# Patient Record
Sex: FEMALE | Race: WHITE | NOT HISPANIC OR LATINO | Employment: UNEMPLOYED | URBAN - METROPOLITAN AREA
[De-identification: names, ages, dates, MRNs, and addresses within clinical notes are randomized per-mention and may not be internally consistent; named-entity substitution may affect disease eponyms.]

---

## 2017-02-14 ENCOUNTER — PATIENT OUTREACH (OUTPATIENT)
Dept: ADMINISTRATIVE | Facility: HOSPITAL | Age: 42
End: 2017-02-14
Payer: COMMERCIAL

## 2017-02-14 DIAGNOSIS — G43.019 INTRACTABLE MIGRAINE WITHOUT AURA AND WITHOUT STATUS MIGRAINOSUS: Primary | ICD-10-CM

## 2017-02-14 RX ORDER — OXYCODONE AND ACETAMINOPHEN 10; 325 MG/1; MG/1
1 TABLET ORAL EVERY 6 HOURS PRN
Qty: 15 TABLET | Refills: 0 | Status: SHIPPED | OUTPATIENT
Start: 2017-02-14 | End: 2017-11-27 | Stop reason: SDUPTHER

## 2017-02-14 NOTE — PROGRESS NOTES
called indicating the patient had intractable headache pain for several days.  Percocet was requested and prescription was sent to the pharmacy.

## 2017-02-28 ENCOUNTER — DOCUMENTATION ONLY (OUTPATIENT)
Dept: INTERNAL MEDICINE | Facility: CLINIC | Age: 42
End: 2017-02-28

## 2017-03-03 ENCOUNTER — LAB VISIT (OUTPATIENT)
Dept: LAB | Facility: HOSPITAL | Age: 42
End: 2017-03-03
Attending: INTERNAL MEDICINE
Payer: COMMERCIAL

## 2017-03-03 DIAGNOSIS — R53.83 FATIGUE, UNSPECIFIED TYPE: ICD-10-CM

## 2017-03-03 DIAGNOSIS — E55.9 VITAMIN D DEFICIENCY: ICD-10-CM

## 2017-03-03 DIAGNOSIS — R42 DIZZINESS: ICD-10-CM

## 2017-03-03 DIAGNOSIS — R42 DIZZINESS: Primary | ICD-10-CM

## 2017-03-03 LAB
25(OH)D3+25(OH)D2 SERPL-MCNC: 26 NG/ML
ALBUMIN SERPL BCP-MCNC: 4 G/DL
ALP SERPL-CCNC: 43 U/L
ALT SERPL W/O P-5'-P-CCNC: 16 U/L
ANION GAP SERPL CALC-SCNC: 8 MMOL/L
AST SERPL-CCNC: 18 U/L
BASOPHILS # BLD AUTO: 0.02 K/UL
BASOPHILS NFR BLD: 0.4 %
BILIRUB SERPL-MCNC: 0.6 MG/DL
BUN SERPL-MCNC: 17 MG/DL
CALCIUM SERPL-MCNC: 9.2 MG/DL
CHLORIDE SERPL-SCNC: 104 MMOL/L
CO2 SERPL-SCNC: 26 MMOL/L
CREAT SERPL-MCNC: 0.7 MG/DL
DIFFERENTIAL METHOD: ABNORMAL
EOSINOPHIL # BLD AUTO: 0.1 K/UL
EOSINOPHIL NFR BLD: 1.3 %
ERYTHROCYTE [DISTWIDTH] IN BLOOD BY AUTOMATED COUNT: 13.9 %
EST. GFR  (AFRICAN AMERICAN): >60 ML/MIN/1.73 M^2
EST. GFR  (NON AFRICAN AMERICAN): >60 ML/MIN/1.73 M^2
GLUCOSE SERPL-MCNC: 78 MG/DL
HCT VFR BLD AUTO: 37.2 %
HGB BLD-MCNC: 11.8 G/DL
LYMPHOCYTES # BLD AUTO: 2 K/UL
LYMPHOCYTES NFR BLD: 37.5 %
MCH RBC QN AUTO: 24.6 PG
MCHC RBC AUTO-ENTMCNC: 31.7 %
MCV RBC AUTO: 78 FL
MONOCYTES # BLD AUTO: 0.3 K/UL
MONOCYTES NFR BLD: 5.1 %
NEUTROPHILS # BLD AUTO: 3 K/UL
NEUTROPHILS NFR BLD: 55.5 %
PLATELET # BLD AUTO: 291 K/UL
PMV BLD AUTO: 9.7 FL
POTASSIUM SERPL-SCNC: 4.2 MMOL/L
PROT SERPL-MCNC: 7.2 G/DL
RBC # BLD AUTO: 4.8 M/UL
SODIUM SERPL-SCNC: 138 MMOL/L
TSH SERPL DL<=0.005 MIU/L-ACNC: 1.41 UIU/ML
WBC # BLD AUTO: 5.31 K/UL

## 2017-03-03 PROCEDURE — 84443 ASSAY THYROID STIM HORMONE: CPT

## 2017-03-03 PROCEDURE — 36415 COLL VENOUS BLD VENIPUNCTURE: CPT | Mod: PO

## 2017-03-03 PROCEDURE — 80053 COMPREHEN METABOLIC PANEL: CPT

## 2017-03-03 PROCEDURE — 82306 VITAMIN D 25 HYDROXY: CPT

## 2017-03-03 PROCEDURE — 85025 COMPLETE CBC W/AUTO DIFF WBC: CPT

## 2017-04-18 ENCOUNTER — TELEPHONE (OUTPATIENT)
Dept: CARDIOLOGY | Facility: CLINIC | Age: 42
End: 2017-04-18

## 2017-04-18 DIAGNOSIS — R10.13 ABDOMINAL PAIN, EPIGASTRIC: Primary | ICD-10-CM

## 2017-04-18 DIAGNOSIS — R10.9 ABDOMINAL PAIN, UNSPECIFIED LOCATION: Primary | ICD-10-CM

## 2017-04-20 ENCOUNTER — TELEPHONE (OUTPATIENT)
Dept: RADIOLOGY | Facility: HOSPITAL | Age: 42
End: 2017-04-20

## 2017-04-28 ENCOUNTER — HOSPITAL ENCOUNTER (OUTPATIENT)
Dept: RADIOLOGY | Facility: HOSPITAL | Age: 42
Discharge: HOME | End: 2017-04-28
Attending: INTERNAL MEDICINE
Payer: COMMERCIAL

## 2017-04-28 DIAGNOSIS — R10.13 ABDOMINAL PAIN, EPIGASTRIC: ICD-10-CM

## 2017-04-28 PROCEDURE — 76700 US EXAM ABDOM COMPLETE: CPT | Mod: 26,,, | Performed by: RADIOLOGY

## 2017-04-28 PROCEDURE — 76700 US EXAM ABDOM COMPLETE: CPT | Mod: TC,PO

## 2017-05-01 ENCOUNTER — TELEPHONE (OUTPATIENT)
Dept: GASTROENTEROLOGY | Facility: CLINIC | Age: 42
End: 2017-05-01

## 2017-05-03 RX ORDER — QUETIAPINE FUMARATE 25 MG/1
25 TABLET, FILM COATED ORAL DAILY
Qty: 30 TABLET | Refills: 11 | Status: SHIPPED | OUTPATIENT
Start: 2017-05-03 | End: 2017-11-24

## 2017-05-25 ENCOUNTER — TELEPHONE (OUTPATIENT)
Dept: INTERNAL MEDICINE | Facility: CLINIC | Age: 42
End: 2017-05-25

## 2017-05-25 DIAGNOSIS — G47.33 OSA (OBSTRUCTIVE SLEEP APNEA): Primary | ICD-10-CM

## 2017-05-25 NOTE — TELEPHONE ENCOUNTER
With her c/o fatigue and daytime sleepiness, her  has observed snoring and sleeping difficulties. With get home sleep study and pulmonary consult. My staff to call pulmonary to see how to order home sleep study.

## 2017-05-26 ENCOUNTER — TELEPHONE (OUTPATIENT)
Dept: PULMONOLOGY | Facility: HOSPITAL | Age: 42
End: 2017-05-26

## 2017-06-19 RX ORDER — DIAZEPAM 5 MG/1
5 TABLET ORAL EVERY 12 HOURS PRN
Qty: 10 TABLET | Refills: 0 | Status: SHIPPED | OUTPATIENT
Start: 2017-06-19 | End: 2018-05-01

## 2017-07-27 ENCOUNTER — OFFICE VISIT (OUTPATIENT)
Dept: SLEEP MEDICINE | Facility: CLINIC | Age: 42
End: 2017-07-27
Payer: COMMERCIAL

## 2017-07-27 DIAGNOSIS — R06.83 PRIMARY SNORING: ICD-10-CM

## 2017-07-27 PROCEDURE — 99499 UNLISTED E&M SERVICE: CPT | Mod: S$GLB,,, | Performed by: INTERNAL MEDICINE

## 2017-07-27 PROCEDURE — 95806 SLEEP STUDY UNATT&RESP EFFT: CPT | Mod: S$GLB,,, | Performed by: INTERNAL MEDICINE

## 2017-07-27 PROCEDURE — 99999 PR PBB SHADOW E&M-EST. PATIENT-LVL I: CPT | Mod: PBBFAC,,,

## 2017-07-27 NOTE — PROGRESS NOTES
Assessment and Recommendations  Data collection Was 7 hours 27 minutes.  Excluded respiratory signal was high: 31 minutes.  Lowest oxygen saturation was 96%.  Average heart rate was 71 bpm with a maximal heart rate of 89 bpm.  Snoring was recorded above 50 dB 47% of the time.  Apnea-hypopnea index/respiratory event index: 1.9 events per hour total events 14  Normal AHI. Primary snoring.  If pretest suspicion for sleep disorder breathing remains significantly high test needs to be  repeated.  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  Comprehensive evaluation in sleep disorder clinic by sleep provider is suggested.

## 2017-09-22 DIAGNOSIS — R00.2 PALPITATIONS: Primary | ICD-10-CM

## 2017-10-06 ENCOUNTER — CLINICAL SUPPORT (OUTPATIENT)
Dept: CARDIOLOGY | Facility: CLINIC | Age: 42
End: 2017-10-06
Payer: COMMERCIAL

## 2017-10-06 DIAGNOSIS — R00.2 PALPITATIONS: ICD-10-CM

## 2017-11-15 ENCOUNTER — OFFICE VISIT (OUTPATIENT)
Dept: OPHTHALMOLOGY | Facility: CLINIC | Age: 42
End: 2017-11-15
Payer: COMMERCIAL

## 2017-11-15 DIAGNOSIS — H52.13 MYOPIA WITH ASTIGMATISM AND PRESBYOPIA, BILATERAL: Primary | ICD-10-CM

## 2017-11-15 DIAGNOSIS — H52.203 MYOPIA WITH ASTIGMATISM AND PRESBYOPIA, BILATERAL: Primary | ICD-10-CM

## 2017-11-15 DIAGNOSIS — H52.4 MYOPIA WITH ASTIGMATISM AND PRESBYOPIA, BILATERAL: Primary | ICD-10-CM

## 2017-11-15 PROCEDURE — 99999 PR PBB SHADOW E&M-EST. PATIENT-LVL I: CPT | Mod: PBBFAC,,, | Performed by: OPTOMETRIST

## 2017-11-15 PROCEDURE — 92310 CONTACT LENS FITTING OU: CPT | Mod: ,,, | Performed by: OPTOMETRIST

## 2017-11-15 PROCEDURE — 92004 COMPRE OPH EXAM NEW PT 1/>: CPT | Mod: S$GLB,,, | Performed by: OPTOMETRIST

## 2017-11-15 PROCEDURE — 92015 DETERMINE REFRACTIVE STATE: CPT | Mod: S$GLB,,, | Performed by: OPTOMETRIST

## 2017-11-15 RX ORDER — FLUOXETINE HYDROCHLORIDE 20 MG/1
20 CAPSULE ORAL DAILY
Refills: 6 | COMMUNITY
Start: 2017-10-24 | End: 2018-02-07 | Stop reason: SDUPTHER

## 2017-11-16 NOTE — PROGRESS NOTES
HPI     Pt's last eye appt with [facility] was with [facility] in 2013. Pt is new to [facility].   Pt states that she is here for a new CLS Rx. Pt does not remember how old   her current CLS Rx is.       Any vision changes since last exam: None  Eye pain: None  Other ocular symptoms: None    Do you wear currently wear glasses or contacts? Contacts    Interested in contacts today? Yes    Do you plan on getting new glasses today? No    Last edited by Ruddy Duckworth, Patient Care Assistant on 11/15/2017  9:23   AM. (History)            Assessment /Plan     For exam results, see Encounter Report.    Myopia with astigmatism and presbyopia, bilateral      Eyeglass Final Rx     Eyeglass Final Rx       Sphere Cylinder Axis Dist VA Add    Right -4.50 +0.25 075 20/20 +1.25    Left -6.00 +0.75 100 20/20 +1.25    Expiration Date:  11/16/2018              Contact Lens Prescription (11/15/2017)        Brand Base Curve Diameter Sphere    Right Acuvue 1 Day Moist 8.50 14.2 -3.50    Left Acuvue 1 Day Moist 8.50 14.2 -4.50    Expiration Date:  11/16/2018    Replacement:  Daily    Wearing Schedule:  Daily wear        Kept cls rx same as before, pt asymptomatic at distance with good va   If increased minus, may decrease near va  Ok +1.00 OTC PRN for near    RTC 1 yr for undilated eye exam or PRN if any problems.   Discussed above and answered questions.

## 2017-11-24 DIAGNOSIS — G43.909 MIGRAINE WITHOUT STATUS MIGRAINOSUS, NOT INTRACTABLE, UNSPECIFIED MIGRAINE TYPE: Primary | ICD-10-CM

## 2017-11-24 RX ORDER — ATENOLOL 25 MG/1
12.5 TABLET ORAL DAILY
Qty: 15 TABLET | Refills: 11 | Status: SHIPPED | OUTPATIENT
Start: 2017-11-24 | End: 2018-07-04

## 2017-11-24 RX ORDER — PROMETHAZINE HYDROCHLORIDE 25 MG/1
25 TABLET ORAL EVERY 4 HOURS
Qty: 30 TABLET | Refills: 2 | Status: SHIPPED | OUTPATIENT
Start: 2017-11-24 | End: 2018-05-01

## 2017-11-27 ENCOUNTER — DOCUMENTATION ONLY (OUTPATIENT)
Dept: NEUROLOGY | Facility: CLINIC | Age: 42
End: 2017-11-27

## 2017-11-27 DIAGNOSIS — G43.019 INTRACTABLE MIGRAINE WITHOUT AURA AND WITHOUT STATUS MIGRAINOSUS: ICD-10-CM

## 2017-11-27 RX ORDER — OXYCODONE AND ACETAMINOPHEN 10; 325 MG/1; MG/1
1 TABLET ORAL EVERY 6 HOURS PRN
Qty: 15 TABLET | Refills: 0 | Status: SHIPPED | OUTPATIENT
Start: 2017-11-27 | End: 2018-05-01

## 2017-11-27 RX ORDER — TRAMADOL HYDROCHLORIDE 100 MG/1
100 TABLET, EXTENDED RELEASE ORAL DAILY
Qty: 10 TABLET | Refills: 0 | Status: SHIPPED | OUTPATIENT
Start: 2017-11-27 | End: 2017-12-07

## 2017-11-28 NOTE — PROGRESS NOTES
Dr. Dimas called that she is in severe migraine. She is a patient of mine for long time . Sometimes she needs , strong pain medications for acute headache control.  Today  Percocet  ,   Prescribed  #15   And Tramadol 100 mg , 10 of them.

## 2017-11-30 ENCOUNTER — OFFICE VISIT (OUTPATIENT)
Dept: NEUROLOGY | Facility: CLINIC | Age: 42
End: 2017-11-30
Payer: COMMERCIAL

## 2017-11-30 DIAGNOSIS — G44.011 INTRACTABLE EPISODIC CLUSTER HEADACHE: Primary | ICD-10-CM

## 2017-11-30 PROCEDURE — 99215 OFFICE O/P EST HI 40 MIN: CPT | Mod: S$GLB,,, | Performed by: PSYCHIATRY & NEUROLOGY

## 2017-11-30 PROCEDURE — 96372 THER/PROPH/DIAG INJ SC/IM: CPT | Mod: S$GLB,,, | Performed by: PSYCHIATRY & NEUROLOGY

## 2017-11-30 RX ORDER — DIVALPROEX SODIUM 500 MG/1
500 TABLET, DELAYED RELEASE ORAL DAILY
Qty: 30 TABLET | Refills: 11 | Status: SHIPPED | OUTPATIENT
Start: 2017-11-30 | End: 2017-12-26

## 2017-11-30 RX ORDER — RIZATRIPTAN BENZOATE 10 MG/1
10 TABLET ORAL
Qty: 9 TABLET | Refills: 6 | Status: SHIPPED | OUTPATIENT
Start: 2017-11-30 | End: 2018-05-01

## 2017-11-30 RX ORDER — KETOROLAC TROMETHAMINE 30 MG/ML
60 INJECTION, SOLUTION INTRAMUSCULAR; INTRAVENOUS
Status: COMPLETED | OUTPATIENT
Start: 2017-11-30 | End: 2017-11-30

## 2017-11-30 RX ORDER — KETOROLAC TROMETHAMINE 30 MG/ML
60 INJECTION, SOLUTION INTRAMUSCULAR; INTRAVENOUS
Qty: 10 ML | Refills: 3 | Status: SHIPPED | OUTPATIENT
Start: 2017-11-30 | End: 2018-05-01

## 2017-11-30 RX ORDER — MELOXICAM 15 MG/1
15 TABLET ORAL DAILY PRN
Qty: 30 TABLET | Refills: 3 | Status: SHIPPED | OUTPATIENT
Start: 2017-11-30 | End: 2017-12-26

## 2017-11-30 RX ADMIN — KETOROLAC TROMETHAMINE 60 MG: 30 INJECTION, SOLUTION INTRAMUSCULAR; INTRAVENOUS at 01:11

## 2017-11-30 NOTE — PROGRESS NOTES
Progress note  Neurology      Neurology follow up:  For: Headache     SUBJECTIVE:      HPI:     HISTORY OF PRESENT ILLNESS:  This is a 42-year-old pleasant lady who is a   patient of mine in the past for headache.  She has headache, very severe.  She   said that when she was pregnant, she did not have headache, but after pregnancy,   she gets headache.  She said that some week she he has headache whole week,   then after that headache breaks down the next week or week 2, she does not have   any, then it starts again.  It is so intense affecting the right side of the   head and temple.  She cannot function.  She becomes totally disabled with this   severe headache.  She cannot tolerate light and noise.  She throws up.  She said   that sometimes her right side of the shoulder get tightened up stiff.  She has   noticed that within the week of headache duration, her headaches last for a   couple of hours and then come back again and it is maintaining the cyclical   pattern for a long time.  She has been tried with many medications with   migraine, but she did not persistently taking any medication, no preventives.    Doctor put her on Prozac, which did help and also she is on atenolol for some   palpitations.  Otherwise, all medications are p.r.n. during the headache time   only.  Dr. Dimas mentioned that one time she had headache, but oxygen did help.    Last time, Depakote was given, but she did not start.    She came to the clinic today with mild headache, but in the clinic, she started   having severe headache.  She was very restless, doing up and down with headache,   so we had to give her oxygen 5-7 liters per minute for 5 minutes, then she also   got 60 mg Toradol injections slowly it improved.  She start talking too and   give the history.  No family history of cluster headache and she said that   sometimes she has tears of the eye, her right side get first.      Past Medical History:   Diagnosis Date     Endometriosis     Migraines      Past Surgical History:   Procedure Laterality Date    APPENDECTOMY      diagnostic laprascopy      robotic assisted laparoscopy with excision of ovarian endometrioma and chromotubation       Family History   Problem Relation Age of Onset    Strabismus Neg Hx     Retinal detachment Neg Hx     Macular degeneration Neg Hx     Glaucoma Neg Hx     Blindness Neg Hx     Amblyopia Neg Hx     Breast cancer Neg Hx     Colon cancer Neg Hx     Ovarian cancer Neg Hx      Social History   Substance Use Topics    Smoking status: Never Smoker    Smokeless tobacco: Never Used    Alcohol use Yes      Comment: rarely       Review of patient's allergies indicates:   Allergen Reactions    Hydrocodone Other (See Comments)     Chest pains      Patient Active Problem List   Diagnosis    Cyst of left ovary    Primary snoring       Review of Systems   Constitutional: Negative for fever and weight loss.   HENT: Negative for hearing loss.    Eyes: Negative for blurred vision, double vision, photophobia and pain.   Respiratory: Negative for cough.    Cardiovascular: Negative for chest pain.   Gastrointestinal: Negative for abdominal pain, nausea and vomiting.   Genitourinary: Negative for dysuria, frequency and urgency.   Musculoskeletal: Negative.  Negative for back pain, falls, joint pain, myalgias and neck pain.   Skin: Negative for itching and rash.   Neurological: Positive for headaches. Negative for tingling.   Psychiatric/Behavioral: Positive for memory loss. Negative for depression. The patient is nervous/anxious and has insomnia.            OBJECTIVE:     Physical Exam   Constitutional: She is oriented to person, place, and time. She appears well-developed and well-nourished.   HENT:   Head: Normocephalic and atraumatic.   Eyes: Conjunctivae and EOM are normal. Pupils are equal, round, and reactive to light.   Neck: Normal range of motion. Neck supple. No JVD present. No tracheal  deviation present. No thyromegaly present.   Cardiovascular: Normal rate, regular rhythm and normal heart sounds.    Pulmonary/Chest: Effort normal and breath sounds normal.   Abdominal: She exhibits no distension. There is no tenderness.   Musculoskeletal: Normal range of motion. She exhibits no edema or tenderness.   Neurological: She is alert and oriented to person, place, and time. She has normal strength and normal reflexes. She displays normal reflexes. No cranial nerve deficit or sensory deficit. She exhibits normal muscle tone. She displays a negative Romberg sign. Coordination and gait normal.   Reflex Scores:       Tricep reflexes are 2+ on the right side and 2+ on the left side.       Bicep reflexes are 2+ on the right side and 2+ on the left side.       Brachioradialis reflexes are 2+ on the right side and 2+ on the left side.       Patellar reflexes are 2+ on the right side and 2+ on the left side.       Achilles reflexes are 2+ on the right side and 2+ on the left side.  Skin: Skin is warm and dry. No rash noted.   Psychiatric: Her speech is normal and behavior is normal. Judgment and thought content normal. Her mood appears anxious. She exhibits abnormal recent memory. She is attentive.         Strength  Deltoids Triceps Biceps Wrist Extension Wrist Flexion Hand    Upper: R 5/5 5/5 5/5 5/5 5/5 5/5    L 5/5 5/5 5/5 5/5 5/5 5/5     Iliopsoas Quadriceps Knee  Flexion Tibialis  anterior Gastro- cnemius EHL   Lower: R 5/5 5/5 5/5 5/5 5/5 5/5    L 5/5 5/5 5/5 5/5 5/5 5/5         Laboratory:  Lab Results   Component Value Date    WBC 5.31 03/03/2017    HGB 11.8 (L) 03/03/2017    HCT 37.2 03/03/2017     03/03/2017    CHOL 151 05/26/2016    TRIG 64 05/26/2016    HDL 48 05/26/2016    ALT 16 04/28/2017    AST 19 04/28/2017     04/28/2017    K 4.2 04/28/2017     04/28/2017    CREATININE 0.8 04/28/2017    BUN 16 04/28/2017    CO2 26 04/28/2017    TSH 1.410 03/03/2017                  ASSESSMENT/PLAN:     Assessment:     Encounter Diagnosis   Name Primary?    Intractable episodic cluster headache Yes       Patient Active Problem List   Diagnosis    Cyst of left ovary    Primary snoring         Plan:Intractable episodic cluster headache  -     OXYGEN FOR HOME USE    Other orders  -     divalproex (DEPAKOTE) 500 MG TbEC; Take 1 tablet (500 mg total) by mouth once daily.  Dispense: 30 tablet; Refill: 11  -     meloxicam (MOBIC) 15 MG tablet; Take 1 tablet (15 mg total) by mouth daily as needed for Pain.  Dispense: 30 tablet; Refill: 3  -     rizatriptan (MAXALT) 10 MG tablet; Take 1 tablet (10 mg total) by mouth as needed for Migraine.  Dispense: 9 tablet; Refill: 6  -     ketorolac injection 60 mg; Inject 2 mLs (60 mg total) into the muscle one time.  -     ketorolac 60 mg/2 mL Crtg; Inject 60 mg into the muscle every 72 hours as needed.  Dispense: 10 mL; Refill: 3

## 2017-12-05 ENCOUNTER — TELEPHONE (OUTPATIENT)
Dept: NEUROLOGY | Facility: CLINIC | Age: 42
End: 2017-12-05

## 2017-12-05 ENCOUNTER — OFFICE VISIT (OUTPATIENT)
Dept: PAIN MEDICINE | Facility: CLINIC | Age: 42
End: 2017-12-05
Payer: COMMERCIAL

## 2017-12-05 VITALS
DIASTOLIC BLOOD PRESSURE: 74 MMHG | WEIGHT: 145 LBS | BODY MASS INDEX: 21.98 KG/M2 | SYSTOLIC BLOOD PRESSURE: 115 MMHG | HEART RATE: 77 BPM | HEIGHT: 68 IN

## 2017-12-05 DIAGNOSIS — M79.18 MYOFASCIAL MUSCLE PAIN: Primary | ICD-10-CM

## 2017-12-05 PROCEDURE — 99204 OFFICE O/P NEW MOD 45 MIN: CPT | Mod: S$GLB,,, | Performed by: ANESTHESIOLOGY

## 2017-12-05 PROCEDURE — 99999 PR PBB SHADOW E&M-EST. PATIENT-LVL III: CPT | Mod: PBBFAC,,, | Performed by: ANESTHESIOLOGY

## 2017-12-05 NOTE — LETTER
December 5, 2017      JEREMY Gamez Jr., MD  [address] [place] [address]           [facility]  [address] [place] [address]  Phone: [phone]  Fax: [phone]          Patient: Sandra Dimas   MR Number: [id number]   YOB: 1975   Date of Visit: 12/5/2017       Dear Dr. JEREMY Gamez Jr.:    Thank you for referring Sandra Dimas to me for evaluation. Attached you will find relevant portions of my assessment and plan of care.    If you have questions, please do not hesitate to call me. I look forward to following Sandra Dimas along with you.    Sincerely,    Joe Thomas MD    Enclosure  CC:  No Recipients    If you would like to receive this communication electronically, please contact [email or web address] or [phone] to request more information on [employer] Link access.    For providers and/or their staff who would like to refer a patient to [facility], please contact us through our one-stop-shop provider referral line, [facility], at [phone].    If you feel you have received this communication in error or would no longer like to receive these types of communications, please e-mail [email or web address]

## 2017-12-05 NOTE — PROGRESS NOTES
Chief Pain Complaint:  Neck Pain (right)        History of Present Illness:   Sandra Dimas is a 42 y.o. female  who is presenting with a chief complaint of Neck Pain (right)  . The patient began experiencing this problem insidiously, and the pain has been gradually worsening over the past 1 year(s). The pain is described as throbbing, shooting, aching and heavy and is located in the right cervival spine. Pain is intermittent and lasts hours. The pain radiates to rigth temporal and behing the rigth eye. The patient rates her pain a 10 out of ten and interferes with activities of daily living a 9 out of ten. Pain may trigger migraine headache, and is improved by Percocet. Patient reports no prior trauma, no prior spinal surgery     - pertinent negatives: No fever, No chills, No weight loss, No bladder dysfunction, No bowel dysfunction, No extremity weakness, No saddle anesthesia  - pertinent positives: none    - medications, other therapies tried (physical therapy, injections):     >> NSAIDs, Tylenol, Percocet and zanaflex    >> Has previously undergone Physical Therapy    >> Has NOT previously undergone spinal injection/s      Imaging / Labs / Studies (reviewed on 12/5/2017):    Results for orders placed in visit on 09/07/10   X-Ray Cervical Spine Complete 5 view    Narrative DATE OF EXAM: Sep 14 2010      [facility]   0134  -  C-SPINE 4VWS MIN AP LAT BOTH OBL:     [id number]     CLINICAL HISTORY:   723.1 0 CERVICALGIA     PROCEDURE COMMENT:        ICD 9 CODE(S):   ()     CPT 4 CODE(S)/MODIFIER(S):   ()     RESULTS: STUDY SHOWS THAT THE DISC SPACES ARE WELL MAINTAINED.  CERVICAL   VERTEBRAL BODY HEIGHTS ARE NORMAL.  THERE ARE NO FRACTURES OR   INTRAOSSEOUS LESIONS.       IMPRESSION: NORMAL CERVICAL SPINE SERIES.          :    Transcribe Date/Time: Sep 15 2010  6:52A  Dictated by : BEBE MAYORGA DR  Read On: Sep 14 2010  4:26P  BEBE MAYORGA M.D. [id number]   Images were reviewed, findings were verified  "and document was   electronically  SIGNED BY: BEBE MAYORGA DR On: Sep 15 2010  9:31A          Review of Systems:  CONSTITUTIONAL: patient denies any fever, chills, or weight loss  SKIN: patient denies any rash or itching  RESPIRATORY: patient denies having any shortness of breath  GASTROINTESTINAL: patient denies having any diarrhea, constipation, or bowel incontinence  GENITOURINARY: patient denies having any abnormal bladder function    MUSCULOSKELETAL:  - patient complains of the above noted pain/s (see chief pain complaint)    NEUROLOGICAL:   - pain as above  - strength in Upper extremities is intact, BILATERALLY  - sensation in Upper extremities is intact, BILATERALLY  - patient denies any loss of bowel or bladder control      PSYCHIATRIC: patient denies any change in mood    Other:  All other systems reviewed and are negative      Physical Exam:  /74 (BP Location: Right arm, Patient Position: Sitting)   Pulse 77   Ht 5' 8" (1.727 m)   Wt 65.8 kg (145 lb)   BMI 22.05 kg/m²  (reviewed on 12/5/2017)  General: Alert and oriented, in no apparent distress.  Gait: normal gait.  Skin: No rashes, No discoloration, No obvious lesions  HEENT: Normocephalic, atraumatic. Pupils equal and round.  Cardiovascular: Regular rate and rhythm , no significant peripheral edema present  Respiratory: Without audible wheezing, without use of accessory muscles of respiration.    Musculoskeletal:    Cervical Spine    - Pain on flexion of cervical spine Absent  - Spurling's Test:  Absent    - Pain on extension of cervical spine Absent  - TTP over the cervical facet joints Absent  - Cervical facet loading Absent    -TTP over the right and left para cervical and trapezius muscles.      Neuro:    Strength:  UE R/L: D: 5/5; B: 5/5; T: 5/5; WF: 5/5; WE: 5/5; IO: 5/5;    Extremity Reflexes: Brisk and symmetric throughout.      Extremity Sensory: Sensation to pinprick and temperature symmetric. Proprioception intact.      Psych:  " Mood and affect is appropriate      Assessment:    Sandra Dimas is a 42 y.o. year old female who is presenting with     Encounter Diagnosis   Name Primary?    Myofascial muscle pain Yes       Plan:    1. Interventional: Patient had Semispinalis Capitis and Trapezius Muscle TPI done in clinic    2. Pharmacologic: Continue Meloxicam and Percocet PRN as prescribed by primary.     3. Rehabilitative: Internal Referral to PT    4. Diagnostic: None for now.     5. Follow up: Return in about 4 weeks (around 1/2/2018).      30 minutes were spent in this encounter with more than 50% of the time used for counseling and review of the plan.  Imaging / studies reviewed, detailed above.  I discussed in detail the risks, benefits, and alternatives to any and all potential treatment options.  All questions and concerns were fully addressed today in clinic. Medical decision making moderate.    Thank you for the opportunity to assist in the care of this patient.    Best wishes,    Signed:    Joe Thomas MD    PROCEDURE NOTE    Diagnosis: Myofascial pain  Procedure: trigger point injections to Semispinalis Capitis and Trapezius Muscle     Risks and benefits of procedure explained to patient including risks of infection, bleeding, pain, or damage to surrounding tissues. All questions answered. Informed consent obtained prior to proceeding. Areas marked and prepped in sterile fashion. Using a 27g 1.25inch needle, a 4 cc mixture of NS and 4 cc of 1% lidocaine was injected evenly into the above mentioned muscles. None to minimal bleeding noted. ER and post injection instructions given.    Joe Thomas M.D.        Disclaimer:  This note may have been prepared using voice recognition software, it may have not been extensively proofed, as such there could be errors within the text such as sound alike errors.

## 2017-12-05 NOTE — PATIENT INSTRUCTIONS
Myofascial Pain Syndrome  Your pain is caused by a state of chronic muscle tension. This condition is called by various names: myofascial pain, fibrositis, and trigger point pain. This can also be due to mechanical stress, such as working at a computer terminal for long periods or work that requires repetitive motions of the arms or hands. It can also be caused by emotional stress, such as problems on the job or in your personal life. Sometimes there is no obvious cause. The pain can happen in the area of the muscle spasm or at a site distant to it. For example, spasm of a neck muscle can cause headache. Spasm of the muscle near the shoulder blade can cause pain shooting down the arm.  Home care  · Try to identify the factors that may be causing your problem and change them:  ¨ If you feel that emotional stress is a cause of your pain, learn methods to better deal with the stress in your life. These may include regular exercise, muscle relaxation techniques, meditation, or simply taking time out for yourself. Talk with your healthcare provider or go to a local bookstore and review the many books and tapes about reducing stress.  ¨ If you feel that physical stress is a cause for your pain, try to change any poor work habits.  · You may use over-the-counter pain medicine to control pain, unless another medicine was prescribed. If you have chronic liver or kidney disease or ever had a stomach ulcer or GI bleeding, talk with your healthcare provider before using these medicines.  · Apply an ice pack over the injured area for 15 to 20 minutes every 3 to 6 hours. You should do this for the first 24 to 48 hours. You can make an ice pack by filling a plastic bag that seals at the top with ice cubes and then wrapping it with a thin towel. Be careful not to injure your skin with the ice treatments. Ice should never be applied directly to skin. Continue the use of ice packs for relief of pain and swelling as needed. After 48  hours, apply heat (warm shower or warm bath) for 15 to 20 minutes several times a day, or alternate ice and heat.  · Massage the trigger point and stretch out the muscle. Trigger point massage can be done by applying heat to the area to warm and prepare the muscle. Then have someone apply steady thumb pressure directly on the knot in the muscle for 30 seconds. Release the pressure, then massage the surrounding muscle. Repeat the process, applying more pressure to the trigger point each time. Do this up to the limit of pain. With each treatment, the trigger point should become less tender and the pain should decrease. You can apply local pressure to trigger points in the back by lying on the floor with a tennis ball under the trigger point.  Follow-up care  Follow up with your healthcare provider, or as advised. It may be necessary for you to receive physical therapy if you dont respond to home treatment alone.  Call 911  Call 911 if you have:  · A trigger point in the chest muscles, pain that becomes more severe, lasts longer, or spreads into your shoulder, arm, or jaw  · Chest pain or discomfort  · Trouble breathing with or without chest discomfort   · Sweating, lightheadedness, nausea, or vomiting along with chest discomfort  · Sudden weakness in the arm, leg, or face, especially if this happens on one side of the body  When to seek medical advice  Call your healthcare provider right away if any of these occur:  · A week passes and you have not improved  · If you develop weakness or numbness in an extremity  · If your pain worsens, regardless of its location  Date Last Reviewed: 11/23/2015  © 0698-4422 [employer]. [address] [place] [address], [place] [address]. All rights reserved. This information is not intended as a substitute for professional medical care. Always follow your healthcare professional's instructions.

## 2017-12-05 NOTE — TELEPHONE ENCOUNTER
Called to check the status of patient's home Oxygen. The authorization is still being worked on to be approved. She will call with updated status.

## 2017-12-07 ENCOUNTER — OFFICE VISIT (OUTPATIENT)
Dept: INTERNAL MEDICINE | Facility: CLINIC | Age: 42
End: 2017-12-07
Payer: COMMERCIAL

## 2017-12-07 VITALS
DIASTOLIC BLOOD PRESSURE: 70 MMHG | SYSTOLIC BLOOD PRESSURE: 90 MMHG | RESPIRATION RATE: 16 BRPM | BODY MASS INDEX: 21.87 KG/M2 | TEMPERATURE: 98 F | HEART RATE: 90 BPM | WEIGHT: 143.88 LBS

## 2017-12-07 DIAGNOSIS — J02.9 PHARYNGITIS, UNSPECIFIED ETIOLOGY: Primary | ICD-10-CM

## 2017-12-07 LAB
DEPRECATED S PYO AG THROAT QL EIA: NEGATIVE
FLUAV AG SPEC QL IA: NEGATIVE
FLUBV AG SPEC QL IA: NEGATIVE
SPECIMEN SOURCE: NORMAL

## 2017-12-07 PROCEDURE — 99999 PR PBB SHADOW E&M-EST. PATIENT-LVL II: CPT | Mod: PBBFAC,,, | Performed by: PEDIATRICS

## 2017-12-07 PROCEDURE — 99213 OFFICE O/P EST LOW 20 MIN: CPT | Mod: S$GLB,,, | Performed by: PEDIATRICS

## 2017-12-07 PROCEDURE — 87880 STREP A ASSAY W/OPTIC: CPT | Mod: PO

## 2017-12-07 PROCEDURE — 87400 INFLUENZA A/B EACH AG IA: CPT | Mod: PO

## 2017-12-07 PROCEDURE — 87081 CULTURE SCREEN ONLY: CPT

## 2017-12-07 NOTE — PROGRESS NOTES
Subjective:       Patient ID: Sandra Dimas is a 42 y.o. female.    Chief Complaint: No chief complaint on file.    Today with irritated throat and feeling of hand over throat. Clearing throat, no SOB, mild cough. Had injection 2 days ago. No fever, no uri. No other meds or new foods.      Review of Systems   Constitutional: Negative for fever and unexpected weight change.   HENT: Positive for sore throat. Negative for congestion and rhinorrhea.    Eyes: Negative for discharge and redness.   Respiratory: Positive for choking. Negative for cough, shortness of breath and wheezing.    Cardiovascular: Negative for chest pain.   Gastrointestinal: Negative for constipation, diarrhea and vomiting.   Genitourinary: Negative for decreased urine volume, difficulty urinating and menstrual problem.   Musculoskeletal: Negative for arthralgias and joint swelling.   Skin: Negative for rash and wound.   Neurological: Negative for syncope and headaches.   Psychiatric/Behavioral: Negative for behavioral problems and sleep disturbance.       Objective:      Physical Exam   Constitutional: She is oriented to person, place, and time. She appears well-developed and well-nourished. She appears distressed (constantkly clearing throat, mildly anxious. speaks uninterupted voice, not hoarse).   HENT:   Head: Normocephalic and atraumatic.   Right Ear: Tympanic membrane and external ear normal.   Left Ear: Tympanic membrane and external ear normal.   Nose: Nose normal.   Mouth/Throat: Uvula is midline, oropharynx is clear and moist and mucous membranes are normal. Normal dentition. No oropharyngeal exudate (mildly irritated .).   Eyes: Conjunctivae, EOM and lids are normal. Pupils are equal, round, and reactive to light.   Neck: Trachea normal and normal range of motion. Neck supple. No JVD present. No thyromegaly present.   All muscle loose, not in spasm.   Cardiovascular: Normal rate, regular rhythm, S1 normal, S2 normal, normal heart  sounds and normal pulses.  Exam reveals no gallop and no friction rub.    No murmur heard.  Pulmonary/Chest: Effort normal and breath sounds normal. She has no wheezes. She has no rales.   Abdominal: Soft. Normal appearance and bowel sounds are normal. She exhibits no mass. There is no hepatosplenomegaly. There is no tenderness. There is no rebound and no guarding.   Lymphadenopathy:     She has no cervical adenopathy.   Neurological: She is alert and oriented to person, place, and time. She has normal strength. No cranial nerve deficit. Coordination and gait normal.   Skin: Skin is warm and intact. No rash noted.   Psychiatric: She has a normal mood and affect. Her speech is normal and behavior is normal. Judgment and thought content normal.       Assessment:       1. Pharyngitis, unspecified etiology        Plan:       Pharyngitis, unspecified etiology  -     Throat Screen, Rapid; Future  -     Influenza antigen Nasopharyngeal Swab    I think she is about to come down with viral illness. Cool mist, warm salt water gargles, warm compresses to neck. If worsen to ER. D/W patient and .

## 2017-12-11 LAB — BACTERIA THROAT CULT: NORMAL

## 2017-12-13 ENCOUNTER — OFFICE VISIT (OUTPATIENT)
Dept: OTOLARYNGOLOGY | Facility: CLINIC | Age: 42
End: 2017-12-13
Payer: COMMERCIAL

## 2017-12-13 VITALS
HEART RATE: 68 BPM | DIASTOLIC BLOOD PRESSURE: 69 MMHG | SYSTOLIC BLOOD PRESSURE: 104 MMHG | WEIGHT: 141.13 LBS | TEMPERATURE: 98 F | BODY MASS INDEX: 21.39 KG/M2 | HEIGHT: 68 IN

## 2017-12-13 DIAGNOSIS — K21.9 LPRD (LARYNGOPHARYNGEAL REFLUX DISEASE): Primary | ICD-10-CM

## 2017-12-13 DIAGNOSIS — R09.A2 GLOBUS SENSATION: ICD-10-CM

## 2017-12-13 PROCEDURE — 99999 PR PBB SHADOW E&M-EST. PATIENT-LVL III: CPT | Mod: PBBFAC,,, | Performed by: ORTHOPAEDIC SURGERY

## 2017-12-13 PROCEDURE — 31575 DIAGNOSTIC LARYNGOSCOPY: CPT | Mod: S$GLB,,, | Performed by: ORTHOPAEDIC SURGERY

## 2017-12-13 PROCEDURE — 99204 OFFICE O/P NEW MOD 45 MIN: CPT | Mod: 25,S$GLB,, | Performed by: ORTHOPAEDIC SURGERY

## 2017-12-13 NOTE — LETTER
December 14, 2017      Luz Dimas MD  [address] [place] [address]           [facility]  [address] [place] [address]  Phone: [phone]  Fax: [phone]          Patient: Sandra Dimas   MR Number: [id number]   YOB: 1975   Date of Visit: 12/13/2017       Dear Dr. Luz Dimas:    Thank you for referring Sandra Dimas to me for evaluation. Attached you will find relevant portions of my assessment and plan of care.    If you have questions, please do not hesitate to call me. I look forward to following Sandra Dimas along with you.    Sincerely,    Tory Cooney MD    Enclosure  CC:  No Recipients    If you would like to receive this communication electronically, please contact [email or web address] or [phone] to request more information on [employer] access.    For providers and/or their staff who would like to refer a patient to [facility], please contact us through our one-stop-shop provider referral line, [facility], at [phone].    If you feel you have received this communication in error or would no longer like to receive these types of communications, please e-mail [email or web address]

## 2017-12-14 NOTE — PROGRESS NOTES
Subjective:       Patient ID: Sandra Dimas is a 42 y.o. female.    Chief Complaint: Other (throat mass/feels as if someone is choking her/going on for 1 wk)    Patient is a very pleasant 42 year old female here to see me today for the first time for evaluation of a sensation of tightness in her throat.  She says that she had several lidocaine injection in her posterior occiput several weeks ago for migraines, and since then has been having a feeling of squeezing in her throat.  She is able to swallow both solids and liquids without difficulty or pain.  She denies any reflux symptoms.  She is a non-smoker.  She does note that when she lays down this sensation makes her feel as if the is having difficulty breathing and also causes her to have anxiety.      Review of Systems   Constitutional: Negative for chills, fatigue, fever and unexpected weight change.   HENT: Positive for voice change. Negative for congestion, dental problem, ear discharge, ear pain, facial swelling, hearing loss, nosebleeds, postnasal drip, rhinorrhea, sinus pressure, sneezing, sore throat, tinnitus and trouble swallowing (globus as above).    Eyes: Negative for redness, itching and visual disturbance.   Respiratory: Positive for cough. Negative for choking, shortness of breath and wheezing.    Cardiovascular: Negative for chest pain and palpitations.   Gastrointestinal: Negative for abdominal pain.        No reflux.   Musculoskeletal: Negative for gait problem.   Skin: Negative for rash.   Neurological: Positive for headaches. Negative for dizziness and light-headedness.       Objective:      Physical Exam   Constitutional: She is oriented to person, place, and time. She appears well-developed and well-nourished. No distress.   HENT:   Head: Normocephalic and atraumatic.   Right Ear: Tympanic membrane, external ear and ear canal normal.   Left Ear: Tympanic membrane, external ear and ear canal normal.   Nose: Nose normal. No mucosal edema,  rhinorrhea, nasal deformity or septal deviation. No epistaxis. Right sinus exhibits no maxillary sinus tenderness and no frontal sinus tenderness. Left sinus exhibits no maxillary sinus tenderness and no frontal sinus tenderness.   Mouth/Throat: Uvula is midline, oropharynx is clear and moist and mucous membranes are normal. Mucous membranes are not pale and not dry. No dental caries. No oropharyngeal exudate or posterior oropharyngeal erythema.   Eyes: Conjunctivae, EOM and lids are normal. Pupils are equal, round, and reactive to light. Right eye exhibits no chemosis. Left eye exhibits no chemosis. Right conjunctiva is not injected. Left conjunctiva is not injected. No scleral icterus. Right eye exhibits normal extraocular motion and no nystagmus. Left eye exhibits normal extraocular motion and no nystagmus.   Neck: Trachea normal and phonation normal. No tracheal tenderness present. No tracheal deviation present. No thyroid mass and no thyromegaly present.   Cardiovascular: Intact distal pulses.    Pulmonary/Chest: Effort normal. No stridor. No respiratory distress.   Abdominal: She exhibits no distension.   Lymphadenopathy:        Head (right side): No submental, no submandibular, no preauricular, no posterior auricular and no occipital adenopathy present.        Head (left side): No submental, no submandibular, no preauricular, no posterior auricular and no occipital adenopathy present.     She has no cervical adenopathy.   Neurological: She is alert and oriented to person, place, and time. No cranial nerve deficit.   Skin: Skin is warm and dry. No rash noted. No erythema.   Psychiatric: She has a normal mood and affect. Her behavior is normal.       Due to indication in patient's history, presentation or risk factors,  a fiber optic exam was performed.    SEPARATE PROCEDURE NOTE:    ANESTHESIA:  Topical xylocaine with ney-synephrine  FINDINGS:  Moderate to severe inaterarytenoid erythema and  edema    PROCEDURE:  After verbal consent was obtained, the flexible scope was passed through the patient's nasal cavity without difficulty.  The nasopharynx (adenoid pad) and eustachian tube orifices were first visualized and were found to be normal, without masses or irregularity.  The posterior pharyngeal wall and base of tongue were then examined and no mass or irregular tissue was seen.  The scope was then advanced to the larynx, and the epiglottis, valleculae, and piriform sinuses were normal, without masses or mucosal irregularity.  The false vocal folds and true vocal folds were then examined and were found to have normal mobility (full abduction and adduction) and no masses or mucosal irregularity was seen.  The interartyenoid area had moderate to severe edema and erythema consistent with refulx.          Assessment:       1. LPRD (laryngopharyngeal reflux disease)    2. Globus sensation        Plan:       1.  LPRD:  She does have signs of reflux on her posterior glottis today.  I would recommend a trial of an antacid for 4-6 weeks, prescription for Prilosec 40 mg sent to her pharmacy.  If her symptoms return when stopping the antacid, then she will discuss with her PCP or GI long term risks and benefits of oral antacid use.  I also gave her a handout detailing different lifestyle and dietary changes to help with reflux as well.  2.  Globus sensation: If not improved with above, would then order MBSS +/- GI consult.  She will call in 2-3 weeks with her progress.

## 2017-12-26 ENCOUNTER — PATIENT MESSAGE (OUTPATIENT)
Dept: NEPHROLOGY | Facility: CLINIC | Age: 42
End: 2017-12-26

## 2017-12-26 RX ORDER — PANTOPRAZOLE SODIUM 40 MG/1
40 TABLET, DELAYED RELEASE ORAL DAILY
Qty: 30 TABLET | Refills: 11 | Status: SHIPPED | OUTPATIENT
Start: 2017-12-26 | End: 2018-05-22 | Stop reason: SDUPTHER

## 2018-02-07 ENCOUNTER — HOSPITAL ENCOUNTER (OUTPATIENT)
Dept: RADIOLOGY | Facility: HOSPITAL | Age: 43
Discharge: HOME | End: 2018-02-07
Attending: PEDIATRICS
Payer: COMMERCIAL

## 2018-02-07 DIAGNOSIS — Z12.31 ENCOUNTER FOR SCREENING MAMMOGRAM FOR MALIGNANT NEOPLASM OF BREAST: ICD-10-CM

## 2018-02-07 PROCEDURE — 77063 BREAST TOMOSYNTHESIS BI: CPT | Mod: 26,,, | Performed by: RADIOLOGY

## 2018-02-07 PROCEDURE — 77067 SCR MAMMO BI INCL CAD: CPT | Mod: TC,PO

## 2018-02-07 PROCEDURE — 77067 SCR MAMMO BI INCL CAD: CPT | Mod: 26,,, | Performed by: RADIOLOGY

## 2018-02-07 RX ORDER — FLUOXETINE HYDROCHLORIDE 40 MG/1
40 CAPSULE ORAL DAILY
Qty: 30 CAPSULE | Refills: 6 | Status: SHIPPED | OUTPATIENT
Start: 2018-02-07 | End: 2018-05-01

## 2018-02-27 ENCOUNTER — HOSPITAL ENCOUNTER (EMERGENCY)
Facility: HOSPITAL | Age: 43
Discharge: HOME | End: 2018-02-27
Attending: EMERGENCY MEDICINE
Payer: COMMERCIAL

## 2018-02-27 VITALS
HEIGHT: 68 IN | TEMPERATURE: 97 F | RESPIRATION RATE: 20 BRPM | DIASTOLIC BLOOD PRESSURE: 84 MMHG | WEIGHT: 144 LBS | OXYGEN SATURATION: 99 % | SYSTOLIC BLOOD PRESSURE: 123 MMHG | HEART RATE: 73 BPM | BODY MASS INDEX: 21.82 KG/M2

## 2018-02-27 DIAGNOSIS — M54.10 RADICULOPATHY AFFECTING UPPER EXTREMITY: Primary | ICD-10-CM

## 2018-02-27 PROCEDURE — 99283 EMERGENCY DEPT VISIT LOW MDM: CPT | Mod: 25

## 2018-02-27 PROCEDURE — 96372 THER/PROPH/DIAG INJ SC/IM: CPT

## 2018-02-27 PROCEDURE — 63600175 PHARM REV CODE 636 W HCPCS: Performed by: EMERGENCY MEDICINE

## 2018-02-27 RX ORDER — KETOROLAC TROMETHAMINE 30 MG/ML
60 INJECTION, SOLUTION INTRAMUSCULAR; INTRAVENOUS
Status: COMPLETED | OUTPATIENT
Start: 2018-02-27 | End: 2018-02-27

## 2018-02-27 RX ADMIN — KETOROLAC TROMETHAMINE 60 MG: 30 INJECTION, SOLUTION INTRAMUSCULAR; INTRAVENOUS at 06:02

## 2018-02-28 NOTE — ED PROVIDER NOTES
SCRIBE #1 NOTE: I, Brenda Shah, am scribing for, and in the presence of, Kale Jiménez MD. I have scribed the entire note.      History      Chief Complaint   Patient presents with    Shoulder Pain     pt c/o pain and tingling to Lt shoulder after bending down to pick something up this evening       Review of patient's allergies indicates:   Allergen Reactions    Hydrocodone Other (See Comments)     Chest pains        HPI   HPI    2/27/2018, 6:28 PM   History obtained from the patient and spouse      History of Present Illness: Sandra Dimas is a 42 y.o. female patient who presents to the Emergency Department for L shoulder pain which onset suddenly PTA while pt's spouse was helping pt stand up from a kneeling position. Symptoms are constant and moderate in severity. Movement exacerbates sxs, no mitigating factors reported.  Associated sxs include LUE numbness. Patient denies any fever, chills, falls, trauma, extremity weakness, CP, palpitations, SOB, joint swelling, neck pain, erythema, ecchymosis, and all other sxs at this time. No further complaints or concerns at this time.       Arrival mode: Personal vehicle      PCP: OSIRIS Gamez Jr, MD       Past Medical History:  Past Medical History:   Diagnosis Date    Endometriosis     Migraines        Past Surgical History:  Past Surgical History:   Procedure Laterality Date    APPENDECTOMY      diagnostic laprascopy      robotic assisted laparoscopy with excision of ovarian endometrioma and chromotubation           Family History:  Family History   Problem Relation Age of Onset    Strabismus Neg Hx     Retinal detachment Neg Hx     Macular degeneration Neg Hx     Glaucoma Neg Hx     Blindness Neg Hx     Amblyopia Neg Hx     Breast cancer Neg Hx     Colon cancer Neg Hx     Ovarian cancer Neg Hx     Thyroid disease Neg Hx     Migraines Neg Hx     Asthma Neg Hx        Social History:  Social History     Social History Main Topics     Smoking status: Never Smoker    Smokeless tobacco: Never Used    Alcohol use Yes      Comment: rarely    Drug use: No    Sexual activity: Yes     Partners: Male       ROS   Review of Systems   Constitutional: Negative for chills and fever.   HENT: Negative for sore throat.    Respiratory: Negative for shortness of breath.    Cardiovascular: Negative for chest pain and palpitations.   Gastrointestinal: Negative for nausea and vomiting.   Genitourinary: Negative for dysuria.   Musculoskeletal: Positive for arthralgias (L shoulder). Negative for joint swelling, neck pain and neck stiffness.   Skin: Negative for color change and rash.   Neurological: Positive for numbness (LUE). Negative for weakness.   Hematological: Does not bruise/bleed easily.   All other systems reviewed and are negative.    Physical Exam      Initial Vitals [02/27/18 1825]   BP Pulse Resp Temp SpO2   123/84 73 20 97.4 °F (36.3 °C) 99 %      MAP       97          Physical Exam  Nursing Notes and Vital Signs Reviewed.  Constitutional: Patient is in no acute distress. Well-developed and well-nourished.  Head: Normocephalic.  Eyes: No scleral icterus.  Neck: Supple. Full ROM. No lymphadenopathy. No trapezius tenderness.   Musculoskeletal: No clavicular tenderness. Moves all extremities.   LUE/shoulder: Tenderness to deltoid muscles. Palpation of shoulder caused radiating numbness/tingling down LUE. Elbow intact. Wrist intact. has no evident deformity. Negative for swelling. ROM is decreased secondary to pain. Cap refill distally is <2 seconds. Radial pulses are equal and 2+ bilaterally. No motor deficit. Distal sensation intact but mildly decreased per pt..  Skin: Warm and dry.  Neurological:  Alert, awake, and appropriate.  Normal speech.  No acute focal neurological deficits are appreciated.  Psychiatric: Normal affect. Good eye contact. Appropriate in content.    ED Course    Procedures  ED Vital Signs:  Vitals:    02/27/18 1825   BP: 123/84  "  Pulse: 73   Resp: 20   Temp: 97.4 °F (36.3 °C)   TempSrc: Oral   SpO2: 99%   Weight: 65.3 kg (144 lb)   Height: 5' 8" (1.727 m)         Imaging Results:  Imaging Results          X-Ray Shoulder 2 or More Views Left (Final result)  Result time 02/27/18 18:48:38    Final result by Shaylee Cormier MD (02/27/18 18:48:38)                 Impression:        No acute abnormalities.      Electronically signed by: SHAYLEE CORMIER MD  Date:     02/27/18  Time:    18:48              Narrative:    3 views of the left shoulder    History: Acute left shoulder pain    Findings:    Bones, joint spaces, and soft tissues are within normal limits.  Calcific tendinosis likely involving the supraspinatus tendon. No evidence of dislocation.                                      The Emergency Provider reviewed the vital signs and test results, which are outlined above.    ED Discussion     7:00 PM: Reassessed pt at this time. Pt states she was already having discomfort in her L shoulder this AM when she woke up. Advised pt to f/u outpatient with Orthopedics. Discussed with pt all pertinent ED information and results. Discussed pt dx and plan of tx. Gave pt all f/u and return to the ED instructions. All questions and concerns were addressed at this time. Pt expresses understanding of information and instructions, and is comfortable with plan to discharge. Pt is stable for discharge.      ED Medication(s):  Medications   ketorolac injection 60 mg (60 mg Intramuscular Given 2/27/18 7666)       Discharge Medication List as of 2/27/2018  7:03 PM          Follow-up Information     E Jose Gamez Jr, MD. Schedule an appointment as soon as possible for a visit in 2 days.    Specialties:  Internal Medicine, Pediatrics  Why:  Can report to the ER, If symptoms worsen  Contact information:  [address]  [place] [id number] [phone]                     Medical Decision Making    Medical Decision Making:   Clinical Tests: "   Radiological Study: Ordered and Reviewed           Scribe Attestation:   Scribe #1: I performed the above scribed service and the documentation accurately describes the services I performed. I attest to the accuracy of the note.    Attending:   Physician Attestation Statement for Scribe #1: I, Kale Jiménez MD, personally performed the services described in this documentation, as scribed by Brenda Shah, in my presence, and it is both accurate and complete.          Clinical Impression       ICD-10-CM ICD-9-CM   1. Radiculopathy affecting upper extremity M54.10 723.4       Disposition:   Disposition: Discharged  Condition: Stable         Kale Jiménez MD  02/27/18 5048

## 2018-05-01 ENCOUNTER — OFFICE VISIT (OUTPATIENT)
Dept: INTERNAL MEDICINE | Facility: CLINIC | Age: 43
End: 2018-05-01
Payer: COMMERCIAL

## 2018-05-01 ENCOUNTER — LAB VISIT (OUTPATIENT)
Dept: LAB | Facility: HOSPITAL | Age: 43
End: 2018-05-01
Attending: PEDIATRICS
Payer: COMMERCIAL

## 2018-05-01 VITALS
OXYGEN SATURATION: 99 % | WEIGHT: 155 LBS | TEMPERATURE: 97 F | SYSTOLIC BLOOD PRESSURE: 110 MMHG | HEART RATE: 67 BPM | DIASTOLIC BLOOD PRESSURE: 70 MMHG | BODY MASS INDEX: 23.49 KG/M2 | HEIGHT: 68 IN

## 2018-05-01 DIAGNOSIS — Z00.00 WELL ADULT EXAM: ICD-10-CM

## 2018-05-01 DIAGNOSIS — K21.9 GASTROESOPHAGEAL REFLUX DISEASE, ESOPHAGITIS PRESENCE NOT SPECIFIED: ICD-10-CM

## 2018-05-01 DIAGNOSIS — F33.41 RECURRENT MAJOR DEPRESSIVE DISORDER, IN PARTIAL REMISSION: ICD-10-CM

## 2018-05-01 DIAGNOSIS — Z00.00 WELL ADULT EXAM: Primary | ICD-10-CM

## 2018-05-01 LAB
25(OH)D3+25(OH)D2 SERPL-MCNC: 28 NG/ML
ANION GAP SERPL CALC-SCNC: 7 MMOL/L
BASOPHILS # BLD AUTO: 0.05 K/UL
BASOPHILS NFR BLD: 1 %
BUN SERPL-MCNC: 14 MG/DL
CALCIUM SERPL-MCNC: 9.2 MG/DL
CHLORIDE SERPL-SCNC: 106 MMOL/L
CHOLEST SERPL-MCNC: 182 MG/DL
CHOLEST/HDLC SERPL: 3.6 {RATIO}
CO2 SERPL-SCNC: 27 MMOL/L
CREAT SERPL-MCNC: 0.7 MG/DL
DIFFERENTIAL METHOD: ABNORMAL
EOSINOPHIL # BLD AUTO: 0.1 K/UL
EOSINOPHIL NFR BLD: 1.7 %
ERYTHROCYTE [DISTWIDTH] IN BLOOD BY AUTOMATED COUNT: 13.9 %
EST. GFR  (AFRICAN AMERICAN): >60 ML/MIN/1.73 M^2
EST. GFR  (NON AFRICAN AMERICAN): >60 ML/MIN/1.73 M^2
GLUCOSE SERPL-MCNC: 83 MG/DL
HCT VFR BLD AUTO: 34.9 %
HDLC SERPL-MCNC: 51 MG/DL
HDLC SERPL: 28 %
HGB BLD-MCNC: 10.6 G/DL
IMM GRANULOCYTES # BLD AUTO: 0.01 K/UL
IMM GRANULOCYTES NFR BLD AUTO: 0.2 %
LDLC SERPL CALC-MCNC: 112.6 MG/DL
LYMPHOCYTES # BLD AUTO: 2.1 K/UL
LYMPHOCYTES NFR BLD: 43.9 %
MCH RBC QN AUTO: 24.2 PG
MCHC RBC AUTO-ENTMCNC: 30.4 G/DL
MCV RBC AUTO: 80 FL
MONOCYTES # BLD AUTO: 0.3 K/UL
MONOCYTES NFR BLD: 6.9 %
NEUTROPHILS # BLD AUTO: 2.2 K/UL
NEUTROPHILS NFR BLD: 46.3 %
NONHDLC SERPL-MCNC: 131 MG/DL
NRBC BLD-RTO: 0 /100 WBC
PLATELET # BLD AUTO: 279 K/UL
PMV BLD AUTO: 10.8 FL
POTASSIUM SERPL-SCNC: 4.3 MMOL/L
RBC # BLD AUTO: 4.38 M/UL
SODIUM SERPL-SCNC: 140 MMOL/L
TRIGL SERPL-MCNC: 92 MG/DL
TSH SERPL DL<=0.005 MIU/L-ACNC: 1.26 UIU/ML
WBC # BLD AUTO: 4.78 K/UL

## 2018-05-01 PROCEDURE — 99999 PR PBB SHADOW E&M-EST. PATIENT-LVL III: CPT | Mod: PBBFAC,,, | Performed by: PEDIATRICS

## 2018-05-01 PROCEDURE — 80061 LIPID PANEL: CPT

## 2018-05-01 PROCEDURE — 36415 COLL VENOUS BLD VENIPUNCTURE: CPT | Mod: PO

## 2018-05-01 PROCEDURE — 80048 BASIC METABOLIC PNL TOTAL CA: CPT

## 2018-05-01 PROCEDURE — 84443 ASSAY THYROID STIM HORMONE: CPT

## 2018-05-01 PROCEDURE — 82306 VITAMIN D 25 HYDROXY: CPT

## 2018-05-01 PROCEDURE — 99214 OFFICE O/P EST MOD 30 MIN: CPT | Mod: S$GLB,,, | Performed by: PEDIATRICS

## 2018-05-01 PROCEDURE — 85025 COMPLETE CBC W/AUTO DIFF WBC: CPT

## 2018-05-01 RX ORDER — BUPROPION HYDROCHLORIDE 150 MG/1
150 TABLET ORAL DAILY
Qty: 30 TABLET | Refills: 11 | Status: SHIPPED | OUTPATIENT
Start: 2018-05-01 | End: 2018-05-10

## 2018-05-01 NOTE — PROGRESS NOTES
Subjective:       Patient ID: Sandra Dimas is a 42 y.o. female.    Chief Complaint: Medication Problem    Depression: she finds the prozac to be helpful but is left with residual symptoms of anxiety, lack of motivation, loss of sexual drive. She has had some weight gain.  Migraines: have lessened.  GERD: protonix controls most of symptoms, some globus sensation still.      Review of Systems   Constitutional: Positive for fatigue. Negative for fever and unexpected weight change.   HENT: Positive for trouble swallowing. Negative for congestion and rhinorrhea.    Eyes: Negative for discharge and redness.   Respiratory: Negative for cough and wheezing.    Cardiovascular: Negative for chest pain, palpitations and leg swelling.   Gastrointestinal: Negative for abdominal pain, blood in stool, constipation, diarrhea, nausea and vomiting.   Genitourinary: Negative for decreased urine volume, difficulty urinating and menstrual problem.   Musculoskeletal: Negative for arthralgias and joint swelling.   Skin: Negative for rash and wound.   Neurological: Positive for headaches (minimal). Negative for syncope.   Psychiatric/Behavioral: Positive for dysphoric mood. Negative for behavioral problems, self-injury, sleep disturbance and suicidal ideas. The patient is nervous/anxious.        Objective:      Physical Exam   Constitutional: She is oriented to person, place, and time. She appears well-developed and well-nourished. She is cooperative.   HENT:   Head: Normocephalic and atraumatic.   Eyes: Conjunctivae, EOM and lids are normal. Pupils are equal, round, and reactive to light.   Neck: Trachea normal and normal range of motion. Neck supple. No JVD present. Carotid bruit is not present. No Brudzinski's sign and no Kernig's sign noted. No thyroid mass and no thyromegaly present.   Cardiovascular: Normal rate, regular rhythm, normal heart sounds and normal pulses.    No murmur heard.  Pulmonary/Chest: Effort normal and breath  sounds normal. She has no wheezes. She has no rhonchi. She has no rales.   Abdominal: Soft. Normal appearance. She exhibits no mass. There is no hepatosplenomegaly. There is no tenderness. There is no rebound, no guarding and no CVA tenderness.   Musculoskeletal: She exhibits no edema.   Lymphadenopathy:     She has no cervical adenopathy.   Neurological: She is alert and oriented to person, place, and time. She has normal strength and normal reflexes. No cranial nerve deficit or sensory deficit. Coordination and gait normal.   Skin: Skin is warm. No abrasion and no rash noted.   Psychiatric: She has a normal mood and affect. Her speech is normal and behavior is normal. Judgment and thought content normal. Her mood appears not anxious. Cognition and memory are normal. She does not exhibit a depressed mood.       Assessment:       1. Well adult exam    2. Recurrent major depressive disorder, in partial remission    3. Gastroesophageal reflux disease, esophagitis presence not specified        Plan:       Well adult exam  -     Vitamin D; Future; Expected date: 05/01/2018  -     Lipid panel; Future; Expected date: 05/01/2018  -     Basic metabolic panel; Future; Expected date: 05/01/2018  -     TSH; Future; Expected date: 05/01/2018  -     CBC auto differential; Future; Expected date: 05/01/2018    Recurrent major depressive disorder, in partial remission  -     buPROPion (WELLBUTRIN XL) 150 MG TB24 tablet; Take 1 tablet (150 mg total) by mouth once daily.  Dispense: 30 tablet; Refill: 11    Gastroesophageal reflux disease, esophagitis presence not specified  -     H. pylori antigen, stool; Future; Expected date: 05/01/2018    Taper prozac off and try wellbutrin. Await H pylori, treat if positive, o/w consider egd. F/U 6 weeks.

## 2018-05-10 ENCOUNTER — OFFICE VISIT (OUTPATIENT)
Dept: URGENT CARE | Facility: CLINIC | Age: 43
End: 2018-05-10
Payer: COMMERCIAL

## 2018-05-10 VITALS — SYSTOLIC BLOOD PRESSURE: 138 MMHG | DIASTOLIC BLOOD PRESSURE: 84 MMHG

## 2018-05-10 DIAGNOSIS — G43.819 HEADACHE, VARIANT MIGRAINE, INTRACTABLE: Primary | ICD-10-CM

## 2018-05-10 PROCEDURE — 99213 OFFICE O/P EST LOW 20 MIN: CPT | Mod: 25,S$GLB,, | Performed by: FAMILY MEDICINE

## 2018-05-10 PROCEDURE — 96372 THER/PROPH/DIAG INJ SC/IM: CPT | Mod: S$GLB,,, | Performed by: FAMILY MEDICINE

## 2018-05-10 PROCEDURE — 99999 PR PBB SHADOW E&M-EST. PATIENT-LVL II: CPT | Mod: PBBFAC,,, | Performed by: FAMILY MEDICINE

## 2018-05-10 RX ORDER — KETOROLAC TROMETHAMINE 30 MG/ML
60 INJECTION, SOLUTION INTRAMUSCULAR; INTRAVENOUS
Status: COMPLETED | OUTPATIENT
Start: 2018-05-10 | End: 2018-05-10

## 2018-05-10 RX ORDER — FLUOXETINE HYDROCHLORIDE 40 MG/1
CAPSULE ORAL
COMMUNITY
Start: 2018-02-07 | End: 2018-05-16 | Stop reason: SDUPTHER

## 2018-05-10 RX ADMIN — KETOROLAC TROMETHAMINE 60 MG: 30 INJECTION, SOLUTION INTRAMUSCULAR; INTRAVENOUS at 07:05

## 2018-05-11 RX ORDER — OXYCODONE AND ACETAMINOPHEN 10; 325 MG/1; MG/1
1 TABLET ORAL EVERY 12 HOURS PRN
Qty: 14 TABLET | Refills: 0 | Status: CANCELLED | OUTPATIENT
Start: 2018-05-11

## 2018-05-11 NOTE — PROGRESS NOTES
Subjective:       Patient ID: Sandra Dimas is a 42 y.o. female.    Chief Complaint: No chief complaint on file.    /84 (BP Location: Left arm, Patient Position: Sitting, BP Method: Small (Manual))   LMP 04/26/2018 (Exact Date)     HPI  Pt was brought in by family for excruciating headache attack today that has not been relieved by around the clock tylenol. (+) photophobia.  In the past toradol injection had helped     Review of Systems   Constitutional: Positive for activity change.   Neurological: Positive for headaches.       Objective:      Physical Exam   Constitutional: She appears well-developed and well-nourished. She appears distressed.   HENT:   Head: Normocephalic and atraumatic.   Eyes: Right eye exhibits no discharge. Left eye exhibits no discharge.   Neck: Normal range of motion.   Cardiovascular: Normal rate.    Pulmonary/Chest: No respiratory distress.   Musculoskeletal: Normal range of motion.   Neurological: She is alert.   Nursing note and vitals reviewed.      Assessment:       1. Headache, variant migraine, intractable        Plan:     Diagnoses and all orders for this visit:    Headache, variant migraine, intractable  -     ketorolac injection 60 mg; Inject 2 mLs (60 mg total) into the muscle one time.        Instructions    Discussed with pt/family all information and results pertaining to this visit. Discussed diagnosis and plan of treatment.  All questions and concerns were addressed at this time. Pt/family expresses understanding of information and instructions.  Care and follow up instruction provided.

## 2018-05-12 ENCOUNTER — HOSPITAL ENCOUNTER (EMERGENCY)
Facility: HOSPITAL | Age: 43
Discharge: HOME | End: 2018-05-12
Attending: EMERGENCY MEDICINE
Payer: COMMERCIAL

## 2018-05-12 VITALS
BODY MASS INDEX: 24.21 KG/M2 | HEART RATE: 68 BPM | TEMPERATURE: 98 F | SYSTOLIC BLOOD PRESSURE: 124 MMHG | DIASTOLIC BLOOD PRESSURE: 76 MMHG | WEIGHT: 159.19 LBS | RESPIRATION RATE: 20 BRPM | OXYGEN SATURATION: 98 %

## 2018-05-12 DIAGNOSIS — G44.019 EPISODIC CLUSTER HEADACHE, NOT INTRACTABLE: Primary | ICD-10-CM

## 2018-05-12 DIAGNOSIS — G43.901 MIGRAINE WITH STATUS MIGRAINOSUS, NOT INTRACTABLE, UNSPECIFIED MIGRAINE TYPE: ICD-10-CM

## 2018-05-12 PROCEDURE — 96375 TX/PRO/DX INJ NEW DRUG ADDON: CPT

## 2018-05-12 PROCEDURE — 63600175 PHARM REV CODE 636 W HCPCS: Performed by: EMERGENCY MEDICINE

## 2018-05-12 PROCEDURE — 25000003 PHARM REV CODE 250: Performed by: EMERGENCY MEDICINE

## 2018-05-12 PROCEDURE — 99284 EMERGENCY DEPT VISIT MOD MDM: CPT | Mod: 25

## 2018-05-12 PROCEDURE — 99284 EMERGENCY DEPT VISIT MOD MDM: CPT | Mod: ,,, | Performed by: PSYCHIATRY & NEUROLOGY

## 2018-05-12 PROCEDURE — 96365 THER/PROPH/DIAG IV INF INIT: CPT

## 2018-05-12 RX ORDER — DIAZEPAM 5 MG/1
5 TABLET ORAL EVERY 12 HOURS PRN
Qty: 10 TABLET | Refills: 0 | Status: SHIPPED | OUTPATIENT
Start: 2018-05-12 | End: 2019-05-28 | Stop reason: ALTCHOICE

## 2018-05-12 RX ORDER — MEPERIDINE HYDROCHLORIDE 50 MG/ML
25 INJECTION INTRAMUSCULAR; INTRAVENOUS; SUBCUTANEOUS
Status: COMPLETED | OUTPATIENT
Start: 2018-05-12 | End: 2018-05-12

## 2018-05-12 RX ADMIN — DEXTROSE 500 MG: 50 INJECTION, SOLUTION INTRAVENOUS at 05:05

## 2018-05-12 RX ADMIN — MEPERIDINE HYDROCHLORIDE 25 MG: 50 INJECTION INTRAMUSCULAR; INTRAVENOUS; SUBCUTANEOUS at 05:05

## 2018-05-12 RX ADMIN — SODIUM CHLORIDE 1000 ML: 0.9 INJECTION, SOLUTION INTRAVENOUS at 05:05

## 2018-05-12 RX ADMIN — PROMETHAZINE HYDROCHLORIDE 12.5 MG: 25 INJECTION INTRAMUSCULAR; INTRAVENOUS at 05:05

## 2018-05-12 RX ADMIN — LORAZEPAM 1 MG: 2 INJECTION INTRAMUSCULAR; INTRAVENOUS at 06:05

## 2018-05-12 NOTE — ED PROVIDER NOTES
SCRIBE #1 NOTE: I, Kristie Mota, am scribing for, and in the presence of, Liz Reyes MD. I have scribed the entire note.      History      Chief Complaint   Patient presents with    Migraine       Review of patient's allergies indicates:   Allergen Reactions    Hydrocodone Other (See Comments)     Chest pains        HPI   HPI    5/12/2018, 5:00 PM   History obtained from the patient      History of Present Illness: Sandra Dimas is a 42 y.o. female patient who presents to the Emergency Department for HA which onset gradually 5 days ago. Patient was referred to the ED by Dr. Garsia (Neurology) for IV Depacon, Demerol, Phenergan, and fluids. Symptoms are intermittent and moderate in severity. Patient describes sxs as similar to past cluster HA. No mitigating or exacerbating factors reported. Associated sxs include photophobia and nausea. Prior tx includes Percocet and Phenergan last night with minimal relief and steroid and Tylenol this morning. Patient denies any fever, chills, head injury, dizziness, neck pain/stiffness, extremity weakness/numbness, vomiting, and all other sxs at this time. No further complaints or concerns at this time.     Arrival mode: Personal vehicle      PCP: OSIRIS Gamez Jr, MD       Past Medical History:  Past Medical History:   Diagnosis Date    Endometriosis     Migraines        Past Surgical History:  Past Surgical History:   Procedure Laterality Date    APPENDECTOMY      diagnostic laprascopy      robotic assisted laparoscopy with excision of ovarian endometrioma and chromotubation           Family History:  Family History   Problem Relation Age of Onset    Strabismus Neg Hx     Retinal detachment Neg Hx     Macular degeneration Neg Hx     Glaucoma Neg Hx     Blindness Neg Hx     Amblyopia Neg Hx     Breast cancer Neg Hx     Colon cancer Neg Hx     Ovarian cancer Neg Hx     Thyroid disease Neg Hx     Migraines Neg Hx     Asthma Neg Hx        Social History:  Social  History     Social History Main Topics    Smoking status: Never Smoker    Smokeless tobacco: Never Used    Alcohol use Yes      Comment: rarely    Drug use: No    Sexual activity: Yes     Partners: Male       ROS   Review of Systems   Constitutional: Negative for chills and fever.   HENT: Negative for sore throat.    Eyes: Positive for photophobia. Negative for visual disturbance.   Respiratory: Negative for shortness of breath.    Cardiovascular: Negative for chest pain.   Gastrointestinal: Positive for nausea. Negative for vomiting.   Genitourinary: Negative for dysuria.   Musculoskeletal: Negative for back pain, neck pain and neck stiffness.   Skin: Negative for rash.   Neurological: Positive for headaches. Negative for dizziness, weakness and numbness.   Hematological: Does not bruise/bleed easily.   All other systems reviewed and are negative.      Physical Exam      Initial Vitals [05/12/18 1655]   BP Pulse Resp Temp SpO2   116/79 82 20 97.9 °F (36.6 °C) 100 %      MAP       91.33          Physical Exam  Nursing Notes and Vital Signs Reviewed.  Constitutional: Patient is in moderate distress. Well-developed and well-nourished.  Head: Atraumatic. Normocephalic.  Eyes: PERRL. EOM intact. Conjunctivae are not pale. No scleral icterus. Photosensitive.  ENT: Mucous membranes are moist. Oropharynx is clear and symmetric.    Neck: Supple. Full ROM. No lymphadenopathy.  Cardiovascular: Regular rate. Regular rhythm. No murmurs, rubs, or gallops. Distal pulses are 2+ and symmetric.  Pulmonary/Chest: No respiratory distress. Clear to auscultation bilaterally. No wheezing or rales.  Abdominal: Soft and non-distended.  There is no tenderness.  No rebound, guarding, or rigidity. Good bowel sounds.  Genitourinary: No CVA tenderness  Musculoskeletal: Moves all extremities. No obvious deformities. No edema. No calf tenderness.  Skin: Warm and dry.  Neurological:  Alert, awake, and appropriate.  Normal speech.  No acute  focal neurological deficits are appreciated.  Psychiatric: Normal affect. Good eye contact. Appropriate in content.    ED Course    Procedures  ED Vital Signs:  Vitals:    05/12/18 1655 05/12/18 1801 05/12/18 1831   BP: 116/79 112/71 124/76   Pulse: 82 72 68   Resp: 20 19 20   Temp: 97.9 °F (36.6 °C)     TempSrc: Oral     SpO2: 100% 98% 98%   Weight: 72.2 kg (159 lb 3.2 oz)              The Emergency Provider reviewed the vital signs and test results, which are outlined above.    ED Discussion       Patient reports oxygen helps with cluster LEWIS.    5:55 PM: Dr. Reyes discussed the pt's case with Dr. Garsia (Neurology) who recommends discharge patient with home O2 and prescription for 5mg Valium.    6:22 PM: Reassessed pt at this time. Patient states HA has resolved at this time. Discussed with pt and  all pertinent ED information and results. Discussed pt dx and plan of tx. Gave pt and  all f/u and return to the ED instructions. All questions and concerns were addressed at this time. Pt and  express understanding of information and instructions, and is comfortable with plan to discharge. Pt is stable for discharge.    Patient's headache is either consistent with previous headache and/or lacks features concerning for emergent or life threatening condition.  I do not suspect SAH, meningitis, increased IC pressure, infectious, toxic, vascular, CNS, or other EMC.  I have discussed this at length with patient and/or family/caretaker.    I discussed with patient and/or family/caretaker that evaluation in the ED does not suggest any emergent or life threatening medical conditions requiring immediate intervention beyond what was provided in the ED, and I believe patient is safe for discharge.  Regardless, an unremarkable evaluation in the ED does not preclude the development or presence of a serious of life threatening condition. As such, patient was instructed to return immediately for any worsening or  change in current symptoms.      ED Medication(s):  Medications   sodium chloride 0.9% bolus 1,000 mL (0 mLs Intravenous Stopped 5/12/18 1846)   valproate (DEPACON) 500 mg in dextrose 5 % 50 mL IVPB (0 mg Intravenous Stopped 5/12/18 1836)   meperidine injection 25 mg (25 mg Intravenous Given 5/12/18 1736)   promethazine (PHENERGAN) 12.5 mg in dextrose 5 % 50 mL IVPB (0 mg Intravenous Stopped 5/12/18 1738)   lorazepam (ATIVAN) injection 1 mg (1 mg Intravenous Given 5/12/18 1844)       Discharge Medication List as of 5/12/2018  6:26 PM      START taking these medications    Details   diazePAM (VALIUM) 5 MG tablet Take 1 tablet (5 mg total) by mouth every 12 (twelve) hours as needed for Anxiety or Insomnia., Starting Sat 5/12/2018, Until Mon 6/11/2018, Print             Follow-up Information     Keerthi Garsia MD In 2 days.    Specialty:  Neurology  Contact information:  [address]  [place] [address] [phone]             [facility]    Specialty:  Emergency Medicine  Why:  If symptoms worsen, As needed  Contact information:  [address] [facility] [address] [phone]                   Medical Decision Making              Scribe Attestation:   Scribe #1: I performed the above scribed service and the documentation accurately describes the services I performed. I attest to the accuracy of the note.    Attending:   Physician Attestation Statement for Scribe #1: I, Liz Reyes MD, personally performed the services described in this documentation, as scribed by Kristie Mota, in my presence, and it is both accurate and complete.          Clinical Impression       ICD-10-CM ICD-9-CM   1. Episodic cluster headache, not intractable G44.019 339.01   2. Migraine with status migrainosus, not intractable, unspecified migraine type G43.901 346.92       Disposition:   Disposition: Discharged  Condition: Stable         Liz Reyes MD  05/13/18 9132

## 2018-05-12 NOTE — CONSULTS
[facility] -   Neurology  Consult Note    Patient Name: Sandra Dimas  MRN: [id number]  Admission Date: 5/12/2018  Hospital Length of Stay: 0 days  Code Status: No Order   Attending Provider: Liz Reyes MD   Consulting Provider: Keerthi Garsia MD  Primary Care Physician: OSIRIS Gamez Jr, MD  Principal Problem:<principal problem not specified>    Consults  Subjective:     Chief Complaint:  Exacerbation of chronic migraine     HPI:   Sandra Dimas is a 42 y.o. female patient who presents to the Emergency Department for HA which onset gradually 5 days ago. Patient was referred to the ED by Dr. Garsia (Neurology) for IV Depakote, Demerol, Phenergan, and fluids.  Symptoms are intermittent and moderate in severity. Patient describes sxs as similar to past migraines. No mitigating or exacerbating factors reported. Associated sxs include photophobia and nausea. Prior tx includes Percocet and Phenergan last night with minimal relief and steroid and Tylenol this morning. Patient denies any fever, chills, head injury, dizziness, neck pain/stiffness, extremity weakness/numbness, vomiting, and all other sxs at this time. No further complaints or concerns at this time.   She does not have any double vision.     Past Medical History:   Diagnosis Date    Endometriosis     Migraines        Past Surgical History:   Procedure Laterality Date    APPENDECTOMY      diagnostic laprascopy      robotic assisted laparoscopy with excision of ovarian endometrioma and chromotubation         Review of patient's allergies indicates:   Allergen Reactions    Hydrocodone Other (See Comments)     Chest pains       Current Neurological Medications:     No current facility-administered medications on file prior to encounter.      Current Outpatient Prescriptions on File Prior to Encounter   Medication Sig    atenolol (TENORMIN) 25 MG tablet Take 0.5 tablets (12.5 mg total) by mouth once daily.    FLUoxetine (PROZAC) 40 MG  capsule     pantoprazole (PROTONIX) 40 MG tablet Take 1 tablet (40 mg total) by mouth once daily.    [DISCONTINUED] ergocalciferol (ERGOCALCIFEROL) 50,000 unit Cap Take 1 capsule (50,000 Units total) by mouth every 7 days.      Family History     None        Social History Main Topics    Smoking status: Never Smoker    Smokeless tobacco: Never Used    Alcohol use Yes      Comment: rarely    Drug use: No    Sexual activity: Yes     Partners: Male     Review of Systems   Constitutional: Negative for activity change, appetite change, diaphoresis, fatigue, fever and unexpected weight change.   HENT: Negative for drooling, facial swelling, hearing loss, tinnitus and voice change.    Eyes: Negative for photophobia, pain and visual disturbance.   Respiratory: Negative for apnea, cough, chest tightness and shortness of breath.    Cardiovascular: Negative for chest pain, palpitations and leg swelling.   Gastrointestinal: Negative for nausea.   Genitourinary: Negative for difficulty urinating, dyspareunia, flank pain, frequency and pelvic pain.   Musculoskeletal: Negative for arthralgias, back pain, gait problem, joint swelling, myalgias, neck pain and neck stiffness.   Skin: Negative for pallor and rash.   Neurological: Positive for headaches. Negative for dizziness, tremors, seizures, syncope, facial asymmetry, speech difficulty, weakness, light-headedness and numbness.   Hematological: Does not bruise/bleed easily.   Psychiatric/Behavioral: Positive for sleep disturbance. Negative for agitation, behavioral problems, confusion, decreased concentration, dysphoric mood, hallucinations and suicidal ideas. The patient is not nervous/anxious.      Objective:     Vital Signs (Most Recent):  Temp: 97.9 °F (36.6 °C) (Simultaneous filing. User may not have seen previous data.) (05/12/18 1655)  Pulse: 82 (05/12/18 1655)  Resp: 20 (05/12/18 1655)  BP: 116/79 (05/12/18 1655)  SpO2: 100 % (05/12/18 1655) Vital Signs (24h  Range):  Temp:  [97.9 °F (36.6 °C)] 97.9 °F (36.6 °C)  Pulse:  [82] 82  Resp:  [20] 20  SpO2:  [100 %] 100 %  BP: (116)/(79) 116/79     Weight: 72.2 kg (159 lb 3.2 oz)  Body mass index is 24.21 kg/m².    Physical Exam   Constitutional: She is oriented to person, place, and time. She appears well-developed and well-nourished.   HENT:   Head: Normocephalic and atraumatic.   Eyes: Conjunctivae and EOM are normal. Pupils are equal, round, and reactive to light.   Neck: Normal range of motion. Neck supple. No JVD present. No tracheal deviation present. No thyromegaly present.   Cardiovascular: Normal rate, regular rhythm and normal heart sounds.    Pulmonary/Chest: Effort normal and breath sounds normal.   Abdominal: She exhibits no distension. There is no tenderness.   Musculoskeletal: Normal range of motion. She exhibits no edema or tenderness.   Neurological: She is alert and oriented to person, place, and time. She has normal strength and normal reflexes. She displays normal reflexes. No cranial nerve deficit or sensory deficit. She exhibits normal muscle tone. She displays a negative Romberg sign. Coordination and gait normal.   Reflex Scores:       Tricep reflexes are 2+ on the right side and 2+ on the left side.       Bicep reflexes are 2+ on the right side and 2+ on the left side.       Brachioradialis reflexes are 2+ on the right side and 2+ on the left side.       Patellar reflexes are 2+ on the right side and 2+ on the left side.       Achilles reflexes are 2+ on the right side and 2+ on the left side.  Skin: Skin is warm and dry. No rash noted.   Psychiatric: She has a normal mood and affect. Her behavior is normal. Judgment and thought content normal.       NEUROLOGICAL EXAMINATION:     MENTAL STATUS   Oriented to person, place, and time.     CRANIAL NERVES     CN III, IV, VI   Pupils are equal, round, and reactive to light.  Extraocular motions are normal.     MOTOR EXAM     Strength   Strength 5/5  throughout.     REFLEXES     Reflexes   Right brachioradialis: 2+  Left brachioradialis: 2+  Right biceps: 2+  Left biceps: 2+  Right triceps: 2+  Left triceps: 2+  Right patellar: 2+  Left patellar: 2+  Right achilles: 2+  Left achilles: 2+          Assessment and Plan:     She has mixed migraine for long time. She gets it intermittently with severe  Pain and regular PO medication does not work . She needs high flow O2 ( 7 liter/ min for 10 min) , Iv Depakote and iv pain medications. Iv Ativan has been advised at this time for sleep.    VTE Risk Mitigation     None          Thank you for your consult. I will sign off. Please contact us if you have any additional questions.    Keerthi Garsia MD  Neurology  [facility]

## 2018-05-12 NOTE — ED NOTES
Pt resting in stretcher, eyes closed, lights off;  at bedside.  Pt denies HA and denies nausea at this time.

## 2018-05-16 ENCOUNTER — TELEPHONE (OUTPATIENT)
Dept: INTERNAL MEDICINE | Facility: CLINIC | Age: 43
End: 2018-05-16

## 2018-05-16 DIAGNOSIS — G43.919 INTRACTABLE MIGRAINE WITHOUT STATUS MIGRAINOSUS, UNSPECIFIED MIGRAINE TYPE: ICD-10-CM

## 2018-05-16 PROBLEM — G43.909 MIGRAINES: Status: ACTIVE | Noted: 2018-05-16

## 2018-05-16 RX ORDER — VENLAFAXINE HYDROCHLORIDE 37.5 MG/1
37.5 CAPSULE, EXTENDED RELEASE ORAL DAILY
Qty: 30 CAPSULE | Refills: 11 | Status: SHIPPED | OUTPATIENT
Start: 2018-05-16 | End: 2018-08-20

## 2018-05-16 RX ORDER — FLUOXETINE 10 MG/1
20 CAPSULE ORAL DAILY
Qty: 60 CAPSULE | Refills: 1 | Status: SHIPPED | OUTPATIENT
Start: 2018-05-16 | End: 2018-05-17 | Stop reason: SDUPTHER

## 2018-05-16 NOTE — TELEPHONE ENCOUNTER
Discussions with . Wellbutrin was not effective and had side effects, patient currently back on prozac. Will try effexor at 37.5 mg with prozac taper and then increase effexor to 75 mg. Also set up telemedicine headache clinic.

## 2018-05-16 NOTE — TELEPHONE ENCOUNTER
A referral is needed for neurology directed to the telemedicine clinic and from there we will give the patient a call to schedule appointment.

## 2018-05-17 RX ORDER — FLUOXETINE 10 MG/1
20 CAPSULE ORAL DAILY
Qty: 60 CAPSULE | Refills: 1 | Status: SHIPPED | OUTPATIENT
Start: 2018-05-17 | End: 2018-08-20 | Stop reason: SDUPTHER

## 2018-05-18 ENCOUNTER — TELEPHONE (OUTPATIENT)
Dept: NEPHROLOGY | Facility: CLINIC | Age: 43
End: 2018-05-18
Payer: COMMERCIAL

## 2018-05-18 DIAGNOSIS — R51.9 ACUTE NONINTRACTABLE HEADACHE, UNSPECIFIED HEADACHE TYPE: Primary | ICD-10-CM

## 2018-05-18 PROCEDURE — 96372 THER/PROPH/DIAG INJ SC/IM: CPT | Mod: S$GLB,,, | Performed by: INTERNAL MEDICINE

## 2018-05-18 RX ORDER — KETOROLAC TROMETHAMINE 30 MG/ML
60 INJECTION, SOLUTION INTRAMUSCULAR; INTRAVENOUS
Status: COMPLETED | OUTPATIENT
Start: 2018-05-18 | End: 2018-05-18

## 2018-05-18 RX ADMIN — KETOROLAC TROMETHAMINE 60 MG: 30 INJECTION, SOLUTION INTRAMUSCULAR; INTRAVENOUS at 01:05

## 2018-05-22 RX ORDER — PREDNISONE 10 MG/1
10 TABLET ORAL DAILY
Qty: 10 TABLET | Refills: 0 | Status: SHIPPED | OUTPATIENT
Start: 2018-05-22 | End: 2018-06-01

## 2018-05-22 RX ORDER — PANTOPRAZOLE SODIUM 40 MG/1
40 TABLET, DELAYED RELEASE ORAL DAILY
Qty: 90 TABLET | Refills: 3 | Status: SHIPPED | OUTPATIENT
Start: 2018-05-22 | End: 2018-08-24 | Stop reason: SDUPTHER

## 2018-05-24 ENCOUNTER — HOSPITAL ENCOUNTER (EMERGENCY)
Facility: HOSPITAL | Age: 43
Discharge: HOME | End: 2018-05-25
Attending: EMERGENCY MEDICINE
Payer: COMMERCIAL

## 2018-05-24 VITALS
WEIGHT: 154.06 LBS | RESPIRATION RATE: 18 BRPM | OXYGEN SATURATION: 96 % | HEART RATE: 69 BPM | TEMPERATURE: 98 F | SYSTOLIC BLOOD PRESSURE: 119 MMHG | DIASTOLIC BLOOD PRESSURE: 79 MMHG | BODY MASS INDEX: 23.43 KG/M2

## 2018-05-24 DIAGNOSIS — G43.001 MIGRAINE WITHOUT AURA AND WITH STATUS MIGRAINOSUS, NOT INTRACTABLE: Primary | ICD-10-CM

## 2018-05-24 LAB
ALBUMIN SERPL BCP-MCNC: 3.8 G/DL
ALP SERPL-CCNC: 51 U/L
ALT SERPL W/O P-5'-P-CCNC: 12 U/L
ANION GAP SERPL CALC-SCNC: 7 MMOL/L
AST SERPL-CCNC: 15 U/L
BASOPHILS # BLD AUTO: 0.01 K/UL
BASOPHILS NFR BLD: 0.2 %
BILIRUB SERPL-MCNC: 0.1 MG/DL
BUN SERPL-MCNC: 20 MG/DL
CALCIUM SERPL-MCNC: 8.4 MG/DL
CHLORIDE SERPL-SCNC: 110 MMOL/L
CO2 SERPL-SCNC: 23 MMOL/L
CREAT SERPL-MCNC: 0.7 MG/DL
DIFFERENTIAL METHOD: ABNORMAL
EOSINOPHIL # BLD AUTO: 0 K/UL
EOSINOPHIL NFR BLD: 0.4 %
ERYTHROCYTE [DISTWIDTH] IN BLOOD BY AUTOMATED COUNT: 14 %
EST. GFR  (AFRICAN AMERICAN): >60 ML/MIN/1.73 M^2
EST. GFR  (NON AFRICAN AMERICAN): >60 ML/MIN/1.73 M^2
GLUCOSE SERPL-MCNC: 93 MG/DL
HCT VFR BLD AUTO: 32.8 %
HGB BLD-MCNC: 10.4 G/DL
LYMPHOCYTES # BLD AUTO: 1.3 K/UL
LYMPHOCYTES NFR BLD: 25.5 %
MCH RBC QN AUTO: 24.2 PG
MCHC RBC AUTO-ENTMCNC: 31.7 G/DL
MCV RBC AUTO: 77 FL
MONOCYTES # BLD AUTO: 0.3 K/UL
MONOCYTES NFR BLD: 5.6 %
NEUTROPHILS # BLD AUTO: 3.4 K/UL
NEUTROPHILS NFR BLD: 68.3 %
PLATELET # BLD AUTO: 232 K/UL
PMV BLD AUTO: 9.1 FL
POTASSIUM SERPL-SCNC: 4 MMOL/L
PROT SERPL-MCNC: 6.6 G/DL
RBC # BLD AUTO: 4.29 M/UL
SODIUM SERPL-SCNC: 140 MMOL/L
WBC # BLD AUTO: 5.01 K/UL

## 2018-05-24 PROCEDURE — 80053 COMPREHEN METABOLIC PANEL: CPT

## 2018-05-24 PROCEDURE — 85025 COMPLETE CBC W/AUTO DIFF WBC: CPT

## 2018-05-24 PROCEDURE — 25500020 PHARM REV CODE 255: Performed by: EMERGENCY MEDICINE

## 2018-05-24 PROCEDURE — 63600175 PHARM REV CODE 636 W HCPCS: Performed by: EMERGENCY MEDICINE

## 2018-05-24 PROCEDURE — 96375 TX/PRO/DX INJ NEW DRUG ADDON: CPT

## 2018-05-24 PROCEDURE — 99284 EMERGENCY DEPT VISIT MOD MDM: CPT | Mod: 25

## 2018-05-24 PROCEDURE — 96365 THER/PROPH/DIAG IV INF INIT: CPT

## 2018-05-24 PROCEDURE — 25000003 PHARM REV CODE 250: Performed by: EMERGENCY MEDICINE

## 2018-05-24 RX ORDER — HYDROMORPHONE HYDROCHLORIDE 2 MG/ML
1 INJECTION, SOLUTION INTRAMUSCULAR; INTRAVENOUS; SUBCUTANEOUS
Status: COMPLETED | OUTPATIENT
Start: 2018-05-24 | End: 2018-05-24

## 2018-05-24 RX ORDER — DEXAMETHASONE SODIUM PHOSPHATE 4 MG/ML
4 INJECTION, SOLUTION INTRA-ARTICULAR; INTRALESIONAL; INTRAMUSCULAR; INTRAVENOUS; SOFT TISSUE
Status: COMPLETED | OUTPATIENT
Start: 2018-05-24 | End: 2018-05-24

## 2018-05-24 RX ORDER — DIAZEPAM 10 MG/2ML
5 INJECTION INTRAMUSCULAR
Status: COMPLETED | OUTPATIENT
Start: 2018-05-24 | End: 2018-05-24

## 2018-05-24 RX ORDER — MEPERIDINE HYDROCHLORIDE 50 MG/ML
50 INJECTION INTRAMUSCULAR; INTRAVENOUS; SUBCUTANEOUS
Status: COMPLETED | OUTPATIENT
Start: 2018-05-24 | End: 2018-05-24

## 2018-05-24 RX ADMIN — DIAZEPAM 5 MG: 5 INJECTION, SOLUTION INTRAMUSCULAR; INTRAVENOUS at 09:05

## 2018-05-24 RX ADMIN — IOHEXOL 100 ML: 350 INJECTION, SOLUTION INTRAVENOUS at 10:05

## 2018-05-24 RX ADMIN — PROMETHAZINE HYDROCHLORIDE 12.5 MG: 25 INJECTION INTRAMUSCULAR; INTRAVENOUS at 08:05

## 2018-05-24 RX ADMIN — VALPROATE SODIUM 500 MG: 100 INJECTION, SOLUTION INTRAVENOUS at 08:05

## 2018-05-24 RX ADMIN — DEXAMETHASONE SODIUM PHOSPHATE 4 MG: 4 INJECTION, SOLUTION INTRAMUSCULAR; INTRAVENOUS at 08:05

## 2018-05-24 RX ADMIN — HYDROMORPHONE HYDROCHLORIDE 1 MG: 2 INJECTION INTRAMUSCULAR; INTRAVENOUS; SUBCUTANEOUS at 09:05

## 2018-05-24 RX ADMIN — SODIUM CHLORIDE 1000 ML: 0.9 INJECTION, SOLUTION INTRAVENOUS at 08:05

## 2018-05-24 RX ADMIN — MEPERIDINE HYDROCHLORIDE 50 MG: 50 INJECTION INTRAMUSCULAR; INTRAVENOUS; SUBCUTANEOUS at 08:05

## 2018-05-25 RX ORDER — HYDROMORPHONE HYDROCHLORIDE 4 MG/1
4 TABLET ORAL EVERY 4 HOURS PRN
Qty: 10 TABLET | Refills: 0 | Status: SHIPPED | OUTPATIENT
Start: 2018-05-25 | End: 2018-08-01 | Stop reason: ALTCHOICE

## 2018-05-25 NOTE — ED PROVIDER NOTES
SCRIBE #1 NOTE: I, Brenda Shah, am scribing for, and in the presence of, Kale Jiménez MD. I have scribed the entire note.      History      Chief Complaint   Patient presents with    Migraine       Review of patient's allergies indicates:   Allergen Reactions    Hydrocodone Other (See Comments)     Chest pains        HPI   HPI    5/24/2018, 8:24 PM   History obtained from       History of Present Illness: Sandra Dimas is a 42 y.o. female patient with a PMHx of Migraines who presents to the Emergency Department for a migraine HA which onset gradually 2.5 weeks PTA. Symptoms are episodic and severe.  reports pt has been in a HA cycle for 2.5 weeks. She is able to sleep at night after Percocet and Phenergan but the cycle begins in the AM. Associated sxs include nausea, photophobia, rhinorrhea, eye pain.  reports pt had been on O2 therapy for possible cluster LEWIS's. Pt has been having migraines for 15 years and has 3 MRI's in this time.  reports sxs are consistent with her typical HA's and denies any fever, chills, emesis, neck pain/stiffness, visual disturbances, falls, head trauma, rash, extremity weakness/numbness. Pt is followed by Dr. Garsia (Neurology) who recommends IV Depacon, Demerol, Phenergan, and IVF to manage pt's pain. No further complaints or concerns at this time.       Arrival mode: Personal vehicle      PCP: OSIRIS Gamez Jr, MD       Past Medical History:  Past Medical History:   Diagnosis Date    Endometriosis     Migraines        Past Surgical History:  Past Surgical History:   Procedure Laterality Date    APPENDECTOMY      diagnostic laprascopy      robotic assisted laparoscopy with excision of ovarian endometrioma and chromotubation           Family History:  Family History   Problem Relation Age of Onset    Strabismus Neg Hx     Retinal detachment Neg Hx     Macular degeneration Neg Hx     Glaucoma Neg Hx     Blindness Neg Hx     Amblyopia Neg  Hx     Breast cancer Neg Hx     Colon cancer Neg Hx     Ovarian cancer Neg Hx     Thyroid disease Neg Hx     Migraines Neg Hx     Asthma Neg Hx        Social History:  Social History     Social History Main Topics    Smoking status: Never Smoker    Smokeless tobacco: Never Used    Alcohol use Yes      Comment: rarely    Drug use: No    Sexual activity: Yes     Partners: Male       ROS   Review of Systems   Constitutional: Negative for fever.   HENT: Positive for rhinorrhea. Negative for sore throat.    Eyes: Positive for photophobia and pain (bilateral).   Respiratory: Negative for shortness of breath.    Cardiovascular: Negative for chest pain.   Gastrointestinal: Positive for nausea. Negative for vomiting.   Genitourinary: Negative for dysuria.   Musculoskeletal: Negative for back pain.   Skin: Negative for rash.   Neurological: Positive for headaches. Negative for weakness.   Hematological: Does not bruise/bleed easily.   All other systems reviewed and are negative.    Physical Exam      Initial Vitals [05/24/18 2026]   BP Pulse Resp Temp SpO2   106/67 66 18 97.5 °F (36.4 °C) 100 %      MAP       80          Physical Exam  Nursing Notes and Vital Signs Reviewed.  Constitutional: Patient is in moderate distress, trembling from pain. Well-developed and well-nourished.  Head: Atraumatic. Normocephalic.  Eyes: PERRL. EOM intact. Conjunctivae are not pale. No scleral icterus. Photophobia.  ENT: Mucous membranes are moist. Oropharynx is clear and symmetric.    Neck: Supple. Full ROM. No lymphadenopathy. No meningismus.   Cardiovascular: Regular rate. Regular rhythm. No murmurs, rubs, or gallops. Distal pulses are 2+ and symmetric.  Pulmonary/Chest: No respiratory distress. Clear to auscultation bilaterally. No wheezing or rales.  Abdominal: Soft and non-distended.  There is no tenderness.  No rebound, guarding, or rigidity.  Musculoskeletal: Moves all extremities. No obvious deformities. No edema. No calf  tenderness.  Skin: Warm and dry.  Neurological:  Alert, awake, and appropriate. CNII-XII intact. Normal speech.  No acute focal neurological deficits are appreciated.  Psychiatric: Normal affect. Good eye contact. Appropriate in content.    ED Course    Procedures  ED Vital Signs:  Vitals:    05/24/18 2026 05/24/18 2027 05/24/18 2035 05/24/18 2120   BP: 106/67 106/67     Pulse: 66 65  78   Resp: 18      Temp: 97.5 °F (36.4 °C)      TempSrc: Oral      SpO2: 100% 99% 100% 100%   Weight: 69.9 kg (154 lb 1 oz)       05/24/18 2148 05/24/18 2203 05/24/18 2205 05/24/18 2252   BP:   114/68    Pulse: 73 71  89   Resp:       Temp:       TempSrc:       SpO2: 96% (!) 92%  98%   Weight:        05/24/18 2333   BP: 119/79   Pulse: 69   Resp:    Temp:    TempSrc:    SpO2: 96%   Weight:        Abnormal Lab Results:  Labs Reviewed   CBC W/ AUTO DIFFERENTIAL - Abnormal; Notable for the following:        Result Value    Hemoglobin 10.4 (*)     Hematocrit 32.8 (*)     MCV 77 (*)     MCH 24.2 (*)     MCHC 31.7 (*)     MPV 9.1 (*)     All other components within normal limits   COMPREHENSIVE METABOLIC PANEL - Abnormal; Notable for the following:     Calcium 8.4 (*)     Alkaline Phosphatase 51 (*)     Anion Gap 7 (*)     All other components within normal limits        All Lab Results:  Results for orders placed or performed during the hospital encounter of 05/24/18   CBC auto differential   Result Value Ref Range    WBC 5.01 3.90 - 12.70 K/uL    RBC 4.29 4.00 - 5.40 M/uL    Hemoglobin 10.4 (L) 12.0 - 16.0 g/dL    Hematocrit 32.8 (L) 37.0 - 48.5 %    MCV 77 (L) 82 - 98 fL    MCH 24.2 (L) 27.0 - 31.0 pg    MCHC 31.7 (L) 32.0 - 36.0 g/dL    RDW 14.0 11.5 - 14.5 %    Platelets 232 150 - 350 K/uL    MPV 9.1 (L) 9.2 - 12.9 fL    Gran # (ANC) 3.4 1.8 - 7.7 K/uL    Lymph # 1.3 1.0 - 4.8 K/uL    Mono # 0.3 0.3 - 1.0 K/uL    Eos # 0.0 0.0 - 0.5 K/uL    Baso # 0.01 0.00 - 0.20 K/uL    Gran% 68.3 38.0 - 73.0 %    Lymph% 25.5 18.0 - 48.0 %    Mono%  5.6 4.0 - 15.0 %    Eosinophil% 0.4 0.0 - 8.0 %    Basophil% 0.2 0.0 - 1.9 %    Differential Method Automated    Comprehensive metabolic panel   Result Value Ref Range    Sodium 140 136 - 145 mmol/L    Potassium 4.0 3.5 - 5.1 mmol/L    Chloride 110 95 - 110 mmol/L    CO2 23 23 - 29 mmol/L    Glucose 93 70 - 110 mg/dL    BUN, Bld 20 6 - 20 mg/dL    Creatinine 0.7 0.5 - 1.4 mg/dL    Calcium 8.4 (L) 8.7 - 10.5 mg/dL    Total Protein 6.6 6.0 - 8.4 g/dL    Albumin 3.8 3.5 - 5.2 g/dL    Total Bilirubin 0.1 0.1 - 1.0 mg/dL    Alkaline Phosphatase 51 (L) 55 - 135 U/L    AST 15 10 - 40 U/L    ALT 12 10 - 44 U/L    Anion Gap 7 (L) 8 - 16 mmol/L    eGFR if African American >60 >60 mL/min/1.73 m^2    eGFR if non African American >60 >60 mL/min/1.73 m^2       Imaging Results:  Imaging Results          CTA Brain (Final result)  Result time 05/24/18 23:07:03    Final result by Jose David MD (05/24/18 23:07:03)                 Impression:      Negative CTA scan of the brain.    All CT scans at this facility use dose modulation, iterative reconstruction and/or weight based dosing when appropriate to reduce radiation dose to as low as reasonably achievable.      Electronically signed by: Jose David MD  Date:    05/24/2018  Time:    23:07             Narrative:    EXAMINATION:  CTA HEAD    CLINICAL HISTORY:  Headache, chronic, new features or neuro deficit;    TECHNIQUE:  Standard contrast enhanced CTA of brain with 100 ml of Omnipaque 350 with 3D MIP reformations. No previous for comparison.    COMPARISON:  None    FINDINGS:  The brain is grossly normal    The anterior circulation is normal. The right and left internal carotid arteries are normal.    The vertebrobasilar arteries are normal.                                        The Emergency Provider reviewed the vital signs and test results, which are outlined above.    ED Discussion     9:30 PM: Discussed pt's case with Dr. Garsia (Neurology) who recommends admitting pt and a  CTA of brain.    11:31 PM: Reassessed pt at this time. Pt is AAOx3, in NAD, and VSS. Pt states her condition has improved at this time. Offered pt admission for pain control, pt declined stating she wants to go home. Discussed with pt all pertinent ED information and results. Discussed pt dx and plan of tx. Gave pt all f/u and return to the ED instructions. All questions and concerns were addressed at this time. Pt expresses understanding of information and instructions, and is comfortable with plan to discharge. Pt is stable for discharge.    Patient's headache is either consistent with previous headache and/or lacks features concerning for emergent or life threatening condition.  I do not suspect SAH, meningitis, increased IC pressure, infectious, toxic, vascular, CNS, or other EMC.  I have discussed this at length with patient and/or family/caretaker.        ED Medication(s):  Medications   valproate (DEPACON) 500 mg in dextrose 5 % 50 mL IVPB (0 mg Intravenous Stopped 5/24/18 2140)   sodium chloride 0.9% bolus 1,000 mL (0 mLs Intravenous Stopped 5/24/18 2156)   promethazine (PHENERGAN) 12.5 mg in dextrose 5 % 50 mL IVPB (0 mg Intravenous Stopped 5/24/18 2043)   dexamethasone injection 4 mg (4 mg Intravenous Given 5/24/18 2035)   meperidine injection 50 mg (50 mg Intravenous Given 5/24/18 2035)   diazePAM injection 5 mg (5 mg Intravenous Given 5/24/18 2130)   hydromorphone (PF) injection 1 mg (1 mg Intravenous Given 5/24/18 2130)   omnipaque 350 iohexol 100 mL (100 mLs Intravenous Given 5/24/18 2247)       Discharge Medication List as of 5/24/2018 11:35 PM          Follow-up Information     Keerthi Garsia MD. Schedule an appointment as soon as possible for a visit in 1 week.    Specialty:  Neurology  Why:  Can return to the ER, If symptoms worsen  Contact information:  [address]  [place] [address] [phone]                     Medical Decision Making    Medical Decision Making:   Clinical Tests:    Lab Tests: Ordered and Reviewed  Radiological Study: Ordered and Reviewed           Scribe Attestation:   Scribe #1: I performed the above scribed service and the documentation accurately describes the services I performed. I attest to the accuracy of the note.    Attending:   Physician Attestation Statement for Scribe #1: I, Kale Jiménez MD, personally performed the services described in this documentation, as scribed by Brenda Shah, in my presence, and it is both accurate and complete.          Clinical Impression       ICD-10-CM ICD-9-CM   1. Migraine without aura and with status migrainosus, not intractable G43.001 346.12       Disposition:   Disposition: Discharged  Condition: Stable         Kale Jiménez MD  05/25/18 0130

## 2018-05-28 ENCOUNTER — TELEPHONE (OUTPATIENT)
Dept: NEUROLOGY | Facility: CLINIC | Age: 43
End: 2018-05-28

## 2018-05-31 ENCOUNTER — TELEPHONE (OUTPATIENT)
Dept: NEPHROLOGY | Facility: CLINIC | Age: 43
End: 2018-05-31

## 2018-05-31 NOTE — TELEPHONE ENCOUNTER
----- Message from Giovanna Mccord sent at 5/31/2018 10:57 AM CDT -----  Contact: [facility]- [phone]   Would  Like to consult with the nurse about patient information.  Please call back at [phone].  Thx-AH

## 2018-06-01 RX ORDER — CARBAMAZEPINE 100 MG/1
100 TABLET, EXTENDED RELEASE ORAL 2 TIMES DAILY
Qty: 60 TABLET | Refills: 11 | Status: SHIPPED | OUTPATIENT
Start: 2018-06-01 | End: 2018-06-01 | Stop reason: SDUPTHER

## 2018-06-01 RX ORDER — CARBAMAZEPINE 100 MG/1
100 TABLET, EXTENDED RELEASE ORAL DAILY
Qty: 30 TABLET | Refills: 11 | Status: SHIPPED | OUTPATIENT
Start: 2018-06-01 | End: 2018-08-01 | Stop reason: CLARIF

## 2018-07-04 DIAGNOSIS — G44.029 CHRONIC CLUSTER HEADACHE, NOT INTRACTABLE: Primary | ICD-10-CM

## 2018-07-04 RX ORDER — VERAPAMIL HYDROCHLORIDE 120 MG/1
120 TABLET, FILM COATED, EXTENDED RELEASE ORAL NIGHTLY
Qty: 30 TABLET | Refills: 11 | Status: SHIPPED | OUTPATIENT
Start: 2018-07-04 | End: 2018-08-01

## 2018-07-05 ENCOUNTER — HOSPITAL ENCOUNTER (EMERGENCY)
Facility: HOSPITAL | Age: 43
Discharge: HOME | End: 2018-07-05
Attending: EMERGENCY MEDICINE
Payer: COMMERCIAL

## 2018-07-05 VITALS
SYSTOLIC BLOOD PRESSURE: 117 MMHG | WEIGHT: 162 LBS | BODY MASS INDEX: 23.99 KG/M2 | OXYGEN SATURATION: 100 % | RESPIRATION RATE: 25 BRPM | TEMPERATURE: 99 F | HEART RATE: 63 BPM | HEIGHT: 69 IN | DIASTOLIC BLOOD PRESSURE: 80 MMHG

## 2018-07-05 DIAGNOSIS — G44.001 INTRACTABLE CLUSTER HEADACHE SYNDROME, UNSPECIFIED CHRONICITY PATTERN: ICD-10-CM

## 2018-07-05 DIAGNOSIS — R51.9 HEADACHE: Primary | ICD-10-CM

## 2018-07-05 LAB
ANION GAP SERPL CALC-SCNC: 9 MMOL/L
BASOPHILS # BLD AUTO: 0.03 K/UL
BASOPHILS NFR BLD: 0.4 %
BUN SERPL-MCNC: 18 MG/DL
CALCIUM SERPL-MCNC: 9.3 MG/DL
CHLORIDE SERPL-SCNC: 105 MMOL/L
CO2 SERPL-SCNC: 23 MMOL/L
CREAT SERPL-MCNC: 0.7 MG/DL
DIFFERENTIAL METHOD: ABNORMAL
EOSINOPHIL # BLD AUTO: 0 K/UL
EOSINOPHIL NFR BLD: 0.4 %
ERYTHROCYTE [DISTWIDTH] IN BLOOD BY AUTOMATED COUNT: 14.8 %
EST. GFR  (AFRICAN AMERICAN): >60 ML/MIN/1.73 M^2
EST. GFR  (NON AFRICAN AMERICAN): >60 ML/MIN/1.73 M^2
GLUCOSE SERPL-MCNC: 89 MG/DL
HCT VFR BLD AUTO: 35.8 %
HGB BLD-MCNC: 11.4 G/DL
LYMPHOCYTES # BLD AUTO: 3 K/UL
LYMPHOCYTES NFR BLD: 41.4 %
MCH RBC QN AUTO: 24.7 PG
MCHC RBC AUTO-ENTMCNC: 31.8 G/DL
MCV RBC AUTO: 78 FL
MONOCYTES # BLD AUTO: 0.5 K/UL
MONOCYTES NFR BLD: 6.4 %
NEUTROPHILS # BLD AUTO: 3.8 K/UL
NEUTROPHILS NFR BLD: 51.4 %
PLATELET # BLD AUTO: 283 K/UL
PMV BLD AUTO: 9.5 FL
POTASSIUM SERPL-SCNC: 4.7 MMOL/L
RBC # BLD AUTO: 4.62 M/UL
SODIUM SERPL-SCNC: 137 MMOL/L
WBC # BLD AUTO: 7.32 K/UL

## 2018-07-05 PROCEDURE — 80048 BASIC METABOLIC PNL TOTAL CA: CPT

## 2018-07-05 PROCEDURE — 99285 EMERGENCY DEPT VISIT HI MDM: CPT | Mod: 25

## 2018-07-05 PROCEDURE — C9113 INJ PANTOPRAZOLE SODIUM, VIA: HCPCS | Performed by: EMERGENCY MEDICINE

## 2018-07-05 PROCEDURE — 63600175 PHARM REV CODE 636 W HCPCS: Performed by: EMERGENCY MEDICINE

## 2018-07-05 PROCEDURE — 36415 COLL VENOUS BLD VENIPUNCTURE: CPT

## 2018-07-05 PROCEDURE — 25000003 PHARM REV CODE 250: Performed by: EMERGENCY MEDICINE

## 2018-07-05 PROCEDURE — 85025 COMPLETE CBC W/AUTO DIFF WBC: CPT

## 2018-07-05 PROCEDURE — 96365 THER/PROPH/DIAG IV INF INIT: CPT

## 2018-07-05 PROCEDURE — 96376 TX/PRO/DX INJ SAME DRUG ADON: CPT

## 2018-07-05 PROCEDURE — 96375 TX/PRO/DX INJ NEW DRUG ADDON: CPT

## 2018-07-05 RX ORDER — MEPERIDINE HYDROCHLORIDE 50 MG/ML
50 INJECTION INTRAMUSCULAR; INTRAVENOUS; SUBCUTANEOUS
Status: COMPLETED | OUTPATIENT
Start: 2018-07-05 | End: 2018-07-05

## 2018-07-05 RX ORDER — DEXAMETHASONE SODIUM PHOSPHATE 4 MG/ML
12 INJECTION, SOLUTION INTRA-ARTICULAR; INTRALESIONAL; INTRAMUSCULAR; INTRAVENOUS; SOFT TISSUE
Status: COMPLETED | OUTPATIENT
Start: 2018-07-05 | End: 2018-07-05

## 2018-07-05 RX ORDER — DIAZEPAM 10 MG/2ML
5 INJECTION INTRAMUSCULAR ONCE
Status: DISCONTINUED | OUTPATIENT
Start: 2018-07-05 | End: 2018-07-05

## 2018-07-05 RX ORDER — HYDROMORPHONE HYDROCHLORIDE 2 MG/ML
1 INJECTION, SOLUTION INTRAMUSCULAR; INTRAVENOUS; SUBCUTANEOUS
Status: COMPLETED | OUTPATIENT
Start: 2018-07-05 | End: 2018-07-05

## 2018-07-05 RX ORDER — KETOROLAC TROMETHAMINE 30 MG/ML
30 INJECTION, SOLUTION INTRAMUSCULAR; INTRAVENOUS
Status: COMPLETED | OUTPATIENT
Start: 2018-07-05 | End: 2018-07-05

## 2018-07-05 RX ORDER — MEPERIDINE HYDROCHLORIDE 50 MG/ML
50 INJECTION INTRAMUSCULAR; INTRAVENOUS; SUBCUTANEOUS
Status: DISCONTINUED | OUTPATIENT
Start: 2018-07-05 | End: 2018-07-05

## 2018-07-05 RX ORDER — OXYCODONE AND ACETAMINOPHEN 10; 325 MG/1; MG/1
1 TABLET ORAL EVERY 4 HOURS PRN
Qty: 30 TABLET | Refills: 0 | Status: ON HOLD | OUTPATIENT
Start: 2018-07-05 | End: 2018-12-26 | Stop reason: SDUPTHER

## 2018-07-05 RX ADMIN — DEXAMETHASONE SODIUM PHOSPHATE 12 MG: 4 INJECTION, SOLUTION INTRAMUSCULAR; INTRAVENOUS at 10:07

## 2018-07-05 RX ADMIN — LORAZEPAM 1 MG: 2 INJECTION INTRAMUSCULAR; INTRAVENOUS at 10:07

## 2018-07-05 RX ADMIN — MEPERIDINE HYDROCHLORIDE 50 MG: 50 INJECTION INTRAMUSCULAR; INTRAVENOUS; SUBCUTANEOUS at 08:07

## 2018-07-05 RX ADMIN — PROMETHAZINE HYDROCHLORIDE 25 MG: 25 INJECTION INTRAMUSCULAR; INTRAVENOUS at 08:07

## 2018-07-05 RX ADMIN — DEXTROSE 40 MG: 50 INJECTION, SOLUTION INTRAVENOUS at 10:07

## 2018-07-05 RX ADMIN — KETOROLAC TROMETHAMINE 30 MG: 30 INJECTION, SOLUTION INTRAMUSCULAR at 08:07

## 2018-07-05 RX ADMIN — HYDROMORPHONE HYDROCHLORIDE 1 MG: 2 INJECTION, SOLUTION INTRAMUSCULAR; INTRAVENOUS; SUBCUTANEOUS at 08:07

## 2018-07-05 RX ADMIN — SODIUM CHLORIDE 1000 ML: 0.9 INJECTION, SOLUTION INTRAVENOUS at 08:07

## 2018-07-05 RX ADMIN — LORAZEPAM 2 MG: 2 INJECTION INTRAMUSCULAR; INTRAVENOUS at 09:07

## 2018-07-06 NOTE — ED PROVIDER NOTES
SCRIBE #1 NOTE: I, Luis Miguel Tinoco, am scribing for, and in the presence of, Charlotte Fang Do, MD. I have scribed the entire note.      History      Chief Complaint   Patient presents with    Headache       Review of patient's allergies indicates:   Allergen Reactions    Hydrocodone Other (See Comments)     Chest pains        HPI   HPI    7/5/2018, 8:03 PM   History obtained from the family      History of Present Illness: Sandra Dimas is a 42 y.o. female patient w/ a PMHx of migraines who presents to the Emergency Department for HA which onset this AM. Pt has had chronic HA's since she was 18. They are usually located to the unilateral R side. This HA is diffuse. Family reports that [place] recently traveled [place], and had HA's but that they were manageable. Family states that she has been having HA's approximately every two hours since they returned. Symptoms are constant and moderate in severity.  No mitigating or exacerbating factors reported. Associated sxs include nausea and photophobia. Patient denies any vomiting, dizziness, weakness, numbness, tremors, syncope, sinus pressure, congestion, and all other sxs at this time. No further complaints or concerns at this time.         Arrival mode: Personal vehicle    PCP: OSIRIS Gamez Jr, MD       Past Medical History:  Past Medical History:   Diagnosis Date    Endometriosis     Migraines        Past Surgical History:  Past Surgical History:   Procedure Laterality Date    APPENDECTOMY      diagnostic laprascopy      robotic assisted laparoscopy with excision of ovarian endometrioma and chromotubation           Family History:  Family History   Problem Relation Age of Onset    Strabismus Neg Hx     Retinal detachment Neg Hx     Macular degeneration Neg Hx     Glaucoma Neg Hx     Blindness Neg Hx     Amblyopia Neg Hx     Breast cancer Neg Hx     Colon cancer Neg Hx     Ovarian cancer Neg Hx     Thyroid disease Neg Hx     Migraines Neg Hx      Asthma Neg Hx        Social History:  Social History     Social History Main Topics    Smoking status: Never Smoker    Smokeless tobacco: Never Used    Alcohol use Yes      Comment: rarely    Drug use: No    Sexual activity: Yes     Partners: Male       ROS   Review of Systems   Constitutional: Negative for fever.   HENT: Negative for congestion, sinus pain and sore throat.    Eyes: Positive for photophobia.   Respiratory: Negative for shortness of breath.    Cardiovascular: Negative for chest pain.   Gastrointestinal: Positive for nausea. Negative for vomiting.   Genitourinary: Negative for dysuria.   Musculoskeletal: Negative for back pain.   Skin: Negative for rash.   Neurological: Positive for headaches (Diffuse). Negative for dizziness, tremors, syncope, weakness, light-headedness and numbness.   Hematological: Does not bruise/bleed easily.   All other systems reviewed and are negative.      Physical Exam      Initial Vitals   BP Pulse Resp Temp SpO2   07/05/18 2031 07/05/18 2027 07/05/18 2027 07/05/18 2027 07/05/18 2027   (!) 140/87 70 20 98.9 °F (37.2 °C) 100 %      MAP       --                 Physical Exam  Nursing Notes and Vital Signs Reviewed.  Constitutional: Patient is in mild distress. Well-developed and well-nourished.  Head: Atraumatic. Normocephalic.  Eyes: PERRL. EOM intact. Conjunctivae are not pale. No scleral icterus.  ENT: Mucous membranes are moist. Oropharynx is clear and symmetric.    Neck: Supple. Full ROM. No lymphadenopathy.  Cardiovascular: Regular rate. Regular rhythm. No murmurs, rubs, or gallops. Distal pulses are 2+ and symmetric.  Pulmonary/Chest: No respiratory distress. Clear to auscultation bilaterally. No wheezing or rales.  Abdominal: Soft and non-distended.  There is no tenderness.  No rebound, guarding, or rigidity.   Musculoskeletal: Moves all extremities. No obvious deformities. No edema. No calf tenderness.  Skin: Warm and dry.  Neurological:  Alert, awake, and  "appropriate.  Pt is photophobic.   Psychiatric: Normal affect. Good eye contact. Appropriate in content.    ED Course    Procedures  ED Vital Signs:  Vitals:    07/05/18 2027 07/05/18 2031 07/05/18 2159 07/05/18 2202   BP:  (!) 140/87  133/83   Pulse: 70 69 88 70   Resp: 20      Temp: 98.9 °F (37.2 °C)      TempSrc: Oral      SpO2: 100% 100%  100%   Weight: 73.5 kg (162 lb)      Height: 5' 9" (1.753 m)       07/05/18 2204 07/05/18 2219 07/05/18 2231 07/05/18 2257   BP:  (!) 144/79 135/88 117/80   Pulse: 69 70 66 63   Resp: 14 20 20 (!) 25   Temp:       TempSrc:       SpO2: 100% 100% 100% 100%   Weight:       Height:           Abnormal Lab Results:  Labs Reviewed   CBC W/ AUTO DIFFERENTIAL - Abnormal; Notable for the following:        Result Value    Hemoglobin 11.4 (*)     Hematocrit 35.8 (*)     MCV 78 (*)     MCH 24.7 (*)     MCHC 31.8 (*)     RDW 14.8 (*)     All other components within normal limits   BASIC METABOLIC PANEL        All Lab Results:  Results for orders placed or performed during the hospital encounter of 07/05/18   CBC auto differential   Result Value Ref Range    WBC 7.32 3.90 - 12.70 K/uL    RBC 4.62 4.00 - 5.40 M/uL    Hemoglobin 11.4 (L) 12.0 - 16.0 g/dL    Hematocrit 35.8 (L) 37.0 - 48.5 %    MCV 78 (L) 82 - 98 fL    MCH 24.7 (L) 27.0 - 31.0 pg    MCHC 31.8 (L) 32.0 - 36.0 g/dL    RDW 14.8 (H) 11.5 - 14.5 %    Platelets 283 150 - 350 K/uL    MPV 9.5 9.2 - 12.9 fL    Gran # (ANC) 3.8 1.8 - 7.7 K/uL    Lymph # 3.0 1.0 - 4.8 K/uL    Mono # 0.5 0.3 - 1.0 K/uL    Eos # 0.0 0.0 - 0.5 K/uL    Baso # 0.03 0.00 - 0.20 K/uL    Gran% 51.4 38.0 - 73.0 %    Lymph% 41.4 18.0 - 48.0 %    Mono% 6.4 4.0 - 15.0 %    Eosinophil% 0.4 0.0 - 8.0 %    Basophil% 0.4 0.0 - 1.9 %    Differential Method Automated    Basic metabolic panel   Result Value Ref Range    Sodium 137 136 - 145 mmol/L    Potassium 4.7 3.5 - 5.1 mmol/L    Chloride 105 95 - 110 mmol/L    CO2 23 23 - 29 mmol/L    Glucose 89 70 - 110 mg/dL    BUN, " Bld 18 6 - 20 mg/dL    Creatinine 0.7 0.5 - 1.4 mg/dL    Calcium 9.3 8.7 - 10.5 mg/dL    Anion Gap 9 8 - 16 mmol/L    eGFR if African American >60 >60 mL/min/1.73 m^2    eGFR if non African American >60 >60 mL/min/1.73 m^2         Imaging Results:  Imaging Results          MRI Cervical Spine Without Contrast (Final result)  Result time 07/05/18 22:08:35    Final result by Chris Vega MD (07/05/18 22:08:35)                 Impression:      1. Cervicothoracic scoliosis.  2. Mild multilevel spondylosis with mild multilevel disc bulging and uncovertebral joint hypertrophy.  Central canal and neural foramina widely patent around the cervical spine.      Electronically signed by: Chris Vega MD  Date:    07/05/2018  Time:    22:08             Narrative:    EXAMINATION:  MRI CERVICAL SPINE WITHOUT CONTRAST    CLINICAL HISTORY:  Neck pain, first study;.  Headache    TECHNIQUE:  Multiplanar, multisequence MR images of the cervical spine were acquired without the administration of contrast.    COMPARISON:  None.    FINDINGS:  Cervicothoracic scoliosis is present with a mild curvature of the cervical spine which is convex to the right and centered at C5-C6.  All cervical vertebral bodies are normal in height.  Marrow signal is normal    No evidence of fracture.  No suspicious osseous lesions    The visualized intracranial contents within the posterior cranial fossa are normal.  There is no cerebellar tonsillar ectopia present.  The cervical cord is normal.  There is no cervical cord signal abnormality present.    C1-C2: Normal.    C2-3: Normal.    C3-C4: Normal.    C4-C5: Mild left-sided uncovertebral joint hypertrophy.  Central canal and neural foramina patent.    C5-C6: Mild generalized disc bulging, with bilateral uncovertebral joint hypertrophy.  Central canal and neural foramina widely patent.    C6-C7: Normal.    C7-T1: Normal.    T1-T2: Normal intervertebral body disc.  Central canal neural foramina  patent.  Prominent incidental perineural cyst identified at the exit zone of the left neural foramina.    Paravertebral soft tissues and musculature are normal.  No evidence of muscle strain.  Interspinous ligaments are normal.                                        The Emergency Provider reviewed the vital signs and test results, which are outlined above.    ED Discussion     8:10 PM: Dr. Mcghee discussed the pt's case with Dr. Crump (neurology) who recommends administering 12 mg dexamethasone and IV fluids.      10:23 PM: Pt has frequent severe cluster HA's.She has had 2 ED visits in the past month. O2 has helped her previously. Recommend to receive outpatient O2 therapy and/or O2 tank at home as that is the treatment that helped her the most.      10:51 PM: Reassessed pt at this time.  Pt states her condition has improved at this time. Discussed with pt all pertinent ED information and results. Discussed pt dx and plan of tx. Gave pt all f/u and return to the ED instructions. All questions and concerns were addressed at this time. Pt expresses understanding of information and instructions, and is comfortable with plan to discharge. Pt is stable for discharge.      I discussed with patient and/or family/caretaker that evaluation in the ED does not suggest any emergent or life threatening medical conditions requiring immediate intervention beyond what was provided in the ED, and I believe patient is safe for discharge.  Regardless, an unremarkable evaluation in the ED does not preclude the development or presence of a serious of life threatening condition. As such, patient was instructed to return immediately for any worsening or change in current symptoms.      Patient's headache is either consistent with previous headache and/or lacks features concerning for emergent or life threatening condition.  I do not suspect SAH, meningitis, increased IC pressure, infectious, toxic, vascular, CNS, or other EMC.  I have  discussed this at length with patient and/or family/caretaker.      ED Medication(s):  Medications   promethazine (PHENERGAN) 25 mg in dextrose 5 % 50 mL IVPB (0 mg Intravenous Stopped 7/5/18 2101)   sodium chloride 0.9% bolus 1,000 mL (0 mLs Intravenous Stopped 7/5/18 2112)   dexamethasone injection 12 mg (12 mg Intramuscular Given 7/5/18 2216)   meperidine injection 50 mg (50 mg Intravenous Given 7/5/18 2020)   ketorolac injection 30 mg (30 mg Intravenous Given 7/5/18 2018)   pantoprazole 40 mg in dextrose 5 % 100 mL IVPB (over 15 minutes) (ready to mix system) (40 mg Intravenous Given 7/5/18 2204)   hydromorphone (PF) injection 1 mg (1 mg Intravenous Given 7/5/18 2059)   lorazepam (ATIVAN) injection 2 mg (2 mg Intravenous Given 7/5/18 2110)   dexamethasone injection 12 mg (12 mg Intravenous Given 7/5/18 2221)   lorazepam (ATIVAN) injection 1 mg (1 mg Intravenous Given 7/5/18 2226)       New Prescriptions    OXYCODONE-ACETAMINOPHEN (PERCOCET)  MG PER TABLET    Take 1 tablet by mouth every 4 (four) hours as needed for Pain.       Follow-up Information     E Jose Gamez Jr, MD In 2 days.    Specialties:  Internal Medicine, Pediatrics  Contact information:  [address]  [facility] [address] [phone]                     Medical Decision Making    Medical Decision Making:   Clinical Tests:   Lab Tests: Ordered and Reviewed           Scribe Attestation:   Scribe #1: I performed the above scribed service and the documentation accurately describes the services I performed. I attest to the accuracy of the note.    Attending:   Physician Attestation Statement for Scribe #1: I, Charlotte Fang Do, MD, personally performed the services described in this documentation, as scribed by Luis Miguel Tinoco, in my presence, and it is both accurate and complete.          Clinical Impression       ICD-10-CM ICD-9-CM   1. Headache R51 784.0   2. Intractable cluster headache syndrome, unspecified chronicity pattern  G44.001 339.00       Disposition:   Disposition: Discharged  Condition: Stable         Charlotte Fang Do, MD  07/05/18 0305

## 2018-08-01 DIAGNOSIS — G44.51 HEMICRANIA CONTINUA: Primary | ICD-10-CM

## 2018-08-01 RX ORDER — INDOMETHACIN 25 MG/1
25 CAPSULE ORAL
Qty: 90 CAPSULE | Refills: 3 | Status: SHIPPED | OUTPATIENT
Start: 2018-08-01 | End: 2018-11-29 | Stop reason: ALTCHOICE

## 2018-08-19 RX ORDER — FUROSEMIDE 20 MG/1
20 TABLET ORAL DAILY
Qty: 30 TABLET | Refills: 1 | Status: SHIPPED | OUTPATIENT
Start: 2018-08-19 | End: 2018-12-13

## 2018-08-20 RX ORDER — FLUOXETINE HYDROCHLORIDE 20 MG/1
20 CAPSULE ORAL DAILY
Qty: 90 CAPSULE | Refills: 3 | Status: SHIPPED | OUTPATIENT
Start: 2018-08-20 | End: 2019-08-13 | Stop reason: SDUPTHER

## 2018-08-24 DIAGNOSIS — K21.9 GASTROESOPHAGEAL REFLUX DISEASE WITHOUT ESOPHAGITIS: Primary | ICD-10-CM

## 2018-08-24 RX ORDER — PANTOPRAZOLE SODIUM 40 MG/1
40 TABLET, DELAYED RELEASE ORAL DAILY
Qty: 30 TABLET | Refills: 11 | Status: SHIPPED | OUTPATIENT
Start: 2018-05-22 | End: 2019-08-27 | Stop reason: SDUPTHER

## 2018-09-26 DIAGNOSIS — R00.2 PALPITATION: Primary | ICD-10-CM

## 2018-09-26 RX ORDER — ATENOLOL 25 MG/1
25 TABLET ORAL DAILY
Qty: 90 TABLET | Refills: 3 | Status: SHIPPED | OUTPATIENT
Start: 2018-09-26 | End: 2019-09-19 | Stop reason: SDUPTHER

## 2018-10-17 DIAGNOSIS — R53.83 FATIGUE, UNSPECIFIED TYPE: Primary | ICD-10-CM

## 2018-10-17 DIAGNOSIS — E55.9 VITAMIN D DEFICIENCY: ICD-10-CM

## 2018-10-17 DIAGNOSIS — G44.51 HEMICRANIA CONTINUA: ICD-10-CM

## 2018-10-18 ENCOUNTER — LAB VISIT (OUTPATIENT)
Dept: LAB | Facility: HOSPITAL | Age: 43
End: 2018-10-18
Attending: INTERNAL MEDICINE
Payer: COMMERCIAL

## 2018-10-18 DIAGNOSIS — R53.83 FATIGUE, UNSPECIFIED TYPE: ICD-10-CM

## 2018-10-18 DIAGNOSIS — G44.51 HEMICRANIA CONTINUA: ICD-10-CM

## 2018-10-18 DIAGNOSIS — E55.9 VITAMIN D DEFICIENCY: ICD-10-CM

## 2018-10-18 LAB
25(OH)D3+25(OH)D2 SERPL-MCNC: 44 NG/ML
ALBUMIN SERPL BCP-MCNC: 4 G/DL
ALP SERPL-CCNC: 42 U/L
ALT SERPL W/O P-5'-P-CCNC: 9 U/L
ANION GAP SERPL CALC-SCNC: 9 MMOL/L
AST SERPL-CCNC: 16 U/L
BASOPHILS # BLD AUTO: 0.03 K/UL
BASOPHILS NFR BLD: 0.6 %
BILIRUB SERPL-MCNC: 0.4 MG/DL
BUN SERPL-MCNC: 14 MG/DL
CALCIUM SERPL-MCNC: 9.6 MG/DL
CHLORIDE SERPL-SCNC: 106 MMOL/L
CHOLEST SERPL-MCNC: 163 MG/DL
CHOLEST/HDLC SERPL: 3.1 {RATIO}
CO2 SERPL-SCNC: 25 MMOL/L
CREAT SERPL-MCNC: 0.8 MG/DL
DIFFERENTIAL METHOD: ABNORMAL
EOSINOPHIL # BLD AUTO: 0.1 K/UL
EOSINOPHIL NFR BLD: 1.3 %
ERYTHROCYTE [DISTWIDTH] IN BLOOD BY AUTOMATED COUNT: 13.2 %
EST. GFR  (AFRICAN AMERICAN): >60 ML/MIN/1.73 M^2
EST. GFR  (NON AFRICAN AMERICAN): >60 ML/MIN/1.73 M^2
GLUCOSE SERPL-MCNC: 86 MG/DL
HCT VFR BLD AUTO: 34.4 %
HDLC SERPL-MCNC: 52 MG/DL
HDLC SERPL: 31.9 %
HGB BLD-MCNC: 10.5 G/DL
IMM GRANULOCYTES # BLD AUTO: 0.01 K/UL
IMM GRANULOCYTES NFR BLD AUTO: 0.2 %
LDLC SERPL CALC-MCNC: 95 MG/DL
LYMPHOCYTES # BLD AUTO: 2.2 K/UL
LYMPHOCYTES NFR BLD: 48.3 %
MCH RBC QN AUTO: 23.9 PG
MCHC RBC AUTO-ENTMCNC: 30.5 G/DL
MCV RBC AUTO: 78 FL
MONOCYTES # BLD AUTO: 0.4 K/UL
MONOCYTES NFR BLD: 8.6 %
NEUTROPHILS # BLD AUTO: 1.9 K/UL
NEUTROPHILS NFR BLD: 41 %
NONHDLC SERPL-MCNC: 111 MG/DL
NRBC BLD-RTO: 0 /100 WBC
PLATELET # BLD AUTO: 277 K/UL
PMV BLD AUTO: 11.1 FL
POTASSIUM SERPL-SCNC: 4.2 MMOL/L
PROT SERPL-MCNC: 6.8 G/DL
RBC # BLD AUTO: 4.4 M/UL
SODIUM SERPL-SCNC: 140 MMOL/L
TRIGL SERPL-MCNC: 80 MG/DL
TSH SERPL DL<=0.005 MIU/L-ACNC: 1.24 UIU/ML
WBC # BLD AUTO: 4.64 K/UL

## 2018-10-18 PROCEDURE — 80061 LIPID PANEL: CPT

## 2018-10-18 PROCEDURE — 82306 VITAMIN D 25 HYDROXY: CPT

## 2018-10-18 PROCEDURE — 86038 ANTINUCLEAR ANTIBODIES: CPT

## 2018-10-18 PROCEDURE — 36415 COLL VENOUS BLD VENIPUNCTURE: CPT | Mod: PO

## 2018-10-18 PROCEDURE — 80053 COMPREHEN METABOLIC PANEL: CPT

## 2018-10-18 PROCEDURE — 85025 COMPLETE CBC W/AUTO DIFF WBC: CPT

## 2018-10-18 PROCEDURE — 84443 ASSAY THYROID STIM HORMONE: CPT

## 2018-10-19 DIAGNOSIS — D64.9 ANEMIA, UNSPECIFIED TYPE: Primary | ICD-10-CM

## 2018-10-19 LAB — ANA SER QL IF: NORMAL

## 2018-10-24 ENCOUNTER — LAB VISIT (OUTPATIENT)
Dept: LAB | Facility: HOSPITAL | Age: 43
End: 2018-10-24
Attending: INTERNAL MEDICINE
Payer: COMMERCIAL

## 2018-10-24 DIAGNOSIS — D64.9 ANEMIA, UNSPECIFIED TYPE: ICD-10-CM

## 2018-10-24 LAB
CRP SERPL-MCNC: 0.1 MG/L
FERRITIN SERPL-MCNC: 8 NG/ML
IRON SERPL-MCNC: 74 UG/DL
SATURATED IRON: 17 %
TOTAL IRON BINDING CAPACITY: 438 UG/DL
TRANSFERRIN SERPL-MCNC: 296 MG/DL

## 2018-10-24 PROCEDURE — 86140 C-REACTIVE PROTEIN: CPT

## 2018-10-24 PROCEDURE — 83540 ASSAY OF IRON: CPT

## 2018-10-24 PROCEDURE — 36415 COLL VENOUS BLD VENIPUNCTURE: CPT | Mod: PO

## 2018-10-24 PROCEDURE — 82728 ASSAY OF FERRITIN: CPT

## 2018-10-26 DIAGNOSIS — D50.0 IRON DEFICIENCY ANEMIA DUE TO CHRONIC BLOOD LOSS: ICD-10-CM

## 2018-10-26 RX ORDER — HEPARIN 100 UNIT/ML
500 SYRINGE INTRAVENOUS
Status: CANCELLED | OUTPATIENT
Start: 2018-10-29

## 2018-10-26 RX ORDER — SODIUM CHLORIDE 0.9 % (FLUSH) 0.9 %
10 SYRINGE (ML) INJECTION
Status: CANCELLED | OUTPATIENT
Start: 2018-10-29

## 2018-10-26 RX ORDER — SODIUM CHLORIDE 0.9 % (FLUSH) 0.9 %
10 SYRINGE (ML) INJECTION
Status: CANCELLED | OUTPATIENT
Start: 2018-11-05

## 2018-10-26 RX ORDER — HEPARIN 100 UNIT/ML
500 SYRINGE INTRAVENOUS
Status: CANCELLED | OUTPATIENT
Start: 2018-11-05

## 2018-10-30 ENCOUNTER — INFUSION (OUTPATIENT)
Dept: INFUSION THERAPY | Facility: HOSPITAL | Age: 43
End: 2018-10-30
Attending: INTERNAL MEDICINE
Payer: COMMERCIAL

## 2018-10-30 VITALS
DIASTOLIC BLOOD PRESSURE: 70 MMHG | RESPIRATION RATE: 18 BRPM | TEMPERATURE: 98 F | HEART RATE: 60 BPM | SYSTOLIC BLOOD PRESSURE: 107 MMHG | OXYGEN SATURATION: 99 %

## 2018-10-30 DIAGNOSIS — D50.0 IRON DEFICIENCY ANEMIA DUE TO CHRONIC BLOOD LOSS: Primary | ICD-10-CM

## 2018-10-30 PROCEDURE — 96365 THER/PROPH/DIAG IV INF INIT: CPT | Mod: PO

## 2018-10-30 PROCEDURE — 25000003 PHARM REV CODE 250: Mod: PO | Performed by: INTERNAL MEDICINE

## 2018-10-30 PROCEDURE — 63600175 PHARM REV CODE 636 W HCPCS: Mod: JG,PO | Performed by: INTERNAL MEDICINE

## 2018-10-30 RX ADMIN — FERRIC CARBOXYMALTOSE INJECTION 750 MG: 50 INJECTION, SOLUTION INTRAVENOUS at 10:10

## 2018-10-30 NOTE — PLAN OF CARE
Problem: Patient Care Overview  Goal: Plan of Care Review  Outcome: Ongoing (interventions implemented as appropriate)  I'm exhausted and just nervous about this infusion.

## 2018-10-30 NOTE — PATIENT INSTRUCTIONS
[facility]  [address]  [address]  [phone] phone     [phone] fax  Hours of Operation: Monday- Friday 8:00am- 5:00pm  After hours phone  [phone]  Hematology / Oncology Physicians on call      Dr. Emeka Macario                        Please call with any concerns regarding your appointment today.    Ferric carboxymaltose injection  What is this medicine?  FERRIC CARBOXYMALTOSE (ferr-ik car-box-ee-mol-toes) is an iron complex. Iron is used to make healthy red blood cells, which carry oxygen and nutrients throughout the body. This medicine is used to treat anemia in people with chronic kidney disease or people who cannot take iron by mouth.  How should I use this medicine?  This medicine is for infusion into a vein. It is given by a health care professional in a hospital or clinic setting.  Talk to your pediatrician regarding the use of this medicine in children. Special care may be needed.  What side effects may I notice from receiving this medicine?  Side effects that you should report to your doctor or health care professional as soon as possible:  · allergic reactions like skin rash, itching or hives, swelling of the face, lips, or tongue -breathing problems  · changes in blood pressure  · feeling faint or lightheaded, falls  · flushing, sweating, or hot feelings  Side effects that usually do not require medical attention (Report these to your doctor or health care professional if they continue or are bothersome.):  · changes in taste  · constipation  · dizziness  · headache  · nausea  · pain, redness, or irritation at site where injected  · vomiting  What may interact with this medicine?  Do not take this medicine with any of the following medications:  · deferoxamine  · dimercaprol  · other iron products  This medicine may also interact with the following medications:  · chloramphenicol  · deferasirox  What if I  miss a dose?  It is important not to miss your dose. Call your doctor or health care professional if you are unable to keep an appointment.  Where should I keep my medicine?  This drug is given in a hospital or clinic and will not be stored at home.  What should I tell my health care provider before I take this medicine?  They need to know if you have any of these conditions:  · anemia not caused by low iron levels  · high levels of iron in the blood  · liver disease  · an unusual or allergic reaction to iron, other medicines, foods, dyes, or preservatives  · pregnant or trying to get pregnant  · breast-feeding  What should I watch for while using this medicine?  Visit your doctor or health care professional regularly. Tell your doctor if your symptoms do not start to get better or if they get worse. You may need blood work done while you are taking this medicine.  You may need to follow a special diet. Talk to your doctor. Foods that contain iron include: whole grains/cereals, dried fruits, beans, or peas, leafy green vegetables, and organ meats (liver, kidney).  NOTE:This sheet is a summary. It may not cover all possible information. If you have questions about this medicine, talk to your doctor, pharmacist, or health care provider. Copyright© 2017 [employer]

## 2018-11-06 ENCOUNTER — INFUSION (OUTPATIENT)
Dept: INFUSION THERAPY | Facility: HOSPITAL | Age: 43
End: 2018-11-06
Attending: INTERNAL MEDICINE
Payer: COMMERCIAL

## 2018-11-06 VITALS
OXYGEN SATURATION: 99 % | DIASTOLIC BLOOD PRESSURE: 64 MMHG | RESPIRATION RATE: 16 BRPM | SYSTOLIC BLOOD PRESSURE: 99 MMHG | HEART RATE: 72 BPM | TEMPERATURE: 98 F

## 2018-11-06 DIAGNOSIS — D50.0 IRON DEFICIENCY ANEMIA DUE TO CHRONIC BLOOD LOSS: Primary | ICD-10-CM

## 2018-11-06 PROCEDURE — 25000003 PHARM REV CODE 250: Mod: PO | Performed by: INTERNAL MEDICINE

## 2018-11-06 PROCEDURE — 63600175 PHARM REV CODE 636 W HCPCS: Mod: JG,PO | Performed by: INTERNAL MEDICINE

## 2018-11-06 PROCEDURE — 96365 THER/PROPH/DIAG IV INF INIT: CPT | Mod: PO

## 2018-11-06 RX ADMIN — FERRIC CARBOXYMALTOSE INJECTION 750 MG: 50 INJECTION, SOLUTION INTRAVENOUS at 08:11

## 2018-11-06 NOTE — PATIENT INSTRUCTIONS
[facility]  [address]  [phone] phone     [phone] fax  Hours of Operation: Monday- Friday 8:00am- 5:00pm  After hours phone  [phone]  Hematology / Oncology Physicians on call      Dr. Emeka Macario                        Please call with any concerns regarding your appointment today.

## 2018-11-06 NOTE — PLAN OF CARE
Problem: Patient Care Overview  Goal: Plan of Care Review  Outcome: Ongoing (interventions implemented as appropriate)  I still feel the same. Just really tired.

## 2018-11-09 ENCOUNTER — DOCUMENTATION ONLY (OUTPATIENT)
Dept: NEUROLOGY | Facility: CLINIC | Age: 43
End: 2018-11-09

## 2018-11-09 ENCOUNTER — DOCUMENTATION ONLY (OUTPATIENT)
Dept: GASTROENTEROLOGY | Facility: CLINIC | Age: 43
End: 2018-11-09

## 2018-11-09 ENCOUNTER — HOSPITAL ENCOUNTER (EMERGENCY)
Facility: HOSPITAL | Age: 43
Discharge: HOME | End: 2018-11-09
Attending: EMERGENCY MEDICINE
Payer: COMMERCIAL

## 2018-11-09 ENCOUNTER — TELEPHONE (OUTPATIENT)
Dept: GASTROENTEROLOGY | Facility: CLINIC | Age: 43
End: 2018-11-09

## 2018-11-09 ENCOUNTER — DOCUMENTATION ONLY (OUTPATIENT)
Dept: NEUROLOGY | Facility: CLINIC | Age: 43
End: 2018-11-09
Payer: COMMERCIAL

## 2018-11-09 VITALS
BODY MASS INDEX: 24.47 KG/M2 | HEART RATE: 74 BPM | OXYGEN SATURATION: 100 % | SYSTOLIC BLOOD PRESSURE: 118 MMHG | HEIGHT: 69 IN | TEMPERATURE: 99 F | DIASTOLIC BLOOD PRESSURE: 67 MMHG | RESPIRATION RATE: 18 BRPM | WEIGHT: 165.19 LBS

## 2018-11-09 DIAGNOSIS — G43.909 MIGRAINE WITHOUT STATUS MIGRAINOSUS, NOT INTRACTABLE, UNSPECIFIED MIGRAINE TYPE: Primary | ICD-10-CM

## 2018-11-09 DIAGNOSIS — G44.039 EPISODIC PAROXYSMAL HEMICRANIA, NOT INTRACTABLE: Primary | ICD-10-CM

## 2018-11-09 PROCEDURE — 96375 TX/PRO/DX INJ NEW DRUG ADDON: CPT

## 2018-11-09 PROCEDURE — 25000003 PHARM REV CODE 250: Performed by: EMERGENCY MEDICINE

## 2018-11-09 PROCEDURE — 99284 EMERGENCY DEPT VISIT MOD MDM: CPT | Mod: 25

## 2018-11-09 PROCEDURE — 96372 THER/PROPH/DIAG INJ SC/IM: CPT | Mod: S$GLB,,, | Performed by: INTERNAL MEDICINE

## 2018-11-09 PROCEDURE — 63600175 PHARM REV CODE 636 W HCPCS: Performed by: EMERGENCY MEDICINE

## 2018-11-09 PROCEDURE — 96365 THER/PROPH/DIAG IV INF INIT: CPT

## 2018-11-09 RX ORDER — KETOROLAC TROMETHAMINE 30 MG/ML
60 INJECTION, SOLUTION INTRAMUSCULAR; INTRAVENOUS
Status: COMPLETED | OUTPATIENT
Start: 2018-11-09 | End: 2018-11-09

## 2018-11-09 RX ORDER — BUTORPHANOL TARTRATE 1 MG/ML
1 INJECTION INTRAMUSCULAR; INTRAVENOUS ONCE
Status: COMPLETED | OUTPATIENT
Start: 2018-11-09 | End: 2018-11-09

## 2018-11-09 RX ORDER — ONDANSETRON HYDROCHLORIDE 8 MG/1
8 TABLET, FILM COATED ORAL EVERY 8 HOURS PRN
Qty: 30 TABLET | Refills: 0 | Status: SHIPPED | OUTPATIENT
Start: 2018-11-09 | End: 2018-11-09 | Stop reason: SDUPTHER

## 2018-11-09 RX ORDER — BUTORPHANOL TARTRATE 10 MG/ML
1 SPRAY NASAL EVERY 4 HOURS PRN
Qty: 2.5 ML | Refills: 0 | Status: SHIPPED | OUTPATIENT
Start: 2018-11-09 | End: 2019-01-23

## 2018-11-09 RX ORDER — SUMATRIPTAN 6 MG/.5ML
6 INJECTION, SOLUTION SUBCUTANEOUS
Status: DISCONTINUED | OUTPATIENT
Start: 2018-11-09 | End: 2018-11-09

## 2018-11-09 RX ORDER — ONDANSETRON HYDROCHLORIDE 8 MG/1
8 TABLET, FILM COATED ORAL EVERY 8 HOURS PRN
Qty: 30 TABLET | Refills: 0 | Status: SHIPPED | OUTPATIENT
Start: 2018-11-09 | End: 2020-09-18 | Stop reason: SDUPTHER

## 2018-11-09 RX ADMIN — LORAZEPAM 0.5 MG: 2 INJECTION INTRAMUSCULAR; INTRAVENOUS at 04:11

## 2018-11-09 RX ADMIN — KETOROLAC TROMETHAMINE 60 MG: 30 INJECTION, SOLUTION INTRAMUSCULAR; INTRAVENOUS at 09:11

## 2018-11-09 RX ADMIN — SODIUM CHLORIDE 1000 ML: 0.9 INJECTION, SOLUTION INTRAVENOUS at 04:11

## 2018-11-09 RX ADMIN — BUTORPHANOL TARTRATE 1 MG: 1 INJECTION, SOLUTION INTRAMUSCULAR; INTRAVENOUS at 04:11

## 2018-11-09 RX ADMIN — PROMETHAZINE HYDROCHLORIDE 12.5 MG: 25 INJECTION INTRAMUSCULAR; INTRAVENOUS at 04:11

## 2018-11-09 NOTE — PROGRESS NOTES
Has severe headache not responsive to po nsaid will need to use 60 mg im x1 of toradol. \prescription for zofran sent to pharmacy.

## 2018-11-09 NOTE — ED PROVIDER NOTES
SCRIBE #1 NOTE: I, Jaja Espinosa, am scribing for, and in the presence of, William Robles Jr., MD. I have scribed the entire note.       History     Chief Complaint   Patient presents with    Headache     Review of patient's allergies indicates:   Allergen Reactions    Hydrocodone Other (See Comments)     Chest pains         History of Present Illness     HPI    11/9/2018, 4:18 PM  History obtained from the patient      History of Present Illness: Sandra Dimas is a 43 y.o. female patient with a PMHx including migraines who presents to the Emergency Department for evaluation of HA.  Patient is a history of indomethacin sensitive migraines.  On indomethacin she been asymptomatic for approximately 3 months.  She also has a history of iron deficiency anemia and received iron transfusions as of 2 weeks ago.  Since that time she has developed more frequent worsening headaches. Today she has a migraine consistent with her past migraines that is severe in nature bifrontal with nausea vomiting and photophobia.  She has no focal weakness. No trauma. She has been completely evaluate MRI and CTs as well. This is Dr. Dimas's wife.  He is very familiar with her care. She received Toradol 60 mg IM prior to arrival without effect.        Arrival mode: Personal vehicle    PCP: OSIRIS Gamez Jr, MD        Past Medical History:  Past Medical History:   Diagnosis Date    Endometriosis     Headaches, cluster     Migraines        Past Surgical History:  Past Surgical History:   Procedure Laterality Date    APPENDECTOMY      diagnostic laprascopy      robotic assisted laparoscopy with excision of ovarian endometrioma and chromotubation           Family History:  Family History   Problem Relation Age of Onset    Strabismus Neg Hx     Retinal detachment Neg Hx     Macular degeneration Neg Hx     Glaucoma Neg Hx     Blindness Neg Hx     Amblyopia Neg Hx     Breast cancer Neg Hx     Colon cancer Neg Hx     Ovarian  cancer Neg Hx     Thyroid disease Neg Hx     Migraines Neg Hx     Asthma Neg Hx        Social History:  Social History     Tobacco Use    Smoking status: Never Smoker    Smokeless tobacco: Never Used   Substance and Sexual Activity    Alcohol use: Yes     Comment: rarely    Drug use: No    Sexual activity: Yes     Partners: Male        Review of Systems     Review of Systems   Constitutional: Negative for fever.   HENT: Negative for sore throat.    Eyes: Positive for photophobia.   Respiratory: Negative for shortness of breath.    Cardiovascular: Negative for chest pain.   Gastrointestinal: Negative for nausea.   Genitourinary: Negative for dysuria.   Musculoskeletal: Negative for back pain.   Skin: Negative for rash.   Neurological: Positive for headaches. Negative for weakness.   Hematological: Does not bruise/bleed easily.   All other systems reviewed and are negative.       Physical Exam     Initial Vitals [11/09/18 1615]   BP Pulse Resp Temp SpO2   126/81 98 20 98.9 °F (37.2 °C) 100 %      MAP       --          Physical Exam  Nursing Notes and Vital Signs Reviewed.  Constitutional: Patient appears to be in some distress. She is an emesis bag at her side.  Head: Atraumatic. Normocephalic.  Eyes: PERRL. EOM intact. Conjunctivae are not pale. No scleral icterus.  ENT: Mucous membranes are moist. Oropharynx is clear and symmetric.    Neck: Supple. Full ROM. No lymphadenopathy.  Cardiovascular: Regular rate. Regular rhythm. No murmurs, rubs, or gallops. Distal pulses are 2+ and symmetric.  Pulmonary/Chest: No respiratory distress. Clear to auscultation bilaterally. No wheezing or rales.  Abdominal: Soft and non-distended.  There is no tenderness.  No rebound, guarding, or rigidity. Good bowel sounds.  Genitourinary: No CVA tenderness  Musculoskeletal: Moves all extremities. No obvious deformities. No edema. No calf tenderness.  Skin: Warm and dry.  Neurological:  Alert, awake, and appropriate.  Normal  speech.  No acute focal neurological deficits are appreciated.  Psychiatric: Normal affect. Good eye contact. Appropriate in content.     ED Course   Procedures  ED Vital Signs:  Vitals:    11/09/18 1615 11/09/18 1640 11/09/18 1700   BP: 126/81 128/69 99/61   Pulse: 98 76 74   Resp: 20 20 16   Temp: 98.9 °F (37.2 °C)     TempSrc: Oral     SpO2: 100% 100% 100%   Weight:  74.9 kg (165 lb 3.2 oz)               The Emergency Provider reviewed the vital signs and test results, which are outlined above.     ED Discussion     5:14 PM: Patient is stable nontoxic.  She feels much better.  Will monitor.  Will discharge with state all intranasal as needed.    5:39 PM: Reassessed pt at this time.  Pt states her condition has improved at this time. Discussed with pt all pertinent ED information and results. Discussed pt dx and plan of tx. Gave pt all f/u and return to the ED instructions. All questions and concerns were addressed at this time. Pt expresses understanding of information and instructions, and is comfortable with plan to discharge. Pt is stable for discharge.    Patient's headache is either consistent with previous headache and/or lacks features concerning for emergent or life threatening condition. I do not suspect SAH, meningitis, increased IC pressure, infectious, toxic, vascular, CNS, or other EMC.  I have discussed this at length with patient and/or family/caretaker.    I discussed with patient and/or family/caretaker that evaluation in the ED does not suggest any emergent or life threatening medical conditions requiring immediate intervention beyond what was provided in the ED, and I believe patient is safe for discharge.  Regardless, an unremarkable evaluation in the ED does not preclude the development or presence of a serious of life threatening condition. As such, patient was instructed to return immediately for any worsening or change in current symptoms.      ED Medication(s):  Medications   butorphanol  injection 1 mg (1 mg Intravenous Given 11/9/18 1628)   promethazine (PHENERGAN) 12.5 mg in dextrose 5 % 50 mL IVPB (0 mg Intravenous Stopped 11/9/18 1647)   sodium chloride 0.9% bolus 1,000 mL (0 mLs Intravenous Stopped 11/9/18 1659)   lorazepam (ATIVAN) injection 0.5 mg (0.5 mg Intravenous Given 11/9/18 1640)     Current Discharge Medication List            Medication List      START taking these medications    butorphanol 10 mg/mL nasal spray  Commonly known as:  STADOL  1 spray by Nasal route every 4 (four) hours as needed for Pain.        ASK your doctor about these medications    atenolol 25 MG tablet  Commonly known as:  TENORMIN  Take 1 tablet (25 mg total) by mouth once daily.     diazePAM 5 MG tablet  Commonly known as:  VALIUM  Take 1 tablet (5 mg total) by mouth every 12 (twelve) hours as needed for Anxiety or Insomnia.     FLUoxetine 20 MG capsule  Commonly known as:  PROZAC  Take 1 capsule (20 mg total) by mouth once daily.     furosemide 20 MG tablet  Commonly known as:  LASIX  Take 1 tablet (20 mg total) by mouth once daily.     indomethacin 25 MG capsule  Commonly known as:  INDOCIN  Take 1 capsule (25 mg total) by mouth 3 (three) times daily with meals.     ondansetron 8 MG tablet  Commonly known as:  ZOFRAN  Take 1 tablet (8 mg total) by mouth every 8 (eight) hours as needed for Nausea.     oxyCODONE-acetaminophen  mg per tablet  Commonly known as:  PERCOCET  Take 1 tablet by mouth every 4 (four) hours as needed for Pain.     pantoprazole 40 MG tablet  Commonly known as:  PROTONIX  Take 1 tablet (40 mg total) by mouth once daily.           Where to Get Your Medications      You can get these medications from any pharmacy    Bring a paper prescription for each of these medications  · butorphanol 10 mg/mL nasal spray                    Medical Decision Making               Scribe Attestation:   Scribe #1: I performed the above scribed service and the documentation accurately describes the  services I performed. I attest to the accuracy of the note.    Attending:   Physician Attestation Statement for Scribe #1: I, William Robles Jr., MD, personally performed the services described in this documentation, as scribed by Jaja Espinosa, in my presence, and it is both accurate and complete.           Clinical Impression       ICD-10-CM ICD-9-CM   1. Migraine without status migrainosus, not intractable, unspecified migraine type G43.909 346.90       Disposition:   Disposition: Discharged  Condition: Stable         William Robles Jr., MD  11/09/18 0807

## 2018-11-09 NOTE — ED NOTES
Pt resting quietly at present asleep, VSS, respirations even/unlabored, Dr Dimas at bedside, bed low, SR x 2, call light in reach.

## 2018-11-09 NOTE — ED NOTES
Pt resting quietly asleep, NAD noted, VSS, respirations even/unlabored, Dr Dimas at bedside, bed low, SR x 2, call light in reach.

## 2018-11-10 DIAGNOSIS — K21.9 GASTROESOPHAGEAL REFLUX DISEASE WITHOUT ESOPHAGITIS: Primary | ICD-10-CM

## 2018-11-10 RX ORDER — SUCRALFATE 1 G/10ML
1 SUSPENSION ORAL 4 TIMES DAILY
Qty: 1200 ML | Refills: 0 | Status: SHIPPED | OUTPATIENT
Start: 2018-11-10 | End: 2018-12-10

## 2018-11-12 ENCOUNTER — LAB VISIT (OUTPATIENT)
Dept: LAB | Facility: HOSPITAL | Age: 43
End: 2018-11-12
Attending: INTERNAL MEDICINE
Payer: COMMERCIAL

## 2018-11-12 ENCOUNTER — INITIAL CONSULT (OUTPATIENT)
Dept: HEMATOLOGY/ONCOLOGY | Facility: CLINIC | Age: 43
End: 2018-11-12
Payer: COMMERCIAL

## 2018-11-12 VITALS
DIASTOLIC BLOOD PRESSURE: 60 MMHG | HEIGHT: 69 IN | RESPIRATION RATE: 18 BRPM | OXYGEN SATURATION: 100 % | TEMPERATURE: 98 F | HEART RATE: 59 BPM | BODY MASS INDEX: 23.97 KG/M2 | SYSTOLIC BLOOD PRESSURE: 104 MMHG | WEIGHT: 161.81 LBS

## 2018-11-12 DIAGNOSIS — N92.0 MENORRHAGIA WITH REGULAR CYCLE: ICD-10-CM

## 2018-11-12 DIAGNOSIS — D50.0 IRON DEFICIENCY ANEMIA DUE TO CHRONIC BLOOD LOSS: Primary | ICD-10-CM

## 2018-11-12 DIAGNOSIS — Z79.1 NSAID LONG-TERM USE: ICD-10-CM

## 2018-11-12 DIAGNOSIS — D50.0 IRON DEFICIENCY ANEMIA DUE TO CHRONIC BLOOD LOSS: ICD-10-CM

## 2018-11-12 LAB
BASOPHILS # BLD AUTO: 0.01 K/UL
BASOPHILS NFR BLD: 0.2 %
DIFFERENTIAL METHOD: ABNORMAL
EOSINOPHIL # BLD AUTO: 0.1 K/UL
EOSINOPHIL NFR BLD: 1 %
ERYTHROCYTE [DISTWIDTH] IN BLOOD BY AUTOMATED COUNT: 15.9 %
HCT VFR BLD AUTO: 36.1 %
HGB BLD-MCNC: 11.3 G/DL
LYMPHOCYTES # BLD AUTO: 2.7 K/UL
LYMPHOCYTES NFR BLD: 43.4 %
MCH RBC QN AUTO: 24.5 PG
MCHC RBC AUTO-ENTMCNC: 31.3 G/DL
MCV RBC AUTO: 78 FL
MONOCYTES # BLD AUTO: 0.4 K/UL
MONOCYTES NFR BLD: 6.5 %
NEUTROPHILS # BLD AUTO: 3.1 K/UL
NEUTROPHILS NFR BLD: 48.9 %
PLATELET # BLD AUTO: 246 K/UL
PMV BLD AUTO: 10.3 FL
RBC # BLD AUTO: 4.62 M/UL
WBC # BLD AUTO: 6.31 K/UL

## 2018-11-12 PROCEDURE — 36415 COLL VENOUS BLD VENIPUNCTURE: CPT | Mod: PO

## 2018-11-12 PROCEDURE — 99204 OFFICE O/P NEW MOD 45 MIN: CPT | Mod: S$GLB,,, | Performed by: INTERNAL MEDICINE

## 2018-11-12 PROCEDURE — 99999 PR PBB SHADOW E&M-EST. PATIENT-LVL IV: CPT | Mod: PBBFAC,,, | Performed by: INTERNAL MEDICINE

## 2018-11-12 PROCEDURE — 85025 COMPLETE CBC W/AUTO DIFF WBC: CPT | Mod: PO

## 2018-11-12 NOTE — LETTER
November 12, 2018      [facility]  [address] [address]           [facility] Hematology Oncology  [address] [place] [address]  Phone: [phone]  Fax: [phone]          Patient: Sandra Dimas   MR Number: [id number]   YOB: 1975   Date of Visit: 11/12/2018       Dear [facility]:    Thank you for referring Sandra Dimas to me for evaluation. Attached you will find relevant portions of my assessment and plan of care.    If you have questions, please do not hesitate to call me. I look forward to following Sandra Dimas along with you.    Sincerely,    Jony Adan MD    Enclosure  CC:  No Recipients    If you would like to receive this communication electronically, please contact [email or web address] or [phone] to request more information on [employer] access.    For providers and/or their staff who would like to refer a patient to [facility], please contact us through our one-stop-shop provider referral line, Davy Nicole, at [phone].    If you feel you have received this communication in error or would no longer like to receive these types of communications, please e-mail [email or web address]

## 2018-11-12 NOTE — H&P (VIEW-ONLY)
Subjective:      Patient ID: Sandra Dimas is a 43 y.o. female.    Chief Complaint: Anemia    The patient is a 43-year-old female who presents to the Hematology Oncology Clinic today in consultation to discuss further evaluation management recommendations for iron deficiency anemia.  I have reviewed all the patient's relevant clinical history available in the medical record and have utilized this in my evaluation and management recommendations today.  The patient reports that she has a history of heavy menstrual cycle bleeding due to endometriosis.  She also reports chronic NSAID use for headaches.  She denies any chest pain or shortness of breath.  She reports chronic fatigue.  She denies any fevers, chills or night sweats.  She denies any loss of appetite or unintentional weight loss.  She denies any nausea, vomiting or abdominal pain.  She denies any bowel or urinary complaints.      Review of Systems   Constitutional: Positive for fatigue. Negative for activity change, appetite change, chills, diaphoresis, fever and unexpected weight change.   HENT: Negative for congestion, dental problem, ear discharge, ear pain, hearing loss, mouth sores, postnasal drip, sinus pressure, sore throat, tinnitus, trouble swallowing and voice change.    Eyes: Negative for photophobia, pain and visual disturbance.   Respiratory: Negative for cough, chest tightness, shortness of breath and wheezing.    Cardiovascular: Negative for chest pain, palpitations and leg swelling.   Gastrointestinal: Negative for abdominal distention, abdominal pain, blood in stool, constipation, diarrhea, nausea and vomiting.   Endocrine: Negative for cold intolerance, heat intolerance, polydipsia, polyphagia and polyuria.   Genitourinary: Negative for difficulty urinating, dysuria, flank pain, frequency, hematuria, urgency, vaginal bleeding and vaginal discharge.   Musculoskeletal: Negative for arthralgias, back pain, gait problem, joint swelling and  myalgias.   Skin: Negative for pallor and rash.   Allergic/Immunologic: Negative for immunocompromised state.   Neurological: Positive for headaches. Negative for dizziness, syncope, weakness, light-headedness and numbness.   Hematological: Negative for adenopathy. Does not bruise/bleed easily.   Psychiatric/Behavioral: Negative for agitation, confusion and dysphoric mood. The patient is not nervous/anxious.           Medication List           Accurate as of 11/12/18  9:05 AM. If you have any questions, ask your nurse or doctor.               CONTINUE taking these medications    atenolol 25 MG tablet  Commonly known as:  TENORMIN  Take 1 tablet (25 mg total) by mouth once daily.     butorphanol 10 mg/mL nasal spray  Commonly known as:  STADOL  1 spray by Nasal route every 4 (four) hours as needed for Pain.     diazePAM 5 MG tablet  Commonly known as:  VALIUM  Take 1 tablet (5 mg total) by mouth every 12 (twelve) hours as needed for Anxiety or Insomnia.     FLUoxetine 20 MG capsule  Commonly known as:  PROZAC  Take 1 capsule (20 mg total) by mouth once daily.     furosemide 20 MG tablet  Commonly known as:  LASIX  Take 1 tablet (20 mg total) by mouth once daily.     indomethacin 25 MG capsule  Commonly known as:  INDOCIN  Take 1 capsule (25 mg total) by mouth 3 (three) times daily with meals.     ondansetron 8 MG tablet  Commonly known as:  ZOFRAN  Take 1 tablet (8 mg total) by mouth every 8 (eight) hours as needed for Nausea.     oxyCODONE-acetaminophen  mg per tablet  Commonly known as:  PERCOCET  Take 1 tablet by mouth every 4 (four) hours as needed for Pain.     pantoprazole 40 MG tablet  Commonly known as:  PROTONIX  Take 1 tablet (40 mg total) by mouth once daily.     sucralfate 100 mg/mL suspension  Commonly known as:  CARAFATE  Take 10 mLs (1 g total) by mouth 4 (four) times daily.          Review of patient's allergies indicates:   Allergen Reactions    Hydrocodone Other (See Comments)     Chest pains        Lab: I have reviewed all of the patient's relevant lab work available in the medical record and have utilized this in my evaluation and management recommendations today    Imaging: I have reviewed all of the patient's diagnostic/imaging results available in the medical record and have utilized this in my evaluation and management recommendations today.      Objective:     Vitals:    11/12/18 0847   BP: 104/60   Pulse: (!) 59   Resp: 18   Temp: 97.9 °F (36.6 °C)       Physical Exam   Constitutional: She is oriented to person, place, and time. She appears well-developed and well-nourished. She is active and cooperative.   HENT:   Head: Normocephalic and atraumatic.   Nose: Nose normal.   Mouth/Throat: Oropharynx is clear and moist. No oropharyngeal exudate.   Eyes: Pupils are equal, round, and reactive to light. No scleral icterus.   Neck: Neck supple. No thyromegaly present.   Cardiovascular: Normal rate, regular rhythm, normal heart sounds and intact distal pulses.   No murmur heard.  Pulmonary/Chest: Effort normal and breath sounds normal. No stridor. No respiratory distress. She has no wheezes. She has no rales.   Abdominal: Soft. Bowel sounds are normal. She exhibits no distension, no ascites and no mass. There is no hepatosplenomegaly. There is no tenderness. There is no rigidity, no rebound and no guarding.   Musculoskeletal: She exhibits no edema or tenderness.   Lymphadenopathy:     She has no cervical adenopathy.   Neurological: She is alert and oriented to person, place, and time. No cranial nerve deficit. She exhibits normal muscle tone. Coordination normal.   Skin: Skin is warm and dry. No rash noted. No pallor.   Psychiatric: She has a normal mood and affect. Her behavior is normal. Judgment and thought content normal.   Nursing note and vitals reviewed.      Assessment:     1. Iron deficiency anemia due to chronic blood loss    2. NSAID long-term use    3. Menorrhagia with regular cycle         Plan:   1.  I had a detailed discussion with the patient today with regard to the various possible etiologies for her iron deficiency anemia.  We discussed that the most likely etiology was her excessive menses due to endometriosis.  She is planning to follow up with her gynecologist in the near future to address this problem.  We discussed the importance of this in the treatment of her iron deficiency anemia.  She expressed understanding.  2.  The patient is status post 2 doses of IV injectafer for treatment of her iron deficiency anemia.  I will recheck patient's CBC today to evaluate response to therapy.  She understands that she will likely need additional IV injectafer infusions in the next few months depending on recheck of her iron levels at that time.  3.  I have recommended GI consultation to discuss endoscopic evaluation for her iron deficiency because of her chronic NSAID use for her headache disorder.  She expressed understanding and will schedule this when feasible in the next few weeks.  4.  She knows to seek immediate attention for any signs/symptoms of GI/ bleeding.    Further evaluation/management/follow up recommendations will be determined after above.  She knows to call for any additional questions or new problems.  Iron deficiency anemia due to chronic blood loss  -     CBC auto differential; Future; Expected date: 11/12/2018    NSAID long-term use    Menorrhagia with regular cycle

## 2018-11-16 DIAGNOSIS — D50.9 IRON DEFICIENCY ANEMIA, UNSPECIFIED IRON DEFICIENCY ANEMIA TYPE: Primary | ICD-10-CM

## 2018-11-19 ENCOUNTER — TELEPHONE (OUTPATIENT)
Dept: OBSTETRICS AND GYNECOLOGY | Facility: CLINIC | Age: 43
End: 2018-11-19

## 2018-11-19 DIAGNOSIS — N92.0 FREQUENT MENSTRUATION: Primary | ICD-10-CM

## 2018-11-20 DIAGNOSIS — D50.0 IRON DEFICIENCY ANEMIA DUE TO CHRONIC BLOOD LOSS: Primary | ICD-10-CM

## 2018-11-20 NOTE — TELEPHONE ENCOUNTER
Spoke with pt. Scheduled pelvic ultrasound and follow up appointment. Pt verbalized understanding.

## 2018-11-23 ENCOUNTER — TELEPHONE (OUTPATIENT)
Dept: RADIOLOGY | Facility: HOSPITAL | Age: 43
End: 2018-11-23

## 2018-11-26 ENCOUNTER — HOSPITAL ENCOUNTER (OUTPATIENT)
Dept: RADIOLOGY | Facility: HOSPITAL | Age: 43
Discharge: HOME | End: 2018-11-26
Attending: OBSTETRICS & GYNECOLOGY
Payer: COMMERCIAL

## 2018-11-26 ENCOUNTER — TELEPHONE (OUTPATIENT)
Dept: OBSTETRICS AND GYNECOLOGY | Facility: CLINIC | Age: 43
End: 2018-11-26

## 2018-11-26 DIAGNOSIS — N92.0 FREQUENT MENSTRUATION: ICD-10-CM

## 2018-11-26 PROCEDURE — 76830 TRANSVAGINAL US NON-OB: CPT | Mod: 26,,, | Performed by: RADIOLOGY

## 2018-11-26 PROCEDURE — 76856 US EXAM PELVIC COMPLETE: CPT | Mod: TC,PO

## 2018-11-26 PROCEDURE — 76830 TRANSVAGINAL US NON-OB: CPT | Mod: TC,PO

## 2018-11-26 PROCEDURE — 76856 US EXAM PELVIC COMPLETE: CPT | Mod: 26,,, | Performed by: RADIOLOGY

## 2018-11-27 NOTE — TELEPHONE ENCOUNTER
+spoke w pt about her results.   She needs an EMB - pls add her to this  Thursday into the open 1130 slot, but she willl come in for 8am.

## 2018-11-29 ENCOUNTER — PROCEDURE VISIT (OUTPATIENT)
Dept: OBSTETRICS AND GYNECOLOGY | Facility: CLINIC | Age: 43
End: 2018-11-29
Payer: COMMERCIAL

## 2018-11-29 VITALS
DIASTOLIC BLOOD PRESSURE: 78 MMHG | BODY MASS INDEX: 23.9 KG/M2 | SYSTOLIC BLOOD PRESSURE: 122 MMHG | WEIGHT: 161.38 LBS | HEIGHT: 69 IN

## 2018-11-29 DIAGNOSIS — N92.6 IRREGULAR MENSES: Primary | ICD-10-CM

## 2018-11-29 PROCEDURE — 58100 BIOPSY OF UTERUS LINING: CPT | Mod: S$GLB,,, | Performed by: OBSTETRICS & GYNECOLOGY

## 2018-11-29 PROCEDURE — 88305 TISSUE EXAM BY PATHOLOGIST: CPT | Mod: 26,,, | Performed by: PATHOLOGY

## 2018-11-29 PROCEDURE — 88305 TISSUE EXAM BY PATHOLOGIST: CPT | Performed by: PATHOLOGY

## 2018-11-30 ENCOUNTER — TELEPHONE (OUTPATIENT)
Dept: NEUROLOGY | Facility: CLINIC | Age: 43
End: 2018-11-30

## 2018-11-30 ENCOUNTER — PATIENT MESSAGE (OUTPATIENT)
Dept: GASTROENTEROLOGY | Facility: HOSPITAL | Age: 43
End: 2018-11-30

## 2018-11-30 ENCOUNTER — ANESTHESIA EVENT (OUTPATIENT)
Dept: ENDOSCOPY | Facility: HOSPITAL | Age: 43
End: 2018-11-30
Payer: COMMERCIAL

## 2018-11-30 ENCOUNTER — ANESTHESIA (OUTPATIENT)
Dept: ENDOSCOPY | Facility: HOSPITAL | Age: 43
End: 2018-11-30
Payer: COMMERCIAL

## 2018-11-30 ENCOUNTER — HOSPITAL ENCOUNTER (OUTPATIENT)
Facility: HOSPITAL | Age: 43
End: 2018-11-30
Attending: INTERNAL MEDICINE | Admitting: INTERNAL MEDICINE
Payer: COMMERCIAL

## 2018-11-30 DIAGNOSIS — D50.0 IRON DEFICIENCY ANEMIA DUE TO CHRONIC BLOOD LOSS: Primary | ICD-10-CM

## 2018-11-30 DIAGNOSIS — K44.9 HIATAL HERNIA: ICD-10-CM

## 2018-11-30 PROCEDURE — 88305 TISSUE EXAM BY PATHOLOGIST: CPT | Performed by: PATHOLOGY

## 2018-11-30 PROCEDURE — 63600175 PHARM REV CODE 636 W HCPCS: Performed by: INTERNAL MEDICINE

## 2018-11-30 PROCEDURE — 27201012 HC FORCEPS, HOT/COLD, DISP: Performed by: INTERNAL MEDICINE

## 2018-11-30 PROCEDURE — 88305 TISSUE EXAM BY PATHOLOGIST: CPT | Mod: 26,,, | Performed by: PATHOLOGY

## 2018-11-30 PROCEDURE — 25000003 PHARM REV CODE 250: Performed by: NURSE ANESTHETIST, CERTIFIED REGISTERED

## 2018-11-30 PROCEDURE — 37000008 HC ANESTHESIA 1ST 15 MINUTES: Performed by: INTERNAL MEDICINE

## 2018-11-30 PROCEDURE — 43239 EGD BIOPSY SINGLE/MULTIPLE: CPT | Performed by: INTERNAL MEDICINE

## 2018-11-30 PROCEDURE — 25000003 PHARM REV CODE 250: Performed by: INTERNAL MEDICINE

## 2018-11-30 PROCEDURE — 37000009 HC ANESTHESIA EA ADD 15 MINS: Performed by: INTERNAL MEDICINE

## 2018-11-30 PROCEDURE — 63600175 PHARM REV CODE 636 W HCPCS: Performed by: NURSE ANESTHETIST, CERTIFIED REGISTERED

## 2018-11-30 PROCEDURE — 43239 EGD BIOPSY SINGLE/MULTIPLE: CPT | Mod: ,,, | Performed by: INTERNAL MEDICINE

## 2018-11-30 RX ORDER — LIDOCAINE HYDROCHLORIDE 10 MG/ML
INJECTION INFILTRATION; PERINEURAL
Status: DISCONTINUED | OUTPATIENT
Start: 2018-11-30 | End: 2018-11-30

## 2018-11-30 RX ORDER — SODIUM CHLORIDE, SODIUM LACTATE, POTASSIUM CHLORIDE, CALCIUM CHLORIDE 600; 310; 30; 20 MG/100ML; MG/100ML; MG/100ML; MG/100ML
INJECTION, SOLUTION INTRAVENOUS CONTINUOUS
Status: DISCONTINUED | OUTPATIENT
Start: 2018-11-30 | End: 2018-11-30 | Stop reason: HOSPADM

## 2018-11-30 RX ORDER — KETOROLAC TROMETHAMINE 30 MG/ML
30 INJECTION, SOLUTION INTRAMUSCULAR; INTRAVENOUS ONCE
Status: COMPLETED | OUTPATIENT
Start: 2018-11-30 | End: 2018-11-30

## 2018-11-30 RX ORDER — SODIUM CHLORIDE 0.9 % (FLUSH) 0.9 %
3 SYRINGE (ML) INJECTION
Status: DISCONTINUED | OUTPATIENT
Start: 2018-11-30 | End: 2018-11-30 | Stop reason: HOSPADM

## 2018-11-30 RX ORDER — PROPOFOL 10 MG/ML
VIAL (ML) INTRAVENOUS
Status: DISCONTINUED | OUTPATIENT
Start: 2018-11-30 | End: 2018-11-30

## 2018-11-30 RX ORDER — ONDANSETRON 2 MG/ML
8 INJECTION INTRAMUSCULAR; INTRAVENOUS ONCE
Status: COMPLETED | OUTPATIENT
Start: 2018-11-30 | End: 2018-11-30

## 2018-11-30 RX ADMIN — KETOROLAC TROMETHAMINE 30 MG: 30 INJECTION INTRAMUSCULAR; INTRAVENOUS at 10:11

## 2018-11-30 RX ADMIN — SODIUM CHLORIDE, SODIUM LACTATE, POTASSIUM CHLORIDE, AND CALCIUM CHLORIDE: .6; .31; .03; .02 INJECTION, SOLUTION INTRAVENOUS at 10:11

## 2018-11-30 RX ADMIN — ONDANSETRON 8 MG: 2 INJECTION, SOLUTION INTRAMUSCULAR; INTRAVENOUS at 11:11

## 2018-11-30 RX ADMIN — PROPOFOL 150 MG: 10 INJECTION, EMULSION INTRAVENOUS at 10:11

## 2018-11-30 RX ADMIN — PROPOFOL 50 MG: 10 INJECTION, EMULSION INTRAVENOUS at 10:11

## 2018-11-30 RX ADMIN — LIDOCAINE HYDROCHLORIDE 50 MG: 10 INJECTION, SOLUTION INFILTRATION; PERINEURAL at 10:11

## 2018-11-30 NOTE — OR NURSING
Pt. Adequately sedated.  Final time-out done and agreed with all staff.  See ANESTHESIA  For all medications and vital signs.

## 2018-11-30 NOTE — DISCHARGE SUMMARY
Endoscopy Discharge Summary      Admit Date: 11/30/2018    Discharge Date and Time:  11/30/2018 11:04 AM    Attending Physician: Bossman Bustos MD     Discharge Physician: Bossman Bustos MD     Principal Admitting Diagnoses: Iron deficiency anemia due to chronic blood loss         Discharge Diagnosis: The primary encounter diagnosis was Iron deficiency anemia due to chronic blood loss. A diagnosis of Hiatal hernia was also pertinent to this visit.     Discharged Condition: Good    Indication for Admission: Iron deficiency anemia due to chronic blood loss     Hospital Course: Patient was admitted for an inpatient procedure and tolerated the procedure well with no complications.    Significant Diagnostic Studies: EGD    Pathology (if any):  Specimen (12h ago, onward)    Start     Ordered    11/30/18 1036  Specimen to Pathology - Surgery  Once     Comments:  1. Antrum Bx- r/o H. Pylori     Start Status   11/30/18 1036 Collected (11/30/18 1039)       11/30/18 1038          Disposition: Home.    Bleeding: None    No Implants         Follow Up/Patient Instructions:   Current Discharge Medication List      CONTINUE these medications which have NOT CHANGED    Details   atenolol (TENORMIN) 25 MG tablet Take 1 tablet (25 mg total) by mouth once daily.  Qty: 90 tablet, Refills: 3    Associated Diagnoses: Palpitation      butorphanol (STADOL) 10 mg/mL nasal spray 1 spray by Nasal route every 4 (four) hours as needed for Pain.  Qty: 2.5 mL, Refills: 0      diazePAM (VALIUM) 5 MG tablet Take 1 tablet (5 mg total) by mouth every 12 (twelve) hours as needed for Anxiety or Insomnia.  Qty: 10 tablet, Refills: 0      FLUoxetine (PROZAC) 20 MG capsule Take 1 capsule (20 mg total) by mouth once daily.  Qty: 90 capsule, Refills: 3      indomethacin (INDOCIN) 25 MG capsule TAKE 2 CAPSULES BY MOUTH THREE TIMES DAILY  Qty: 180 capsule, Refills: 6      ondansetron (ZOFRAN) 8 MG tablet Take 1 tablet (8 mg total) by mouth every 8 (eight)  hours as needed for Nausea.  Qty: 30 tablet, Refills: 0      oxyCODONE-acetaminophen (PERCOCET)  mg per tablet Take 1 tablet by mouth every 4 (four) hours as needed for Pain.  Qty: 30 tablet, Refills: 0      pantoprazole (PROTONIX) 40 MG tablet Take 1 tablet (40 mg total) by mouth once daily.  Qty: 30 tablet, Refills: 11    Associated Diagnoses: Gastroesophageal reflux disease without esophagitis      sucralfate (CARAFATE) 100 mg/mL suspension Take 10 mLs (1 g total) by mouth 4 (four) times daily.  Qty: 1200 mL, Refills: 0    Associated Diagnoses: Gastroesophageal reflux disease without esophagitis      furosemide (LASIX) 20 MG tablet Take 1 tablet (20 mg total) by mouth once daily.  Qty: 30 tablet, Refills: 1             Discharge Procedure Orders   Diet general     Call MD for:  temperature >100.4     Call MD for:  persistent nausea and vomiting     Call MD for:  severe uncontrolled pain     Call MD for:  difficulty breathing, headache or visual disturbances     Call MD for:  redness, tenderness, or signs of infection (pain, swelling, redness, odor or green/yellow discharge around incision site)     Call MD for:  hives     Call MD for:  persistent dizziness or light-headedness     No dressing needed       Follow-up Information     E Jose Gamez Jr, MD.    Specialties:  Internal Medicine, Pediatrics  Why:  As needed  Contact information:  [address]  [place] [address] [phone]

## 2018-11-30 NOTE — DISCHARGE INSTRUCTIONS
Dear Sandra Dimas      If you develop fevers, severe abdominal pain, significant bleeding, nausea or vomiting, please contact us or come to our Emergency Department at [facility].  Our  contact number is [phone] available for emergencies or any question that you may have.      You may return to normal activities tomorrow.  Written discharge instructions were provided to you today and have them handy since you may not remember our conversation today.       If you take Aspirin, please resume taking it at your prior dose today. If we obtained biopsies or removed polyps during your procedure, we will contact you within 5 to 6 days with the results.      Thanks for trusting us with your healthcare needs.      Sincerely,     Bossman Bustos M.D.        To rate your experience with Dr. Bustos, please click on the link below     [email or web address]

## 2018-11-30 NOTE — ANESTHESIA PREPROCEDURE EVALUATION
11/30/2018  Sandra Dimas is a 43 y.o., female.    Anesthesia Evaluation    I have reviewed the Patient Summary Reports.    I have reviewed the Nursing Notes.   I have reviewed the Medications.     Review of Systems  Anesthesia Hx:  No problems with previous Anesthesia    Social:  Non-Smoker    Hematology/Oncology:     Oncology Normal    -- Anemia:   EENT/Dental:EENT/Dental Normal   Cardiovascular:  Cardiovascular Normal     Pulmonary:  Pulmonary Normal    Renal/:  Renal/ Normal     Hepatic/GI:   GERD, well controlled    Musculoskeletal:  Musculoskeletal Normal    Neurological:   Headaches    Endocrine:  Endocrine Normal    Dermatological:  Skin Normal    Psych:   Psychiatric History anxiety depression          Physical Exam  General:  Well nourished    Airway/Jaw/Neck:  Airway Findings: Mouth Opening: Normal Tongue: Normal       Chest/Lungs:  Chest/Lungs Findings: Clear to auscultation     Heart/Vascular:  Heart Findings: Rate: Normal             Anesthesia Plan  Type of Anesthesia, risks & benefits discussed:  Anesthesia Type:  MAC  Patient's Preference:   Intra-op Monitoring Plan: standard ASA monitors  Intra-op Monitoring Plan Comments:   Post Op Pain Control Plan:   Post Op Pain Control Plan Comments:   Induction:   IV  Beta Blocker:  Patient is not currently on a Beta-Blocker (No further documentation required).       Informed Consent: Patient understands risks and agrees with Anesthesia plan.  Questions answered. Anesthesia consent signed with patient.  ASA Score: 2     Day of Surgery Review of History & Physical: I have interviewed and examined the patient. I have reviewed the patient's H&P dated:  There are no significant changes.          Ready For Surgery From Anesthesia Perspective.

## 2018-11-30 NOTE — TRANSFER OF CARE
Anesthesia Transfer of Care Note    Patient: Sandra Dimas    Procedure(s) Performed: Procedure(s) (LRB):  EGD (ESOPHAGOGASTRODUODENOSCOPY) (N/A)    Patient location: PACU    Anesthesia Type: MAC    Transport from OR: Transported from OR on room air with adequate spontaneous ventilation    Post pain: adequate analgesia    Post assessment: no apparent anesthetic complications    Post vital signs: stable    Level of consciousness: awake    Nausea/Vomiting: no nausea/vomiting    Complications: none    Transfer of care protocol was followed      Last vitals:   Visit Vitals  Breastfeeding? No

## 2018-11-30 NOTE — [employer] PATIENT INSTRUCTIONS
Discharge Summary/Instructions after an Endoscopic Procedure  Patient Name: Sandra Dimas  Patient MRN: [id number]  Patient YOB: 1975 Friday, November 30, 2018 Bossman Bustos MD  RESTRICTIONS:  During your procedure today, you received medications for sedation.  These   medications may affect your judgment, balance and coordination.  Therefore,   for 24 hours, you have the following restrictions:   - DO NOT drive a car, operate machinery, make legal/financial decisions,   sign important papers or drink alcohol.    ACTIVITY:  Today: no heavy lifting, straining or running due to procedural   sedation/anesthesia.  The following day: return to full activity including work.  DIET:  Eat and drink normally unless instructed otherwise.     TREATMENT FOR COMMON SIDE EFFECTS:  - Mild abdominal pain, nausea, belching, bloating or excessive gas:  rest,   eat lightly and use a heating pad.  - Sore Throat: treat with throat lozenges and/or gargle with warm salt   water.  - Because air was used during the procedure, expelling large amounts of air   from your rectum or belching is normal.  - If a bowel prep was taken, you may not have a bowel movement for 1-3 days.    This is normal.  SYMPTOMS TO WATCH FOR AND REPORT TO YOUR PHYSICIAN:  1. Abdominal pain or bloating, other than gas cramps.  2. Chest pain.  3. Back pain.  4. Signs of infection such as: chills or fever occurring within 24 hours   after the procedure.  5. Rectal bleeding, which would show as bright red, maroon, or black stools.   (A tablespoon of blood from the rectum is not serious, especially if   hemorrhoids are present.)  6. Vomiting.  7. Weakness or dizziness.  GO DIRECTLY TO THE NEAREST EMERGENCY ROOM IF YOU HAVE ANY OF THE FOLLOWING:      Difficulty breathing              Chills and/or fever over 101 F   Persistent vomiting and/or vomiting blood   Severe abdominal pain   Severe chest pain   Black, tarry stools   Bleeding- more than one tablespoon   Any  other symptom or condition that you feel may need urgent attention  Your doctor recommends these additional instructions:  If any biopsies were taken, your doctors clinic will contact you in 1 to 2   weeks with any results.  - The patient will be observed post-procedure, until all discharge criteria   are met.   - Discharge patient to home (ambulatory).   - Patient has a contact number available for emergencies.  The signs and   symptoms of potential delayed complications were discussed with the   patient.  Return to normal activities tomorrow.  Written discharge   instructions were provided to the patient.   - Resume previous diet.   - Continue present medications.   - Await pathology results.   - No repeat upper endoscopy.   - Return to referring physician as previously scheduled.  For questions, problems or results please call your physician Bossman Bustos MD at Work:  [phone]  If you have any questions about the above instructions, call the GI   department at [phone] or call the endoscopy unit at [phone]   from 7am until 3 pm.  [facility], EMERGENCY ROOM PHONE NUMBER:   [phone]  IF A COMPLICATION OR EMERGENCY SITUATION ARISES AND YOU ARE UNABLE TO REACH   YOUR PHYSICIAN - GO DIRECTLY TO THE EMERGENCY ROOM.  I have read or have had read to me these discharge instructions for my   procedure and have received a written copy.  I understand these   instructions and will follow-up with my physician if I have any questions.     __________________________________       _____________________________________  Nurse Signature                                          Patient/Designated   Responsible Party Signature  Bossman Bustos MD  11/30/2018 11:01:21 AM  This report has been verified and signed electronically.  [employer]

## 2018-11-30 NOTE — ANESTHESIA POSTPROCEDURE EVALUATION
Anesthesia Post Evaluation    Patient: Sandra Dimas    Procedure(s) Performed: Procedure(s) (LRB):  EGD (ESOPHAGOGASTRODUODENOSCOPY) (N/A)    Final Anesthesia Type: MAC  Patient location during evaluation: PACU  Patient participation: Yes- Able to Participate  Level of consciousness: awake and alert  Post-procedure vital signs: reviewed and stable  Pain management: adequate  Airway patency: patent  PONV status at discharge: No PONV  Anesthetic complications: no      Cardiovascular status: blood pressure returned to baseline  Respiratory status: unassisted  Hydration status: euvolemic  Follow-up not needed.        Visit Vitals  Breastfeeding? No       Pain/Kamila Score: Pain Assessment Performed: Yes (11/30/2018 10:44 AM)  Presence of Pain: complains of pain/discomfort (11/30/2018 10:01 AM)  Kamila Score: 9 (11/30/2018 10:44 AM)

## 2018-11-30 NOTE — ANESTHESIA RELEASE NOTE
Anesthesia Release from PACU Note    Patient: Sandra Dimas    Procedure(s) Performed: Procedure(s) (LRB):  EGD (ESOPHAGOGASTRODUODENOSCOPY) (N/A)    Anesthesia type: MAC    Post pain: Adequate analgesia    Post assessment: no apparent anesthetic complications    Last Vitals:   Visit Vitals  Breastfeeding? No       Post vital signs: stable    Level of consciousness: awake    Nausea/Vomiting: no nausea/no vomiting    Complications: none    Airway Patency: patent    Respiratory: unassisted    Cardiovascular: stable and blood pressure at baseline    Hydration: euvolemic

## 2018-11-30 NOTE — PLAN OF CARE
Dr Bustos came to bedside and discussed findings. NO N/V,  no abdominal pain, no GI bleeding, and vitals stable.  Pt discharged from unit.

## 2018-11-30 NOTE — PROCEDURES
Patient is here for endometrial biopsy due to menometrorrhagia (heavy menses about every 2wks), anemia, menstrual migraines and considering an ablation w salpingectomy (such a laparoscopy would also allow for an updated look at her endometriosis).       Pain Scale 0/10    ROS:  GENERAL: No fever, chills, fatigability or weight loss.  ABDOMEN: Appetite fine. No weight loss. Denies diarrhea, hematemesis or blood in stool.  URINARY: No flank pain, dysuria or hematuria.   BREASTS: Breasts symmetric, nontender and no lumps detected.    PE:   ABDOMEN: Soft. No tenderness or masses. No hepatosplenomegaly. No hernias.     PROCEDURES:  Urine pregnancy test: Negative    PRE ENDOMETRIAL BIOPSY COUNSELING:  The patient was informed of the risk of bleeding, infection, uterine perforation and pain and that the test will rule-out endometrial cancer with accuracy greater than 95%. She was counseled on the alternatives to endometrial biopsy and agrees to proceed.    TIME OUT PERFORMED.  The cervix was visualized with a speculum.  No additional instrumentation was required.  A sterile endometrial pipelle was passed without difficulty to a depth of 10 cm.  Adequate endometrial tissue was obtained.    The specimen was placed in formalyn and sent to Pathology for histology evaluation.    POST ENDOMETRIAL BIOPSY COUNSELING:  Manage post biopsy cramping with NSAIDs or Tyleno.  Expect spotting or light bleeding for a few days.  Report bleeding heavier than a period, fever > 101.0 F, worsening pain or a foul smelling vaginal discharge.    Counseling lasted approximately 15 minutes and all her questions were answered.    FOLLOW-UP: Pending biopsy results.

## 2018-11-30 NOTE — INTERVAL H&P NOTE
The patient has been examined and the H&P has been reviewed:    I concur with the findings and no changes have occurred since H&P was written.    Anesthesia/Surgery risks, benefits and alternative options discussed and understood by patient/family.          Active Hospital Problems    Diagnosis  POA    *Iron deficiency anemia due to chronic blood loss [D50.0]  Yes      Resolved Hospital Problems   No resolved problems to display.

## 2018-12-03 VITALS
HEART RATE: 66 BPM | OXYGEN SATURATION: 100 % | DIASTOLIC BLOOD PRESSURE: 80 MMHG | RESPIRATION RATE: 17 BRPM | SYSTOLIC BLOOD PRESSURE: 119 MMHG | TEMPERATURE: 98 F

## 2018-12-05 ENCOUNTER — PATIENT MESSAGE (OUTPATIENT)
Dept: GASTROENTEROLOGY | Facility: HOSPITAL | Age: 43
End: 2018-12-05

## 2018-12-05 DIAGNOSIS — G47.33 OSA (OBSTRUCTIVE SLEEP APNEA): Primary | ICD-10-CM

## 2018-12-05 DIAGNOSIS — D50.0 IRON DEFICIENCY ANEMIA DUE TO CHRONIC BLOOD LOSS: ICD-10-CM

## 2018-12-05 DIAGNOSIS — G43.919 INTRACTABLE MIGRAINE WITHOUT STATUS MIGRAINOSUS, UNSPECIFIED MIGRAINE TYPE: ICD-10-CM

## 2018-12-05 DIAGNOSIS — R06.83 PRIMARY SNORING: ICD-10-CM

## 2018-12-05 NOTE — PROGRESS NOTES
Called by colleague  Patient had non diagnostic home study before  Gained weight, snoring, headaches, non refreshing sleep   Recently treated for low iron  Would like repeat test  Will order through [facility]

## 2018-12-05 NOTE — TELEPHONE ENCOUNTER
FINAL PATHOLOGIC DIAGNOSIS  Antrum biopsy:  Antral mucosa with minimal chronic inflammation and reactive gastropathy;  H pylori not identified on H&E stained sections.    Biopsy results sent to the patient.    willian

## 2018-12-06 DIAGNOSIS — K21.9 GASTROESOPHAGEAL REFLUX DISEASE WITHOUT ESOPHAGITIS: ICD-10-CM

## 2018-12-06 RX ORDER — SUCRALFATE 1 G/10ML
SUSPENSION ORAL
Qty: 1200 ML | Refills: 0 | OUTPATIENT
Start: 2018-12-06

## 2018-12-10 ENCOUNTER — TELEPHONE (OUTPATIENT)
Dept: OBSTETRICS AND GYNECOLOGY | Facility: CLINIC | Age: 43
End: 2018-12-10

## 2018-12-10 DIAGNOSIS — N92.6 IRREGULAR MENSES: Primary | ICD-10-CM

## 2018-12-10 DIAGNOSIS — Z30.2 ENCOUNTER FOR FEMALE STERILIZATION PROCEDURE: ICD-10-CM

## 2018-12-12 ENCOUNTER — PATIENT MESSAGE (OUTPATIENT)
Dept: OBSTETRICS AND GYNECOLOGY | Facility: CLINIC | Age: 43
End: 2018-12-12

## 2018-12-13 ENCOUNTER — PROCEDURE VISIT (OUTPATIENT)
Dept: SLEEP MEDICINE | Facility: CLINIC | Age: 43
End: 2018-12-13
Payer: COMMERCIAL

## 2018-12-13 ENCOUNTER — OFFICE VISIT (OUTPATIENT)
Dept: OBSTETRICS AND GYNECOLOGY | Facility: CLINIC | Age: 43
End: 2018-12-13
Payer: COMMERCIAL

## 2018-12-13 VITALS
HEIGHT: 69 IN | WEIGHT: 159.19 LBS | BODY MASS INDEX: 23.58 KG/M2 | SYSTOLIC BLOOD PRESSURE: 96 MMHG | DIASTOLIC BLOOD PRESSURE: 58 MMHG

## 2018-12-13 DIAGNOSIS — G43.919 INTRACTABLE MIGRAINE WITHOUT STATUS MIGRAINOSUS, UNSPECIFIED MIGRAINE TYPE: ICD-10-CM

## 2018-12-13 DIAGNOSIS — D50.0 IRON DEFICIENCY ANEMIA DUE TO CHRONIC BLOOD LOSS: ICD-10-CM

## 2018-12-13 DIAGNOSIS — N92.0 FREQUENT MENSTRUATION: Primary | ICD-10-CM

## 2018-12-13 DIAGNOSIS — R06.83 PRIMARY SNORING: Primary | ICD-10-CM

## 2018-12-13 DIAGNOSIS — G47.33 OSA (OBSTRUCTIVE SLEEP APNEA): ICD-10-CM

## 2018-12-13 PROCEDURE — 99999 PR PBB SHADOW E&M-EST. PATIENT-LVL III: CPT | Mod: PBBFAC,,, | Performed by: OBSTETRICS & GYNECOLOGY

## 2018-12-13 PROCEDURE — 95800 SLP STDY UNATTENDED: CPT | Mod: 26,,, | Performed by: INTERNAL MEDICINE

## 2018-12-13 PROCEDURE — 99499 UNLISTED E&M SERVICE: CPT | Mod: S$GLB,,, | Performed by: OBSTETRICS & GYNECOLOGY

## 2018-12-13 PROCEDURE — 95800 SLP STDY UNATTENDED: CPT | Mod: TC

## 2018-12-13 PROCEDURE — 99499 UNLISTED E&M SERVICE: CPT | Mod: S$GLB,,, | Performed by: INTERNAL MEDICINE

## 2018-12-13 NOTE — Clinical Note
PHYSICIAN INTERPRETATION AND COMMENTS: Clinically significant sleep disordered breathing is not identified.Primary Snoring. No Obstructive sleep apnea.CLINICAL HISTORY: 43 year old female presented with:symptoms of nocturnal snoring and waking up choking. Based onthe clinical history, the patient has a low pre-test probability of having minimal JACQUES. 1 inch neck, BMI of 23, an Epworthsleepiness score of 2, history of depression Patient had non diagnostic home study before. Apnea-hypopnea index/respiratoryevent index: 1.9 events per hour total events 14 Gained weight, snoring, headaches, non refreshing sleep.SLEEP STUDY FINDINGS: Patient underwent a one night Home Sleep Test and by behavioral criteria, slept forapproximately 6 hours, with a sleep latency of 9 minutes and a sleep efficiency of 88.7%. The patient slept supine 30.2% of thenight based on valid recording time of 6.03 hours. Snoring occurs for 15.3% (30 dB) of the study, 3.2% is very loud. The meanpulse rate is 66 BPM, with frequent

## 2018-12-14 NOTE — PROGRESS NOTES
43 y.o. Sandra Dimas   For preoperative appointment for hysteroscopy D+C and endometrial ablation with Novasure device, for purpose of menometrorrhagia (EMB benign) treatment, as well as hope there is a beneficial side effect of lower inflammation that would aggravate her menstrual headaches, reduce anemia that could likewise worsen migraines. In addition a laparoscopic sterilization with b/l salpingectomy is planned for birth control. At this time will plan to take photos and investigate endometriosis changes, and possible resection of it, as well as adhesiolysis if needed.       No chief complaint on file.      Patient Active Problem List    Diagnosis Date Noted    Hiatal hernia 2018    NSAID long-term use 2018    Menorrhagia with regular cycle 2018    Iron deficiency anemia due to chronic blood loss 10/26/2018    Migraines 2018    Recurrent major depressive disorder, in partial remission 2018    GERD (gastroesophageal reflux disease) 2018    Primary snoring 2017    Cyst of left ovary 2012       Past Medical History:   Diagnosis Date    Endometriosis     Headaches, cluster     Hemicrania continua     Migraines        Past Surgical History:   Procedure Laterality Date    APPENDECTOMY      diagnostic laprascopy      EGD (ESOPHAGOGASTRODUODENOSCOPY) N/A 2018    Performed by Bossman Bustos MD at [facility]    ESOPHAGOGASTRODUODENOSCOPY N/A 2018    Procedure: EGD (ESOPHAGOGASTRODUODENOSCOPY);  Surgeon: Bossman Bustos MD;  Location: [facility];  Service: Endoscopy;  Laterality: N/A;    robotic assisted laparoscopy with excision of ovarian endometrioma and chromotubation         Family History   Problem Relation Age of Onset    Strabismus Neg Hx     Retinal detachment Neg Hx     Macular degeneration Neg Hx     Glaucoma Neg Hx     Blindness Neg Hx     Amblyopia Neg Hx     Breast cancer Neg Hx     Colon cancer Neg Hx      Ovarian cancer Neg Hx     Thyroid disease Neg Hx     Migraines Neg Hx     Asthma Neg Hx        Social History     Socioeconomic History    Marital status:      Spouse name: None    Number of children: None    Years of education: None    Highest education level: None   Social Needs    Financial resource strain: None    Food insecurity - worry: None    Food insecurity - inability: None    Transportation needs - medical: None    Transportation needs - non-medical: None   Occupational History    None   Tobacco Use    Smoking status: Never Smoker    Smokeless tobacco: Never Used   Substance and Sexual Activity    Alcohol use: Yes     Frequency: Monthly or less     Drinks per session: 1 or 2     Binge frequency: Never     Comment: rarely    Drug use: No    Sexual activity: Yes     Partners: Male   Other Topics Concern    None   Social History Narrative    None       Current Outpatient Medications   Medication Sig Dispense Refill    atenolol (TENORMIN) 25 MG tablet Take 1 tablet (25 mg total) by mouth once daily. 90 tablet 3    butorphanol (STADOL) 10 mg/mL nasal spray 1 spray by Nasal route every 4 (four) hours as needed for Pain. 2.5 mL 0    FLUoxetine (PROZAC) 20 MG capsule Take 1 capsule (20 mg total) by mouth once daily. 90 capsule 3    indomethacin (INDOCIN) 25 MG capsule TAKE 2 CAPSULES BY MOUTH THREE TIMES DAILY 180 capsule 6    ondansetron (ZOFRAN) 8 MG tablet Take 1 tablet (8 mg total) by mouth every 8 (eight) hours as needed for Nausea. 30 tablet 0    oxyCODONE-acetaminophen (PERCOCET)  mg per tablet Take 1 tablet by mouth every 4 (four) hours as needed for Pain. 30 tablet 0    pantoprazole (PROTONIX) 40 MG tablet Take 1 tablet (40 mg total) by mouth once daily. 30 tablet 11    diazePAM (VALIUM) 5 MG tablet Take 1 tablet (5 mg total) by mouth every 12 (twelve) hours as needed for Anxiety or Insomnia. 10 tablet 0     No current facility-administered medications for this  visit.        Review of patient's allergies indicates:   Allergen Reactions    Hydrocodone Other (See Comments)     Chest pains       ROS:  GENERAL: No fever, chills, fatigability or weight loss.  CHEST: no chest pain, shortness of breath or palpitations.  ABDOMEN: Appetite fine. No weight loss. Denies diarrhea, abdominal pain, hematemesis or blood in stool.  URINARY: No flank pain, dysuria or hematuria.  BREASTS: Breasts symmetric, nontender and no lumps detected.       PHYSICAL EXAM    Vitals:    12/13/18 0845   BP: (!) 96/58       APPEARANCE: Well nourished, well developed, in no acute distress.   ABDOMEN: Soft. No tenderness or masses.    DIAGNOSIS  Menometrorrhagia  Menstrual migraines in addition to chronic headaches  Anemia  Contraception management.        COUNSELING  Discussed with patient the risks of surgery, including but not limited to, risks of anesthesia, infection, bleeding, injury to other organs, such as skin, nerves, arteries, veins, bowel, bladder, ureters, with possible need for reparative or subsequent surgery such as bowel resection/repair, hernia, colostomy, bladder/ureter surgery, blood transfusion, possible hysterectomy .   There is also risks of development of deep venous thrombosis, pulmonary embolus, myocardial infarction, possible death. The patient verbalizes understanding. Discussed with the Patient the risks/benefits/alternatives of the treatment options.  Consents were signed. Pt understands her h/a may continue despite this procedure.

## 2018-12-14 NOTE — PROCEDURES
Home Sleep Studies  Date/Time: 12/13/2018 6:29 PM  Performed by: Hossein Duffy MD  Authorized by: Hossein Duffy MD       PHYSICIAN INTERPRETATION AND COMMENTS: Clinically significant sleep disordered breathing is not identified.  Primary Snoring. No Obstructive sleep apnea.  CLINICAL HISTORY: 43 year old female presented with:symptoms of nocturnal snoring and waking up choking. Based on  the clinical history, the patient has a low pre-test probability of having minimal JACQUES. 1 inch neck, BMI of 23, an [place]  sleepiness score of 2, history of depression Patient had non diagnostic home study before. Apnea-hypopnea index/respiratory  event index: 1.9 events per hour total events 14 Gained weight, snoring, headaches, non refreshing sleep.  SLEEP STUDY FINDINGS: Patient underwent a one night Home Sleep Test and by behavioral criteria, slept for  approximately 6 hours, with a sleep latency of 9 minutes and a sleep efficiency of 88.7%. The patient slept supine 30.2% of the  night based on valid recording time of 6.03 hours. Snoring occurs for 15.3% (30 dB) of the study, 3.2% is very loud. The mean  pulse rate is 66 BPM, with frequent pulse rate variability (48 events with >= 6 BPM increase/decrease per hour).  TREATMENT CONSIDERATIONS: Based on this study, treatment is not required at this time for obstructive sleep  apnea/hypopnea. Weight gain, alcohol consumption, and time spent sleeping supine can affect the severity of JACQUES. Future  testing should be considered as the patient's risk factors change.INLAB POLYSOMNOGRAPHY after evaluation in Sleep  dosorders clinic may be considered.

## 2018-12-14 NOTE — H&P (VIEW-ONLY)
43 y.o. Sandra Dimas   For preoperative appointment for hysteroscopy D+C and endometrial ablation with Novasure device, for purpose of menometrorrhagia (EMB benign) treatment, as well as hope there is a beneficial side effect of lower inflammation that would aggravate her menstrual headaches, reduce anemia that could likewise worsen migraines. In addition a laparoscopic sterilization with b/l salpingectomy is planned for birth control. At this time will plan to take photos and investigate endometriosis changes, and possible resection of it, as well as adhesiolysis if needed.       No chief complaint on file.      Patient Active Problem List    Diagnosis Date Noted    Hiatal hernia 2018    NSAID long-term use 2018    Menorrhagia with regular cycle 2018    Iron deficiency anemia due to chronic blood loss 10/26/2018    Migraines 2018    Recurrent major depressive disorder, in partial remission 2018    GERD (gastroesophageal reflux disease) 2018    Primary snoring 2017    Cyst of left ovary 2012       Past Medical History:   Diagnosis Date    Endometriosis     Headaches, cluster     Hemicrania continua     Migraines        Past Surgical History:   Procedure Laterality Date    APPENDECTOMY      diagnostic laprascopy      EGD (ESOPHAGOGASTRODUODENOSCOPY) N/A 2018    Per[facility] by Bossman Bustos [facility] at [facility]    ESOPHAGOGASTRODUODENOSCOPY N/A 2018    Procedure: EGD (ESOPHAGOGASTRODUODENOSCOPY);  Surgeon: Bossman Bustos MD;  Location: [facility];  Service: Endoscopy;  Laterality: N/A;    robotic assisted laparoscopy with excision of ovarian endometrioma and chromotubation         Family History   Problem Relation Age of Onset    Strabismus Neg Hx     Retinal detachment Neg Hx     Macular degeneration Neg Hx     Glaucoma Neg Hx     Blindness Neg Hx     Amblyopia Neg Hx     Breast cancer Neg Hx     Colon cancer Neg Hx      Ovarian cancer Neg Hx     Thyroid disease Neg Hx     Migraines Neg Hx     Asthma Neg Hx        Social History     Socioeconomic History    Marital status:      Spouse name: None    Number of children: None    Years of education: None    Highest education level: None   Social Needs    Financial resource strain: None    Food insecurity - worry: None    Food insecurity - inability: None    Transportation needs - medical: None    Transportation needs - non-medical: None   Occupational History    None   Tobacco Use    Smoking status: Never Smoker    Smokeless tobacco: Never Used   Substance and Sexual Activity    Alcohol use: Yes     Frequency: Monthly or less     Drinks per session: 1 or 2     Binge frequency: Never     Comment: rarely    Drug use: No    Sexual activity: Yes     Partners: Male   Other Topics Concern    None   Social History Narrative    None       Current Outpatient Medications   Medication Sig Dispense Refill    atenolol (TENORMIN) 25 MG tablet Take 1 tablet (25 mg total) by mouth once daily. 90 tablet 3    butorphanol (STADOL) 10 mg/mL nasal spray 1 spray by Nasal route every 4 (four) hours as needed for Pain. 2.5 mL 0    FLUoxetine (PROZAC) 20 MG capsule Take 1 capsule (20 mg total) by mouth once daily. 90 capsule 3    indomethacin (INDOCIN) 25 MG capsule TAKE 2 CAPSULES BY MOUTH THREE TIMES DAILY 180 capsule 6    ondansetron (ZOFRAN) 8 MG tablet Take 1 tablet (8 mg total) by mouth every 8 (eight) hours as needed for Nausea. 30 tablet 0    oxyCODONE-acetaminophen (PERCOCET)  mg per tablet Take 1 tablet by mouth every 4 (four) hours as needed for Pain. 30 tablet 0    pantoprazole (PROTONIX) 40 MG tablet Take 1 tablet (40 mg total) by mouth once daily. 30 tablet 11    diazePAM (VALIUM) 5 MG tablet Take 1 tablet (5 mg total) by mouth every 12 (twelve) hours as needed for Anxiety or Insomnia. 10 tablet 0     No current facility-administered medications for this  visit.        Review of patient's allergies indicates:   Allergen Reactions    Hydrocodone Other (See Comments)     Chest pains       ROS:  GENERAL: No fever, chills, fatigability or weight loss.  CHEST: no chest pain, shortness of breath or palpitations.  ABDOMEN: Appetite fine. No weight loss. Denies diarrhea, abdominal pain, hematemesis or blood in stool.  URINARY: No flank pain, dysuria or hematuria.  BREASTS: Breasts symmetric, nontender and no lumps detected.       PHYSICAL EXAM    Vitals:    12/13/18 0845   BP: (!) 96/58       APPEARANCE: Well nourished, well developed, in no acute distress.   ABDOMEN: Soft. No tenderness or masses.    DIAGNOSIS  Menometrorrhagia  Menstrual migraines in addition to chronic headaches  Anemia  Contraception management.        COUNSELING  Discussed with patient the risks of surgery, including but not limited to, risks of anesthesia, infection, bleeding, injury to other organs, such as skin, nerves, arteries, veins, bowel, bladder, ureters, with possible need for reparative or subsequent surgery such as bowel resection/repair, hernia, colostomy, bladder/ureter surgery, blood transfusion, possible hysterectomy .   There is also risks of development of deep venous thrombosis, pulmonary embolus, myocardial infarction, possible death. The patient verbalizes understanding. Discussed with the Patient the risks/benefits/alternatives of the treatment options.  Consents were signed. Pt understands her h/a may continue despite this procedure.

## 2018-12-14 NOTE — PROGRESS NOTES
PHYSICIAN INTERPRETATION AND COMMENTS: Clinically significant sleep disordered breathing is not identified.  Primary Snoring. No Obstructive sleep apnea.  CLINICAL HISTORY: 43 year old female presented with:symptoms of nocturnal snoring and waking up choking. Based on  the clinical history, the patient has a low pre-test probability of having minimal JACQUES. 1 inch neck, BMI of 23, an [place]  sleepiness score of 2, history of depression Patient had non diagnostic home study before. Apnea-hypopnea index/respiratory  event index: 1.9 events per hour total events 14 Gained weight, snoring, headaches, non refreshing sleep.  SLEEP STUDY FINDINGS: Patient underwent a one night Home Sleep Test and by behavioral criteria, slept for  approximately 6 hours, with a sleep latency of 9 minutes and a sleep efficiency of 88.7%. The patient slept supine 30.2% of the  night based on valid recording time of 6.03 hours. Snoring occurs for 15.3% (30 dB) of the study, 3.2% is very loud. The mean  pulse rate is 66 BPM, with frequent pulse rate variability (48 events with >= 6 BPM increase/decrease per hour).  TREATMENT CONSIDERATIONS: Based on this study, treatment is not required at this time for obstructive sleep  apnea/hypopnea. Weight gain, alcohol consumption, and time spent sleeping supine can affect the severity of JACQUES. Future  testing should be considered as the patient's risk factors change.INLAB POLYSOMNOGRAPHY after evaluation in Sleep  dosorders clinic may be considered.

## 2018-12-17 ENCOUNTER — HOSPITAL ENCOUNTER (OUTPATIENT)
Dept: PREADMISSION TESTING | Facility: HOSPITAL | Age: 43
Discharge: HOME | End: 2018-12-17
Attending: OBSTETRICS & GYNECOLOGY
Payer: COMMERCIAL

## 2018-12-17 RX ORDER — CHOLECALCIFEROL (VITAMIN D3) 25 MCG
1000 TABLET ORAL DAILY
COMMUNITY

## 2018-12-17 NOTE — DISCHARGE INSTRUCTIONS
What to expect during recovery    Pain  · You will experience some level of pain after surgery.  Pain medication should help with the pain, but may not be able to eliminate it entirely.  Pain will decrease with time, and most pain will be gone by 4 to 6 weeks after surgery.  · Ice packs may help with pain and can reduce swelling.  · Your prescription pain medication may contain acetaminophen (Tylenol).  If so, you should not take additional acetaminophen (Tylenol) at the same time as your pain medication.   · Do not drive, operate machinery or power tools, or sign legal papers for 24 hours or as long as you are on your postoperative pain medication.   · Prescription pain medication should be taken only as directed.  We are not able to replace pain medication that has been lost or stolen.    Nausea/vomiting  · You may experience nausea or vomiting as a result of anesthesia or pain medication.  · If you experience severe nausea or you are unable to keep fluids down, contact your doctor.    Bleeding  · A small amount of clear or reddish drainage from the incision is normal after surgery.  · For mild bleeding from the incision, apply pressure for five minutes.  · If bleeding is severe or does not stop with pressure, contact your doctor.    Signs of infection  · Notify your doctor if you have the following signs of infection:  · Fever over 101 degrees  · Worsening redness around incsion  · Thick drainage from incision  · Foul smell from incision  · You may experience low fever/chills, this is normal after surgery.    Other post-operative symptoms  · It is safe to take over-the-counter medications for constipation, heartburn, sleep, or itching if needed.    · You may experience light-headedness, dizziness, and sleepiness following surgery. Please do not stay alone. A responsible adult should be with you for this 24 hour period.     Activity  · Try to rest and avoid strenuous activity, but also get out of bed regularly  unless your doctor has ordered strict bedrest.  · Several times every hour while you are awake, pump and flex your feet 5-6 times and do foot circles. This will help prevent blood clots.  · Several times every hour while you are awake, take 2 to 3 deep breaths and cough. If you had stomach surgery, hold a pillow or rolled towel firmly against your stomach before you cough. This will help with any pain the cough might cause.  · Do not smoke after surgery, it decreases your ability to heal and increases the risk of infection and pneumonia.    Nozin: Nasal   · Nozin reduces nasal germs to help decrease the risk of infection after surgery.  · Continue Nozin provided at discharge twice daily for 7 days or until the incision is healed.    · Place 4 drops to cotton swab tip and swab both nostril rims 6 times in each direction.  · See pamphlet for more information.     Diet  · Drink lots of fluids after surgery, unless otherwise instructed.  · You might not have much appetite at first.  Progress slowly to a normal diet unless given other specific diet instructions by your doctor.  Begin with liquids, then soup and crackers, working up to solid foods.  · Do not drink alcoholic beverages including beer for 24 hours or as long as you are on post-operative pain medication.    Follow-up after surgery  · You can contact your doctor through the patient portal using the [employer] ryan or at [email or web address].  · You can also contact your doctor at any time by calling [phone] for the [facility] on [facility], or [phone] for the [facility] on [facility].  · A nurse will be calling you sometime after surgery. Do not be alarmed. This is our way of finding out how you are doing.

## 2018-12-24 ENCOUNTER — ANESTHESIA EVENT (OUTPATIENT)
Dept: SURGERY | Facility: HOSPITAL | Age: 43
End: 2018-12-24
Payer: COMMERCIAL

## 2018-12-26 ENCOUNTER — ANESTHESIA (OUTPATIENT)
Dept: SURGERY | Facility: HOSPITAL | Age: 43
End: 2018-12-26
Payer: COMMERCIAL

## 2018-12-26 ENCOUNTER — HOSPITAL ENCOUNTER (OUTPATIENT)
Facility: HOSPITAL | Age: 43
Discharge: HOME | End: 2018-12-26
Attending: OBSTETRICS & GYNECOLOGY | Admitting: OBSTETRICS & GYNECOLOGY
Payer: COMMERCIAL

## 2018-12-26 VITALS
WEIGHT: 163.94 LBS | RESPIRATION RATE: 16 BRPM | HEIGHT: 68 IN | BODY MASS INDEX: 24.85 KG/M2 | TEMPERATURE: 97 F | SYSTOLIC BLOOD PRESSURE: 100 MMHG | DIASTOLIC BLOOD PRESSURE: 62 MMHG | OXYGEN SATURATION: 96 % | HEART RATE: 86 BPM

## 2018-12-26 DIAGNOSIS — Z98.890 S/P LAPAROSCOPY: Primary | ICD-10-CM

## 2018-12-26 DIAGNOSIS — N93.9 ABNORMAL VAGINAL BLEEDING: ICD-10-CM

## 2018-12-26 PROCEDURE — 25000003 PHARM REV CODE 250: Performed by: NURSE ANESTHETIST, CERTIFIED REGISTERED

## 2018-12-26 PROCEDURE — 25000003 PHARM REV CODE 250: Performed by: ANESTHESIOLOGY

## 2018-12-26 PROCEDURE — 88305 TISSUE EXAM BY PATHOLOGIST: CPT | Mod: 26,,, | Performed by: PATHOLOGY

## 2018-12-26 PROCEDURE — 63600175 PHARM REV CODE 636 W HCPCS: Performed by: NURSE ANESTHETIST, CERTIFIED REGISTERED

## 2018-12-26 PROCEDURE — 36000708 HC OR TIME LEV III 1ST 15 MIN: Performed by: OBSTETRICS & GYNECOLOGY

## 2018-12-26 PROCEDURE — 58662 PR LAP,FULGURATE/EXCISE LESIONS: ICD-10-PCS | Mod: ,,, | Performed by: OBSTETRICS & GYNECOLOGY

## 2018-12-26 PROCEDURE — 58661 LAPAROSCOPY REMOVE ADNEXA: CPT | Mod: 51,50,, | Performed by: OBSTETRICS & GYNECOLOGY

## 2018-12-26 PROCEDURE — 37000009 HC ANESTHESIA EA ADD 15 MINS: Performed by: OBSTETRICS & GYNECOLOGY

## 2018-12-26 PROCEDURE — 27201423 OPTIME MED/SURG SUP & DEVICES STERILE SUPPLY: Performed by: OBSTETRICS & GYNECOLOGY

## 2018-12-26 PROCEDURE — 58662 LAPAROSCOPY EXCISE LESIONS: CPT | Mod: AS,,, | Performed by: PHYSICIAN ASSISTANT

## 2018-12-26 PROCEDURE — 37000008 HC ANESTHESIA 1ST 15 MINUTES: Performed by: OBSTETRICS & GYNECOLOGY

## 2018-12-26 PROCEDURE — 36000709 HC OR TIME LEV III EA ADD 15 MIN: Performed by: OBSTETRICS & GYNECOLOGY

## 2018-12-26 PROCEDURE — 88302 TISSUE SPECIMEN TO PATHOLOGY - SURGERY: ICD-10-PCS | Mod: 26,,, | Performed by: PATHOLOGY

## 2018-12-26 PROCEDURE — 58661 PR LAP,RMV  ADNEXAL STRUCTURE: ICD-10-PCS | Mod: 51,50,, | Performed by: OBSTETRICS & GYNECOLOGY

## 2018-12-26 PROCEDURE — 58661 LAPAROSCOPY REMOVE ADNEXA: CPT | Mod: 51,AS,50, | Performed by: PHYSICIAN ASSISTANT

## 2018-12-26 PROCEDURE — 88302 TISSUE EXAM BY PATHOLOGIST: CPT | Mod: 26,,, | Performed by: PATHOLOGY

## 2018-12-26 PROCEDURE — C1765 ADHESION BARRIER: HCPCS | Performed by: OBSTETRICS & GYNECOLOGY

## 2018-12-26 PROCEDURE — 58563 PR HYSTEROSCOPY,W/ENDOMETRIAL ABLATION: ICD-10-PCS | Mod: 51,,, | Performed by: OBSTETRICS & GYNECOLOGY

## 2018-12-26 PROCEDURE — 58563 HYSTEROSCOPY ABLATION: CPT | Mod: 51,,, | Performed by: OBSTETRICS & GYNECOLOGY

## 2018-12-26 PROCEDURE — 63600175 PHARM REV CODE 636 W HCPCS: Performed by: ANESTHESIOLOGY

## 2018-12-26 PROCEDURE — 88305 TISSUE SPECIMEN TO PATHOLOGY - SURGERY: ICD-10-PCS | Mod: 26,,, | Performed by: PATHOLOGY

## 2018-12-26 PROCEDURE — 58661 PR LAP,RMV  ADNEXAL STRUCTURE: ICD-10-PCS | Mod: 51,AS,50, | Performed by: PHYSICIAN ASSISTANT

## 2018-12-26 PROCEDURE — 71000015 HC POSTOP RECOV 1ST HR: Performed by: OBSTETRICS & GYNECOLOGY

## 2018-12-26 PROCEDURE — 71000016 HC POSTOP RECOV ADDL HR: Performed by: OBSTETRICS & GYNECOLOGY

## 2018-12-26 PROCEDURE — 58662 LAPAROSCOPY EXCISE LESIONS: CPT | Mod: ,,, | Performed by: OBSTETRICS & GYNECOLOGY

## 2018-12-26 PROCEDURE — 58662 PR LAP,FULGURATE/EXCISE LESIONS: ICD-10-PCS | Mod: AS,,, | Performed by: PHYSICIAN ASSISTANT

## 2018-12-26 PROCEDURE — 71000033 HC RECOVERY, INTIAL HOUR: Performed by: OBSTETRICS & GYNECOLOGY

## 2018-12-26 PROCEDURE — 88305 TISSUE EXAM BY PATHOLOGIST: CPT | Performed by: PATHOLOGY

## 2018-12-26 DEVICE — BARRIER INTERCEED 3X4 ST DIS: Type: IMPLANTABLE DEVICE | Site: ABDOMEN | Status: FUNCTIONAL

## 2018-12-26 RX ORDER — PROPOFOL 10 MG/ML
VIAL (ML) INTRAVENOUS
Status: DISCONTINUED | OUTPATIENT
Start: 2018-12-26 | End: 2018-12-26

## 2018-12-26 RX ORDER — SODIUM CHLORIDE 0.9 % (FLUSH) 0.9 %
3 SYRINGE (ML) INJECTION EVERY 8 HOURS
Status: DISCONTINUED | OUTPATIENT
Start: 2018-12-26 | End: 2018-12-26 | Stop reason: HOSPADM

## 2018-12-26 RX ORDER — MEPERIDINE HYDROCHLORIDE 50 MG/ML
12.5 INJECTION INTRAMUSCULAR; INTRAVENOUS; SUBCUTANEOUS ONCE AS NEEDED
Status: COMPLETED | OUTPATIENT
Start: 2018-12-26 | End: 2018-12-26

## 2018-12-26 RX ORDER — ONDANSETRON 2 MG/ML
INJECTION INTRAMUSCULAR; INTRAVENOUS
Status: DISCONTINUED | OUTPATIENT
Start: 2018-12-26 | End: 2018-12-26

## 2018-12-26 RX ORDER — LIDOCAINE HYDROCHLORIDE 10 MG/ML
1 INJECTION, SOLUTION EPIDURAL; INFILTRATION; INTRACAUDAL; PERINEURAL ONCE
Status: ACTIVE | OUTPATIENT
Start: 2018-12-26

## 2018-12-26 RX ORDER — CHLORHEXIDINE GLUCONATE ORAL RINSE 1.2 MG/ML
10 SOLUTION DENTAL 2 TIMES DAILY
Status: DISCONTINUED | OUTPATIENT
Start: 2018-12-26 | End: 2018-12-26 | Stop reason: HOSPADM

## 2018-12-26 RX ORDER — MORPHINE SULFATE 4 MG/ML
2 INJECTION, SOLUTION INTRAMUSCULAR; INTRAVENOUS EVERY 5 MIN PRN
Status: COMPLETED | OUTPATIENT
Start: 2018-12-26 | End: 2018-12-26

## 2018-12-26 RX ORDER — FENTANYL CITRATE 50 UG/ML
INJECTION, SOLUTION INTRAMUSCULAR; INTRAVENOUS
Status: DISCONTINUED | OUTPATIENT
Start: 2018-12-26 | End: 2018-12-26

## 2018-12-26 RX ORDER — MIDAZOLAM HYDROCHLORIDE 1 MG/ML
1 INJECTION INTRAMUSCULAR; INTRAVENOUS ONCE
Status: COMPLETED | OUTPATIENT
Start: 2018-12-26 | End: 2018-12-26

## 2018-12-26 RX ORDER — LIDOCAINE HCL/PF 100 MG/5ML
SYRINGE (ML) INTRAVENOUS
Status: DISCONTINUED | OUTPATIENT
Start: 2018-12-26 | End: 2018-12-26

## 2018-12-26 RX ORDER — OXYCODONE HYDROCHLORIDE 5 MG/1
5 TABLET ORAL
Status: DISCONTINUED | OUTPATIENT
Start: 2018-12-26 | End: 2018-12-26 | Stop reason: HOSPADM

## 2018-12-26 RX ORDER — ACETAMINOPHEN 500 MG
1000 TABLET ORAL
Status: COMPLETED | OUTPATIENT
Start: 2018-12-26 | End: 2018-12-26

## 2018-12-26 RX ORDER — CHLORHEXIDINE GLUCONATE ORAL RINSE 1.2 MG/ML
10 SOLUTION DENTAL
Status: DISCONTINUED | OUTPATIENT
Start: 2018-12-26 | End: 2018-12-26 | Stop reason: HOSPADM

## 2018-12-26 RX ORDER — SODIUM CHLORIDE, SODIUM LACTATE, POTASSIUM CHLORIDE, CALCIUM CHLORIDE 600; 310; 30; 20 MG/100ML; MG/100ML; MG/100ML; MG/100ML
INJECTION, SOLUTION INTRAVENOUS CONTINUOUS
Status: ACTIVE | OUTPATIENT
Start: 2018-12-26

## 2018-12-26 RX ORDER — NEOSTIGMINE METHYLSULFATE 1 MG/ML
INJECTION, SOLUTION INTRAVENOUS
Status: DISCONTINUED | OUTPATIENT
Start: 2018-12-26 | End: 2018-12-26

## 2018-12-26 RX ORDER — MIDAZOLAM HYDROCHLORIDE 1 MG/ML
INJECTION, SOLUTION INTRAMUSCULAR; INTRAVENOUS
Status: DISCONTINUED | OUTPATIENT
Start: 2018-12-26 | End: 2018-12-26

## 2018-12-26 RX ORDER — METOCLOPRAMIDE HYDROCHLORIDE 5 MG/ML
10 INJECTION INTRAMUSCULAR; INTRAVENOUS EVERY 10 MIN PRN
Status: COMPLETED | OUTPATIENT
Start: 2018-12-26 | End: 2018-12-26

## 2018-12-26 RX ORDER — SODIUM CHLORIDE 0.9 % (FLUSH) 0.9 %
3 SYRINGE (ML) INJECTION
Status: DISCONTINUED | OUTPATIENT
Start: 2018-12-26 | End: 2018-12-26 | Stop reason: HOSPADM

## 2018-12-26 RX ORDER — OXYCODONE AND ACETAMINOPHEN 10; 325 MG/1; MG/1
1 TABLET ORAL EVERY 8 HOURS PRN
Qty: 5 TABLET | Refills: 0 | Status: SHIPPED | OUTPATIENT
Start: 2018-12-26 | End: 2019-01-23 | Stop reason: SDUPTHER

## 2018-12-26 RX ORDER — GLYCOPYRROLATE 0.2 MG/ML
INJECTION INTRAMUSCULAR; INTRAVENOUS
Status: DISCONTINUED | OUTPATIENT
Start: 2018-12-26 | End: 2018-12-26

## 2018-12-26 RX ORDER — ROCURONIUM BROMIDE 10 MG/ML
INJECTION, SOLUTION INTRAVENOUS
Status: DISCONTINUED | OUTPATIENT
Start: 2018-12-26 | End: 2018-12-26

## 2018-12-26 RX ADMIN — ACETAMINOPHEN 1000 MG: 500 TABLET ORAL at 12:12

## 2018-12-26 RX ADMIN — MORPHINE SULFATE 2 MG: 4 INJECTION INTRAVENOUS at 04:12

## 2018-12-26 RX ADMIN — SODIUM CHLORIDE, SODIUM LACTATE, POTASSIUM CHLORIDE, AND CALCIUM CHLORIDE: 600; 310; 30; 20 INJECTION, SOLUTION INTRAVENOUS at 02:12

## 2018-12-26 RX ADMIN — ONDANSETRON 4 MG: 2 INJECTION, SOLUTION INTRAMUSCULAR; INTRAVENOUS at 03:12

## 2018-12-26 RX ADMIN — NEOSTIGMINE METHYLSULFATE 5 MG: 1 INJECTION INTRAVENOUS at 03:12

## 2018-12-26 RX ADMIN — MEPERIDINE HYDROCHLORIDE 12.5 MG: 50 INJECTION, SOLUTION INTRAMUSCULAR; INTRAVENOUS; SUBCUTANEOUS at 04:12

## 2018-12-26 RX ADMIN — METOCLOPRAMIDE 10 MG: 5 INJECTION, SOLUTION INTRAMUSCULAR; INTRAVENOUS at 04:12

## 2018-12-26 RX ADMIN — ROBINUL 0.8 MG: 0.2 INJECTION INTRAMUSCULAR; INTRAVENOUS at 03:12

## 2018-12-26 RX ADMIN — ROCURONIUM BROMIDE 35 MG: 10 INJECTION, SOLUTION INTRAVENOUS at 02:12

## 2018-12-26 RX ADMIN — MIDAZOLAM HYDROCHLORIDE 1 MG: 1 INJECTION, SOLUTION INTRAMUSCULAR; INTRAVENOUS at 04:12

## 2018-12-26 RX ADMIN — MIDAZOLAM 2 MG: 1 INJECTION INTRAMUSCULAR; INTRAVENOUS at 02:12

## 2018-12-26 RX ADMIN — FENTANYL CITRATE 100 MCG: 50 INJECTION, SOLUTION INTRAMUSCULAR; INTRAVENOUS at 02:12

## 2018-12-26 RX ADMIN — PROPOFOL 120 MG: 10 INJECTION, EMULSION INTRAVENOUS at 02:12

## 2018-12-26 RX ADMIN — ONDANSETRON 4 MG: 2 INJECTION, SOLUTION INTRAMUSCULAR; INTRAVENOUS at 02:12

## 2018-12-26 RX ADMIN — LIDOCAINE HYDROCHLORIDE 70 MG: 20 INJECTION, SOLUTION INTRAVENOUS at 02:12

## 2018-12-26 NOTE — DISCHARGE SUMMARY
DISCHARGE SUMMARY    Sandra Dimas is a 43 y.o. female patient.  S/p  Surgery as noted below:      PREOPERATIVE DIAGNOSES:  pelvic pain, menorrhagia, anemia, menstrual migraines, endometriosis, desires permanent sterilzation           POSTOPERATIVE DIAGNOSES:  same             SPECIMENS:  Bilateral tubes (left one with mesosalpinx endometriosis implants), anterior cul de sac peritoneum biopsy, small right ovarian endometrial cystectomy  EBL 15mL                                                                                                         PROCEDURE:    laparoscopic bilateral salpingectomy, endometriosis implant resection, hysteroscopy, dilation and curettage, Novasure endometrial ablation                FINDINGS:  benign appearing pelvic organs - no adhesive disease. Endometriosis (powder burn lesions) noted on both ovaries that were ablated, and small right ovarian cyst that appeared to have 'chocolate cyst' content was resected, endometriosis implants also noted in the anterior cul de sac, as well as 2 small ones on the serosa of the descending bowel about 1-2mm in diameter that were photographed but not resected or ablated                     Photos were given to family per pt req. Images were sent to media in [email or web address].          Surgery and postoperative stay were uneventful.    Principal Problem:  Active Hospital Problems    Diagnosis  POA    *S/P laparoscopy [Z98.890]  Not Applicable    Abnormal vaginal bleeding [N93.9]  Yes      Resolved Hospital Problems   No resolved problems to display.       Condition: stable     Plan:   Discharge home.  Encourage ambulation.  Complete pelvic rest for 2wks and no heavy lifting over 15lbs for 6wks. Regular diet.  Administer medications as ordered.   Follow up in clinic 4-6wks. Return to clinic for any problems.

## 2018-12-26 NOTE — ANESTHESIA PREPROCEDURE EVALUATION
12/26/2018  Sandra Dimas is a 43 y.o., female.    Anesthesia Evaluation    I have reviewed the Patient Summary Reports.    I have reviewed the Nursing Notes.   I have reviewed the Medications.     Review of Systems  Anesthesia Hx:  No problems with previous Anesthesia  Denies Family Hx of Anesthesia complications.   Denies Personal Hx of Anesthesia complications.   Social:  Non-Smoker    Cardiovascular:  Cardiovascular Normal  Takes atenolol for palpitations.   Pulmonary:  Pulmonary Normal    Hepatic/GI:   Hiatal Hernia, GERD    Neurological:   Neuromuscular Disease, Headaches    Endocrine:  Endocrine Normal    Psych:   Psychiatric History          Physical Exam  General:  Well nourished    Airway/Jaw/Neck:  Airway Findings: Mouth Opening: Normal Mallampati: II      Dental:  DENTAL FINDINGS: Normal   Chest/Lungs:  Chest/Lungs Findings: Normal Respiratory Rate     Heart/Vascular:  Heart Findings: Normal            Anesthesia Plan  Type of Anesthesia, risks & benefits discussed:  Anesthesia Type:  general  Patient's Preference:   Intra-op Monitoring Plan: standard ASA monitors  Intra-op Monitoring Plan Comments:   Post Op Pain Control Plan: multimodal analgesia  Post Op Pain Control Plan Comments:   Induction:   IV  Beta Blocker:  Patient is on a Beta-Blocker and has received one dose within the past 24 hours (No further documentation required).       Informed Consent: Patient understands risks and agrees with Anesthesia plan.  Questions answered. Anesthesia consent signed with patient.  ASA Score: 2     Day of Surgery Review of History & Physical: I have interviewed and examined the patient. I have reviewed the patient's H&P dated:  There are no significant changes.          Ready For Surgery From Anesthesia Perspective.

## 2018-12-26 NOTE — OP NOTE
OPERATIVE NOTE    12/26/2018     PRE-PROCEDURE COUNSELING  Patient counseled on the risks, benefits, and alternatives to procedure.  Please see preoperative consents.   SCDs were applied and working prior to anesthesia induction.                                                                          SURGEON:  Zehra Jensen M.D.                                                                                                                         ASSISTANT:  First Assistant: NASH Wooten    Was necessary to assist in performing this case                                                                                                      PREOPERATIVE DIAGNOSES:  pelvic pain, menorrhagia, anemia, menstrual migraines, endometriosis, desires permanent sterilzation                                                                                POSTOPERATIVE DIAGNOSES:  same               SPECIMENS:  Bilateral tubes (left one with mesosalpinx endometriosis implants), anterior cul de sac peritoneum biopsy, small right ovarian endometrial cystectomy    EBL 15mL                                                                                                                 PROCEDURE:    laparoscopic bilateral salpingectomy, endometriosis implant resection, hysteroscopy, dilation and curettage, Novasure endometrial ablation                         FINDINGS:  benign appearing pelvic organs - no adhesive disease. Endometriosis (powder burn lesions) noted on both ovaries that were ablated, and small right ovarian cyst that appeared to have 'chocolate cyst' content was resected, endometriosis implants also noted in the anterior cul de sac, as well as 2 small ones on the serosa of the descending bowel about 1-2mm in diameter that were photographed but not resected or ablated                     Photos were given to family per pt req. Images were sent to media in [employer].                                                                                PROCEDURE IN DETAIL:    After discussion of the risks, benefits and alternatives, the patient understood the potential risks and complications and signed consents were reviewed. Patient was given preoperative sequential compression devices and antibiotics and was taken to the Operating Room where the general endotracheal anesthesia was administered and found to be adequate.  She was prepped and draped in routine fashion.    Attention was turned to the abdomen where the anterior abdominal wall was grasped with towel clamps and elevated.  Veress needle was introduced through the umbilicus and abdomen was insufflated with CO2 gas to 15 mmHg.  A 5 mm  incision was made in the umbilicus and a 5mm trocar  was placed into the abdomen. Under direct visualization, ancillary ports were placed.  This included two 5 mm lateral ports in the suprapubic area and in the LLQ.  The patient was noted to be in correct anatomical position, placed in Trendelenburg. A survey of the pelvis was made.      Next, the right mesosalpinx was cauterized and transected up to the edge of the uterine cornu. Same was performed on the other side. Next the endometriosis implants were resected as noted about. Surface cautery was used to achieve hemostasis, confirmed by irrigation and desuffation. All trocars were removed.  The skin was reapproximated with 4-0 monocryl and supported with Dermabond.    Next, attention was turned to the pelvis. The pt was placed in dorsal lithotomy position. A sterile speculum was placed in the vagina. Anterior lip of the cervix was grasped with a single tooth tenaculum. Cervix was dilated and uterus was sounded in routine fashion.      Hysteroscopy was performed which showed no distinct lesions, just redundant fluffy endometrium,  followed by sharp curettage, in a gentle withdrawing manner, of the entire endometrial cavity.    Novasure device probe was introduced into the endometrial cavity and  ablation was performed  For 57 sec at 152 mcclure. All instruments were then removed from the vagina and hemostasis was noted.       Lap, needle and instrument counts were  correct x2.           Patient was sent to the recovery room in stable condition.

## 2018-12-26 NOTE — TRANSFER OF CARE
"Anesthesia Transfer of Care Note    Patient: Sandra Dimas    Procedure(s) Performed: Procedure(s) (LRB):  SALPINGECTOMY, LAPAROSCOPIC (Bilateral)  HYSTEROSCOPY, WITH DILATION AND CURETTAGE OF UTERUS AND HYDROTHERMAL ENDOMETRIAL ABLATION (N/A)    Patient location: PACU    Anesthesia Type: general    Transport from OR: Transported from OR on room air with adequate spontaneous ventilation    Post pain: adequate analgesia    Post assessment: no apparent anesthetic complications and tolerated procedure well    Post vital signs: stable    Level of consciousness: awake    Nausea/Vomiting: no nausea/vomiting    Complications: none    Transfer of care protocol was followed      Last vitals:   Visit Vitals  /73   Pulse 63   Temp 36.7 °C (98.1 °F) (Temporal)   Resp 18   Ht 5' 8" (1.727 m)   Wt 74.3 kg (163 lb 14.6 oz)   LMP 12/17/2018 (Approximate)   SpO2 100%   Breastfeeding? No   BMI 24.92 kg/m²     "

## 2018-12-27 NOTE — ANESTHESIA POSTPROCEDURE EVALUATION
"Anesthesia Post Evaluation    Patient: Sandra Dimas    Procedure(s) Performed: Procedure(s) (LRB):  SALPINGECTOMY, LAPAROSCOPIC (Bilateral)  HYSTEROSCOPY, WITH DILATION AND CURETTAGE OF UTERUS AND HYDROTHERMAL ENDOMETRIAL ABLATION (N/A)    Final Anesthesia Type: general  Patient location during evaluation: PACU  Patient participation: Yes- Able to Participate  Level of consciousness: awake and alert  Post-procedure vital signs: reviewed and stable  Pain management: adequate  Airway patency: patent  PONV status at discharge: No PONV  Anesthetic complications: no      Cardiovascular status: stable  Respiratory status: room air  Hydration status: euvolemic  Follow-up not needed.        Visit Vitals  /62   Pulse 86   Temp 36.3 °C (97.4 °F) (Temporal)   Resp 16   Ht 5' 8" (1.727 m)   Wt 74.3 kg (163 lb 14.6 oz)   LMP 12/17/2018 (Approximate)   SpO2 96%   Breastfeeding? No   BMI 24.92 kg/m²       Pain/Kamila Score: Pain Rating Prior to Med Admin: 6 (12/26/2018  4:20 PM)  Kamila Score: 9 (12/26/2018  5:15 PM)        "

## 2019-01-01 DIAGNOSIS — L25.3 CONTACT DERMATITIS DUE TO OTHER CHEMICAL PRODUCT, UNSPECIFIED CONTACT DERMATITIS TYPE: Primary | ICD-10-CM

## 2019-01-01 RX ORDER — FLUOCINONIDE 0.5 MG/G
OINTMENT TOPICAL 2 TIMES DAILY
Qty: 60 G | Refills: 1 | Status: SHIPPED | OUTPATIENT
Start: 2019-01-01 | End: 2019-03-05

## 2019-01-03 ENCOUNTER — OFFICE VISIT (OUTPATIENT)
Dept: NEUROLOGY | Facility: CLINIC | Age: 44
End: 2019-01-03
Payer: COMMERCIAL

## 2019-01-03 VITALS
DIASTOLIC BLOOD PRESSURE: 72 MMHG | WEIGHT: 163.81 LBS | HEIGHT: 68 IN | BODY MASS INDEX: 24.83 KG/M2 | HEART RATE: 67 BPM | SYSTOLIC BLOOD PRESSURE: 104 MMHG

## 2019-01-03 DIAGNOSIS — G44.049 CHRONIC PAROXYSMAL HEMICRANIA, NOT INTRACTABLE: ICD-10-CM

## 2019-01-03 DIAGNOSIS — F41.9 ANXIETY: ICD-10-CM

## 2019-01-03 PROCEDURE — 99244 PR OFFICE CONSULTATION,LEVEL IV: ICD-10-PCS | Mod: S$GLB,,, | Performed by: PSYCHIATRY & NEUROLOGY

## 2019-01-03 PROCEDURE — 99999 PR PBB SHADOW E&M-EST. PATIENT-LVL III: CPT | Mod: PBBFAC,,, | Performed by: PSYCHIATRY & NEUROLOGY

## 2019-01-03 PROCEDURE — 99244 OFF/OP CNSLTJ NEW/EST MOD 40: CPT | Mod: S$GLB,,, | Performed by: PSYCHIATRY & NEUROLOGY

## 2019-01-03 PROCEDURE — 99999 PR PBB SHADOW E&M-EST. PATIENT-LVL III: ICD-10-PCS | Mod: PBBFAC,,, | Performed by: PSYCHIATRY & NEUROLOGY

## 2019-01-03 RX ORDER — CEPHALEXIN 500 MG/1
500 CAPSULE ORAL 3 TIMES DAILY
COMMUNITY
End: 2019-04-10

## 2019-01-03 RX ORDER — SULFAMETHOXAZOLE AND TRIMETHOPRIM 200; 40 MG/5ML; MG/5ML
8 SUSPENSION ORAL
COMMUNITY
End: 2019-03-05

## 2019-01-03 NOTE — PROGRESS NOTES
Outpatient Neurology Consultation    Requesting physician: Dr. Gamez    Impression:  1. Chronic paroxysmal hemicrania (CPH)  2. Anxiety    Plan:  1. OK to continue off indomethacin but restart prn (with Protonix)  2. Continue fluoxeting  3. Send [facility] msg prn  4. RTC annually      Problem List Items Addressed This Visit        1 - High    Chronic paroxysmal hemicrania       2     Anxiety          CC:  Headaches    HPI:  44 y/o F referred for evaluation and treatment of headaches.  She presents with her friend who provides additional history.       Headaches started at age 17.  Pain is R sided, lasting 15 mins, R ptosis, lacrimation, nasal congestion.  She can have 10-50 episodes/day.  She failed multiple therapies as listed below.  Indomethacin 25-50mg tid has helped significantly.  She has had some breakthrough with lower dosages.   Indomethacin causes reflux but Protonix helps. Stopped indomethacin on 12/26 for surgery and no recurrent HA until now.  Third trimester of pregnancy always helped.  Multiple MRIs negative.  MRI c-spine unrevealing    EGD neg  Sleep study neg  Chronic anemia    Freq: 4/30 and 0/30 (on low-dose indomethacin)    Prophylactics   Effective: indomethacin, ibuprofen    Ineffective/not tolerated:  Elavil, verapamil, propranolol, Depakote, topiramate, lithium, GONB, Botox, Celebrex    Abortives   Effective: Stadol, Zofran, Percocet   Ineffective/not tolerated: Imitrex, Maxalt, Zomig, Relpax, Frova, steroids, magnesium, DHE, oxygen    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    Endometriosis     Hemicrania continua     Migraines       Past Surgical History:   Procedure Laterality Date    APPENDECTOMY      diagnostic laprascopy      EGD (ESOPHAGOGASTRODUODENOSCOPY) N/A 11/30/2018    Per[facility] by [facility]ustos [facility] at [facility]    FULGURATION, ENDOMETRIOSIS  12/26/2018    [facility] by [facility]ra Jensen [facility] at [facility]    HYSTEROSCOPY, WITH  DILATION AND CURETTAGE OF UTERUS AND HYDROTHERMAL ENDOMETRIAL ABLATION N/A 12/26/2018    [facility] at [facility]    robotic assisted laparoscopy with excision of ovarian endometrioma and chromotubation      SALPINGECTOMY, LAPAROSCOPIC Bilateral 12/26/2018    Performed by [facility] at [facility] OR      Outpatient Medications Marked as Taking for the 1/3/19 encounter (Office Visit) with Ric Jacome MD   Medication Sig Dispense Refill    atenolol (TENORMIN) 25 MG tablet Take 1 tablet (25 mg total) by mouth once daily. 90 tablet 3    butorphanol (STADOL) 10 mg/mL nasal spray 1 spray by Nasal route every 4 (four) hours as needed for Pain. 2.5 mL 0    cephALEXin (KEFLEX) 500 MG capsule Take 500 mg by mouth 3 (three) times daily.      fluocinonide (LIDEX) 0.05 % ointment Apply topically 2 (two) times daily. 60 g 1    FLUoxetine (PROZAC) 20 MG capsule Take 1 capsule (20 mg total) by mouth once daily. 90 capsule 3    indomethacin (INDOCIN) 25 MG capsule TAKE 2 CAPSULES BY MOUTH THREE TIMES DAILY 180 capsule 6    ondansetron (ZOFRAN) 8 MG tablet Take 1 tablet (8 mg total) by mouth every 8 (eight) hours as needed for Nausea. 30 tablet 0    oxyCODONE-acetaminophen (PERCOCET)  mg per tablet Take 1 tablet by mouth every 8 (eight) hours as needed for Pain. 5 tablet 0    pantoprazole (PROTONIX) 40 MG tablet Take 1 tablet (40 mg total) by mouth once daily. 30 tablet 11    sulfamethoxazole-trimethoprim 200-40 mg/5 ml (BACTRIM,SEPTRA) 200-40 mg/5 mL Susp Take 8 mg/kg/day by mouth every 12 (twelve) hours.      vitamin D (VITAMIN D3) 1000 units Tab Take 1,000 Units by mouth once daily.        Review of patient's allergies indicates:   Allergen Reactions    Hydrocodone Other (See Comments)     Chest pains      Family History   Problem Relation Age of Onset    Strabismus Neg Hx     Retinal detachment Neg Hx     Macular degeneration Neg Hx     Glaucoma Neg Hx     Blindness Neg  "Hx     Amblyopia Neg Hx     Breast cancer Neg Hx     Colon cancer Neg Hx     Ovarian cancer Neg Hx     Thyroid disease Neg Hx     Migraines Neg Hx     Asthma Neg Hx       Social History     Socioeconomic History    Marital status:      Spouse name: Not on file    Number of children: Not on file    Years of education: Not on file    Highest education level: Not on file   Social Needs    Financial resource strain: Not on file    Food insecurity - worry: Not on file    Food insecurity - inability: Not on file    Transportation needs - medical: Not on file    Transportation needs - non-medical: Not on file   Occupational History    Not on file   Tobacco Use    Smoking status: Never Smoker    Smokeless tobacco: Never Used   Substance and Sexual Activity    Alcohol use: Yes     Frequency: Monthly or less     Drinks per session: 1 or 2     Binge frequency: Never     Comment: rarely    Drug use: No    Sexual activity: Yes     Partners: Male   Other Topics Concern    Not on file   Social History Narrative    Not on file       Review Of Systems:  General: Negative for fever   HENT: Negative for tinnitus, nose bleeds, neck stiffness   Cardiac Negative for palpitations   Vascular: Negative for easy bruising   Pulmonary: Negative for cough   Gastrointestinal: Negative for constipation   Urinary: Negative for incomplete bladder emptying   Musculoskeletal: Negative for muscle aches   Neurological: As above. Otherwise negative for abnormal movements   Psychiatric:  Negative for depression       /72   Pulse 67   Ht 5' 8" (1.727 m)   Wt 74.3 kg (163 lb 12.8 oz)   LMP 12/17/2018 (Approximate)   BMI 24.91 kg/m²    Well developed, well nourished female  Extremities: no edema    Mental status:   Awake, alert and appropriately oriented   Normal recent and remote memory   Normal attention and concentration   Normal speech and language   Normal fund of knowledge   No extinction  Cranial " nerves:   Normal funduscopic - discs sharp   PERRLA   EOMF without nystagmus   VFF   Normal facial sensation   Normal facial movements   Intact hearing bilaterally   Palate elevates symmetrically   Normal SCM and trapezius strength   Tongue midline  Motor:   No pronator drift   Normal FF movements bilaterally   Normal muscle tone, bulk and power   No abnormal movements  Sensory   Intact to LT, temperature  DTRs   2++ and symmetric   Plantar responses are flexor bilaterally  Coordination   Intact to FNF, RAH, and HTS  Gait   Normal base and gait   Normal toe, heel and tandem   No Romberg    Data Reviewed:  MRI brain    Ric Jacome MD

## 2019-01-03 NOTE — LETTER
January 3, 2019      JEREMY Gamez Jr., MD  [address] [place] [address]           [facility]  [address]  [place] [address]  Phone: [phone]          Patient: Sandra Dimas   MR Number: [id number]   YOB: 1975   Date of Visit: 1/3/2019       Dear Dr. JEREMY Gamez Jr.:    Thank you for referring Sandra Dimas to me for evaluation. Attached you will find relevant portions of my assessment and plan of care.    If you have questions, please do not hesitate to call me. I look forward to following Sandra Dimas along with you.    Sincerely,    Ric Jacome MD    Enclosure  CC:  No Recipients    If you would like to receive this communication electronically, please contact [email or web address] or [phone] to request more information on [employer] Link access.    For providers and/or their staff who would like to refer a patient to [facility], please contact us through our one-stop-shop provider referral line, [facility], at [phone].    If you feel you have received this communication in error or would no longer like to receive these types of communications, please e-mail [email or web address]

## 2019-01-14 ENCOUNTER — PATIENT MESSAGE (OUTPATIENT)
Dept: OBSTETRICS AND GYNECOLOGY | Facility: CLINIC | Age: 44
End: 2019-01-14

## 2019-01-14 ENCOUNTER — TELEPHONE (OUTPATIENT)
Dept: OBSTETRICS AND GYNECOLOGY | Facility: CLINIC | Age: 44
End: 2019-01-14

## 2019-01-16 ENCOUNTER — LAB VISIT (OUTPATIENT)
Dept: LAB | Facility: HOSPITAL | Age: 44
End: 2019-01-16
Attending: INTERNAL MEDICINE
Payer: COMMERCIAL

## 2019-01-16 DIAGNOSIS — D50.0 IRON DEFICIENCY ANEMIA DUE TO CHRONIC BLOOD LOSS: Primary | ICD-10-CM

## 2019-01-16 DIAGNOSIS — D50.0 IRON DEFICIENCY ANEMIA DUE TO CHRONIC BLOOD LOSS: ICD-10-CM

## 2019-01-16 LAB
BASOPHILS # BLD AUTO: 0.02 K/UL
BASOPHILS NFR BLD: 0.4 %
CRP SERPL-MCNC: 1.3 MG/L
DIFFERENTIAL METHOD: ABNORMAL
EOSINOPHIL # BLD AUTO: 0 K/UL
EOSINOPHIL NFR BLD: 0.7 %
ERYTHROCYTE [DISTWIDTH] IN BLOOD BY AUTOMATED COUNT: 14.9 %
FERRITIN SERPL-MCNC: 297 NG/ML
HCT VFR BLD AUTO: 36.4 %
HGB BLD-MCNC: 11.6 G/DL
IMM GRANULOCYTES # BLD AUTO: 0.01 K/UL
IMM GRANULOCYTES NFR BLD AUTO: 0.2 %
IRON SERPL-MCNC: 92 UG/DL
LYMPHOCYTES # BLD AUTO: 2.5 K/UL
LYMPHOCYTES NFR BLD: 44.3 %
MCH RBC QN AUTO: 25.6 PG
MCHC RBC AUTO-ENTMCNC: 31.9 G/DL
MCV RBC AUTO: 80 FL
MONOCYTES # BLD AUTO: 0.4 K/UL
MONOCYTES NFR BLD: 6.3 %
NEUTROPHILS # BLD AUTO: 2.7 K/UL
NEUTROPHILS NFR BLD: 48.3 %
NRBC BLD-RTO: 0 /100 WBC
PLATELET # BLD AUTO: 282 K/UL
PMV BLD AUTO: 8.6 FL
RBC # BLD AUTO: 4.53 M/UL
SATURATED IRON: 31 %
TOTAL IRON BINDING CAPACITY: 295 UG/DL
TRANSFERRIN SERPL-MCNC: 199 MG/DL
WBC # BLD AUTO: 5.58 K/UL

## 2019-01-16 PROCEDURE — 85025 COMPLETE CBC W/AUTO DIFF WBC: CPT

## 2019-01-16 PROCEDURE — 86140 C-REACTIVE PROTEIN: CPT

## 2019-01-16 PROCEDURE — 36415 COLL VENOUS BLD VENIPUNCTURE: CPT

## 2019-01-16 PROCEDURE — 83540 ASSAY OF IRON: CPT

## 2019-01-16 PROCEDURE — 82728 ASSAY OF FERRITIN: CPT

## 2019-01-22 ENCOUNTER — PATIENT MESSAGE (OUTPATIENT)
Dept: NEUROLOGY | Facility: CLINIC | Age: 44
End: 2019-01-22

## 2019-01-22 DIAGNOSIS — G44.041 INTRACTABLE CHRONIC PAROXYSMAL HEMICRANIA: Primary | ICD-10-CM

## 2019-01-23 ENCOUNTER — TELEPHONE (OUTPATIENT)
Dept: ENDOSCOPY | Facility: HOSPITAL | Age: 44
End: 2019-01-23

## 2019-01-23 RX ORDER — OXYCODONE AND ACETAMINOPHEN 10; 325 MG/1; MG/1
1 TABLET ORAL EVERY 8 HOURS PRN
Qty: 20 TABLET | Refills: 0 | Status: SHIPPED | OUTPATIENT
Start: 2019-01-23 | End: 2019-01-25

## 2019-01-23 RX ORDER — BUTORPHANOL TARTRATE 10 MG/ML
1 SPRAY NASAL EVERY 4 HOURS PRN
Qty: 1 ML | Refills: 0 | Status: SHIPPED | OUTPATIENT
Start: 2019-01-23 | End: 2019-01-25

## 2019-01-24 ENCOUNTER — OFFICE VISIT (OUTPATIENT)
Dept: OBSTETRICS AND GYNECOLOGY | Facility: CLINIC | Age: 44
End: 2019-01-24
Payer: COMMERCIAL

## 2019-01-24 VITALS
SYSTOLIC BLOOD PRESSURE: 118 MMHG | BODY MASS INDEX: 25.29 KG/M2 | DIASTOLIC BLOOD PRESSURE: 66 MMHG | WEIGHT: 166.88 LBS | HEIGHT: 68 IN

## 2019-01-24 DIAGNOSIS — Z09 POSTOP CHECK: Primary | ICD-10-CM

## 2019-01-24 PROCEDURE — 99024 POSTOP FOLLOW-UP VISIT: CPT | Mod: S$GLB,,, | Performed by: OBSTETRICS & GYNECOLOGY

## 2019-01-24 PROCEDURE — 99999 PR PBB SHADOW E&M-EST. PATIENT-LVL III: ICD-10-PCS | Mod: PBBFAC,,, | Performed by: OBSTETRICS & GYNECOLOGY

## 2019-01-24 PROCEDURE — 99024 PR POST-OP FOLLOW-UP VISIT: ICD-10-PCS | Mod: S$GLB,,, | Performed by: OBSTETRICS & GYNECOLOGY

## 2019-01-24 PROCEDURE — 99999 PR PBB SHADOW E&M-EST. PATIENT-LVL III: CPT | Mod: PBBFAC,,, | Performed by: OBSTETRICS & GYNECOLOGY

## 2019-01-24 NOTE — PROGRESS NOTES
Sandra Dimas is a 43 y.o. female  post-op from a  LS:      2018      PRE-PROCEDURE COUNSELING  Patient counseled on the risks, benefits, and alternatives to procedure.  Please see preoperative consents.   SCDs were applied and working prior to anesthesia induction.                                                                          SURGEON:  Zehra Jensen M.D.                                                                                                                         ASSISTANT:  First Assistant: NASH Wooten    Was necessary to assist in performing this case                                                                                                      PREOPERATIVE DIAGNOSES:  pelvic pain, menorrhagia, anemia, menstrual migraines, endometriosis, desires permanent sterilzation                                                                                POSTOPERATIVE DIAGNOSES:  same                SPECIMENS:  Bilateral tubes (left one with mesosalpinx endometriosis implants), anterior cul de sac peritoneum biopsy, small right ovarian endometrial cystectomy     EBL 15mL                                                                                                                 PROCEDURE:    laparoscopic bilateral salpingectomy, endometriosis implant resection, hysteroscopy, dilation and curettage, Novasure endometrial ablation                         FINDINGS:  benign appearing pelvic organs - no adhesive disease. Endometriosis (powder burn lesions) noted on both ovaries that were ablated, and small right ovarian cyst that appeared to have 'chocolate cyst' content was resected, endometriosis implants also noted in the anterior cul de sac, as well as 2 small ones on the serosa of the descending bowel about 1-2mm in diameter that were photographed but not resected or ablated                     Photos were given to family per pt req. Images were sent to media in  Epic.       The operative findings and pathology were reviewed with the patient.     Irregular menses, encounter for female sterilization procedure, abnormal vaginal bleeding.  PostOperative Diagnosis  cp  SPECIMEN  1) Right fallopian tube.  2) Left fallopian tube.  3) Anterior peritoneum biopsy.  4) Right ovary endometrioma.  5) Endometrial curettings.  FINAL PATHOLOGIC DIAGNOSIS  1. Right fallopian tube:  Complete cross section present.  2. Left fallopian tube:  Complete cross section present.  3. Anterior peritoneum, biopsy:  Endometriosis.  4. Right ovarian cyst:  Consistent with a benign endometriotic cyst (endometrioma).  5. Endometrial curettings:  Proliferative pattern endometrium.  Negative for hyperplasia and malignancy.    PE:  APPEARANCE: Well nourished, well developed, in no acute distress.   ABDOMEN: Soft. No tenderness or masses. No hepatosplenomegaly. No hernias. Incisions intact, clean and dry      Assessment: Routine postoperative follow-up exam      Follow-up with me as needed.  Messaged Neurologist Alen regarding HRT for pt h/a. Awaiting response.

## 2019-01-25 RX ORDER — TRAMADOL HYDROCHLORIDE 50 MG/1
50-100 TABLET ORAL EVERY 6 HOURS PRN
Qty: 30 TABLET | Refills: 0 | Status: SHIPPED | OUTPATIENT
Start: 2019-01-25 | End: 2019-04-10

## 2019-01-28 ENCOUNTER — TELEPHONE (OUTPATIENT)
Dept: OBSTETRICS AND GYNECOLOGY | Facility: HOSPITAL | Age: 44
End: 2019-01-28

## 2019-01-28 RX ORDER — LEVONORGESTREL AND ETHINYL ESTRADIOL 90; 20 UG/1; UG/1
1 TABLET ORAL DAILY
Qty: 30 TABLET | Refills: 11 | Status: SHIPPED | OUTPATIENT
Start: 2019-01-28 | End: 2019-02-27

## 2019-01-29 NOTE — TELEPHONE ENCOUNTER
----- Message from Zehra Jensen MD sent at 1/27/2019  7:21 AM CST -----  Regarding: neuro and ocp  Hi,   Great questions.  If her headaches are really only lasting 15 mins and occurring multiple times per day, they are not likely migraine.  I am questioning the history, though, as it would be unusual for indomethacin to stop working in chronic paroxysmal hemicrania.     I am fine trying combo hormonal therapy.  If it doesn't work, I will add a more traditional migraine prophylactic to the indomethacin.   Rich

## 2019-01-31 ENCOUNTER — PATIENT MESSAGE (OUTPATIENT)
Dept: NEUROLOGY | Facility: CLINIC | Age: 44
End: 2019-01-31

## 2019-01-31 RX ORDER — TRAZODONE HYDROCHLORIDE 50 MG/1
50-100 TABLET ORAL NIGHTLY
Qty: 60 TABLET | Refills: 11 | Status: SHIPPED | OUTPATIENT
Start: 2019-01-31 | End: 2019-04-10

## 2019-02-05 ENCOUNTER — OFFICE VISIT (OUTPATIENT)
Dept: PODIATRY | Facility: CLINIC | Age: 44
End: 2019-02-05
Payer: COMMERCIAL

## 2019-02-05 VITALS
WEIGHT: 163.13 LBS | DIASTOLIC BLOOD PRESSURE: 70 MMHG | HEIGHT: 68 IN | HEART RATE: 65 BPM | BODY MASS INDEX: 24.72 KG/M2 | SYSTOLIC BLOOD PRESSURE: 103 MMHG

## 2019-02-05 DIAGNOSIS — L84 CORN OR CALLUS: Primary | ICD-10-CM

## 2019-02-05 PROCEDURE — 3008F PR BODY MASS INDEX (BMI) DOCUMENTED: ICD-10-PCS | Mod: CPTII,S$GLB,, | Performed by: PODIATRIST

## 2019-02-05 PROCEDURE — 3008F BODY MASS INDEX DOCD: CPT | Mod: CPTII,S$GLB,, | Performed by: PODIATRIST

## 2019-02-05 PROCEDURE — 99999 PR PBB SHADOW E&M-EST. PATIENT-LVL III: ICD-10-PCS | Mod: PBBFAC,,, | Performed by: PODIATRIST

## 2019-02-05 PROCEDURE — 99203 OFFICE O/P NEW LOW 30 MIN: CPT | Mod: S$GLB,,, | Performed by: PODIATRIST

## 2019-02-05 PROCEDURE — 99203 PR OFFICE/OUTPT VISIT, NEW, LEVL III, 30-44 MIN: ICD-10-PCS | Mod: S$GLB,,, | Performed by: PODIATRIST

## 2019-02-05 PROCEDURE — 99999 PR PBB SHADOW E&M-EST. PATIENT-LVL III: CPT | Mod: PBBFAC,,, | Performed by: PODIATRIST

## 2019-02-06 ENCOUNTER — HOSPITAL ENCOUNTER (OUTPATIENT)
Dept: RADIOLOGY | Facility: HOSPITAL | Age: 44
Discharge: HOME | End: 2019-02-06
Attending: FAMILY MEDICINE
Payer: COMMERCIAL

## 2019-02-06 ENCOUNTER — OFFICE VISIT (OUTPATIENT)
Dept: URGENT CARE | Facility: CLINIC | Age: 44
End: 2019-02-06
Payer: COMMERCIAL

## 2019-02-06 ENCOUNTER — HOSPITAL ENCOUNTER (INPATIENT)
Facility: HOSPITAL | Age: 44
LOS: 4 days | Discharge: HOME | DRG: 329 | End: 2019-02-10
Attending: FAMILY MEDICINE | Admitting: SURGERY
Payer: COMMERCIAL

## 2019-02-06 VITALS
SYSTOLIC BLOOD PRESSURE: 125 MMHG | BODY MASS INDEX: 25.71 KG/M2 | HEART RATE: 61 BPM | OXYGEN SATURATION: 99 % | WEIGHT: 169.06 LBS | TEMPERATURE: 99 F | DIASTOLIC BLOOD PRESSURE: 82 MMHG

## 2019-02-06 DIAGNOSIS — R10.32 LEFT LOWER QUADRANT PAIN: Primary | ICD-10-CM

## 2019-02-06 DIAGNOSIS — R10.32 LEFT LOWER QUADRANT PAIN: ICD-10-CM

## 2019-02-06 DIAGNOSIS — K56.41 FECAL IMPACTION: ICD-10-CM

## 2019-02-06 DIAGNOSIS — K56.609 SMALL BOWEL OBSTRUCTION: ICD-10-CM

## 2019-02-06 DIAGNOSIS — K65.9 PERITONITIS: Primary | ICD-10-CM

## 2019-02-06 LAB
ALBUMIN SERPL BCP-MCNC: 4.6 G/DL
ALP SERPL-CCNC: 50 U/L
ALT SERPL W/O P-5'-P-CCNC: 17 U/L
AMYLASE SERPL-CCNC: 59 U/L
ANION GAP SERPL CALC-SCNC: 12 MMOL/L
AST SERPL-CCNC: 21 U/L
BACTERIA #/AREA URNS HPF: NORMAL /HPF
BASOPHILS # BLD AUTO: 0.02 K/UL
BASOPHILS NFR BLD: 0.2 %
BILIRUB SERPL-MCNC: 0.5 MG/DL
BILIRUB UR QL STRIP: NEGATIVE
BUN SERPL-MCNC: 16 MG/DL
CALCIUM SERPL-MCNC: 10.2 MG/DL
CHLORIDE SERPL-SCNC: 105 MMOL/L
CLARITY UR: CLEAR
CO2 SERPL-SCNC: 22 MMOL/L
COLOR UR: YELLOW
CREAT SERPL-MCNC: 0.8 MG/DL
DIFFERENTIAL METHOD: ABNORMAL
EOSINOPHIL # BLD AUTO: 0.1 K/UL
EOSINOPHIL NFR BLD: 0.8 %
ERYTHROCYTE [DISTWIDTH] IN BLOOD BY AUTOMATED COUNT: 14 %
EST. GFR  (AFRICAN AMERICAN): >60 ML/MIN/1.73 M^2
EST. GFR  (NON AFRICAN AMERICAN): >60 ML/MIN/1.73 M^2
GLUCOSE SERPL-MCNC: 89 MG/DL
GLUCOSE UR QL STRIP: NEGATIVE
HCT VFR BLD AUTO: 38.9 %
HGB BLD-MCNC: 12.9 G/DL
HGB UR QL STRIP: ABNORMAL
HYALINE CASTS #/AREA URNS LPF: 1 /LPF
KETONES UR QL STRIP: ABNORMAL
LEUKOCYTE ESTERASE UR QL STRIP: ABNORMAL
LIPASE SERPL-CCNC: 29 U/L
LYMPHOCYTES # BLD AUTO: 2.5 K/UL
LYMPHOCYTES NFR BLD: 27.5 %
MCH RBC QN AUTO: 26 PG
MCHC RBC AUTO-ENTMCNC: 33.2 G/DL
MCV RBC AUTO: 78 FL
MICROSCOPIC COMMENT: NORMAL
MONOCYTES # BLD AUTO: 0.6 K/UL
MONOCYTES NFR BLD: 6.6 %
NEUTROPHILS # BLD AUTO: 5.8 K/UL
NEUTROPHILS NFR BLD: 64.9 %
NITRITE UR QL STRIP: NEGATIVE
PH UR STRIP: >8 [PH] (ref 5–8)
PLATELET # BLD AUTO: 247 K/UL
PMV BLD AUTO: 9 FL
POTASSIUM SERPL-SCNC: 3.8 MMOL/L
PROT SERPL-MCNC: 7.6 G/DL
PROT UR QL STRIP: NEGATIVE
RBC # BLD AUTO: 4.96 M/UL
RBC #/AREA URNS HPF: 1 /HPF (ref 0–4)
SODIUM SERPL-SCNC: 139 MMOL/L
SP GR UR STRIP: 1.01 (ref 1–1.03)
SQUAMOUS #/AREA URNS HPF: 0 /HPF
URN SPEC COLLECT METH UR: ABNORMAL
UROBILINOGEN UR STRIP-ACNC: NEGATIVE EU/DL
WBC # BLD AUTO: 8.91 K/UL
WBC #/AREA URNS HPF: 0 /HPF (ref 0–5)
WBC CLUMPS URNS QL MICRO: NORMAL
YEAST URNS QL MICRO: NORMAL

## 2019-02-06 PROCEDURE — 96374 THER/PROPH/DIAG INJ IV PUSH: CPT

## 2019-02-06 PROCEDURE — 96361 HYDRATE IV INFUSION ADD-ON: CPT

## 2019-02-06 PROCEDURE — 3008F BODY MASS INDEX DOCD: CPT | Mod: CPTII,S$GLB,, | Performed by: FAMILY MEDICINE

## 2019-02-06 PROCEDURE — 85025 COMPLETE CBC W/AUTO DIFF WBC: CPT

## 2019-02-06 PROCEDURE — 63600175 PHARM REV CODE 636 W HCPCS: Performed by: FAMILY MEDICINE

## 2019-02-06 PROCEDURE — 99285 EMERGENCY DEPT VISIT HI MDM: CPT | Mod: 25

## 2019-02-06 PROCEDURE — 99999 PR PBB SHADOW E&M-EST. PATIENT-LVL III: CPT | Mod: PBBFAC,,, | Performed by: FAMILY MEDICINE

## 2019-02-06 PROCEDURE — 74019 XR ABDOMEN FLAT AND ERECT: ICD-10-PCS | Mod: 26,,, | Performed by: RADIOLOGY

## 2019-02-06 PROCEDURE — 11000001 HC ACUTE MED/SURG PRIVATE ROOM

## 2019-02-06 PROCEDURE — 25000003 PHARM REV CODE 250: Performed by: FAMILY MEDICINE

## 2019-02-06 PROCEDURE — 99214 OFFICE O/P EST MOD 30 MIN: CPT | Mod: S$GLB,,, | Performed by: FAMILY MEDICINE

## 2019-02-06 PROCEDURE — 3008F PR BODY MASS INDEX (BMI) DOCUMENTED: ICD-10-PCS | Mod: CPTII,S$GLB,, | Performed by: FAMILY MEDICINE

## 2019-02-06 PROCEDURE — 74019 RADEX ABDOMEN 2 VIEWS: CPT | Mod: 26,,, | Performed by: RADIOLOGY

## 2019-02-06 PROCEDURE — 99999 PR PBB SHADOW E&M-EST. PATIENT-LVL III: ICD-10-PCS | Mod: PBBFAC,,, | Performed by: FAMILY MEDICINE

## 2019-02-06 PROCEDURE — 96375 TX/PRO/DX INJ NEW DRUG ADDON: CPT

## 2019-02-06 PROCEDURE — 82150 ASSAY OF AMYLASE: CPT

## 2019-02-06 PROCEDURE — 25500020 PHARM REV CODE 255: Performed by: FAMILY MEDICINE

## 2019-02-06 PROCEDURE — 80053 COMPREHEN METABOLIC PANEL: CPT

## 2019-02-06 PROCEDURE — 81000 URINALYSIS NONAUTO W/SCOPE: CPT

## 2019-02-06 PROCEDURE — 36415 COLL VENOUS BLD VENIPUNCTURE: CPT

## 2019-02-06 PROCEDURE — 99214 PR OFFICE/OUTPT VISIT, EST, LEVL IV, 30-39 MIN: ICD-10-PCS | Mod: S$GLB,,, | Performed by: FAMILY MEDICINE

## 2019-02-06 PROCEDURE — 74019 RADEX ABDOMEN 2 VIEWS: CPT | Mod: TC

## 2019-02-06 PROCEDURE — 83690 ASSAY OF LIPASE: CPT

## 2019-02-06 RX ORDER — KETOROLAC TROMETHAMINE 30 MG/ML
30 INJECTION, SOLUTION INTRAMUSCULAR; INTRAVENOUS
Status: COMPLETED | OUTPATIENT
Start: 2019-02-06 | End: 2019-02-06

## 2019-02-06 RX ORDER — SIMETHICONE 80 MG
1 TABLET,CHEWABLE ORAL 3 TIMES DAILY PRN
Status: CANCELLED | OUTPATIENT
Start: 2019-02-06

## 2019-02-06 RX ORDER — HYDROMORPHONE HYDROCHLORIDE 2 MG/ML
1 INJECTION, SOLUTION INTRAMUSCULAR; INTRAVENOUS; SUBCUTANEOUS
Status: COMPLETED | OUTPATIENT
Start: 2019-02-06 | End: 2019-02-06

## 2019-02-06 RX ORDER — ONDANSETRON 2 MG/ML
4 INJECTION INTRAMUSCULAR; INTRAVENOUS ONCE
Status: COMPLETED | OUTPATIENT
Start: 2019-02-06 | End: 2019-02-06

## 2019-02-06 RX ADMIN — KETOROLAC TROMETHAMINE 30 MG: 30 INJECTION, SOLUTION INTRAMUSCULAR; INTRAVENOUS at 09:02

## 2019-02-06 RX ADMIN — ONDANSETRON 4 MG: 2 INJECTION INTRAMUSCULAR; INTRAVENOUS at 09:02

## 2019-02-06 RX ADMIN — SODIUM CHLORIDE 1000 ML: 0.9 INJECTION, SOLUTION INTRAVENOUS at 08:02

## 2019-02-06 RX ADMIN — PIPERACILLIN AND TAZOBACTAM 4.5 G: 4; .5 INJECTION, POWDER, LYOPHILIZED, FOR SOLUTION INTRAVENOUS; PARENTERAL at 11:02

## 2019-02-06 RX ADMIN — HYDROMORPHONE HYDROCHLORIDE 1 MG: 2 INJECTION, SOLUTION INTRAMUSCULAR; INTRAVENOUS; SUBCUTANEOUS at 09:02

## 2019-02-06 RX ADMIN — IOHEXOL 75 ML: 350 INJECTION, SOLUTION INTRAVENOUS at 10:02

## 2019-02-07 ENCOUNTER — ANESTHESIA EVENT (OUTPATIENT)
Dept: SURGERY | Facility: HOSPITAL | Age: 44
DRG: 329 | End: 2019-02-07
Payer: COMMERCIAL

## 2019-02-07 ENCOUNTER — ANESTHESIA (OUTPATIENT)
Dept: SURGERY | Facility: HOSPITAL | Age: 44
DRG: 329 | End: 2019-02-07
Payer: COMMERCIAL

## 2019-02-07 PROBLEM — N93.9 ABNORMAL VAGINAL BLEEDING: Chronic | Status: ACTIVE | Noted: 2018-12-26

## 2019-02-07 LAB
ANION GAP SERPL CALC-SCNC: 7 MMOL/L
BASOPHILS # BLD AUTO: 0.02 K/UL
BASOPHILS NFR BLD: 0.4 %
BUN SERPL-MCNC: 15 MG/DL
CALCIUM SERPL-MCNC: 8.3 MG/DL
CHLORIDE SERPL-SCNC: 111 MMOL/L
CO2 SERPL-SCNC: 22 MMOL/L
CREAT SERPL-MCNC: 0.7 MG/DL
DIFFERENTIAL METHOD: ABNORMAL
EOSINOPHIL # BLD AUTO: 0.1 K/UL
EOSINOPHIL NFR BLD: 1.4 %
ERYTHROCYTE [DISTWIDTH] IN BLOOD BY AUTOMATED COUNT: 14.1 %
EST. GFR  (AFRICAN AMERICAN): >60 ML/MIN/1.73 M^2
EST. GFR  (NON AFRICAN AMERICAN): >60 ML/MIN/1.73 M^2
GLUCOSE SERPL-MCNC: 89 MG/DL
HCT VFR BLD AUTO: 34.6 %
HGB BLD-MCNC: 10.9 G/DL
LYMPHOCYTES # BLD AUTO: 2 K/UL
LYMPHOCYTES NFR BLD: 34.7 %
MAGNESIUM SERPL-MCNC: 2.2 MG/DL
MCH RBC QN AUTO: 25.2 PG
MCHC RBC AUTO-ENTMCNC: 31.5 G/DL
MCV RBC AUTO: 80 FL
MONOCYTES # BLD AUTO: 0.5 K/UL
MONOCYTES NFR BLD: 9.3 %
NEUTROPHILS # BLD AUTO: 3.1 K/UL
NEUTROPHILS NFR BLD: 54.2 %
PLATELET # BLD AUTO: 195 K/UL
PMV BLD AUTO: 9.1 FL
POTASSIUM SERPL-SCNC: 3.7 MMOL/L
RBC # BLD AUTO: 4.33 M/UL
SODIUM SERPL-SCNC: 140 MMOL/L
WBC # BLD AUTO: 5.67 K/UL

## 2019-02-07 PROCEDURE — 96372 THER/PROPH/DIAG INJ SC/IM: CPT

## 2019-02-07 PROCEDURE — 25000003 PHARM REV CODE 250: Performed by: SURGERY

## 2019-02-07 PROCEDURE — C1765 ADHESION BARRIER: HCPCS | Performed by: SURGERY

## 2019-02-07 PROCEDURE — 44120 PR RESECT SMALL INTEST,SINGL RESEC/ANAS: ICD-10-PCS | Mod: 80,,, | Performed by: SURGERY

## 2019-02-07 PROCEDURE — 80048 BASIC METABOLIC PNL TOTAL CA: CPT

## 2019-02-07 PROCEDURE — 94799 UNLISTED PULMONARY SVC/PX: CPT

## 2019-02-07 PROCEDURE — 63600175 PHARM REV CODE 636 W HCPCS: Performed by: NURSE ANESTHETIST, CERTIFIED REGISTERED

## 2019-02-07 PROCEDURE — 44120 REMOVAL OF SMALL INTESTINE: CPT | Mod: 80,,, | Performed by: SURGERY

## 2019-02-07 PROCEDURE — 63600175 PHARM REV CODE 636 W HCPCS: Performed by: ANESTHESIOLOGY

## 2019-02-07 PROCEDURE — 88307 TISSUE SPECIMEN TO PATHOLOGY - SURGERY: ICD-10-PCS | Mod: 26,,, | Performed by: PATHOLOGY

## 2019-02-07 PROCEDURE — 85025 COMPLETE CBC W/AUTO DIFF WBC: CPT

## 2019-02-07 PROCEDURE — 63600175 PHARM REV CODE 636 W HCPCS: Performed by: FAMILY MEDICINE

## 2019-02-07 PROCEDURE — 99222 PR INITIAL HOSPITAL CARE,LEVL II: ICD-10-PCS | Mod: ,,, | Performed by: SURGERY

## 2019-02-07 PROCEDURE — 44120 REMOVAL OF SMALL INTESTINE: CPT | Mod: ,,, | Performed by: SURGERY

## 2019-02-07 PROCEDURE — 44120 PR RESECT SMALL INTEST,SINGL RESEC/ANAS: ICD-10-PCS | Mod: ,,, | Performed by: SURGERY

## 2019-02-07 PROCEDURE — 11000001 HC ACUTE MED/SURG PRIVATE ROOM

## 2019-02-07 PROCEDURE — 37000009 HC ANESTHESIA EA ADD 15 MINS: Performed by: SURGERY

## 2019-02-07 PROCEDURE — 36415 COLL VENOUS BLD VENIPUNCTURE: CPT

## 2019-02-07 PROCEDURE — 27000221 HC OXYGEN, UP TO 24 HOURS

## 2019-02-07 PROCEDURE — 63600175 PHARM REV CODE 636 W HCPCS: Performed by: SURGERY

## 2019-02-07 PROCEDURE — 25000003 PHARM REV CODE 250: Performed by: NURSE ANESTHETIST, CERTIFIED REGISTERED

## 2019-02-07 PROCEDURE — 83735 ASSAY OF MAGNESIUM: CPT

## 2019-02-07 PROCEDURE — 96376 TX/PRO/DX INJ SAME DRUG ADON: CPT

## 2019-02-07 PROCEDURE — 71000033 HC RECOVERY, INTIAL HOUR: Performed by: SURGERY

## 2019-02-07 PROCEDURE — C9113 INJ PANTOPRAZOLE SODIUM, VIA: HCPCS | Performed by: SURGERY

## 2019-02-07 PROCEDURE — 27201423 OPTIME MED/SURG SUP & DEVICES STERILE SUPPLY: Performed by: SURGERY

## 2019-02-07 PROCEDURE — 36000708 HC OR TIME LEV III 1ST 15 MIN: Performed by: SURGERY

## 2019-02-07 PROCEDURE — 36000709 HC OR TIME LEV III EA ADD 15 MIN: Performed by: SURGERY

## 2019-02-07 PROCEDURE — C9290 INJ, BUPIVACAINE LIPOSOME: HCPCS | Performed by: SURGERY

## 2019-02-07 PROCEDURE — 88307 TISSUE EXAM BY PATHOLOGIST: CPT | Performed by: PATHOLOGY

## 2019-02-07 PROCEDURE — 25000003 PHARM REV CODE 250: Performed by: FAMILY MEDICINE

## 2019-02-07 PROCEDURE — 37000008 HC ANESTHESIA 1ST 15 MINUTES: Performed by: SURGERY

## 2019-02-07 PROCEDURE — S0020 INJECTION, BUPIVICAINE HYDRO: HCPCS | Performed by: SURGERY

## 2019-02-07 PROCEDURE — S0077 INJECTION, CLINDAMYCIN PHOSP: HCPCS | Performed by: SURGERY

## 2019-02-07 PROCEDURE — 88307 TISSUE EXAM BY PATHOLOGIST: CPT | Mod: 26,,, | Performed by: PATHOLOGY

## 2019-02-07 PROCEDURE — S0030 INJECTION, METRONIDAZOLE: HCPCS | Performed by: SURGERY

## 2019-02-07 PROCEDURE — 94760 N-INVAS EAR/PLS OXIMETRY 1: CPT

## 2019-02-07 PROCEDURE — 96375 TX/PRO/DX INJ NEW DRUG ADDON: CPT

## 2019-02-07 PROCEDURE — 99222 1ST HOSP IP/OBS MODERATE 55: CPT | Mod: ,,, | Performed by: SURGERY

## 2019-02-07 DEVICE — MEMBRANE SEPRAFILM 5 X 6: Type: IMPLANTABLE DEVICE | Site: ABDOMEN | Status: FUNCTIONAL

## 2019-02-07 RX ORDER — BUPIVACAINE HYDROCHLORIDE 5 MG/ML
INJECTION, SOLUTION EPIDURAL; INTRACAUDAL
Status: DISCONTINUED | OUTPATIENT
Start: 2019-02-07 | End: 2019-02-07 | Stop reason: HOSPADM

## 2019-02-07 RX ORDER — CIPROFLOXACIN 2 MG/ML
400 INJECTION, SOLUTION INTRAVENOUS
Status: COMPLETED | OUTPATIENT
Start: 2019-02-07 | End: 2019-02-10

## 2019-02-07 RX ORDER — LIDOCAINE HYDROCHLORIDE 10 MG/ML
1 INJECTION, SOLUTION EPIDURAL; INFILTRATION; INTRACAUDAL; PERINEURAL ONCE
Status: DISCONTINUED | OUTPATIENT
Start: 2019-02-07 | End: 2019-02-07

## 2019-02-07 RX ORDER — ONDANSETRON 2 MG/ML
4 INJECTION INTRAMUSCULAR; INTRAVENOUS DAILY PRN
Status: DISCONTINUED | OUTPATIENT
Start: 2019-02-07 | End: 2019-02-10 | Stop reason: HOSPADM

## 2019-02-07 RX ORDER — PANTOPRAZOLE SODIUM 40 MG/10ML
40 INJECTION, POWDER, LYOPHILIZED, FOR SOLUTION INTRAVENOUS
Status: DISCONTINUED | OUTPATIENT
Start: 2019-02-08 | End: 2019-02-10 | Stop reason: HOSPADM

## 2019-02-07 RX ORDER — RAMELTEON 8 MG/1
8 TABLET ORAL NIGHTLY PRN
Status: DISCONTINUED | OUTPATIENT
Start: 2019-02-07 | End: 2019-02-10 | Stop reason: HOSPADM

## 2019-02-07 RX ORDER — ONDANSETRON 2 MG/ML
INJECTION INTRAMUSCULAR; INTRAVENOUS
Status: DISCONTINUED | OUTPATIENT
Start: 2019-02-07 | End: 2019-02-07

## 2019-02-07 RX ORDER — KETOROLAC TROMETHAMINE 30 MG/ML
INJECTION, SOLUTION INTRAMUSCULAR; INTRAVENOUS
Status: DISCONTINUED | OUTPATIENT
Start: 2019-02-07 | End: 2019-02-07

## 2019-02-07 RX ORDER — KETOROLAC TROMETHAMINE 30 MG/ML
15 INJECTION, SOLUTION INTRAMUSCULAR; INTRAVENOUS EVERY 6 HOURS PRN
Status: DISCONTINUED | OUTPATIENT
Start: 2019-02-07 | End: 2019-02-07

## 2019-02-07 RX ORDER — PANTOPRAZOLE SODIUM 40 MG/10ML
40 INJECTION, POWDER, LYOPHILIZED, FOR SOLUTION INTRAVENOUS DAILY
Status: DISCONTINUED | OUTPATIENT
Start: 2019-02-07 | End: 2019-02-07

## 2019-02-07 RX ORDER — DIPHENHYDRAMINE HYDROCHLORIDE 50 MG/ML
25 INJECTION INTRAMUSCULAR; INTRAVENOUS EVERY 4 HOURS PRN
Status: DISCONTINUED | OUTPATIENT
Start: 2019-02-07 | End: 2019-02-07

## 2019-02-07 RX ORDER — LIDOCAINE HYDROCHLORIDE 10 MG/ML
INJECTION INFILTRATION; PERINEURAL
Status: DISCONTINUED | OUTPATIENT
Start: 2019-02-07 | End: 2019-02-07

## 2019-02-07 RX ORDER — SODIUM CHLORIDE 9 MG/ML
INJECTION, SOLUTION INTRAVENOUS CONTINUOUS
Status: DISCONTINUED | OUTPATIENT
Start: 2019-02-07 | End: 2019-02-07

## 2019-02-07 RX ORDER — ACETAMINOPHEN 10 MG/ML
INJECTION, SOLUTION INTRAVENOUS
Status: DISCONTINUED | OUTPATIENT
Start: 2019-02-07 | End: 2019-02-07

## 2019-02-07 RX ORDER — HYDROMORPHONE HYDROCHLORIDE 1 MG/ML
1 INJECTION, SOLUTION INTRAMUSCULAR; INTRAVENOUS; SUBCUTANEOUS
Status: DISCONTINUED | OUTPATIENT
Start: 2019-02-07 | End: 2019-02-08

## 2019-02-07 RX ORDER — CLONIDINE HYDROCHLORIDE 0.1 MG/1
0.1 TABLET ORAL EVERY 6 HOURS PRN
Status: DISCONTINUED | OUTPATIENT
Start: 2019-02-07 | End: 2019-02-10 | Stop reason: HOSPADM

## 2019-02-07 RX ORDER — FENTANYL CITRATE 50 UG/ML
INJECTION, SOLUTION INTRAMUSCULAR; INTRAVENOUS
Status: DISCONTINUED | OUTPATIENT
Start: 2019-02-07 | End: 2019-02-07

## 2019-02-07 RX ORDER — DIPHENHYDRAMINE HYDROCHLORIDE 50 MG/ML
25 INJECTION INTRAMUSCULAR; INTRAVENOUS EVERY 4 HOURS PRN
Status: DISCONTINUED | OUTPATIENT
Start: 2019-02-07 | End: 2019-02-10 | Stop reason: HOSPADM

## 2019-02-07 RX ORDER — TRAZODONE HYDROCHLORIDE 50 MG/1
50 TABLET ORAL NIGHTLY
Status: DISCONTINUED | OUTPATIENT
Start: 2019-02-07 | End: 2019-02-07

## 2019-02-07 RX ORDER — FLUOXETINE HYDROCHLORIDE 20 MG/1
20 CAPSULE ORAL DAILY
Status: DISCONTINUED | OUTPATIENT
Start: 2019-02-07 | End: 2019-02-07

## 2019-02-07 RX ORDER — MEPERIDINE HYDROCHLORIDE 50 MG/ML
12.5 INJECTION INTRAMUSCULAR; INTRAVENOUS; SUBCUTANEOUS ONCE AS NEEDED
Status: COMPLETED | OUTPATIENT
Start: 2019-02-07 | End: 2019-02-07

## 2019-02-07 RX ORDER — ATENOLOL 25 MG/1
25 TABLET ORAL DAILY
Status: DISCONTINUED | OUTPATIENT
Start: 2019-02-07 | End: 2019-02-07

## 2019-02-07 RX ORDER — AMOXICILLIN 250 MG
1 CAPSULE ORAL 2 TIMES DAILY
Status: DISCONTINUED | OUTPATIENT
Start: 2019-02-07 | End: 2019-02-10 | Stop reason: HOSPADM

## 2019-02-07 RX ORDER — ACETAMINOPHEN 10 MG/ML
1000 INJECTION, SOLUTION INTRAVENOUS EVERY 8 HOURS
Status: DISCONTINUED | OUTPATIENT
Start: 2019-02-08 | End: 2019-02-08

## 2019-02-07 RX ORDER — METRONIDAZOLE 500 MG/100ML
500 INJECTION, SOLUTION INTRAVENOUS
Status: DISCONTINUED | OUTPATIENT
Start: 2019-02-07 | End: 2019-02-10 | Stop reason: HOSPADM

## 2019-02-07 RX ORDER — DIPHENHYDRAMINE HYDROCHLORIDE 50 MG/ML
25 INJECTION INTRAMUSCULAR; INTRAVENOUS EVERY 6 HOURS PRN
Status: DISCONTINUED | OUTPATIENT
Start: 2019-02-07 | End: 2019-02-07

## 2019-02-07 RX ORDER — ACETAMINOPHEN 325 MG/1
650 TABLET ORAL EVERY 6 HOURS PRN
Status: DISCONTINUED | OUTPATIENT
Start: 2019-02-07 | End: 2019-02-08

## 2019-02-07 RX ORDER — SODIUM CHLORIDE, SODIUM LACTATE, POTASSIUM CHLORIDE, CALCIUM CHLORIDE 600; 310; 30; 20 MG/100ML; MG/100ML; MG/100ML; MG/100ML
INJECTION, SOLUTION INTRAVENOUS CONTINUOUS
Status: DISCONTINUED | OUTPATIENT
Start: 2019-02-07 | End: 2019-02-10 | Stop reason: HOSPADM

## 2019-02-07 RX ORDER — ACETAMINOPHEN 325 MG/1
650 TABLET ORAL EVERY 4 HOURS PRN
Status: DISCONTINUED | OUTPATIENT
Start: 2019-02-07 | End: 2019-02-07

## 2019-02-07 RX ORDER — MORPHINE SULFATE 4 MG/ML
2 INJECTION, SOLUTION INTRAMUSCULAR; INTRAVENOUS EVERY 5 MIN PRN
Status: DISCONTINUED | OUTPATIENT
Start: 2019-02-07 | End: 2019-02-07

## 2019-02-07 RX ORDER — CHOLECALCIFEROL (VITAMIN D3) 25 MCG
1000 TABLET ORAL DAILY
Status: DISCONTINUED | OUTPATIENT
Start: 2019-02-07 | End: 2019-02-07

## 2019-02-07 RX ORDER — LACTULOSE 10 G/15ML
20 SOLUTION ORAL EVERY 6 HOURS PRN
Status: DISCONTINUED | OUTPATIENT
Start: 2019-02-07 | End: 2019-02-10 | Stop reason: HOSPADM

## 2019-02-07 RX ORDER — ROCURONIUM BROMIDE 10 MG/ML
INJECTION, SOLUTION INTRAVENOUS
Status: DISCONTINUED | OUTPATIENT
Start: 2019-02-07 | End: 2019-02-07

## 2019-02-07 RX ORDER — NEOSTIGMINE METHYLSULFATE 1 MG/ML
INJECTION, SOLUTION INTRAVENOUS
Status: DISCONTINUED | OUTPATIENT
Start: 2019-02-07 | End: 2019-02-07

## 2019-02-07 RX ORDER — GLYCOPYRROLATE 0.2 MG/ML
INJECTION INTRAMUSCULAR; INTRAVENOUS
Status: DISCONTINUED | OUTPATIENT
Start: 2019-02-07 | End: 2019-02-07

## 2019-02-07 RX ORDER — ENOXAPARIN SODIUM 100 MG/ML
40 INJECTION SUBCUTANEOUS EVERY 24 HOURS
Status: DISCONTINUED | OUTPATIENT
Start: 2019-02-07 | End: 2019-02-07

## 2019-02-07 RX ORDER — SODIUM CHLORIDE 0.9 % (FLUSH) 0.9 %
3 SYRINGE (ML) INJECTION
Status: DISCONTINUED | OUTPATIENT
Start: 2019-02-07 | End: 2019-02-10 | Stop reason: HOSPADM

## 2019-02-07 RX ORDER — DIAZEPAM 5 MG/1
5 TABLET ORAL EVERY 12 HOURS PRN
Status: DISCONTINUED | OUTPATIENT
Start: 2019-02-07 | End: 2019-02-07

## 2019-02-07 RX ORDER — KETOROLAC TROMETHAMINE 30 MG/ML
15 INJECTION, SOLUTION INTRAMUSCULAR; INTRAVENOUS EVERY 6 HOURS PRN
Status: DISCONTINUED | OUTPATIENT
Start: 2019-02-07 | End: 2019-02-08

## 2019-02-07 RX ORDER — DEXAMETHASONE SODIUM PHOSPHATE 4 MG/ML
INJECTION, SOLUTION INTRA-ARTICULAR; INTRALESIONAL; INTRAMUSCULAR; INTRAVENOUS; SOFT TISSUE
Status: DISCONTINUED | OUTPATIENT
Start: 2019-02-07 | End: 2019-02-07

## 2019-02-07 RX ORDER — ONDANSETRON 8 MG/1
8 TABLET, ORALLY DISINTEGRATING ORAL EVERY 8 HOURS PRN
Status: DISCONTINUED | OUTPATIENT
Start: 2019-02-07 | End: 2019-02-07

## 2019-02-07 RX ORDER — RAMELTEON 8 MG/1
8 TABLET ORAL NIGHTLY PRN
Status: DISCONTINUED | OUTPATIENT
Start: 2019-02-07 | End: 2019-02-07

## 2019-02-07 RX ORDER — PROPOFOL 10 MG/ML
VIAL (ML) INTRAVENOUS
Status: DISCONTINUED | OUTPATIENT
Start: 2019-02-07 | End: 2019-02-07

## 2019-02-07 RX ORDER — BISACODYL 10 MG
10 SUPPOSITORY, RECTAL RECTAL DAILY PRN
Status: DISCONTINUED | OUTPATIENT
Start: 2019-02-07 | End: 2019-02-10 | Stop reason: HOSPADM

## 2019-02-07 RX ORDER — SODIUM CHLORIDE, SODIUM LACTATE, POTASSIUM CHLORIDE, CALCIUM CHLORIDE 600; 310; 30; 20 MG/100ML; MG/100ML; MG/100ML; MG/100ML
INJECTION, SOLUTION INTRAVENOUS CONTINUOUS PRN
Status: DISCONTINUED | OUTPATIENT
Start: 2019-02-07 | End: 2019-02-07

## 2019-02-07 RX ORDER — MIDAZOLAM HYDROCHLORIDE 1 MG/ML
INJECTION, SOLUTION INTRAMUSCULAR; INTRAVENOUS
Status: DISCONTINUED | OUTPATIENT
Start: 2019-02-07 | End: 2019-02-07

## 2019-02-07 RX ORDER — SODIUM CHLORIDE, SODIUM LACTATE, POTASSIUM CHLORIDE, CALCIUM CHLORIDE 600; 310; 30; 20 MG/100ML; MG/100ML; MG/100ML; MG/100ML
INJECTION, SOLUTION INTRAVENOUS CONTINUOUS
Status: DISCONTINUED | OUTPATIENT
Start: 2019-02-07 | End: 2019-02-07

## 2019-02-07 RX ORDER — HYDROMORPHONE HYDROCHLORIDE 1 MG/ML
1 INJECTION, SOLUTION INTRAMUSCULAR; INTRAVENOUS; SUBCUTANEOUS EVERY 4 HOURS PRN
Status: DISCONTINUED | OUTPATIENT
Start: 2019-02-07 | End: 2019-02-07

## 2019-02-07 RX ORDER — CLINDAMYCIN PHOSPHATE 900 MG/50ML
900 INJECTION, SOLUTION INTRAVENOUS
Status: COMPLETED | OUTPATIENT
Start: 2019-02-07 | End: 2019-02-07

## 2019-02-07 RX ADMIN — NEOSTIGMINE METHYLSULFATE 5 MG: 1 INJECTION INTRAVENOUS at 06:02

## 2019-02-07 RX ADMIN — LORAZEPAM 0.25 MG: 2 INJECTION INTRAMUSCULAR; INTRAVENOUS at 07:02

## 2019-02-07 RX ADMIN — SODIUM CHLORIDE 1000 ML: 0.9 INJECTION, SOLUTION INTRAVENOUS at 12:02

## 2019-02-07 RX ADMIN — KETOROLAC TROMETHAMINE 30 MG: 30 INJECTION, SOLUTION INTRAMUSCULAR; INTRAVENOUS at 06:02

## 2019-02-07 RX ADMIN — DEXAMETHASONE SODIUM PHOSPHATE 8 MG: 4 INJECTION, SOLUTION INTRA-ARTICULAR; INTRALESIONAL; INTRAMUSCULAR; INTRAVENOUS; SOFT TISSUE at 05:02

## 2019-02-07 RX ADMIN — ONDANSETRON 4 MG: 2 INJECTION, SOLUTION INTRAMUSCULAR; INTRAVENOUS at 05:02

## 2019-02-07 RX ADMIN — LORAZEPAM 2 MG: 2 INJECTION INTRAMUSCULAR; INTRAVENOUS at 12:02

## 2019-02-07 RX ADMIN — MIDAZOLAM 2 MG: 1 INJECTION INTRAMUSCULAR; INTRAVENOUS at 05:02

## 2019-02-07 RX ADMIN — LIDOCAINE HYDROCHLORIDE 80 MG: 10 INJECTION, SOLUTION INFILTRATION; PERINEURAL at 05:02

## 2019-02-07 RX ADMIN — ONDANSETRON 4 MG: 2 INJECTION, SOLUTION INTRAMUSCULAR; INTRAVENOUS at 06:02

## 2019-02-07 RX ADMIN — METRONIDAZOLE 500 MG: 500 INJECTION, SOLUTION INTRAVENOUS at 09:02

## 2019-02-07 RX ADMIN — ROBINUL 1 MG: 0.2 INJECTION INTRAMUSCULAR; INTRAVENOUS at 06:02

## 2019-02-07 RX ADMIN — SODIUM CHLORIDE, SODIUM LACTATE, POTASSIUM CHLORIDE, AND CALCIUM CHLORIDE: .6; .31; .03; .02 INJECTION, SOLUTION INTRAVENOUS at 08:02

## 2019-02-07 RX ADMIN — FENTANYL CITRATE 150 MCG: 50 INJECTION, SOLUTION INTRAMUSCULAR; INTRAVENOUS at 05:02

## 2019-02-07 RX ADMIN — KETOROLAC TROMETHAMINE 15 MG: 30 INJECTION INTRAMUSCULAR; INTRAVENOUS at 08:02

## 2019-02-07 RX ADMIN — ATENOLOL 25 MG: 25 TABLET ORAL at 11:02

## 2019-02-07 RX ADMIN — ROCURONIUM BROMIDE 40 MG: 10 INJECTION, SOLUTION INTRAVENOUS at 05:02

## 2019-02-07 RX ADMIN — HYDROMORPHONE HYDROCHLORIDE 1 MG: 1 INJECTION, SOLUTION INTRAMUSCULAR; INTRAVENOUS; SUBCUTANEOUS at 04:02

## 2019-02-07 RX ADMIN — SODIUM CHLORIDE, SODIUM LACTATE, POTASSIUM CHLORIDE, AND CALCIUM CHLORIDE: .6; .31; .03; .02 INJECTION, SOLUTION INTRAVENOUS at 01:02

## 2019-02-07 RX ADMIN — PANTOPRAZOLE SODIUM 40 MG: 40 INJECTION, POWDER, LYOPHILIZED, FOR SOLUTION INTRAVENOUS at 08:02

## 2019-02-07 RX ADMIN — PROPOFOL 150 MG: 10 INJECTION, EMULSION INTRAVENOUS at 05:02

## 2019-02-07 RX ADMIN — STANDARDIZED SENNA CONCENTRATE AND DOCUSATE SODIUM 1 TABLET: 8.6; 5 TABLET ORAL at 08:02

## 2019-02-07 RX ADMIN — MEPERIDINE HYDROCHLORIDE 12.5 MG: 50 INJECTION, SOLUTION INTRAMUSCULAR; INTRAVENOUS; SUBCUTANEOUS at 06:02

## 2019-02-07 RX ADMIN — MORPHINE SULFATE 2 MG: 4 INJECTION INTRAVENOUS at 07:02

## 2019-02-07 RX ADMIN — SODIUM CHLORIDE, SODIUM LACTATE, POTASSIUM CHLORIDE, AND CALCIUM CHLORIDE: 600; 310; 30; 20 INJECTION, SOLUTION INTRAVENOUS at 05:02

## 2019-02-07 RX ADMIN — SODIUM CHLORIDE, SODIUM LACTATE, POTASSIUM CHLORIDE, AND CALCIUM CHLORIDE: .6; .31; .03; .02 INJECTION, SOLUTION INTRAVENOUS at 12:02

## 2019-02-07 RX ADMIN — CLINDAMYCIN IN 5 PERCENT DEXTROSE 900 MG: 18 INJECTION, SOLUTION INTRAVENOUS at 05:02

## 2019-02-07 RX ADMIN — BUPIVACAINE 266 MG: 13.3 INJECTION, SUSPENSION, LIPOSOMAL INFILTRATION at 05:02

## 2019-02-07 RX ADMIN — ACETAMINOPHEN 1000 MG: 10 INJECTION, SOLUTION INTRAVENOUS at 06:02

## 2019-02-07 RX ADMIN — FLUOXETINE 20 MG: 20 CAPSULE ORAL at 11:02

## 2019-02-07 RX ADMIN — HYDROMORPHONE HYDROCHLORIDE 1 MG: 1 INJECTION, SOLUTION INTRAMUSCULAR; INTRAVENOUS; SUBCUTANEOUS at 12:02

## 2019-02-07 RX ADMIN — HYDROMORPHONE HYDROCHLORIDE 1 MG: 1 INJECTION, SOLUTION INTRAMUSCULAR; INTRAVENOUS; SUBCUTANEOUS at 08:02

## 2019-02-07 RX ADMIN — ENOXAPARIN SODIUM 40 MG: 100 INJECTION SUBCUTANEOUS at 04:02

## 2019-02-07 RX ADMIN — CIPROFLOXACIN 400 MG: 2 INJECTION, SOLUTION INTRAVENOUS at 10:02

## 2019-02-07 NOTE — HOSPITAL COURSE
02/07/2019 - Mild improvement in lower abdominal pain. No flatus or bowel movement.  Requiring IV Dilaudid for pain control.   02/08/2019 s/p exploratory laparotomy with small bowel resection pod1 dc christy, ice chips ok, pain controlled with dilaudid. Indomethacin prn for severe headaches.     2/9/2019:  The patient is up and ambulating.  Continues to complain lower incisional pain. She is tolerating clear liquid.  Will continue with current management.  Patient was encouraged to ambulate more.    2/10/2019: POD 3. Doing well and ambulating. Moderate incisional pain but tolerable. Wishes to go home. Continue current management. Discharge today.

## 2019-02-07 NOTE — PROGRESS NOTES
"Subjective:       Patient ID: Sandra Dimas is a 43 y.o. female.    Chief Complaint: Abdominal Cramping    /82 (BP Location: Left arm, Patient Position: Sitting, BP Method: Large (Manual))   Pulse 61   Temp 99 °F (37.2 °C) (Oral)   Wt 76.7 kg (169 lb 1.5 oz)   SpO2 99%   BMI 25.71 kg/m²     HPI  Lower abdominal cramp for 2-3 hours. Pain 10/10 "like having a baby". Newly on keto diet. Had a normal meal at 2 pm today. She was given Gas x and valium at home for pain  S/p appey  Not pregnant    Review of Systems   Constitutional: Negative for chills and fever.   Gastrointestinal: Negative for constipation, diarrhea, nausea and vomiting.       Objective:      Physical Exam   Constitutional: She is oriented to person, place, and time. She appears well-developed and well-nourished. No distress.   HENT:   Head: Normocephalic and atraumatic.   Eyes: EOM are normal. Pupils are equal, round, and reactive to light.   Abdominal: Soft. She exhibits no distension. There is tenderness (TTP LLQ>RLQ, below umbilicus).   BS normal lower quarters, slightly decreased upper quarters   Neurological: She is alert and oriented to person, place, and time. No cranial nerve deficit.   Skin: Skin is warm and dry. She is not diaphoretic.   Nursing note and vitals reviewed.      Assessment:       1. Left lower quadrant pain        Plan:     Sandra was seen today for abdominal cramping.    Diagnoses and all orders for this visit:    Left lower quadrant pain  -     X-Ray Abdomen Flat And Erect; Future    Other orders  -     Cancel: simethicone chewable tablet 80 mg      OTC Gas X   OTC mag citrate   Go to ER if symptom persists    "

## 2019-02-07 NOTE — ANESTHESIA PREPROCEDURE EVALUATION
02/07/2019  Sandra Dimas is a 43 y.o., female.    Anesthesia Evaluation    I have reviewed the Patient Summary Reports.    I have reviewed the Nursing Notes.   I have reviewed the Medications.     Review of Systems  Anesthesia Hx:  No problems with previous Anesthesia  Denies Family Hx of Anesthesia complications.   Denies Personal Hx of Anesthesia complications.   Social:  Non-Smoker, Social Alcohol Use    Cardiovascular:  Cardiovascular Normal     Renal/:  Renal/ Normal         Physical Exam  General:  Well nourished    Airway/Jaw/Neck:  Airway Findings: Mouth Opening: Normal Tongue: Normal  General Airway Assessment: Adult  Mallampati: II  Improves to II with phonation.  TM Distance: 4 - 6 cm      Dental:  DENTAL FINDINGS: Normal   Chest/Lungs:  Chest/Lungs Findings: Clear to auscultation     Heart/Vascular:  Heart Findings: Rate: Normal  Rhythm: Regular Rhythm  Sounds: Normal             Anesthesia Plan  Type of Anesthesia, risks & benefits discussed:  Anesthesia Type:  general  Patient's Preference:   Intra-op Monitoring Plan:   Intra-op Monitoring Plan Comments:   Post Op Pain Control Plan: IV/PO Opioids PRN  Post Op Pain Control Plan Comments:   Induction:   IV  Beta Blocker:  Patient is on a Beta-Blocker and has received one dose within the past 24 hours (No further documentation required).       Informed Consent: Patient understands risks and agrees with Anesthesia plan.  Questions answered.   ASA Score: 1     Day of Surgery Review of History & Physical:            Ready For Surgery From Anesthesia Perspective.

## 2019-02-07 NOTE — PLAN OF CARE
CM met with patient at the bedside. CM explained role/purpose of visit. Pt reports she lives with her spouse and is independent with ADL's. Pt denies having HH/DME at this time. Patient denies any post hospital needs or services at this time. Transitional Care Folder, Discharge Planning Begins on Admission pamphlet, [facility], Advance Directive information given to patient along with the contact information given.Instructed patient or family to call with any questions or concerns. CM updated patient white board with DONNA Seth contact information. CM encouraged patient to contact cm with any discharge questions.     Transportation: spouse  Pharmacy: [facility] on Face [facility]   PCP: Dr. JEREMY Gamez Jr.   No needs at this time     02/07/19 1031   Discharge Assessment   Assessment Type Discharge Planning Assessment   Confirmed/corrected address and phone number on facesheet? Yes   Assessment information obtained from? Patient   Prior to hospitilization cognitive status: Alert/Oriented   Prior to hospitalization functional status: Independent   Current cognitive status: Alert/Oriented   Current Functional Status: Independent   Lives With spouse   Able to Return to Prior Arrangements yes   Is patient able to care for self after discharge? Yes   Who are your caregiver(s) and their phone number(s)? Dr. Marina Dimas; spouse [phone]   Equipment Currently Used at Home none   Does the patient have transportation home? Yes   Transportation Anticipated family or friend will provide   Discharge Plan A Home   Discharge Plan B Home with family

## 2019-02-07 NOTE — ED PROVIDER NOTES
SCRIBE #1 NOTE: I, Jaja Espinosa, am scribing for, and in the presence of, Heydi Barajas MD. I have scribed the entire note.       History     Chief Complaint   Patient presents with    Abdominal Pain     abd pain for last 5 hours with n/v, states no fever. States pain to LLQ - seen at after hours with  xray done.  Dysuria - last BM today     Review of patient's allergies indicates:   Allergen Reactions    Hydrocodone Other (See Comments)     Chest pains         History of Present Illness     HPI    2/6/2019, 8:41 PM  History obtained from the patient      History of Present Illness: Sandra Dimas is a 43 y.o. female patient with a PMHx of endometriosis who presents to the Emergency Department for evaluation of LLQ abd pain which onset gradually around 3 PM today. Symptoms are constant and moderate in severity. No mitigating or exacerbating factors reported. Associated sxs include nausea, vomiting x1 PTA, diarrhea x1 in ED. Patient denies any fever, chills, constipation, hematuria, hematochezia, dysuria, and all other sxs at this time. Pt reports an appendectomy in the past. No further complaints or concerns at this time.     Arrival mode: Personal vehicle      PCP: OSIRIS Gamez Jr, MD        Past Medical History:  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    Endometriosis     Hemicrania continua     Migraines        Past Surgical History:  Past Surgical History:   Procedure Laterality Date    APPENDECTOMY      diagnostic laprascopy      EGD (ESOPHAGOGASTRODUODENOSCOPY) N/A 11/30/2018    Performed by [facility]os [facility] at [facility] ENDO    FULGURATION, ENDOMETRIOSIS  12/26/2018    Performed by Zehra Jensen [facility] at [facility] OR    HYSTEROSCOPY, WITH DILATION AND CURETTAGE OF UTERUS AND HYDROTHERMAL ENDOMETRIAL ABLATION N/A 12/26/2018    Performed by [facility] at [facility] OR    robotic assisted laparoscopy with excision of ovarian endometrioma and  chromotubation      SALPINGECTOMY, LAPAROSCOPIC Bilateral 12/26/2018    [facility]         Family History:  Family History   Problem Relation Age of Onset    Strabismus Neg Hx     Retinal detachment Neg Hx     Macular degeneration Neg Hx     Glaucoma Neg Hx     Blindness Neg Hx     Amblyopia Neg Hx     Breast cancer Neg Hx     Colon cancer Neg Hx     Ovarian cancer Neg Hx     Thyroid disease Neg Hx     Migraines Neg Hx     Asthma Neg Hx        Social History:  Social History     Tobacco Use    Smoking status: Never Smoker    Smokeless tobacco: Never Used   Substance and Sexual Activity    Alcohol use: Yes     Frequency: Monthly or less     Drinks per session: 1 or 2     Binge frequency: Never     Comment: rarely    Drug use: No    Sexual activity: Yes     Partners: Male        Review of Systems     Review of Systems   Constitutional: Negative for chills and fever.   HENT: Negative for sore throat.    Respiratory: Negative for shortness of breath.    Cardiovascular: Negative for chest pain.   Gastrointestinal: Positive for abdominal pain (LLQ), diarrhea, nausea and vomiting. Negative for blood in stool and constipation.   Genitourinary: Negative for dysuria and hematuria.   Musculoskeletal: Negative for back pain.   Skin: Negative for rash.   Neurological: Negative for weakness.   Hematological: Does not bruise/bleed easily.   All other systems reviewed and are negative.       Physical Exam     Initial Vitals [02/06/19 2037]   BP Pulse Resp Temp SpO2   132/74 64 (!) 22 98 °F (36.7 °C) 100 %      MAP       --          Physical Exam  Nursing Notes and Vital Signs Reviewed.  Constitutional: Patient is in no acute distress. Well-developed and well-nourished.  Head: Atraumatic. Normocephalic.  Eyes: PERRL. EOM intact. Conjunctivae are not pale. No scleral icterus.  ENT: Mucous membranes are moist. Oropharynx is clear and symmetric.    Neck: Supple. Full ROM. No  "lymphadenopathy.  Cardiovascular: Regular rate. Regular rhythm. No murmurs, rubs, or gallops. Distal pulses are 2+ and symmetric.  Pulmonary/Chest: No respiratory distress. Clear to auscultation bilaterally. No wheezing or rales.  Abdominal: Soft and non-distended. LLQ tenderness.  No rebound, guarding, or rigidity. Good bowel sounds.  Genitourinary: No CVA tenderness  Musculoskeletal: Moves all extremities. No obvious deformities. No edema. No calf tenderness.  Skin: Warm and dry.  Neurological:  Alert, awake, and appropriate.  Normal speech.  No acute focal neurological deficits are appreciated.  Psychiatric: Normal affect. Good eye contact. Appropriate in content.     ED Course   Procedures  ED Vital Signs:  Vitals:    02/06/19 2037 02/06/19 2048 02/06/19 2158 02/06/19 2232   BP: 132/74 132/74 (!) 97/55 106/67   Pulse: 64 65 63 80   Resp: (!) 22 (!) 27 13 17   Temp: 98 °F (36.7 °C)      TempSrc: Oral      SpO2: 100% 100% 97% 99%   Weight: 76.2 kg (167 lb 15.9 oz)      Height: 5' 9" (1.753 m)          Abnormal Lab Results:  Labs Reviewed   CBC W/ AUTO DIFFERENTIAL - Abnormal; Notable for the following components:       Result Value    MCV 78 (*)     MCH 26.0 (*)     MPV 9.0 (*)     All other components within normal limits   COMPREHENSIVE METABOLIC PANEL - Abnormal; Notable for the following components:    CO2 22 (*)     Alkaline Phosphatase 50 (*)     All other components within normal limits   URINALYSIS - Abnormal; Notable for the following components:    pH, UA >8.0 (*)     Ketones, UA Trace (*)     Occult Blood UA Trace (*)     Leukocytes, UA 1+ (*)     All other components within normal limits   AMYLASE   LIPASE   URINALYSIS MICROSCOPIC        All Lab Results:  Results for orders placed or performed during the hospital encounter of 02/06/19   CBC auto differential   Result Value Ref Range    WBC 8.91 3.90 - 12.70 K/uL    RBC 4.96 4.00 - 5.40 M/uL    Hemoglobin 12.9 12.0 - 16.0 g/dL    Hematocrit 38.9 37.0 - " 48.5 %    MCV 78 (L) 82 - 98 fL    MCH 26.0 (L) 27.0 - 31.0 pg    MCHC 33.2 32.0 - 36.0 g/dL    RDW 14.0 11.5 - 14.5 %    Platelets 247 150 - 350 K/uL    MPV 9.0 (L) 9.2 - 12.9 fL    Gran # (ANC) 5.8 1.8 - 7.7 K/uL    Lymph # 2.5 1.0 - 4.8 K/uL    Mono # 0.6 0.3 - 1.0 K/uL    Eos # 0.1 0.0 - 0.5 K/uL    Baso # 0.02 0.00 - 0.20 K/uL    Gran% 64.9 38.0 - 73.0 %    Lymph% 27.5 18.0 - 48.0 %    Mono% 6.6 4.0 - 15.0 %    Eosinophil% 0.8 0.0 - 8.0 %    Basophil% 0.2 0.0 - 1.9 %    Differential Method Automated    Comprehensive metabolic panel   Result Value Ref Range    Sodium 139 136 - 145 mmol/L    Potassium 3.8 3.5 - 5.1 mmol/L    Chloride 105 95 - 110 mmol/L    CO2 22 (L) 23 - 29 mmol/L    Glucose 89 70 - 110 mg/dL    BUN, Bld 16 6 - 20 mg/dL    Creatinine 0.8 0.5 - 1.4 mg/dL    Calcium 10.2 8.7 - 10.5 mg/dL    Total Protein 7.6 6.0 - 8.4 g/dL    Albumin 4.6 3.5 - 5.2 g/dL    Total Bilirubin 0.5 0.1 - 1.0 mg/dL    Alkaline Phosphatase 50 (L) 55 - 135 U/L    AST 21 10 - 40 U/L    ALT 17 10 - 44 U/L    Anion Gap 12 8 - 16 mmol/L    eGFR if African American >60 >60 mL/min/1.73 m^2    eGFR if non African American >60 >60 mL/min/1.73 m^2   Urinalysis - Clean Catch   Result Value Ref Range    Specimen UA Urine, Clean Catch     Color, UA Yellow Yellow, Straw, Claudia    Appearance, UA Clear Clear    pH, UA >8.0 (A) 5.0 - 8.0    Specific Gravity, UA 1.010 1.005 - 1.030    Protein, UA Negative Negative    Glucose, UA Negative Negative    Ketones, UA Trace (A) Negative    Bilirubin (UA) Negative Negative    Occult Blood UA Trace (A) Negative    Nitrite, UA Negative Negative    Urobilinogen, UA Negative <2.0 EU/dL    Leukocytes, UA 1+ (A) Negative   Amylase   Result Value Ref Range    Amylase 59 20 - 110 U/L   Lipase   Result Value Ref Range    Lipase 29 4 - 60 U/L   Urinalysis Microscopic   Result Value Ref Range    RBC, UA 1 0 - 4 /hpf    WBC, UA 0 0 - 5 /hpf    WBC Clumps, UA None None-Rare    Bacteria, UA Rare None-Occ /hpf     Yeast, UA None None    Squam Epithel, UA 0 /hpf    Hyaline Casts, UA 1 0-1/lpf /lpf    Microscopic Comment SEE COMMENT          Imaging Results:  Imaging Results          CT Abdomen Pelvis With Contrast (Final result)  Result time 02/06/19 22:46:01    Final result by Jose Ochoa III, MD (02/06/19 22:46:01)                 Impression:      1.  Moderate distention of numerous small bowel loops with fluid.  I suspect at least a partial or developing small bowel obstruction.  The exact point of transition is indeterminate.    2.   Otherwise as above.  Consider follow-up studies as indicated.    All CT scans at this facility are performed  using dose modulation techniques as appropriate to performed exam including the following:  automated exposure control; adjustment of mA and/or kV according to the patients size (this includes techniques or standardized protocols for targeted exams where dose is matched to indication/reason for exam: i.e. extremities or head);  iterative reconstruction technique.      Electronically signed by: Jose Ochoa MD  Date:    02/06/2019  Time:    22:46             Narrative:    EXAMINATION:  CT ABDOMEN PELVIS WITH CONTRAST    CLINICAL HISTORY:  LLQ pain, suspect diverticulitis;    TECHNIQUE:  Axial CT imaging was performed through the abdomen and pelvis with  75cc  of intravenous contrast. Multiplanar reformats were performed and interpreted.    COMPARISON:  None    FINDINGS:  The heart size is normal.  The lung bases are grossly clear except for minimal scarring or atelectasis.  There is no free intraperitoneal air.  No acute bony abnormality is suggested.    The liver and spleen show no focal abnormality.  Gallbladder is unremarkable.  There is no adrenal or pancreatic mass or enlargement.  No solid renal mass or obstruction.  There is atheromatous change along the aorta without aneurysm formation.    No ascites.  In the right lower quadrant I cannot definitely visualize the  appendix but I do not see a focal inflammatory mass.  Suspect fluid within the endometrial canal.    Cannot exclude a few diverticula in the colon with no gross evidence of diverticulitis.  There is moderate distention of numerous small bowel loops with fluid and scattered air-fluid levels.  The exact point of transition is difficult to determine but the appearance is suspicious for a developing or at least partial small bowel obstruction.                                      The Emergency Provider reviewed the vital signs and test results, which are outlined above.     ED Discussion     11:02 PM: Re-evaluated pt. Pt is resting comfortably and is in no acute distress. D/w pt about possible admission. Will consult general surgery for recommendations. Pt's , Dr. Dimas (Cardiology), asks if clear fluids and abx at home is okay. D/w pt all pertinent results. D/w pt any concerns expressed at this time. Answered all questions. Pt expresses understanding at this time.    11:44 PM: Dr. Barajas discussed the pt's case with Dr. Ozuna (General Surgery) who states pt has a small bowel obstruction. Recommends admission for inpatient bowel rest, IV abx, and pain control.    11:46 PM: Re-evaluated pt. I have discussed test results, shared treatment plan, and the need for admission with patient and family at bedside. Pt and family express understanding at this time and agree with all information. All questions answered. Pt and family have no further questions or concerns at this time. Pt is ready for admit.  .  11:55 PM: Discussed case with Dr. Ozuna (General Surgery). Dr. Ozuna agrees with current care and management of pt and accepts admission.   Admitting Service: General Surgery  Admitting Physician: Dr. Ozuna  Admit to: Med Surg      ED Medication(s):  Medications   piperacillin-tazobactam 4.5 g in dextrose 5 % 100 mL IVPB (ready to mix system) (4.5 g Intravenous New Bag 2/6/19 9917)   lorazepam (ATIVAN)  injection 2 mg (not administered)   sodium chloride 0.9% bolus 1,000 mL (0 mLs Intravenous Stopped 2/6/19 2140)   ketorolac injection 30 mg (30 mg Intravenous Given 2/6/19 2100)   ondansetron injection 4 mg (4 mg Intravenous Given 2/6/19 2100)   hydromorphone (PF) injection 1 mg (1 mg Intravenous Given 2/6/19 2140)   omnipaque 350 iohexol 75 mL (75 mLs Intravenous Given 2/6/19 2225)       New Prescriptions    No medications on file                 Medical Decision Making:   Clinical Tests:   Lab Tests: Ordered and Reviewed  Radiological Study: Ordered and Reviewed           Scribe Attestation:   Scribe #1: I performed the above scribed service and the documentation accurately describes the services I performed. I attest to the accuracy of the note.     Attending:   Physician Attestation Statement for Scribe #1: I, Heydi Barajas MD, personally performed the services described in this documentation, as scribed by Jaja Espinosa, in my presence, and it is both accurate and complete.           Clinical Impression       ICD-10-CM ICD-9-CM   1. Small bowel obstruction K56.609 560.9       Disposition:   Disposition: Admitted  Condition: Fair         Heydi Barajas MD  02/08/19 1032

## 2019-02-07 NOTE — ASSESSMENT & PLAN NOTE
Bowel rest with NPO and IV fluid resuscitation  Serial abdominal exams  Toradol p.r.n. pain, on attempt to avoid narcotic medications  NG tube if develops progressive nausea and vomiting  Restart home meds with sips of water  Lovenox DVT ppx

## 2019-02-07 NOTE — H&P
[facility]  General Surgery  History & Physical    Patient Name: Sandra Dimas  MRN: [id number]  Admission Date: 2/6/2019  Attending Physician: Allison Ozuna MD   Primary Care Provider: OSIRIS Gamez Jr, MD    Patient information was obtained from patient, spouse/SO and ER records.     Subjective:     Chief Complaint/Reason for Admission:  Abdominal pain    History of Present Illness: 43-year-old female with acute onset of abdominal pain that began around 3:00 p.m. today.  Abdominal flat direct was obtained as an outpatient which revealed no acute pathology. However her pain progressed and she had 1 episode of nausea with emesis at 8:00 p.m. a for she proceeded to the ER.  Workup revealed no leukocytosis but a CT scan did show dilated loops of small bowel within the pelvis which is consistent with partial small-bowel obstruction due to adhesive disease from prior gynecologic surgeries.  She reports 1 episode of diarrhea today.  No prior history of bowel obstruction. No prior endoscopy.    Current Facility-Administered Medications on File Prior to Encounter   Medication    lactated ringers infusion    lidocaine (PF) 10 mg/ml (1%) injection 10 mg     Current Outpatient Medications on File Prior to Encounter   Medication Sig    atenolol (TENORMIN) 25 MG tablet Take 1 tablet (25 mg total) by mouth once daily.    diazePAM (VALIUM) 5 MG tablet Take 1 tablet (5 mg total) by mouth every 12 (twelve) hours as needed for Anxiety or Insomnia.    FLUoxetine (PROZAC) 20 MG capsule Take 1 capsule (20 mg total) by mouth once daily.    indomethacin (INDOCIN) 25 MG capsule TAKE 2 CAPSULES BY MOUTH THREE TIMES DAILY    pantoprazole (PROTONIX) 40 MG tablet Take 1 tablet (40 mg total) by mouth once daily.    traMADol (ULTRAM) 50 mg tablet Take 1-2 tablets ( mg total) by mouth every 6 (six) hours as needed for Pain.    traZODone (DESYREL) 50 MG tablet Take 1-2 tablets ( mg total) by mouth  every evening.    vitamin D (VITAMIN D3) 1000 units Tab Take 1,000 Units by mouth once daily.    cephALEXin (KEFLEX) 500 MG capsule Take 500 mg by mouth 3 (three) times daily.    fluocinonide (LIDEX) 0.05 % ointment Apply topically 2 (two) times daily.    levonorgestrel-ethinyl estrad (LYBREL) 90-20 mcg per tablet Take 1 tablet by mouth once daily.    ondansetron (ZOFRAN) 8 MG tablet Take 1 tablet (8 mg total) by mouth every 8 (eight) hours as needed for Nausea.    sulfamethoxazole-trimethoprim 200-40 mg/5 ml (BACTRIM,SEPTRA) 200-40 mg/5 mL Susp Take 8 mg/kg/day by mouth every 12 (twelve) hours.       Review of patient's allergies indicates:   Allergen Reactions    Hydrocodone Other (See Comments)     Chest pains       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    Endometriosis     Hemicrania continua     Migraines      Past Surgical History:   Procedure Laterality Date    APPENDECTOMY      diagnostic laprascopy      EGD (ESOPHAGOGASTRODUODENOSCOPY) N/A 11/30/2018    Performed by [facility]ustos [facility] at [facility] ENDO    FULGURATION, ENDOMETRIOSIS  12/26/2018    Performed by Ze[facility]ra Jensen MD at [facility] OR    HYSTEROSCOPY, WITH DILATION AND CURETTAGE OF UTERUS AND HYDROTHERMAL ENDOMETRIAL ABLATION N/A 12/26/2018    Performed by [facility] at [facility] OR    robotic assisted laparoscopy with excision of ovarian endometrioma and chromotubation      SALPINGECTOMY, LAPAROSCOPIC Bilateral 12/26/2018    Performed by [facility]ra Jensen [facility] at [facility] OR     Family History     None        Tobacco Use    Smoking status: Never Smoker    Smokeless tobacco: Never Used   Substance and Sexual Activity    Alcohol use: Yes     Frequency: Monthly or less     Drinks per session: 1 or 2     Binge frequency: Never     Comment: rarely    Drug use: No    Sexual activity: Yes     Partners: Male     Review of Systems   Constitutional: Positive for appetite change (decreased). Negative  for fever and unexpected weight change.   Eyes: Negative for visual disturbance.   Respiratory: Negative for shortness of breath.    Cardiovascular: Negative for chest pain.   Gastrointestinal: Positive for abdominal pain, diarrhea, nausea and vomiting. Negative for abdominal distention, blood in stool and constipation.   Genitourinary: Negative for difficulty urinating.   Skin: Negative for rash.   Neurological: Positive for headaches (chronic). Negative for weakness.     Objective:     Vital Signs (Most Recent):  Temp: 98.8 °F (37.1 °C) (02/07/19 0036)  Pulse: (!) 58 (02/07/19 0036)  Resp: 18 (02/07/19 0036)  BP: 99/60 (02/07/19 0036)  SpO2: 95 % (02/07/19 0036) Vital Signs (24h Range):  Temp:  [98 °F (36.7 °C)-99 °F (37.2 °C)] 98.8 °F (37.1 °C)  Pulse:  [58-80] 58  Resp:  [13-27] 18  SpO2:  [95 %-100 %] 95 %  BP: ()/(55-82) 99/60     Weight: 76.2 kg (167 lb 15.9 oz)  Body mass index is 24.81 kg/m².    Physical Exam   Constitutional: She is oriented to person, place, and time. She appears well-developed and well-nourished. No distress.   HENT:   Head: Normocephalic and atraumatic.   Eyes: EOM are normal.   Neck: Neck supple.   Cardiovascular: Normal rate and regular rhythm.   Pulmonary/Chest: Effort normal.   Abdominal: Soft. She exhibits no distension. There is tenderness (LLQ > suprapubic). There is no rebound and no guarding.   Musculoskeletal: Normal range of motion.   Neurological: She is alert and oriented to person, place, and time.   Skin: Skin is warm.   Psychiatric: She has a normal mood and affect.   Vitals reviewed.      Significant Labs:  CBC:   Recent Labs   Lab 02/06/19 2050   WBC 8.91   RBC 4.96   HGB 12.9   HCT 38.9      MCV 78*   MCH 26.0*   MCHC 33.2     BMP:   Recent Labs   Lab 02/06/19 2050   GLU 89      K 3.8      CO2 22*   BUN 16   CREATININE 0.8   CALCIUM 10.2       Significant Diagnostics:  I have reviewed all pertinent imaging results/findings within the past 24  hours.   CT scan with dilated loops of small bowel within the pelvis consistent with partial small-bowel obstruction. Diverticulosis within the sigmoid colon noted. No pneumoperitoneum.     Assessment/Plan:     Small bowel obstruction    Admit to surgery  Bowel rest with NPO and IV fluid resuscitation  Serial abdominal exams  Toradol p.r.n. pain, on attempt to avoid narcotic medications  NG tube if develops progressive nausea and vomiting  Discussed with the patient and her  that she meets criteria for non operative management of her partial small bowel obstruction. If there is any status change in her abdominal exam or clinical picture, she may warrant operative intervention.      Chronic paroxysmal hemicrania    Toradol p.r.n.     GERD (gastroesophageal reflux disease)    IV Protonix  Transition to PO once diet advanced       VTE Risk Mitigation (From admission, onward)        Ordered     enoxaparin injection 40 mg  Daily      02/07/19 0044     IP VTE LOW RISK PATIENT  Once      02/07/19 0044     Place sequential compression device  Until discontinued      02/07/19 0044     Place ERNESTINE hose  Until discontinued      02/07/19 0044          Allison Ozuna MD  General Surgery  [facility]

## 2019-02-07 NOTE — PLAN OF CARE
Problem: Adult Inpatient Plan of Care  Goal: Plan of Care Review  Outcome: Ongoing (interventions implemented as appropriate)  Remains free from injury. States relief of pain with available prn meds. Fluids running as ordered. NPO status maintained. Vital signs stable. Chart reviewed. Will continue to monitor.

## 2019-02-07 NOTE — PROGRESS NOTES
[facility] -   General Surgery  Progress Note    Subjective:     History of Present Illness:  43-year-old female with acute onset of abdominal pain that began around 3:00 p.m. today.  Abdominal flat direct was obtained as an outpatient which revealed no acute pathology. However her pain progressed and she had 1 episode of nausea with emesis at 8:00 p.m. a for she proceeded to the ER.  Workup revealed no leukocytosis but a CT scan did show dilated loops of small bowel within the pelvis which is consistent with partial small-bowel obstruction due to adhesive disease from prior gynecologic surgeries.  She reports 1 episode of diarrhea today.  No prior history of bowel obstruction. No prior endoscopy.    Post-Op Info:  * No surgery found *         Interval History: Mild improvement in lower abdominal pain. No flatus or bowel movement.  Requiring IV Dilaudid for pain control.     Medications:  Continuous Infusions:   lactated ringers 100 mL/hr at 02/07/19 0131     Scheduled Meds:   atenolol  25 mg Oral Daily    enoxaparin  40 mg Subcutaneous Daily    FLUoxetine  20 mg Oral Daily    lidocaine (PF) 10 mg/ml (1%)  1 mL Other Once    pantoprazole  40 mg Intravenous Daily    traZODone  50 mg Oral QHS    vitamin D  1,000 Units Oral Daily     PRN Meds:acetaminophen, diazePAM, diphenhydrAMINE, HYDROmorphone, ketorolac, ondansetron, promethazine (PHENERGAN) IVPB, ramelteon     Review of patient's allergies indicates:   Allergen Reactions    Hydrocodone Other (See Comments)     Chest pains    Chlorhexidine Rash     Per  after surgery patient had large rash across abdomen where chlorhexidine was applied.     Objective:     Vital Signs (Most Recent):  Temp: 98.2 °F (36.8 °C) (02/07/19 0751)  Pulse: 72 (02/07/19 0751)  Resp: 16 (02/07/19 0751)  BP: 113/66 (02/07/19 0751)  SpO2: 96 % (02/07/19 0751) Vital Signs (24h Range):  Temp:  [97.7 °F (36.5 °C)-99 °F (37.2 °C)] 98.2 °F (36.8 °C)  Pulse:  [58-80]  72  Resp:  [13-27] 16  SpO2:  [95 %-100 %] 96 %  BP: ()/(55-82) 113/66     Weight: 76.2 kg (168 lb)  Body mass index is 24.81 kg/m².    Intake/Output - Last 3 Shifts       02/05 0700 - 02/06 0659 02/06 0700 - 02/07 0659 02/07 0700 - 02/08 0659    P.O.  0     I.V. (mL/kg)  448.3 (5.9)     IV Piggyback  2100     Total Intake(mL/kg)  2548.3 (33.4)     Net  +2548.3            Urine Occurrence  1 x           Physical Exam   Constitutional: She is oriented to person, place, and time. She appears well-developed and well-nourished. No distress.   HENT:   Head: Normocephalic and atraumatic.   Eyes: EOM are normal.   Neck: Neck supple.   Cardiovascular: Normal rate and regular rhythm.   Pulmonary/Chest: Effort normal.   Abdominal: Soft. She exhibits no distension. There is tenderness (LLQ>suprapubic).   Musculoskeletal: Normal range of motion.   Neurological: She is alert and oriented to person, place, and time.   Skin: Skin is warm.   Vitals reviewed.      Significant Labs:  CBC:   Recent Labs   Lab 02/07/19  0428   WBC 5.67   RBC 4.33   HGB 10.9*   HCT 34.6*      MCV 80*   MCH 25.2*   MCHC 31.5*     BMP:   Recent Labs   Lab 02/07/19  0428   GLU 89      K 3.7   *   CO2 22*   BUN 15   CREATININE 0.7   CALCIUM 8.3*   MG 2.2       Significant Diagnostics:  I have reviewed all pertinent imaging results/findings within the past 24 hours.    Assessment/Plan:     Small bowel obstruction    Bowel rest with NPO and IV fluid resuscitation  Serial abdominal exams  Toradol p.r.n. pain, on attempt to avoid narcotic medications  NG tube if develops progressive nausea and vomiting  Restart home meds with sips of water  Lovenox DVT ppx     Chronic paroxysmal hemicrania    Toradol p.r.n.     GERD (gastroesophageal reflux disease)    IV Protonix  Transition to PO once diet advanced         Allison Ozuna MD  General Surgery  [facility] -

## 2019-02-07 NOTE — SUBJECTIVE & OBJECTIVE
Interval History: Mild improvement in lower abdominal pain. No flatus or bowel movement.  Requiring IV Dilaudid for pain control.     Medications:  Continuous Infusions:   lactated ringers 100 mL/hr at 02/07/19 0131     Scheduled Meds:   atenolol  25 mg Oral Daily    enoxaparin  40 mg Subcutaneous Daily    FLUoxetine  20 mg Oral Daily    lidocaine (PF) 10 mg/ml (1%)  1 mL Other Once    pantoprazole  40 mg Intravenous Daily    traZODone  50 mg Oral QHS    vitamin D  1,000 Units Oral Daily     PRN Meds:acetaminophen, diazePAM, diphenhydrAMINE, HYDROmorphone, ketorolac, ondansetron, promethazine (PHENERGAN) IVPB, ramelteon     Review of patient's allergies indicates:   Allergen Reactions    Hydrocodone Other (See Comments)     Chest pains    Chlorhexidine Rash     Per  after surgery patient had large rash across abdomen where chlorhexidine was applied.     Objective:     Vital Signs (Most Recent):  Temp: 98.2 °F (36.8 °C) (02/07/19 0751)  Pulse: 72 (02/07/19 0751)  Resp: 16 (02/07/19 0751)  BP: 113/66 (02/07/19 0751)  SpO2: 96 % (02/07/19 0751) Vital Signs (24h Range):  Temp:  [97.7 °F (36.5 °C)-99 °F (37.2 °C)] 98.2 °F (36.8 °C)  Pulse:  [58-80] 72  Resp:  [13-27] 16  SpO2:  [95 %-100 %] 96 %  BP: ()/(55-82) 113/66     Weight: 76.2 kg (168 lb)  Body mass index is 24.81 kg/m².    Intake/Output - Last 3 Shifts       02/05 0700 - 02/06 0659 02/06 0700 - 02/07 0659 02/07 0700 - 02/08 0659    P.O.  0     I.V. (mL/kg)  448.3 (5.9)     IV Piggyback  2100     Total Intake(mL/kg)  2548.3 (33.4)     Net  +2548.3            Urine Occurrence  1 x           Physical Exam   Constitutional: She is oriented to person, place, and time. She appears well-developed and well-nourished. No distress.   HENT:   Head: Normocephalic and atraumatic.   Eyes: EOM are normal.   Neck: Neck supple.   Cardiovascular: Normal rate and regular rhythm.   Pulmonary/Chest: Effort normal.   Abdominal: Soft. She exhibits no distension.  There is tenderness (LLQ>suprapubic).   Musculoskeletal: Normal range of motion.   Neurological: She is alert and oriented to person, place, and time.   Skin: Skin is warm.   Vitals reviewed.      Significant Labs:  CBC:   Recent Labs   Lab 02/07/19 0428   WBC 5.67   RBC 4.33   HGB 10.9*   HCT 34.6*      MCV 80*   MCH 25.2*   MCHC 31.5*     BMP:   Recent Labs   Lab 02/07/19 0428   GLU 89      K 3.7   *   CO2 22*   BUN 15   CREATININE 0.7   CALCIUM 8.3*   MG 2.2       Significant Diagnostics:  I have reviewed all pertinent imaging results/findings within the past 24 hours.

## 2019-02-07 NOTE — PLAN OF CARE
Problem: Adult Inpatient Plan of Care  Goal: Plan of Care Review  Outcome: Ongoing (interventions implemented as appropriate)  Plan of care reviewed and discussed with patient.  No acute distress noted.  Safety and fall precautions in place.  Patient free from injury.  Ambulation promoted.  IV hydration maintained.  Pain managed adequately with IV pain medication.  NPO status maintained.  Will continue to monitor.    12 hour chart check complete.

## 2019-02-07 NOTE — ASSESSMENT & PLAN NOTE
Admit to surgery  Bowel rest with NPO and IV fluid resuscitation  Serial abdominal exams  Toradol p.r.n. pain, on attempt to avoid narcotic medications  NG tube if develops progressive nausea and vomiting  Discussed with the patient and her  that she meets criteria for non operative management of her partial small bowel obstruction. If there is any status change in her abdominal exam or clinical picture, she may warrant operative intervention.

## 2019-02-07 NOTE — SUBJECTIVE & OBJECTIVE
Current Facility-Administered Medications on File Prior to Encounter   Medication    lactated ringers infusion    lidocaine (PF) 10 mg/ml (1%) injection 10 mg     Current Outpatient Medications on File Prior to Encounter   Medication Sig    atenolol (TENORMIN) 25 MG tablet Take 1 tablet (25 mg total) by mouth once daily.    diazePAM (VALIUM) 5 MG tablet Take 1 tablet (5 mg total) by mouth every 12 (twelve) hours as needed for Anxiety or Insomnia.    FLUoxetine (PROZAC) 20 MG capsule Take 1 capsule (20 mg total) by mouth once daily.    indomethacin (INDOCIN) 25 MG capsule TAKE 2 CAPSULES BY MOUTH THREE TIMES DAILY    pantoprazole (PROTONIX) 40 MG tablet Take 1 tablet (40 mg total) by mouth once daily.    traMADol (ULTRAM) 50 mg tablet Take 1-2 tablets ( mg total) by mouth every 6 (six) hours as needed for Pain.    traZODone (DESYREL) 50 MG tablet Take 1-2 tablets ( mg total) by mouth every evening.    vitamin D (VITAMIN D3) 1000 units Tab Take 1,000 Units by mouth once daily.    cephALEXin (KEFLEX) 500 MG capsule Take 500 mg by mouth 3 (three) times daily.    fluocinonide (LIDEX) 0.05 % ointment Apply topically 2 (two) times daily.    levonorgestrel-ethinyl estrad (LYBREL) 90-20 mcg per tablet Take 1 tablet by mouth once daily.    ondansetron (ZOFRAN) 8 MG tablet Take 1 tablet (8 mg total) by mouth every 8 (eight) hours as needed for Nausea.    sulfamethoxazole-trimethoprim 200-40 mg/5 ml (BACTRIM,SEPTRA) 200-40 mg/5 mL Susp Take 8 mg/kg/day by mouth every 12 (twelve) hours.       Review of patient's allergies indicates:   Allergen Reactions    Hydrocodone Other (See Comments)     Chest pains       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    Endometriosis     Hemicrania continua     Migraines      Past Surgical History:   Procedure Laterality Date    APPENDECTOMY      diagnostic laprascopy      EGD (ESOPHAGOGASTRODUODENOSCOPY) N/A 11/30/2018     Performed by Bossman Bustos MD at [facility] ENDO    FULGURATION, ENDOMETRIOSIS  12/26/2018    Performed by Zehra Jensen MD at [facility] OR    HYSTEROSCOPY, WITH DILATION AND CURETTAGE OF UTERUS AND HYDROTHERMAL ENDOMETRIAL ABLATION N/A 12/26/2018    Performed by Zehra Jensen MD at [facility] OR    robotic assisted laparoscopy with excision of ovarian endometrioma and chromotubation      SALPINGECTOMY, LAPAROSCOPIC Bilateral 12/26/2018    Performed by Zehra Jensen MD at [facility] OR     Family History     None        Tobacco Use    Smoking status: Never Smoker    Smokeless tobacco: Never Used   Substance and Sexual Activity    Alcohol use: Yes     Frequency: Monthly or less     Drinks per session: 1 or 2     Binge frequency: Never     Comment: rarely    Drug use: No    Sexual activity: Yes     Partners: Male     Review of Systems   Constitutional: Positive for appetite change (decreased). Negative for fever and unexpected weight change.   Eyes: Negative for visual disturbance.   Respiratory: Negative for shortness of breath.    Cardiovascular: Negative for chest pain.   Gastrointestinal: Positive for abdominal pain, diarrhea, nausea and vomiting. Negative for abdominal distention, blood in stool and constipation.   Genitourinary: Negative for difficulty urinating.   Skin: Negative for rash.   Neurological: Positive for headaches (chronic). Negative for weakness.     Objective:     Vital Signs (Most Recent):  Temp: 98.8 °F (37.1 °C) (02/07/19 0036)  Pulse: (!) 58 (02/07/19 0036)  Resp: 18 (02/07/19 0036)  BP: 99/60 (02/07/19 0036)  SpO2: 95 % (02/07/19 0036) Vital Signs (24h Range):  Temp:  [98 °F (36.7 °C)-99 °F (37.2 °C)] 98.8 °F (37.1 °C)  Pulse:  [58-80] 58  Resp:  [13-27] 18  SpO2:  [95 %-100 %] 95 %  BP: ()/(55-82) 99/60     Weight: 76.2 kg (167 lb 15.9 oz)  Body mass index is 24.81 kg/m².    Physical Exam   Constitutional: She is oriented to person, place, and time. She appears well-developed and  well-nourished. No distress.   HENT:   Head: Normocephalic and atraumatic.   Eyes: EOM are normal.   Neck: Neck supple.   Cardiovascular: Normal rate and regular rhythm.   Pulmonary/Chest: Effort normal.   Abdominal: Soft. She exhibits no distension. There is tenderness (LLQ > suprapubic). There is no rebound and no guarding.   Musculoskeletal: Normal range of motion.   Neurological: She is alert and oriented to person, place, and time.   Skin: Skin is warm.   Psychiatric: She has a normal mood and affect.   Vitals reviewed.      Significant Labs:  CBC:   Recent Labs   Lab 02/06/19 2050   WBC 8.91   RBC 4.96   HGB 12.9   HCT 38.9      MCV 78*   MCH 26.0*   MCHC 33.2     BMP:   Recent Labs   Lab 02/06/19 2050   GLU 89      K 3.8      CO2 22*   BUN 16   CREATININE 0.8   CALCIUM 10.2       Significant Diagnostics:  I have reviewed all pertinent imaging results/findings within the past 24 hours.   CT scan with dilated loops of small bowel within the pelvis consistent with partial small-bowel obstruction. Diverticulosis within the sigmoid colon noted. No pneumoperitoneum.

## 2019-02-07 NOTE — PATIENT INSTRUCTIONS
OTC Gas X   OTC mag citrate   Go to ER if symptom persists      Constipation (Adult)  Constipation means that you have bowel movements that are less frequent than usual. Stools often become very hard and difficult to pass.  Constipation is very common. At some point in life it affects almost everyone. Since everyone's bowel habits are different, what is constipation to one person may not be to another. Your healthcare provider may do tests to diagnose constipation. It depends on what he or she finds when evaluating you.    Symptoms of constipation include:  · Abdominal pain  · Bloating  · Vomiting  · Painful bowel movements  · Itching, swelling, bleeding, or pain around the anus  Causes  Constipation can have many causes. These include:  · Diet low in fiber  · Too much dairy  · Not drinking enough liquids  · Lack of exercise or physical activity. This is especially true for older adults.  · Changes in lifestyle or daily routine, including pregnancy, aging, work, and travel  · Frequent use or misuse of laxatives  · Ignoring the urge to have a bowel movement or delaying it until later  · Medicines, such as certain prescription pain medicines, iron supplements, antacids, certain antidepressants, and calcium supplements  · Diseases like irritable bowel syndrome, bowel obstructions, stroke, diabetes, thyroid disease, Parkinson disease, hemorrhoids, and colon cancer  Complications  Potential complications of constipation can include:  · Hemorrhoids  · Rectal bleeding from hemorrhoids or anal fissures (skin tears)  · Hernias  · Dependency on laxatives  · Chronic constipation  · Fecal impaction  · Bowel obstruction or perforation  Home care  All treatment should be done after talking with your healthcare provider. This is especially true if you have another medical problems, are taking prescription medicines, or are an older adult. Treatment most often involves lifestyle changes. You may also need medicines. Your healthcare  provider will tell you which will work best for you. Follow the advice below to help avoid this problem in the future.  Lifestyle changes  These lifestyle changes can help prevent constipation:  · Diet. Eat a high-fiber diet, with fresh fruit and vegetables, and reduce dairy intake, meats, and processed foods  · Fluids. It's important to get enough fluids each day. Drink plenty of water when you eat more fiber. If you are on diet that limits the amount of fluid you can have, talk about this with your healthcare provider.  · Regular exercise. Check with your healthcare provider first.  Medications  Take any medicines as directed. Some laxatives are safe to use only every now and then. Others can be taken on a regular basis. Talk with your doctor or pharmacist if you have questions.  Prescription pain medicines can cause constipation. If you are taking this kind of medicine, ask your healthcare provider if you should also take a stool softener.  Medicines you may take to treat constipation include:  · Fiber supplements  · Stool softeners  · Laxatives  · Enemas  · Rectal suppositories  Follow-up care  Follow up with your healthcare provider if symptoms don't get better in the next few days. You may need to have more tests or see a specialist.  Call 911  Call 911 if any of these occur:  · Trouble breathing  · Stiff, rigid abdomen that is severely painful to touch  · Confusion  · Fainting or loss of consciousness  · Rapid heart rate  · Chest pain  When to seek medical advice  Call your healthcare provider right away if any of these occur:  · Fever over 100.4°F (38°C)  · Failure to resume normal bowel movements  · Pain in your abdomen or back gets worse  · Nausea or vomiting  · Swelling in your abdomen  · Blood in the stool  · Black, tarry stool  · Involuntary weight loss  · Weakness  Date Last Reviewed: 12/30/2015  © 4849-7526 [employer]. [address] [place] [address], [place] [address]. All rights reserved. This  information is not intended as a substitute for professional medical care. Always follow your healthcare professional's instructions.

## 2019-02-07 NOTE — HPI
43-year-old female with acute onset of abdominal pain that began around 3:00 p.m. today.  Abdominal flat direct was obtained as an outpatient which revealed no acute pathology. However her pain progressed and she had 1 episode of nausea with emesis at 8:00 p.m. a for she proceeded to the ER.  Workup revealed no leukocytosis but a CT scan did show dilated loops of small bowel within the pelvis which is consistent with partial small-bowel obstruction due to adhesive disease from prior gynecologic surgeries.  She reports 1 episode of diarrhea today.  No prior history of bowel obstruction. No prior endoscopy.

## 2019-02-08 LAB
ANION GAP SERPL CALC-SCNC: 9 MMOL/L
BUN SERPL-MCNC: 8 MG/DL
CALCIUM SERPL-MCNC: 8.6 MG/DL
CHLORIDE SERPL-SCNC: 108 MMOL/L
CO2 SERPL-SCNC: 21 MMOL/L
CREAT SERPL-MCNC: 0.7 MG/DL
ERYTHROCYTE [DISTWIDTH] IN BLOOD BY AUTOMATED COUNT: 14.1 %
EST. GFR  (AFRICAN AMERICAN): >60 ML/MIN/1.73 M^2
EST. GFR  (NON AFRICAN AMERICAN): >60 ML/MIN/1.73 M^2
GLUCOSE SERPL-MCNC: 111 MG/DL
HCT VFR BLD AUTO: 36.2 %
HGB BLD-MCNC: 11.5 G/DL
MCH RBC QN AUTO: 25.5 PG
MCHC RBC AUTO-ENTMCNC: 31.8 G/DL
MCV RBC AUTO: 80 FL
PLATELET # BLD AUTO: 197 K/UL
PMV BLD AUTO: 9.1 FL
POTASSIUM SERPL-SCNC: 3.9 MMOL/L
RBC # BLD AUTO: 4.51 M/UL
SODIUM SERPL-SCNC: 138 MMOL/L
WBC # BLD AUTO: 7.59 K/UL

## 2019-02-08 PROCEDURE — 25000003 PHARM REV CODE 250: Performed by: SURGERY

## 2019-02-08 PROCEDURE — 99024 POSTOP FOLLOW-UP VISIT: CPT | Mod: ,,, | Performed by: PHYSICIAN ASSISTANT

## 2019-02-08 PROCEDURE — 36415 COLL VENOUS BLD VENIPUNCTURE: CPT

## 2019-02-08 PROCEDURE — 94760 N-INVAS EAR/PLS OXIMETRY 1: CPT

## 2019-02-08 PROCEDURE — 11000001 HC ACUTE MED/SURG PRIVATE ROOM

## 2019-02-08 PROCEDURE — S0030 INJECTION, METRONIDAZOLE: HCPCS | Performed by: SURGERY

## 2019-02-08 PROCEDURE — 97161 PT EVAL LOW COMPLEX 20 MIN: CPT

## 2019-02-08 PROCEDURE — 85027 COMPLETE CBC AUTOMATED: CPT

## 2019-02-08 PROCEDURE — 63600175 PHARM REV CODE 636 W HCPCS: Performed by: SURGERY

## 2019-02-08 PROCEDURE — 94799 UNLISTED PULMONARY SVC/PX: CPT

## 2019-02-08 PROCEDURE — 80048 BASIC METABOLIC PNL TOTAL CA: CPT

## 2019-02-08 PROCEDURE — C9113 INJ PANTOPRAZOLE SODIUM, VIA: HCPCS | Performed by: SURGERY

## 2019-02-08 PROCEDURE — 99024 PR POST-OP FOLLOW-UP VISIT: ICD-10-PCS | Mod: ,,, | Performed by: PHYSICIAN ASSISTANT

## 2019-02-08 PROCEDURE — 97116 GAIT TRAINING THERAPY: CPT

## 2019-02-08 RX ORDER — HYDROMORPHONE HYDROCHLORIDE 1 MG/ML
1 INJECTION, SOLUTION INTRAMUSCULAR; INTRAVENOUS; SUBCUTANEOUS
Status: DISCONTINUED | OUTPATIENT
Start: 2019-02-08 | End: 2019-02-10

## 2019-02-08 RX ORDER — DIAZEPAM 5 MG/1
5 TABLET ORAL DAILY PRN
Status: DISCONTINUED | OUTPATIENT
Start: 2019-02-08 | End: 2019-02-10 | Stop reason: HOSPADM

## 2019-02-08 RX ORDER — HYDROMORPHONE HYDROCHLORIDE 1 MG/ML
0.5 INJECTION, SOLUTION INTRAMUSCULAR; INTRAVENOUS; SUBCUTANEOUS
Status: DISCONTINUED | OUTPATIENT
Start: 2019-02-08 | End: 2019-02-08

## 2019-02-08 RX ORDER — INDOMETHACIN 50 MG/1
50 SUPPOSITORY RECTAL EVERY 12 HOURS PRN
Status: DISCONTINUED | OUTPATIENT
Start: 2019-02-08 | End: 2019-02-08 | Stop reason: SDUPTHER

## 2019-02-08 RX ORDER — HYDROMORPHONE HYDROCHLORIDE 1 MG/ML
0.5 INJECTION, SOLUTION INTRAMUSCULAR; INTRAVENOUS; SUBCUTANEOUS ONCE
Status: COMPLETED | OUTPATIENT
Start: 2019-02-08 | End: 2019-02-08

## 2019-02-08 RX ORDER — FLUOXETINE HYDROCHLORIDE 20 MG/1
20 CAPSULE ORAL DAILY
Status: DISCONTINUED | OUTPATIENT
Start: 2019-02-08 | End: 2019-02-10 | Stop reason: HOSPADM

## 2019-02-08 RX ORDER — KETOROLAC TROMETHAMINE 30 MG/ML
30 INJECTION, SOLUTION INTRAMUSCULAR; INTRAVENOUS EVERY 6 HOURS PRN
Status: DISCONTINUED | OUTPATIENT
Start: 2019-02-08 | End: 2019-02-08

## 2019-02-08 RX ORDER — ATENOLOL 25 MG/1
25 TABLET ORAL DAILY
Status: DISCONTINUED | OUTPATIENT
Start: 2019-02-08 | End: 2019-02-10 | Stop reason: HOSPADM

## 2019-02-08 RX ORDER — INDOMETHACIN 50 MG/1
25 SUPPOSITORY RECTAL EVERY 8 HOURS PRN
Status: DISCONTINUED | OUTPATIENT
Start: 2019-02-08 | End: 2019-02-10 | Stop reason: HOSPADM

## 2019-02-08 RX ADMIN — STANDARDIZED SENNA CONCENTRATE AND DOCUSATE SODIUM 1 TABLET: 8.6; 5 TABLET ORAL at 08:02

## 2019-02-08 RX ADMIN — SODIUM CHLORIDE, SODIUM LACTATE, POTASSIUM CHLORIDE, AND CALCIUM CHLORIDE: .6; .31; .03; .02 INJECTION, SOLUTION INTRAVENOUS at 08:02

## 2019-02-08 RX ADMIN — FLUOXETINE 20 MG: 20 CAPSULE ORAL at 07:02

## 2019-02-08 RX ADMIN — HYDROMORPHONE HYDROCHLORIDE 0.5 MG: 1 INJECTION, SOLUTION INTRAMUSCULAR; INTRAVENOUS; SUBCUTANEOUS at 09:02

## 2019-02-08 RX ADMIN — ACETAMINOPHEN 1000 MG: 10 INJECTION, SOLUTION INTRAVENOUS at 01:02

## 2019-02-08 RX ADMIN — HYDROMORPHONE HYDROCHLORIDE 0.5 MG: 1 INJECTION, SOLUTION INTRAMUSCULAR; INTRAVENOUS; SUBCUTANEOUS at 08:02

## 2019-02-08 RX ADMIN — HYDROMORPHONE HYDROCHLORIDE 0.5 MG: 1 INJECTION, SOLUTION INTRAMUSCULAR; INTRAVENOUS; SUBCUTANEOUS at 03:02

## 2019-02-08 RX ADMIN — SODIUM CHLORIDE, SODIUM LACTATE, POTASSIUM CHLORIDE, AND CALCIUM CHLORIDE: .6; .31; .03; .02 INJECTION, SOLUTION INTRAVENOUS at 01:02

## 2019-02-08 RX ADMIN — CIPROFLOXACIN 400 MG: 2 INJECTION, SOLUTION INTRAVENOUS at 08:02

## 2019-02-08 RX ADMIN — METRONIDAZOLE 500 MG: 500 INJECTION, SOLUTION INTRAVENOUS at 03:02

## 2019-02-08 RX ADMIN — SODIUM CHLORIDE, SODIUM LACTATE, POTASSIUM CHLORIDE, AND CALCIUM CHLORIDE: .6; .31; .03; .02 INJECTION, SOLUTION INTRAVENOUS at 12:02

## 2019-02-08 RX ADMIN — PANTOPRAZOLE SODIUM 40 MG: 40 INJECTION, POWDER, LYOPHILIZED, FOR SOLUTION INTRAVENOUS at 05:02

## 2019-02-08 RX ADMIN — ATENOLOL 25 MG: 25 TABLET ORAL at 07:02

## 2019-02-08 RX ADMIN — HYDROMORPHONE HYDROCHLORIDE 1 MG: 1 INJECTION, SOLUTION INTRAMUSCULAR; INTRAVENOUS; SUBCUTANEOUS at 04:02

## 2019-02-08 RX ADMIN — DIAZEPAM 5 MG: 5 TABLET ORAL at 09:02

## 2019-02-08 RX ADMIN — KETOROLAC TROMETHAMINE 15 MG: 30 INJECTION, SOLUTION INTRAMUSCULAR; INTRAVENOUS at 03:02

## 2019-02-08 RX ADMIN — METRONIDAZOLE 500 MG: 500 INJECTION, SOLUTION INTRAVENOUS at 11:02

## 2019-02-08 RX ADMIN — METRONIDAZOLE 500 MG: 500 INJECTION, SOLUTION INTRAVENOUS at 07:02

## 2019-02-08 RX ADMIN — HYDROMORPHONE HYDROCHLORIDE 1 MG: 1 INJECTION, SOLUTION INTRAMUSCULAR; INTRAVENOUS; SUBCUTANEOUS at 12:02

## 2019-02-08 RX ADMIN — HYDROMORPHONE HYDROCHLORIDE 0.5 MG: 1 INJECTION, SOLUTION INTRAMUSCULAR; INTRAVENOUS; SUBCUTANEOUS at 12:02

## 2019-02-08 NOTE — ANESTHESIA POSTPROCEDURE EVALUATION
"Anesthesia Post Evaluation    Patient: Sandra Dimas    Procedure(s) Performed: Procedure(s) (LRB):  LAPAROTOMY, EXPLORATORY (N/A)  EXCISION, SMALL INTESTINE (N/A)    Final Anesthesia Type: general  Patient location during evaluation: PACU  Patient participation: Yes- Able to Participate  Level of consciousness: awake and alert  Post-procedure vital signs: reviewed and stable  Pain management: adequate  Airway patency: patent  PONV status at discharge: No PONV  Anesthetic complications: no      Cardiovascular status: blood pressure returned to baseline and hemodynamically stable  Respiratory status: unassisted and spontaneous ventilation  Hydration status: euvolemic  Follow-up not needed.        Visit Vitals  /76   Pulse 82   Temp 36.9 °C (98.5 °F) (Temporal)   Resp 20   Ht 5' 9" (1.753 m)   Wt 76.2 kg (168 lb)   SpO2 99%   Breastfeeding? No   BMI 24.81 kg/m²       Pain/Kamila Score: Pain Rating Prior to Med Admin: 10 (2/7/2019  4:07 PM)  Pain Rating Post Med Admin: 3 (2/7/2019  4:37 PM)        "

## 2019-02-08 NOTE — PROGRESS NOTES
[facility] -   General Surgery  Progress Note    Subjective:     History of Present Illness:  43-year-old female with acute onset of abdominal pain that began around 3:00 p.m. today.  Abdominal flat direct was obtained as an outpatient which revealed no acute pathology. However her pain progressed and she had 1 episode of nausea with emesis at 8:00 p.m. a for she proceeded to the ER.  Workup revealed no leukocytosis but a CT scan did show dilated loops of small bowel within the pelvis which is consistent with partial small-bowel obstruction due to adhesive disease from prior gynecologic surgeries.  She reports 1 episode of diarrhea today.  No prior history of bowel obstruction. No prior endoscopy.    Post-Op Info:  Procedure(s) (LRB):  LAPAROTOMY, EXPLORATORY (N/A)  EXCISION, SMALL INTESTINE (N/A)  ANASTAMOSIS (N/A)   1 Day Post-Op     Interval History: headache and pain controlled with IV dilaudid. No nausea. Ambulating well in halls.     Medications:  Continuous Infusions:   lactated ringers 100 mL/hr at 02/08/19 0140     Scheduled Meds:   ciprofloxacin  400 mg Intravenous Q12H    metronidazole  500 mg Intravenous Q8H    nozaseptin   Each Nare BID    pantoprazole  40 mg Intravenous Before breakfast    senna-docusate 8.6-50 mg  1 tablet Oral BID     PRN Meds:bisacodyl, cloNIDine, diphenhydrAMINE, HYDROmorphone, indomethacin, lactulose, ondansetron, promethazine (PHENERGAN) IVPB, promethazine (PHENERGAN) IVPB, ramelteon, sodium chloride 0.9%     Review of patient's allergies indicates:   Allergen Reactions    Acetaminophen Other (See Comments)    Hydrocodone Other (See Comments)     Chest pains    Chlorhexidine Rash     Per  after surgery patient had large rash across abdomen where chlorhexidine was applied.     Objective:     Vital Signs (Most Recent):  Temp: 98.1 °F (36.7 °C) (02/08/19 0727)  Pulse: 62 (02/08/19 0727)  Resp: 16 (02/08/19 0727)  BP: (!) 106/54 (02/08/19 0727)  SpO2:  (!) 94 % (02/08/19 0727) Vital Signs (24h Range):  Temp:  [98.1 °F (36.7 °C)-98.5 °F (36.9 °C)] 98.1 °F (36.7 °C)  Pulse:  [62-83] 62  Resp:  [12-20] 16  SpO2:  [93 %-99 %] 94 %  BP: (105-124)/(54-76) 106/54     Weight: 76.2 kg (168 lb)  Body mass index is 24.81 kg/m².    Intake/Output - Last 3 Shifts       02/06 0700 - 02/07 0659 02/07 0700 - 02/08 0659 02/08 0700 - 02/09 0659    P.O. 0 0     I.V. (mL/kg) 448.3 (5.9) 2985 (39.2)     IV Piggyback 2100 500     Total Intake(mL/kg) 2548.3 (33.4) 3485 (45.7)     Urine (mL/kg/hr)  3700 (2)     Blood  25     Total Output  3725     Net +2548.3 -240            Urine Occurrence 1 x 0 x           Physical Exam   Constitutional: She is oriented to person, place, and time. She appears well-developed and well-nourished.   HENT:   Head: Normocephalic and atraumatic.   Eyes: EOM are normal.   Cardiovascular: Normal rate and regular rhythm.   Pulmonary/Chest: Effort normal. No respiratory distress.   Abdominal: Soft. There is tenderness (appropriate incisional).   Musculoskeletal: Normal range of motion.   Neurological: She is alert and oriented to person, place, and time.   Skin: Skin is warm and dry.   Psychiatric: She has a normal mood and affect. Thought content normal.   Vitals reviewed.      Significant Labs:  CBC:   Recent Labs   Lab 02/08/19  0507   WBC 7.59   RBC 4.51   HGB 11.5*   HCT 36.2*      MCV 80*   MCH 25.5*   MCHC 31.8*     CMP:   Recent Labs   Lab 02/06/19 2050 02/08/19  0507   GLU 89   < > 111*   CALCIUM 10.2   < > 8.6*   ALBUMIN 4.6  --   --    PROT 7.6  --   --       < > 138   K 3.8   < > 3.9   CO2 22*   < > 21*      < > 108   BUN 16   < > 8   CREATININE 0.8   < > 0.7   ALKPHOS 50*  --   --    ALT 17  --   --    AST 21  --   --    BILITOT 0.5  --   --     < > = values in this interval not displayed.       Significant Diagnostics:  na    Assessment/Plan:     * Small bowel obstruction    S/p exploratory laparotomy, small bowel resection.  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   Continue IV pain medication  Await return of bowel function, ok for ice chips  Indomethacin PRN severe headaches  D/c christy  Ambulate frequently  IS     Chronic paroxysmal hemicrania    Toradol p.r.n.     GERD (gastroesophageal reflux disease)    IV Protonix  Transition to PO once diet advanced         Linda Bermudez PA-C  General Surgery  [facility] [place]

## 2019-02-08 NOTE — PLAN OF CARE
Problem: Adult Inpatient Plan of Care  Goal: Plan of Care Review  Outcome: Ongoing (interventions implemented as appropriate)  Plan of care reviewed with patient; verbalized understanding. Fall precautions maintained, patient remains free from injury. Pain being managed appropriately. Medications being administered as ordered. Patient ambulating in halls. Dressing to abdomen is intact with scant amount of dry drainage. Vital signs stable with no signs of distress noted. Bed low and locked with call light in reach. Will continue monitor.

## 2019-02-08 NOTE — NURSING
Pt complaining of severe headache (hx of paroxysmal hemicrania), administered PRN and scheduled meds available w/ no relief (toradol, ofirmev, dilaudid has worsened headaches). Sent secure chat to Dr. Boone.

## 2019-02-08 NOTE — SUBJECTIVE & OBJECTIVE
Interval History: headache and pain controlled with IV dilaudid. No nausea. Ambulating well in halls.     Medications:  Continuous Infusions:   lactated ringers 100 mL/hr at 02/08/19 0140     Scheduled Meds:   ciprofloxacin  400 mg Intravenous Q12H    metronidazole  500 mg Intravenous Q8H    nozaseptin   Each Nare BID    pantoprazole  40 mg Intravenous Before breakfast    senna-docusate 8.6-50 mg  1 tablet Oral BID     PRN Meds:bisacodyl, cloNIDine, diphenhydrAMINE, HYDROmorphone, indomethacin, lactulose, ondansetron, promethazine (PHENERGAN) IVPB, promethazine (PHENERGAN) IVPB, ramelteon, sodium chloride 0.9%     Review of patient's allergies indicates:   Allergen Reactions    Acetaminophen Other (See Comments)    Hydrocodone Other (See Comments)     Chest pains    Chlorhexidine Rash     Per  after surgery patient had large rash across abdomen where chlorhexidine was applied.     Objective:     Vital Signs (Most Recent):  Temp: 98.1 °F (36.7 °C) (02/08/19 0727)  Pulse: 62 (02/08/19 0727)  Resp: 16 (02/08/19 0727)  BP: (!) 106/54 (02/08/19 0727)  SpO2: (!) 94 % (02/08/19 0727) Vital Signs (24h Range):  Temp:  [98.1 °F (36.7 °C)-98.5 °F (36.9 °C)] 98.1 °F (36.7 °C)  Pulse:  [62-83] 62  Resp:  [12-20] 16  SpO2:  [93 %-99 %] 94 %  BP: (105-124)/(54-76) 106/54     Weight: 76.2 kg (168 lb)  Body mass index is 24.81 kg/m².    Intake/Output - Last 3 Shifts       02/06 0700 - 02/07 0659 02/07 0700 - 02/08 0659 02/08 0700 - 02/09 0659    P.O. 0 0     I.V. (mL/kg) 448.3 (5.9) 2985 (39.2)     IV Piggyback 2100 500     Total Intake(mL/kg) 2548.3 (33.4) 3485 (45.7)     Urine (mL/kg/hr)  3700 (2)     Blood  25     Total Output  3725     Net +2548.3 -240            Urine Occurrence 1 x 0 x           Physical Exam   Constitutional: She is oriented to person, place, and time. She appears well-developed and well-nourished.   HENT:   Head: Normocephalic and atraumatic.   Eyes: EOM are normal.   Cardiovascular: Normal  rate and regular rhythm.   Pulmonary/Chest: Effort normal. No respiratory distress.   Abdominal: Soft. There is tenderness (appropriate incisional).   Musculoskeletal: Normal range of motion.   Neurological: She is alert and oriented to person, place, and time.   Skin: Skin is warm and dry.   Psychiatric: She has a normal mood and affect. Thought content normal.   Vitals reviewed.      Significant Labs:  CBC:   Recent Labs   Lab 02/08/19  0507   WBC 7.59   RBC 4.51   HGB 11.5*   HCT 36.2*      MCV 80*   MCH 25.5*   MCHC 31.8*     CMP:   Recent Labs   Lab 02/06/19 2050 02/08/19  0507   GLU 89   < > 111*   CALCIUM 10.2   < > 8.6*   ALBUMIN 4.6  --   --    PROT 7.6  --   --       < > 138   K 3.8   < > 3.9   CO2 22*   < > 21*      < > 108   BUN 16   < > 8   CREATININE 0.8   < > 0.7   ALKPHOS 50*  --   --    ALT 17  --   --    AST 21  --   --    BILITOT 0.5  --   --     < > = values in this interval not displayed.       Significant Diagnostics:  na

## 2019-02-08 NOTE — OP NOTE
Operative Note       SURGERY DATE:  02/07/2019    PRE-OP DIAGNOSIS:  Peritonitis [K65.9]    POST-OP DIAGNOSIS:  Peritonitis [K65.9]   Active Hospital Problems    Diagnosis  POA    Small bowel obstruction [K56.609]  Yes    GERD (gastroesophageal reflux disease) [K21.9]  Yes      Resolved Hospital Problems   No resolved problems to display.       Procedure(s) (LRB):  LAPAROTOMY, EXPLORATORY (N/A)  EXCISION, SMALL INTESTINE (N/A)    Surgeon(s) and Role:     * Ramakrishna Boone MD - Primary     * Barry Watts MD - Assisting    ASSISTANTS: None  ANESTHESIA: General    FINDINGS:  Upon entering the peritoneal cavity large amount of reactive ascites was noted.  The loops of small bowel was edematous and had fecalization of the segment that was found in the proximal ileum.  There was to sites of critical stenosis due to endometriosis.  These 2 sites were about 30 cm apart.  The uterus was found to be edematous.  Two separate small foci of endometriosis was noted on the distal sigmoid colon.  Both ovaries also had small foci of endometriosis.    ESTIMATED BLOOD LOSS: 20 mL              COMPLICATIONS:  None    SPECIMEN:  Small bowel (ileum).    Implants: None    INDICATION:  Small bowel obstruction with peritoneal signs.    DESCRIPTION OF PROCEDURE:    The patient was taken to the operating room, and underwent general anesthesia with orotracheal intubation per anesthesia service.  The patient's abdomen was prepped and draped in usual sterile manner. A time out was done to confirm the identification of the patient as well as the procedure. Low midline incision was made below the umbilicus, and entered the abdominal cavity in the usual fashion, and found the above findings. After mobilization of the small bowel out of the peritoneal cavity into the operative field, mesenteric windows were created at 2 different sites to remove the entire 40 cm long ileum.  ANNE 75 mm with blue loads were used to divide the ileum at 2 different  sites, then functional end-to-end anastomosis was done in the usual fashion. The mesenteric defect was closed with 3 0 Vicryl running sutures. The staple line was then reinforced with Lembert sutures. After copious irrigation of the peritoneal cavity, and the hemostasis was found to be stable, abdominal incision was injected with total of 100 mL of a mixture of Exparel, 0.25% Marcaine and saline. The skin was approximated in the subcuticular fashion.  A sterile dressing was applied on top of it.           CONDITION: Good    DISPOSITION: PACU - hemodynamically stable.     Ramakrishna Boone

## 2019-02-08 NOTE — TRANSFER OF CARE
"Anesthesia Transfer of Care Note    Patient: Sandra Dimas    Procedure(s) Performed: Procedure(s) (LRB):  LAPAROTOMY, EXPLORATORY (N/A)  EXCISION, SMALL INTESTINE (N/A)    Patient location: PACU    Anesthesia Type: general    Transport from OR: Transported from OR on room air with adequate spontaneous ventilation    Post pain: adequate analgesia    Post assessment: no apparent anesthetic complications and tolerated procedure well    Post vital signs: stable    Level of consciousness: responds to stimulation    Nausea/Vomiting: no nausea/vomiting    Complications: none    Transfer of care protocol was followed      Last vitals:   Visit Vitals  /76   Pulse 82   Temp 36.8 °C (98.2 °F) (Oral)   Resp 20   Ht 5' 9" (1.753 m)   Wt 76.2 kg (168 lb)   SpO2 99%   Breastfeeding? No   BMI 24.81 kg/m²     "

## 2019-02-08 NOTE — ASSESSMENT & PLAN NOTE
S/p exploratory laparotomy, small bowel resection. 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   Continue IV pain medication  Await return of bowel function, ok for ice chips  Indomethacin PRN severe headaches  D/c christy  Ambulate frequently  IS

## 2019-02-08 NOTE — PLAN OF CARE
Problem: Adult Inpatient Plan of Care  Goal: Plan of Care Review  Outcome: Ongoing (interventions implemented as appropriate)  Pt had no adverse events during shift. Pt free of falls. Call light in reach. Side rails x 2. Pain well controlled w/ prn meds. IVF/abx administered as ordered. Dressing intact w/ some serosanguineous drainage. Pt repositions independently. VSS. Chart reviewed, will continue to monitor.

## 2019-02-08 NOTE — PT/OT/SLP EVAL
Physical Therapy Evaluation    Patient Name:  Sandra Dimas   MRN:  [id number]    Recommendations:     Discharge Recommendations:  other (see comments)(HOME)   Discharge Equipment Recommendations: none   Barriers to discharge: None    Assessment:     Sandra Dimas is a 43 y.o. female admitted with a medical diagnosis of Small bowel obstruction.  She presents with the following impairments/functional limitations:  pain .      Recent Surgery: Procedure(s) (LRB):  LAPAROTOMY, EXPLORATORY (N/A)  EXCISION, SMALL INTESTINE (N/A)  ANASTAMOSIS (N/A) 1 Day Post-Op    Plan:     · PT WILL BE D/C TO MOBILITY PROGRAM FOR WALKING    Subjective     Chief Complaint: PAIN  Patient/Family Comments/goals: DEC PAIN  Pain/Comfort:  · Pain Rating 1: 10/10  · Location 1: abdomen  · Pain Rating Post-Intervention 1: 10/10    Patients cultural, spiritual, Anglican conflicts given the current situation:      Living Environment:  PT LIVES AT HOME WITH  AND KIDS   Prior to admission, patients level of function was IND AND DRIVES.  Equipment used at home: none.  DME owned (not currently used): none.  Upon discharge, patient will have assistance from FAMILY.    Objective:     Communicated with NURSE AND Epic CHART REVIEW prior to session.  Patient found SUP IN BED peripheral IV  upon PT entry to room.    General Precautions: Standard,     Orthopedic Precautions:N/A   Braces: N/A     Exams:  · RLE ROM: WNL  · RLE Strength: WNL  · LLE ROM: WNL  · LLE Strength: WNL    Functional Mobility:  PT MET IN RM SUP.SIT EOB IND. PT STOOD WITH NO AD AND GT TRAINED X 250' WITH NO LOB. PT RETURNED TO RM AND STOOD AT SINK FOR GROOMING. PT LEFT WITH  PRESENT AND EDUCATED ON MOBILITY PROGRAM AND THAT PT COULD AMBULATE WITH FAMILY .   AM-PAC 6 CLICK MOBILITY  Total Score:21     Patient left STANDING with call button in reach and  present.    GOALS:   Multidisciplinary Problems     Physical Therapy Goals     Not on file                 History:     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    Endometriosis     Hemicrania continua     Migraines        Past Surgical History:   Procedure Laterality Date    ANASTAMOSIS N/A 2/7/2019    [facility]formed [facility] OR    APPENDECTOMY      diagnostic laprascopy      EGD (ESOPHAGOGASTRODUODENOSCOPY) N/A 11/30/2018    Performed by Bossman Bustos MD at [facility] ENDO    EXCISION, SMALL INTESTINE N/A 2/7/2019    Performed by [facility] at [facility] OR    FULGURATION, ENDOMETRIOSIS  12/26/2018    Performed by Zehra Jensen MD at [facility] OR    HYSTEROSCOPY, WITH DILATION AND CURETTAGE OF UTERUS AND HYDROTHERMAL ENDOMETRIAL ABLATION N/A 12/26/2018    Performed by [facility] at [facility] OR    LAPAROTOMY, EXPLORATORY N/A 2/7/2019    Performed [facility] at [facility] OR    robotic assisted laparoscopy with excision of ovarian endometrioma and chromotubation      SALPINGECTOMY, LAPAROSCOPIC Bilateral 12/26/2018    Performed by [facility]ra Jensen MD at [facility] OR       Time Tracking:     PT Received On: 02/08/19  PT Start Time: 0810     PT Stop Time: 0835  PT Total Time (min): 25 min     Billable Minutes: Evaluation 15 and Gait Training 10      Rita Weiss, PT  02/08/2019

## 2019-02-09 LAB
ANION GAP SERPL CALC-SCNC: 9 MMOL/L
BUN SERPL-MCNC: 10 MG/DL
CALCIUM SERPL-MCNC: 8.3 MG/DL
CHLORIDE SERPL-SCNC: 108 MMOL/L
CO2 SERPL-SCNC: 23 MMOL/L
CREAT SERPL-MCNC: 0.7 MG/DL
ERYTHROCYTE [DISTWIDTH] IN BLOOD BY AUTOMATED COUNT: 14.2 %
EST. GFR  (AFRICAN AMERICAN): >60 ML/MIN/1.73 M^2
EST. GFR  (NON AFRICAN AMERICAN): >60 ML/MIN/1.73 M^2
GLUCOSE SERPL-MCNC: 84 MG/DL
HCT VFR BLD AUTO: 31.1 %
HGB BLD-MCNC: 10 G/DL
MCH RBC QN AUTO: 25.8 PG
MCHC RBC AUTO-ENTMCNC: 32.2 G/DL
MCV RBC AUTO: 80 FL
PLATELET # BLD AUTO: 186 K/UL
PMV BLD AUTO: 8.9 FL
POTASSIUM SERPL-SCNC: 3.4 MMOL/L
RBC # BLD AUTO: 3.87 M/UL
SODIUM SERPL-SCNC: 140 MMOL/L
WBC # BLD AUTO: 6.7 K/UL

## 2019-02-09 PROCEDURE — 36415 COLL VENOUS BLD VENIPUNCTURE: CPT

## 2019-02-09 PROCEDURE — S0030 INJECTION, METRONIDAZOLE: HCPCS | Performed by: SURGERY

## 2019-02-09 PROCEDURE — 94799 UNLISTED PULMONARY SVC/PX: CPT

## 2019-02-09 PROCEDURE — 63600175 PHARM REV CODE 636 W HCPCS: Performed by: SURGERY

## 2019-02-09 PROCEDURE — C9113 INJ PANTOPRAZOLE SODIUM, VIA: HCPCS | Performed by: SURGERY

## 2019-02-09 PROCEDURE — 99900035 HC TECH TIME PER 15 MIN (STAT)

## 2019-02-09 PROCEDURE — 85027 COMPLETE CBC AUTOMATED: CPT

## 2019-02-09 PROCEDURE — 80048 BASIC METABOLIC PNL TOTAL CA: CPT

## 2019-02-09 PROCEDURE — 11000001 HC ACUTE MED/SURG PRIVATE ROOM

## 2019-02-09 PROCEDURE — 25000003 PHARM REV CODE 250: Performed by: SURGERY

## 2019-02-09 RX ADMIN — CIPROFLOXACIN 400 MG: 2 INJECTION, SOLUTION INTRAVENOUS at 07:02

## 2019-02-09 RX ADMIN — METRONIDAZOLE 500 MG: 500 INJECTION, SOLUTION INTRAVENOUS at 11:02

## 2019-02-09 RX ADMIN — HYDROMORPHONE HYDROCHLORIDE 1 MG: 1 INJECTION, SOLUTION INTRAMUSCULAR; INTRAVENOUS; SUBCUTANEOUS at 01:02

## 2019-02-09 RX ADMIN — SODIUM CHLORIDE, SODIUM LACTATE, POTASSIUM CHLORIDE, AND CALCIUM CHLORIDE: .6; .31; .03; .02 INJECTION, SOLUTION INTRAVENOUS at 07:02

## 2019-02-09 RX ADMIN — HYDROMORPHONE HYDROCHLORIDE 1 MG: 1 INJECTION, SOLUTION INTRAMUSCULAR; INTRAVENOUS; SUBCUTANEOUS at 08:02

## 2019-02-09 RX ADMIN — CIPROFLOXACIN 400 MG: 2 INJECTION, SOLUTION INTRAVENOUS at 08:02

## 2019-02-09 RX ADMIN — HYDROMORPHONE HYDROCHLORIDE 1 MG: 1 INJECTION, SOLUTION INTRAMUSCULAR; INTRAVENOUS; SUBCUTANEOUS at 04:02

## 2019-02-09 RX ADMIN — HYDROMORPHONE HYDROCHLORIDE 1 MG: 1 INJECTION, SOLUTION INTRAMUSCULAR; INTRAVENOUS; SUBCUTANEOUS at 12:02

## 2019-02-09 RX ADMIN — METRONIDAZOLE 500 MG: 500 INJECTION, SOLUTION INTRAVENOUS at 08:02

## 2019-02-09 RX ADMIN — DIAZEPAM 5 MG: 5 TABLET ORAL at 09:02

## 2019-02-09 RX ADMIN — STANDARDIZED SENNA CONCENTRATE AND DOCUSATE SODIUM 1 TABLET: 8.6; 5 TABLET ORAL at 08:02

## 2019-02-09 RX ADMIN — ATENOLOL 25 MG: 25 TABLET ORAL at 08:02

## 2019-02-09 RX ADMIN — PANTOPRAZOLE SODIUM 40 MG: 40 INJECTION, POWDER, LYOPHILIZED, FOR SOLUTION INTRAVENOUS at 05:02

## 2019-02-09 RX ADMIN — HYDROMORPHONE HYDROCHLORIDE 1 MG: 1 INJECTION, SOLUTION INTRAMUSCULAR; INTRAVENOUS; SUBCUTANEOUS at 11:02

## 2019-02-09 RX ADMIN — METRONIDAZOLE 500 MG: 500 INJECTION, SOLUTION INTRAVENOUS at 04:02

## 2019-02-09 RX ADMIN — FLUOXETINE 20 MG: 20 CAPSULE ORAL at 08:02

## 2019-02-09 NOTE — PLAN OF CARE
Problem: Adult Inpatient Plan of Care  Goal: Plan of Care Review  Outcome: Ongoing (interventions implemented as appropriate)  Pt complains of generalized cramping abdominal pain. Pain medication is effective. Abdomen is non distended. Dry drainage noted to dressing. Pt denies n/v. Pt is tolerating clear liquid diet well. Pt has not passed any gas. IV fluids are infusing. IV ABX given per order. No injuries. Will continue to monitor. 12 hour chart check is completed.

## 2019-02-09 NOTE — PLAN OF CARE
Problem: Adult Inpatient Plan of Care  Goal: Plan of Care Review  Outcome: Ongoing (interventions implemented as appropriate)  Fall precautions maintained, pt free from injuries/fall.  Repositions and ambulates independently.  C/o abdominal pain, denies nausea/vomiting  Pt ambulating in the hallway multiple times  Denies passing gas or belching  IVF and abx as prescribed  POC and meds discussed with pt, pt verbalized understanding.  Side rails x 2, bed locked and low, phone and call light w/in reach.  Chart check done. Will cont to monitor.

## 2019-02-10 VITALS
WEIGHT: 168 LBS | TEMPERATURE: 99 F | BODY MASS INDEX: 24.88 KG/M2 | HEIGHT: 69 IN | DIASTOLIC BLOOD PRESSURE: 73 MMHG | RESPIRATION RATE: 18 BRPM | OXYGEN SATURATION: 95 % | HEART RATE: 66 BPM | SYSTOLIC BLOOD PRESSURE: 117 MMHG

## 2019-02-10 LAB
ANION GAP SERPL CALC-SCNC: 10 MMOL/L
BUN SERPL-MCNC: 7 MG/DL
CALCIUM SERPL-MCNC: 8.9 MG/DL
CHLORIDE SERPL-SCNC: 105 MMOL/L
CO2 SERPL-SCNC: 24 MMOL/L
CREAT SERPL-MCNC: 0.7 MG/DL
ERYTHROCYTE [DISTWIDTH] IN BLOOD BY AUTOMATED COUNT: 14.1 %
EST. GFR  (AFRICAN AMERICAN): >60 ML/MIN/1.73 M^2
EST. GFR  (NON AFRICAN AMERICAN): >60 ML/MIN/1.73 M^2
GLUCOSE SERPL-MCNC: 87 MG/DL
HCT VFR BLD AUTO: 34.5 %
HGB BLD-MCNC: 10.9 G/DL
MCH RBC QN AUTO: 25.4 PG
MCHC RBC AUTO-ENTMCNC: 31.6 G/DL
MCV RBC AUTO: 80 FL
PLATELET # BLD AUTO: 209 K/UL
PMV BLD AUTO: 8.9 FL
POTASSIUM SERPL-SCNC: 3.5 MMOL/L
RBC # BLD AUTO: 4.29 M/UL
SODIUM SERPL-SCNC: 139 MMOL/L
WBC # BLD AUTO: 5.9 K/UL

## 2019-02-10 PROCEDURE — S0030 INJECTION, METRONIDAZOLE: HCPCS | Performed by: SURGERY

## 2019-02-10 PROCEDURE — 25000003 PHARM REV CODE 250: Performed by: SURGERY

## 2019-02-10 PROCEDURE — 85027 COMPLETE CBC AUTOMATED: CPT

## 2019-02-10 PROCEDURE — 99024 PR POST-OP FOLLOW-UP VISIT: ICD-10-PCS | Mod: ,,, | Performed by: SURGERY

## 2019-02-10 PROCEDURE — 63600175 PHARM REV CODE 636 W HCPCS: Performed by: SURGERY

## 2019-02-10 PROCEDURE — C9113 INJ PANTOPRAZOLE SODIUM, VIA: HCPCS | Performed by: SURGERY

## 2019-02-10 PROCEDURE — 99024 POSTOP FOLLOW-UP VISIT: CPT | Mod: ,,, | Performed by: SURGERY

## 2019-02-10 PROCEDURE — 80048 BASIC METABOLIC PNL TOTAL CA: CPT

## 2019-02-10 PROCEDURE — 36415 COLL VENOUS BLD VENIPUNCTURE: CPT

## 2019-02-10 PROCEDURE — 94799 UNLISTED PULMONARY SVC/PX: CPT

## 2019-02-10 RX ORDER — METRONIDAZOLE 500 MG/1
500 TABLET ORAL EVERY 8 HOURS
Qty: 9 TABLET | Refills: 0 | Status: SHIPPED | OUTPATIENT
Start: 2019-02-10 | End: 2019-02-13

## 2019-02-10 RX ORDER — MEPERIDINE HYDROCHLORIDE 50 MG/1
50 TABLET ORAL EVERY 6 HOURS PRN
Status: DISCONTINUED | OUTPATIENT
Start: 2019-02-10 | End: 2019-02-10 | Stop reason: HOSPADM

## 2019-02-10 RX ORDER — HYDROMORPHONE HYDROCHLORIDE 1 MG/ML
1 INJECTION, SOLUTION INTRAMUSCULAR; INTRAVENOUS; SUBCUTANEOUS
Status: DISCONTINUED | OUTPATIENT
Start: 2019-02-10 | End: 2019-02-10 | Stop reason: HOSPADM

## 2019-02-10 RX ORDER — CIPROFLOXACIN 500 MG/1
500 TABLET ORAL EVERY 12 HOURS
Qty: 6 TABLET | Refills: 0 | Status: SHIPPED | OUTPATIENT
Start: 2019-02-10 | End: 2019-02-13

## 2019-02-10 RX ORDER — MEPERIDINE HYDROCHLORIDE 50 MG/1
50 TABLET ORAL EVERY 6 HOURS PRN
Qty: 30 TABLET | Refills: 0 | Status: SHIPPED | OUTPATIENT
Start: 2019-02-10 | End: 2019-03-05

## 2019-02-10 RX ADMIN — CIPROFLOXACIN 400 MG: 2 INJECTION, SOLUTION INTRAVENOUS at 07:02

## 2019-02-10 RX ADMIN — SODIUM CHLORIDE, SODIUM LACTATE, POTASSIUM CHLORIDE, AND CALCIUM CHLORIDE: .6; .31; .03; .02 INJECTION, SOLUTION INTRAVENOUS at 05:02

## 2019-02-10 RX ADMIN — STANDARDIZED SENNA CONCENTRATE AND DOCUSATE SODIUM 1 TABLET: 8.6; 5 TABLET ORAL at 08:02

## 2019-02-10 RX ADMIN — FLUOXETINE 20 MG: 20 CAPSULE ORAL at 08:02

## 2019-02-10 RX ADMIN — ATENOLOL 25 MG: 25 TABLET ORAL at 08:02

## 2019-02-10 RX ADMIN — METRONIDAZOLE 500 MG: 500 INJECTION, SOLUTION INTRAVENOUS at 05:02

## 2019-02-10 RX ADMIN — PANTOPRAZOLE SODIUM 40 MG: 40 INJECTION, POWDER, LYOPHILIZED, FOR SOLUTION INTRAVENOUS at 05:02

## 2019-02-10 RX ADMIN — HYDROMORPHONE HYDROCHLORIDE 1 MG: 1 INJECTION, SOLUTION INTRAMUSCULAR; INTRAVENOUS; SUBCUTANEOUS at 05:02

## 2019-02-10 RX ADMIN — MEPERIDINE HYDROCHLORIDE 50 MG: 50 TABLET ORAL at 08:02

## 2019-02-10 NOTE — PLAN OF CARE
Problem: Adult Inpatient Plan of Care  Goal: Plan of Care Review  Outcome: Ongoing (interventions implemented as appropriate)  Pt complains of generalized cramping abdominal pain. Pain medication is effective. Abdomen is non distended. Dry drainage noted to dressing. Pt denies n/v. Pt is tolerating clear liquid diet well. Pt has passed  gas. IV fluids are infusing. IV ABX given per order. No injuries. Will continue to monitor. 12 hour chart check is completed.

## 2019-02-10 NOTE — NURSING
Dr. Boone at bedside, removed midline dressing. Steri strips in place. Pain controlled. Tolerating regular diet. Adequate for dc.

## 2019-02-10 NOTE — PLAN OF CARE
02/10/19 0928   Final Note   Assessment Type Final Discharge Note   Anticipated Discharge Disposition Home   Right Care Referral Info   Post Acute Recommendation No Care

## 2019-02-10 NOTE — NURSING
Discharge instructions, medications, and appointments reviewed with patient and spouse. Midline PJ w/ steri strips intact. Incision care, weight lifting restrictions, and no driving for at least 2 weeks discussed, teach back method use. Pt and spouse verbalized understanding.

## 2019-02-10 NOTE — DISCHARGE SUMMARY
[facility] -   General Surgery  Discharge Summary      Patient Name: Sandra Dimas  MRN: [id number]  Admission Date: 2/6/2019  Hospital Length of Stay: 4 days  Discharge Date and Time:  02/10/2019 6:17 AM  Attending Physician: Ramakrishna Boone MD   Discharging Provider: Ramakrishna Boone MD  Primary Care Provider: OSIRIS Gamez Jr, MD    HPI:   43-year-old female with acute onset of abdominal pain that began around 3:00 p.m. today.  Abdominal flat direct was obtained as an outpatient which revealed no acute pathology. However her pain progressed and she had 1 episode of nausea with emesis at 8:00 p.m. a for she proceeded to the ER.  Workup revealed no leukocytosis but a CT scan did show dilated loops of small bowel within the pelvis which is consistent with partial small-bowel obstruction due to adhesive disease from prior gynecologic surgeries.  She reports 1 episode of diarrhea today.  No prior history of bowel obstruction. No prior endoscopy.    Procedure(s) (LRB):  LAPAROTOMY, EXPLORATORY (N/A)  EXCISION, SMALL INTESTINE (N/A)  ANASTAMOSIS (N/A)      Indwelling Lines/Drains at time of discharge:   Lines/Drains/Airways          None        Hospital Course: 02/07/2019 - Mild improvement in lower abdominal pain. No flatus or bowel movement.  Requiring IV Dilaudid for pain control.   02/08/2019 s/p exploratory laparotomy with small bowel resection pod1 dc christy, ice chips ok, pain controlled with dilaudid. Indomethacin prn for severe headaches.     2/9/2019:  The patient is up and ambulating.  Continues to complain lower incisional pain. She is tolerating clear liquid.  Will continue with current management.  Patient was encouraged to ambulate more.    2/10/2019: POD 3. Doing well and ambulating. Moderate incisional pain but tolerable. Wishes to go home. Continue current management. Discharge today.    Consults:   Consults (From admission, onward)        Status Ordering Provider     Consult to Case  Management/Social Work  Once     Provider:  (Not yet assigned)    Completed ZARIA POOLE          Significant Diagnostic Studies: Labs:   BMP:   Recent Labs   Lab 02/09/19 0522   GLU 84      K 3.4*      CO2 23   BUN 10   CREATININE 0.7   CALCIUM 8.3*    and CBC   Recent Labs   Lab 02/09/19 0522   WBC 6.70   HGB 10.0*   HCT 31.1*          Pending Diagnostic Studies:     Procedure Component Value Units Date/Time    Basic metabolic panel [[id number]] Collected:  02/10/19 0555    Order Status:  Sent Lab Status:  In process Updated:  02/10/19 0555    Specimen:  Blood     Hematology Profile [[id number]] Collected:  02/10/19 0555    Order Status:  Sent Lab Status:  In process Updated:  02/10/19 0555    Specimen:  Blood         Final Active Diagnoses:    Diagnosis Date Noted POA    PRINCIPAL PROBLEM:  Small bowel obstruction [K56.609] 02/06/2019 Yes    GERD (gastroesophageal reflux disease) [K21.9] 05/01/2018 Yes      Problems Resolved During this Admission:      Discharged Condition: good    Disposition:     Follow Up:  Follow-up Information     Ramakrishna Boone MD On 2/22/2019.    Specialties:  General Surgery, Bariatrics  Why:  For wound re-check  Contact information:  [address]  [place] [address] [phone]                 Patient Instructions:   No discharge procedures on file.  Medications:  Reconciled Home Medications:      Medication List      START taking these medications    ciprofloxacin HCl 500 MG tablet  Commonly known as:  CIPRO  Take 1 tablet (500 mg total) by mouth every 12 (twelve) hours. for 3 days     meperidine 50 mg tablet  Commonly known as:  DEMEROL  Take 1 tablet (50 mg total) by mouth every 6 (six) hours as needed for Pain.     metroNIDAZOLE 500 MG tablet  Commonly known as:  FLAGYL  Take 1 tablet (500 mg total) by mouth every 8 (eight) hours. for 3 days        CONTINUE taking these medications    atenolol 25 MG tablet  Commonly known as:  TENORMIN  Take 1  tablet (25 mg total) by mouth once daily.     cephALEXin 500 MG capsule  Commonly known as:  KEFLEX  Take 500 mg by mouth 3 (three) times daily.     diazePAM 5 MG tablet  Commonly known as:  VALIUM  Take 1 tablet (5 mg total) by mouth every 12 (twelve) hours as needed for Anxiety or Insomnia.     fluocinonide 0.05 % ointment  Commonly known as:  LIDEX  Apply topically 2 (two) times daily.     FLUoxetine 20 MG capsule  Take 1 capsule (20 mg total) by mouth once daily.     indomethacin 25 MG capsule  Commonly known as:  INDOCIN  TAKE 2 CAPSULES BY MOUTH THREE TIMES DAILY     levonorgestrel-ethinyl estrad 90-20 mcg per tablet  Commonly known as:  LYBREL  Take 1 tablet by mouth once daily.     ondansetron 8 MG tablet  Commonly known as:  ZOFRAN  Take 1 tablet (8 mg total) by mouth every 8 (eight) hours as needed for Nausea.     pantoprazole 40 MG tablet  Commonly known as:  PROTONIX  Take 1 tablet (40 mg total) by mouth once daily.     sulfamethoxazole-trimethoprim 200-40 mg/5 ml 200-40 mg/5 mL Susp  Commonly known as:  BACTRIM,SEPTRA  Take 8 mg/kg/day by mouth every 12 (twelve) hours.     traMADol 50 mg tablet  Commonly known as:  ULTRAM  Take 1-2 tablets ( mg total) by mouth every 6 (six) hours as needed for Pain.     traZODone 50 MG tablet  Commonly known as:  DESYREL  Take 1-2 tablets ( mg total) by mouth every evening.     vitamin D 1000 units Tab  Commonly known as:  VITAMIN D3  Take 1,000 Units by mouth once daily.          Time spent on the discharge of patient: 10 minutes    Ramakrishna Boone MD  General Surgery  [facility] -

## 2019-02-11 NOTE — PROGRESS NOTES
[facility]  PODIATRIC MEDICINE AND SURGERY      CHIEF COMPLAINT   Chief Complaint   Patient presents with    Foot Pain     wart in between 4th and 5th digits pain rated at 4/10         HPI:    Sandra Dimas is a 43 y.o. female presenting to podiatry clinic with complaint of painful corn in between 4th and 5th digits of left foot. Patient states that she has tried trimming of the lesion but recurrence of the lesion occurs.  Patient inquires about available treatment options. No further pedal complaints.      PMH  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    Endometriosis     Hemicrania continua     Migraines        PROBLEM LIST  Patient Active Problem List    Diagnosis Date Noted    Small bowel obstruction 02/06/2019    Chronic paroxysmal hemicrania 01/03/2019    Anxiety 01/03/2019    Abnormal vaginal bleeding 12/26/2018    S/P laparoscopy 12/26/2018    Hiatal hernia 11/30/2018    NSAID long-term use 11/12/2018    Menorrhagia with regular cycle 11/12/2018    Iron deficiency anemia due to chronic blood loss 10/26/2018    Migraines 05/16/2018    Recurrent major depressive disorder, in partial remission 05/01/2018    GERD (gastroesophageal reflux disease) 05/01/2018    Primary snoring 07/27/2017    Cyst of left ovary 03/12/2012       MEDS  Current Outpatient Medications on File Prior to Visit   Medication Sig Dispense Refill    atenolol (TENORMIN) 25 MG tablet Take 1 tablet (25 mg total) by mouth once daily. 90 tablet 3    cephALEXin (KEFLEX) 500 MG capsule Take 500 mg by mouth 3 (three) times daily.      FLUoxetine (PROZAC) 20 MG capsule Take 1 capsule (20 mg total) by mouth once daily. 90 capsule 3    indomethacin (INDOCIN) 25 MG capsule TAKE 2 CAPSULES BY MOUTH THREE TIMES DAILY 180 capsule 6    ondansetron (ZOFRAN) 8 MG tablet Take 1 tablet (8 mg total) by mouth every 8 (eight) hours as needed for Nausea. 30 tablet 0    pantoprazole  (PROTONIX) 40 MG tablet Take 1 tablet (40 mg total) by mouth once daily. 30 tablet 11    traMADol (ULTRAM) 50 mg tablet Take 1-2 tablets ( mg total) by mouth every 6 (six) hours as needed for Pain. 30 tablet 0    traZODone (DESYREL) 50 MG tablet Take 1-2 tablets ( mg total) by mouth every evening. 60 tablet 11    vitamin D (VITAMIN D3) 1000 units Tab Take 1,000 Units by mouth once daily.      diazePAM (VALIUM) 5 MG tablet Take 1 tablet (5 mg total) by mouth every 12 (twelve) hours as needed for Anxiety or Insomnia. 10 tablet 0    fluocinonide (LIDEX) 0.05 % ointment Apply topically 2 (two) times daily. 60 g 1    levonorgestrel-ethinyl estrad (LYBREL) 90-20 mcg per tablet Take 1 tablet by mouth once daily. 30 tablet 11    sulfamethoxazole-trimethoprim 200-40 mg/5 ml (BACTRIM,SEPTRA) 200-40 mg/5 mL Susp Take 8 mg/kg/day by mouth every 12 (twelve) hours.       Current Facility-Administered Medications on File Prior to Visit   Medication Dose Route Frequency Provider Last Rate Last Dose    lactated ringers infusion   Intravenous Continuous Rip Doss MD   Stopped at 12/26/18 1542    lidocaine (PF) 10 mg/ml (1%) injection 10 mg  1 mL Intradermal Once Rip Doss MD           [facility]     Past Surgical History:   Procedure Laterality Date    ANASTAMOSIS N/A 2/7/2019    Performed by [facility] Boone [facility] at [facility] OR    APPENDECTOMY      diagnostic laprascopy      EGD (ESOPHAGOGASTRODUODENOSCOPY) N/A 11/30/2018    Performed by Bossman Bustos MD at [facility] ENDO    EXCISION, SMALL INTESTINE N/A 2/7/2019    Performed by [facility]one [facility] at [facility] OR    FULGURATION, ENDOMETRIOSIS  12/26/2018    Performed by Zehar Jensen MD at [facility] OR    HYSTEROSCOPY, WITH DILATION AND CURETTAGE OF UTERUS AND HYDROTHERMAL ENDOMETRIAL ABLATION N/A 12/26/2018    Performed by Zehra Jensen [facility] at [facility] OR    LAPAROTOMY, EXPLORATORY N/A 2/7/2019    Performed by [facility]one [facility] at [facility] OR    robotic assisted laparoscopy  "with excision of ovarian endometrioma and chromotubation      SALPINGECTOMY, LAPAROSCOPIC Bilateral 12/26/2018    Performed by [facility] at [facility] OR        ALL  Review of patient's allergies indicates:   Allergen Reactions    Acetaminophen Other (See Comments)    Hydrocodone Other (See Comments)     Chest pains. Note: patient tolerated hydromorphone in 2/2019.     Chlorhexidine Rash     Per  after surgery patient had large rash across abdomen where chlorhexidine was applied.       SOC     Social History     Tobacco Use    Smoking status: Never Smoker    Smokeless tobacco: Never Used   Substance Use Topics    Alcohol use: Yes     Frequency: Monthly or less     Drinks per session: 1 or 2     Binge frequency: Never     Comment: rarely    Drug use: No         Family HX    Family History   Problem Relation Age of Onset    Strabismus Neg Hx     Retinal detachment Neg Hx     Macular degeneration Neg Hx     Glaucoma Neg Hx     Blindness Neg Hx     Amblyopia Neg Hx     Breast cancer Neg Hx     Colon cancer Neg Hx     Ovarian cancer Neg Hx     Thyroid disease Neg Hx     Migraines Neg Hx     Asthma Neg Hx             REVIEW OF SYSTEMS  General: Denies any fever or chills  Chest: Denies shortness of breath, wheezing, coughing, or sputum production  Heart: Denies chest pain, cold extremities, orthopenia, or reduced exercise tolerance  As noted above and per history of current illness above, otherwise negative in the remainder of the 14 systems.      PHYSICAL EXAM:      Vitals:    02/05/19 1524   BP: 103/70   Pulse: 65   Weight: 74 kg (163 lb 2.3 oz)   Height: 5' 8" (1.727 m)   PainSc:   3       General: This patient is well-developed, well-nourished and appears stated age, well-oriented to person, place and time, and cooperative and pleasant on today's visit    LOWER EXTREMITY PHYSICAL EXAM  VASCULAR  Dorsalis pedis and posterior tibial pulses palpable 2/4 bilaterally.   Capillary refill " time immediate to the toes.   Feet are warm to the touch. Skin temperature warm to warm from proximally to distally   There are no varicosities, telangiectasias noted to bilateral foot and ankle regions.   There are no ecchymoses noted to bilateral foot and ankle regions.   There is no gross lower extremity edema.    DERMATOLOGIC  Skin moist with healthy texture and turgor.  There are no open ulcerations, lacerations, or fissures to bilateral foot and ankle regions. There are no signs of infection as there is no erythema, no proximal-extending lymphangiitis, no fluctuance, or crepitus noted on palpation to bilateral foot and ankle regions.   There is no interdigital maceration.   There is hyperkeratotic lesion lateral aspect fourth digit, post debridement reveals no open wound    NEUROLOGIC  Epicritic sensation is intact as the patient is able to sense light touch to bilateral foot and ankle regions.   Achilles and patellar deep tendon reflexes intact  Babinski reflex absent    ORTHOPEDIC/BIOMECHANICAL  Muscle strength AT/EHL/EDL/PT: 5/5; Achilles/Gastroc/Soleus: 5/5; PB/PL: 5/5 Muscle tone is normal.  Ankle joint ROM INTACT DF/PF, non-crepitus      ASSESSMENT   Corn or callus        PLAN    1. Patient was educated about clinical and imaging findings, and verbalizes understanding of above.  2.  With patient's permission via verbal consent, the involved area was cleansed with an alcohol swab. Trimming of hyperkeratotic lesions deep to epidermal layer x 1 was performed with a #15 blade without incident. Patient relates relief following the procedure. Patient will continue to monitor the areas daily, inspect feet, wear protective shoe gear when ambulatory, moisturizer to maintain skin integrity.   Discussed corn pads and offloading with toe spacer/silicone pad    Report Electronically Signed By:     Bev Boyd DPM   Podiatry  [facility]  2/11/2019

## 2019-02-25 ENCOUNTER — TELEPHONE (OUTPATIENT)
Dept: NEUROLOGY | Facility: HOSPITAL | Age: 44
End: 2019-02-25

## 2019-02-26 NOTE — TELEPHONE ENCOUNTER
I spoke with pt's  as she is having sign HA since laparatomy.  Brief HAs consistent with CPH.    Plan:  If patient agrees, will try prednisone 60mg/d x 3 days.  She will let me know.  RZ        Prophylactics              Effective: indomethacin, ibuprofen               Ineffective/not tolerated:  Elavil, verapamil, propranolol, Depakote, topiramate, lithium, GONB, Botox, Celebrex     Abortives              Effective: Stadol, Zofran, Percocet              Ineffective/not tolerated: Imitrex, Maxalt, Zomig, Relpax, Frova, steroids, magnesium, DHE, oxygen

## 2019-02-27 ENCOUNTER — TELEPHONE (OUTPATIENT)
Dept: NEUROLOGY | Facility: HOSPITAL | Age: 44
End: 2019-02-27

## 2019-02-27 ENCOUNTER — TELEPHONE (OUTPATIENT)
Dept: OBSTETRICS AND GYNECOLOGY | Facility: CLINIC | Age: 44
End: 2019-02-27

## 2019-02-27 RX ORDER — PREDNISONE 20 MG/1
60 TABLET ORAL DAILY
Qty: 9 TABLET | Refills: 0 | Status: SHIPPED | OUTPATIENT
Start: 2019-02-27 | End: 2019-03-02

## 2019-02-27 RX ORDER — NORETHINDRONE 5 MG/1
5 TABLET ORAL DAILY
Qty: 30 TABLET | Refills: 6 | Status: SHIPPED | OUTPATIENT
Start: 2019-02-27 | End: 2019-04-10

## 2019-02-28 ENCOUNTER — CLINICAL SUPPORT (OUTPATIENT)
Dept: OBSTETRICS AND GYNECOLOGY | Facility: CLINIC | Age: 44
End: 2019-02-28
Payer: COMMERCIAL

## 2019-02-28 PROCEDURE — 96372 PR INJECTION,THERAP/PROPH/DIAG2ST, IM OR SUBCUT: ICD-10-PCS | Mod: S$GLB,,, | Performed by: OBSTETRICS & GYNECOLOGY

## 2019-02-28 PROCEDURE — 96372 THER/PROPH/DIAG INJ SC/IM: CPT | Mod: S$GLB,,, | Performed by: OBSTETRICS & GYNECOLOGY

## 2019-02-28 PROCEDURE — 99999 PR PBB SHADOW E&M-EST. PATIENT-LVL II: CPT | Mod: PBBFAC,,,

## 2019-02-28 PROCEDURE — 99999 PR PBB SHADOW E&M-EST. PATIENT-LVL II: ICD-10-PCS | Mod: PBBFAC,,,

## 2019-02-28 NOTE — PROGRESS NOTES
Verified patient with 2 patient identifiers. Allergies and medications reviewed.   Depo Lupron 3.75mg given IM to right ventrogluteal using aseptic technique.   No discomfort noted. Patient tolerated well.     Next Depo injection scheduled.    Patient advised to wait 15 minutes in lobby to monitor for reaction.   Patient verbalized understanding.

## 2019-03-05 ENCOUNTER — LAB VISIT (OUTPATIENT)
Dept: LAB | Facility: HOSPITAL | Age: 44
End: 2019-03-05
Attending: SURGERY
Payer: COMMERCIAL

## 2019-03-05 ENCOUNTER — OFFICE VISIT (OUTPATIENT)
Dept: SURGERY | Facility: CLINIC | Age: 44
End: 2019-03-05
Payer: COMMERCIAL

## 2019-03-05 VITALS
WEIGHT: 164.88 LBS | BODY MASS INDEX: 24.99 KG/M2 | SYSTOLIC BLOOD PRESSURE: 100 MMHG | DIASTOLIC BLOOD PRESSURE: 62 MMHG | HEIGHT: 68 IN | TEMPERATURE: 98 F | HEART RATE: 80 BPM

## 2019-03-05 DIAGNOSIS — Z98.890 S/P EXPLORATORY LAPAROTOMY: Primary | ICD-10-CM

## 2019-03-05 DIAGNOSIS — Z98.890 S/P EXPLORATORY LAPAROTOMY: ICD-10-CM

## 2019-03-05 LAB
ANION GAP SERPL CALC-SCNC: 9 MMOL/L
BASOPHILS # BLD AUTO: 0.03 K/UL
BASOPHILS NFR BLD: 0.5 %
BUN SERPL-MCNC: 18 MG/DL
CALCIUM SERPL-MCNC: 10 MG/DL
CHLORIDE SERPL-SCNC: 106 MMOL/L
CO2 SERPL-SCNC: 22 MMOL/L
CREAT SERPL-MCNC: 0.7 MG/DL
DIFFERENTIAL METHOD: ABNORMAL
EOSINOPHIL # BLD AUTO: 0.1 K/UL
EOSINOPHIL NFR BLD: 1.5 %
ERYTHROCYTE [DISTWIDTH] IN BLOOD BY AUTOMATED COUNT: 13.1 %
EST. GFR  (AFRICAN AMERICAN): >60 ML/MIN/1.73 M^2
EST. GFR  (NON AFRICAN AMERICAN): >60 ML/MIN/1.73 M^2
GLUCOSE SERPL-MCNC: 89 MG/DL
HCT VFR BLD AUTO: 38.4 %
HGB BLD-MCNC: 12.1 G/DL
IMM GRANULOCYTES # BLD AUTO: 0.02 K/UL
IMM GRANULOCYTES NFR BLD AUTO: 0.3 %
LYMPHOCYTES # BLD AUTO: 2.5 K/UL
LYMPHOCYTES NFR BLD: 38.9 %
MCH RBC QN AUTO: 25.6 PG
MCHC RBC AUTO-ENTMCNC: 31.5 G/DL
MCV RBC AUTO: 81 FL
MONOCYTES # BLD AUTO: 0.4 K/UL
MONOCYTES NFR BLD: 6 %
NEUTROPHILS # BLD AUTO: 3.4 K/UL
NEUTROPHILS NFR BLD: 52.8 %
NRBC BLD-RTO: 0 /100 WBC
PLATELET # BLD AUTO: 274 K/UL
PMV BLD AUTO: 9.8 FL
POTASSIUM SERPL-SCNC: 4.4 MMOL/L
RBC # BLD AUTO: 4.72 M/UL
SODIUM SERPL-SCNC: 137 MMOL/L
WBC # BLD AUTO: 6.47 K/UL

## 2019-03-05 PROCEDURE — 85025 COMPLETE CBC W/AUTO DIFF WBC: CPT

## 2019-03-05 PROCEDURE — 99024 POSTOP FOLLOW-UP VISIT: CPT | Mod: S$GLB,,, | Performed by: SURGERY

## 2019-03-05 PROCEDURE — 99999 PR PBB SHADOW E&M-EST. PATIENT-LVL III: ICD-10-PCS | Mod: PBBFAC,,, | Performed by: SURGERY

## 2019-03-05 PROCEDURE — 99024 PR POST-OP FOLLOW-UP VISIT: ICD-10-PCS | Mod: S$GLB,,, | Performed by: SURGERY

## 2019-03-05 PROCEDURE — 99999 PR PBB SHADOW E&M-EST. PATIENT-LVL III: CPT | Mod: PBBFAC,,, | Performed by: SURGERY

## 2019-03-05 PROCEDURE — 80048 BASIC METABOLIC PNL TOTAL CA: CPT

## 2019-03-05 PROCEDURE — 36415 COLL VENOUS BLD VENIPUNCTURE: CPT

## 2019-03-05 NOTE — PROGRESS NOTES
Sandra is 43-year-old  female who recently underwent exploratory laparotomy with small-bowel resection due to small bowel obstruction induced by endometriosis.  She is status post surgery for about 3 weeks.  She continues to feel vague lower abdominal pain both size.  She denies any other fever and chill.  She denies of having constipation or diarrhea.  She has normal bowel movement.  She her oral intake is normal. And she has no difficulty with voiding.  She denies any other inciting factors but the discomfort Sir persistent.  The physical exam confirms slight tenderness on both lower abdomen.  We will proceed with CBC and BMP as well as urinalysis.  We discussed about the possibility of doing abdominal CT scan.  I will check the results of the blood workups and decide if we need to proceed with the CT scan.    Ramakrishna Boone

## 2019-03-28 ENCOUNTER — CLINICAL SUPPORT (OUTPATIENT)
Dept: OBSTETRICS AND GYNECOLOGY | Facility: CLINIC | Age: 44
End: 2019-03-28
Payer: COMMERCIAL

## 2019-03-28 PROCEDURE — 99999 PR PBB SHADOW E&M-EST. PATIENT-LVL II: ICD-10-PCS | Mod: PBBFAC,,,

## 2019-03-28 PROCEDURE — 96372 PR INJECTION,THERAP/PROPH/DIAG2ST, IM OR SUBCUT: ICD-10-PCS | Mod: S$GLB,,, | Performed by: OBSTETRICS & GYNECOLOGY

## 2019-03-28 PROCEDURE — 99999 PR PBB SHADOW E&M-EST. PATIENT-LVL II: CPT | Mod: PBBFAC,,,

## 2019-03-28 PROCEDURE — 96372 THER/PROPH/DIAG INJ SC/IM: CPT | Mod: S$GLB,,, | Performed by: OBSTETRICS & GYNECOLOGY

## 2019-03-28 NOTE — PROGRESS NOTES
After identifying patient with 2 identifiers, verifying that patient wished to receive Depo lupron, reviewing allergies, Depo lupron administered to left ventrogluteal.  Patient tolerated well.  Patient instructed to wait 15 minutes and verbalized understanding.  Next appointment scheduled.

## 2019-04-10 ENCOUNTER — OFFICE VISIT (OUTPATIENT)
Dept: INTERNAL MEDICINE | Facility: CLINIC | Age: 44
End: 2019-04-10
Payer: COMMERCIAL

## 2019-04-10 ENCOUNTER — TELEPHONE (OUTPATIENT)
Dept: INTERNAL MEDICINE | Facility: CLINIC | Age: 44
End: 2019-04-10

## 2019-04-10 VITALS
RESPIRATION RATE: 16 BRPM | HEART RATE: 65 BPM | OXYGEN SATURATION: 98 % | HEIGHT: 68 IN | WEIGHT: 166.25 LBS | TEMPERATURE: 99 F | BODY MASS INDEX: 25.2 KG/M2 | DIASTOLIC BLOOD PRESSURE: 68 MMHG | SYSTOLIC BLOOD PRESSURE: 122 MMHG

## 2019-04-10 DIAGNOSIS — E66.3 OVERWEIGHT: Primary | ICD-10-CM

## 2019-04-10 DIAGNOSIS — F33.41 RECURRENT MAJOR DEPRESSIVE DISORDER, IN PARTIAL REMISSION: ICD-10-CM

## 2019-04-10 PROCEDURE — 3008F BODY MASS INDEX DOCD: CPT | Mod: CPTII,S$GLB,, | Performed by: PEDIATRICS

## 2019-04-10 PROCEDURE — 99214 OFFICE O/P EST MOD 30 MIN: CPT | Mod: S$GLB,,, | Performed by: PEDIATRICS

## 2019-04-10 PROCEDURE — 3008F PR BODY MASS INDEX (BMI) DOCUMENTED: ICD-10-PCS | Mod: CPTII,S$GLB,, | Performed by: PEDIATRICS

## 2019-04-10 PROCEDURE — 99999 PR PBB SHADOW E&M-EST. PATIENT-LVL IV: CPT | Mod: PBBFAC,,, | Performed by: PEDIATRICS

## 2019-04-10 PROCEDURE — 99999 PR PBB SHADOW E&M-EST. PATIENT-LVL IV: ICD-10-PCS | Mod: PBBFAC,,, | Performed by: PEDIATRICS

## 2019-04-10 PROCEDURE — 99214 PR OFFICE/OUTPT VISIT, EST, LEVL IV, 30-39 MIN: ICD-10-PCS | Mod: S$GLB,,, | Performed by: PEDIATRICS

## 2019-04-10 NOTE — PATIENT INSTRUCTIONS
Contrave starting instructions: Get coupon online    Start 1 tablet at breakfast for 4-7 days  Then 1 at bkfst and 1 at supper for 4-7 days.  Then  2 at brkfst and 1 at supper for 4-7 days  Then 2 at brkfst and 2 at supper thereafter    NO NARCOTICS OF ANY SORT

## 2019-04-10 NOTE — PROGRESS NOTES
She is here for feeling poorly after her SBO surgery from endimetriosis. She is on Lupron, having perimenopausal symptoms, Slow to recover from surgery for activity, has gained 20 pounds, she is angry and emotional. No HI/SI. On prozac 20. Has not had to use any narcotics for migraines, has not filled since 1/19 by [facility] En[facility]. She has tried several diet plans, none worked.    After extension d/w patient, she will continue prozac 20 mg, start contrave(other options discussed). Opiate use contraindicated with contrave, also d/w . F/U in 6 weeks. 20 minutes with patient.

## 2019-04-25 ENCOUNTER — CLINICAL SUPPORT (OUTPATIENT)
Dept: OBSTETRICS AND GYNECOLOGY | Facility: CLINIC | Age: 44
End: 2019-04-25
Payer: COMMERCIAL

## 2019-04-25 PROCEDURE — 96372 THER/PROPH/DIAG INJ SC/IM: CPT | Mod: S$GLB,,, | Performed by: OBSTETRICS & GYNECOLOGY

## 2019-04-25 PROCEDURE — 96372 PR INJECTION,THERAP/PROPH/DIAG2ST, IM OR SUBCUT: ICD-10-PCS | Mod: S$GLB,,, | Performed by: OBSTETRICS & GYNECOLOGY

## 2019-04-25 PROCEDURE — 99999 PR PBB SHADOW E&M-EST. PATIENT-LVL I: CPT | Mod: PBBFAC,,,

## 2019-04-25 PROCEDURE — 99999 PR PBB SHADOW E&M-EST. PATIENT-LVL I: ICD-10-PCS | Mod: PBBFAC,,,

## 2019-04-25 NOTE — PROGRESS NOTES
Pt. Received depo lupron today after using 2 patient identifier. Pt. Tolerated well. Instructed pt. To wait in clinic for 15 minutes. Pt. Voiced understanding. Next appointment made and copy of AVS given to patient.

## 2019-04-29 RX ORDER — NORETHINDRONE 5 MG/1
5 TABLET ORAL DAILY
Qty: 30 TABLET | Refills: 6 | Status: CANCELLED | OUTPATIENT
Start: 2019-04-29 | End: 2020-04-28

## 2019-05-09 ENCOUNTER — TELEPHONE (OUTPATIENT)
Dept: OBSTETRICS AND GYNECOLOGY | Facility: CLINIC | Age: 44
End: 2019-05-09

## 2019-05-09 RX ORDER — NORETHINDRONE 5 MG/1
5 TABLET ORAL DAILY
Qty: 30 TABLET | Refills: 5 | Status: SHIPPED | OUTPATIENT
Start: 2019-05-09 | End: 2019-10-07

## 2019-05-20 ENCOUNTER — TELEPHONE (OUTPATIENT)
Dept: OBSTETRICS AND GYNECOLOGY | Facility: HOSPITAL | Age: 44
End: 2019-05-20

## 2019-05-22 ENCOUNTER — OFFICE VISIT (OUTPATIENT)
Dept: INTERNAL MEDICINE | Facility: CLINIC | Age: 44
End: 2019-05-22
Payer: COMMERCIAL

## 2019-05-22 VITALS
SYSTOLIC BLOOD PRESSURE: 92 MMHG | WEIGHT: 155.63 LBS | DIASTOLIC BLOOD PRESSURE: 62 MMHG | RESPIRATION RATE: 16 BRPM | BODY MASS INDEX: 23.59 KG/M2 | HEART RATE: 71 BPM | HEIGHT: 68 IN | OXYGEN SATURATION: 97 % | TEMPERATURE: 97 F

## 2019-05-22 DIAGNOSIS — E66.3 OVERWEIGHT: Primary | ICD-10-CM

## 2019-05-22 PROCEDURE — 3008F BODY MASS INDEX DOCD: CPT | Mod: CPTII,S$GLB,, | Performed by: PEDIATRICS

## 2019-05-22 PROCEDURE — 99999 PR PBB SHADOW E&M-EST. PATIENT-LVL III: CPT | Mod: PBBFAC,,, | Performed by: PEDIATRICS

## 2019-05-22 PROCEDURE — 99212 PR OFFICE/OUTPT VISIT, EST, LEVL II, 10-19 MIN: ICD-10-PCS | Mod: S$GLB,,, | Performed by: PEDIATRICS

## 2019-05-22 PROCEDURE — 3008F PR BODY MASS INDEX (BMI) DOCUMENTED: ICD-10-PCS | Mod: CPTII,S$GLB,, | Performed by: PEDIATRICS

## 2019-05-22 PROCEDURE — 99999 PR PBB SHADOW E&M-EST. PATIENT-LVL III: ICD-10-PCS | Mod: PBBFAC,,, | Performed by: PEDIATRICS

## 2019-05-22 PROCEDURE — 99212 OFFICE O/P EST SF 10 MIN: CPT | Mod: S$GLB,,, | Performed by: PEDIATRICS

## 2019-05-22 NOTE — PROGRESS NOTES
Subjective:       Patient ID: Sandra Dimas is a 43 y.o. female.    Chief Complaint: Follow-up    Here for contrave use. She has lost 11 #s by our scales but she states not that much by her home scales and she states she was wearing heavier clothes. She is exercising several times a week, using 1 meal a day keto diet. No side effects from contrave.    Review of Systems   Constitutional: Negative for fever and unexpected weight change.   HENT: Negative for congestion and rhinorrhea.    Eyes: Negative for discharge and redness.   Respiratory: Negative for cough and wheezing.    Cardiovascular: Negative for chest pain, palpitations and leg swelling.   Gastrointestinal: Negative for constipation, diarrhea and vomiting.   Endocrine: Negative for polydipsia, polyphagia and polyuria.   Genitourinary: Negative for decreased urine volume, difficulty urinating and menstrual problem.   Musculoskeletal: Negative for arthralgias and joint swelling.   Skin: Negative for rash and wound.   Neurological: Positive for headaches (stable). Negative for syncope.   Psychiatric/Behavioral: Positive for dysphoric mood. Negative for behavioral problems and sleep disturbance. The patient is nervous/anxious.         Less luke       Objective:      Physical Exam   Constitutional: She is oriented to person, place, and time. She appears well-developed and well-nourished. No distress.   HENT:   Right Ear: External ear normal.   Left Ear: External ear normal.   Nose: Nose normal.   Mouth/Throat: Oropharynx is clear and moist. No oropharyngeal exudate.   Eyes: Pupils are equal, round, and reactive to light. Conjunctivae and EOM are normal.   Neck: Normal range of motion. No JVD present. No thyromegaly present.   Cardiovascular: Normal rate, regular rhythm and normal heart sounds.   No murmur heard.  Pulmonary/Chest: Effort normal and breath sounds normal. No respiratory distress. She has no wheezes. She has no rales.   Abdominal: Soft. She  exhibits no distension and no mass. There is tenderness (minimal tenderness post surgery). There is no rebound and no guarding.   Musculoskeletal: She exhibits no edema.   Lymphadenopathy:     She has no cervical adenopathy.   Neurological: She is alert and oriented to person, place, and time. No cranial nerve deficit. Coordination normal.   Skin: Capillary refill takes less than 2 seconds. No rash noted.   Psychiatric: She has a normal mood and affect. Her behavior is normal. Judgment and thought content normal.       Assessment:       1. Overweight        Plan:       Overweight  -     Lipid panel; Future; Expected date: 05/22/2019  -     Basic metabolic panel; Future; Expected date: 05/22/2019    She seems to be successful. Plan another 2-3 months contrave. I have advised to consider smaller more frequent meals during day rather than 1 large meal to prevent overeating and snacking. F/U 3 months.

## 2019-05-23 ENCOUNTER — CLINICAL SUPPORT (OUTPATIENT)
Dept: OBSTETRICS AND GYNECOLOGY | Facility: CLINIC | Age: 44
End: 2019-05-23
Payer: COMMERCIAL

## 2019-05-23 PROCEDURE — 99999 PR PBB SHADOW E&M-EST. PATIENT-LVL II: ICD-10-PCS | Mod: PBBFAC,,,

## 2019-05-23 PROCEDURE — 96372 THER/PROPH/DIAG INJ SC/IM: CPT | Mod: S$GLB,,, | Performed by: OBSTETRICS & GYNECOLOGY

## 2019-05-23 PROCEDURE — 96372 PR INJECTION,THERAP/PROPH/DIAG2ST, IM OR SUBCUT: ICD-10-PCS | Mod: S$GLB,,, | Performed by: OBSTETRICS & GYNECOLOGY

## 2019-05-23 PROCEDURE — 99999 PR PBB SHADOW E&M-EST. PATIENT-LVL II: CPT | Mod: PBBFAC,,,

## 2019-05-23 NOTE — PROGRESS NOTES
Verified pt with two identifiers name and . Allergies and medications reviewed. Depo Lupron 3.75 mg administered IM to Left Ventrogluteal while using aseptic technique. No discomfort noted. Pt tolerated well. Advised pt to wait 15 minutes in the lobby to monitor for side effects. Next scheduled injection 19 @ 0900 at the [facility].  Pt verbalized understanding.

## 2019-05-28 ENCOUNTER — TELEPHONE (OUTPATIENT)
Dept: INTERNAL MEDICINE | Facility: CLINIC | Age: 44
End: 2019-05-28

## 2019-05-28 RX ORDER — CLONAZEPAM 0.5 MG/1
0.5 TABLET ORAL NIGHTLY
Qty: 30 TABLET | Refills: 0 | Status: SHIPPED | OUTPATIENT
Start: 2019-05-28 | End: 2019-10-08

## 2019-06-10 ENCOUNTER — TELEPHONE (OUTPATIENT)
Dept: ORTHOPEDICS | Facility: CLINIC | Age: 44
End: 2019-06-10

## 2019-06-10 DIAGNOSIS — M25.512 LEFT SHOULDER PAIN, UNSPECIFIED CHRONICITY: Primary | ICD-10-CM

## 2019-06-11 ENCOUNTER — HOSPITAL ENCOUNTER (OUTPATIENT)
Dept: RADIOLOGY | Facility: HOSPITAL | Age: 44
Discharge: HOME | End: 2019-06-11
Attending: ORTHOPAEDIC SURGERY
Payer: COMMERCIAL

## 2019-06-11 DIAGNOSIS — M25.512 LEFT SHOULDER PAIN, UNSPECIFIED CHRONICITY: ICD-10-CM

## 2019-06-11 PROCEDURE — 73030 X-RAY EXAM OF SHOULDER: CPT | Mod: TC,LT

## 2019-06-11 PROCEDURE — 73030 X-RAY EXAM OF SHOULDER: CPT | Mod: 26,LT,, | Performed by: RADIOLOGY

## 2019-06-11 PROCEDURE — 73030 XR SHOULDER COMPLETE 2 OR MORE VIEWS LEFT: ICD-10-PCS | Mod: 26,LT,, | Performed by: RADIOLOGY

## 2019-06-12 ENCOUNTER — LAB VISIT (OUTPATIENT)
Dept: LAB | Facility: HOSPITAL | Age: 44
End: 2019-06-12
Attending: SURGERY
Payer: COMMERCIAL

## 2019-06-12 ENCOUNTER — TELEPHONE (OUTPATIENT)
Dept: ORTHOPEDICS | Facility: CLINIC | Age: 44
End: 2019-06-12

## 2019-06-12 DIAGNOSIS — R10.84 GENERALIZED ABDOMINAL PAIN: ICD-10-CM

## 2019-06-12 LAB
ANION GAP SERPL CALC-SCNC: 15 MMOL/L (ref 8–16)
BUN SERPL-MCNC: 19 MG/DL (ref 6–20)
CALCIUM SERPL-MCNC: 9.5 MG/DL (ref 8.7–10.5)
CHLORIDE SERPL-SCNC: 107 MMOL/L (ref 95–110)
CO2 SERPL-SCNC: 16 MMOL/L (ref 23–29)
CREAT SERPL-MCNC: 0.7 MG/DL (ref 0.5–1.4)
EST. GFR  (AFRICAN AMERICAN): >60 ML/MIN/1.73 M^2
EST. GFR  (NON AFRICAN AMERICAN): >60 ML/MIN/1.73 M^2
GLUCOSE SERPL-MCNC: 92 MG/DL (ref 70–110)
POTASSIUM SERPL-SCNC: 5.1 MMOL/L (ref 3.5–5.1)
SODIUM SERPL-SCNC: 138 MMOL/L (ref 136–145)

## 2019-06-12 PROCEDURE — 36415 COLL VENOUS BLD VENIPUNCTURE: CPT

## 2019-06-12 PROCEDURE — 80048 BASIC METABOLIC PNL TOTAL CA: CPT

## 2019-06-12 NOTE — TELEPHONE ENCOUNTER
"Discussed xray findings L shoulder, she has calcium in SA space, cannot rule out tear, but she wants to "tough it out" for now, hold on any injections or MRI for now. We will see how she does, okay to make appointment whenever.  "

## 2019-06-13 ENCOUNTER — HOSPITAL ENCOUNTER (OUTPATIENT)
Dept: RADIOLOGY | Facility: HOSPITAL | Age: 44
Discharge: HOME | End: 2019-06-13
Attending: INTERNAL MEDICINE
Payer: COMMERCIAL

## 2019-06-13 DIAGNOSIS — R10.84 GENERALIZED ABDOMINAL PAIN: ICD-10-CM

## 2019-06-13 PROCEDURE — 74178 CT ABD&PLV WO CNTR FLWD CNTR: CPT | Mod: TC

## 2019-06-13 PROCEDURE — 25500020 PHARM REV CODE 255: Performed by: INTERNAL MEDICINE

## 2019-06-13 RX ADMIN — IOHEXOL 30 ML: 350 INJECTION, SOLUTION INTRAVENOUS at 09:06

## 2019-06-13 RX ADMIN — IOHEXOL 100 ML: 350 INJECTION, SOLUTION INTRAVENOUS at 10:06

## 2019-06-19 ENCOUNTER — CLINICAL SUPPORT (OUTPATIENT)
Dept: OBSTETRICS AND GYNECOLOGY | Facility: CLINIC | Age: 44
End: 2019-06-19
Payer: COMMERCIAL

## 2019-06-19 PROCEDURE — 96372 THER/PROPH/DIAG INJ SC/IM: CPT | Mod: S$GLB,,, | Performed by: OBSTETRICS & GYNECOLOGY

## 2019-06-19 PROCEDURE — 96372 PR INJECTION,THERAP/PROPH/DIAG2ST, IM OR SUBCUT: ICD-10-PCS | Mod: S$GLB,,, | Performed by: OBSTETRICS & GYNECOLOGY

## 2019-06-19 PROCEDURE — 99999 PR PBB SHADOW E&M-EST. PATIENT-LVL II: CPT | Mod: PBBFAC,,,

## 2019-06-19 PROCEDURE — 99999 PR PBB SHADOW E&M-EST. PATIENT-LVL II: ICD-10-PCS | Mod: PBBFAC,,,

## 2019-06-19 NOTE — PROGRESS NOTES
Verified pt by two identifiers. Allergies and medications reviewed.   Depo-lupron given IM to right ventrogluteal using aseptic technique. No discomfort noted, pt tolerated well.   Next injection scheduled 7/17/19. Pt advised to wait 15 min in office to monitor for any reactions. Pt verbalized understanding.

## 2019-07-16 ENCOUNTER — OFFICE VISIT (OUTPATIENT)
Dept: ORTHOPEDICS | Facility: CLINIC | Age: 44
End: 2019-07-16
Payer: COMMERCIAL

## 2019-07-16 VITALS
SYSTOLIC BLOOD PRESSURE: 107 MMHG | HEART RATE: 74 BPM | HEIGHT: 68 IN | BODY MASS INDEX: 23.49 KG/M2 | DIASTOLIC BLOOD PRESSURE: 68 MMHG | WEIGHT: 155 LBS

## 2019-07-16 DIAGNOSIS — M54.2 NECK PAIN: ICD-10-CM

## 2019-07-16 DIAGNOSIS — M25.512 LEFT SHOULDER PAIN, UNSPECIFIED CHRONICITY: Primary | ICD-10-CM

## 2019-07-16 DIAGNOSIS — M75.22 BICEPS TENDONITIS, LEFT: ICD-10-CM

## 2019-07-16 DIAGNOSIS — M75.82 ROTATOR CUFF TENDINITIS, LEFT: ICD-10-CM

## 2019-07-16 DIAGNOSIS — M75.42 SUBACROMIAL IMPINGEMENT, LEFT: ICD-10-CM

## 2019-07-16 DIAGNOSIS — M75.32 CALCIFIC TENDINITIS OF LEFT SHOULDER: ICD-10-CM

## 2019-07-16 PROCEDURE — 97110 PR THERAPEUTIC EXERCISES: ICD-10-PCS | Mod: S$GLB,,, | Performed by: ORTHOPAEDIC SURGERY

## 2019-07-16 PROCEDURE — 99999 PR PBB SHADOW E&M-EST. PATIENT-LVL III: ICD-10-PCS | Mod: PBBFAC,,, | Performed by: ORTHOPAEDIC SURGERY

## 2019-07-16 PROCEDURE — 20610 LARGE JOINT ASPIRATION/INJECTION: L SUBACROMIAL BURSA: ICD-10-PCS | Mod: LT,S$GLB,, | Performed by: ORTHOPAEDIC SURGERY

## 2019-07-16 PROCEDURE — 99204 PR OFFICE/OUTPT VISIT, NEW, LEVL IV, 45-59 MIN: ICD-10-PCS | Mod: 25,S$GLB,, | Performed by: ORTHOPAEDIC SURGERY

## 2019-07-16 PROCEDURE — 20610 DRAIN/INJ JOINT/BURSA W/O US: CPT | Mod: LT,S$GLB,, | Performed by: ORTHOPAEDIC SURGERY

## 2019-07-16 PROCEDURE — 3008F BODY MASS INDEX DOCD: CPT | Mod: CPTII,S$GLB,, | Performed by: ORTHOPAEDIC SURGERY

## 2019-07-16 PROCEDURE — 97110 THERAPEUTIC EXERCISES: CPT | Mod: S$GLB,,, | Performed by: ORTHOPAEDIC SURGERY

## 2019-07-16 PROCEDURE — 99204 OFFICE O/P NEW MOD 45 MIN: CPT | Mod: 25,S$GLB,, | Performed by: ORTHOPAEDIC SURGERY

## 2019-07-16 PROCEDURE — 3008F PR BODY MASS INDEX (BMI) DOCUMENTED: ICD-10-PCS | Mod: CPTII,S$GLB,, | Performed by: ORTHOPAEDIC SURGERY

## 2019-07-16 PROCEDURE — 99999 PR PBB SHADOW E&M-EST. PATIENT-LVL III: CPT | Mod: PBBFAC,,, | Performed by: ORTHOPAEDIC SURGERY

## 2019-07-16 RX ORDER — METHYLPREDNISOLONE ACETATE 80 MG/ML
80 INJECTION, SUSPENSION INTRA-ARTICULAR; INTRALESIONAL; INTRAMUSCULAR; SOFT TISSUE
Status: DISCONTINUED | OUTPATIENT
Start: 2019-07-16 | End: 2019-07-16 | Stop reason: HOSPADM

## 2019-07-16 RX ADMIN — METHYLPREDNISOLONE ACETATE 80 MG: 80 INJECTION, SUSPENSION INTRA-ARTICULAR; INTRALESIONAL; INTRAMUSCULAR; SOFT TISSUE at 11:07

## 2019-07-16 NOTE — PROGRESS NOTES
Subjective:     Patient ID: Sandra Dimas is a 43 y.o. female.    Chief Complaint: Pain of the Left Shoulder    Mrs. Dimas is here for evaluation of anterolateral left shoulder pain. She sometimes has pain to the base of the neck. She also sometimes has pain that radiates down past the elbow to the forearm.    Her sleep on the left shoulder, worse with overhead movement        Shoulder Pain    The pain is present in the left shoulder. The current episode started more than 1 month ago. The injury was the result of a action while at home. The problem occurs constantly. The quality of the pain is described as aching, sharp and shooting. The pain is at a severity of 9/10. Pertinent negatives include no fever or itching. The symptoms are aggravated by activity, lifting, rotation and twisting. She has tried nothing for the symptoms. Physical therapy was not tried.      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    Endometriosis     Hemicrania continua     Migraines      Past Surgical History:   Procedure Laterality Date    ANASTAMOSIS N/A 2/7/2019    Performed by Ramakrishna Boone MD at [facility] [place]    APPENDECTOMY      diagnostic laprascopy      EGD (ESOPHAGOGASTRODUODENOSCOPY) N/A 11/30/2018    Performed by Bossman Bustos MD at [facility]    EXCISION, SMALL INTESTINE N/A 2/7/2019    Performed by [facility]rishna Boone MD at [facility] [place]    FULGURATION, ENDOMETRIOSIS  12/26/2018    Performed by Zehra Jensen MD at [facility] [place]    HYSTEROSCOPY, WITH DILATION AND CURETTAGE OF UTERUS AND HYDROTHERMAL ENDOMETRIAL ABLATION N/A 12/26/2018    Performed by Zehra Jensen MD at [facility] [place]    LAPAROTOMY, EXPLORATORY N/A 2/7/2019    Performed by Ram[facility]rishna Boone MD at [facility] [place]    robotic assisted laparoscopy with excision of ovarian endometrioma and chromotubation      SALPINGECTOMY, LAPAROSCOPIC Bilateral 12/26/2018    Performed by Zehra Jensen MD at [facility] [place]     Family History   Problem Relation Age of  Onset    Strabismus Neg Hx     Retinal detachment Neg Hx     Macular degeneration Neg Hx     Glaucoma Neg Hx     Blindness Neg Hx     Amblyopia Neg Hx     Breast cancer Neg Hx     Colon cancer Neg Hx     Ovarian cancer Neg Hx     Thyroid disease Neg Hx     Migraines Neg Hx     Asthma Neg Hx      Social History     Socioeconomic History    Marital status:      Spouse name: Not on file    Number of children: Not on file    Years of education: Not on file    Highest education level: Not on file   Occupational History    Not on file   Social Needs    Financial resource strain: Not on file    Food insecurity:     Worry: Not on file     Inability: Not on file    Transportation needs:     Medical: Not on file     Non-medical: Not on file   Tobacco Use    Smoking status: Never Smoker    Smokeless tobacco: Never Used   Substance and Sexual Activity    Alcohol use: Never     Frequency: Monthly or less     Drinks per session: 1 or 2     Binge frequency: Never    Drug use: No    Sexual activity: Yes     Partners: Male   Lifestyle    Physical activity:     Days per week: Not on file     Minutes per session: Not on file    Stress: Not on file   Relationships    Social connections:     Talks on phone: Not on file     Gets together: Not on file     Attends [employer] service: Not on file     Active member of club or organization: Not on file     Attends meetings of clubs or organizations: Not on file     Relationship status: Not on file   Other Topics Concern    Not on file   Social History Narrative    No pets or smokers in household.     Medication List with Changes/Refills   Current Medications    ATENOLOL (TENORMIN) 25 MG TABLET    Take 1 tablet (25 mg total) by mouth once daily.    B2/MAGNESIUM CIT,OXID/FEVERFEW (MIGRELIEF ORAL)    Take by mouth as needed.    CLONAZEPAM (KLONOPIN) 0.5 MG TABLET    Take 1 tablet (0.5 mg total) by mouth every evening.    ESTROGENS, CONJUGATED, (PREMARIN) 0.45  MG TABLET    Take 1 tablet (0.45 mg total) by mouth once daily.    FLUOXETINE 20 MG CAPSULE    Take 1 capsule (20 mg total) by mouth once daily.    INDOMETHACIN (INDOCIN) 25 MG CAPSULE    TAKE 2 CAPSULES BY MOUTH THREE TIMES DAILY    NALTREXONE-BUPROPION (CONTRAVE) 8-90 MG TBSR    Take 2 tablets by mouth 2 (two) times daily.    NORETHINDRONE (AYGESTIN) 5 MG TAB    Take 1 tablet (5 mg total) by mouth once daily.    ONDANSETRON (ZOFRAN) 8 MG TABLET    Take 1 tablet (8 mg total) by mouth every 8 (eight) hours as needed for Nausea.    PANTOPRAZOLE (PROTONIX) 40 MG TABLET    Take 1 tablet (40 mg total) by mouth once daily.    VITAMIN D (VITAMIN D3) 1000 UNITS TAB    Take 1,000 Units by mouth once daily.     Review of patient's allergies indicates:   Allergen Reactions    Hydrocodone Other (See Comments)     Chest pains. Note: patient tolerated hydromorphone in 2/2019.     Chlorhexidine Rash     Per  after surgery patient had large rash across abdomen where chlorhexidine was applied.    Mastisol adhesive [gum geuwea-dpqavh-zefd-alcohol] Rash     Review of Systems   Constitution: Negative for fever.   HENT: Negative for sore throat.    Eyes: Negative for blurred vision.   Cardiovascular: Negative for dyspnea on exertion.   Respiratory: Negative for shortness of breath.    Hematologic/Lymphatic: Does not bruise/bleed easily.   Skin: Negative for itching.   Gastrointestinal: Negative for vomiting.   Genitourinary: Negative for dysuria.   Neurological: Negative for dizziness.   Psychiatric/Behavioral: The patient does not have insomnia.        Objective:   Body mass index is 23.57 kg/m².  Vitals:    07/16/19 1012   BP: 107/68   Pulse: 74           General    Nursing note and vitals reviewed.  Constitutional: She is oriented to person, place, and time. She appears well-developed. No distress.   HENT:   Head: Normocephalic and atraumatic.   Eyes: EOM are normal.   Cardiovascular: Normal rate.    Pulmonary/Chest:  Effort normal. No stridor.   Neurological: She is alert and oriented to person, place, and time.   Psychiatric: She has a normal mood and affect. Her behavior is normal.         Right Shoulder Exam     Range of Motion   Active abduction: 90   Forward Flexion: 170   External Rotation 0 degrees: 30 External Rotation 90 degrees: 90   Internal rotation 0 degrees: L1   Internal rotation 90 degrees: 10     Left Shoulder Exam     Inspection/Observation   Deformity: absent  Scapular Dyskinesia: positive  Atrophy: absent    Tenderness   Left shoulder tenderness location: Mild tenderness palpation biceps groove, no tenderness palpation acromioclavicular joint.    Range of Motion   Active abduction: 80   Forward Flexion: 150 (Passively can correct to 160 with pain)   External Rotation 0 degrees: 30 External Rotation 90 degrees: 80 (With anterior pain)   Internal rotation 0 degrees: L4   Internal rotation 90 degrees: 10     Tests & Signs   Cantor test: negative  Impingement: positive  Rotator Cuff Painful Arc/Range: moderate  Belly Press: negative  Speed's Test: negative  Bear Hug: negative    Other   Sensation: normal     Comments:        Spurling's is negative for radicular symptoms but does recreate headache      Muscle Strength   Left Upper Extremity  Shoulder Internal Rotation: 5/5   Shoulder External Rotation: 5/5   Supraspinatus: 4/5 (With pain, limited by pain)/5   Subscapularis: 5/5/5     Vascular Exam       Capillary Refill  Left Hand: normal capillary refill      IMAGING Radiographs / Imaging : Results reviewed by me and interpreted by me, discussed with the patient and / or family     Glenohumeral joint space is well preserved, humeral head is centered on the glenoid on the axillary vie.  Calcifications to the rotator cuff anterior and superior    Assessment:     Encounter Diagnoses   Name Primary?    Left shoulder pain, unspecified chronicity Yes    Calcific tendinitis of left shoulder     Rotator cuff  tendinitis, left     Subacromial impingement, left     Biceps tendonitis, left     Neck pain         Plan:       I had an in depth discussion today with Sandra today regarding her left shoulder problem, going over her radiographs and the model to help further her understanding. I explained the anatomy and pathophysiology of the problem. I told Sandra  that I believe the problem relates to above noted diagnoses. Rotator cuff / calcific tendonitis and bursitis higher on differential. Biceps is somewhat painful for her today, cannot completely rule out contribution from cervical spine . We had an in depth discussion regarding appropriate treatment and management of her condition.     From a treatment standpoint, the decision was made to go forward with :     HEP 66998 - I, instructed and demonstrated a left shoulder stretching and strengthening HEP. The patient then demonstrated understanding of exercises and proper technique. This program was performed for 15 minutes.     Try left shoulder corticosteroid injection to subacromial space today, discussed risks benefits, pros and cons, including small risk for infection, she would like to proceed    If pain persists, consider biceps groove injection    If pain persists after that, consider MRI of the left shoulder to further evaluate the integrity of the rotator cuff and the calcium location to the cuff    Discussed surgical options in the future if the pain does not resolve with conservative treatment    Follow-up in 3 months without x-rays, or sooner if she desires injection to the biceps groove

## 2019-07-16 NOTE — PROCEDURES
Large Joint Aspiration/Injection: L subacromial bursa  Date/Time: 7/16/2019 11:15 AM  Performed by: Andriy Aguilar MD  Authorized by: Andriy Aguilar MD     Consent Done?:  Yes (Verbal)  Indications:  Pain and diagnostic evaluation  Procedure site marked: Yes    Timeout: Prior to procedure the correct patient, procedure, and site was verified      Location:  Shoulder  Site:  L subacromial bursa  Prep: Patient was prepped and draped in usual sterile fashion    Ultrasonic Guidance for needle placement: No  Needle size:  22 G  Approach: posterolateral.  Medications:  80 mg methylPREDNISolone acetate 80 mg/mL  Patient tolerance:  Patient tolerated the procedure well with no immediate complications    Additional Comments: The patient had no adverse reactions to the medication. The patient was instructed to apply ice to the joint for 30 minutes on and 30 minutes off for the next 48 hours, and avoid strenuous activities for 48 hours following the injection. Patient was warned of possible blood sugar and/or blood pressure changes during that time regarding corticosteroid injections if applicable.  Following that time, patient can resume regular activities. Note that informed consent was performed with the patient before injection discussing risks, benefits, indications and alternatives to injection, risks including but not limited to bleeding and infection.        5 min after injection she had only minimal relief with impingement testing

## 2019-07-18 ENCOUNTER — OFFICE VISIT (OUTPATIENT)
Dept: OTOLARYNGOLOGY | Facility: CLINIC | Age: 44
End: 2019-07-18
Payer: COMMERCIAL

## 2019-07-18 ENCOUNTER — CLINICAL SUPPORT (OUTPATIENT)
Dept: AUDIOLOGY | Facility: CLINIC | Age: 44
End: 2019-07-18
Payer: COMMERCIAL

## 2019-07-18 VITALS
DIASTOLIC BLOOD PRESSURE: 68 MMHG | WEIGHT: 155.19 LBS | BODY MASS INDEX: 23.6 KG/M2 | TEMPERATURE: 97 F | HEART RATE: 64 BPM | SYSTOLIC BLOOD PRESSURE: 110 MMHG

## 2019-07-18 DIAGNOSIS — H90.3 ASYMMETRICAL SENSORINEURAL HEARING LOSS: Primary | ICD-10-CM

## 2019-07-18 DIAGNOSIS — G44.041 INTRACTABLE CHRONIC PAROXYSMAL HEMICRANIA: ICD-10-CM

## 2019-07-18 DIAGNOSIS — H90.3 HEARING LOSS, SENSORINEURAL, ASYMMETRICAL: Primary | ICD-10-CM

## 2019-07-18 PROCEDURE — 92567 TYMPANOMETRY: CPT | Mod: S$GLB,,, | Performed by: AUDIOLOGIST

## 2019-07-18 PROCEDURE — 92567 PR TYMPA2METRY: ICD-10-PCS | Mod: S$GLB,,, | Performed by: AUDIOLOGIST

## 2019-07-18 PROCEDURE — 92557 COMPREHENSIVE HEARING TEST: CPT | Mod: S$GLB,,, | Performed by: AUDIOLOGIST

## 2019-07-18 PROCEDURE — 3008F PR BODY MASS INDEX (BMI) DOCUMENTED: ICD-10-PCS | Mod: CPTII,S$GLB,, | Performed by: OTOLARYNGOLOGY

## 2019-07-18 PROCEDURE — 99213 OFFICE O/P EST LOW 20 MIN: CPT | Mod: S$GLB,,, | Performed by: OTOLARYNGOLOGY

## 2019-07-18 PROCEDURE — 99213 PR OFFICE/OUTPT VISIT, EST, LEVL III, 20-29 MIN: ICD-10-PCS | Mod: S$GLB,,, | Performed by: OTOLARYNGOLOGY

## 2019-07-18 PROCEDURE — 99999 PR PBB SHADOW E&M-EST. PATIENT-LVL III: ICD-10-PCS | Mod: PBBFAC,,, | Performed by: OTOLARYNGOLOGY

## 2019-07-18 PROCEDURE — 92557 PR COMPREHENSIVE HEARING TEST: ICD-10-PCS | Mod: S$GLB,,, | Performed by: AUDIOLOGIST

## 2019-07-18 PROCEDURE — 99999 PR PBB SHADOW E&M-EST. PATIENT-LVL III: CPT | Mod: PBBFAC,,, | Performed by: OTOLARYNGOLOGY

## 2019-07-18 PROCEDURE — 3008F BODY MASS INDEX DOCD: CPT | Mod: CPTII,S$GLB,, | Performed by: OTOLARYNGOLOGY

## 2019-07-18 NOTE — LETTER
July 18, 2019      Luz Dimas MD  [address] [facility] [address] [place] [address]           [facility]  [address] [facility] [address]  Phone: [phone]  Fax: [phone]          Patient: Sandra Dimas   MR Number: [id number]   YOB: 1975   Date of Visit: 7/18/2019       Dear Dr. Luz Dimas:    Thank you for referring Sandra Dimas to me for evaluation. Attached you will find relevant portions of my assessment and plan of care.    If you have questions, please do not hesitate to call me. I look forward to following Sandra Dimas along with you.    Sincerely,    Terrance Joiner MD    Enclosure  CC:  No Recipients    If you would like to receive this communication electronically, please contact [email or web address] or [phone] to request more information on [employer] Link access.    For providers and/or their staff who would like to refer a patient to [facility], please contact us through our one-stop-shop provider referral line, [facility], at [phone].    If you feel you have received this communication in error or would no longer like to receive these types of communications, please e-mail [email or web address]

## 2019-07-18 NOTE — PROGRESS NOTES
Sandra Dimas was seen 07/18/2019 for an audiological evaluation.  Patient complains of gradual bilateral hearing loss with the right ear being he poorer hearing ear. She reports decrease in hearing over the past 2 months. She reports a static sound in her right ear that is almost constant. Her last audiogram was in 2009 and it showed a very mild low frequency hearing loss in the right ear.    Results reveal a mild-to-severe sensorineural hearing loss (mixed at 1k Hz) 250-8000 Hz for the right ear, and  mild sensorineural hearing loss 500-4000 Hz for the left ear.   Speech Reception Thresholds were  45 dBHL for the right ear and 35 dBHL for the left ear.   Word recognition scores were excellent for the right ear and excellent for the left ear.   Tympanograms were Type A, normal for the right ear and Type A, normal for the left ear.    Patient was counseled on the above findings.    Recommendations include:    1.  ENT followup  2.  Hearing aid consult (information was given to patient at today's visit)  3.  Wear hearing protective devices around loud noise  4.  Annual audiograms

## 2019-07-18 NOTE — PROGRESS NOTES
Subjective:   Patient: Sandra Dimas [id number], :1975   Visit date:2019 9:53 AM    Chief Complaint:  Hearing Loss (both ears; mostly left ear)    HPI:  Sandra is a 43 y.o. female who is here for evaluation of otalgia.   Right side.  Decreased hearing AD.  Chronic right sided headaches.        Review of Systems:  -     Allergic/Immunologic: is allergic to hydrocodone; chlorhexidine; and mastisol adhesive [gum hllspz-nuqbgk-vcne-alcohol]..  -     Constitutional: Current temp: 97.4 °F (36.3 °C) (Tympanic)    Her meds, allergies, medical, surgical, social & family histories were reviewed & updated:  -     She has a current medication list which includes the following prescription(s): atenolol, b2/magnesium cit,oxid/feverfew, clonazepam, estrogens (conjugated), fluoxetine, indomethacin, naltrexone-bupropion, norethindrone, ondansetron, pantoprazole, and vitamin d, and the following Facility-Administered Medications: lactated ringers, leuprolide, and lidocaine (pf) 10 mg/ml (1%).  -     She  has a past medical history of Chronic paroxysmal hemicrania (1/3/2019), Endometriosis, Hemicrania continua, and Migraines.   -     She does not have any pertinent problems on file.   -     She  has a past surgical history that includes Appendectomy; diagnostic laprascopy; robotic assisted laparoscopy with excision of ovarian endometrioma and chromotubation; Esophagogastroduodenoscopy (N/A, 2018); Laparoscopic salpingectomy (Bilateral, 2018); Hysteroscopy with hydrothermal ablation of endometrium with dilation and curettage (N/A, 2018); Fulguration of endometriosis (2018); and LAPAROTOMY, EXPLORATORY (N/A, 2019).  -     She  reports that she has never smoked. She has never used smokeless tobacco. She reports that she does not drink alcohol or use drugs.  -     Her family history is not on file.  -     She is allergic to hydrocodone; chlorhexidine; and mastisol adhesive [gum  nuatsd-lqqiba-edae-alcohol].    Objective:     Physical Exam:  Vitals:  /68   Pulse 64   Temp 97.4 °F (36.3 °C) (Tympanic)   Wt 70.4 kg (155 lb 3.3 oz)   BMI 23.60 kg/m²   Appearance:  Well-developed, well-nourished.  Communication:  Able to communicate, no hoarseness.  Head & Face:  Normocephalic, atraumatic, no sinus tenderness, normal facial strength.  Eyes:  Extraocular motions intact.  Ears:  Otoscopy of external auditory canals and tympanic membranes was normal, clinical speech reception thresholds grossly intact, no mass/lesion of auricle.  Nose:  No masses/lesions of external nose, nasal mucosa, septum, and turbinates were within normal limits.  Mouth:  No mass/lesion of lips, teeth, gums, hard/soft palate, tongue, tonsils, or oropharynx.  Neck & Lymphatics:  No cervical lymphadenopathy, no neck mass/crepitus/ asymmetry, trachea is midline, no thyroid enlargement/tenderness/mass.  Neuro/Psych: Alert with normal mood and affect.   Abdominal: Normal appearance.   Respiration/Chest:  Symmetric expansion during respiration, normal respiratory effort.  Skin:  Warm and intact  Cardiovascular:  No peripheral vascular edema or varicosities.    Assessment & Plan:   Sandra was seen today for hearing loss.    Diagnoses and all orders for this visit:    Asymmetrical sensorineural hearing loss  -     MRI IAC/Temporal Bones Without Contrast; Future    Intractable chronic paroxysmal hemicrania  -     MRI IAC/Temporal Bones Without Contrast; Future      Audio with asymmetry.  R sided headaches.  MRI.  Hearing aid jessika Joiner MD

## 2019-07-19 ENCOUNTER — TELEPHONE (OUTPATIENT)
Dept: OTOLARYNGOLOGY | Facility: CLINIC | Age: 44
End: 2019-07-19

## 2019-07-19 NOTE — TELEPHONE ENCOUNTER
Called and left pt a detailed message to return call to schedule MRI based on hearing test.        ----- Message from Pratibha Lei LPN sent at 7/18/2019  4:46 PM CDT -----  MRI dept is closed will call back tomorrow.    MD TREASURE Nava Staff         Based on her hearing test, I recommend MRI.

## 2019-07-22 ENCOUNTER — PATIENT MESSAGE (OUTPATIENT)
Dept: OBSTETRICS AND GYNECOLOGY | Facility: CLINIC | Age: 44
End: 2019-07-22

## 2019-07-24 ENCOUNTER — TELEPHONE (OUTPATIENT)
Dept: OTOLARYNGOLOGY | Facility: CLINIC | Age: 44
End: 2019-07-24

## 2019-07-24 NOTE — TELEPHONE ENCOUNTER
Attempted to contact pt to inform that Dr Joiner suggests that she have an follow up appointment to discuss Audiogram results. Left call back number to return call and informed that she could also send a patient portal message or schedule online.        ----- Message from Terrance Joiner MD sent at 7/24/2019 11:09 AM CDT -----  Please schedule her a follow up appointment to discuss audiogram.

## 2019-07-29 ENCOUNTER — CLINICAL SUPPORT (OUTPATIENT)
Dept: OBSTETRICS AND GYNECOLOGY | Facility: CLINIC | Age: 44
End: 2019-07-29
Payer: COMMERCIAL

## 2019-07-29 PROCEDURE — 96372 PR INJECTION,THERAP/PROPH/DIAG2ST, IM OR SUBCUT: ICD-10-PCS | Mod: S$GLB,,, | Performed by: OBSTETRICS & GYNECOLOGY

## 2019-07-29 PROCEDURE — 99999 PR PBB SHADOW E&M-EST. PATIENT-LVL II: ICD-10-PCS | Mod: PBBFAC,,,

## 2019-07-29 PROCEDURE — 96372 THER/PROPH/DIAG INJ SC/IM: CPT | Mod: S$GLB,,, | Performed by: OBSTETRICS & GYNECOLOGY

## 2019-07-29 PROCEDURE — 99999 PR PBB SHADOW E&M-EST. PATIENT-LVL II: CPT | Mod: PBBFAC,,,

## 2019-07-29 NOTE — PROGRESS NOTES
2 pt identifiers verified, pt notified to wait 15 minutes after injection in lobby, 3.75 MG of DEPO LUPRON administered IM to pt's LEFT ventrogluteal. Pt tolerated well. Next injection scheduled.

## 2019-07-30 ENCOUNTER — TELEPHONE (OUTPATIENT)
Dept: OTOLARYNGOLOGY | Facility: CLINIC | Age: 44
End: 2019-07-30

## 2019-07-30 NOTE — TELEPHONE ENCOUNTER
----- Message from Terrance Joiner MD sent at 7/30/2019 11:10 AM CDT -----  Please contact her to set up an appt with me.

## 2019-08-21 ENCOUNTER — DOCUMENTATION ONLY (OUTPATIENT)
Dept: INTERNAL MEDICINE | Facility: CLINIC | Age: 44
End: 2019-08-21

## 2019-08-27 DIAGNOSIS — K21.9 GASTROESOPHAGEAL REFLUX DISEASE WITHOUT ESOPHAGITIS: ICD-10-CM

## 2019-08-27 RX ORDER — PANTOPRAZOLE SODIUM 40 MG/1
40 TABLET, DELAYED RELEASE ORAL DAILY
Qty: 90 TABLET | Refills: 3 | Status: SHIPPED | OUTPATIENT
Start: 2019-08-27 | End: 2020-08-14 | Stop reason: SDUPTHER

## 2019-09-10 ENCOUNTER — TELEPHONE (OUTPATIENT)
Dept: INTERNAL MEDICINE | Facility: CLINIC | Age: 44
End: 2019-09-10

## 2019-09-10 RX ORDER — PHENTERMINE HYDROCHLORIDE 37.5 MG/1
37.5 CAPSULE ORAL EVERY MORNING
Qty: 30 CAPSULE | Refills: 0 | Status: SHIPPED | OUTPATIENT
Start: 2019-09-10 | End: 2019-10-07 | Stop reason: SDUPTHER

## 2019-09-19 DIAGNOSIS — R00.2 PALPITATION: ICD-10-CM

## 2019-09-19 RX ORDER — ATENOLOL 25 MG/1
25 TABLET ORAL DAILY
Qty: 90 TABLET | Refills: 3 | Status: SHIPPED | OUTPATIENT
Start: 2019-09-19 | End: 2020-08-14 | Stop reason: SDUPTHER

## 2019-10-07 ENCOUNTER — TELEPHONE (OUTPATIENT)
Dept: OBSTETRICS AND GYNECOLOGY | Facility: HOSPITAL | Age: 44
End: 2019-10-07

## 2019-10-07 ENCOUNTER — OFFICE VISIT (OUTPATIENT)
Dept: NEUROLOGY | Facility: CLINIC | Age: 44
End: 2019-10-07
Payer: COMMERCIAL

## 2019-10-07 ENCOUNTER — TELEPHONE (OUTPATIENT)
Dept: PHARMACY | Facility: CLINIC | Age: 44
End: 2019-10-07

## 2019-10-07 VITALS
WEIGHT: 162.25 LBS | HEART RATE: 82 BPM | HEIGHT: 68 IN | SYSTOLIC BLOOD PRESSURE: 118 MMHG | BODY MASS INDEX: 24.59 KG/M2 | RESPIRATION RATE: 16 BRPM | DIASTOLIC BLOOD PRESSURE: 76 MMHG

## 2019-10-07 DIAGNOSIS — F41.9 ANXIETY: ICD-10-CM

## 2019-10-07 DIAGNOSIS — N92.0 MENORRHAGIA WITH REGULAR CYCLE: ICD-10-CM

## 2019-10-07 DIAGNOSIS — K44.9 HIATAL HERNIA: ICD-10-CM

## 2019-10-07 DIAGNOSIS — N83.202 CYST OF LEFT OVARY: ICD-10-CM

## 2019-10-07 DIAGNOSIS — N80.30 ENDOMETRIOSIS OF PELVIC PERITONEUM: Primary | ICD-10-CM

## 2019-10-07 DIAGNOSIS — D50.0 IRON DEFICIENCY ANEMIA DUE TO CHRONIC BLOOD LOSS: ICD-10-CM

## 2019-10-07 DIAGNOSIS — K21.9 GASTROESOPHAGEAL REFLUX DISEASE, ESOPHAGITIS PRESENCE NOT SPECIFIED: ICD-10-CM

## 2019-10-07 DIAGNOSIS — F33.41 RECURRENT MAJOR DEPRESSIVE DISORDER, IN PARTIAL REMISSION: ICD-10-CM

## 2019-10-07 DIAGNOSIS — Z79.1 NSAID LONG-TERM USE: ICD-10-CM

## 2019-10-07 DIAGNOSIS — G44.091 OTHER INTRACTABLE TRIGEMINAL AUTONOMIC CEPHALGIA (TAC): Primary | ICD-10-CM

## 2019-10-07 DIAGNOSIS — G44.041 INTRACTABLE CHRONIC PAROXYSMAL HEMICRANIA: ICD-10-CM

## 2019-10-07 DIAGNOSIS — G43.919 INTRACTABLE MIGRAINE WITHOUT STATUS MIGRAINOSUS, UNSPECIFIED MIGRAINE TYPE: ICD-10-CM

## 2019-10-07 DIAGNOSIS — R06.83 PRIMARY SNORING: ICD-10-CM

## 2019-10-07 DIAGNOSIS — Z98.890 S/P LAPAROSCOPY: ICD-10-CM

## 2019-10-07 PROBLEM — R51.9 INTRACTABLE HEADACHE: Status: ACTIVE | Noted: 2019-10-07

## 2019-10-07 PROBLEM — N93.9 ABNORMAL VAGINAL BLEEDING: Chronic | Status: RESOLVED | Noted: 2018-12-26 | Resolved: 2019-10-07

## 2019-10-07 PROBLEM — K56.609 SMALL BOWEL OBSTRUCTION: Status: RESOLVED | Noted: 2019-02-06 | Resolved: 2019-10-07

## 2019-10-07 PROCEDURE — 99215 PR OFFICE/OUTPT VISIT, EST, LEVL V, 40-54 MIN: ICD-10-PCS | Mod: S$GLB,,, | Performed by: PSYCHIATRY & NEUROLOGY

## 2019-10-07 PROCEDURE — 99999 PR PBB SHADOW E&M-EST. PATIENT-LVL IV: CPT | Mod: PBBFAC,,, | Performed by: PSYCHIATRY & NEUROLOGY

## 2019-10-07 PROCEDURE — 3008F BODY MASS INDEX DOCD: CPT | Mod: CPTII,S$GLB,, | Performed by: PSYCHIATRY & NEUROLOGY

## 2019-10-07 PROCEDURE — 99215 OFFICE O/P EST HI 40 MIN: CPT | Mod: S$GLB,,, | Performed by: PSYCHIATRY & NEUROLOGY

## 2019-10-07 PROCEDURE — 99999 PR PBB SHADOW E&M-EST. PATIENT-LVL IV: ICD-10-PCS | Mod: PBBFAC,,, | Performed by: PSYCHIATRY & NEUROLOGY

## 2019-10-07 PROCEDURE — 3008F PR BODY MASS INDEX (BMI) DOCUMENTED: ICD-10-PCS | Mod: CPTII,S$GLB,, | Performed by: PSYCHIATRY & NEUROLOGY

## 2019-10-07 RX ORDER — DROSPIRENONE AND ETHINYL ESTRADIOL 0.03MG-3MG
1 KIT ORAL DAILY
Qty: 28 TABLET | Refills: 12 | Status: ON HOLD | OUTPATIENT
Start: 2019-10-07 | End: 2019-12-30

## 2019-10-07 RX ORDER — PHENTERMINE HYDROCHLORIDE 37.5 MG/1
37.5 CAPSULE ORAL EVERY MORNING
Qty: 30 CAPSULE | Refills: 0 | Status: SHIPPED | OUTPATIENT
Start: 2019-10-07 | End: 2019-11-06

## 2019-10-07 NOTE — PROGRESS NOTES
Subjective:       Patient ID: Sandra Dimas is a 44 y.o. female.    Chief Complaint: Headache    HPI     The patient is here for headache evaluation.    The patient has been suffering of medically refractory headaches since the age of 18. The headache has complex and mixed features. Most of the time it is RT sides, could be LT sided, comes in clusters, each lasting 20-30 minutes and wakes her up from sleep with 10/10 severity associated with stimulus sensitivity and associated with droopy eyelid and watering. She failed all known migraine medications: abortive Triptans/NSAIDs, status medications (DHE, VPA, DMS) and preventative medications (TCA-Elavil, BB-Inderal, AED-TPM, AED-VPA, Botox, ON Block, OTC supplements). Was diagnosed with PH and Indomethacin helped initially but then stopped working. Failed all known cluster headache medications: abortive Triptans, O2 and preventative: CCB-Verapamil and Li.      Review of Systems   Constitutional: Positive for unexpected weight change. Negative for appetite change and fatigue.   HENT: Negative for hearing loss and tinnitus.    Eyes: Negative for photophobia and visual disturbance.   Respiratory: Negative for apnea and shortness of breath.    Cardiovascular: Negative for chest pain and palpitations.   Gastrointestinal: Negative for nausea and vomiting.   Endocrine: Negative for cold intolerance and heat intolerance.   Genitourinary: Negative for difficulty urinating and urgency.   Musculoskeletal: Negative for arthralgias, back pain, gait problem, joint swelling, myalgias, neck pain and neck stiffness.   Skin: Negative for color change and rash.   Allergic/Immunologic: Negative for environmental allergies and immunocompromised state.   Neurological: Positive for headaches. Negative for dizziness, tremors, seizures, syncope, facial asymmetry, speech difficulty, weakness, light-headedness and numbness.   Hematological: Negative for adenopathy. Does not bruise/bleed  easily.   Psychiatric/Behavioral: Positive for dysphoric mood and sleep disturbance. Negative for agitation, behavioral problems, confusion, decreased concentration, hallucinations, self-injury and suicidal ideas. The patient is nervous/anxious. The patient is not hyperactive.          Current Outpatient Medications:     atenolol (TENORMIN) 25 MG tablet, Take 1 tablet (25 mg total) by mouth once daily., Disp: 90 tablet, Rfl: 3    B2/magnesium cit,oxid/feverfew (MIGRELIEF ORAL), Take by mouth as needed., Disp: , Rfl:     FLUoxetine 20 MG capsule, Take one capsule by mouth once daily, Disp: 90 capsule, Rfl: 3    ondansetron (ZOFRAN) 8 MG tablet, Take 1 tablet (8 mg total) by mouth every 8 (eight) hours as needed for Nausea., Disp: 30 tablet, Rfl: 0    pantoprazole (PROTONIX) 40 MG tablet, Take 1 tablet (40 mg total) by mouth once daily., Disp: 90 tablet, Rfl: 3    phentermine 37.5 MG capsule, Take 1 capsule (37.5 mg total) by mouth every morning., Disp: 30 capsule, Rfl: 0    vitamin D (VITAMIN D3) 1000 units Tab, Take 1,000 Units by mouth once daily., Disp: , Rfl:     clonazePAM (KLONOPIN) 0.5 MG tablet, Take 1 tablet (0.5 mg total) by mouth every evening. (Patient not taking: Reported on 10/7/2019), Disp: 30 tablet, Rfl: 0    galcanezumab-gnlm (EMGALITY PEN) 120 mg/mL PnIj, Inject 240 mg into the skin every 28 days., Disp: 2 mL, Rfl: 0    [START ON 11/7/2019] galcanezumab-gnlm (EMGALITY SYRINGE) 120 mg/mL Syrg, Inject 120 mg into the skin every 28 days., Disp: 1 mL, Rfl: 11    norethindrone (AYGESTIN) 5 mg Tab, Take 1 tablet (5 mg total) by mouth once daily., Disp: 30 tablet, Rfl: 5    TENS unit and electrodes (CEFALY) Cmpk, Per Protocol, Disp: 1 each, Rfl: 0  No current facility-administered medications for this visit.     Facility-Administered Medications Ordered in Other Visits:     lactated ringers infusion, , Intravenous, Continuous, Rip Doss MD, Stopped at 12/26/18 1543    lidocaine (PF) 10  mg/ml (1%) injection 10 mg, 1 mL, Intradermal, Once, Rip Doss MD  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    Endometriosis     Hemicrania continua     Migraines      Past Surgical History:   Procedure Laterality Date    APPENDECTOMY      diagnostic laprascopy      ESOPHAGOGASTRODUODENOSCOPY N/A 11/30/2018    Procedure: EGD (ESOPHAGOGASTRODUODENOSCOPY);  Surgeon: Bossman Bustos MD;  Location: [facility] ENDO;  Service: Endoscopy;  Laterality: N/A;    FULGURATION OF ENDOMETRIOSIS  12/26/2018    Procedure: FULGURATION, ENDOMETRIOSIS;  Surgeon: Zehra Jensen MD;  Location: [facility] OR;  Service: OB/GYN;;  endometriosis implant resection    HYSTEROSCOPY WITH HYDROTHERMAL ABLATION OF ENDOMETRIUM WITH DILATION AND CURETTAGE N/A 12/26/2018    Procedure: HYSTEROSCOPY, WITH DILATION AND CURETTAGE OF UTERUS AND HYDROTHERMAL ENDOMETRIAL ABLATION;  Surgeon: Zehra Jensen MD;  Location: [facility] OR;  Service: OB/GYN;  Laterality: N/A;    LAPAROSCOPIC SALPINGECTOMY Bilateral 12/26/2018    Procedure: SALPINGECTOMY, LAPAROSCOPIC;  Surgeon: Zehra Jensen MD;  Location: [facility] OR;  Service: OB/GYN;  Laterality: Bilateral;    LAPAROTOMY, EXPLORATORY N/A 2/7/2019    Procedure: LAPAROTOMY, EXPLORATORY;  Surgeon: Ramakrishna Boone MD;  Location: [facility] OR;  Service: General;  Laterality: N/A;    robotic assisted laparoscopy with excision of ovarian endometrioma and chromotubation       Social History     Socioeconomic History    Marital status:      Spouse name: Not on file    Number of children: Not on file    Years of education: Not on file    Highest education level: Not on file   Occupational History    Not on file   Social Needs    Financial resource strain: Not on file    Food insecurity:     Worry: Not on file     Inability: Not on file    Transportation needs:     Medical: Not on file     Non-medical: Not on file   Tobacco Use    Smoking status: Never Smoker     Smokeless tobacco: Never Used   Substance and Sexual Activity    Alcohol use: Never     Frequency: Monthly or less     Drinks per session: 1 or 2     Binge frequency: Never    Drug use: No    Sexual activity: Yes     Partners: Male   Lifestyle    Physical activity:     Days per week: Not on file     Minutes per session: Not on file    Stress: Not on file   Relationships    Social connections:     Talks on phone: Not on file     Gets together: Not on file     Attends Sikhism service: Not on file     Active member of club or organization: Not on file     Attends meetings of clubs or organizations: Not on file     Relationship status: Not on file   Other Topics Concern    Not on file   Social History Narrative    No pets or smokers in household.       Objective:     Vital signs reviewed     GENERAL APPEARANCE:     The patient looks uncomfortable.    No signs of medical or psychiatric distress.    Normal breathing pattern.    No dysmorphic features    Normal eye contact.     GENERAL MEDICAL EXAM:    HEENT:  Head is atraumatic normocephalic. No tender temporal arteries.     Neck and Axillae: No JVD. No carotid bruits. No thyromegaly. No lymphadenopathy.    Cardiopulmonary: No cyanosis. No tachypnea. Normal respiratory effort.  Clear breath sounds. Normal heart sounds with regular rhythm and no murmurs.    Gastrointestinal:  No stomas or lesions. No hernias.  Abdomen is soft non-tender. No masses or organomegaly.    Skin, Hair and Nails: No pathognonomic skin rash. No neurofibromatosis.   No stigmata of autoimmune disease.     Limbs: No varicose veins. No edema. Symmetric pulses.     Muskoskeletal: No deformities.No spine tenderness.   No signs of longstanding neuropathy. No dislocations or fractures.        Neurologic Exam     Mental Status   Oriented to person, place, and time.   Registration: recalls 3 of 3 objects. Recall at 5 minutes: recalls 3 of 3 objects. Follows 3 step commands.   Attention: normal.  Concentration: normal.   Speech: speech is normal   Level of consciousness: alert  Knowledge: good and consistent with education. Able to perform simple calculations.   Able to name object. Able to read. Able to repeat. Able to write. Normal comprehension.     Cranial Nerves     CN II   Visual fields full to confrontation.   Visual acuity: normal  Right visual field deficit: none  Left visual field deficit: none     CN III, IV, VI   Pupils are equal, round, and reactive to light.  Extraocular motions are normal.   Right pupil: Size: 2 mm. Shape: regular. Reactivity: brisk. Consensual response: intact. Accommodation: intact.   Left pupil: Size: 2 mm. Shape: regular. Reactivity: brisk. Consensual response: intact. Accommodation: intact.   CN III: no CN III palsy  CN VI: no CN VI palsy  Nystagmus: none   Diplopia: none  Ophthalmoparesis: none  Upgaze: normal  Downgaze: normal  Conjugate gaze: present  Vestibulo-ocular reflex: present    CN V   Facial sensation intact.   Right facial sensation deficit: none  Left facial sensation deficit: none  Right corneal reflex: normal  Left corneal reflex: normal    CN VII   Right facial weakness: none  Left facial weakness: none  Right taste: normal  Left taste: normal    CN VIII   CN VIII normal.   Hearing: intact  Right Rinne: AC > BC  Left Rinne: AC > BC  Regalado: does not lateralize     CN IX, X   CN IX normal.   CN X normal.   Palate: symmetric  Right gag reflex: normal  Left gag reflex: normal    CN XI   CN XI normal.   Right sternocleidomastoid strength: normal  Left sternocleidomastoid strength: normal  Right trapezius strength: normal  Left trapezius strength: normal    CN XII   CN XII normal.   Tongue: not atrophic  Fasciculations: absent  Tongue deviation: none    Motor Exam   Muscle bulk: normal  Overall muscle tone: normal  Right arm tone: normal  Left arm tone: normal  Right arm pronator drift: absent  Left arm pronator drift: absent  Right leg tone: normal  Left leg  tone: normal    Strength   Strength 5/5 throughout.   Right neck flexion: 5/5  Left neck flexion: 5/5  Right neck extension: 5/5  Left neck extension: 5/5  Right deltoid: 5/5  Left deltoid: 5/5  Right biceps: 5/5  Left biceps: 5/5  Right triceps: 5/5  Left triceps: 5/5  Right wrist flexion: 5/5  Left wrist flexion: 5/5  Right wrist extension: 5/5  Left wrist extension: 5/5  Right interossei: 5/5  Left interossei: 5/5  Right abdominals: 5/5  Left abdominals: 5/5  Right iliopsoas: 5/5  Left iliopsoas: 5/5  Right quadriceps: 5/5  Left quadriceps: 5/5  Right hamstrin/5  Left hamstrin/5  Right glutei: 5/5  Left glutei: 5/5  Right anterior tibial: 5/5  Left anterior tibial: 5/5  Right posterior tibial: 5/5  Left posterior tibial: 5/5  Right peroneal: 5/5  Left peroneal: 5/5  Right gastroc: 5/5  Left gastroc: 5/5    Sensory Exam   Light touch normal.   Right arm light touch: normal  Left arm light touch: normal  Right leg light touch: normal  Left leg light touch: normal  Vibration normal.   Right arm vibration: normal  Left arm vibration: normal  Right leg vibration: normal  Left leg vibration: normal  Proprioception normal.   Right arm proprioception: normal  Left arm proprioception: normal  Right leg proprioception: normal  Left leg proprioception: normal  Pinprick normal.   Right arm pinprick: normal  Left arm pinprick: normal  Right leg pinprick: normal  Left leg pinprick: normal  Graphesthesia: normal  Stereognosis: normal    Gait, Coordination, and Reflexes     Gait  Gait: normal    Coordination   Romberg: negative  Finger to nose coordination: normal  Heel to shin coordination: normal  Tandem walking coordination: normal    Tremor   Resting tremor: absent  Intention tremor: absent  Action tremor: absent    Reflexes   Right brachioradialis: 2+  Left brachioradialis: 2+  Right biceps: 2+  Left biceps: 2+  Right triceps: 2+  Left triceps: 2+  Right patellar: 2+  Left patellar: 2+  Right achilles: 2+  Left  achilles: 2+  Right : 2+  Left : 2+  Right plantar: normal  Left plantar: normal  Right Bedoya: absent  Left Bedoya: absent  Right ankle clonus: absent  Left ankle clonus: absent  Right pendular knee jerk: absent  Left pendular knee jerk: absent      Lab Results   Component Value Date    WBC 6.47 03/05/2019    HGB 12.1 03/05/2019    HCT 38.4 03/05/2019    MCV 81 (L) 03/05/2019     03/05/2019     Sodium   Date Value Ref Range Status   06/12/2019 138 136 - 145 mmol/L Final     Potassium   Date Value Ref Range Status   06/12/2019 5.1 3.5 - 5.1 mmol/L Final     Chloride   Date Value Ref Range Status   06/12/2019 107 95 - 110 mmol/L Final     CO2   Date Value Ref Range Status   06/12/2019 16 (L) 23 - 29 mmol/L Final     Glucose   Date Value Ref Range Status   06/12/2019 92 70 - 110 mg/dL Final     BUN, Bld   Date Value Ref Range Status   06/12/2019 19 6 - 20 mg/dL Final     Creatinine   Date Value Ref Range Status   06/12/2019 0.7 0.5 - 1.4 mg/dL Final     Calcium   Date Value Ref Range Status   06/12/2019 9.5 8.7 - 10.5 mg/dL Final     Total Protein   Date Value Ref Range Status   02/06/2019 7.6 6.0 - 8.4 g/dL Final     Albumin   Date Value Ref Range Status   02/06/2019 4.6 3.5 - 5.2 g/dL Final     Total Bilirubin   Date Value Ref Range Status   02/06/2019 0.5 0.1 - 1.0 mg/dL Final     Comment:     For infants and newborns, interpretation of results should be based  on gestational age, weight and in agreement with clinical  observations.  Premature Infant recommended reference ranges:  Up to 24 hours.............<8.0 mg/dL  Up to 48 hours............<12.0 mg/dL  3-5 days..................<15.0 mg/dL  6-29 days.................<15.0 mg/dL       Alkaline Phosphatase   Date Value Ref Range Status   02/06/2019 50 (L) 55 - 135 U/L Final     AST   Date Value Ref Range Status   02/06/2019 21 10 - 40 U/L Final     ALT   Date Value Ref Range Status   02/06/2019 17 10 - 44 U/L Final     Anion Gap   Date Value Ref  Range Status   06/12/2019 15 8 - 16 mmol/L Final     eGFR if    Date Value Ref Range Status   06/12/2019 >60 >60 mL/min/1.73 m^2 Final     eGFR if non    Date Value Ref Range Status   06/12/2019 >60 >60 mL/min/1.73 m^2 Final     Comment:     Calculation used to obtain the estimated glomerular filtration  rate (eGFR) is the CKD-EPI equation.        Lab Results   Component Value Date    PSVURRSK44 493 06/12/2009     Lab Results   Component Value Date    TSH 1.238 10/18/2018    H1WTAUK 7.1 06/28/2006       Multiple Unremarkable MRIs     Reviewed the neuroimaging independently       Assessment:       1. Other intractable trigeminal autonomic cephalgia (TAC)    2. Cyst of left ovary    3. Primary snoring    4. Recurrent major depressive disorder, in partial remission    5. Gastroesophageal reflux disease, esophagitis presence not specified    6. Intractable migraine without status migrainosus, unspecified migraine type    7. Iron deficiency anemia due to chronic blood loss    8. NSAID long-term use    9. Menorrhagia with regular cycle    10. Hiatal hernia    11. S/P laparoscopy    12. Intractable chronic paroxysmal hemicrania    13. Anxiety        Plan:       TAC          Will try Emgality 240 mg LD followed by 120 mg SQ monthly.    Will also try Cefaly Dual Device.    Recommend against the use of narcotics and benzodiazepines on regular basis.                   MEDICAL/SURGICAL COMORBIDITIES     All relevant medical comorbidities noted and managed by primary care physician and medical care team.            RTC in 3 months                      Thomas Graff MD, FAAN    Attending Neurologist/Epileptologist         Diplomate, American Board-Psychiatry and Neurology (Neurology)    Diplomate, American Board-Clinical Neurophysiology (Epilpesy-Neuromuscular)     Fellow, American Academy of Neurology

## 2019-10-07 NOTE — TELEPHONE ENCOUNTER
Discussed case with Patient's .   Since coming off Lupron and add back hormonal therapy, having recurrence of headaches as in the past.   No vaginal bleeding.  Ablation done at time of last surgery.   Reviewed options.   Suggest trial of continuous dose oral contraceptives, Malini.

## 2019-10-08 ENCOUNTER — TELEPHONE (OUTPATIENT)
Dept: PHARMACY | Facility: CLINIC | Age: 44
End: 2019-10-08

## 2019-10-08 ENCOUNTER — HOSPITAL ENCOUNTER (EMERGENCY)
Facility: HOSPITAL | Age: 44
Discharge: HOME | End: 2019-10-09
Attending: EMERGENCY MEDICINE
Payer: COMMERCIAL

## 2019-10-08 ENCOUNTER — PATIENT MESSAGE (OUTPATIENT)
Dept: NEUROLOGY | Facility: CLINIC | Age: 44
End: 2019-10-08

## 2019-10-08 ENCOUNTER — TELEPHONE (OUTPATIENT)
Dept: NEUROLOGY | Facility: CLINIC | Age: 44
End: 2019-10-08

## 2019-10-08 DIAGNOSIS — G43.901 MIGRAINE WITH STATUS MIGRAINOSUS, NOT INTRACTABLE, UNSPECIFIED MIGRAINE TYPE: Primary | ICD-10-CM

## 2019-10-08 DIAGNOSIS — R00.2 PALPITATIONS: ICD-10-CM

## 2019-10-08 PROCEDURE — 93010 ELECTROCARDIOGRAM REPORT: CPT | Mod: ,,, | Performed by: INTERNAL MEDICINE

## 2019-10-08 PROCEDURE — 96375 TX/PRO/DX INJ NEW DRUG ADDON: CPT

## 2019-10-08 PROCEDURE — 96376 TX/PRO/DX INJ SAME DRUG ADON: CPT

## 2019-10-08 PROCEDURE — 96361 HYDRATE IV INFUSION ADD-ON: CPT

## 2019-10-08 PROCEDURE — 93005 ELECTROCARDIOGRAM TRACING: CPT

## 2019-10-08 PROCEDURE — 93010 EKG 12-LEAD: ICD-10-PCS | Mod: ,,, | Performed by: INTERNAL MEDICINE

## 2019-10-08 PROCEDURE — 99284 EMERGENCY DEPT VISIT MOD MDM: CPT | Mod: 25

## 2019-10-08 PROCEDURE — 96374 THER/PROPH/DIAG INJ IV PUSH: CPT

## 2019-10-08 PROCEDURE — 63600175 PHARM REV CODE 636 W HCPCS: Performed by: EMERGENCY MEDICINE

## 2019-10-08 RX ORDER — PROMETHAZINE HYDROCHLORIDE 25 MG/1
25 TABLET ORAL EVERY 8 HOURS PRN
Qty: 15 TABLET | Refills: 0 | Status: SHIPPED | OUTPATIENT
Start: 2019-10-08 | End: 2020-02-06

## 2019-10-08 RX ORDER — BUTORPHANOL TARTRATE 2 MG/ML
1 INJECTION INTRAMUSCULAR; INTRAVENOUS ONCE
Status: COMPLETED | OUTPATIENT
Start: 2019-10-08 | End: 2019-10-08

## 2019-10-08 RX ORDER — BUTORPHANOL TARTRATE 10 MG/ML
1 SPRAY NASAL EVERY 4 HOURS PRN
Qty: 9 ML | Refills: 0 | Status: SHIPPED | OUTPATIENT
Start: 2019-10-08 | End: 2019-10-18

## 2019-10-08 RX ORDER — BUTORPHANOL TARTRATE 2 MG/ML
0.5 INJECTION INTRAMUSCULAR; INTRAVENOUS ONCE
Status: COMPLETED | OUTPATIENT
Start: 2019-10-09 | End: 2019-10-08

## 2019-10-08 RX ORDER — KETOROLAC TROMETHAMINE 30 MG/ML
30 INJECTION, SOLUTION INTRAMUSCULAR; INTRAVENOUS
Status: COMPLETED | OUTPATIENT
Start: 2019-10-08 | End: 2019-10-08

## 2019-10-08 RX ADMIN — SODIUM CHLORIDE 2000 ML: 0.9 INJECTION, SOLUTION INTRAVENOUS at 10:10

## 2019-10-08 RX ADMIN — PROMETHAZINE HYDROCHLORIDE 12.5 MG: 25 INJECTION INTRAMUSCULAR; INTRAVENOUS at 10:10

## 2019-10-08 RX ADMIN — BUTORPHANOL TARTRATE 1 MG: 2 INJECTION, SOLUTION INTRAMUSCULAR; INTRAVENOUS at 10:10

## 2019-10-08 RX ADMIN — KETOROLAC TROMETHAMINE 30 MG: 30 INJECTION, SOLUTION INTRAMUSCULAR; INTRAVENOUS at 10:10

## 2019-10-08 RX ADMIN — BUTORPHANOL TARTRATE 0.5 MG: 2 INJECTION, SOLUTION INTRAMUSCULAR; INTRAVENOUS at 11:10

## 2019-10-08 NOTE — TELEPHONE ENCOUNTER
"----- Message from Thomas Graff MD sent at 10/8/2019 11:25 AM CDT -----    Could you forward this message to her? Thanks     Dr. Prerna Amador:    We have indeed received Ms. Dimas's Rx well and will record it in our database so it can be matched up with her order once processed.     At any time, Ms. Dimas can proceed to our encrypted website [email or web address] to place her order at her convenience.    Our Order Processing Team will send another email confirmation within 1 business day, once the order is preparing for shipment by [employer] to your patient.    From that point, it is an estimated 2-7 business days for transit time via [employer] to your requested shipping address.    " We are not able to ship to PO Boxes since we ship UPS.  " We do not have expedited shipping options available, only the flat rate shipping fee of which is the most cost-effective for all deliveries anywhere within the [place] for $15.00 Drop off or $25 Signature required.    As stated in the FAQ section of our website under our 60 Day Money Back Guarantee [email or web address] - you have 60 days from the date of delivery to try your Cefaly to see if it is the right treatment for you.    Note: The 60 Day Money Back Guarantee does not include refunds for shipping fees. To be eligible for a refund, electrodes kits returned must be unopened, unused in the original packaging and in the same condition that your patient received it including all original contents.    "

## 2019-10-09 ENCOUNTER — DOCUMENTATION ONLY (OUTPATIENT)
Dept: NEUROLOGY | Facility: CLINIC | Age: 44
End: 2019-10-09

## 2019-10-09 VITALS
HEIGHT: 68 IN | OXYGEN SATURATION: 95 % | WEIGHT: 162 LBS | BODY MASS INDEX: 24.55 KG/M2 | DIASTOLIC BLOOD PRESSURE: 76 MMHG | HEART RATE: 84 BPM | TEMPERATURE: 99 F | SYSTOLIC BLOOD PRESSURE: 115 MMHG | RESPIRATION RATE: 18 BRPM

## 2019-10-09 PROCEDURE — 63600175 PHARM REV CODE 636 W HCPCS: Performed by: EMERGENCY MEDICINE

## 2019-10-09 RX ORDER — OCTREOTIDE ACETATE 100 UG/ML
100 INJECTION, SOLUTION INTRAVENOUS; SUBCUTANEOUS ONCE
Qty: 1 ML | Refills: 0 | Status: SHIPPED | OUTPATIENT
Start: 2019-10-09 | End: 2020-02-05

## 2019-10-09 RX ORDER — DICLOFENAC POTASSIUM 50 MG/1
50 POWDER, FOR SOLUTION ORAL DAILY PRN
Qty: 30 EACH | Refills: 5 | Status: SHIPPED | OUTPATIENT
Start: 2019-10-09 | End: 2020-02-05

## 2019-10-09 NOTE — ED PROVIDER NOTES
SCRIBE #1 NOTE: I, Amirah Boston, am scribing for, and in the presence of, William Robles Jr., MD. I have scribed the entire note.       History     Chief Complaint   Patient presents with    Migraine     Review of patient's allergies indicates:   Allergen Reactions    Hydrocodone Other (See Comments)     Chest pains. Note: patient tolerated hydromorphone in 2/2019.     Chlorhexidine Rash     Per  after surgery patient had large rash across abdomen where chlorhexidine was applied.    Mastisol adhesive [gum hswljf-aasyrb-iecp-alcohol] Rash         History of Present Illness     HPI    10/8/2019, 10:12 PM  History obtained from the patient and spouse (Dr. Dimas)      History of Present Illness: Sandra Dimas is a 44 y.o. female patient with a PMHx of endometriosis, chronic migraine headaches, and s/p APPY who presents to the Emergency Department for evaluation of a migraine HA which onset suddenly this evening. Pt recently stopped an estrogen and progesterone medications to treat endometriosis which spouse believes contributes to the onset. Pt started a new medication 2 days ago. Per souse, her typical HA refers a burning/ numb sensation to the R ear and R teeth, but today it referred to the L ear and L teeth which is new. Spouse does not want the pt to be prescribed any steroids. Symptoms are constant and moderate in severity. No mitigating or exacerbating factors reported. Associated sxs include nausea. Patient denies any speech difficulty, numbness, localized weakness, dizziness, back pain, dysuria, cough, SOB, CP, fever/ chills, congestion, neck pain/stiffness, emesis, abdominal pain, rash, confusion, visual disturbances, and all other sxs at this time. No further complaints or concerns at this time.     Arrival mode: Personal vehicle     PCP: OSIRIS Gamez Jr, MD        Past Medical History:  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    Endometriosis      Hemicrania continua     Migraines        Past Surgical History:  Past Surgical History:   Procedure Laterality Date    APPENDECTOMY      diagnostic laprascopy      ESOPHAGOGASTRODUODENOSCOPY N/A 11/30/2018    Procedure: EGD (ESOPHAGOGASTRODUODENOSCOPY);  Surgeon: Bossman Bustos MD;  Location[facility];  Service: Endoscopy;  Laterality: N/A;    FULGURATION OF ENDOMETRIOSIS  12/26/2018    Procedure: FULGURATION, ENDOMETRIOSIS;  Surgeon: Zehra Jensen MD;  Location: [facility] OR;  Service: OB/GYN;;  endometriosis implant resection    HYSTEROSCOPY WITH HYDROTHERMAL ABLATION OF ENDOMETRIUM WITH DILATION AND CURETTAGE N/A 12/26/2018    Procedure: HYSTEROSCOPY, WITH DILATION AND CURETTAGE OF UTERUS AND HYDROTHERMAL ENDOMETRIAL ABLATION;  Surgeon: Zehra Jensen MD;  Location: [facility];  Service: OB/GYN;  Laterality: N/A;    LAPAROSCOPIC SALPINGECTOMY Bilateral 12/26/2018    Procedure: SALPINGECTOMY, LAPAROSCOPIC;  Surgeon: Zehra Jensen MD;  Location: [facility];  Service: OB/GYN;  Laterality: Bilateral;    LAPAROTOMY, EXPLORATORY N/A 2/7/2019    Procedure: LAPAROTOMY, EXPLORATORY;  Surgeon: Ramakrishna Boone MD;  Location: [facility];  Service: General;  Laterality: N/A;    robotic assisted laparoscopy with excision of ovarian endometrioma and chromotubation           Family History:  Family History   Problem Relation Age of Onset    Strabismus Neg Hx     Retinal detachment Neg Hx     Macular degeneration Neg Hx     Glaucoma Neg Hx     Blindness Neg Hx     Amblyopia Neg Hx     Breast cancer Neg Hx     Colon cancer Neg Hx     Ovarian cancer Neg Hx     Thyroid disease Neg Hx     Migraines Neg Hx     Asthma Neg Hx        Social History:  Social History     Tobacco Use    Smoking status: Never Smoker    Smokeless tobacco: Never Used   Substance and Sexual Activity    Alcohol use: Never     Frequency: Monthly or less     Drinks per session: 1 or 2     Binge frequency: Never    Drug use: No    Sexual  activity: Yes     Partners: Male        Review of Systems     Review of Systems   Constitutional: Negative for chills and fever.   HENT: Negative for congestion.    Eyes: Negative for visual disturbance.   Respiratory: Negative for cough and shortness of breath.    Cardiovascular: Negative for chest pain.   Gastrointestinal: Positive for nausea. Negative for abdominal pain and vomiting.   Genitourinary: Negative for dysuria.   Musculoskeletal: Negative for back pain, neck pain and neck stiffness.   Skin: Negative for rash.   Neurological: Positive for headaches. Negative for dizziness, speech difficulty, weakness and numbness.        - Head injury   Psychiatric/Behavioral: Negative for confusion.   All other systems reviewed and are negative.     Physical Exam     Initial Vitals [10/08/19 2255]   BP Pulse Resp Temp SpO2   119/78 80 18 98.6 °F (37 °C) 99 %      MAP       --          Physical Exam  Nursing Notes and Vital Signs Reviewed.  Constitutional: Patient is in significant distress. Well-developed and well-nourished.  Head: Atraumatic. Normocephalic.  Eyes:  EOM intact. Conjunctivae are not pale. No scleral icterus.  ENT: Mucous membranes are moist. Nares are clear  Neck: Supple. Full ROM.   Cardiovascular: Regular rate. Regular rhythm. No murmurs, rubs, or gallops. Distal pulses are 2+ and symmetric.  Pulmonary/Chest: No respiratory distress. Clear to auscultation bilaterally. No wheezing or rales.  Abdominal: Soft and non-distended.  There is no tenderness.  No rebound, guarding, or rigidity. Good bowel sounds.  Musculoskeletal: Moves all extremities. No obvious deformities. No edema. No calf tenderness. 5 x 5 strength in all extremities  Skin: Warm and dry.  Neurological:  Alert, awake, and appropriate.  Normal speech.  No acute focal neurological deficits are appreciated. 2 through 12 intact bilaterally. No nuchal rigidity or meningismus.  Psychiatric: Normal affect. Good eye contact. Appropriate in  "content.     ED Course   Procedures  ED Vital Signs:  Vitals:    10/08/19 2203 10/08/19 2255   BP:  119/78   Pulse:  80   Resp:  18   Temp:  98.6 °F (37 °C)   TempSrc:  Oral   SpO2:  99%   Weight: 73.5 kg (162 lb)    Height: 5' 8" (1.727 m)        Abnormal Lab Results:  Labs Reviewed - No data to display     All Lab Results:  None.     Imaging Results:  Imaging Results    None        The EKG was ordered, reviewed, and independently interpreted by the ED provider.  Interpretation time: 23:38  Rate: 90 BPM  Rhythm: normal sinus rhythm  Interpretation: Possible left atrial enlargement. Incomplete RBBB. Prolonged QT. Abnormal ECG. No STEMI.           The Emergency Provider reviewed the vital signs and test results, which are outlined above.     ED Discussion     11:01 PM: Re-evaluated pt. Pt is resting comfortably. Pt states that her pain has decreased to a 5/10.    11:31 PM: Reassessed pt at this time.  Pt states her condition has improved at this time. Discussed with pt all pertinent ED information and results. Discussed pt dx and plan of tx. Gave pt all f/u and return to the ED instructions. All questions and concerns were addressed at this time. Pt expresses understanding of information and instructions, and is comfortable with plan to discharge. Pt is stable for discharge.    I discussed with patient and/or family/caretaker that evaluation in the ED does not suggest any emergent or life threatening medical conditions requiring immediate intervention beyond what was provided in the ED, and I believe patient is safe for discharge.  Regardless, an unremarkable evaluation in the ED does not preclude the development or presence of a serious of life threatening condition. As such, patient was instructed to return immediately for any worsening or change in current symptoms.    11:45 PM  Patient with history of migraine headaches that her sometimes intractable.  Patient has a migraine and significant over the past several " days is refractory to home medications.  This has been easily broken with state all and is consistent with her prior migraines.  Patient had recently been on Lupron as well as progesterone but this was stopped about a month ago with the onset of her symptoms following shortly after.     Medical Decision Making:   Clinical Tests:   Medical Tests: Ordered and Reviewed           ED Medication(s):  Medications   sodium chloride 0.9% bolus 2,000 mL (2,000 mLs Intravenous New Bag 10/8/19 2234)   butorphanol injection 1 mg (1 mg Intravenous Given 10/8/19 2235)   promethazine (PHENERGAN) 12.5 mg in dextrose 5 % 50 mL IVPB (0 mg Intravenous Stopped 10/8/19 2247)   ketorolac injection 30 mg (30 mg Intravenous Given 10/8/19 2215)   butorphanol injection 0.5 mg (0.5 mg Intravenous Given 10/8/19 2317)       New Prescriptions    BUTORPHANOL (STADOL) 10 MG/ML NASAL SPRAY    1 spray by Nasal route every 4 (four) hours as needed for Pain.    PROMETHAZINE (PHENERGAN) 25 MG TABLET    Take 1 tablet (25 mg total) by mouth every 8 (eight) hours as needed for Nausea.       Follow-up Information     E Jose Gamez Jr, MD In 2 days.    Specialties:  Internal Medicine, Pediatrics  Contact information:  [address]  [place] [address] [phone]                       Scribe Attestation:   Scribe #1: I performed the above scribed service and the documentation accurately describes the services I performed. I attest to the accuracy of the note.     Attending:   Physician Attestation Statement for Scribe #1: I, William Robles Jr., MD, personally performed the services described in this documentation, as scribed by Amirah Boston, in my presence, and it is both accurate and complete.           Clinical Impression       ICD-10-CM ICD-9-CM   1. Migraine with status migrainosus, not intractable, unspecified migraine type G43.901 346.92   2. Palpitations R00.2 785.1       Disposition:   Disposition: Discharged  Condition: Stable          William Robles Jr., MD  10/08/19 1797

## 2019-10-09 NOTE — PROGRESS NOTES
Patient's  (Dr. Dimas) texted me about patient and we had a phone call.     My recs:  1.  Prednisone 60mg/d x 3d  2.  Repeat GONB  3.  Percocet prn  4.  Zofran prn  5.  Consider IV magnesium 2g over 2 hrs if above ineffective      Prophylactics              Effective: indomethacin (variable - she may have mixed phenotypes), ibuprofen               Ineffective/not tolerated:  Elavil, verapamil, propranolol, Depakote, topiramate, lithium, GONB, Botox, Celebrex     Abortives              Effective: Stadol, Zofran, Percocet              Ineffective/not tolerated: Imitrex, Maxalt, Zomig, Relpax, Frova, steroids, magnesium, DHE, oxygen    Ric Jacome

## 2019-10-09 NOTE — PROGRESS NOTES
The patient's  talked to me and texted me 12 times from 10- through 10- discussing the options of treating the patient's very very intractable headaches.    We discussed Ergotamine, Octreotide and Cambia. The  wanted to proceed with Cambia and Octreotide.    If no response I recommended Pain Management evaluation and consider Iovera.

## 2019-10-11 ENCOUNTER — HOSPITAL ENCOUNTER (EMERGENCY)
Facility: HOSPITAL | Age: 44
Discharge: HOME | End: 2019-10-11
Attending: FAMILY MEDICINE
Payer: COMMERCIAL

## 2019-10-11 VITALS
DIASTOLIC BLOOD PRESSURE: 75 MMHG | TEMPERATURE: 98 F | SYSTOLIC BLOOD PRESSURE: 125 MMHG | OXYGEN SATURATION: 98 % | RESPIRATION RATE: 16 BRPM | HEIGHT: 65 IN | WEIGHT: 170 LBS | BODY MASS INDEX: 28.32 KG/M2 | HEART RATE: 70 BPM

## 2019-10-11 DIAGNOSIS — G43.009 MIGRAINE WITHOUT AURA AND WITHOUT STATUS MIGRAINOSUS, NOT INTRACTABLE: Primary | ICD-10-CM

## 2019-10-11 LAB
ALBUMIN SERPL BCP-MCNC: 4.5 G/DL (ref 3.5–5.2)
ALP SERPL-CCNC: 65 U/L (ref 55–135)
ALT SERPL W/O P-5'-P-CCNC: 36 U/L (ref 10–44)
ANION GAP SERPL CALC-SCNC: 16 MMOL/L (ref 8–16)
AST SERPL-CCNC: 29 U/L (ref 10–40)
BASOPHILS # BLD AUTO: 0.04 K/UL (ref 0–0.2)
BASOPHILS NFR BLD: 0.6 % (ref 0–1.9)
BILIRUB SERPL-MCNC: 0.3 MG/DL (ref 0.1–1)
BUN SERPL-MCNC: 22 MG/DL (ref 6–20)
CALCIUM SERPL-MCNC: 10 MG/DL (ref 8.7–10.5)
CHLORIDE SERPL-SCNC: 105 MMOL/L (ref 95–110)
CO2 SERPL-SCNC: 23 MMOL/L (ref 23–29)
CREAT SERPL-MCNC: 0.8 MG/DL (ref 0.5–1.4)
DIFFERENTIAL METHOD: ABNORMAL
EOSINOPHIL # BLD AUTO: 0.1 K/UL (ref 0–0.5)
EOSINOPHIL NFR BLD: 1.1 % (ref 0–8)
ERYTHROCYTE [DISTWIDTH] IN BLOOD BY AUTOMATED COUNT: 12.1 % (ref 11.5–14.5)
EST. GFR  (AFRICAN AMERICAN): >60 ML/MIN/1.73 M^2
EST. GFR  (NON AFRICAN AMERICAN): >60 ML/MIN/1.73 M^2
GLUCOSE SERPL-MCNC: 98 MG/DL (ref 70–110)
HCT VFR BLD AUTO: 38.3 % (ref 37–48.5)
HGB BLD-MCNC: 12.2 G/DL (ref 12–16)
IMM GRANULOCYTES # BLD AUTO: 0.01 K/UL (ref 0–0.04)
IMM GRANULOCYTES NFR BLD AUTO: 0.2 % (ref 0–0.5)
LYMPHOCYTES # BLD AUTO: 3 K/UL (ref 1–4.8)
LYMPHOCYTES NFR BLD: 45.8 % (ref 18–48)
MCH RBC QN AUTO: 25.7 PG (ref 27–31)
MCHC RBC AUTO-ENTMCNC: 31.9 G/DL (ref 32–36)
MCV RBC AUTO: 81 FL (ref 82–98)
MONOCYTES # BLD AUTO: 0.4 K/UL (ref 0.3–1)
MONOCYTES NFR BLD: 6.4 % (ref 4–15)
NEUTROPHILS # BLD AUTO: 3 K/UL (ref 1.8–7.7)
NEUTROPHILS NFR BLD: 45.9 % (ref 38–73)
NRBC BLD-RTO: 0 /100 WBC
PLATELET # BLD AUTO: 247 K/UL (ref 150–350)
PMV BLD AUTO: 8.9 FL (ref 9.2–12.9)
POTASSIUM SERPL-SCNC: 3.5 MMOL/L (ref 3.5–5.1)
PROT SERPL-MCNC: 7.6 G/DL (ref 6–8.4)
RBC # BLD AUTO: 4.75 M/UL (ref 4–5.4)
SODIUM SERPL-SCNC: 144 MMOL/L (ref 136–145)
WBC # BLD AUTO: 6.61 K/UL (ref 3.9–12.7)

## 2019-10-11 PROCEDURE — 96367 TX/PROPH/DG ADDL SEQ IV INF: CPT

## 2019-10-11 PROCEDURE — 96375 TX/PRO/DX INJ NEW DRUG ADDON: CPT

## 2019-10-11 PROCEDURE — 85025 COMPLETE CBC W/AUTO DIFF WBC: CPT

## 2019-10-11 PROCEDURE — 96365 THER/PROPH/DIAG IV INF INIT: CPT

## 2019-10-11 PROCEDURE — 63600175 PHARM REV CODE 636 W HCPCS: Performed by: FAMILY MEDICINE

## 2019-10-11 PROCEDURE — 36415 COLL VENOUS BLD VENIPUNCTURE: CPT

## 2019-10-11 PROCEDURE — 96368 THER/DIAG CONCURRENT INF: CPT

## 2019-10-11 PROCEDURE — 80053 COMPREHEN METABOLIC PANEL: CPT

## 2019-10-11 PROCEDURE — 99284 EMERGENCY DEPT VISIT MOD MDM: CPT | Mod: 25

## 2019-10-11 PROCEDURE — 96366 THER/PROPH/DIAG IV INF ADDON: CPT

## 2019-10-11 RX ORDER — MAGNESIUM SULFATE HEPTAHYDRATE 40 MG/ML
2 INJECTION, SOLUTION INTRAVENOUS ONCE
Status: COMPLETED | OUTPATIENT
Start: 2019-10-11 | End: 2019-10-11

## 2019-10-11 RX ORDER — MEPERIDINE HYDROCHLORIDE 25 MG/ML
25 INJECTION INTRAMUSCULAR; INTRAVENOUS; SUBCUTANEOUS
Status: COMPLETED | OUTPATIENT
Start: 2019-10-11 | End: 2019-10-11

## 2019-10-11 RX ORDER — OXYCODONE AND ACETAMINOPHEN 10; 325 MG/1; MG/1
1 TABLET ORAL EVERY 4 HOURS PRN
Qty: 10 TABLET | Refills: 0 | Status: ON HOLD | OUTPATIENT
Start: 2019-10-11 | End: 2019-12-31 | Stop reason: HOSPADM

## 2019-10-11 RX ORDER — KETOROLAC TROMETHAMINE 30 MG/ML
30 INJECTION, SOLUTION INTRAMUSCULAR; INTRAVENOUS
Status: COMPLETED | OUTPATIENT
Start: 2019-10-11 | End: 2019-10-11

## 2019-10-11 RX ORDER — HYDROMORPHONE HYDROCHLORIDE 2 MG/ML
1 INJECTION, SOLUTION INTRAMUSCULAR; INTRAVENOUS; SUBCUTANEOUS
Status: COMPLETED | OUTPATIENT
Start: 2019-10-11 | End: 2019-10-11

## 2019-10-11 RX ADMIN — MEPERIDINE HYDROCHLORIDE 25 MG: 25 INJECTION INTRAMUSCULAR; INTRAVENOUS; SUBCUTANEOUS at 10:10

## 2019-10-11 RX ADMIN — LORAZEPAM 1 MG: 2 INJECTION INTRAMUSCULAR; INTRAVENOUS at 08:10

## 2019-10-11 RX ADMIN — SODIUM CHLORIDE 1000 ML: 0.9 INJECTION, SOLUTION INTRAVENOUS at 08:10

## 2019-10-11 RX ADMIN — HYDROMORPHONE HYDROCHLORIDE 1 MG: 2 INJECTION, SOLUTION INTRAMUSCULAR; INTRAVENOUS; SUBCUTANEOUS at 08:10

## 2019-10-11 RX ADMIN — KETOROLAC TROMETHAMINE 30 MG: 30 INJECTION, SOLUTION INTRAMUSCULAR at 08:10

## 2019-10-11 RX ADMIN — MAGNESIUM SULFATE IN WATER 2 G: 40 INJECTION, SOLUTION INTRAVENOUS at 08:10

## 2019-10-11 RX ADMIN — PROMETHAZINE HYDROCHLORIDE 12.5 MG: 25 INJECTION INTRAMUSCULAR; INTRAVENOUS at 08:10

## 2019-10-12 NOTE — ED PROVIDER NOTES
SCRIBE #1 NOTE: IRick, am scribing for, and in the presence of, Heydi Barajas MD. I have scribed the entire note.       History     Chief Complaint   Patient presents with    Headache     Review of patient's allergies indicates:   Allergen Reactions    Hydrocodone Other (See Comments)     Chest pains. Note: patient tolerated hydromorphone in 2/2019.     Chlorhexidine Rash     Per  after surgery patient had large rash across abdomen where chlorhexidine was applied.    Mastisol adhesive [gum mwpldf-rrwkjy-swim-alcohol] Rash         History of Present Illness     HPI    10/11/2019, 7:43 PM   History obtained from the  and patient      History of Present Illness: Sandra Dimas is a 44 y.o. female patient with a PMHx of chronic aroxysmal hemicrania, hemicrania continua, and migraines who presents to the Emergency Department for evaluation of HA which onset suddenly today. Pt was seen in the ED on 10/8 for similar sxs. Pt recently stopped estrogen and progesterone medication to treat endometriosis which is believed to be the cause of the onset. Symptoms are constant and moderate in severity. No mitigating or exacerbating factors reported. Associated sxs include photophobia. Patient denies any fever, chills, SOB, CP, abd pain, N/V, back pain, weakness, dizziness, and all other sxs at this time. No prior Tx reported. No further complaints or concerns at this time.        Arrival mode: Personal vehicle      PCP: OSIRIS Gamez Jr, MD        Past Medical History:  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    Endometriosis     Hemicrania continua     Migraines        Past Surgical History:  Past Surgical History:   Procedure Laterality Date    APPENDECTOMY      diagnostic laprascopy      ESOPHAGOGASTRODUODENOSCOPY N/A 11/30/2018    Procedure: EGD (ESOPHAGOGASTRODUODENOSCOPY);  Surgeon: Bossman Bustos MD;  Location: [facility];  Service:  Endoscopy;  Laterality: N/A;    FULGURATION OF ENDOMETRIOSIS  12/26/2018    Procedure: FULGURATION, ENDOMETRIOSIS;  Surgeon: Zehra Jensen MD;  Location: [facility] OR;  Service: OB/GYN;;  endometriosis implant resection    HYSTEROSCOPY WITH HYDROTHERMAL ABLATION OF ENDOMETRIUM WITH DILATION AND CURETTAGE N/A 12/26/2018    Procedure: HYSTEROSCOPY, WITH DILATION AND CURETTAGE OF UTERUS AND HYDROTHERMAL ENDOMETRIAL ABLATION;  Surgeon: Zehra Jensen MD;  Location: [facility] OR;  Service: OB/GYN;  Laterality: N/A;    LAPAROSCOPIC SALPINGECTOMY Bilateral 12/26/2018    Procedure: SALPINGECTOMY, LAPAROSCOPIC;  Surgeon: Zehra Jensen MD;  Location: [facility] OR;  Service: OB/GYN;  Laterality: Bilateral;    LAPAROTOMY, EXPLORATORY N/A 2/7/2019    Procedure: LAPAROTOMY, EXPLORATORY;  Surgeon: Ramakrishna Boone MD;  Location: [facility] OR;  Service: General;  Laterality: N/A;    robotic assisted laparoscopy with excision of ovarian endometrioma and chromotubation           Family History:  Family History   Problem Relation Age of Onset    Strabismus Neg Hx     Retinal detachment Neg Hx     Macular degeneration Neg Hx     Glaucoma Neg Hx     Blindness Neg Hx     Amblyopia Neg Hx     Breast cancer Neg Hx     Colon cancer Neg Hx     Ovarian cancer Neg Hx     Thyroid disease Neg Hx     Migraines Neg Hx     Asthma Neg Hx        Social History:  Social History     Tobacco Use    Smoking status: Never Smoker    Smokeless tobacco: Never Used   Substance and Sexual Activity    Alcohol use: Never     Frequency: Monthly or less     Drinks per session: 1 or 2     Binge frequency: Never    Drug use: No    Sexual activity: Yes     Partners: Male        Review of Systems     Review of Systems   Constitutional: Negative for chills and fever.   HENT: Negative for sore throat.    Respiratory: Negative for shortness of breath.    Cardiovascular: Negative for chest pain.   Gastrointestinal: Negative for abdominal pain, nausea and  vomiting.   Genitourinary: Negative for dysuria.   Musculoskeletal: Negative for back pain.   Skin: Negative for rash.   Neurological: Positive for headaches. Negative for dizziness, weakness and numbness.   Hematological: Does not bruise/bleed easily.   All other systems reviewed and are negative.       Physical Exam     Initial Vitals   BP Pulse Resp Temp SpO2   10/11/19 1957 10/11/19 1956 10/11/19 1957 10/11/19 2200 10/11/19 1957   126/79 71 17 97.9 °F (36.6 °C) 99 %      MAP       --                 Physical Exam  Nursing Notes and Vital Signs Reviewed.  Constitutional: Patient is in moderate distress. Well-developed and well-nourished.  Head: Atraumatic. Normocephalic.  Eyes: PERRL. EOM intact. Conjunctivae are not pale. No scleral icterus. Light sensitive.   ENT: Mucous membranes are moist. Oropharynx is clear and symmetric.    Neck: Supple. Full ROM. No lymphadenopathy.  Cardiovascular: Regular rate. Regular rhythm. No murmurs, rubs, or gallops. Distal pulses are 2+ and symmetric.  Pulmonary/Chest: No respiratory distress. Clear to auscultation bilaterally. No wheezing or rales.  Abdominal: Soft and non-distended.  There is no tenderness.  No rebound, guarding, or rigidity.   Musculoskeletal: Moves all extremities. No obvious deformities. No edema. No calf tenderness.  Skin: Warm and dry.  Neurological:  Alert, awake, and appropriate.  Normal speech.  No acute focal neurological deficits are appreciated.  Psychiatric: Normal affect. Good eye contact. Appropriate in content.     ED Course   Procedures  ED Vital Signs:  Vitals:    10/11/19 1956 10/11/19 1957 10/11/19 2002 10/11/19 2011   BP:  126/79 125/77    Pulse: 71 69 66    Resp:  17     Temp:       TempSrc:       SpO2:  99% 98%    Weight:    77.1 kg (170 lb)   Height:        10/11/19 2030 10/11/19 2035 10/11/19 2100 10/11/19 2130   BP: 127/72  126/72 118/74   Pulse: 72  76 72   Resp:    16   Temp:       TempSrc:       SpO2: 98%  95% 98%   Weight:      "  Height:  5' 5" (1.651 m)      10/11/19 2200   BP: 125/75   Pulse: 70   Resp: 16   Temp: 97.9 °F (36.6 °C)   TempSrc: Oral   SpO2: 98%   Weight:    Height:        Abnormal Lab Results:  Labs Reviewed   CBC W/ AUTO DIFFERENTIAL - Abnormal; Notable for the following components:       Result Value    Mean Corpuscular Volume 81 (*)     Mean Corpuscular Hemoglobin 25.7 (*)     Mean Corpuscular Hemoglobin Conc 31.9 (*)     MPV 8.9 (*)     All other components within normal limits   COMPREHENSIVE METABOLIC PANEL - Abnormal; Notable for the following components:    BUN, Bld 22 (*)     All other components within normal limits        All Lab Results:  Results for orders placed or performed during the hospital encounter of 10/11/19   CBC auto differential   Result Value Ref Range    WBC 6.61 3.90 - 12.70 K/uL    RBC 4.75 4.00 - 5.40 M/uL    Hemoglobin 12.2 12.0 - 16.0 g/dL    Hematocrit 38.3 37.0 - 48.5 %    Mean Corpuscular Volume 81 (L) 82 - 98 fL    Mean Corpuscular Hemoglobin 25.7 (L) 27.0 - 31.0 pg    Mean Corpuscular Hemoglobin Conc 31.9 (L) 32.0 - 36.0 g/dL    RDW 12.1 11.5 - 14.5 %    Platelets 247 150 - 350 K/uL    MPV 8.9 (L) 9.2 - 12.9 fL    Immature Granulocytes 0.2 0.0 - 0.5 %    Gran # (ANC) 3.0 1.8 - 7.7 K/uL    Immature Grans (Abs) 0.01 0.00 - 0.04 K/uL    Lymph # 3.0 1.0 - 4.8 K/uL    Mono # 0.4 0.3 - 1.0 K/uL    Eos # 0.1 0.0 - 0.5 K/uL    Baso # 0.04 0.00 - 0.20 K/uL    nRBC 0 0 /100 WBC    Gran% 45.9 38.0 - 73.0 %    Lymph% 45.8 18.0 - 48.0 %    Mono% 6.4 4.0 - 15.0 %    Eosinophil% 1.1 0.0 - 8.0 %    Basophil% 0.6 0.0 - 1.9 %    Differential Method Automated    Comprehensive metabolic panel   Result Value Ref Range    Sodium 144 136 - 145 mmol/L    Potassium 3.5 3.5 - 5.1 mmol/L    Chloride 105 95 - 110 mmol/L    CO2 23 23 - 29 mmol/L    Glucose 98 70 - 110 mg/dL    BUN, Bld 22 (H) 6 - 20 mg/dL    Creatinine 0.8 0.5 - 1.4 mg/dL    Calcium 10.0 8.7 - 10.5 mg/dL    Total Protein 7.6 6.0 - 8.4 g/dL    " Albumin 4.5 3.5 - 5.2 g/dL    Total Bilirubin 0.3 0.1 - 1.0 mg/dL    Alkaline Phosphatase 65 55 - 135 U/L    AST 29 10 - 40 U/L    ALT 36 10 - 44 U/L    Anion Gap 16 8 - 16 mmol/L    eGFR if African American >60 >60 mL/min/1.73 m^2    eGFR if non African American >60 >60 mL/min/1.73 m^2          The Emergency Provider reviewed the vital signs and test results, which are outlined above.     ED Discussion     8:57 PM: Re-evaluated pt. Pt is resting comfortably and is in no acute distress.  Pt is asleep.  states that her HA is better.  D/w  all pertinent results. D/w pt any concerns expressed at this time. Answered all questions. Pt expresses understanding at this time.    8:55 PM: Reassessed pt at this time.  Pt states her condition has improved at this time. Pt is asleep,  states her HA has improved. Discussed with  all pertinent ED information and results. Discussed pt dx and plan of tx. Gave  all f/u and return to the ED instructions. All questions and concerns were addressed at this time.  expresses understanding of information and instructions, and is comfortable with plan to discharge. Pt is stable for discharge.        ED Medication(s):  Medications   sodium chloride 0.9% bolus 1,000 mL (0 mLs Intravenous Stopped 10/11/19 2206)   magnesium sulfate 2g in water 50mL IVPB (premix) (0 g Intravenous Stopped 10/11/19 2206)   hydromorphone (PF) injection 1 mg (1 mg Intravenous Given 10/11/19 2003)   promethazine (PHENERGAN) 12.5 mg in dextrose 5 % 50 mL IVPB (0 mg Intravenous Stopped 10/11/19 2043)   ketorolac injection 30 mg (30 mg Intravenous Given 10/11/19 2003)   lorazepam (ATIVAN) injection 1 mg (1 mg Intravenous Given 10/11/19 2035)   meperidine (PF) injection 25 mg (25 mg Intravenous Given 10/11/19 2226)       Discharge Medication List as of 10/11/2019  8:58 PM          Follow-up Information     Schedule an appointment as soon as possible for a visit  with OSIRIS Garcia  Vera Laboy MD.    Specialties:  Internal Medicine, Pediatrics  Why:  As needed  Contact information:  [address]  [place] [id number] [phone]                         Medical Decision Making:   Clinical Tests:   Lab Tests: Ordered and Reviewed             Scribe Attestation:   Scribe #1: I performed the above scribed service and the documentation accurately describes the services I performed. I attest to the accuracy of the note.     Attending:   Physician Attestation Statement for Scribe #1: I, Heydi Barajsa MD, personally performed the services described in this documentation, as scribed by Rick Solorio, in my presence, and it is both accurate and complete.           Clinical Impression       ICD-10-CM ICD-9-CM   1. Migraine without aura and without status migrainosus, not intractable G43.009 346.10       Disposition:   Disposition: Discharged  Condition: Stable         Heydi Barajas MD  10/13/19 1953

## 2019-10-14 ENCOUNTER — TELEPHONE (OUTPATIENT)
Dept: NEUROLOGY | Facility: CLINIC | Age: 44
End: 2019-10-14

## 2019-10-14 NOTE — TELEPHONE ENCOUNTER
----- Message from Sander Naqvi sent at 10/11/2019 10:06 AM CDT -----  Contact: Yanna ( [facility])   .Type:  Pharmacy Calling to Clarify an RX    Name of Caller: Yanna   Pharmacy Name: [facility]   Prescription Name: octreotide   What do they need to clarify?: unable to process request  Best Call Back Number: [phone]   Additional Information:  They are unable to process must be done in house per Yanna.              ..  [facility]

## 2019-10-17 NOTE — TELEPHONE ENCOUNTER
DOCUMENTATION ONLY:   Prior authorization for EMGALITY approved from 10/15/2019 to 04/06/2020.     Case ID# [id number]    Co-pay: $95- $0 WITH EMGALITY E-VOUCHER    Patient Assistance IS NOT required.     Forward to clinical pharmacist for consult & shipment. FLC

## 2019-10-18 ENCOUNTER — TELEPHONE (OUTPATIENT)
Dept: PHARMACY | Facility: CLINIC | Age: 44
End: 2019-10-18

## 2019-10-23 ENCOUNTER — TELEPHONE (OUTPATIENT)
Dept: PHARMACY | Facility: CLINIC | Age: 44
End: 2019-10-23

## 2019-10-23 NOTE — TELEPHONE ENCOUNTER
Initial Emgality consult declined on Tuesday 10/22/19 by patient's , Dr. Dimas. Emgality 240mg (loading dose) will be shipped on Wednesday 10/23/19 to arrive at patient's home on Thursday 10/24/19 via [employer]. $0.00 copay. Patient intends to start Emgality once received. Address confirmed. Confirmed 2 patient identifiers - name and . Therapy Appropriate.    Advised to inject two injections (240mg) into the skin as a loading dose, followed by one injection (120mg) every 4 weeks thereafter.     All questions answered and addressed to satisfaction. RPh will touchbase with pt in 7 days and [facility] will contact patient in 3 weeks for refills.    Xavi Murry, GucciD  Clinical Pharmacist  [facility]  P: [phone]    [facility] on 10/23/19

## 2019-10-28 ENCOUNTER — LAB VISIT (OUTPATIENT)
Dept: LAB | Facility: HOSPITAL | Age: 44
End: 2019-10-28
Payer: COMMERCIAL

## 2019-10-28 ENCOUNTER — CLINICAL SUPPORT (OUTPATIENT)
Dept: CARDIOLOGY | Facility: CLINIC | Age: 44
End: 2019-10-28
Payer: COMMERCIAL

## 2019-10-28 DIAGNOSIS — R07.9 CHEST PAIN AT REST: ICD-10-CM

## 2019-10-28 DIAGNOSIS — R07.9 CHEST PAIN AT REST: Primary | ICD-10-CM

## 2019-10-28 DIAGNOSIS — R07.9 CHEST PAIN: ICD-10-CM

## 2019-10-28 DIAGNOSIS — R07.9 CHEST PAIN: Primary | ICD-10-CM

## 2019-10-28 LAB
DIASTOLIC DYSFUNCTION: NO
ESTIMATED PA SYSTOLIC PRESSURE: 30.25
RETIRED EF AND QEF - SEE NOTES: 55 (ref 55–65)
TROPONIN I SERPL DL<=0.01 NG/ML-MCNC: 0.01 NG/ML (ref 0–0.03)

## 2019-10-28 PROCEDURE — 93000 EKG 12-LEAD: ICD-10-PCS | Mod: S$GLB,,, | Performed by: INTERNAL MEDICINE

## 2019-10-28 PROCEDURE — 93306 2D ECHO WITH COLOR FLOW DOPPLER: ICD-10-PCS | Mod: S$GLB,,, | Performed by: INTERNAL MEDICINE

## 2019-10-28 PROCEDURE — 84484 ASSAY OF TROPONIN QUANT: CPT

## 2019-10-28 PROCEDURE — 36415 COLL VENOUS BLD VENIPUNCTURE: CPT

## 2019-10-28 PROCEDURE — 93000 ELECTROCARDIOGRAM COMPLETE: CPT | Mod: S$GLB,,, | Performed by: INTERNAL MEDICINE

## 2019-10-28 PROCEDURE — 93306 TTE W/DOPPLER COMPLETE: CPT | Mod: S$GLB,,, | Performed by: INTERNAL MEDICINE

## 2019-11-01 ENCOUNTER — TELEPHONE (OUTPATIENT)
Dept: NEUROLOGY | Facility: CLINIC | Age: 44
End: 2019-11-01

## 2019-11-01 NOTE — TELEPHONE ENCOUNTER
----- Message from Yanira Melendez sent at 11/1/2019  1:14 PM CDT -----  Contact: Patient's  Dr. Dimas with [facility]  Type:  Sooner Apoointment Request    Caller is requesting a sooner appointment.  Caller declined first available appointment listed below.  Caller will not accept being placed on the waitlist and is requesting a message be sent to doctor.    Name of Caller:  Dr. Dimas  When is the first available appointment?  3/3/20  Symptoms:  Headaches  Best Call Back Number: [phone]

## 2019-11-08 ENCOUNTER — OFFICE VISIT (OUTPATIENT)
Dept: NEUROLOGY | Facility: CLINIC | Age: 44
End: 2019-11-08
Payer: COMMERCIAL

## 2019-11-08 VITALS
HEART RATE: 82 BPM | WEIGHT: 163.25 LBS | SYSTOLIC BLOOD PRESSURE: 116 MMHG | BODY MASS INDEX: 27.2 KG/M2 | DIASTOLIC BLOOD PRESSURE: 81 MMHG | HEIGHT: 65 IN | RESPIRATION RATE: 16 BRPM

## 2019-11-08 DIAGNOSIS — G43.919 INTRACTABLE MIGRAINE WITHOUT STATUS MIGRAINOSUS, UNSPECIFIED MIGRAINE TYPE: Primary | ICD-10-CM

## 2019-11-08 DIAGNOSIS — G44.011 INTRACTABLE EPISODIC CLUSTER HEADACHE: ICD-10-CM

## 2019-11-08 DIAGNOSIS — K21.9 GASTROESOPHAGEAL REFLUX DISEASE, ESOPHAGITIS PRESENCE NOT SPECIFIED: ICD-10-CM

## 2019-11-08 DIAGNOSIS — F33.41 RECURRENT MAJOR DEPRESSIVE DISORDER, IN PARTIAL REMISSION: ICD-10-CM

## 2019-11-08 DIAGNOSIS — N83.202 CYST OF LEFT OVARY: ICD-10-CM

## 2019-11-08 DIAGNOSIS — R06.83 PRIMARY SNORING: ICD-10-CM

## 2019-11-08 PROCEDURE — 3008F BODY MASS INDEX DOCD: CPT | Mod: CPTII,S$GLB,, | Performed by: PSYCHIATRY & NEUROLOGY

## 2019-11-08 PROCEDURE — 3008F PR BODY MASS INDEX (BMI) DOCUMENTED: ICD-10-PCS | Mod: CPTII,S$GLB,, | Performed by: PSYCHIATRY & NEUROLOGY

## 2019-11-08 PROCEDURE — 99999 PR PBB SHADOW E&M-EST. PATIENT-LVL III: ICD-10-PCS | Mod: PBBFAC,,, | Performed by: PSYCHIATRY & NEUROLOGY

## 2019-11-08 PROCEDURE — 99215 PR OFFICE/OUTPT VISIT, EST, LEVL V, 40-54 MIN: ICD-10-PCS | Mod: 25,S$GLB,, | Performed by: PSYCHIATRY & NEUROLOGY

## 2019-11-08 PROCEDURE — 99999 PR PBB SHADOW E&M-EST. PATIENT-LVL III: CPT | Mod: PBBFAC,,, | Performed by: PSYCHIATRY & NEUROLOGY

## 2019-11-08 PROCEDURE — 96372 PR INJECTION,THERAP/PROPH/DIAG2ST, IM OR SUBCUT: ICD-10-PCS | Mod: S$GLB,,, | Performed by: PSYCHIATRY & NEUROLOGY

## 2019-11-08 PROCEDURE — 99215 OFFICE O/P EST HI 40 MIN: CPT | Mod: 25,S$GLB,, | Performed by: PSYCHIATRY & NEUROLOGY

## 2019-11-08 PROCEDURE — 96372 THER/PROPH/DIAG INJ SC/IM: CPT | Mod: S$GLB,,, | Performed by: PSYCHIATRY & NEUROLOGY

## 2019-11-08 RX ORDER — KETOROLAC TROMETHAMINE 30 MG/ML
INJECTION, SOLUTION INTRAMUSCULAR; INTRAVENOUS
Qty: 20 ML | Refills: 0 | OUTPATIENT
Start: 2019-11-08 | End: 2019-11-11 | Stop reason: SDUPTHER

## 2019-11-08 RX ORDER — KETOROLAC TROMETHAMINE 30 MG/ML
60 INJECTION, SOLUTION INTRAMUSCULAR; INTRAVENOUS
Status: COMPLETED | OUTPATIENT
Start: 2019-11-08 | End: 2019-11-08

## 2019-11-08 RX ORDER — LAMOTRIGINE 25 MG/1
TABLET ORAL
Qty: 100 TABLET | Refills: 11 | Status: SHIPPED | OUTPATIENT
Start: 2019-11-08 | End: 2020-07-09

## 2019-11-08 RX ADMIN — KETOROLAC TROMETHAMINE 60 MG: 30 INJECTION, SOLUTION INTRAMUSCULAR; INTRAVENOUS at 01:11

## 2019-11-08 NOTE — PROGRESS NOTES
Headache questionnaire    1. When did your Headaches start?    18 years old      2. Where are your headaches located?   Right side mostly       3. Your headache's characteristics:   Excruciating, Pressure, Throbbing, Pounding, Stabbing, Like a tight band, Sharp      4. How long does the headache last?   Days       5. How often does the headache occur?   continuously      6. Are your headaches preceded or accompanied by other symptoms? yes   If yes, please describe.        7. Does the headache awaken you at night? yes   If so, how often?         8. Please daria the word that best describes your headache's intensity:    severe      9. Using a scale of 1 through 10, with 0 = no pain and 10 = the worst pain:   What score is your headache now? 8   What score is your headache at its worst? 10   What score is your headache at its best? 3        10. Possible associated headache symptoms:  [x]  Sensitivity to light  [x] Dizziness  [] Nasal or sinus pressure/ pain   [x] Sensitivity to noise  [] Vertigo  [x] Problems with concentration  [x] Sensitivity to smells  [x] Ringing in ears  [x] Problems with memory    [] Blurred vision  [x] Irritability  [] Problems with task completion   [] Double vision  [x] Anger  [x]  Problems with relaxation  [] Loss of appetite  [x] Anxiety  [x] Neck tightness, Neck pain  [x] Nausea   [x] Nasal congestion  [] Vomiting         11. Headache improving factors:  [] Sleep  [] Heat  [x] Darkness  [] Ice  [] Local pressure [] Menses (period)  [] Massage   [] Medications:        12. Headache worsening factors:   [x] Fatigue [] Sneezing  [x] Changes in Weather  [x] Light [] Bending Over [x] Stress  [x] Noise [x] Ovulation  [] Multiple Sclerosis Flare-Up  [] Smells  [x] Menses  [] Food   [] Coughing [x] Alcohol      13. Number of caffeinated drinks per day: 1    14. Number of diet drinks per day:  1      15. Have you seen any other [facility] within the last 3 years?  Yes  Please Check any  Medications or Procedures tried/failed for Headache    AED Neuromodulators  MAOIs  Ergot Alkaloids    Acetazolamide (Diamox) [] Phenelzine (Nardil) [] Dihydroergotamine (Migranal) []   Carbamazepine (Tegretol) [] Tranylcypromine (Parnate) [] Ergotamine (Ergomar) []   Gabapentin (Neurontin) [] Antihistamine/Serotonergic  Triptans    Lacosamide (Vimpat) [] Cyproheptadine (Periactin) [] Almotriptan (Axert) []   Lamotrigine (Lamictal) [] Antihypertensives  Eletriptan (Relpax) [x]   Levatiracetam (Keppra) [] Atenolol (Tenormin) [x] Frovatriptan (Frova) [x]   Oxcarbazepine (Trileptal) [] Bisoprostol (Zebeta) [] Naratriptan (Amerge) [x]   Phenobarbital [] Candesartan (Atacand) [] Rizatriptan (Maxalt) [x]     Nebivolol (Bystolic)  Sumatriptan (Imitrex) [x]   Levetiracetam (Keppra)  Cardeilol (Coreg) [] Zolmitriptan (Zomig) [x]   Phenytoin (Dilantin) [] Diltiazem (Cardizem) []     Pregabalin (Lyrica) [] Lisinopril (Prinivil, Zestril) [] Combo Abortives    Primidone (Mysoline) [] Metoprolol (Toprol) [] BC Powder []   Tiagabine (Gabatril) [] Nadolol (Corgard) [] Butalbital and Acetaminophen (Bupap) []   Topiramate (Topamax)  (Trokendi) [x] Nicardipine (Cardene) []     Vigabatrin (Sabril) [] Nimodipine (Nimotop) [] Butalbital, Acetaminophen, and caffeine (Fioricet) [x]   Valproic Acid (Depakote) (Divalproex Sodium) [x] Propranolol (Inderal) [x]     Zonisamide (Zonegran) [] Telmisartan (Micardis) [] Butalbital, Aspirin, and caffeine (Fiorinal) []   Benzodiazepines  Timolol (Blocadren) []     Alprazolam (Xanax) [] Verapamil (Calan, Verelan) [] Butalbital, Caffeine, Acetaminophen, and Codeine (Fioricet with Codeine) []   Diazepam (Valium) [] NSAIDs      Lorazepam (Ativan) [] Acetaminophen (Tylenol) []     Clonazepam (Klonopin) [] Acetylsalicylic Acid (Aspirin) [] Butalbital, Caffeine, Aspirin, and Codeine  (Fiorinal with Codeine) [x]   Antidepressants  Diclofenac (Cambia) []     Amitriptyline (Elavil) [x] Ibuprofen (Motrin) []      Desipramine (Norpramin) [] Indomethacin (Indocin) [] Aspirin, Caffeine, and Acetaminophen (Excedrin) (Goodys) []   Doxepin (Sinequan) [] Ketoprofen (Orudis) []     Fluoxetine (Prozac) [] Ketorolac (Toradol) [] Acetaminophen, Dichloralphenazone, and Isometheptene (Midrin) []   Imipramine (Tofranil) [] Naproxen (Anaprox) (Aleve) [x]     Nortriptyline (Pamelor) [] Meclofenamic Acid (Meclomen) []     Venlafaxine (Effexor) [] Meloxicam (Mobic) [] Procedures    Desvenlafazine (Pristiq) [] Monoclonals  Greater occipital nerve block [x]   Duloxetine (Cymbalta) [] Eptinezumab [] Cervical, Thoracic, Lumbar radiofrequency ablation []   Trazadone [x] Erenumab-aooe (Aimovig) [] Spenopalatine ganglion block []   Wellbutrin [x] Galcanezumab (Emgality) [] Occipital neuro stimulation []   Protriptyline (Vivactil) [] Fremanazumab-vfrm (Ajovy)  Cervical, Thoracic, Lumbar, Caudal Epidural steroid injection []   Escitalopram (Lexapro) [] Other [] Sacroiliac joint steroid injection []   Celexa [] Memantine (Namenda) [] Transforaminal epidural steroid injection []     Botox [] Facet joint injections []     Baclofen (Lioresal) [] Cervical, Thoracic, Lumbar medial branch blocks []       Cefaly []       Gamma Core []       Iovera []       Transcranial Magnetic stimulation []

## 2019-11-08 NOTE — PROGRESS NOTES
"Subjective:       Patient ID: Sandra Dimas is a 44 y.o. female.    Chief Complaint: No chief complaint on file.    HPI  The patient is a 43 y/o female, referred by her , Dr. Dimas, for evaluation of headaches. According to her , "she has been suffering with headaches since she was 19 yeras old. She has had multiple work up multiple trials of meds w/o good success.The only intervention that relieves the headaches is pregnancy.  Her mri/mra cta of head and cspine mri are normal. she has had trial of botox lithium, verapamil, tricyclics, triptans(imitrex gave her chest pain)maxalt frova relpax topamax, depakote, zomig, amerge . she also tried fioricet and fiorinal .Had multiple er visits for headache. Admited for iv DHEA w/o relief. she also was labeled besides migraine to have hemicrania continua by ABDOULAYE FARRIS AND BECKI had initial good response to indomethacin then failed it completely. She also had occipital nerve block with partial relief and ended in no success.   Recently in february 2019 had an acute abdomen and had endometriosis invading bowel necessitating open laparotomy and bowel reesection then subsequently was placed on lupron and ad back hormonal therapy . she was headache free. her last dose was in september subsequently the headache recurred. and we had multiple er trips and her obstetrician suggested ELIS birth control pills. she had some transient improvement after she was started on Emgality loading dose 240 mg on october 14 after DR ANDERS thought she had mixed picture of migraine and cluster headaches. However the headache attacks are recurring and they are affecting her quality of life.   Her attacks are right sided started in the back of the head radiating to the right eye with piercing pain associated with tearing runny nose eye ptosis ear ache as well hypersensitivity to light and sound and her right eye will shut down. She will go to sleep afterwards. She at times has " "associated nausea.they come in waves and can last 5 to 20 minutes."  Further history obtained from the patient indicates that while on Lupron, she had minimum headaches, upon discontinuation they significantly increased. In addition, she gained significant weight and started taking phentermine. Last tab yesterday. She reiterates propensity for allergic reactions to medications.       Review of Systems   Constitutional: Negative for activity change, appetite change, fatigue and fever.   HENT: Negative for congestion, dental problem, hearing loss, sinus pressure, tinnitus, trouble swallowing and voice change.    Eyes: Negative for photophobia, pain, redness and visual disturbance.   Respiratory: Negative for cough, chest tightness and shortness of breath.    Cardiovascular: Negative for chest pain, palpitations and leg swelling.   Gastrointestinal: Negative for abdominal pain, blood in stool, nausea and vomiting.   Endocrine: Negative for cold intolerance and heat intolerance.   Genitourinary: Negative for difficulty urinating, frequency, menstrual problem and urgency.   Musculoskeletal: Negative for arthralgias, back pain, gait problem, joint swelling, myalgias, neck pain and neck stiffness.   Skin: Negative.    Neurological: Positive for headaches. Negative for dizziness, tremors, seizures, syncope, facial asymmetry, speech difficulty, weakness, light-headedness and numbness.   Hematological: Negative for adenopathy. Does not bruise/bleed easily.   Psychiatric/Behavioral: Negative for agitation, behavioral problems, confusion, decreased concentration, self-injury, sleep disturbance and suicidal ideas. The patient is not nervous/anxious and is not hyperactive.            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    Endometriosis     Hemicrania continua     Migraines      Past Surgical History:   Procedure Laterality Date    APPENDECTOMY      diagnostic laprascopy      " ESOPHAGOGASTRODUODENOSCOPY N/A 11/30/2018    Procedure: EGD (ESOPHAGOGASTRODUODENOSCOPY);  Surgeon: Bossman Bustos MD;  Location: [facility] ENDO;  Service: Endoscopy;  Laterality: N/A;    FULGURATION OF ENDOMETRIOSIS  12/26/2018    Procedure: FULGURATION, ENDOMETRIOSIS;  Surgeon: Zehra Jensen MD;  Location: [facility] OR;  Service: OB/GYN;;  endometriosis implant resection    HYSTEROSCOPY WITH HYDROTHERMAL ABLATION OF ENDOMETRIUM WITH DILATION AND CURETTAGE N/A 12/26/2018    Procedure: HYSTEROSCOPY, WITH DILATION AND CURETTAGE OF UTERUS AND HYDROTHERMAL ENDOMETRIAL ABLATION;  Surgeon: Zehra Jensen MD;  Location: [facility] OR;  Service: OB/GYN;  Laterality: N/A;    LAPAROSCOPIC SALPINGECTOMY Bilateral 12/26/2018    Procedure: SALPINGECTOMY, LAPAROSCOPIC;  Surgeon: Zehra Jensen MD;  Location: [facility] OR;  Service: OB/GYN;  Laterality: Bilateral;    LAPAROTOMY, EXPLORATORY N/A 2/7/2019    Procedure: LAPAROTOMY, EXPLORATORY;  Surgeon: Ramakrishna Boone MD;  Location: [facility] OR;  Service: General;  Laterality: N/A;    robotic assisted laparoscopy with excision of ovarian endometrioma and chromotubation       Family History   Problem Relation Age of Onset    Strabismus Neg Hx     Retinal detachment Neg Hx     Macular degeneration Neg Hx     Glaucoma Neg Hx     Blindness Neg Hx     Amblyopia Neg Hx     Breast cancer Neg Hx     Colon cancer Neg Hx     Ovarian cancer Neg Hx     Thyroid disease Neg Hx     Migraines Neg Hx     Asthma Neg Hx      Social History     Socioeconomic History    Marital status:      Spouse name: Not on file    Number of children: Not on file    Years of education: Not on file    Highest education level: Not on file   Occupational History    Not on file   Social Needs    Financial resource strain: Not on file    Food insecurity:     Worry: Not on file     Inability: Not on file    Transportation needs:     Medical: Not on file     Non-medical: Not on file   Tobacco Use     Smoking status: Never Smoker    Smokeless tobacco: Never Used   Substance and Sexual Activity    Alcohol use: Never     Frequency: Monthly or less     Drinks per session: 1 or 2     Binge frequency: Never    Drug use: No    Sexual activity: Yes     Partners: Male   Lifestyle    Physical activity:     Days per week: Not on file     Minutes per session: Not on file    Stress: Not on file   Relationships    Social connections:     Talks on phone: Not on file     Gets together: Not on file     Attends Gnosticism service: Not on file     Active member of club or organization: Not on file     Attends meetings of clubs or organizations: Not on file     Relationship status: Not on file   Other Topics Concern    Not on file   Social History Narrative    No pets or smokers in household.     Review of patient's allergies indicates:   Allergen Reactions    Hydrocodone Other (See Comments)     Chest pains. Note: patient tolerated hydromorphone in 2/2019.     Chlorhexidine Rash     Per  after surgery patient had large rash across abdomen where chlorhexidine was applied.    Mastisol adhesive [gum [id number]-[id number]-znkb-alcohol] Rash       Current Outpatient Medications:     atenolol (TENORMIN) 25 MG tablet, Take 1 tablet (25 mg total) by mouth once daily., Disp: 90 tablet, Rfl: 3    B2/magnesium cit,oxid/feverfew (MIGRELIEF ORAL), Take by mouth as needed., Disp: , Rfl:     diclofenac potassium (CAMBIA) 50 mg PwPk, Take 50 mg by mouth daily as needed., Disp: 30 each, Rfl: 5    drospirenone-ethinyl estradiol (ELIS) 3-0.03 mg per tablet, Take 1 tablet by mouth once daily. Take one tablet per day, start new pack every 21 days for continuous usage., Disp: 28 tablet, Rfl: 12    FLUoxetine 20 MG capsule, Take one capsule by mouth once daily, Disp: 90 capsule, Rfl: 3    galcanezumab-gnlm (EMGALITY PEN) 120 mg/mL PnIj, Inject 240 mg into the skin every 28 days., Disp: 2 mL, Rfl: 0    galcanezumab-gnlm (EMGALITY  SYRINGE) 120 mg/mL Syrg, Inject 120 mg into the skin every 28 days., Disp: 1 mL, Rfl: 11    octreotide (SANDOSTATIN) 100 mcg/mL Soln, Inject 1 mL (100 mcg total) into the skin once. for 1 dose, Disp: 1 mL, Rfl: 0    ondansetron (ZOFRAN) 8 MG tablet, Take 1 tablet (8 mg total) by mouth every 8 (eight) hours as needed for Nausea., Disp: 30 tablet, Rfl: 0    oxyCODONE-acetaminophen (PERCOCET)  mg per tablet, Take 1 tablet by mouth every 4 (four) hours as needed for Pain., Disp: 10 tablet, Rfl: 0    pantoprazole (PROTONIX) 40 MG tablet, Take 1 tablet (40 mg total) by mouth once daily., Disp: 90 tablet, Rfl: 3    promethazine (PHENERGAN) 25 MG tablet, Take 1 tablet (25 mg total) by mouth every 8 (eight) hours as needed for Nausea., Disp: 15 tablet, Rfl: 0    TENS unit and electrodes (CEFALY) Cmpk, Per Protocol, Disp: 1 each, Rfl: 0    vitamin D (VITAMIN D3) 1000 units Tab, Take 1,000 Units by mouth once daily., Disp: , Rfl:   No current facility-administered medications for this visit.     Facility-Administered Medications Ordered in Other Visits:     lactated ringers infusion, , Intravenous, Continuous, Rip Doss MD, Stopped at 12/26/18 1542    lidocaine (PF) 10 mg/ml (1%) injection 10 mg, 1 mL, Intradermal, Once, Rip Doss MD      Objective:        Physical Exam      Constitutional:   She appears well-developed and well-nourished. She is well groomed. She is in acute distress.  Mental status: Awake and alert  Speech language: No dysarthria or aphasia on conversation  Cranial nerves: ALEIDA. Severely photophobic. Face symmetric.  Motor: Moves all extremities well  Coordination: No ataxia. No tremor.       Review of Data:  Lab Results   Component Value Date     10/11/2019    K 3.5 10/11/2019    MG 2.2 02/07/2019     10/11/2019    CO2 23 10/11/2019    BUN 22 (H) 10/11/2019    CREATININE 0.8 10/11/2019    GLU 98 10/11/2019    AST 29 10/11/2019    ALT 36 10/11/2019    ALBUMIN 4.5  10/11/2019    PROT 7.6 10/11/2019    BILITOT 0.3 10/11/2019    CHOL 163 10/18/2018    HDL 52 10/18/2018    LDLCALC 95.0 10/18/2018    TRIG 80 10/18/2018       Lab Results   Component Value Date    WBC 6.61 10/11/2019    HGB 12.2 10/11/2019    HCT 38.3 10/11/2019    MCV 81 (L) 10/11/2019     10/11/2019       Lab Results   Component Value Date    TSH 1.238 10/18/2018     Results for orders placed or performed during the hospital encounter of 10/06/12   MRI Brain W WO Contrast    Narrative    DATE OF EXAM: Oct  6 2012      AMR   0007  -  MRI BRAIN W & W/O CONTRAST:     [id number]     CLINICAL HISTORY:   migrane \     ICD 9 CODE(S):   ()     CPT 4 CODE(S)/MODIFIER(S):   ()     RESULTS:  Exam: MRI of the brain with and without contrast.     History:     Migraine headaches.     Findings:     Ventricular/sulcal pattern is normal. No abnormal regions   of signal intensity or mass displacement are apparent.     Flow-voids are noted in all major intracranial vessels. Paranasal sinuses   and mastoids are clear.     Marrow signal throughout the calvarium is normal. No structural anomalies   are apparent.     Postcontrast images reveal no evidence of pathologic meningeal or   parenchymal enhancement.           IMPRESSION:       Unremarkable exam.           : [facility]  Transcribe Date/Time: Oct  7 2012 11:36A  Dictated by : MORENITA MONTES MD  Read On: Oct  7 2012 11:35A     Images were reviewed, findings were verified and document was   electronically  signed on: Oct  7 2012 11:36A   MRA Brain    Narrative    DATE OF EXAM: Oct  6 2012      AMR   0075  -  MRA HEAD WITHOUT CONTRAST:     [id number]     CLINICAL HISTORY:   migrane \     ICD 9 CODE(S):   ()     CPT 4 CODE(S)/MODIFIER(S):   ()     RESULTS:  Exam: Intracranial MRA without contrast.     Indications:     Headaches. Aneurysm.      Findings:    3-D time-of-flight technique was utilized to evaluate   intracranial arterial anatomy. No aneurysm, vascular  malformation or   large vessel occlusion noted. No focal stenotic lesion identified. The   left posterior communicating artery is absent or hypoplastic.           IMPRESSION:       Unremarkable exam.           : [facility]  Transcribe Date/Time: Oct  7 2012 11:53A  Dictated by : MORENITA MONTES MD  Read On: Oct  7 2012 11:52A     Images were reviewed, findings were verified and document was   electronically  signed on: Oct  7 2012 11:53A   Results for orders placed or performed during the hospital encounter of 05/25/11   MRI Brain Without Contrast    Narrative    DATE OF EXAM: May 26 2011      AMR   0002  -  MRI BRAIN WITHOUT CONTRAST:     [id number]     CLINICAL HISTORY:   MIGRANES \     ICD 9 CODE(S):   ()     CPT 4 CODE(S)/MODIFIER(S):   ()     RESULTS:  MRI brain without contrast, 5/26/2011.        History: Migraine headaches.        Technique: Standard multiplanar noncontrast MRI sequences of the brain.     Findings: The ventricles are nondilated. The corpus callosum is intact.   Gray and white matter structures reveal normal pattern. No extra-axial   fluid collection is seen. Pituitary gland region appears normal. Skull   base appears unremarkable. No evidence of incidental sinusitis.     Orbital region appears unremarkable.              IMPRESSION:   Normal standard noncontrast MRI of the brain.           : [facility]  Transcribe Date/Time: May 26 2011 12:36P  Dictated by : ROSA BE MD  Read On: May 26 2011 12:35P     Images were reviewed, findings were verified and document was   electronically  signed on: May 26 2011 12:36P     Toradol 60 mg IM given Nurse today        Assessment and Plan   The patient meets criteria for chronic migraine, heavily influenced by hormonal fluctuations. This exacerbation is likely consequence of withdrawal from Lupron plus starting Phentermine. She is status post hysterectomy but ovaries in place.   She also meets criteria for Cluster headaches.She  used a loading dose of 240 mg of Emgality which is insufficient for cluster control. I will send a prescription for 300 mg sc q28 days.  Will place on a bridge to break the cycle:  Toradol 30 mg Q6h for 3 days, Q8h for 2 days, and Q12h for 1 day.  For prevention, in addition to the Emgality 300 mg Q28 days, will add Lamictal titrating slowly to am initial goal of 50 mg BID  For acute treatment trial of IN Lidocaine, Sprix, and Gammacore.  Stop Phentermine  Low carb diet    Considerations for the future:  IV Vimpat  IV Phenobarbital  IV Lidocaine  IV Diamox followed by oral administration if effective    I have discussed the side effects of the medications prescribed and the patient acknowledges understanding  Counseling:  More than 50% of the 45 minute encounter was spent face to face counseling the patient regarding current status and future plan of care as well as side of the medications. All questions were answered to patient's satisfaction        Manasa Mcclure M.D  Medical Director, Headache and Facial Pain  [facility]

## 2019-11-11 ENCOUNTER — TELEPHONE (OUTPATIENT)
Dept: NEUROLOGY | Facility: CLINIC | Age: 44
End: 2019-11-11

## 2019-11-11 RX ORDER — KETOROLAC TROMETHAMINE 30 MG/ML
INJECTION, SOLUTION INTRAMUSCULAR; INTRAVENOUS
Qty: 20 ML | Refills: 0 | OUTPATIENT
Start: 2019-11-11 | End: 2020-07-09

## 2019-11-11 NOTE — TELEPHONE ENCOUNTER
----- Message from Danita May, PharmD sent at 11/11/2019  9:11 AM CST -----  Regarding: Emgality script and Toradol  Good morning,    I received a call from Ms. Dimas's  this morning. He says that she will be switching over to the Emgality - Inject 300 mg into the skin every 28 days. It was sent to [facility]. We still have the old script for Emaglity in our system. Is it appropriate to close out the previous scripts? Can we have the new script for Emgality sent over to [facility] so that we can work it up in our system?    Also, he says that [facility] is having a difficult time filling the Toradol script. I know that [facility] can help with assistance in filling certain medications. If appropriate, can we have a script sent to [facility]  for the Toradol script as well?    Thank you!    Danita May, PharmD  [facility]   [phone]

## 2019-11-13 ENCOUNTER — TELEPHONE (OUTPATIENT)
Dept: INTERNAL MEDICINE | Facility: CLINIC | Age: 44
End: 2019-11-13

## 2019-11-13 DIAGNOSIS — G43.919 INTRACTABLE MIGRAINE WITHOUT STATUS MIGRAINOSUS, UNSPECIFIED MIGRAINE TYPE: Primary | ICD-10-CM

## 2019-11-13 NOTE — TELEPHONE ENCOUNTER
S/w pt and sched for Fasting lab appt tomorrow 11/14/19 at , pt confirmed appt date/time and fasting instructions.

## 2019-11-13 NOTE — TELEPHONE ENCOUNTER
S/w pt's  Dr. Dimas requesting Dr. Gamez order CBC and iron studies on pt, advised  would send request to Dr. Gamez and let pt know when we receive Dr. Gamez's response.

## 2019-11-22 ENCOUNTER — TELEPHONE (OUTPATIENT)
Dept: PHARMACY | Facility: CLINIC | Age: 44
End: 2019-11-22

## 2019-11-22 NOTE — TELEPHONE ENCOUNTER
Initial Emgality consult completed on  . Emgality 100mg x3 will be shipped on  to arrive at patient's home on  via [employer]. $0.00 copay. Patient intends to start Emgality on . Address confirmed. Confirmed 2 patient identifiers - name and . Therapy Appropriate.    Indication: Cluster headache prophlaxis  Goals of treatment: reduction in cluster attack frequency    --Injection experience: Has injected Emgality migraine loading dose  Informed patient on online injection video on  website.     Store in refrigerator prior to use (do not freeze, do not shake, keep in original box until use).    Counseled patient on administration directions:  - Inject (300 mg) comes in three (100 mg) prefilled syringes, which are taken one after the other at the start of a cluster period and then every month until the end of the cluster period.  - Take out of the refrigerator 30 minutes prior to injection to reach room temperature.  - Wash hands before and after injection.  - Monthly RX will come with gauze, band aids, and alcohol swabs.  - Patient may self-inject in either the front/top of the thighs, abdomen- but at least 2 inches away from belly button   - If someone else is giving the injection, they may also use the outer part of your upper arm or your buttocks.   - Use 3 different injection sites.   - Patient was instructed to rotate injections sites monthly.  - Inspect medication - should be clear and colorless to slightly yellow to slightly brown.   - Patient is to wipe down the injection site with the alcohol pad, wait to dry.    - Remove white cap from needle (pull straight off).  - Gently pinch the skin where you will inject and insert needle at a 45-degree angle.  - Slowly depress plunger with thumb until all medicine injected   - If you have bleeding at the injection site, press a cotton ball or gauze over the injection site. Do not rub the injection site.  - Do not put the needle cap back on  the prefilled syringe.  - Patient will use sharps container; once full, per state law, she/he may securely lock the sharps container and place in trash. Pharmacy will replace the sharps at no additional charge.    Patient was counseled on possible side effects:  - Injection site reaction: redness, soreness, itching, bruising, which should resolve within 3-5 days.  - Allergic reactions: iswelling of face, mouth, tongue, throat, trouble breathing.   - Informed that there is no data in pregnancy/breastfeeding.     Medication and Allergy list reviewed. Drug list up to date in Epic. No DDIs or drug-allergy concerns with Emgality.    Consultation included the importance of compliance and of keeping all follow up appointments.  Patient understands to report any medication changes to [facility] and provider. All questions answered and addressed to patients satisfaction. RPh will touchbase with pt in 7 days and [facility] will contact patient in 3 weeks for refills.    Rene Taylor, PharmD  Clinical Pharmacist  [facility]  P: [phone]    [facility] sent 11/22 @ 12:57PM

## 2019-12-10 ENCOUNTER — TELEPHONE (OUTPATIENT)
Dept: INTERNAL MEDICINE | Facility: CLINIC | Age: 44
End: 2019-12-10

## 2019-12-10 NOTE — TELEPHONE ENCOUNTER
Clarification of stadol allergy. She had severe migraine and on first dose stadol she got nauseated. Later migraines she retried and did not have allergy or side effects. Will be removed from allergy list.

## 2019-12-19 ENCOUNTER — TELEPHONE (OUTPATIENT)
Dept: PHARMACY | Facility: CLINIC | Age: 44
End: 2019-12-19

## 2019-12-27 ENCOUNTER — HOSPITAL ENCOUNTER (OUTPATIENT)
Dept: PREADMISSION TESTING | Facility: HOSPITAL | Age: 44
Discharge: HOME | End: 2019-12-27
Attending: OBSTETRICS & GYNECOLOGY
Payer: COMMERCIAL

## 2019-12-27 ENCOUNTER — TELEPHONE (OUTPATIENT)
Dept: OBSTETRICS AND GYNECOLOGY | Facility: CLINIC | Age: 44
End: 2019-12-27

## 2019-12-27 ENCOUNTER — OFFICE VISIT (OUTPATIENT)
Dept: OBSTETRICS AND GYNECOLOGY | Facility: CLINIC | Age: 44
End: 2019-12-27
Payer: COMMERCIAL

## 2019-12-27 VITALS
WEIGHT: 173.5 LBS | BODY MASS INDEX: 26.22 KG/M2 | BODY MASS INDEX: 28.87 KG/M2 | DIASTOLIC BLOOD PRESSURE: 78 MMHG | WEIGHT: 173 LBS | HEIGHT: 68 IN | SYSTOLIC BLOOD PRESSURE: 114 MMHG

## 2019-12-27 DIAGNOSIS — R10.2 PELVIC PAIN: ICD-10-CM

## 2019-12-27 DIAGNOSIS — Z01.818 PREOPERATIVE EXAM FOR GYNECOLOGIC SURGERY: ICD-10-CM

## 2019-12-27 DIAGNOSIS — G43.919 INTRACTABLE MIGRAINE WITHOUT STATUS MIGRAINOSUS, UNSPECIFIED MIGRAINE TYPE: ICD-10-CM

## 2019-12-27 DIAGNOSIS — N80.30 ENDOMETRIOSIS OF PELVIC PERITONEUM: Primary | ICD-10-CM

## 2019-12-27 DIAGNOSIS — N92.0 MENORRHAGIA WITH REGULAR CYCLE: Primary | ICD-10-CM

## 2019-12-27 PROBLEM — Z98.890 S/P LAPAROSCOPY: Status: RESOLVED | Noted: 2018-12-26 | Resolved: 2019-12-27

## 2019-12-27 PROCEDURE — 99999 PR PBB SHADOW E&M-EST. PATIENT-LVL II: CPT | Mod: PBBFAC,,,

## 2019-12-27 PROCEDURE — 99499 UNLISTED E&M SERVICE: CPT | Mod: S$GLB,,, | Performed by: OBSTETRICS & GYNECOLOGY

## 2019-12-27 PROCEDURE — 99499 NO LOS: ICD-10-PCS | Mod: S$GLB,,, | Performed by: OBSTETRICS & GYNECOLOGY

## 2019-12-27 PROCEDURE — 99999 PR PBB SHADOW E&M-EST. PATIENT-LVL II: ICD-10-PCS | Mod: PBBFAC,,,

## 2019-12-27 RX ORDER — SODIUM CHLORIDE, SODIUM LACTATE, POTASSIUM CHLORIDE, CALCIUM CHLORIDE 600; 310; 30; 20 MG/100ML; MG/100ML; MG/100ML; MG/100ML
INJECTION, SOLUTION INTRAVENOUS CONTINUOUS
Status: CANCELLED | OUTPATIENT
Start: 2019-12-27

## 2019-12-27 RX ORDER — BUTORPHANOL TARTRATE 10 MG/ML
SPRAY NASAL
COMMUNITY
Start: 2019-12-09 | End: 2020-07-09

## 2019-12-27 NOTE — DISCHARGE INSTRUCTIONS
To confirm, Your doctor has instructed you that surgery is scheduled for 12/30/19  at  12:00  p.m.       Please report to [facility], [address], 1st floor, main lobby by 10:30 a.m.  Pre admit office will call afternoon prior to surgery with final arrival time    INSTRUCTIONS IMPORTANT!!!   Do not eat, drink, or smoke after 12 midnight, may have water and apple juice until 3 hours prior to surgery. OK to brush teeth, no gum, candy or mints!    ¨ Take only these medicines with a small swallow of water-morning of surgery.  Atenolol, Prozac, Protonix        Pre operative instructions:  Please review the Pre-Operative Instruction booklet that you were given.        Bathing Instructions--See page 6 in the Pre-operative booklet.      Prevention of surgical site infections:     -Keep incisions clean and dry.   -Do not soak/submerge incisions in water until completely healed.   -Do not apply lotions, powders, creams, or deodorants to site.   -Always make sure hands are cleaned with antibacterial soap/ alcohol-based                 prior to touching the surgical site.  (This includes doctors,                 nurses, staff, and yourself.)    Signs and symptoms:   -Redness and pain around the area where you had surgery   -Drainage of cloudy fluid from your surgical wound   -Fever over 100.4       I have read or had read and explained to me, and understand the above information.  Additional comments or instructions:  Received a copy of Pre-operative instructions booklet, FAQ surgical site infection sheet, and packets of hibiclens (if indicated).

## 2019-12-27 NOTE — H&P (VIEW-ONLY)
History & Physical    SUBJECTIVE:     History of Present Illness:  Patient is a 44 y.o. female presents for pre-op visit for robotic assisted laparoscopic hysterectomy and bilateral salpingo-oophorectomy for persistent pelvic pain from endometriosis. Her pelvic pain was responsive to Lupron in the past and she has experienced symptoms such as cyclic pain from the peritoneal and ovarian endometriosis as well as SBO as a result of ileal involvement requiring laparotomy and resection with anastomosis.  She has completed childbearing and is already s/p ablation. She reports she is doing well. She denies abdominal pain, urinary complaints, or any recent infections. She is eager to proceed with surgery.   Reports cyst in vaginal area that she would like evaluated, has noticed over the past 1 year, believes it be slowly enlarging. No pain.    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    Endometriosis     General anesthetics causing adverse effect in therapeutic use     wakes up with severe anxiety attacks    Hemicrania continua     Migraines      Past Surgical History:   Procedure Laterality Date    APPENDECTOMY      diagnostic laprascopy      ESOPHAGOGASTRODUODENOSCOPY N/A 11/30/2018    Procedure: EGD (ESOPHAGOGASTRODUODENOSCOPY);  Surgeon: Bossman Bustos MD;  Location: [facility];  Service: Endoscopy;  Laterality: N/A;    FULGURATION OF ENDOMETRIOSIS  12/26/2018    Procedure: FULGURATION, ENDOMETRIOSIS;  Surgeon: Zehra Jensen MD;  Location: [facility] OR;  Service: OB/GYN;;  endometriosis implant resection    HYSTEROSCOPY WITH HYDROTHERMAL ABLATION OF ENDOMETRIUM WITH DILATION AND CURETTAGE N/A 12/26/2018    Procedure: HYSTEROSCOPY, WITH DILATION AND CURETTAGE OF UTERUS AND HYDROTHERMAL ENDOMETRIAL ABLATION;  Surgeon: Zehra Jensen MD;  Location: [facility] OR;  Service: OB/GYN;  Laterality: N/A;    LAPAROSCOPIC SALPINGECTOMY Bilateral 12/26/2018    Procedure:  SALPINGECTOMY, LAPAROSCOPIC;  Surgeon: Zehra Jensen MD;  Location: [facility] OR;  Service: OB/GYN;  Laterality: Bilateral;    LAPAROTOMY, EXPLORATORY N/A 2/7/2019    Procedure: LAPAROTOMY, EXPLORATORY;  Surgeon: Ramakrishna Boone MD;  Location: [facility] OR;  Service: General;  Laterality: N/A;    robotic assisted laparoscopy with excision of ovarian endometrioma and chromotubation       Social History     Socioeconomic History    Marital status:      Spouse name: Not on file    Number of children: Not on file    Years of education: Not on file    Highest education level: Not on file   Occupational History    Not on file   Social Needs    Financial resource strain: Not on file    Food insecurity:     Worry: Not on file     Inability: Not on file    Transportation needs:     Medical: Not on file     Non-medical: Not on file   Tobacco Use    Smoking status: Never Smoker    Smokeless tobacco: Never Used   Substance and Sexual Activity    Alcohol use: Yes     Frequency: Monthly or less     Drinks per session: 1 or 2     Binge frequency: Never     Comment: rarely No alcohol 72h prior to sx    Drug use: No    Sexual activity: Yes     Partners: Male   Lifestyle    Physical activity:     Days per week: Not on file     Minutes per session: Not on file    Stress: Not on file   Relationships    Social connections:     Talks on phone: Not on file     Gets together: Not on file     Attends Mandaeism service: Not on file     Active member of club or organization: Not on file     Attends meetings of clubs or organizations: Not on file     Relationship status: Not on file   Other Topics Concern    Not on file   Social History Narrative    No pets or smokers in household.     Family History   Problem Relation Age of Onset    Strabismus Neg Hx     Retinal detachment Neg Hx     Macular degeneration Neg Hx     Glaucoma Neg Hx     Blindness Neg Hx     Amblyopia Neg Hx     Breast cancer Neg Hx     Colon cancer Neg  Hx     Ovarian cancer Neg Hx     Thyroid disease Neg Hx     Migraines Neg Hx     Asthma Neg Hx      Review of patient's allergies indicates:   Allergen Reactions    Hydrocodone Other (See Comments)     Chest pains. Note: patient tolerated hydromorphone in 2/2019.     Chlorhexidine Rash     Per  after surgery patient had large rash across abdomen where chlorhexidine was applied.    Mastisol adhesive [gum tcvjbh-ynnetq-mvze-alcohol] Rash     Current Outpatient Medications   Medication Sig Dispense Refill    atenolol (TENORMIN) 25 MG tablet Take 1 tablet (25 mg total) by mouth once daily. 90 tablet 3    B2/magnesium cit,oxid/feverfew (MIGRELIEF ORAL) Take by mouth once daily.       butorphanol (STADOL) 10 mg/mL nasal spray INHALE 1 SPRAY INTO EACH NOSTRIL EVERY DAY MAY REPEAT IN 1 HOUR **MAX 4 SPRAY/DAY**      FLUoxetine 20 MG capsule Take one capsule by mouth once daily (Patient taking differently: Take 20 mg by mouth once daily. ) 90 capsule 3    galcanezumab-gnlm (GALCANEZUMAB-GNLM) 300 mg/3 mL (100 mg/mL x 3) Syrg Inject 300 mg into the skin every 28 days. 3 mL 5    ketorolac (TORADOL) 30 mg/mL (1 mL) injection Bridge to break status migrainosus.   Use 30 mg IM q6h for 3 days, q8h for 2 days and q12h for 1 day (total of 20 doses) 20 mL 0    ondansetron (ZOFRAN) 8 MG tablet Take 1 tablet (8 mg total) by mouth every 8 (eight) hours as needed for Nausea. 30 tablet 0    oxyCODONE-acetaminophen (PERCOCET)  mg per tablet Take 1 tablet by mouth every 4 (four) hours as needed for Pain. 10 tablet 0    pantoprazole (PROTONIX) 40 MG tablet Take 1 tablet (40 mg total) by mouth once daily. 90 tablet 3    promethazine (PHENERGAN) 25 MG tablet Take 1 tablet (25 mg total) by mouth every 8 (eight) hours as needed for Nausea. 15 tablet 0    vitamin D (VITAMIN D3) 1000 units Tab Take 1,000 Units by mouth once daily.      diclofenac potassium (CAMBIA) 50 mg PwPk Take 50 mg by mouth daily as needed.  (Patient not taking: Reported on 11/8/2019) 30 each 5    drospirenone-ethinyl estradiol (ELIS) 3-0.03 mg per tablet Take 1 tablet by mouth once daily. Take one tablet per day, start new pack every 21 days for continuous usage. (Patient not taking: Reported on 11/8/2019) 28 tablet 12    lamoTRIgine (LAMICTAL) 25 MG tablet Use 1 po qhs for 1 wk, 1 po BID for 1 wk, 2 po qhs for 1 wk, 3 po qhs for 1 wk, then 4 po BID (100 mg BID) (Patient taking differently: Take 25 mg by mouth nightly. Use 1 po qhs for 1 wk, 1 po BID for 1 wk, 2 po qhs for 1 wk, 3 po qhs for 1 wk, then 4 po BID (100 mg BID)) 100 tablet 11    octreotide (SANDOSTATIN) 100 mcg/mL Soln Inject 1 mL (100 mcg total) into the skin once. for 1 dose 1 mL 0    TENS unit and electrodes (CEFALY) Cmpk Per Protocol 1 each 0     No current facility-administered medications for this visit.      Facility-Administered Medications Ordered in Other Visits   Medication Dose Route Frequency Provider Last Rate Last Dose    lactated ringers infusion   Intravenous Continuous Rip Doss MD   Stopped at 12/26/18 1542    lidocaine (PF) 10 mg/ml (1%) injection 10 mg  1 mL Intradermal Once Rip Doss MD                Review of Systems:  Constitutional: no fever or chills  Respiratory: no cough or shortness of breath  Cardiovascular: no chest pain or palpitations  Gastrointestinal: no nausea or vomiting, tolerating diet  Genitourinary: no dysuria, frequency  Hematologic/Lymphatic: no easy bruising or lymphadenopathy  Neurological: no seizures or tremors      OBJECTIVE:     Vitals:    12/27/19 1311   BP: 114/78         Physical Exam:  General: well developed, well nourished, no distress  Head: normocephalic  Neck: supple, symmetrical, trachea midline  Lungs:  clear to auscultation bilaterally and normal respiratory effort  Chest Wall: no tenderness  Heart: regular rate and rhythm, S1, S2 normal, no murmur, rub or gallop  Abdomen: soft, non-tender non-distented;  bowel sounds normal; no masses,  no organomegaly; well healed surgical scars  : 1.5 cm sebaceous cyst to right labia majora, nontender  Extremities: no cyanosis or edema, or clubbing  Pulses: 2+ and symmetric      Laboratory  Labs to be drawn today    Diagnostic Results:  None    ASSESSMENT/PLAN:     Problem List Items Addressed This Visit        Other    Preoperative exam for gynecologic surgery      Reviewed the risks of hysterectomy with the patient including, but not limited to, disfigurement from scars, pain, bleeding, infection, and potential damage to internal organs including muscles, nerves, blood vessels, small bowel, large bowel, bladder, ureters, and kidneys and also potential need for conversion to an open abdominal procedure. Reviewed surgical consents with patient, plan for preop labs today.  All questions were answered.  Proceed with robotic assisted laparoscopic hysterectomy with bilateral salpingo-oophorectomy as scheduled.

## 2019-12-27 NOTE — PRE ADMISSION SCREENING
To confirm, Your doctor has instructed you that surgery is scheduled for 12/30/19  at  1200.       Please report to [facility], [address], 1st floor, main lobby by 1030.      INSTRUCTIONS IMPORTANT!!!   Do not eat, drink, or smoke after 12 midnight, may have water and apple juice until 3 hours prior to surgery. OK to brush teeth, no gum, candy or mints!    ¨ Take only these medicines with a small swallow of water-morning of surgery.  Atenolol, Prozac, Protonix        Pre operative instructions:  Please review the Pre-Operative Instruction booklet that you were given.        Bathing Instructions--See page 6 in the Pre-operative booklet.      Prevention of surgical site infections:     -Keep incisions clean and dry.   -Do not soak/submerge incisions in water until completely healed.   -Do not apply lotions, powders, creams, or deodorants to site.   -Always make sure hands are cleaned with antibacterial soap/ alcohol-based                 prior to touching the surgical site.  (This includes doctors,                 nurses, staff, and yourself.)    Signs and symptoms:   -Redness and pain around the area where you had surgery   -Drainage of cloudy fluid from your surgical wound   -Fever over 100.4       I have read or had read and explained to me, and understand the above information.  Additional comments or instructions:  Received a copy of Pre-operative instructions booklet, FAQ surgical site infection sheet, and packets of hibiclens (if indicated).

## 2019-12-27 NOTE — PROGRESS NOTES
History & Physical    SUBJECTIVE:     History of Present Illness:  Patient is a 44 y.o. female presents for pre-op visit for robotic assisted laparoscopic hysterectomy and bilateral salpingo-oophorectomy for persistent pelvic pain from endometriosis. Her pelvic pain was responsive to Lupron in the past and she has experienced symptoms such as cyclic pain from the peritoneal and ovarian endometriosis as well as SBO as a result of ileal involvement requiring laparotomy and resection with anastomosis.  She has completed childbearing and is already s/p ablation. She reports she is doing well. She denies abdominal pain, urinary complaints, or any recent infections. She is eager to proceed with surgery.   Reports cyst in vaginal area that she would like evaluated, has noticed over the past 1 year, believes it be slowly enlarging. No pain.    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    Endometriosis     General anesthetics causing adverse effect in therapeutic use     wakes up with severe anxiety attacks    Hemicrania continua     Migraines      Past Surgical History:   Procedure Laterality Date    APPENDECTOMY      diagnostic laprascopy      ESOPHAGOGASTRODUODENOSCOPY N/A 11/30/2018    Procedure: EGD (ESOPHAGOGASTRODUODENOSCOPY);  Surgeon: Bossman Bustos MD;  Location: [facility];  Service: Endoscopy;  Laterality: N/A;    FULGURATION OF ENDOMETRIOSIS  12/26/2018    Procedure: FULGURATION, ENDOMETRIOSIS;  Surgeon: Zehra Jensen MD;  Location: [facility];  Service: OB/GYN;;  endometriosis implant resection    HYSTEROSCOPY WITH HYDROTHERMAL ABLATION OF ENDOMETRIUM WITH DILATION AND CURETTAGE N/A 12/26/2018    Procedure: HYSTEROSCOPY, WITH DILATION AND CURETTAGE OF UTERUS AND HYDROTHERMAL ENDOMETRIAL ABLATION;  Surgeon: Zehra Jensen MD;  Location: [facility];  Service: OB/GYN;  Laterality: N/A;    LAPAROSCOPIC SALPINGECTOMY Bilateral 12/26/2018    Procedure:  SALPINGECTOMY, LAPAROSCOPIC;  Surgeon: Zehra Jensen MD;  Location: [facility] OR;  Service: OB/GYN;  Laterality: Bilateral;    LAPAROTOMY, EXPLORATORY N/A 2/7/2019    Procedure: LAPAROTOMY, EXPLORATORY;  Surgeon: Ramakrishna Boone MD;  Location: [facility] OR;  Service: General;  Laterality: N/A;    robotic assisted laparoscopy with excision of ovarian endometrioma and chromotubation       Social History     Socioeconomic History    Marital status:      Spouse name: Not on file    Number of children: Not on file    Years of education: Not on file    Highest education level: Not on file   Occupational History    Not on file   Social Needs    Financial resource strain: Not on file    Food insecurity:     Worry: Not on file     Inability: Not on file    Transportation needs:     Medical: Not on file     Non-medical: Not on file   Tobacco Use    Smoking status: Never Smoker    Smokeless tobacco: Never Used   Substance and Sexual Activity    Alcohol use: Yes     Frequency: Monthly or less     Drinks per session: 1 or 2     Binge frequency: Never     Comment: rarely No alcohol 72h prior to sx    Drug use: No    Sexual activity: Yes     Partners: Male   Lifestyle    Physical activity:     Days per week: Not on file     Minutes per session: Not on file    Stress: Not on file   Relationships    Social connections:     Talks on phone: Not on file     Gets together: Not on file     Attends Sabianism service: Not on file     Active member of club or organization: Not on file     Attends meetings of clubs or organizations: Not on file     Relationship status: Not on file   Other Topics Concern    Not on file   Social History Narrative    No pets or smokers in household.     Family History   Problem Relation Age of Onset    Strabismus Neg Hx     Retinal detachment Neg Hx     Macular degeneration Neg Hx     Glaucoma Neg Hx     Blindness Neg Hx     Amblyopia Neg Hx     Breast cancer Neg Hx     Colon cancer Neg  Hx     Ovarian cancer Neg Hx     Thyroid disease Neg Hx     Migraines Neg Hx     Asthma Neg Hx      Review of patient's allergies indicates:   Allergen Reactions    Hydrocodone Other (See Comments)     Chest pains. Note: patient tolerated hydromorphone in 2/2019.     Chlorhexidine Rash     Per  after surgery patient had large rash across abdomen where chlorhexidine was applied.    Mastisol adhesive [gum [id number]-ylcb-alcohol] Rash     Current Outpatient Medications   Medication Sig Dispense Refill    atenolol (TENORMIN) 25 MG tablet Take 1 tablet (25 mg total) by mouth once daily. 90 tablet 3    B2/magnesium cit,oxid/feverfew (MIGRELIEF ORAL) Take by mouth once daily.       butorphanol (STADOL) 10 mg/mL nasal spray INHALE 1 SPRAY INTO EACH NOSTRIL EVERY DAY MAY REPEAT IN 1 HOUR **MAX 4 SPRAY/DAY**      FLUoxetine 20 MG capsule Take one capsule by mouth once daily (Patient taking differently: Take 20 mg by mouth once daily. ) 90 capsule 3    galcanezumab-gnlm (GALCANEZUMAB-GNLM) 300 mg/3 mL (100 mg/mL x 3) Syrg Inject 300 mg into the skin every 28 days. 3 mL 5    ketorolac (TORADOL) 30 mg/mL (1 mL) injection Bridge to break status migrainosus.   Use 30 mg IM q6h for 3 days, q8h for 2 days and q12h for 1 day (total of 20 doses) 20 mL 0    ondansetron (ZOFRAN) 8 MG tablet Take 1 tablet (8 mg total) by mouth every 8 (eight) hours as needed for Nausea. 30 tablet 0    oxyCODONE-acetaminophen (PERCOCET)  mg per tablet Take 1 tablet by mouth every 4 (four) hours as needed for Pain. 10 tablet 0    pantoprazole (PROTONIX) 40 MG tablet Take 1 tablet (40 mg total) by mouth once daily. 90 tablet 3    promethazine (PHENERGAN) 25 MG tablet Take 1 tablet (25 mg total) by mouth every 8 (eight) hours as needed for Nausea. 15 tablet 0    vitamin D (VITAMIN D3) 1000 units Tab Take 1,000 Units by mouth once daily.      diclofenac potassium (CAMBIA) 50 mg PwPk Take 50 mg by mouth daily as needed.  (Patient not taking: Reported on 11/8/2019) 30 each 5    drospirenone-ethinyl estradiol (ELIS) 3-0.03 mg per tablet Take 1 tablet by mouth once daily. Take one tablet per day, start new pack every 21 days for continuous usage. (Patient not taking: Reported on 11/8/2019) 28 tablet 12    lamoTRIgine (LAMICTAL) 25 MG tablet Use 1 po qhs for 1 wk, 1 po BID for 1 wk, 2 po qhs for 1 wk, 3 po qhs for 1 wk, then 4 po BID (100 mg BID) (Patient taking differently: Take 25 mg by mouth nightly. Use 1 po qhs for 1 wk, 1 po BID for 1 wk, 2 po qhs for 1 wk, 3 po qhs for 1 wk, then 4 po BID (100 mg BID)) 100 tablet 11    octreotide (SANDOSTATIN) 100 mcg/mL Soln Inject 1 mL (100 mcg total) into the skin once. for 1 dose 1 mL 0    TENS unit and electrodes (CEFALY) Cmpk Per Protocol 1 each 0     No current facility-administered medications for this visit.      Facility-Administered Medications Ordered in Other Visits   Medication Dose Route Frequency Provider Last Rate Last Dose    lactated ringers infusion   Intravenous Continuous Rip Doss MD   Stopped at 12/26/18 1542    lidocaine (PF) 10 mg/ml (1%) injection 10 mg  1 mL Intradermal Once Rip Doss MD                Review of Systems:  Constitutional: no fever or chills  Respiratory: no cough or shortness of breath  Cardiovascular: no chest pain or palpitations  Gastrointestinal: no nausea or vomiting, tolerating diet  Genitourinary: no dysuria, frequency  Hematologic/Lymphatic: no easy bruising or lymphadenopathy  Neurological: no seizures or tremors      OBJECTIVE:     Vitals:    12/27/19 1311   BP: 114/78         Physical Exam:  General: well developed, well nourished, no distress  Head: normocephalic  Neck: supple, symmetrical, trachea midline  Lungs:  clear to auscultation bilaterally and normal respiratory effort  Chest Wall: no tenderness  Heart: regular rate and rhythm, S1, S2 normal, no murmur, rub or gallop  Abdomen: soft, non-tender non-distented;  bowel sounds normal; no masses,  no organomegaly; well healed surgical scars  : 1.5 cm sebaceous cyst to right labia majora, nontender  Extremities: no cyanosis or edema, or clubbing  Pulses: 2+ and symmetric      Laboratory  Labs to be drawn today    Diagnostic Results:  None    ASSESSMENT/PLAN:     Problem List Items Addressed This Visit        Other    Preoperative exam for gynecologic surgery      Reviewed the risks of hysterectomy with the patient including, but not limited to, disfigurement from scars, pain, bleeding, infection, and potential damage to internal organs including muscles, nerves, blood vessels, small bowel, large bowel, bladder, ureters, and kidneys and also potential need for conversion to an open abdominal procedure. Reviewed surgical consents with patient, plan for preop labs today.  All questions were answered.  Proceed with robotic assisted laparoscopic hysterectomy with bilateral salpingo-oophorectomy as scheduled.

## 2019-12-27 NOTE — H&P
History & Physical    SUBJECTIVE:     History of Present Illness:  Patient is a 44 y.o. female presents for pre-op visit for robotic assisted laparoscopic hysterectomy and bilateral salpingo-oophorectomy for persistent pelvic pain from endometriosis. Her pelvic pain was responsive to Lupron in the past and she has experienced symptoms such as cyclic pain from the peritoneal and ovarian endometriosis as well as SBO as a result of ileal involvement requiring laparotomy and resection with anastomosis.  She has completed childbearing and is already s/p ablation. She reports she is doing well. She denies abdominal pain, urinary complaints, or any recent infections. She is eager to proceed with surgery.   Reports cyst in vaginal area that she would like evaluated, has noticed over the past 1 year, believes it be slowly enlarging. No pain.    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    Endometriosis     General anesthetics causing adverse effect in therapeutic use     wakes up with severe anxiety attacks    Hemicrania continua     Migraines      Past Surgical History:   Procedure Laterality Date    APPENDECTOMY      diagnostic laprascopy      ESOPHAGOGASTRODUODENOSCOPY N/A 11/30/2018    Procedure: EGD (ESOPHAGOGASTRODUODENOSCOPY);  Surgeon: Bossman Bustos MD;  Location: [facility];  Service: Endoscopy;  Laterality: N/A;    FULGURATION OF ENDOMETRIOSIS  12/26/2018    Procedure: FULGURATION, ENDOMETRIOSIS;  Surgeon: Zehra Jensen MD;  Location: [facility] OR;  Service: OB/GYN;;  endometriosis implant resection    HYSTEROSCOPY WITH HYDROTHERMAL ABLATION OF ENDOMETRIUM WITH DILATION AND CURETTAGE N/A 12/26/2018    Procedure: HYSTEROSCOPY, WITH DILATION AND CURETTAGE OF UTERUS AND HYDROTHERMAL ENDOMETRIAL ABLATION;  Surgeon: Zehra Jensen MD;  Location: [facility] OR;  Service: OB/GYN;  Laterality: N/A;    LAPAROSCOPIC SALPINGECTOMY Bilateral 12/26/2018    Procedure:  SALPINGECTOMY, LAPAROSCOPIC;  Surgeon: Zehra Jensen MD;  Location: [facility];  Service: OB/GYN;  Laterality: Bilateral;    LAPAROTOMY, EXPLORATORY N/A 2/7/2019    Procedure: LAPAROTOMY, EXPLORATORY;  Surgeon: Ramakrishna Boone MD;  Location: [facility];  Service: General;  Laterality: N/A;    robotic assisted laparoscopy with excision of ovarian endometrioma and chromotubation       Social History     Socioeconomic History    Marital status:      Spouse name: Not on file    Number of children: Not on file    Years of education: Not on file    Highest education level: Not on file   Occupational History    Not on file   Social Needs    Financial resource strain: Not on file    Food insecurity:     Worry: Not on file     Inability: Not on file    Transportation needs:     Medical: Not on file     Non-medical: Not on file   Tobacco Use    Smoking status: Never Smoker    Smokeless tobacco: Never Used   Substance and Sexual Activity    Alcohol use: Yes     Frequency: Monthly or less     Drinks per session: 1 or 2     Binge frequency: Never     Comment: rarely No alcohol 72h prior to sx    Drug use: No    Sexual activity: Yes     Partners: Male   Lifestyle    Physical activity:     Days per week: Not on file     Minutes per session: Not on file    Stress: Not on file   Relationships    Social connections:     Talks on phone: Not on file     Gets together: Not on file     Attends Spiritism service: Not on file     Active member of club or organization: Not on file     Attends meetings of clubs or organizations: Not on file     Relationship status: Not on file   Other Topics Concern    Not on file   Social History Narrative    No pets or smokers in household.     Family History   Problem Relation Age of Onset    Strabismus Neg Hx     Retinal detachment Neg Hx     Macular degeneration Neg Hx     Glaucoma Neg Hx     Blindness Neg Hx     Amblyopia Neg Hx     Breast cancer Neg Hx     Colon cancer Neg  Hx     Ovarian cancer Neg Hx     Thyroid disease Neg Hx     Migraines Neg Hx     Asthma Neg Hx      Review of patient's allergies indicates:   Allergen Reactions    Hydrocodone Other (See Comments)     Chest pains. Note: patient tolerated hydromorphone in 2/2019.     Chlorhexidine Rash     Per  after surgery patient had large rash across abdomen where chlorhexidine was applied.    Mastisol adhesive [gum noieii-urvbgk-pgmp-alcohol] Rash     Current Outpatient Medications   Medication Sig Dispense Refill    atenolol (TENORMIN) 25 MG tablet Take 1 tablet (25 mg total) by mouth once daily. 90 tablet 3    B2/magnesium cit,oxid/feverfew (MIGRELIEF ORAL) Take by mouth once daily.       butorphanol (STADOL) 10 mg/mL nasal spray INHALE 1 SPRAY INTO EACH NOSTRIL EVERY DAY MAY REPEAT IN 1 HOUR **MAX 4 SPRAY/DAY**      FLUoxetine 20 MG capsule Take one capsule by mouth once daily (Patient taking differently: Take 20 mg by mouth once daily. ) 90 capsule 3    galcanezumab-gnlm (GALCANEZUMAB-GNLM) 300 mg/3 mL (100 mg/mL x 3) Syrg Inject 300 mg into the skin every 28 days. 3 mL 5    ketorolac (TORADOL) 30 mg/mL (1 mL) injection Bridge to break status migrainosus.   Use 30 mg IM q6h for 3 days, q8h for 2 days and q12h for 1 day (total of 20 doses) 20 mL 0    ondansetron (ZOFRAN) 8 MG tablet Take 1 tablet (8 mg total) by mouth every 8 (eight) hours as needed for Nausea. 30 tablet 0    oxyCODONE-acetaminophen (PERCOCET)  mg per tablet Take 1 tablet by mouth every 4 (four) hours as needed for Pain. 10 tablet 0    pantoprazole (PROTONIX) 40 MG tablet Take 1 tablet (40 mg total) by mouth once daily. 90 tablet 3    promethazine (PHENERGAN) 25 MG tablet Take 1 tablet (25 mg total) by mouth every 8 (eight) hours as needed for Nausea. 15 tablet 0    vitamin D (VITAMIN D3) 1000 units Tab Take 1,000 Units by mouth once daily.      diclofenac potassium (CAMBIA) 50 mg PwPk Take 50 mg by mouth daily as needed.  (Patient not taking: Reported on 11/8/2019) 30 each 5    drospirenone-ethinyl estradiol (ELIS) 3-0.03 mg per tablet Take 1 tablet by mouth once daily. Take one tablet per day, start new pack every 21 days for continuous usage. (Patient not taking: Reported on 11/8/2019) 28 tablet 12    lamoTRIgine (LAMICTAL) 25 MG tablet Use 1 po qhs for 1 wk, 1 po BID for 1 wk, 2 po qhs for 1 wk, 3 po qhs for 1 wk, then 4 po BID (100 mg BID) (Patient taking differently: Take 25 mg by mouth nightly. Use 1 po qhs for 1 wk, 1 po BID for 1 wk, 2 po qhs for 1 wk, 3 po qhs for 1 wk, then 4 po BID (100 mg BID)) 100 tablet 11    octreotide (SANDOSTATIN) 100 mcg/mL Soln Inject 1 mL (100 mcg total) into the skin once. for 1 dose 1 mL 0    TENS unit and electrodes (CEFALY) Cmpk Per Protocol 1 each 0     No current facility-administered medications for this visit.      Facility-Administered Medications Ordered in Other Visits   Medication Dose Route Frequency Provider Last Rate Last Dose    lactated ringers infusion   Intravenous Continuous Rip Doss MD   Stopped at 12/26/18 1542    lidocaine (PF) 10 mg/ml (1%) injection 10 mg  1 mL Intradermal Once Rip Doss MD                Review of Systems:  Constitutional: no fever or chills  Respiratory: no cough or shortness of breath  Cardiovascular: no chest pain or palpitations  Gastrointestinal: no nausea or vomiting, tolerating diet  Genitourinary: no dysuria, frequency  Hematologic/Lymphatic: no easy bruising or lymphadenopathy  Neurological: no seizures or tremors      OBJECTIVE:     Vitals:    12/27/19 1311   BP: 114/78         Physical Exam:  General: well developed, well nourished, no distress  Head: normocephalic  Neck: supple, symmetrical, trachea midline  Lungs:  clear to auscultation bilaterally and normal respiratory effort  Chest Wall: no tenderness  Heart: regular rate and rhythm, S1, S2 normal, no murmur, rub or gallop  Abdomen: soft, non-tender non-distented;  bowel sounds normal; no masses,  no organomegaly; well healed surgical scars  : 1.5 cm sebaceous cyst to right labia majora, nontender  Extremities: no cyanosis or edema, or clubbing  Pulses: 2+ and symmetric      Laboratory  Labs to be drawn today    Diagnostic Results:  None    ASSESSMENT/PLAN:     Problem List Items Addressed This Visit        Other    Preoperative exam for gynecologic surgery      Reviewed the risks of hysterectomy with the patient including, but not limited to, disfigurement from scars, pain, bleeding, infection, and potential damage to internal organs including muscles, nerves, blood vessels, small bowel, large bowel, bladder, ureters, and kidneys and also potential need for conversion to an open abdominal procedure. Reviewed surgical consents with patient, plan for preop labs today.  All questions were answered.  Proceed with robotic assisted laparoscopic hysterectomy with bilateral salpingo-oophorectomy as scheduled.

## 2019-12-27 NOTE — TELEPHONE ENCOUNTER
Call attempt 3 for Emg[facility] refill. LVM, [place] message has not been read. Opening intervention for 4th call.

## 2019-12-27 NOTE — TELEPHONE ENCOUNTER
Continues to suffer from pelvic pain symptoms that were relieved by Lupron in the past  Known history of peritoneal and ovarian endometriosis   Recent SBO from constriction thought to be secondary to ilial endometriosis.  Laparotomy with resection and primary anastomosis done in jan, 2019 with Dr Boone.     Continues to have repetitive Migraine HA.  Only resolution to these was with Lupron and when pregnant.     Neurology believes oophorectomy may improve.     Discussed with patient and .  They are ready to move forward with hysterectomy bilateral salpingo-oophorectomy     Will set up asap

## 2019-12-30 ENCOUNTER — HOSPITAL ENCOUNTER (OUTPATIENT)
Facility: HOSPITAL | Age: 44
Discharge: HOME | End: 2019-12-31
Attending: OBSTETRICS & GYNECOLOGY | Admitting: OBSTETRICS & GYNECOLOGY
Payer: COMMERCIAL

## 2019-12-30 ENCOUNTER — ANESTHESIA (OUTPATIENT)
Dept: SURGERY | Facility: HOSPITAL | Age: 44
End: 2019-12-30
Payer: COMMERCIAL

## 2019-12-30 ENCOUNTER — ANESTHESIA EVENT (OUTPATIENT)
Dept: SURGERY | Facility: HOSPITAL | Age: 44
End: 2019-12-30
Payer: COMMERCIAL

## 2019-12-30 DIAGNOSIS — R10.2 PELVIC PAIN: Primary | ICD-10-CM

## 2019-12-30 DIAGNOSIS — G44.041 INTRACTABLE CHRONIC PAROXYSMAL HEMICRANIA: ICD-10-CM

## 2019-12-30 DIAGNOSIS — D50.0 IRON DEFICIENCY ANEMIA DUE TO CHRONIC BLOOD LOSS: ICD-10-CM

## 2019-12-30 DIAGNOSIS — G43.919 INTRACTABLE MIGRAINE WITHOUT STATUS MIGRAINOSUS, UNSPECIFIED MIGRAINE TYPE: ICD-10-CM

## 2019-12-30 DIAGNOSIS — Z01.818 PREOPERATIVE EXAM FOR GYNECOLOGIC SURGERY: ICD-10-CM

## 2019-12-30 DIAGNOSIS — Z90.710 STATUS POST LAPAROSCOPIC HYSTERECTOMY: ICD-10-CM

## 2019-12-30 PROBLEM — N92.0 MENORRHAGIA WITH REGULAR CYCLE: Status: RESOLVED | Noted: 2018-11-12 | Resolved: 2019-12-30

## 2019-12-30 LAB
B-HCG UR QL: NEGATIVE
CTP QC/QA: YES
POCT GLUCOSE: 86 MG/DL (ref 70–110)

## 2019-12-30 PROCEDURE — 88307 TISSUE EXAM BY PATHOLOGIST: CPT | Mod: 26,,, | Performed by: PATHOLOGY

## 2019-12-30 PROCEDURE — 11422 EXC H-F-NK-SP B9+MARG 1.1-2: CPT | Mod: 51,,, | Performed by: OBSTETRICS & GYNECOLOGY

## 2019-12-30 PROCEDURE — 36000713 HC OR TIME LEV V EA ADD 15 MIN: Performed by: OBSTETRICS & GYNECOLOGY

## 2019-12-30 PROCEDURE — 63600175 PHARM REV CODE 636 W HCPCS: Performed by: PHYSICIAN ASSISTANT

## 2019-12-30 PROCEDURE — 71000033 HC RECOVERY, INTIAL HOUR: Performed by: OBSTETRICS & GYNECOLOGY

## 2019-12-30 PROCEDURE — 63600175 PHARM REV CODE 636 W HCPCS: Performed by: OBSTETRICS & GYNECOLOGY

## 2019-12-30 PROCEDURE — 63600175 PHARM REV CODE 636 W HCPCS: Performed by: NURSE ANESTHETIST, CERTIFIED REGISTERED

## 2019-12-30 PROCEDURE — S5010 5% DEXTROSE AND 0.45% SALINE: HCPCS | Performed by: OBSTETRICS & GYNECOLOGY

## 2019-12-30 PROCEDURE — 25000003 PHARM REV CODE 250: Performed by: NURSE ANESTHETIST, CERTIFIED REGISTERED

## 2019-12-30 PROCEDURE — 63600175 PHARM REV CODE 636 W HCPCS: Performed by: ANESTHESIOLOGY

## 2019-12-30 PROCEDURE — 25000003 PHARM REV CODE 250: Performed by: ANESTHESIOLOGY

## 2019-12-30 PROCEDURE — 71000039 HC RECOVERY, EACH ADD'L HOUR: Performed by: OBSTETRICS & GYNECOLOGY

## 2019-12-30 PROCEDURE — 58571 PR LAPAROSCOPY W TOT HYSTERECTUTERUS <=250 GRAM  W TUBE/OVARY: ICD-10-PCS | Mod: ,,, | Performed by: OBSTETRICS & GYNECOLOGY

## 2019-12-30 PROCEDURE — 88307 PR  SURG PATH,LEVEL V: ICD-10-PCS | Mod: 26,,, | Performed by: PATHOLOGY

## 2019-12-30 PROCEDURE — 37000009 HC ANESTHESIA EA ADD 15 MINS: Performed by: OBSTETRICS & GYNECOLOGY

## 2019-12-30 PROCEDURE — C2628 CATHETER, OCCLUSION: HCPCS | Performed by: OBSTETRICS & GYNECOLOGY

## 2019-12-30 PROCEDURE — 88307 TISSUE EXAM BY PATHOLOGIST: CPT | Performed by: PATHOLOGY

## 2019-12-30 PROCEDURE — 27201423 OPTIME MED/SURG SUP & DEVICES STERILE SUPPLY: Performed by: OBSTETRICS & GYNECOLOGY

## 2019-12-30 PROCEDURE — 37000008 HC ANESTHESIA 1ST 15 MINUTES: Performed by: OBSTETRICS & GYNECOLOGY

## 2019-12-30 PROCEDURE — 25000003 PHARM REV CODE 250: Performed by: OBSTETRICS & GYNECOLOGY

## 2019-12-30 PROCEDURE — 36000712 HC OR TIME LEV V 1ST 15 MIN: Performed by: OBSTETRICS & GYNECOLOGY

## 2019-12-30 PROCEDURE — 94799 UNLISTED PULMONARY SVC/PX: CPT

## 2019-12-30 PROCEDURE — 11422 PR EXC SKIN BENIG 1.1-2 CM REMAINDR BODY: ICD-10-PCS | Mod: 51,,, | Performed by: OBSTETRICS & GYNECOLOGY

## 2019-12-30 PROCEDURE — 58571 TLH W/T/O 250 G OR LESS: CPT | Mod: ,,, | Performed by: OBSTETRICS & GYNECOLOGY

## 2019-12-30 PROCEDURE — 81025 URINE PREGNANCY TEST: CPT | Performed by: PHYSICIAN ASSISTANT

## 2019-12-30 RX ORDER — KETOROLAC TROMETHAMINE 30 MG/ML
30 INJECTION, SOLUTION INTRAMUSCULAR; INTRAVENOUS EVERY 6 HOURS
Status: DISCONTINUED | OUTPATIENT
Start: 2019-12-30 | End: 2019-12-31 | Stop reason: HOSPADM

## 2019-12-30 RX ORDER — DEXAMETHASONE SODIUM PHOSPHATE 4 MG/ML
INJECTION, SOLUTION INTRA-ARTICULAR; INTRALESIONAL; INTRAMUSCULAR; INTRAVENOUS; SOFT TISSUE
Status: DISCONTINUED | OUTPATIENT
Start: 2019-12-30 | End: 2019-12-30

## 2019-12-30 RX ORDER — PROPOFOL 10 MG/ML
VIAL (ML) INTRAVENOUS
Status: DISCONTINUED | OUTPATIENT
Start: 2019-12-30 | End: 2019-12-30

## 2019-12-30 RX ORDER — GLYCOPYRROLATE 0.2 MG/ML
INJECTION INTRAMUSCULAR; INTRAVENOUS
Status: DISCONTINUED | OUTPATIENT
Start: 2019-12-30 | End: 2019-12-30

## 2019-12-30 RX ORDER — ACETAMINOPHEN 500 MG
1000 TABLET ORAL
Status: COMPLETED | OUTPATIENT
Start: 2019-12-30 | End: 2019-12-30

## 2019-12-30 RX ORDER — MEPERIDINE HYDROCHLORIDE 50 MG/ML
12.5 INJECTION INTRAMUSCULAR; INTRAVENOUS; SUBCUTANEOUS ONCE AS NEEDED
Status: COMPLETED | OUTPATIENT
Start: 2019-12-30 | End: 2019-12-30

## 2019-12-30 RX ORDER — DIPHENHYDRAMINE HYDROCHLORIDE 50 MG/ML
25 INJECTION INTRAMUSCULAR; INTRAVENOUS EVERY 6 HOURS PRN
Status: DISCONTINUED | OUTPATIENT
Start: 2019-12-30 | End: 2019-12-30 | Stop reason: HOSPADM

## 2019-12-30 RX ORDER — FENTANYL CITRATE 50 UG/ML
INJECTION, SOLUTION INTRAMUSCULAR; INTRAVENOUS
Status: DISCONTINUED | OUTPATIENT
Start: 2019-12-30 | End: 2019-12-30

## 2019-12-30 RX ORDER — ONDANSETRON 8 MG/1
8 TABLET, ORALLY DISINTEGRATING ORAL EVERY 8 HOURS PRN
Status: DISCONTINUED | OUTPATIENT
Start: 2019-12-30 | End: 2019-12-31 | Stop reason: HOSPADM

## 2019-12-30 RX ORDER — ROCURONIUM BROMIDE 10 MG/ML
INJECTION, SOLUTION INTRAVENOUS
Status: DISCONTINUED | OUTPATIENT
Start: 2019-12-30 | End: 2019-12-30

## 2019-12-30 RX ORDER — CEFAZOLIN SODIUM 2 G/50ML
2 SOLUTION INTRAVENOUS
Status: COMPLETED | OUTPATIENT
Start: 2019-12-30 | End: 2019-12-30

## 2019-12-30 RX ORDER — MIDAZOLAM HYDROCHLORIDE 1 MG/ML
INJECTION, SOLUTION INTRAMUSCULAR; INTRAVENOUS
Status: DISCONTINUED | OUTPATIENT
Start: 2019-12-30 | End: 2019-12-30

## 2019-12-30 RX ORDER — NEOSTIGMINE METHYLSULFATE 1 MG/ML
INJECTION, SOLUTION INTRAVENOUS
Status: DISCONTINUED | OUTPATIENT
Start: 2019-12-30 | End: 2019-12-30

## 2019-12-30 RX ORDER — SUCCINYLCHOLINE CHLORIDE 20 MG/ML
INJECTION INTRAMUSCULAR; INTRAVENOUS
Status: DISCONTINUED | OUTPATIENT
Start: 2019-12-30 | End: 2019-12-30

## 2019-12-30 RX ORDER — LIDOCAINE HYDROCHLORIDE 10 MG/ML
INJECTION, SOLUTION EPIDURAL; INFILTRATION; INTRACAUDAL; PERINEURAL
Status: DISCONTINUED | OUTPATIENT
Start: 2019-12-30 | End: 2019-12-30

## 2019-12-30 RX ORDER — FLUOXETINE HYDROCHLORIDE 20 MG/1
20 CAPSULE ORAL DAILY
Status: DISCONTINUED | OUTPATIENT
Start: 2019-12-31 | End: 2019-12-31 | Stop reason: HOSPADM

## 2019-12-30 RX ORDER — SODIUM CHLORIDE, SODIUM LACTATE, POTASSIUM CHLORIDE, CALCIUM CHLORIDE 600; 310; 30; 20 MG/100ML; MG/100ML; MG/100ML; MG/100ML
INJECTION, SOLUTION INTRAVENOUS CONTINUOUS
Status: DISCONTINUED | OUTPATIENT
Start: 2019-12-30 | End: 2019-12-30

## 2019-12-30 RX ORDER — ONDANSETRON 2 MG/ML
INJECTION INTRAMUSCULAR; INTRAVENOUS
Status: DISCONTINUED | OUTPATIENT
Start: 2019-12-30 | End: 2019-12-30

## 2019-12-30 RX ORDER — HYDROMORPHONE HYDROCHLORIDE 2 MG/ML
0.2 INJECTION, SOLUTION INTRAMUSCULAR; INTRAVENOUS; SUBCUTANEOUS EVERY 5 MIN PRN
Status: DISCONTINUED | OUTPATIENT
Start: 2019-12-30 | End: 2019-12-30 | Stop reason: HOSPADM

## 2019-12-30 RX ORDER — KETOROLAC TROMETHAMINE 30 MG/ML
30 INJECTION, SOLUTION INTRAMUSCULAR; INTRAVENOUS ONCE
Status: COMPLETED | OUTPATIENT
Start: 2019-12-30 | End: 2019-12-30

## 2019-12-30 RX ORDER — METOCLOPRAMIDE HYDROCHLORIDE 5 MG/ML
10 INJECTION INTRAMUSCULAR; INTRAVENOUS EVERY 10 MIN PRN
Status: DISCONTINUED | OUTPATIENT
Start: 2019-12-30 | End: 2019-12-30 | Stop reason: HOSPADM

## 2019-12-30 RX ORDER — PROMETHAZINE HYDROCHLORIDE 25 MG/1
25 TABLET ORAL EVERY 6 HOURS PRN
Status: DISCONTINUED | OUTPATIENT
Start: 2019-12-30 | End: 2019-12-31 | Stop reason: HOSPADM

## 2019-12-30 RX ORDER — SIMETHICONE 80 MG
1 TABLET,CHEWABLE ORAL ONCE
Status: COMPLETED | OUTPATIENT
Start: 2019-12-30 | End: 2019-12-30

## 2019-12-30 RX ORDER — HYDROMORPHONE HYDROCHLORIDE 1 MG/ML
1 INJECTION, SOLUTION INTRAMUSCULAR; INTRAVENOUS; SUBCUTANEOUS EVERY 4 HOURS PRN
Status: DISCONTINUED | OUTPATIENT
Start: 2019-12-30 | End: 2019-12-31 | Stop reason: HOSPADM

## 2019-12-30 RX ORDER — IBUPROFEN 400 MG/1
800 TABLET ORAL EVERY 8 HOURS
Status: DISCONTINUED | OUTPATIENT
Start: 2019-12-31 | End: 2019-12-31 | Stop reason: HOSPADM

## 2019-12-30 RX ORDER — DEXTROSE MONOHYDRATE AND SODIUM CHLORIDE 5; .45 G/100ML; G/100ML
INJECTION, SOLUTION INTRAVENOUS CONTINUOUS
Status: DISCONTINUED | OUTPATIENT
Start: 2019-12-30 | End: 2019-12-31 | Stop reason: HOSPADM

## 2019-12-30 RX ORDER — DIPHENHYDRAMINE HCL 25 MG
25 CAPSULE ORAL EVERY 4 HOURS PRN
Status: DISCONTINUED | OUTPATIENT
Start: 2019-12-30 | End: 2019-12-31 | Stop reason: HOSPADM

## 2019-12-30 RX ORDER — MEPERIDINE HYDROCHLORIDE 50 MG/5ML
50 SOLUTION ORAL EVERY 4 HOURS PRN
Qty: 75 ML | Refills: 0 | Status: SHIPPED | OUTPATIENT
Start: 2019-12-30 | End: 2020-01-06

## 2019-12-30 RX ORDER — MEPERIDINE HYDROCHLORIDE 50 MG/1
50 TABLET ORAL EVERY 6 HOURS PRN
Status: DISCONTINUED | OUTPATIENT
Start: 2019-12-30 | End: 2019-12-30

## 2019-12-30 RX ORDER — ATENOLOL 25 MG/1
25 TABLET ORAL DAILY
Status: DISCONTINUED | OUTPATIENT
Start: 2019-12-31 | End: 2019-12-31 | Stop reason: HOSPADM

## 2019-12-30 RX ORDER — MIDAZOLAM HYDROCHLORIDE 1 MG/ML
2 INJECTION INTRAMUSCULAR; INTRAVENOUS ONCE
Status: COMPLETED | OUTPATIENT
Start: 2019-12-30 | End: 2019-12-30

## 2019-12-30 RX ORDER — HYDROMORPHONE HYDROCHLORIDE 1 MG/ML
0.5 INJECTION, SOLUTION INTRAMUSCULAR; INTRAVENOUS; SUBCUTANEOUS EVERY 6 HOURS PRN
Status: DISCONTINUED | OUTPATIENT
Start: 2019-12-30 | End: 2019-12-31 | Stop reason: HOSPADM

## 2019-12-30 RX ORDER — SODIUM CHLORIDE 0.9 % (FLUSH) 0.9 %
3 SYRINGE (ML) INJECTION EVERY 8 HOURS
Status: DISCONTINUED | OUTPATIENT
Start: 2019-12-30 | End: 2019-12-30 | Stop reason: HOSPADM

## 2019-12-30 RX ADMIN — FENTANYL CITRATE 50 MCG: 50 INJECTION, SOLUTION INTRAMUSCULAR; INTRAVENOUS at 12:12

## 2019-12-30 RX ADMIN — MIDAZOLAM 1 MG: 1 INJECTION INTRAMUSCULAR; INTRAVENOUS at 01:12

## 2019-12-30 RX ADMIN — SIMETHICONE CHEW TAB 80 MG 80 MG: 80 TABLET ORAL at 03:12

## 2019-12-30 RX ADMIN — PROPOFOL 200 MG: 10 INJECTION, EMULSION INTRAVENOUS at 12:12

## 2019-12-30 RX ADMIN — DEXAMETHASONE SODIUM PHOSPHATE 4 MG: 4 INJECTION, SOLUTION INTRA-ARTICULAR; INTRALESIONAL; INTRAMUSCULAR; INTRAVENOUS; SOFT TISSUE at 12:12

## 2019-12-30 RX ADMIN — ONDANSETRON 4 MG: 2 INJECTION, SOLUTION INTRAMUSCULAR; INTRAVENOUS at 12:12

## 2019-12-30 RX ADMIN — ROCURONIUM BROMIDE 10 MG: 10 INJECTION, SOLUTION INTRAVENOUS at 12:12

## 2019-12-30 RX ADMIN — DEXTROSE AND SODIUM CHLORIDE: 5; .45 INJECTION, SOLUTION INTRAVENOUS at 04:12

## 2019-12-30 RX ADMIN — HYDROMORPHONE HYDROCHLORIDE 1 MG: 1 INJECTION, SOLUTION INTRAMUSCULAR; INTRAVENOUS; SUBCUTANEOUS at 08:12

## 2019-12-30 RX ADMIN — ACETAMINOPHEN 1000 MG: 500 TABLET ORAL at 11:12

## 2019-12-30 RX ADMIN — FENTANYL CITRATE 150 MCG: 50 INJECTION, SOLUTION INTRAMUSCULAR; INTRAVENOUS at 12:12

## 2019-12-30 RX ADMIN — KETOROLAC TROMETHAMINE 30 MG: 30 INJECTION, SOLUTION INTRAMUSCULAR; INTRAVENOUS at 11:12

## 2019-12-30 RX ADMIN — NEOSTIGMINE METHYLSULFATE 4 MG: 1 INJECTION INTRAVENOUS at 01:12

## 2019-12-30 RX ADMIN — KETOROLAC TROMETHAMINE 30 MG: 30 INJECTION, SOLUTION INTRAMUSCULAR; INTRAVENOUS at 06:12

## 2019-12-30 RX ADMIN — MIDAZOLAM 2 MG: 1 INJECTION INTRAMUSCULAR; INTRAVENOUS at 12:12

## 2019-12-30 RX ADMIN — ROCURONIUM BROMIDE 35 MG: 10 INJECTION, SOLUTION INTRAVENOUS at 12:12

## 2019-12-30 RX ADMIN — SODIUM CHLORIDE, SODIUM LACTATE, POTASSIUM CHLORIDE, AND CALCIUM CHLORIDE: 600; 310; 30; 20 INJECTION, SOLUTION INTRAVENOUS at 01:12

## 2019-12-30 RX ADMIN — HYDROMORPHONE HYDROCHLORIDE 1 MG: 1 INJECTION, SOLUTION INTRAMUSCULAR; INTRAVENOUS; SUBCUTANEOUS at 04:12

## 2019-12-30 RX ADMIN — MIDAZOLAM 1 MG: 1 INJECTION INTRAMUSCULAR; INTRAVENOUS at 02:12

## 2019-12-30 RX ADMIN — ROCURONIUM BROMIDE 5 MG: 10 INJECTION, SOLUTION INTRAVENOUS at 12:12

## 2019-12-30 RX ADMIN — CEFAZOLIN SODIUM 2 G: 2 SOLUTION INTRAVENOUS at 12:12

## 2019-12-30 RX ADMIN — LIDOCAINE HYDROCHLORIDE 50 MG: 10 INJECTION, SOLUTION EPIDURAL; INFILTRATION; INTRACAUDAL; PERINEURAL at 12:12

## 2019-12-30 RX ADMIN — ROBINUL 0.6 MG: 0.2 INJECTION INTRAMUSCULAR; INTRAVENOUS at 01:12

## 2019-12-30 RX ADMIN — MEPERIDINE HYDROCHLORIDE 12.5 MG: 50 INJECTION INTRAMUSCULAR; INTRAVENOUS; SUBCUTANEOUS at 02:12

## 2019-12-30 RX ADMIN — SODIUM CHLORIDE, SODIUM LACTATE, POTASSIUM CHLORIDE, AND CALCIUM CHLORIDE: 600; 310; 30; 20 INJECTION, SOLUTION INTRAVENOUS at 12:12

## 2019-12-30 RX ADMIN — MIDAZOLAM HYDROCHLORIDE 2 MG: 1 INJECTION, SOLUTION INTRAMUSCULAR; INTRAVENOUS at 11:12

## 2019-12-30 RX ADMIN — SUCCINYLCHOLINE CHLORIDE 100 MG: 20 INJECTION, SOLUTION INTRAMUSCULAR; INTRAVENOUS at 12:12

## 2019-12-30 RX ADMIN — FENTANYL CITRATE 50 MCG: 50 INJECTION, SOLUTION INTRAMUSCULAR; INTRAVENOUS at 02:12

## 2019-12-30 NOTE — H&P
[facility]  Obstetrics & Gynecology  Pre op note     Patient Name: Sandra Dimas  MRN: [id number]  Admission Date: 12/30/2019  Primary Care Provider: OSIRIS Gamez Jr, MD    Subjective:     Chief Complaint/Reason for Admission: Pelvic pain and headaches     History of Present Illness:  Having recurrent pelvic pain from endometriosis and now ready for definitive surgery.  No vaginal bleeding since endometrial ablation in the past.   Also with Migraine headaches that have only improved in the past with pregnancy or Lupron therapy.  There is consideration that removal of the ovaries will improve headaches.     No new subjective & objective note has been filed under this hospital service since the last note was generated.    Assessment/Plan:     Pelvic pain  Robotic Hysterectomy bilateral salpingo-oophorectomy   Discussed risk of laparotomy because of previous surgery for SBO in early 2019   Understands that removal of the ovaries will place into menopause.    Reviewed estrogen replacement in the future.   I have explained the risks, benefits , and alternatives of laparoscopic hysterectomy bilateral salpingo-oophorectomy  in detail.  The patient voices understanding and all questions have been answered.  The patient agrees to proceed as planned.  Consent forms for the procedure have been reviewed and signed by the patient.         CARLOS Shah MD  Obstetrics & Gynecology  [facility]

## 2019-12-30 NOTE — ANESTHESIA RELEASE NOTE
"Anesthesia Release from PACU Note    Patient: Sandra Dimas    Procedure(s) Performed: Procedure(s) (LRB):  XI ROBOTIC HYSTERECTOMY (N/A)  EXCISION, CYST (Right)  XI ROBOTIC OOPHORECTOMY (Bilateral)    Anesthesia type: general    Post pain: Adequate analgesia    Post assessment: no apparent anesthetic complications, tolerated procedure well and no evidence of recall    Last Vitals:   Visit Vitals  BP (!) 102/56 (BP Location: Right arm, Patient Position: Lying)   Pulse (!) 57   Temp 36.7 °C (98 °F) (Temporal)   Resp 20   Ht 5' 9" (1.753 m)   Wt 77.3 kg (170 lb 6.7 oz)   SpO2 (!) 93%   Breastfeeding? No   BMI 25.17 kg/m²       Post vital signs: stable    Level of consciousness: responds to stimulation    Nausea/Vomiting: no nausea/no vomiting    Complications: none    Airway Patency: patent    Respiratory: unassisted    Cardiovascular: stable and blood pressure at baseline    Hydration: euvolemic       "

## 2019-12-30 NOTE — NURSING
Received from PACU per stretcher accompanied by spouse. Assessment done. Vital signs taken. Oriented about unit policies and protocols. Surgial site--no complications noted. Complained of migraine.

## 2019-12-30 NOTE — ANESTHESIA PREPROCEDURE EVALUATION
12/30/2019  Sandra Dimas is a 44 y.o., female.    Pre-op Assessment    I have reviewed the Patient Summary Reports.     I have reviewed the Medications.     Review of Systems  Anesthesia Hx:  No problems with previous Anesthesia   Denies Personal Hx of Anesthesia complications.   Social:  Non-Smoker    Hematology/Oncology:  Hematology Normal   Oncology Normal     EENT/Dental:EENT/Dental Normal   Cardiovascular:  Cardiovascular Normal Exercise tolerance: good  ECG has been reviewed. EKG 10/2019  Vent. Rate : 069 BPM     Atrial Rate : 069 BPM     P-R Int : 154 ms          QRS Dur : 104 ms      QT Int : 412 ms       P-R-T Axes : 054 029 033 degrees     QTc Int : 441 ms    Sinus rhythm with Premature atrial complexes  Incomplete right bundle branch block  Borderline Abnormal ECG  When compared with ECG of 08-OCT-2019 23:38,  Premature atrial complexes are now Present    10/2019 Echo  CONCLUSIONS     1 - Concentric remodeling.     2 - No wall motion abnormalities.     3 - Normal left ventricular systolic function (EF 55-60%).     4 - Normal left ventricular diastolic function.     5 - Normal right ventricular systolic function .     6 - The estimated PA systolic pressure is 30 mmHg.    Pulmonary:  Pulmonary Normal    Renal/:  Renal/ Normal     Hepatic/GI:   Hiatal Hernia, GERD    Musculoskeletal:  Musculoskeletal Normal    Neurological:   Headaches    Endocrine:  Endocrine Normal    Dermatological:  Skin Normal    Psych:   Psychiatric History depression          Physical Exam  General:  Well nourished    Airway/Jaw/Neck:  Airway Findings: Mouth Opening: Normal Tongue: Normal  Mallampati: II      Dental:  Dental Findings: In tact   Chest/Lungs:  Chest/Lungs Clear    Heart/Vascular:  Heart Findings: Normal Heart murmur: negative       Mental Status:  Mental Status Findings:  Cooperative, Alert and Oriented          Anesthesia Plan  Type of Anesthesia, risks & benefits discussed:  Anesthesia Type:  general  Patient's Preference:   Intra-op Monitoring Plan: standard ASA monitors  Intra-op Monitoring Plan Comments:   Post Op Pain Control Plan: multimodal analgesia  Post Op Pain Control Plan Comments:   Induction:   IV  Beta Blocker:  Patient is on a Beta-Blocker and has received one dose within the past 24 hours (No further documentation required).       Informed Consent: Patient understands risks and agrees with Anesthesia plan.  Questions answered. Anesthesia consent signed with patient.  ASA Score: 2     Day of Surgery Review of History & Physical: I have interviewed and examined the patient. I have reviewed the patient's H&P dated:    H&P update referred to the surgeon.

## 2019-12-30 NOTE — OP NOTE
[facility]  Surgery Department  Operative Note    SUMMARY     Date of Procedure: 12/30/2019     Procedure: Procedure(s) (LRB):  XI ROBOTIC HYSTERECTOMY (N/A)  EXCISION, CYST (Right)  XI ROBOTIC OOPHORECTOMY (Bilateral)     Excision of right labial sebacious  cyts   Surgeon(s) and Role:     * NATALI Shah MD - Primary    Assisting Surgeon:  Sienna STODDARD     Pre-Operative Diagnosis: Endometriosis of pelvic peritoneum [N80.3]  Pelvic Pain  Sebaciaus cyst right labia     Post-Operative Diagnosis: Post-Op Diagnosis Codes:     * Endometriosis of pelvic peritoneum [N80.3]     Pelvic Pain       Sebaciaus cyst right Labia     Anesthesia: General    Technical Procedures Used: [employer] Robotic Surgical System     Description of the Findings of the Procedure:     Procedure in Detail/Findings:  Operative findings     Extern Genitalia:  1.5 cm sebaciaus cyst right upper labia with no inflammation     Vagina   Normal mucosa, grade 1 cystocelt      Cervical prolapse to the vaginal opening with tension applied     Upper abdomen normal liver , no adhesions, no bowel abnormalities      6 cm omental adhesion into small umbilical hernia with no bowel involvement     Pelvis:  Uterus normal size and shape,  Ovaries normal , Tubes surgically absent                No Adhesions, No visible  Endometriosis     Old scarring noted throughout the pelvic peritoneum                  Ureters in there normal retroperitoneal position through the pelvis                 Bladder Flap with no adhesions         The patient was taken to the surgical suite.  General              intubation  anesthesia is induced.                                                                   The patient was placed in supine lithotomy  position  with low Schuyler stirrups                                                                   The vagina was prepped with Betadine.  A Roth     catheter, KD manipulator with  KOH colpotomizer placed.       The right labial cyst incised and the cyst lining removed     2 Three zero vicryl placed to approximate the edges.  Minimal bleeding noted.      Abdomen prepped   with  Betadine                                                               Sterile drapes applied over sterile towels to avoid contact with adhesion on the drapes      Time-out taken with all members of the operating team.       Veress  needle placed through the umbilicus, abdomen elevated with towel clamps.     CO2 gas insufflation to 15 mmHg.      Trochar placement as follows:       8 mm bladeless trochars:      4 trochars, transverse line 2 cm above the umbilicus, 8 cm apart with direct visualization after the initial placement                     [employer] robotic XI   system docked from the right side of the patient.     .    Instrument placement as follows   Camera:  Right midline port   Unipolar Scissors:  Right lateral port   [place] Bipolar Forceps:  Left midline port   Fenestrated Forceps:  Left lateral port    The surgeon moved to the robotic console and the first assistant remained at the bedside for uterine manipulation and utilization of instruments placed through the left lateral trochar                                                                           Prior to starting the hysterectomy, the anatomical landmarks of the pelvis and the pelvic course of the ureters right and left are identified.   The [place] bipolar is used for cautery and the scissors used for transection of the pedicles .  The  round ligaments are  Transected after cautery on each side and dissection of the broad ligament and development of the bladder flap is accomplished  with the unipolar scissors.  Retroperitoneal dissection lateral to the IP vessels done bilaterally to expose the blood supply of the ovaries at the pelvic brim 3 cm proximal to the ovary and tube.  The IP vessels taken down with Maryland cautery and cut with scissors bilaterally 3 cm from  the ovaries     The anterior and posterior vagina entered over the colpotomizer with unipolar scissors and the colpotomy is extended over the colpotomizer anterior and posterior to the ascending uterine bilateral uterine arteries.    The Uterine vessels are then cauterized with the [place] instrument and transected.     With the uterus free of blood supply and vaginal connection, it is removed with the cervix and bilateral ovaries  through the vagina.    Bleeding of the uterine pedicle or vaginal cuff is cauterized.   Closure of the vaginal cuff done with Zero Vicryl suture with Vicryl clips on the free end, starting at each vaginal angle and tying in the midline        All needles used in the abdomen removed through the vagina or  the 8 mm trocar on the left are  accounted for.       Good hemostasis was  Appreciated in the operative field, any bleeding noted cauterized with bipolar instrument     Any excess fluid was suctioned free.    After the instruments were removed, the [employer] robotic system was undocked       All trochar  sites closed with 4-0 Monocryl  subcuticular      The specimen removed from the vagina.  Vagina examined for any tears.       The  patient taken to Recovery in stable condition with christy catheter in place              Significant Surgical Tasks Conducted by the Assistant(s), if Applicable: Bedside assistance and stab wound closure     Complications: No    Estimated Blood Loss (EBL): 50 mL           Implants: * No implants in log *    Specimens:   Uterus, cervix right and left ovaries            Condition: Good    Disposition: PACU - hemodynamically stable.    Attestation: I was present and scrubbed for the entire procedure.

## 2019-12-30 NOTE — ANESTHESIA POSTPROCEDURE EVALUATION
Anesthesia Post Evaluation    Patient: Sandra Diams    Procedure(s) Performed: Procedure(s) (LRB):  XI ROBOTIC HYSTERECTOMY (N/A)  EXCISION, CYST (Right)  XI ROBOTIC OOPHORECTOMY (Bilateral)    Final Anesthesia Type: general    Patient location during evaluation: PACU  Patient participation: Yes- Able to Participate  Level of consciousness: awake and alert, oriented and awake  Post-procedure vital signs: reviewed and stable  Pain management: adequate  Airway patency: patent    PONV status at discharge: No PONV  Anesthetic complications: no      Cardiovascular status: blood pressure returned to baseline  Respiratory status: unassisted and spontaneous ventilation  Hydration status: euvolemic  Follow-up not needed.          Vitals Value Taken Time   /65 12/30/2019  3:30 PM   Temp 36.7 °C (98 °F) 12/30/2019  2:15 PM   Pulse 93 12/30/2019  3:36 PM   Resp 17 12/30/2019  3:36 PM   SpO2 94 % 12/30/2019  3:36 PM   Vitals shown include unvalidated device data.      No case tracking events are documented in the log.      Pain/Kamila Score: Pain Rating Prior to Med Admin: 0 (12/30/2019  2:54 PM)  Pain Rating Post Med Admin: 0 (12/30/2019 11:41 AM)  Kamila Score: 9 (12/30/2019  3:30 PM)

## 2019-12-30 NOTE — TRANSFER OF CARE
"Anesthesia Transfer of Care Note    Patient: Sandra Dimas    Procedure(s) Performed: Procedure(s) (LRB):  XI ROBOTIC HYSTERECTOMY (N/A)  EXCISION, CYST (Right)  XI ROBOTIC OOPHORECTOMY (Bilateral)    Patient location: PACU    Anesthesia Type: general    Transport from OR: Transported from OR on room air with adequate spontaneous ventilation    Post pain: adequate analgesia    Post assessment: no apparent anesthetic complications    Post vital signs: stable    Level of consciousness: responds to stimulation    Nausea/Vomiting: no nausea/vomiting    Complications: none    Transfer of care protocol was followed      Last vitals:   Visit Vitals  BP (!) 102/56 (BP Location: Right arm, Patient Position: Lying)   Pulse (!) 57   Temp 36.7 °C (98 °F) (Temporal)   Resp 20   Ht 5' 9" (1.753 m)   Wt 77.3 kg (170 lb 6.7 oz)   SpO2 (!) 93%   Breastfeeding? No   BMI 25.17 kg/m²     "

## 2019-12-30 NOTE — HPI
Having recurrent pelvic pain from endometriosis and now ready for definitive surgery.  No vaginal bleeding since endometrial ablation in the past.   Also with Migraine headaches that have only improved in the past with pregnancy or Lupron therapy.  There is consideration that removal of the ovaries will improve headaches.

## 2019-12-30 NOTE — ASSESSMENT & PLAN NOTE
Robotic Hysterectomy bilateral salpingo-oophorectomy   Discussed risk of laparotomy because of previous surgery for SBO in early 2019   Understands that removal of the ovaries will place into menopause.    Reviewed estrogen replacement in the future.   I have explained the risks, benefits , and alternatives of laparoscopic hysterectomy bilateral salpingo-oophorectomy  in detail.  The patient voices understanding and all questions have been answered.  The patient agrees to proceed as planned.  Consent forms for the procedure have been reviewed and signed by the patient.

## 2019-12-31 VITALS
HEIGHT: 69 IN | OXYGEN SATURATION: 98 % | WEIGHT: 170.44 LBS | HEART RATE: 79 BPM | BODY MASS INDEX: 25.24 KG/M2 | RESPIRATION RATE: 16 BRPM | DIASTOLIC BLOOD PRESSURE: 59 MMHG | SYSTOLIC BLOOD PRESSURE: 116 MMHG | TEMPERATURE: 98 F

## 2019-12-31 PROCEDURE — 94799 UNLISTED PULMONARY SVC/PX: CPT

## 2019-12-31 PROCEDURE — 99900035 HC TECH TIME PER 15 MIN (STAT)

## 2019-12-31 PROCEDURE — 63600175 PHARM REV CODE 636 W HCPCS: Performed by: OBSTETRICS & GYNECOLOGY

## 2019-12-31 PROCEDURE — 94761 N-INVAS EAR/PLS OXIMETRY MLT: CPT

## 2019-12-31 RX ADMIN — KETOROLAC TROMETHAMINE 30 MG: 30 INJECTION, SOLUTION INTRAMUSCULAR; INTRAVENOUS at 05:12

## 2019-12-31 RX ADMIN — HYDROMORPHONE HYDROCHLORIDE 1 MG: 1 INJECTION, SOLUTION INTRAMUSCULAR; INTRAVENOUS; SUBCUTANEOUS at 08:12

## 2019-12-31 RX ADMIN — HYDROMORPHONE HYDROCHLORIDE 1 MG: 1 INJECTION, SOLUTION INTRAMUSCULAR; INTRAVENOUS; SUBCUTANEOUS at 03:12

## 2019-12-31 NOTE — PROGRESS NOTES
[facility] -   Obstetrics & Gynecology  Progress Note    Patient Name: Sandra Dimas  MRN: [id number]  Admission Date: 12/30/2019  Primary Care Provider: OSIRIS Gamez Jr, MD  Principal Problem: <principal problem not specified>    Subjective:     HPI:  Having recurrent pelvic pain from endometriosis and now ready for definitive surgery.  No vaginal bleeding since endometrial ablation in the past.   Also with Migraine headaches that have only improved in the past with pregnancy or Lupron therapy.  There is consideration that removal of the ovaries will improve headaches.     Interval History: Patient reports she is doing well. She is ambulating well. Voiding well. No nausea or vomiting, no flatus as yet. She is eating and tolerating diet. No fever overnight. Pain is very well controlled. No vaginal bleeding. She desires to go home.      Scheduled Meds:   atenolol  25 mg Oral Daily    FLUoxetine  20 mg Oral Daily    ketorolac  30 mg Intravenous Q6H    Followed by    ibuprofen  800 mg Oral Q8H    nozaseptin   Each Nostril BID     Continuous Infusions:   dextrose 5 % and 0.45 % NaCl 125 mL/hr at 12/30/19 1645     PRN Meds:diphenhydrAMINE, HYDROmorphone, HYDROmorphone, ondansetron, promethazine    Review of patient's allergies indicates:   Allergen Reactions    Hydrocodone Other (See Comments)     Chest pains. Note: patient tolerated hydromorphone in 2/2019.     Chlorhexidine Rash     Per  after surgery patient had large rash across abdomen where chlorhexidine was applied.    Mastisol adhesive [gum cksajw-zgoptc-fqwt-alcohol] Rash       Objective:     Vital Signs (Most Recent):  Temp: 98.3 °F (36.8 °C) (12/31/19 0315)  Pulse: 76 (12/31/19 0315)  Resp: 18 (12/31/19 0315)  BP: (!) 116/59 (12/31/19 0315)  SpO2: 99 % (12/31/19 0315) Vital Signs (24h Range):  Temp:  [98 °F (36.7 °C)-98.8 °F (37.1 °C)] 98.3 °F (36.8 °C)  Pulse:  [57-95] 76  Resp:  [14-23] 18  SpO2:  [93 %-100 %] 99 %  BP:  ()/(56-69) 116/59     Weight: 77.3 kg (170 lb 6.7 oz)  Body mass index is 25.17 kg/m².  No LMP recorded (lmp unknown). Patient has had an ablation.    I&O (Last 24H):    Intake/Output Summary (Last 24 hours) at 12/31/2019 0725  Last data filed at 12/31/2019 0400  Gross per 24 hour   Intake 1606.25 ml   Output 550 ml   Net 1056.25 ml       Physical Exam:   Constitutional: She is oriented to person, place, and time. She appears well-developed and well-nourished. No distress.       Cardiovascular: Normal rate, regular rhythm and normal heart sounds.    No murmur heard.   Pulmonary/Chest: Effort normal and breath sounds normal. No respiratory distress. She has no wheezes. She has no rales.        Abdominal: Soft. Bowel sounds are normal. She exhibits abdominal incision (4 trocar incisions clean, dry, intact, undressed incisions due to skin sensitivities). She exhibits no distension. There is no tenderness. There is no guarding.     Genitourinary: Vagina normal.           Musculoskeletal: She exhibits no edema.   No calf tenderness       Neurological: She is alert and oriented to person, place, and time.    Skin: Skin is warm and dry. No rash noted. She is not diaphoretic.        Laboratory:  I have personallly reviewed all pertinent lab results from the last 24 hours.    Diagnostic Results:  Labs: Reviewed    Assessment/Plan:     Status post laparoscopic hysterectomy  POD #1s/p RALH, bilateral salpingo-oophorectomy  Pt doing well, afebrile overnight.  Proceed with routine postoperative care, encouraged ambulation and daily shower with antibacterial soap.  Pt counseled on management plan and discharge goals including pelvic rest and restrictions. Plan for discharge home this AM.           Pelvic pain  S/p Robotic Hysterectomy bilateral salpingo-oophorectomy           Shaylee Meredith PA-C  Obstetrics & Gynecology  [facility]

## 2019-12-31 NOTE — PLAN OF CARE
Problem: Infection  Goal: Infection Symptom Resolution  Outcome: Ongoing, Progressing  Intervention: Prevent or Manage Infection  Flowsheets (Taken 12/31/2019 0103)  Fever Reduction/Comfort Measures: fluid intake increased; medication administered     Problem: Fall Injury Risk  Goal: Absence of Fall and Fall-Related Injury  Outcome: Ongoing, Progressing  Intervention: Promote Injury-Free Environment  Flowsheets (Taken 12/31/2019 0103)  Safety Promotion/Fall Prevention: assistive device/personal item within reach; instructed to call staff for mobility; side rails raised x 2; Fall Risk reviewed with patient/family  Environmental Safety Modification: assistive device/personal items within reach; clutter free environment maintained; lighting adjusted; room organization consistent     Problem: Pain Acute  Goal: Optimal Pain Control  Outcome: Ongoing, Progressing  Intervention: Prevent or Manage Pain  Flowsheets (Taken 12/31/2019 0103)  Sleep/Rest Enhancement: awakenings minimized; consistent schedule promoted

## 2019-12-31 NOTE — PLAN OF CARE
Patient remains free from injury. Pain maintained at a manageable level. Incisions clean dry intact. Ambulation encouraged. Will continue to monitor.

## 2019-12-31 NOTE — DISCHARGE SUMMARY
[facility] -   Obstetrics & Gynecology  Discharge Summary    Patient Name: Sandra Dimas  MRN: [id number]  Admission Date: 12/30/2019  Hospital Length of Stay: 0 days  Discharge Date and Time:  12/31/2019 7:31 AM  Attending Physician: NATALI Shah MD   Discharging Provider: Shaylee Meredith PA-C  Primary Care Provider: OSIRIS Gamez Jr, MD    HPI:  Having recurrent pelvic pain from endometriosis and now ready for definitive surgery.  No vaginal bleeding since endometrial ablation in the past.   Also with Migraine headaches that have only improved in the past with pregnancy or Lupron therapy.  There is consideration that removal of the ovaries will improve headaches.     Hospital Course:  Patient presented for robotic assisted laparoscopic hysterectomy bilateral salpingo-oophorectomy for pelvic pain from endometriosis. Patient progressed well postoperatively without complication, was voiding spontaneously, pain well controlled with po medications, and no post op nausea and vomiting tolerating po well. She is desiring discharge and stable to go home post op day 1.    Procedure(s) (LRB):  XI ROBOTIC HYSTERECTOMY (N/A)  EXCISION, CYST (Right)  XI ROBOTIC OOPHORECTOMY (Bilateral)         Significant Diagnostic Studies: Labs: All labs within the past 24 hours have been reviewed    Pending Diagnostic Studies:     Procedure Component Value Units Date/Time    Specimen to Pathology, Surgery Gynecology and Obstetrics [[id number]] Collected:  12/30/19 1358    Order Status:  Sent Lab Status:  In process Updated:  12/31/19 0615        Final Active Diagnoses:    Diagnosis Date Noted POA    Status post laparoscopic hysterectomy [Z90.710] 12/30/2019 No    Preoperative exam for gynecologic surgery [Z01.818] 12/30/2019 Not Applicable    Pelvic pain [R10.2] 12/27/2019 Yes      Problems Resolved During this Admission:        Discharged Condition: good    Disposition:     Follow Up:  Follow-up Information     F  Filipe Shah MD. Go on 1/31/2020.    Specialties:  Obstetrics, Obstetrics and Gynecology  Why:  Post op  Contact information:  [address]  [place] [id number] [phone]                 Patient Instructions:      Diet Adult Regular     Lifting restrictions   Order Comments: Limit lifting to 15 lb     Other restrictions (specify):   Order Comments: Pelvic rest: nothing in the vagina, including douching, tampons, or intercourse for 12 weeks  Shower only, no baths until cleared by surgeon     No driving until:   Order Comments: No driving until off of all opiate pain meds and able to brake quickly without pain.     Notify your health care provider if you experience any of the following:  persistent nausea and vomiting or diarrhea     Notify your health care provider if you experience any of the following:  severe uncontrolled pain     Notify your health care provider if you experience any of the following:  redness, tenderness, or signs of infection (pain, swelling, redness, odor or green/yellow discharge around incision site)     Notify your health care provider if you experience any of the following:  difficulty breathing or increased cough     Notify your health care provider if you experience any of the following:  increased confusion or weakness     Notify your health care provider if you experience any of the following:   Order Comments: Heavy vaginal bleeding.     Medications:  Reconciled Home Medications:      Medication List      START taking these medications    meperidine 50 mg/5 mL Soln  Commonly known as:  DEMEROL  Take 5 mLs (50 mg total) by mouth every 4 (four) hours as needed.        CHANGE how you take these medications    FLUoxetine 20 MG capsule  Take one capsule by mouth once daily  What changed:    · how much to take  · how to take this  · when to take this     lamoTRIgine 25 MG tablet  Commonly known as:  LAMICTAL  Use 1 po qhs for 1 wk, 1 po BID for 1 wk, 2 po qhs for 1 wk, 3 po  qhs for 1 wk, then 4 po BID (100 mg BID)  What changed:    · how much to take  · how to take this  · when to take this        CONTINUE taking these medications    atenolol 25 MG tablet  Commonly known as:  TENORMIN  Take 1 tablet (25 mg total) by mouth once daily.     butorphanol 10 mg/mL nasal spray  Commonly known as:  STADOL  INHALE 1 SPRAY INTO EACH NOSTRIL EVERY DAY MAY REPEAT IN 1 HOUR **MAX 4 SPRAY/DAY**     diclofenac potassium 50 mg Pwpk  Commonly known as:  CAMBIA  Take 50 mg by mouth daily as needed.     galcanezumab-gnlm 300 mg/3 mL (100 mg/mL x 3) Syrg  Generic drug:  galcanezumab-gnlm  Inject 300 mg into the skin every 28 days.     ketorolac 30 mg/mL (1 mL) injection  Commonly known as:  TORADOL  Bridge to break status migrainosus.   Use 30 mg IM q6h for 3 days, q8h for 2 days and q12h for 1 day (total of 20 doses)     MIGRELIEF ORAL  Take by mouth once daily.     octreotide 100 mcg/mL Soln  Commonly known as:  SandoSTATIN  Inject 1 mL (100 mcg total) into the skin once. for 1 dose     ondansetron 8 MG tablet  Commonly known as:  ZOFRAN  Take 1 tablet (8 mg total) by mouth every 8 (eight) hours as needed for Nausea.     pantoprazole 40 MG tablet  Commonly known as:  PROTONIX  Take 1 tablet (40 mg total) by mouth once daily.     promethazine 25 MG tablet  Commonly known as:  PHENERGAN  Take 1 tablet (25 mg total) by mouth every 8 (eight) hours as needed for Nausea.     TENS unit and electrodes Cmpk  Commonly known as:  Cefaly  Per Protocol     vitamin D 1000 units Tab  Commonly known as:  VITAMIN D3  Take 1,000 Units by mouth once daily.        STOP taking these medications    drospirenone-ethinyl estradiol 3-0.03 mg per tablet  Commonly known as:  ELIS     oxyCODONE-acetaminophen  mg per tablet  Commonly known as:  PERCOCET            Shaylee Meredith PA-C  Obstetrics & Gynecology  [facility]

## 2019-12-31 NOTE — NURSING
AVS reviewed with patient and . Follow up appointment and d/c medication discussed, D/C medication filled per patient prior to surgery. Patient verbalized understanding with teach back. No complaints at this time. No further needs. IV removed per order. Patient tolerated well. To be transported home per family.

## 2019-12-31 NOTE — PLAN OF CARE
CM spoke to patient who is awake, alert , and able to make needs known. Patient states that before she came to the hospital she was not using any assistive equipment at home or oxygen. Patient reports that her spouse is her help at home who is also by the bedside. Patient states that she is established with her PCP and has reliable transportation to and from scheduled appointments. Patient reports that she does not need any services at home and declines any community resources.  CM provided a transitional care folder, information on advanced directives, information on pharmacy bedside delivery, and discharge planning begins on admission with contact information for any needs/questions.    D/C Plan: Home   PCP:  Preferred Pharmacy:  Discharge transportation: Family   [facility]: active   Pharmacy Bedside Delivery:       [facility] [address] [place] [address]  Phone: [phone] Fax: [phone]    [id number] [place], [place] [facility] [address]  [address]  [address]  [place] [address]  Phone: [phone] Fax: [phone]       12/31/19 0904   Discharge Assessment   Assessment Type Discharge Planning Assessment   Confirmed/corrected address and phone number on facesheet? Yes   Assessment information obtained from? Patient   Expected Length of Stay (days)   (TBD )   Communicated expected length of stay with patient/caregiver yes   Prior to hospitilization cognitive status: Alert/Oriented   Prior to hospitalization functional status: Independent   Current cognitive status: Alert/Oriented   Current Functional Status: Independent   Facility Arrived From: Home    Lives With spouse   Able to Return to Prior Arrangements yes   Is patient able to care for self after discharge? Yes   Patient's perception of discharge disposition home or selfcare   Patient currently being followed by outpatient case management? No   Patient currently receives any other  outside agency services? No   Equipment Currently Used at Home none   Do you have any problems affording any of your prescribed medications? No   Is the patient taking medications as prescribed? yes   Does the patient have transportation home? Yes   Transportation Anticipated family or friend will provide   Does the patient receive services at the [facility]? No   Discharge Plan A Home with family   DME Needed Upon Discharge  none   Patient/Family in Agreement with Plan yes

## 2019-12-31 NOTE — ASSESSMENT & PLAN NOTE
POD #1s/p RALH, bilateral salpingo-oophorectomy  Pt doing well, afebrile overnight.  Proceed with routine postoperative care, encouraged ambulation and daily shower with antibacterial soap.  Pt counseled on management plan and discharge goals including pelvic rest and restrictions. Plan for discharge home this AM.

## 2019-12-31 NOTE — PLAN OF CARE
Care plan reviewed with patient and family. Pain managed by PRN and regular meds. Able to eat dinner. Instructed the need to empty bladder within 6 hours. Able to use Incentive Spirometry. Instructed the importance of ambulation. Free from falls. No other complaints made.

## 2019-12-31 NOTE — HOSPITAL COURSE
Patient presented for robotic assisted laparoscopic hysterectomy bilateral salpingo-oophorectomy for pelvic pain from endometriosis. Patient progressed well postoperatively without complication, was voiding spontaneously, pain well controlled with po medications, and no post op nausea and vomiting tolerating po well. She is desiring discharge and stable to go home post op day 1.

## 2019-12-31 NOTE — SUBJECTIVE & OBJECTIVE
Interval History: Patient reports she is doing well. She is ambulating well. Voiding well. No nausea or vomiting, no flatus as yet. She is eating and tolerating diet. No fever overnight. Pain is very well controlled. No vaginal bleeding. She desires to go home.      Scheduled Meds:   atenolol  25 mg Oral Daily    FLUoxetine  20 mg Oral Daily    ketorolac  30 mg Intravenous Q6H    Followed by    ibuprofen  800 mg Oral Q8H    nozaseptin   Each Nostril BID     Continuous Infusions:   dextrose 5 % and 0.45 % NaCl 125 mL/hr at 12/30/19 1645     PRN Meds:diphenhydrAMINE, HYDROmorphone, HYDROmorphone, ondansetron, promethazine    Review of patient's allergies indicates:   Allergen Reactions    Hydrocodone Other (See Comments)     Chest pains. Note: patient tolerated hydromorphone in 2/2019.     Chlorhexidine Rash     Per  after surgery patient had large rash across abdomen where chlorhexidine was applied.    Mastisol adhesive [gum espuwp-mmuuww-mouf-alcohol] Rash       Objective:     Vital Signs (Most Recent):  Temp: 98.3 °F (36.8 °C) (12/31/19 0315)  Pulse: 76 (12/31/19 0315)  Resp: 18 (12/31/19 0315)  BP: (!) 116/59 (12/31/19 0315)  SpO2: 99 % (12/31/19 0315) Vital Signs (24h Range):  Temp:  [98 °F (36.7 °C)-98.8 °F (37.1 °C)] 98.3 °F (36.8 °C)  Pulse:  [57-95] 76  Resp:  [14-23] 18  SpO2:  [93 %-100 %] 99 %  BP: ()/(56-69) 116/59     Weight: 77.3 kg (170 lb 6.7 oz)  Body mass index is 25.17 kg/m².  No LMP recorded (lmp unknown). Patient has had an ablation.    I&O (Last 24H):    Intake/Output Summary (Last 24 hours) at 12/31/2019 0725  Last data filed at 12/31/2019 0400  Gross per 24 hour   Intake 1606.25 ml   Output 550 ml   Net 1056.25 ml       Physical Exam:   Constitutional: She is oriented to person, place, and time. She appears well-developed and well-nourished. No distress.       Cardiovascular: Normal rate, regular rhythm and normal heart sounds.    No murmur heard.   Pulmonary/Chest:  Effort normal and breath sounds normal. No respiratory distress. She has no wheezes. She has no rales.        Abdominal: Soft. Bowel sounds are normal. She exhibits abdominal incision (4 trocar incisions clean, dry, intact, undressed incisions due to skin sensitivities). She exhibits no distension. There is no tenderness. There is no guarding.     Genitourinary: Vagina normal.           Musculoskeletal: She exhibits no edema.   No calf tenderness       Neurological: She is alert and oriented to person, place, and time.    Skin: Skin is warm and dry. No rash noted. She is not diaphoretic.        Laboratory:  I have personallly reviewed all pertinent lab results from the last 24 hours.    Diagnostic Results:  Labs: Reviewed

## 2019-12-31 NOTE — PROGRESS NOTES
Postoperative Progress Note    POD#0  Sandra Dimas is a 44 y.o. female patient s/p RALH/BSO and labial sebaceous cyst resection today.          SUBJECTIVE:     Sandra Dimas is a 44 y.o.  who is doing well. Pain well tolerated. Tolerating PO intake, and no nausea, vomiting or fever.  No chest pain, shortness of breath or palpitations.    Review of patient's allergies indicates:   Allergen Reactions    Hydrocodone Other (See Comments)     Chest pains. Note: patient tolerated hydromorphone in 2019.     Chlorhexidine Rash     Per  after surgery patient had large rash across abdomen where chlorhexidine was applied.    Mastisol adhesive [gum ckcgia-selyes-nfuy-alcohol] Rash            Objective:  Vitals:    19 1616 19 1632 19 1803 19 2046   BP: (!) 99/56  105/62 105/68   Pulse: 79 78  91   Resp: 16 18  18   Temp: 98.7 °F (37.1 °C)   98.5 °F (36.9 °C)   TempSrc:    Oral   SpO2: 95% 100%  97%   Weight:       Height:          Urine Output:  Adequate     Intake/Output Summary (Last 24 hours) at 2019 2109  Last data filed at 2019 1818  Gross per 24 hour   Intake 100 ml   Output 550 ml   Net -450 ml        General appearance: Comfortable and well-appearing.    Abdomen: Abdomen is soft.  No distension.  tenderness appropriate for postop state   Wound:  Clean, dry and intact bandage  : no blood  Extremities: No calf tenderness or teto's sign      Assessment:   Post-op day 0 doing well

## 2020-01-02 ENCOUNTER — TELEPHONE (OUTPATIENT)
Dept: PHARMACY | Facility: CLINIC | Age: 45
End: 2020-01-02

## 2020-01-07 ENCOUNTER — TELEPHONE (OUTPATIENT)
Dept: OBSTETRICS AND GYNECOLOGY | Facility: CLINIC | Age: 45
End: 2020-01-07

## 2020-01-07 DIAGNOSIS — E89.41 SURGICAL MENOPAUSE, SYMPTOMATIC: Primary | ICD-10-CM

## 2020-01-07 RX ORDER — ESTRADIOL 0.05 MG/D
1 PATCH TRANSDERMAL
Qty: 4 PATCH | Refills: 11 | Status: SHIPPED | OUTPATIENT
Start: 2020-01-07 | End: 2020-01-15

## 2020-01-09 RX ORDER — LAMOTRIGINE 100 MG/1
100 TABLET ORAL 2 TIMES DAILY
Qty: 60 TABLET | Refills: 11 | Status: SHIPPED | OUTPATIENT
Start: 2020-01-09 | End: 2021-02-22 | Stop reason: ALTCHOICE

## 2020-01-15 ENCOUNTER — TELEPHONE (OUTPATIENT)
Dept: OBSTETRICS AND GYNECOLOGY | Facility: CLINIC | Age: 45
End: 2020-01-15

## 2020-01-15 DIAGNOSIS — E89.41 SURGICAL MENOPAUSE, SYMPTOMATIC: Primary | ICD-10-CM

## 2020-01-15 RX ORDER — ESTRADIOL 2 MG/1
2 TABLET ORAL DAILY
Qty: 30 TABLET | Refills: 11 | Status: SHIPPED | OUTPATIENT
Start: 2020-01-15 | End: 2020-02-12

## 2020-01-17 LAB
FINAL PATHOLOGIC DIAGNOSIS: NORMAL
GROSS: NORMAL

## 2020-01-24 ENCOUNTER — OFFICE VISIT (OUTPATIENT)
Dept: OBSTETRICS AND GYNECOLOGY | Facility: CLINIC | Age: 45
End: 2020-01-24
Payer: COMMERCIAL

## 2020-01-24 VITALS
DIASTOLIC BLOOD PRESSURE: 78 MMHG | SYSTOLIC BLOOD PRESSURE: 114 MMHG | WEIGHT: 175.69 LBS | BODY MASS INDEX: 26.02 KG/M2 | HEIGHT: 69 IN

## 2020-01-24 DIAGNOSIS — Z09 POSTOP CHECK: ICD-10-CM

## 2020-01-24 DIAGNOSIS — Z90.710 STATUS POST LAPAROSCOPIC HYSTERECTOMY: Primary | ICD-10-CM

## 2020-01-24 PROCEDURE — 99999 PR PBB SHADOW E&M-EST. PATIENT-LVL II: ICD-10-PCS | Mod: PBBFAC,,,

## 2020-01-24 PROCEDURE — 99024 PR POST-OP FOLLOW-UP VISIT: ICD-10-PCS | Mod: S$GLB,,, | Performed by: OBSTETRICS & GYNECOLOGY

## 2020-01-24 PROCEDURE — 99024 POSTOP FOLLOW-UP VISIT: CPT | Mod: S$GLB,,, | Performed by: OBSTETRICS & GYNECOLOGY

## 2020-01-24 PROCEDURE — 99999 PR PBB SHADOW E&M-EST. PATIENT-LVL II: CPT | Mod: PBBFAC,,,

## 2020-01-24 RX ORDER — OXYCODONE AND ACETAMINOPHEN 10; 325 MG/1; MG/1
TABLET ORAL
Status: ON HOLD | COMMUNITY
Start: 2020-01-02 | End: 2020-07-09 | Stop reason: SDUPTHER

## 2020-01-24 NOTE — PROGRESS NOTES
Subjective:       Patient ID: Sandra Dimas is a 44 y.o. female.    Chief Complaint:  Post-op Evaluation      History of Present Illness  HPI  Postoperative Follow-up  Patient presents to the clinic 3 weeks status post Davinci assisted hysterectomy and bilateral oophorectomy for pelvic pain. Requests to have her post operative appointment today instead of next week.   Eating a regular diet without difficulty. Bowel movements are normal. The patient felt her pain was very well controlled since surgery however notes that for the past 1 week she has been having some low back pressure and almost cramping feeling. Denies significant pain but it is uncomfortable. Wonders if it might be related to tapering down the headache pain medications which she has been doing steadily since surgery. Denies burning with urination but describes it as uncomfortable. No vaginal bleeding or discharge.  Taking the estrace and this is alleviating hot flashes and mood swings    GYN & OB History  No LMP recorded (lmp unknown). Patient has had an ablation.   Date of Last Pap: 2016    OB History    Para Term  AB Living   4 3 3   1 3   SAB TAB Ectopic Multiple Live Births                  # Outcome Date GA Lbr Dewayne/2nd Weight Sex Delivery Anes PTL Lv   4 Term      Vag-Spont      3 AB            2 Term      Vag-Spont      1 Term      Vag-Spont          Review of Systems  Review of Systems        Objective:    Physical Exam:   Constitutional: She is oriented to person, place, and time. She appears well-developed and well-nourished. No distress.    HENT:   Head: Atraumatic.              Genitourinary:       Pelvic exam was performed with patient supine. There is no rash or tenderness on the right labia. There is no rash or tenderness on the left labia. Uterus is absent. Right adnexum displays no tenderness. Left adnexum displays no tenderness. No erythema, tenderness or bleeding in the vagina. No vaginal discharge found.  Vaginal cuff normal.Cervix exhibits absence.   Genitourinary Comments: Cuff intact, no granulation tissue           Musculoskeletal: Moves all extremeties.      Lymphadenopathy:        Right: No inguinal adenopathy present.        Left: No inguinal adenopathy present.    Neurological: She is alert and oriented to person, place, and time.    Skin: Skin is warm and dry. She is not diaphoretic.           Problem List Items Addressed This Visit        Renal/    Status post laparoscopic hysterectomy - Primary      Other Visit Diagnoses     Postop check          Reviewed pathology is benign  UA without LE or nitrates  Cuff is intact, healing well.  Likely expected post op healing, reassurance provided. Suggested resuming ibuprofen for another week or so but patient reluctant to take anything as she is successfully weaning off medications for her headaches.    Taking estrace with significant relief of menopausal symptoms  Patient instructed to return if any worsening of symptoms.

## 2020-01-29 DIAGNOSIS — D50.0 IRON DEFICIENCY ANEMIA DUE TO CHRONIC BLOOD LOSS: Primary | ICD-10-CM

## 2020-01-30 ENCOUNTER — LAB VISIT (OUTPATIENT)
Dept: LAB | Facility: HOSPITAL | Age: 45
End: 2020-01-30
Attending: INTERNAL MEDICINE
Payer: COMMERCIAL

## 2020-01-30 DIAGNOSIS — D50.0 IRON DEFICIENCY ANEMIA DUE TO CHRONIC BLOOD LOSS: ICD-10-CM

## 2020-01-30 LAB
BASOPHILS # BLD AUTO: 0.03 K/UL (ref 0–0.2)
BASOPHILS NFR BLD: 0.6 % (ref 0–1.9)
DIFFERENTIAL METHOD: ABNORMAL
EOSINOPHIL # BLD AUTO: 0.1 K/UL (ref 0–0.5)
EOSINOPHIL NFR BLD: 2.4 % (ref 0–8)
ERYTHROCYTE [DISTWIDTH] IN BLOOD BY AUTOMATED COUNT: 12.8 % (ref 11.5–14.5)
HCT VFR BLD AUTO: 37.5 % (ref 37–48.5)
HGB BLD-MCNC: 10.8 G/DL (ref 12–16)
IMM GRANULOCYTES # BLD AUTO: 0.01 K/UL (ref 0–0.04)
IMM GRANULOCYTES NFR BLD AUTO: 0.2 % (ref 0–0.5)
LYMPHOCYTES # BLD AUTO: 2.3 K/UL (ref 1–4.8)
LYMPHOCYTES NFR BLD: 45.8 % (ref 18–48)
MCH RBC QN AUTO: 25.5 PG (ref 27–31)
MCHC RBC AUTO-ENTMCNC: 28.8 G/DL (ref 32–36)
MCV RBC AUTO: 88 FL (ref 82–98)
MONOCYTES # BLD AUTO: 0.3 K/UL (ref 0.3–1)
MONOCYTES NFR BLD: 6.4 % (ref 4–15)
NEUTROPHILS # BLD AUTO: 2.2 K/UL (ref 1.8–7.7)
NEUTROPHILS NFR BLD: 44.6 % (ref 38–73)
NRBC BLD-RTO: 0 /100 WBC
PLATELET # BLD AUTO: 240 K/UL (ref 150–350)
PMV BLD AUTO: 9.7 FL (ref 9.2–12.9)
RBC # BLD AUTO: 4.24 M/UL (ref 4–5.4)
WBC # BLD AUTO: 5 K/UL (ref 3.9–12.7)

## 2020-01-30 PROCEDURE — 85025 COMPLETE CBC W/AUTO DIFF WBC: CPT

## 2020-01-30 PROCEDURE — 36415 COLL VENOUS BLD VENIPUNCTURE: CPT

## 2020-02-03 ENCOUNTER — TELEPHONE (OUTPATIENT)
Dept: PHARMACY | Facility: CLINIC | Age: 45
End: 2020-02-03

## 2020-02-04 RX ORDER — UBROGEPANT 50 MG/1
TABLET ORAL
Qty: 10 TABLET | Refills: 2 | Status: SHIPPED | OUTPATIENT
Start: 2020-02-04 | End: 2020-02-06 | Stop reason: SDUPTHER

## 2020-02-05 ENCOUNTER — HOSPITAL ENCOUNTER (EMERGENCY)
Facility: HOSPITAL | Age: 45
Discharge: HOME | End: 2020-02-06
Attending: EMERGENCY MEDICINE
Payer: COMMERCIAL

## 2020-02-05 DIAGNOSIS — G43.909 MIGRAINE WITHOUT STATUS MIGRAINOSUS, NOT INTRACTABLE, UNSPECIFIED MIGRAINE TYPE: Primary | ICD-10-CM

## 2020-02-05 LAB
BASOPHILS # BLD AUTO: 0.03 K/UL (ref 0–0.2)
BASOPHILS NFR BLD: 0.4 % (ref 0–1.9)
DIFFERENTIAL METHOD: ABNORMAL
EOSINOPHIL # BLD AUTO: 0.1 K/UL (ref 0–0.5)
EOSINOPHIL NFR BLD: 1.2 % (ref 0–8)
ERYTHROCYTE [DISTWIDTH] IN BLOOD BY AUTOMATED COUNT: 12.7 % (ref 11.5–14.5)
HCT VFR BLD AUTO: 33.5 % (ref 37–48.5)
HGB BLD-MCNC: 10.1 G/DL (ref 12–16)
IMM GRANULOCYTES # BLD AUTO: 0.03 K/UL (ref 0–0.04)
IMM GRANULOCYTES NFR BLD AUTO: 0.4 % (ref 0–0.5)
LYMPHOCYTES # BLD AUTO: 2 K/UL (ref 1–4.8)
LYMPHOCYTES NFR BLD: 28.2 % (ref 18–48)
MCH RBC QN AUTO: 25.8 PG (ref 27–31)
MCHC RBC AUTO-ENTMCNC: 30.1 G/DL (ref 32–36)
MCV RBC AUTO: 86 FL (ref 82–98)
MONOCYTES # BLD AUTO: 0.4 K/UL (ref 0.3–1)
MONOCYTES NFR BLD: 5.7 % (ref 4–15)
NEUTROPHILS # BLD AUTO: 4.6 K/UL (ref 1.8–7.7)
NEUTROPHILS NFR BLD: 64.1 % (ref 38–73)
NRBC BLD-RTO: 0 /100 WBC
PLATELET # BLD AUTO: 268 K/UL (ref 150–350)
PMV BLD AUTO: 9.4 FL (ref 9.2–12.9)
RBC # BLD AUTO: 3.92 M/UL (ref 4–5.4)
WBC # BLD AUTO: 7.21 K/UL (ref 3.9–12.7)

## 2020-02-05 PROCEDURE — 63600175 PHARM REV CODE 636 W HCPCS: Performed by: EMERGENCY MEDICINE

## 2020-02-05 PROCEDURE — 96368 THER/DIAG CONCURRENT INF: CPT

## 2020-02-05 PROCEDURE — 36415 COLL VENOUS BLD VENIPUNCTURE: CPT

## 2020-02-05 PROCEDURE — 83735 ASSAY OF MAGNESIUM: CPT

## 2020-02-05 PROCEDURE — 99284 EMERGENCY DEPT VISIT MOD MDM: CPT | Mod: 25

## 2020-02-05 PROCEDURE — 96375 TX/PRO/DX INJ NEW DRUG ADDON: CPT

## 2020-02-05 PROCEDURE — 96361 HYDRATE IV INFUSION ADD-ON: CPT

## 2020-02-05 PROCEDURE — 80053 COMPREHEN METABOLIC PANEL: CPT

## 2020-02-05 PROCEDURE — 85025 COMPLETE CBC W/AUTO DIFF WBC: CPT

## 2020-02-05 PROCEDURE — 96365 THER/PROPH/DIAG IV INF INIT: CPT

## 2020-02-05 RX ORDER — DIHYDROERGOTAMINE MESYLATE 1 MG/ML
1 INJECTION, SOLUTION INTRAMUSCULAR; INTRAVENOUS; SUBCUTANEOUS ONCE
Status: DISCONTINUED | OUTPATIENT
Start: 2020-02-06 | End: 2020-02-05

## 2020-02-05 RX ORDER — DIPHENHYDRAMINE HYDROCHLORIDE 50 MG/ML
12.5 INJECTION INTRAMUSCULAR; INTRAVENOUS
Status: COMPLETED | OUTPATIENT
Start: 2020-02-05 | End: 2020-02-05

## 2020-02-05 RX ORDER — LEVETIRACETAM 5 MG/ML
500 INJECTION INTRAVASCULAR
Status: DISCONTINUED | OUTPATIENT
Start: 2020-02-05 | End: 2020-02-05

## 2020-02-05 RX ORDER — MAGNESIUM SULFATE 1 G/100ML
1 INJECTION INTRAVENOUS
Status: COMPLETED | OUTPATIENT
Start: 2020-02-05 | End: 2020-02-05

## 2020-02-05 RX ORDER — METOCLOPRAMIDE HYDROCHLORIDE 5 MG/ML
10 INJECTION INTRAMUSCULAR; INTRAVENOUS
Status: COMPLETED | OUTPATIENT
Start: 2020-02-05 | End: 2020-02-06

## 2020-02-05 RX ORDER — KETOROLAC TROMETHAMINE 30 MG/ML
30 INJECTION, SOLUTION INTRAMUSCULAR; INTRAVENOUS
Status: COMPLETED | OUTPATIENT
Start: 2020-02-05 | End: 2020-02-05

## 2020-02-05 RX ORDER — KETOROLAC TROMETHAMINE 30 MG/ML
30 INJECTION, SOLUTION INTRAMUSCULAR; INTRAVENOUS
Status: COMPLETED | OUTPATIENT
Start: 2020-02-06 | End: 2020-02-05

## 2020-02-05 RX ADMIN — DIPHENHYDRAMINE HYDROCHLORIDE 12.5 MG: 50 INJECTION, SOLUTION INTRAMUSCULAR; INTRAVENOUS at 11:02

## 2020-02-05 RX ADMIN — KETOROLAC TROMETHAMINE 30 MG: 30 INJECTION, SOLUTION INTRAMUSCULAR at 11:02

## 2020-02-05 RX ADMIN — PROMETHAZINE HYDROCHLORIDE 12.5 MG: 25 INJECTION INTRAMUSCULAR; INTRAVENOUS at 11:02

## 2020-02-05 RX ADMIN — MAGNESIUM SULFATE 1 G: 1 INJECTION INTRAVENOUS at 10:02

## 2020-02-05 RX ADMIN — Medication 1000 ML: at 10:02

## 2020-02-06 VITALS
OXYGEN SATURATION: 97 % | DIASTOLIC BLOOD PRESSURE: 60 MMHG | TEMPERATURE: 99 F | HEART RATE: 67 BPM | RESPIRATION RATE: 18 BRPM | HEIGHT: 69 IN | BODY MASS INDEX: 25.92 KG/M2 | SYSTOLIC BLOOD PRESSURE: 97 MMHG | WEIGHT: 175 LBS

## 2020-02-06 LAB
ALBUMIN SERPL BCP-MCNC: 3.7 G/DL (ref 3.5–5.2)
ALP SERPL-CCNC: 48 U/L (ref 55–135)
ALT SERPL W/O P-5'-P-CCNC: 17 U/L (ref 10–44)
ANION GAP SERPL CALC-SCNC: 11 MMOL/L (ref 8–16)
AST SERPL-CCNC: 18 U/L (ref 10–40)
BILIRUB SERPL-MCNC: 0.2 MG/DL (ref 0.1–1)
BUN SERPL-MCNC: 19 MG/DL (ref 6–20)
CALCIUM SERPL-MCNC: 8.9 MG/DL (ref 8.7–10.5)
CHLORIDE SERPL-SCNC: 107 MMOL/L (ref 95–110)
CO2 SERPL-SCNC: 21 MMOL/L (ref 23–29)
CREAT SERPL-MCNC: 0.7 MG/DL (ref 0.5–1.4)
EST. GFR  (AFRICAN AMERICAN): >60 ML/MIN/1.73 M^2
EST. GFR  (NON AFRICAN AMERICAN): >60 ML/MIN/1.73 M^2
GLUCOSE SERPL-MCNC: 112 MG/DL (ref 70–110)
MAGNESIUM SERPL-MCNC: 2 MG/DL (ref 1.6–2.6)
POTASSIUM SERPL-SCNC: 4 MMOL/L (ref 3.5–5.1)
PROT SERPL-MCNC: 6.5 G/DL (ref 6–8.4)
SODIUM SERPL-SCNC: 139 MMOL/L (ref 136–145)

## 2020-02-06 PROCEDURE — 96376 TX/PRO/DX INJ SAME DRUG ADON: CPT

## 2020-02-06 PROCEDURE — 96367 TX/PROPH/DG ADDL SEQ IV INF: CPT

## 2020-02-06 PROCEDURE — 25000003 PHARM REV CODE 250: Performed by: EMERGENCY MEDICINE

## 2020-02-06 PROCEDURE — 63600175 PHARM REV CODE 636 W HCPCS: Performed by: EMERGENCY MEDICINE

## 2020-02-06 RX ORDER — UBROGEPANT 50 MG/1
TABLET ORAL
Qty: 10 TABLET | Refills: 2 | OUTPATIENT
Start: 2020-02-06 | End: 2020-07-09

## 2020-02-06 RX ORDER — PROMETHAZINE HYDROCHLORIDE 25 MG/1
25 SUPPOSITORY RECTAL EVERY 6 HOURS PRN
Qty: 12 SUPPOSITORY | Refills: 1 | Status: SHIPPED | OUTPATIENT
Start: 2020-02-06 | End: 2020-02-06 | Stop reason: SDUPTHER

## 2020-02-06 RX ORDER — PROMETHAZINE HYDROCHLORIDE 25 MG/1
25 TABLET ORAL EVERY 6 HOURS PRN
Qty: 30 TABLET | Refills: 1 | Status: SHIPPED | OUTPATIENT
Start: 2020-02-06 | End: 2020-02-06 | Stop reason: SDUPTHER

## 2020-02-06 RX ORDER — METOCLOPRAMIDE 10 MG/1
10 TABLET ORAL EVERY 6 HOURS PRN
Qty: 30 TABLET | Refills: 0 | Status: SHIPPED | OUTPATIENT
Start: 2020-02-06 | End: 2020-07-14

## 2020-02-06 RX ORDER — METOCLOPRAMIDE 10 MG/1
10 TABLET ORAL EVERY 6 HOURS PRN
Qty: 30 TABLET | Refills: 0 | Status: SHIPPED | OUTPATIENT
Start: 2020-02-06 | End: 2020-02-06 | Stop reason: SDUPTHER

## 2020-02-06 RX ORDER — PROMETHAZINE HYDROCHLORIDE 25 MG/1
25 SUPPOSITORY RECTAL EVERY 6 HOURS PRN
Qty: 12 SUPPOSITORY | Refills: 1 | Status: SHIPPED | OUTPATIENT
Start: 2020-02-06 | End: 2020-07-09

## 2020-02-06 RX ORDER — PROMETHAZINE HYDROCHLORIDE 25 MG/1
25 TABLET ORAL EVERY 6 HOURS PRN
Qty: 30 TABLET | Refills: 1 | Status: SHIPPED | OUTPATIENT
Start: 2020-02-06 | End: 2021-06-05

## 2020-02-06 RX ADMIN — METOCLOPRAMIDE 10 MG: 5 INJECTION, SOLUTION INTRAMUSCULAR; INTRAVENOUS at 12:02

## 2020-02-06 RX ADMIN — DEXTROSE 1000 MG: 50 INJECTION, SOLUTION INTRAVENOUS at 12:02

## 2020-02-06 RX ADMIN — Medication 1000 ML: at 12:02
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
## 2020-02-08 ENCOUNTER — HOSPITAL ENCOUNTER (EMERGENCY)
Facility: HOSPITAL | Age: 45
Discharge: HOME | End: 2020-02-08
Attending: INTERNAL MEDICINE
Payer: COMMERCIAL

## 2020-02-08 VITALS
HEART RATE: 65 BPM | BODY MASS INDEX: 26.22 KG/M2 | RESPIRATION RATE: 18 BRPM | DIASTOLIC BLOOD PRESSURE: 73 MMHG | WEIGHT: 173 LBS | HEIGHT: 68 IN | OXYGEN SATURATION: 98 % | TEMPERATURE: 98 F | SYSTOLIC BLOOD PRESSURE: 111 MMHG

## 2020-02-08 DIAGNOSIS — G43.111 INTRACTABLE MIGRAINE WITH AURA WITH STATUS MIGRAINOSUS: ICD-10-CM

## 2020-02-08 DIAGNOSIS — R03.0 ELEVATED BLOOD PRESSURE READING: ICD-10-CM

## 2020-02-08 DIAGNOSIS — G43.C1 INTRACTABLE PERIODIC HEADACHE SYNDROME: Primary | ICD-10-CM

## 2020-02-08 PROCEDURE — 99291 CRITICAL CARE FIRST HOUR: CPT | Mod: 25

## 2020-02-08 PROCEDURE — 96375 TX/PRO/DX INJ NEW DRUG ADDON: CPT

## 2020-02-08 PROCEDURE — 96361 HYDRATE IV INFUSION ADD-ON: CPT

## 2020-02-08 PROCEDURE — 94770 HC EXHALED C02 TEST: CPT

## 2020-02-08 PROCEDURE — 63600175 PHARM REV CODE 636 W HCPCS: Performed by: INTERNAL MEDICINE

## 2020-02-08 PROCEDURE — 96365 THER/PROPH/DIAG IV INF INIT: CPT

## 2020-02-08 PROCEDURE — 25000003 PHARM REV CODE 250: Performed by: INTERNAL MEDICINE

## 2020-02-08 PROCEDURE — 63600175 PHARM REV CODE 636 W HCPCS

## 2020-02-08 PROCEDURE — 99900035 HC TECH TIME PER 15 MIN (STAT)

## 2020-02-08 RX ORDER — DIHYDROERGOTAMINE MESYLATE 1 MG/ML
1 INJECTION, SOLUTION INTRAMUSCULAR; INTRAVENOUS; SUBCUTANEOUS ONCE
Status: COMPLETED | OUTPATIENT
Start: 2020-02-08 | End: 2020-02-08

## 2020-02-08 RX ORDER — MIDAZOLAM HYDROCHLORIDE 1 MG/ML
2 INJECTION INTRAMUSCULAR; INTRAVENOUS
Status: COMPLETED | OUTPATIENT
Start: 2020-02-08 | End: 2020-02-08

## 2020-02-08 RX ORDER — METOCLOPRAMIDE HYDROCHLORIDE 5 MG/ML
10 INJECTION INTRAMUSCULAR; INTRAVENOUS
Status: COMPLETED | OUTPATIENT
Start: 2020-02-08 | End: 2020-02-08

## 2020-02-08 RX ORDER — SODIUM CHLORIDE 9 MG/ML
1000 INJECTION, SOLUTION INTRAVENOUS ONCE
Status: DISCONTINUED | OUTPATIENT
Start: 2020-02-08 | End: 2020-02-08

## 2020-02-08 RX ORDER — MIDAZOLAM HYDROCHLORIDE 1 MG/ML
INJECTION INTRAMUSCULAR; INTRAVENOUS
Status: COMPLETED
Start: 2020-02-08 | End: 2020-02-08

## 2020-02-08 RX ORDER — SODIUM CHLORIDE 9 MG/ML
1000 INJECTION, SOLUTION INTRAVENOUS ONCE
Status: COMPLETED | OUTPATIENT
Start: 2020-02-08 | End: 2020-02-08

## 2020-02-08 RX ORDER — DIPHENHYDRAMINE HYDROCHLORIDE 50 MG/ML
25 INJECTION INTRAMUSCULAR; INTRAVENOUS
Status: COMPLETED | OUTPATIENT
Start: 2020-02-08 | End: 2020-02-08

## 2020-02-08 RX ORDER — MIDAZOLAM HYDROCHLORIDE 5 MG/ML
2 INJECTION INTRAMUSCULAR; INTRAVENOUS
Status: DISCONTINUED | OUTPATIENT
Start: 2020-02-08 | End: 2020-02-08

## 2020-02-08 RX ORDER — KETOROLAC TROMETHAMINE 30 MG/ML
60 INJECTION, SOLUTION INTRAMUSCULAR; INTRAVENOUS
Status: COMPLETED | OUTPATIENT
Start: 2020-02-08 | End: 2020-02-08

## 2020-02-08 RX ORDER — KETAMINE HYDROCHLORIDE 10 MG/ML
0.25 INJECTION, SOLUTION INTRAMUSCULAR; INTRAVENOUS
Status: COMPLETED | OUTPATIENT
Start: 2020-02-08 | End: 2020-02-08

## 2020-02-08 RX ORDER — MAGNESIUM SULFATE 1 G/100ML
1 INJECTION INTRAVENOUS
Status: COMPLETED | OUTPATIENT
Start: 2020-02-08 | End: 2020-02-08

## 2020-02-08 RX ORDER — KETAMINE HYDROCHLORIDE 10 MG/ML
0.1 INJECTION, SOLUTION INTRAMUSCULAR; INTRAVENOUS
Status: DISCONTINUED | OUTPATIENT
Start: 2020-02-08 | End: 2020-02-08 | Stop reason: HOSPADM

## 2020-02-08 RX ADMIN — KETAMINE HYDROCHLORIDE 30 MG: 10 INJECTION, SOLUTION INTRAMUSCULAR; INTRAVENOUS at 03:02

## 2020-02-08 RX ADMIN — METOCLOPRAMIDE 10 MG: 5 INJECTION, SOLUTION INTRAMUSCULAR; INTRAVENOUS at 01:02

## 2020-02-08 RX ADMIN — MIDAZOLAM HYDROCHLORIDE 2 MG: 1 INJECTION INTRAMUSCULAR; INTRAVENOUS at 03:02

## 2020-02-08 RX ADMIN — DIHYDROERGOTAMINE MESYLATE 1 MG: 1 INJECTION, SOLUTION INTRAMUSCULAR; INTRAVENOUS; SUBCUTANEOUS at 02:02

## 2020-02-08 RX ADMIN — KETOROLAC TROMETHAMINE 60 MG: 30 INJECTION, SOLUTION INTRAMUSCULAR at 01:02

## 2020-02-08 RX ADMIN — SODIUM CHLORIDE 1000 ML: 0.9 INJECTION, SOLUTION INTRAVENOUS at 03:02

## 2020-02-08 RX ADMIN — DIPHENHYDRAMINE HYDROCHLORIDE 25 MG: 50 INJECTION, SOLUTION INTRAMUSCULAR; INTRAVENOUS at 01:02

## 2020-02-08 RX ADMIN — SODIUM CHLORIDE 1000 ML: 0.9 INJECTION, SOLUTION INTRAVENOUS at 01:02

## 2020-02-08 RX ADMIN — KETAMINE HYDROCHLORIDE 7.9 MG: 10 INJECTION, SOLUTION INTRAMUSCULAR; INTRAVENOUS at 03:02

## 2020-02-08 RX ADMIN — MIDAZOLAM HYDROCHLORIDE 2 MG: 1 INJECTION, SOLUTION INTRAMUSCULAR; INTRAVENOUS at 03:02

## 2020-02-08 RX ADMIN — MAGNESIUM SULFATE 1 G: 1 INJECTION INTRAVENOUS at 01:02

## 2020-02-08 NOTE — ED NOTES
Patient ambulates without difficulty. Speech is clear and coherent. Family at bedside to bring patient home.

## 2020-02-08 NOTE — ED PROVIDER NOTES
SCRIBE #1 NOTE: IJennifer am scribing for, and in the presence of, Salvatore Garsia MD. I have scribed the HPI, ROS, and PEx.     SCRIBE #2 NOTE: I, Amirah Boston, jorden scribing for, and in the presence of,  Jose Turner MD. I have scribed the remaining portions of the note not scribed by Scribe #1.      History     Chief Complaint   Patient presents with    Migraine     The patient has nausea and sensitivity to light persistent for 1 week. Patient reports migraine clusters.     Review of patient's allergies indicates:   Allergen Reactions    Hydrocodone Other (See Comments)     Chest pains. Note: patient tolerated hydromorphone in 2/2019.     Chlorhexidine Rash     Per  after surgery patient had large rash across abdomen where chlorhexidine was applied.    Mastisol adhesive [gum sxkzsx-xhsukz-jcrd-alcohol] Rash         History of Present Illness     HPI    2/8/2020, 1:32 PM  History obtained from the patient      History of Present Illness: Sandra Dimas is a 44 y.o. female patient with a PMHx of migraines and chronic paroxysmal hemicrania who presents to the Emergency Department for evaluation of migraine which onset gradually several days PTA. Pt reports that she is taking at least 6 migraine medications at home without sx relief. Pt was also evaluated at the ED 2 days ago for her sx. Pt's neurologist has sent her with a protocol to follow in care for her migraine. We will follow this plan as closely as possible. Symptoms are constant and moderate in severity. No mitigating or exacerbating factors reported. Associated sxs include nausea and photophobia. Patient denies any n/v/d, abd pain, CP, SOB, dysuria, neck pain/ stiffness, facial droop, visual disturbance, speech difficulty, rash/ wound, cough, localized weakness, and all other sxs at this time. No prior Tx reported. No further complaints or concerns at this time.       Arrival mode: AASI    PCP: OSIRIS Gamez Jr, MD        Past  Medical History:  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    Endometriosis     General anesthetics causing adverse effect in therapeutic use     wakes up with severe anxiety attacks    Hemicrania continua     Migraines        Past Surgical History:  Past Surgical History:   Procedure Laterality Date    APPENDECTOMY      CYST REMOVAL Right 12/30/2019    Procedure: EXCISION, CYST;  Surgeon: NATALI Shah MD;  Location: [facility];  Service: OB/GYN;  Laterality: Right;  Sebaceous cyst    diagnostic laprascopy      ESOPHAGOGASTRODUODENOSCOPY N/A 11/30/2018    Procedure: EGD (ESOPHAGOGASTRODUODENOSCOPY);  Surgeon: Bossman Bustos MD;  Location: [facility];  Service: Endoscopy;  Laterality: N/A;    FULGURATION OF ENDOMETRIOSIS  12/26/2018    Procedure: FULGURATION, ENDOMETRIOSIS;  Surgeon: Zehra Jensen MD;  Location: [facility];  Service: OB/GYN;;  endometriosis implant resection    HYSTEROSCOPY WITH HYDROTHERMAL ABLATION OF ENDOMETRIUM WITH DILATION AND CURETTAGE N/A 12/26/2018    Procedure: HYSTEROSCOPY, WITH DILATION AND CURETTAGE OF UTERUS AND HYDROTHERMAL ENDOMETRIAL ABLATION;  Surgeon: Zehra Jensen MD;  Location: [facility];  Service: OB/GYN;  Laterality: N/A;    LAPAROSCOPIC SALPINGECTOMY Bilateral 12/26/2018    Procedure: SALPINGECTOMY, LAPAROSCOPIC;  Surgeon: Zehra Jensen MD;  Location: [facility];  Service: OB/GYN;  Laterality: Bilateral;    LAPAROTOMY, EXPLORATORY N/A 2/7/2019    Procedure: LAPAROTOMY, EXPLORATORY;  Surgeon: Ramakrishna Boone MD;  Location: [facility];  Service: General;  Laterality: N/A;    ROBOT-ASSISTED LAPAROSCOPIC ABDOMINAL HYSTERECTOMY USING DA MAVIS XI N/A 12/30/2019    Procedure: XI ROBOTIC HYSTERECTOMY;  Surgeon: NATALI Shah MD;  Location: [facility];  Service: OB/GYN;  Laterality: N/A;    ROBOT-ASSISTED LAPAROSCOPIC SURGICAL REMOVAL OF OVARY USING DA MAVIS XI Bilateral 12/30/2019    Procedure: XI ROBOTIC OOPHORECTOMY;   Surgeon: NATALI Shah MD;  Location: [facility];  Service: OB/GYN;  Laterality: Bilateral;    robotic assisted laparoscopy with excision of ovarian endometrioma and chromotubation           Family History:  Family History   Problem Relation Age of Onset    Strabismus Neg Hx     Retinal detachment Neg Hx     Macular degeneration Neg Hx     Glaucoma Neg Hx     Blindness Neg Hx     Amblyopia Neg Hx     Breast cancer Neg Hx     Colon cancer Neg Hx     Ovarian cancer Neg Hx     Thyroid disease Neg Hx     Migraines Neg Hx     Asthma Neg Hx        Social History:  Social History     Tobacco Use    Smoking status: Never Smoker    Smokeless tobacco: Never Used   Substance and Sexual Activity    Alcohol use: Yes     Frequency: Monthly or less     Drinks per session: 1 or 2     Binge frequency: Never     Comment: rarely No alcohol 72h prior to sx    Drug use: No    Sexual activity: Yes     Partners: Male        Review of Systems     Review of Systems   Constitutional: Negative for chills and fever.   HENT: Negative for congestion and sore throat.    Eyes: Positive for photophobia. Negative for visual disturbance.   Respiratory: Negative for cough and shortness of breath.    Cardiovascular: Negative for chest pain and palpitations.   Gastrointestinal: Positive for nausea. Negative for abdominal pain, diarrhea and vomiting.   Genitourinary: Negative for dysuria and hematuria.   Musculoskeletal: Negative for back pain, neck pain and neck stiffness.   Skin: Negative for rash and wound.   Neurological: Positive for headaches. Negative for dizziness, facial asymmetry, speech difficulty, weakness and numbness.        (+) migraine   Hematological: Does not bruise/bleed easily.   Psychiatric/Behavioral: Negative for confusion.   All other systems reviewed and are negative.     Physical Exam     Initial Vitals   BP Pulse Resp Temp SpO2   02/08/20 1300 02/08/20 1300 02/08/20 1300 02/08/20 1502 02/08/20 1300   127/75  (!) 56 18 97.6 °F (36.4 °C) 100 %      MAP       --                 Physical Exam  Nursing Notes and Vital Signs Reviewed.  Constitutional: Patient is in no acute distress. Well-developed and well-nourished.  Head: Atraumatic. Normocephalic.  Eyes: PERRL. EOM intact. Conjunctivae are not pale. No scleral icterus.  ENT: Mucous membranes are moist. Oropharynx is clear and symmetric.    Neck: Supple. Full ROM. No lymphadenopathy.  Cardiovascular: Regular rate. Regular rhythm. No murmurs, rubs, or gallops. Distal pulses are 2+ and symmetric.  Pulmonary/Chest: No respiratory distress. Clear to auscultation bilaterally. No wheezing or rales.  Abdominal: Soft and non-distended.  There is no tenderness.  No rebound, guarding, or rigidity. Good bowel sounds.  Genitourinary: No CVA tenderness  Musculoskeletal: Moves all extremities. No obvious deformities. No edema. No calf tenderness.  Skin: Warm and dry.  Neurological:  Alert, awake, and appropriate.  Normal speech.  No acute focal neurological deficits are appreciated.  Psychiatric: Normal affect. Good eye contact. Appropriate in content.     ED Course   Critical Care  Date/Time: 2/8/2020 2:35 PM  Performed by: Salvatore Garsia MD  Authorized by: Salvatore Garsia MD   Direct patient critical care time: 15 minutes  Additional history critical care time: 5 minutes  Ordering / reviewing critical care time: 5 minutes  Documentation critical care time: 5 minutes  Consulting other physicians critical care time: 5 minutes  Total critical care time (exclusive of procedural time) : 35 minutes  Critical care time was exclusive of separately billable procedures and treating other patients and teaching time.  Critical care was necessary to treat or prevent imminent or life-threatening deterioration of the following conditions: status-migrainaious   Critical care was time spent personally by me on the following activities: blood draw for specimens, development of treatment plan  with patient or surrogate, discussions with consultants, interpretation of cardiac output measurements, evaluation of patient's response to treatment, examination of patient, obtaining history from patient or surrogate, ordering and performing treatments and interventions, ordering and review of laboratory studies, pulse oximetry, ordering and review of radiographic studies, re-evaluation of patient's condition and review of old charts.    Procedural Sedation  Date/Time: 2/8/2020 3:19 PM  Performed by: Salvatore Garsia MD  Authorized by: Salvatore Garsia MD   ASA Class: Class 3 - Systemic Illness with functional impairment.  Mallampati Score: Class 1 - Visualization of the soft palate, fauces, uvula, and anterior/posterior pillars.   NPO STATUS:  Date/Time of last solid: 2/8/2020 6:00 AM  Date/Time of last fluid: 2/8/2020 3:21 PM  Equipment: on cardiac monitor., on BP monitor., on CO2 monitor., on supplemental oxygen., suction available., airway equipment available. and reversal drugs available.   Sedation type: deep sedation  (See MAR for exact dosages of medications).  Analgesia: ketamine  Sedation start date/time: 2/8/2020 3:07 PM  Proc Sedation - Complications: hallucinations requiring versev.         ED Vital Signs:  Vitals:    02/08/20 1516 02/08/20 1520 02/08/20 1526 02/08/20 1530   BP: (!) 178/104 (!) 165/109 (!) 166/104 (!) 167/106   Pulse: 71 62 66 64   Resp: (!) 21 (!) 21 (!) 22 (!) 24   Temp:       TempSrc:       SpO2: 100% 100% 98% (!) 94%   Weight:       Height:        02/08/20 1535 02/08/20 1540 02/08/20 1557 02/08/20 1601   BP: (!) 164/101 133/75 133/85 135/79   Pulse: 65 61 65 64   Resp: (!) 22 20 19 19   Temp:       TempSrc:       SpO2: (!) 93% 98% (!) 93% 97%   Weight:       Height:        02/08/20 1606 02/08/20 1615 02/08/20 1620 02/08/20 1641   BP: (!) 143/82 138/85 130/77 121/83   Pulse: 63 64 63 (!) 58   Resp: (!) 22 (!) 21 19 20   Temp:       TempSrc:       SpO2: 98% 97% 98% 95%    Weight:       Height:        02/08/20 1646 02/08/20 1648 02/08/20 1713   BP: 129/88  111/73   Pulse: 62 (!) 59 65   Resp:  18 18   Temp:   97.5 °F (36.4 °C)   TempSrc:   Axillary   SpO2: 97% 99% 98%   Weight:      Height:          Abnormal Lab Results:  Labs Reviewed - No data to display     All Lab Results:  None.      Imaging Results:  Imaging Results    None                     The Emergency Provider reviewed the vital signs and test results, which are outlined above.     ED Discussion     2:36 PM: Let it be noted that the pt was put under deep sedation as a last resort measure to put her in remission from her status-migraine.     4:01 PM: Dr. Garsia transfers care of pt to Dr. Turner pending lab/ radiology results.    4:43 PM: Re-evaluated pt. D/w pt/ spouse any concerns expressed at this time. Answered all questions. Pt/ spouse expresses understanding at this time.    5:31 PM: Reassessed pt at this time.  Pt states her condition has improved at this time following Ketamine. Discussed with pt all pertinent ED information. Discussed pt dx and plan of tx. Gave pt all f/u and return to the ED instructions. All questions and concerns were addressed at this time. Pt expresses understanding of information and instructions, and is comfortable with plan to discharge. Pt is stable for discharge.    Patient's headache is either consistent with previous headache and/or lacks features concerning for emergent or life threatening condition.  I do not suspect SAH, meningitis, increased IC pressure, infectious, toxic, vascular, CNS, or other EMC.  I have discussed this at length with patient and/or family/caretaker.    I discussed with patient and/or family/caretaker that evaluation in the ED does not suggest any emergent or life threatening medical conditions requiring immediate intervention beyond what was provided in the ED, and I believe patient is safe for discharge.  Regardless, an unremarkable evaluation in the ED does  not preclude the development or presence of a serious of life threatening condition. As such, patient was instructed to return immediately for any worsening or change in current symptoms.           Medical Decision Makin-year-old female who presented with status migrainous; she was initially evaluated and treated by Dr. Garsia; Dr. Garsia treated patietn in consultation with Neurology; treatment involved ketamine for refractory status migrainous; care was handed off to me with plan to administer more 2 more doses of ketamine per neurology migraine protocol, however patient reported that her symptoms had resolved and politely declined more treatments; patient was observed here in the emergency department until ketamine/Versed were off and patient was stable for discharge; advised follow-up with Neurology; ER return precautions            ED Medication(s):  Medications   ketamine injection 7.9 mg (7.9 mg Intravenous Not Given 20 173)   sodium chloride 0.9% bolus 1,000 mL (0 mLs Intravenous Stopped 20 1507)   metoclopramide HCl injection 10 mg (10 mg Intravenous Given 20 1332)   diphenhydrAMINE injection 25 mg (25 mg Intravenous Given 20 1332)   magnesium sulfate in dextrose IVPB (premix) 1 g (0 g Intravenous Stopped 20 1436)   ketorolac injection 60 mg (60 mg Intravenous Given 20 1331)   dihydroergotamine injection 1 mg (1 mg Intravenous Given 20 1423)   ketamine injection 19.6 mg (30 mg Intravenous Given by Other 20 1506)   0.9%  NaCl infusion (0 mLs Intravenous Stopped 20 1731)   midazolam (VERSED) 1 mg/mL injection 2 mg (2 mg Intravenous Given 20 1515)   midazolam (VERSED) 1 mg/mL injection 2 mg (2 mg Intravenous Given 20 1523)       New Prescriptions    No medications on file       Follow-up Information     Schedule an appointment as soon as possible for a visit  with Follow-up with your neurologist.           [facility]    Specialty:  Emergency  Medicine  Why:  As needed, If symptoms worsen  Contact information:  [address]  [facility] [address] [phone]                     Scribe Attestation:   Scribe #1: I performed the above scribed service and the documentation accurately describes the services I performed. I attest to the accuracy of the note.     Attending:   Physician Attestation Statement for Scribe #1: I, Salvatore Garsia MD, personally performed the services described in this documentation, as scribed by Jennifer May, in my presence, and it is both accurate and complete.       Scribe Attestation:   Scribe #2: I performed the above scribed service and the documentation accurately describes the services I performed. I attest to the accuracy of the note.    Attending Attestation:           Physician Attestation for Scribe:    Physician Attestation Statement for Scribe #2: I, Jose Turner MD, reviewed documentation, as scribed by Amirah Boston in my presence, and it is both accurate and complete. I also acknowledge and confirm the content of the note done by Scribe #1.           Clinical Impression       ICD-10-CM ICD-9-CM   1. Intractable periodic headache syndrome G43.C1 346.21   2. Intractable migraine with aura with status migrainosus G43.111 346.03   3. Elevated blood pressure reading R03.0 796.2       Disposition:   Disposition: Discharged  Condition: Stable         Jose Turner MD  02/08/20 1926

## 2020-02-08 NOTE — ED NOTES
Lawanda, DELFINA assisting patient to restroom via wheelchair. Dr. Dimas assisting. Patient states zero pain.

## 2020-02-12 ENCOUNTER — TELEPHONE (OUTPATIENT)
Dept: OBSTETRICS AND GYNECOLOGY | Facility: CLINIC | Age: 45
End: 2020-02-12

## 2020-02-12 DIAGNOSIS — E89.41 SURGICAL MENOPAUSE, SYMPTOMATIC: Primary | ICD-10-CM

## 2020-02-12 RX ORDER — ESTRADIOL 0.07 MG/D
1 FILM, EXTENDED RELEASE TRANSDERMAL
Qty: 8 PATCH | Refills: 11 | Status: SHIPPED | OUTPATIENT
Start: 2020-02-13 | End: 2021-05-06 | Stop reason: SDUPTHER

## 2020-02-20 DIAGNOSIS — Z12.39 BREAST CANCER SCREENING: ICD-10-CM

## 2020-03-03 ENCOUNTER — TELEPHONE (OUTPATIENT)
Dept: PHARMACY | Facility: CLINIC | Age: 45
End: 2020-03-03

## 2020-03-03 DIAGNOSIS — D50.9 IRON DEFICIENCY ANEMIA, UNSPECIFIED IRON DEFICIENCY ANEMIA TYPE: Primary | ICD-10-CM

## 2020-03-05 ENCOUNTER — LAB VISIT (OUTPATIENT)
Dept: LAB | Facility: HOSPITAL | Age: 45
End: 2020-03-05
Attending: INTERNAL MEDICINE
Payer: COMMERCIAL

## 2020-03-05 DIAGNOSIS — D50.9 IRON DEFICIENCY ANEMIA, UNSPECIFIED IRON DEFICIENCY ANEMIA TYPE: ICD-10-CM

## 2020-03-05 LAB
BASOPHILS # BLD AUTO: 0.02 K/UL (ref 0–0.2)
BASOPHILS NFR BLD: 0.4 % (ref 0–1.9)
DIFFERENTIAL METHOD: ABNORMAL
EOSINOPHIL # BLD AUTO: 0.1 K/UL (ref 0–0.5)
EOSINOPHIL NFR BLD: 1.3 % (ref 0–8)
ERYTHROCYTE [DISTWIDTH] IN BLOOD BY AUTOMATED COUNT: 12.1 % (ref 11.5–14.5)
FERRITIN SERPL-MCNC: 249 NG/ML (ref 20–300)
HCT VFR BLD AUTO: 43.1 % (ref 37–48.5)
HGB BLD-MCNC: 12.8 G/DL (ref 12–16)
IMM GRANULOCYTES # BLD AUTO: 0 K/UL (ref 0–0.04)
IMM GRANULOCYTES NFR BLD AUTO: 0 % (ref 0–0.5)
IRON SERPL-MCNC: 77 UG/DL (ref 30–160)
LYMPHOCYTES # BLD AUTO: 2.3 K/UL (ref 1–4.8)
LYMPHOCYTES NFR BLD: 43.1 % (ref 18–48)
MCH RBC QN AUTO: 25.2 PG (ref 27–31)
MCHC RBC AUTO-ENTMCNC: 29.7 G/DL (ref 32–36)
MCV RBC AUTO: 85 FL (ref 82–98)
MONOCYTES # BLD AUTO: 0.4 K/UL (ref 0.3–1)
MONOCYTES NFR BLD: 7 % (ref 4–15)
NEUTROPHILS # BLD AUTO: 2.6 K/UL (ref 1.8–7.7)
NEUTROPHILS NFR BLD: 48.2 % (ref 38–73)
NRBC BLD-RTO: 0 /100 WBC
PLATELET # BLD AUTO: 284 K/UL (ref 150–350)
PMV BLD AUTO: 10 FL (ref 9.2–12.9)
RBC # BLD AUTO: 5.07 M/UL (ref 4–5.4)
SATURATED IRON: 19 % (ref 20–50)
TOTAL IRON BINDING CAPACITY: 416 UG/DL (ref 250–450)
TRANSFERRIN SERPL-MCNC: 281 MG/DL (ref 200–375)
WBC # BLD AUTO: 5.31 K/UL (ref 3.9–12.7)

## 2020-03-05 PROCEDURE — 82728 ASSAY OF FERRITIN: CPT

## 2020-03-05 PROCEDURE — 83540 ASSAY OF IRON: CPT

## 2020-03-05 PROCEDURE — 36415 COLL VENOUS BLD VENIPUNCTURE: CPT

## 2020-03-05 PROCEDURE — 85025 COMPLETE CBC W/AUTO DIFF WBC: CPT

## 2020-03-06 ENCOUNTER — TELEPHONE (OUTPATIENT)
Dept: NEUROLOGY | Facility: CLINIC | Age: 45
End: 2020-03-06

## 2020-03-06 ENCOUNTER — HOSPITAL ENCOUNTER (EMERGENCY)
Facility: HOSPITAL | Age: 45
Discharge: HOME | End: 2020-03-06
Attending: FAMILY MEDICINE
Payer: COMMERCIAL

## 2020-03-06 VITALS
WEIGHT: 170 LBS | OXYGEN SATURATION: 98 % | TEMPERATURE: 99 F | DIASTOLIC BLOOD PRESSURE: 69 MMHG | RESPIRATION RATE: 16 BRPM | SYSTOLIC BLOOD PRESSURE: 112 MMHG | HEART RATE: 71 BPM | BODY MASS INDEX: 25.85 KG/M2

## 2020-03-06 DIAGNOSIS — G43.801 OTHER MIGRAINE WITH STATUS MIGRAINOSUS, NOT INTRACTABLE: Primary | ICD-10-CM

## 2020-03-06 PROCEDURE — 25500020 PHARM REV CODE 255: Performed by: FAMILY MEDICINE

## 2020-03-06 PROCEDURE — A9585 GADOBUTROL INJECTION: HCPCS | Performed by: FAMILY MEDICINE

## 2020-03-06 PROCEDURE — 63600175 PHARM REV CODE 636 W HCPCS: Performed by: FAMILY MEDICINE

## 2020-03-06 PROCEDURE — 99291 CRITICAL CARE FIRST HOUR: CPT | Mod: 25

## 2020-03-06 PROCEDURE — 96365 THER/PROPH/DIAG IV INF INIT: CPT

## 2020-03-06 PROCEDURE — 96361 HYDRATE IV INFUSION ADD-ON: CPT

## 2020-03-06 PROCEDURE — 96367 TX/PROPH/DG ADDL SEQ IV INF: CPT

## 2020-03-06 PROCEDURE — 96375 TX/PRO/DX INJ NEW DRUG ADDON: CPT

## 2020-03-06 PROCEDURE — 96366 THER/PROPH/DIAG IV INF ADDON: CPT

## 2020-03-06 RX ORDER — LIDOCAINE HYDROCHLORIDE ANHYDROUS AND DEXTROSE MONOHYDRATE .8; 5 G/100ML; G/100ML
3 INJECTION, SOLUTION INTRAVENOUS CONTINUOUS
Status: DISCONTINUED | OUTPATIENT
Start: 2020-03-06 | End: 2020-03-06

## 2020-03-06 RX ORDER — PROCHLORPERAZINE EDISYLATE 5 MG/ML
10 INJECTION INTRAMUSCULAR; INTRAVENOUS
Status: COMPLETED | OUTPATIENT
Start: 2020-03-06 | End: 2020-03-06

## 2020-03-06 RX ORDER — MAGNESIUM SULFATE 1 G/100ML
1 INJECTION INTRAVENOUS
Status: DISCONTINUED | OUTPATIENT
Start: 2020-03-06 | End: 2020-03-06

## 2020-03-06 RX ORDER — LIDOCAINE HYDROCHLORIDE ANHYDROUS AND DEXTROSE MONOHYDRATE .8; 5 G/100ML; G/100ML
2 INJECTION, SOLUTION INTRAVENOUS CONTINUOUS
Status: DISCONTINUED | OUTPATIENT
Start: 2020-03-06 | End: 2020-03-06

## 2020-03-06 RX ORDER — MIDAZOLAM HYDROCHLORIDE 1 MG/ML
2 INJECTION INTRAMUSCULAR; INTRAVENOUS
Status: COMPLETED | OUTPATIENT
Start: 2020-03-06 | End: 2020-03-06

## 2020-03-06 RX ORDER — MAGNESIUM SULFATE HEPTAHYDRATE 40 MG/ML
2 INJECTION, SOLUTION INTRAVENOUS
Status: COMPLETED | OUTPATIENT
Start: 2020-03-06 | End: 2020-03-06

## 2020-03-06 RX ORDER — PROCHLORPERAZINE MALEATE 10 MG
10 TABLET ORAL 4 TIMES DAILY PRN
Qty: 20 TABLET | Refills: 0 | Status: SHIPPED | OUTPATIENT
Start: 2020-03-06 | End: 2020-03-11

## 2020-03-06 RX ORDER — DIPHENHYDRAMINE HYDROCHLORIDE 50 MG/ML
25 INJECTION INTRAMUSCULAR; INTRAVENOUS
Status: COMPLETED | OUTPATIENT
Start: 2020-03-06 | End: 2020-03-06

## 2020-03-06 RX ORDER — LIDOCAINE HYDROCHLORIDE ANHYDROUS AND DEXTROSE MONOHYDRATE .8; 5 G/100ML; G/100ML
0.5 INJECTION, SOLUTION INTRAVENOUS ONCE AS NEEDED
Status: COMPLETED | OUTPATIENT
Start: 2020-03-06 | End: 2020-03-06

## 2020-03-06 RX ORDER — LIDOCAINE HYDROCHLORIDE ANHYDROUS AND DEXTROSE MONOHYDRATE .8; 5 G/100ML; G/100ML
0.03 INJECTION, SOLUTION INTRAVENOUS ONCE
Status: COMPLETED | OUTPATIENT
Start: 2020-03-06 | End: 2020-03-06

## 2020-03-06 RX ORDER — MIDAZOLAM HYDROCHLORIDE 5 MG/ML
2 INJECTION INTRAMUSCULAR; INTRAVENOUS
Status: DISCONTINUED | OUTPATIENT
Start: 2020-03-06 | End: 2020-03-06 | Stop reason: SDUPTHER

## 2020-03-06 RX ORDER — KETOROLAC TROMETHAMINE 30 MG/ML
30 INJECTION, SOLUTION INTRAMUSCULAR; INTRAVENOUS
Status: COMPLETED | OUTPATIENT
Start: 2020-03-06 | End: 2020-03-06

## 2020-03-06 RX ORDER — GADOBUTROL 604.72 MG/ML
10 INJECTION INTRAVENOUS
Status: COMPLETED | OUTPATIENT
Start: 2020-03-06 | End: 2020-03-06

## 2020-03-06 RX ORDER — MIDAZOLAM HYDROCHLORIDE 1 MG/ML
1 INJECTION INTRAMUSCULAR; INTRAVENOUS ONCE
Status: COMPLETED | OUTPATIENT
Start: 2020-03-06 | End: 2020-03-06

## 2020-03-06 RX ADMIN — LIDOCAINE HYDROCHLORIDE 0.03 MG/KG/MIN: 8 INJECTION, SOLUTION INTRAVENOUS at 12:03

## 2020-03-06 RX ADMIN — SODIUM CHLORIDE 1000 ML: 0.9 INJECTION, SOLUTION INTRAVENOUS at 10:03

## 2020-03-06 RX ADMIN — MIDAZOLAM HYDROCHLORIDE 1 MG: 1 INJECTION, SOLUTION INTRAMUSCULAR; INTRAVENOUS at 12:03

## 2020-03-06 RX ADMIN — KETOROLAC TROMETHAMINE 30 MG: 30 INJECTION, SOLUTION INTRAMUSCULAR; INTRAVENOUS at 04:03

## 2020-03-06 RX ADMIN — GADOBUTROL 7 ML: 604.72 INJECTION INTRAVENOUS at 12:03

## 2020-03-06 RX ADMIN — SODIUM CHLORIDE 1000 ML: 0.9 INJECTION, SOLUTION INTRAVENOUS at 11:03

## 2020-03-06 RX ADMIN — MIDAZOLAM HYDROCHLORIDE 2 MG: 1 INJECTION, SOLUTION INTRAMUSCULAR; INTRAVENOUS at 10:03

## 2020-03-06 RX ADMIN — LIDOCAINE HYDROCHLORIDE 0.5 MG/KG/MIN: 8 INJECTION, SOLUTION INTRAVENOUS at 03:03

## 2020-03-06 RX ADMIN — DIPHENHYDRAMINE HYDROCHLORIDE 25 MG: 50 INJECTION, SOLUTION INTRAMUSCULAR; INTRAVENOUS at 04:03

## 2020-03-06 RX ADMIN — KETOROLAC TROMETHAMINE 30 MG: 30 INJECTION, SOLUTION INTRAMUSCULAR; INTRAVENOUS at 10:03

## 2020-03-06 RX ADMIN — PROCHLORPERAZINE EDISYLATE 10 MG: 5 INJECTION INTRAMUSCULAR; INTRAVENOUS at 10:03

## 2020-03-06 RX ADMIN — DIPHENHYDRAMINE HYDROCHLORIDE 25 MG: 50 INJECTION, SOLUTION INTRAMUSCULAR; INTRAVENOUS at 10:03

## 2020-03-06 RX ADMIN — MAGNESIUM SULFATE 2 G: 2 INJECTION INTRAVENOUS at 10:03

## 2020-03-06 RX ADMIN — PROCHLORPERAZINE EDISYLATE 10 MG: 5 INJECTION INTRAMUSCULAR; INTRAVENOUS at 04:03

## 2020-03-06 NOTE — ED PROVIDER NOTES
SCRIBE #1 NOTE: I, Amirah Boston, am scribing for, and in the presence of, Lucila Wayne MD. I have scribed the entire note.       History     Chief Complaint   Patient presents with    Migraine     Review of patient's allergies indicates:   Allergen Reactions    Hydrocodone Other (See Comments)     Chest pains. Note: patient tolerated hydromorphone in 2/2019.     Chlorhexidine Rash     Per  after surgery patient had large rash across abdomen where chlorhexidine was applied.    Mastisol adhesive [gum uwhjbx-soeuiz-prbj-alcohol] Rash         History of Present Illness     HPI    3/6/2020, 10:12 AM  History obtained from the  (Dr. Dimas, Cardiology)  Complete hx unobtainable due to pt's acute distress.       History of Present Illness: Sandra Dimas is a 44 y.o. female patient with a PMHx of migraines, endometriosis, hemicrania continua, chronic paroxysmal hemicrania, and s/p APPY who presents to the Emergency Department for evaluation of a worsening severe migraine headache which onset suddenly this morning.  Historian at bedside in .  Reports headache is severe and constant located throughout the head and retro-orbital.  States that she has had severe headaches like this in the past and has had this since she was 19 years old.  Symptoms are constant. Pt was evaluated at the ED twice last month for her severe headaches and treated for her pain. Pt is followed by Dr. Mcclure, Neurology, on an outpatient basis. Spouse reports that the pt's headaches have been unrelieved by traditional home medications. HA is exacerbated by light and noise. Past migraine treatment includes Percocet, Toradol, Lamictal, Emgality, Versed, Ketamine, and Ubrelvy.  Denies any loss of consciousness or recent he a he eat a high her head were monitor injury.  Upon last ED visit, patient's headache was treated with ketamine/conscious sedation.    Arrival mode: Personal vehicle     PCP: OSIRIS Gamez Jr, MD         Past Medical History:  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    Endometriosis     General anesthetics causing adverse effect in therapeutic use     wakes up with severe anxiety attacks    Hemicrania continua     Migraines        Past Surgical History:  Past Surgical History:   Procedure Laterality Date    APPENDECTOMY      CYST REMOVAL Right 12/30/2019    Procedure: EXCISION, CYST;  Surgeon: NATALI Shah MD;  Location: [facility] OR;  Service: OB/GYN;  Laterality: Right;  Sebaceous cyst    diagnostic laprascopy      ESOPHAGOGASTRODUODENOSCOPY N/A 11/30/2018    Procedure: EGD (ESOPHAGOGASTRODUODENOSCOPY);  Surgeon: Bossman Bustos MD;  Location: [facility];  Service: Endoscopy;  Laterality: N/A;    FULGURATION OF ENDOMETRIOSIS  12/26/2018    Procedure: FULGURATION, ENDOMETRIOSIS;  Surgeon: Zehra Jensen MD;  Location: [facility] OR;  Service: OB/GYN;;  endometriosis implant resection    HYSTEROSCOPY WITH HYDROTHERMAL ABLATION OF ENDOMETRIUM WITH DILATION AND CURETTAGE N/A 12/26/2018    Procedure: HYSTEROSCOPY, WITH DILATION AND CURETTAGE OF UTERUS AND HYDROTHERMAL ENDOMETRIAL ABLATION;  Surgeon: Zehra Jensen MD;  Location: [facility] OR;  Service: OB/GYN;  Laterality: N/A;    LAPAROSCOPIC SALPINGECTOMY Bilateral 12/26/2018    Procedure: SALPINGECTOMY, LAPAROSCOPIC;  Surgeon: Zehra Jensen MD;  Location: [facility] OR;  Service: OB/GYN;  Laterality: Bilateral;    LAPAROTOMY, EXPLORATORY N/A 2/7/2019    Procedure: LAPAROTOMY, EXPLORATORY;  Surgeon: Ramakrishna Boone MD;  Location: [facility] OR;  Service: General;  Laterality: N/A;    ROBOT-ASSISTED LAPAROSCOPIC ABDOMINAL HYSTERECTOMY USING DA MAVIS XI N/A 12/30/2019    Procedure: XI ROBOTIC HYSTERECTOMY;  Surgeon: NATALI Shah MD;  Location: [facility] OR;  Service: OB/GYN;  Laterality: N/A;    ROBOT-ASSISTED LAPAROSCOPIC SURGICAL REMOVAL OF OVARY USING DA MAVIS XI Bilateral 12/30/2019    Procedure: XI ROBOTIC  OOPHORECTOMY;  Surgeon: NATALI Shah MD;  Location: [facility];  Service: OB/GYN;  Laterality: Bilateral;    robotic assisted laparoscopy with excision of ovarian endometrioma and chromotubation           Family History:  Family History   Problem Relation Age of Onset    Strabismus Neg Hx     Retinal detachment Neg Hx     Macular degeneration Neg Hx     Glaucoma Neg Hx     Blindness Neg Hx     Amblyopia Neg Hx     Breast cancer Neg Hx     Colon cancer Neg Hx     Ovarian cancer Neg Hx     Thyroid disease Neg Hx     Migraines Neg Hx     Asthma Neg Hx        Social History:  Social History     Tobacco Use    Smoking status: Never Smoker    Smokeless tobacco: Never Used   Substance and Sexual Activity    Alcohol use: Yes     Frequency: Monthly or less     Drinks per session: 1 or 2     Binge frequency: Never     Comment: rarely No alcohol 72h prior to sx    Drug use: No    Sexual activity: Yes     Partners: Male        Review of Systems     Review of Systems   Unable to perform ROS: Acuity of condition (Severe distress)      Physical Exam     Initial Vitals [03/06/20 1013]   BP Pulse Resp Temp SpO2   114/73 71 (!) 22 98 °F (36.7 °C) 100 %      MAP       --          Physical Exam  Nursing Notes and Vital Signs Reviewed.   Constitutional: Patient is in acute distress. Pt is tearing. Due to acute distress unable to assess further.   Physical exam is limited due to the patient's condition.      ED Course   Critical Care  Date/Time: 3/6/2020 1:16 PM  Performed by: Lucila Wayne MD  Authorized by: Lucila Wayne MD   Direct patient critical care time: 15 minutes  Additional history critical care time: 5 minutes  Ordering / reviewing critical care time: 5 minutes  Documentation critical care time: 5 minutes  Consulting other physicians critical care time: 15 minutes  Total critical care time (exclusive of procedural time) : 45 minutes  Critical care time was exclusive of separately billable procedures  and treating other patients and teaching time.  Critical care was necessary to treat or prevent imminent or life-threatening deterioration of the following conditions: CNS failure or compromise (status migrainosus).  Critical care was time spent personally by me on the following activities: development of treatment plan with patient or surrogate, discussions with consultants, evaluation of patient's response to treatment, examination of patient, obtaining history from patient or surrogate, ordering and performing treatments and interventions, ordering and review of radiographic studies, re-evaluation of patient's condition and review of old charts.        ED Vital Signs:  Vitals:    03/06/20 1120 03/06/20 1121 03/06/20 1132 03/06/20 1153   BP:  96/66 (!) 87/54 100/69   Pulse:   60 63   Resp:   15 16   Temp:       SpO2: 95%  (!) 93% 95%   Weight:        03/06/20 1202 03/06/20 1317 03/06/20 1331 03/06/20 1403   BP: 102/70 111/70 101/63 113/72   Pulse: 65 71 67 69   Resp: (!) 25  (!) 31 (!) 24   Temp:       SpO2: 96% 99% 97% 99%   Weight:        03/06/20 1540 03/06/20 1547 03/06/20 1610 03/06/20 1634   BP: 136/86 120/74 113/78 125/79   Pulse: 89 74 66 69   Resp: 20 19 (!) 30 17   Temp: 98.5 °F (36.9 °C)      SpO2: 97% 97% 96% 97%   Weight:        03/06/20 1702 03/06/20 1731 03/06/20 1802   BP: 101/64 102/65 112/69   Pulse: 64 61 71   Resp: 16 15 16   Temp:      SpO2: 97% 97% 98%   Weight:          Abnormal Lab Results:  Labs Reviewed - No data to display     All Lab Results:  None.     Imaging Results:  Imaging Results          MRI Brain W WO Contrast (Final result)  Result time 03/06/20 13:20:36    Final result by Jose David MD (03/06/20 13:20:36)                 Impression:      MRI of the brain is within normal limits.      Electronically signed by: Jose David MD  Date:    03/06/2020  Time:    13:20             Narrative:    EXAMINATION:  MRI BRAIN W WO CONTRAST    CLINICAL HISTORY:  Headache, acute, severe,  "thunderclap, worst HA of life;.    CONTRAST:  Seven ML of Gadavist    TECHNIQUE:  Multiplanar multisequence MR imaging of the brain was performed without contrast.    COMPARISON:  None    FINDINGS:  No evidence of signal abnormality in the brain.  No diffusion weighted sequence abnormality. No mass lesion.    Ventricles and sulci are normal in size for age without evidence of hydrocephalus. No evidence of intra or extra-axial hemorrhage.    Normal vascular flow voids are preserved.    Sinuses are clear.    No evidence of abnormal enhancement post contrast material.    Bone marrow signal intensity is normal.                                        The Emergency Provider reviewed the vital signs and test results, which are outlined above.     ED Discussion     10:23 AM: Tried to call Dr. Mcclure pt's neurologist, but had no response.  At this time I have reviewed previous ED visit notes and medications that were used.  I will administer 1 L normal saline bolus, magnesium, Compazine, Toradol, Benadryl and will continue to contact Dr. Mcclure, neurology for further assistance.    10:49 AM: Nurse called [facility] office directly in order to reach Dr. Mcclure, with no success. Sent secure chat to Dr. Mcclure. Will contact neurology on call.     11:02 AM: Discussed pt's case with Dr. Barbi Mcclure MD, (Neurology) recommends IV lidocaine and an MRI since the pt has not had an MRI in 5 years.    Per Dr. Mcclure's response:    "Initial dose of 3 mg/ kg lidocaine (in normal saline) infused over 90 minutes.    Additional 1.5-2 mg/kg lidocaine to be infuse over 1 hour if there were no side effects and if the Migraine reduction was less than 50%."    11:27 AM: Discussed pt's case with Charley Fountain PharmD, (Pharmacist) who states that the pharmacy has 8mg/mL premixed bags in D5W instead of NS. Let Gucci BrewsterD, know that it must be NS and not D5W and asked if she could mix 231 mg of Lidocaine in a bag of NS. Charley Fountain, " PharmD, states that she can mix it but it must be run on basic in the Alaris pump. Alaris pump settings can be used if it is a premixed bag.     11:35 PM: Discussed pt's case with Dr. Mcclure (pt's Neurologist) who states that the premixed bag of D5W will be just fine. States that she was trained on the NS mix and NS mix is a matter of habit she guesses.    3:15 PM: Re-evaluated pt. Pt is still having a headache. Pain is a 10/10 and located on the right side however has improved in the other regions.  Was able to conduct a neuro exam and patient does not elicit any no focal neurologic deficits and cranial nerves are grossly intact.  Will contact pharmacy for additional dose of lidocaine. Ordered lidocaine and will reevaluate pt's pain.     3:18 PM: Let Dr. Mcclure know that pt is still in pain following initial dose of lidocaine and will try second dose. Asked for any outpatient recommendations for discharge.     3:19 PM: Discussed pt's case with Dr. Mcclure who recommends admission for repetitive IV chlorpromazine protocal. She could get first dose in ED. States she normally gives a bolus of  mL, but pt may not need it if wwll hydrated. Reports issue with IV Chlorpromazine is hypotension, hense bolus if needed. States that she then schedules 12.5 mg IV [id number] and if well tolerated but no relief then she increases to 25 mg IV q6 hours. States she keeps going for 48-72 hours. Uses Toradol/ Benadryl/ Compazine for rescue in between.     3:39 PM: Re-evaluated pt. Pt is now c/o numbness to BUE and face.  However states overall on the side the pain is 2/10.  Patient also reports numbness and tingling in the fingers.  Patient may also have exacerbated symptoms due to anxiety.  Shit IS advice if she was complaining of right patient passed a having chest pain like her he a  Discussed with pt and family treatment plan and results. Stopping lidocaine at this time.     3:52 PM: Re-evaluated pt. Pt is resting comfortably  and is in no acute distress.  Pt states her pain has completely resolved. Re-contacted Dr. Mcclure for outpatient recommendations. Awaiting response.     4:20 PM: Re-evaluated pt. Pt is now c/o some pain along the right jaw. Pain is rated a 2/10. Will give Toradol/ Benadryl/ Compazine and reassess pt's pain.    5:59 PM: Reassessed pt at this time.  Pt states her condition has improved at this time. Pain is rated a 0 at this time. Discussed with pt all pertinent ED information and results. Discussed pt dx and plan of tx. Gave pt all f/u and return to the ED instructions. All questions and concerns were addressed at this time. Pt expresses understanding of information and instructions, and is comfortable with plan to discharge. Pt is stable for discharge.    Patient's headache is either consistent with previous headache and/or lacks features concerning for emergent or life threatening condition.  I do not suspect SAH, meningitis, increased IC pressure, infectious, toxic, vascular, CNS, or other EMC.  I have discussed this at length with patient and/or family/caretaker.    I discussed with patient and/or family/caretaker that evaluation in the ED does not suggest any emergent or life threatening medical conditions requiring immediate intervention beyond what was provided in the ED, and I believe patient is safe for discharge.  Regardless, an unremarkable evaluation in the ED does not preclude the development or presence of a serious of life threatening condition. As such, patient was instructed to return immediately for any worsening or change in current symptoms.         Medical Decision Making:   Clinical Tests:   Radiological Study: Ordered and Reviewed           ED Medication(s):  Medications   sodium chloride 0.9% bolus 1,000 mL (0 mLs Intravenous Stopped 3/6/20 1059)   diphenhydrAMINE injection 25 mg (25 mg Intravenous Given 3/6/20 1026)   ketorolac injection 30 mg (30 mg Intravenous Given 3/6/20 1026)    prochlorperazine injection Soln 10 mg (10 mg Intravenous Given 3/6/20 1026)   midazolam (VERSED) 1 mg/mL injection 2 mg (2 mg Intravenous Given 3/6/20 1027)   magnesium sulfate 2g in water 50mL IVPB (premix) (0 g Intravenous Stopped 3/6/20 1259)   sodium chloride 0.9% bolus 1,000 mL (0 mLs Intravenous Stopped 3/6/20 1300)   lidocaine 2000 mg in dextrose 5% 250 mL infusion (0 mg/kg/min × 77.1 kg Intravenous Stopped 3/6/20 1339)   lidocaine 2000 mg in dextrose 5% 250 mL infusion (0 mg/kg/min × 77.1 kg Intravenous Stopped 3/6/20 1600)   midazolam (VERSED) 1 mg/mL injection 1 mg (1 mg Intravenous Given 3/6/20 1230)   gadobutrol (GADAVIST) injection 10 mL (7 mLs Intravenous Given 3/6/20 1258)   ketorolac injection 30 mg (30 mg Intravenous Given 3/6/20 1621)   prochlorperazine injection Soln 10 mg (10 mg Intravenous Given 3/6/20 1621)   diphenhydrAMINE injection 25 mg (25 mg Intravenous Given 3/6/20 1621)       Discharge Medication List as of 3/6/2020  5:59 PM      START taking these medications    Details   prochlorperazine (COMPAZINE) 10 MG tablet Take 1 tablet (10 mg total) by mouth 4 (four) times daily as needed (HA)., Starting Fri 3/6/2020, Until Wed 3/11/2020, Print             Follow-up Information     Barbi Mcclure MD. Schedule an appointment as soon as possible for a visit in 2 days.    Specialty:  Neurology  Contact information:  [facility] [place] [address]  [phone]                   Scribe Attestation:   Scribe #1: I performed the above scribed service and the documentation accurately describes the services I performed. I attest to the accuracy of the note.     Attending:   Physician Attestation Statement for Scribe #1: I, Lucila Wayne MD, personally performed the services described in this documentation, as scribed by Amirah Boston, in my presence, and it is both accurate and complete.           Clinical Impression       ICD-10-CM ICD-9-CM   1. Other migraine with status  "migrainosus, not intractable G43.801 346.82       Disposition:   Disposition: Discharged  Condition: Stable    Portions of this note may have been created with voice recognition software. Occasional "wrong-word" or "sound-a-like" substitutions may have occurred due to the inherent limitations of voice recognition software. Please, read the note carefully and recognize, using context, where substitutions have occurred.          Lucila Wayne MD  03/09/20 1449    "

## 2020-03-06 NOTE — ED NOTES
On return from transporting another patient to other unit for admit, Provider at bedside to assess patient following complaint of aching recurrence in right jaw following transport to bathroom for void. Lidocaine infusion had been placed on hold for transport and is off at this time.

## 2020-03-06 NOTE — ED NOTES
Assisted with transfer to stretcher for MRI, SO remains attentive at bedside. Patient resting in dark room with eyes closed without indication of distress.

## 2020-03-06 NOTE — ED NOTES
Back from MRI without complication. Assisted return to stretcher. NAD noted. No complaint verbalized.

## 2020-03-06 NOTE — TELEPHONE ENCOUNTER
----- Message from Jenni Menon sent at 3/6/2020  9:47 AM CST -----  Contact: Dr. Dimas  Placed call to pod, Dr. Dimas regarding patient please call back today at [phone]    Case number [id number]

## 2020-03-09 RX ORDER — MECLOFENAMATE SODIUM 100 MG/1
CAPSULE ORAL
Qty: 30 CAPSULE | Refills: 3 | Status: SHIPPED | OUTPATIENT
Start: 2020-03-09 | End: 2020-07-09

## 2020-03-30 ENCOUNTER — TELEPHONE (OUTPATIENT)
Dept: PHARMACY | Facility: CLINIC | Age: 45
End: 2020-03-30

## 2020-04-16 ENCOUNTER — TELEPHONE (OUTPATIENT)
Dept: HEPATOLOGY | Facility: HOSPITAL | Age: 45
End: 2020-04-16

## 2020-04-16 RX ORDER — DOXEPIN HYDROCHLORIDE 10 MG/1
10 CAPSULE ORAL NIGHTLY
Qty: 14 CAPSULE | Refills: 0 | Status: SHIPPED | OUTPATIENT
Start: 2020-04-16 | End: 2020-07-09

## 2020-04-16 NOTE — TELEPHONE ENCOUNTER
Telephone d/w patient's . Situational insomnia surfaced. CBI tips d/w . Will try short term doxepin.

## 2020-04-25 ENCOUNTER — TELEPHONE (OUTPATIENT)
Dept: HEPATOLOGY | Facility: HOSPITAL | Age: 45
End: 2020-04-25

## 2020-04-25 RX ORDER — PHENTERMINE HYDROCHLORIDE 37.5 MG/1
37.5 TABLET ORAL
Qty: 21 TABLET | Refills: 0 | Status: SHIPPED | OUTPATIENT
Start: 2020-04-25 | End: 2020-05-15 | Stop reason: SDUPTHER

## 2020-04-28 ENCOUNTER — TELEPHONE (OUTPATIENT)
Dept: PHARMACY | Facility: CLINIC | Age: 45
End: 2020-04-28

## 2020-04-28 NOTE — TELEPHONE ENCOUNTER
RX call attempt 1 regarding Emgality refill from [facility]. Patient was not reached, left voicemail. Copay 0.00.

## 2020-05-13 ENCOUNTER — TELEPHONE (OUTPATIENT)
Dept: NEUROLOGY | Facility: CLINIC | Age: 45
End: 2020-05-13

## 2020-05-13 NOTE — TELEPHONE ENCOUNTER
Called patient and scheduled a virtual visit as requested by patient. Patient expressed understanding

## 2020-05-15 RX ORDER — PHENTERMINE HYDROCHLORIDE 37.5 MG/1
37.5 TABLET ORAL
Qty: 30 TABLET | Refills: 0 | Status: SHIPPED | OUTPATIENT
Start: 2020-05-15 | End: 2020-06-05

## 2020-05-26 ENCOUNTER — PATIENT OUTREACH (OUTPATIENT)
Dept: ADMINISTRATIVE | Facility: OTHER | Age: 45
End: 2020-05-26

## 2020-05-28 ENCOUNTER — TELEPHONE (OUTPATIENT)
Dept: NEUROLOGY | Facility: CLINIC | Age: 45
End: 2020-05-28

## 2020-05-28 ENCOUNTER — OFFICE VISIT (OUTPATIENT)
Dept: NEUROLOGY | Facility: CLINIC | Age: 45
End: 2020-05-28
Payer: COMMERCIAL

## 2020-05-28 DIAGNOSIS — K21.9 GASTROESOPHAGEAL REFLUX DISEASE, ESOPHAGITIS PRESENCE NOT SPECIFIED: ICD-10-CM

## 2020-05-28 DIAGNOSIS — F33.41 RECURRENT MAJOR DEPRESSIVE DISORDER, IN PARTIAL REMISSION: ICD-10-CM

## 2020-05-28 DIAGNOSIS — F41.9 ANXIETY: ICD-10-CM

## 2020-05-28 DIAGNOSIS — G43.919 INTRACTABLE MIGRAINE WITHOUT STATUS MIGRAINOSUS, UNSPECIFIED MIGRAINE TYPE: Primary | ICD-10-CM

## 2020-05-28 DIAGNOSIS — N92.0 MENORRHAGIA WITH REGULAR CYCLE: ICD-10-CM

## 2020-05-28 DIAGNOSIS — R06.83 PRIMARY SNORING: ICD-10-CM

## 2020-05-28 DIAGNOSIS — Z79.1 NSAID LONG-TERM USE: ICD-10-CM

## 2020-05-28 DIAGNOSIS — D50.0 IRON DEFICIENCY ANEMIA DUE TO CHRONIC BLOOD LOSS: ICD-10-CM

## 2020-05-28 PROCEDURE — 99214 PR OFFICE/OUTPT VISIT, EST, LEVL IV, 30-39 MIN: ICD-10-PCS | Mod: 95,,, | Performed by: PSYCHIATRY & NEUROLOGY

## 2020-05-28 PROCEDURE — 99214 OFFICE O/P EST MOD 30 MIN: CPT | Mod: 95,,, | Performed by: PSYCHIATRY & NEUROLOGY

## 2020-05-28 RX ORDER — RIMEGEPANT SULFATE 75 MG/75MG
TABLET, ORALLY DISINTEGRATING ORAL
COMMUNITY
Start: 2020-05-11 | End: 2020-07-09

## 2020-05-28 NOTE — PROGRESS NOTES
"Subjective:       Patient ID: Sandra Dimas is a 44 y.o. female.    Chief Complaint: No chief complaint on file.  The patient location is: Home  The chief complaint leading to consultation is: Migraine  Visit type: Virtual visit with synchronous audio and video  Total time spent with patient: 25 minutes  The patient was informed of the relationship between the physician and patient and notified that he or she may decline to receive medical services by telemedicine and may withdraw from such care at any time.    INTERVAL HISTORY 5/28/2020  The patient presents for a virtual follow up. She is somewhat better, over the last 3 weeks, the headache has been a lot more tolerable.  She is on:  Vivelle patch  Prozac  Lamictal  Stadol NS  Adipex (almost finished with the course)  She stopped Emgality, Doxepin  When in the hospital, we tried a lot of different IV medications. She finally responded to the second dose of Lidocaine IV although she did have side effects of numbness and nausea.  She would like to discuss further treatment options but she is particularly concerned with her weight.    HPI  The patient is a 45 y/o female, referred by her , Dr. Dimas, for evaluation of headaches. According to her , "she has been suffering with headaches since she was 19 yeras old. She has had multiple work up multiple trials of meds w/o good success.The only intervention that relieves the headaches is pregnancy.  Her mri/mra cta of head and cspine mri are normal. she has had trial of botox lithium, verapamil, tricyclics, triptans(imitrex gave her chest pain)maxalt frova relpax topamax, depakote, zomig, amerge . she also tried fioricet and fiorinal .Had multiple er visits for headache. Admited for iv DHEA w/o relief. she also was labeled besides migraine to have hemicrania continua by ABDOULAYE FARRIS AND BECKI had initial good response to indomethacin then failed it completely. She also had occipital nerve block with " "partial relief and ended in no success.   Recently in february 2019 had an acute abdomen and had endometriosis invading bowel necessitating open laparotomy and bowel reesection then subsequently was placed on lupron and ad back hormonal therapy . she was headache free. her last dose was in september subsequently the headache recurred. and we had multiple er trips and her obstetrician suggested ELIS birth control pills. she had some transient improvement after she was started on Emgality loading dose 240 mg on october 14 after DR ANDERS thought she had mixed picture of migraine and cluster headaches. However the headache attacks are recurring and they are affecting her quality of life.   Her attacks are right sided started in the back of the head radiating to the right eye with piercing pain associated with tearing runny nose eye ptosis ear ache as well hypersensitivity to light and sound and her right eye will shut down. She will go to sleep afterwards. She at times has associated nausea.they come in waves and can last 5 to 20 minutes."  Further history obtained from the patient indicates that while on Lupron, she had minimum headaches, upon discontinuation they significantly increased. In addition, she gained significant weight and started taking phentermine. Last tab yesterday. She reiterates propensity for allergic reactions to medications.       Review of Systems   Constitutional: Negative for activity change, appetite change, fatigue and fever.   HENT: Negative for congestion, dental problem, hearing loss, sinus pressure, tinnitus, trouble swallowing and voice change.    Eyes: Negative for photophobia, pain, redness and visual disturbance.   Respiratory: Negative for cough, chest tightness and shortness of breath.    Cardiovascular: Negative for chest pain, palpitations and leg swelling.   Gastrointestinal: Negative for abdominal pain, blood in stool, nausea and vomiting.   Endocrine: Negative for cold " intolerance and heat intolerance.   Genitourinary: Negative for difficulty urinating, frequency, menstrual problem and urgency.   Musculoskeletal: Negative for arthralgias, back pain, gait problem, joint swelling, myalgias, neck pain and neck stiffness.   Skin: Negative.    Neurological: Positive for headaches. Negative for dizziness, tremors, seizures, syncope, facial asymmetry, speech difficulty, weakness, light-headedness and numbness.   Hematological: Negative for adenopathy. Does not bruise/bleed easily.   Psychiatric/Behavioral: Negative for agitation, behavioral problems, confusion, decreased concentration, self-injury, sleep disturbance and suicidal ideas. The patient is not nervous/anxious and is not hyperactive.            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    Endometriosis     Hemicrania continua     Migraines      Past Surgical History:   Procedure Laterality Date    APPENDECTOMY      diagnostic laprascopy      ESOPHAGOGASTRODUODENOSCOPY N/A 11/30/2018    Procedure: EGD (ESOPHAGOGASTRODUODENOSCOPY);  Surgeon: Bossman Bustos MD;  Location: [facility];  Service: Endoscopy;  Laterality: N/A;    FULGURATION OF ENDOMETRIOSIS  12/26/2018    Procedure: FULGURATION, ENDOMETRIOSIS;  Surgeon: Zehra Jensen MD;  Location: [facility] OR;  Service: OB/GYN;;  endometriosis implant resection    HYSTEROSCOPY WITH HYDROTHERMAL ABLATION OF ENDOMETRIUM WITH DILATION AND CURETTAGE N/A 12/26/2018    Procedure: HYSTEROSCOPY, WITH DILATION AND CURETTAGE OF UTERUS AND HYDROTHERMAL ENDOMETRIAL ABLATION;  Surgeon: Zehra Jensen MD;  Location: [facility] OR;  Service: OB/GYN;  Laterality: N/A;    LAPAROSCOPIC SALPINGECTOMY Bilateral 12/26/2018    Procedure: SALPINGECTOMY, LAPAROSCOPIC;  Surgeon: Zehra Jensen MD;  Location: [facility] OR;  Service: OB/GYN;  Laterality: Bilateral;    LAPAROTOMY, EXPLORATORY N/A 2/7/2019    Procedure: LAPAROTOMY, EXPLORATORY;  Surgeon: Ramakrishna Boone  MD;  Location: [facility] OR;  Service: General;  Laterality: N/A;    robotic assisted laparoscopy with excision of ovarian endometrioma and chromotubation       Family History   Problem Relation Age of Onset    Strabismus Neg Hx     Retinal detachment Neg Hx     Macular degeneration Neg Hx     Glaucoma Neg Hx     Blindness Neg Hx     Amblyopia Neg Hx     Breast cancer Neg Hx     Colon cancer Neg Hx     Ovarian cancer Neg Hx     Thyroid disease Neg Hx     Migraines Neg Hx     Asthma Neg Hx      Social History     Socioeconomic History    Marital status:      Spouse name: Not on file    Number of children: Not on file    Years of education: Not on file    Highest education level: Not on file   Occupational History    Not on file   Social Needs    Financial resource strain: Not on file    Food insecurity:     Worry: Not on file     Inability: Not on file    Transportation needs:     Medical: Not on file     Non-medical: Not on file   Tobacco Use    Smoking status: Never Smoker    Smokeless tobacco: Never Used   Substance and Sexual Activity    Alcohol use: Never     Frequency: Monthly or less     Drinks per session: 1 or 2     Binge frequency: Never    Drug use: No    Sexual activity: Yes     Partners: Male   Lifestyle    Physical activity:     Days per week: Not on file     Minutes per session: Not on file    Stress: Not on file   Relationships    Social connections:     Talks on phone: Not on file     Gets together: Not on file     Attends Yazidism service: Not on file     Active member of club or organization: Not on file     Attends meetings of clubs or organizations: Not on file     Relationship status: Not on file   Other Topics Concern    Not on file   Social History Narrative    No pets or smokers in household.     Review of patient's allergies indicates:   Allergen Reactions    Hydrocodone Other (See Comments)     Chest pains. Note: patient tolerated hydromorphone in 2/2019.      Chlorhexidine Rash     Per  after surgery patient had large rash across abdomen where chlorhexidine was applied.    Mastisol adhesive [gum guhzid-mvsoiq-vwso-alcohol] Rash       Current Outpatient Medications:     atenolol (TENORMIN) 25 MG tablet, Take 1 tablet (25 mg total) by mouth once daily., Disp: 90 tablet, Rfl: 3    B2/magnesium cit,oxid/feverfew (MIGRELIEF ORAL), Take by mouth as needed., Disp: , Rfl:     diclofenac potassium (CAMBIA) 50 mg PwPk, Take 50 mg by mouth daily as needed., Disp: 30 each, Rfl: 5    drospirenone-ethinyl estradiol (ELIS) 3-0.03 mg per tablet, Take 1 tablet by mouth once daily. Take one tablet per day, start new pack every 21 days for continuous usage., Disp: 28 tablet, Rfl: 12    FLUoxetine 20 MG capsule, Take one capsule by mouth once daily, Disp: 90 capsule, Rfl: 3    galcanezumab-gnlm (EMGALITY PEN) 120 mg/mL PnIj, Inject 240 mg into the skin every 28 days., Disp: 2 mL, Rfl: 0    galcanezumab-gnlm (EMGALITY SYRINGE) 120 mg/mL Syrg, Inject 120 mg into the skin every 28 days., Disp: 1 mL, Rfl: 11    octreotide (SANDOSTATIN) 100 mcg/mL Soln, Inject 1 mL (100 mcg total) into the skin once. for 1 dose, Disp: 1 mL, Rfl: 0    ondansetron (ZOFRAN) 8 MG tablet, Take 1 tablet (8 mg total) by mouth every 8 (eight) hours as needed for Nausea., Disp: 30 tablet, Rfl: 0    oxyCODONE-acetaminophen (PERCOCET)  mg per tablet, Take 1 tablet by mouth every 4 (four) hours as needed for Pain., Disp: 10 tablet, Rfl: 0    pantoprazole (PROTONIX) 40 MG tablet, Take 1 tablet (40 mg total) by mouth once daily., Disp: 90 tablet, Rfl: 3    promethazine (PHENERGAN) 25 MG tablet, Take 1 tablet (25 mg total) by mouth every 8 (eight) hours as needed for Nausea., Disp: 15 tablet, Rfl: 0    TENS unit and electrodes (CEFALY) Cmpk, Per Protocol, Disp: 1 each, Rfl: 0    vitamin D (VITAMIN D3) 1000 units Tab, Take 1,000 Units by mouth once daily., Disp: , Rfl:   No current  facility-administered medications for this visit.     Facility-Administered Medications Ordered in Other Visits:     lactated ringers infusion, , Intravenous, Continuous, Rip Doss MD, Stopped at 12/26/18 1542    lidocaine (PF) 10 mg/ml (1%) injection 10 mg, 1 mL, Intradermal, Once, Rip Doss MD      Objective:        Physical Exam      Constitutional:   She appears well-developed and well-nourished. She is well groomed. She is in acute distress.  Mental status: Awake and alert  Speech language: No dysarthria or aphasia on conversation  Cranial nerves: ALEIDA. Severely photophobic. Face symmetric.  Motor: Moves all extremities well  Coordination: No ataxia. No tremor.       Review of Data:  Lab Results   Component Value Date     02/05/2020    K 4.0 02/05/2020    MG 2.0 02/05/2020     02/05/2020    CO2 21 (L) 02/05/2020    BUN 19 02/05/2020    CREATININE 0.7 02/05/2020     (H) 02/05/2020    AST 18 02/05/2020    ALT 17 02/05/2020    ALBUMIN 3.7 02/05/2020    PROT 6.5 02/05/2020    BILITOT 0.2 02/05/2020    CHOL 163 10/18/2018    HDL 52 10/18/2018    LDLCALC 95.0 10/18/2018    TRIG 80 10/18/2018       Lab Results   Component Value Date    WBC 5.31 03/05/2020    HGB 12.8 03/05/2020    HCT 43.1 03/05/2020    MCV 85 03/05/2020     03/05/2020       Lab Results   Component Value Date    TSH 1.238 10/18/2018         Results for orders placed or performed during the hospital encounter of 10/06/12   MRI Brain W WO Contrast    Narrative    DATE OF EXAM: Oct  6 2012      [facility]   0007  -  MRI BRAIN W & W/O CONTRAST:     [id number]     CLINICAL HISTORY:   migrane \     ICD 9 CODE(S):   ()     CPT 4 CODE(S)/MODIFIER(S):   ()     RESULTS:  Exam: MRI of the brain with and without contrast.     History:     Migraine headaches.     Findings:     Ventricular/sulcal pattern is normal. No abnormal regions   of signal intensity or mass displacement are apparent.     Flow-voids are noted in all major  intracranial vessels. Paranasal sinuses   and mastoids are clear.     Marrow signal throughout the calvarium is normal. No structural anomalies   are apparent.     Postcontrast images reveal no evidence of pathologic meningeal or   parenchymal enhancement.           IMPRESSION:       Unremarkable exam.           : [facility]  Transcribe Date/Time: Oct  7 2012 11:36A  Dictated by : MORENITA MONTES MD  Read On: Oct  7 2012 11:35A     Images were reviewed, findings were verified and document was   electronically  signed on: Oct  7 2012 11:36A   MRA Brain    Narrative    DATE OF EXAM: Oct  6 2012      [facility]   0075  -  MRA HEAD WITHOUT CONTRAST:     [id number]     CLINICAL HISTORY:   migrane \     ICD 9 CODE(S):   ()     CPT 4 CODE(S)/MODIFIER(S):   ()     RESULTS:  Exam: Intracranial MRA without contrast.     Indications:     Headaches. Aneurysm.      Findings:    3-D time-of-flight technique was utilized to evaluate   intracranial arterial anatomy. No aneurysm, vascular malformation or   large vessel occlusion noted. No focal stenotic lesion identified. The   left posterior communicating artery is absent or hypoplastic.           IMPRESSION:       Unremarkable exam.           : [facility]  Transcribe Date/Time: Oct  7 2012 11:53A  Dictated by : MORENITA MONTES MD  Read On: Oct  7 2012 11:52A     Images were reviewed, findings were verified and document was   electronically  signed on: Oct  7 2012 11:53A   Results for orders placed or performed during the hospital encounter of 05/25/11   MRI Brain Without Contrast    Narrative    DATE OF EXAM: May 26 2011      [facility]   0002  -  MRI BRAIN WITHOUT CONTRAST:     [id number]     CLINICAL HISTORY:   MIGRANES \     ICD 9 CODE(S):   ()     CPT 4 CODE(S)/MODIFIER(S):   ()     RESULTS:  MRI brain without contrast, 5/26/2011.        History: Migraine headaches.        Technique: Standard multiplanar noncontrast MRI sequences of the brain.     Findings: The ventricles  are nondilated. The corpus callosum is intact.   Gray and white matter structures reveal normal pattern. No extra-axial   fluid collection is seen. Pituitary gland region appears normal. Skull   base appears unremarkable. No evidence of incidental sinusitis.     Orbital region appears unremarkable.              IMPRESSION:   Normal standard noncontrast MRI of the brain.           : [facility]  Transcribe Date/Time: May 26 2011 12:36P  Dictated by : ROSA BE MD  Read On: May 26 2011 12:35P     Images were reviewed, findings were verified and document was   electronically  signed on: May 26 2011 12:36P     Toradol 60 mg IM given Nurse today        Assessment and Plan   The patient meets criteria for chronic migraine, a bit better after surgical menopause. She is onVivelle patch. Recent exacerbation finally responded to IV Lidocaine with relative poor tolerability. She also meets criteria for Cluster headaches.She has tried Emgality 300 mg sc q28 days without noticeable improvement  Status post multiple bridges to break the cycle:  Toradol 30 mg Q6h for 3 days, Q8h for 2 days, and Q12h for 1 day.  Continue Lamictal 100 mg BID  For acute treatment no benefit from Rayvow, Ubrelvy, or Nurtec  Stop Phentermine and Stadol.  Continue Ideal Protein dietLow carb diet  Considerations for the future:  Vyepti IV infusions    I have discussed the side effects of the medications prescribed and the patient acknowledges understanding  Counseling:  More than 50% of the 25 minute encounter was spent face to face counseling the patient regarding current status and future plan of care as well as side of the medications. All questions were answered to patient's satisfaction        Manasa Mcclure M.D  Medical Director, Headache and Facial Pain  [facility]

## 2020-05-28 NOTE — TELEPHONE ENCOUNTER
Called patient and scheduled a virtual visit follow up in 8 weeks. Patient expressed understanding

## 2020-06-02 ENCOUNTER — TELEPHONE (OUTPATIENT)
Dept: PHARMACY | Facility: CLINIC | Age: 45
End: 2020-06-02

## 2020-06-03 ENCOUNTER — TELEPHONE (OUTPATIENT)
Dept: PHARMACY | Facility: CLINIC | Age: 45
End: 2020-06-03

## 2020-06-09 ENCOUNTER — TELEPHONE (OUTPATIENT)
Dept: PHARMACY | Facility: CLINIC | Age: 45
End: 2020-06-09

## 2020-06-25 ENCOUNTER — HOSPITAL ENCOUNTER (EMERGENCY)
Facility: HOSPITAL | Age: 45
Discharge: HOME | End: 2020-06-25
Attending: EMERGENCY MEDICINE
Payer: COMMERCIAL

## 2020-06-25 VITALS
OXYGEN SATURATION: 100 % | DIASTOLIC BLOOD PRESSURE: 73 MMHG | HEART RATE: 71 BPM | RESPIRATION RATE: 18 BRPM | SYSTOLIC BLOOD PRESSURE: 116 MMHG | TEMPERATURE: 98 F

## 2020-06-25 DIAGNOSIS — G43.909 MIGRAINE WITHOUT STATUS MIGRAINOSUS, NOT INTRACTABLE, UNSPECIFIED MIGRAINE TYPE: Primary | ICD-10-CM

## 2020-06-25 LAB
ALBUMIN SERPL BCP-MCNC: 3.9 G/DL (ref 3.5–5.2)
ALP SERPL-CCNC: 80 U/L (ref 55–135)
ALT SERPL W/O P-5'-P-CCNC: 38 U/L (ref 10–44)
ANION GAP SERPL CALC-SCNC: 11 MMOL/L (ref 8–16)
AST SERPL-CCNC: 27 U/L (ref 10–40)
BASOPHILS # BLD AUTO: 0.03 K/UL (ref 0–0.2)
BASOPHILS NFR BLD: 0.6 % (ref 0–1.9)
BILIRUB SERPL-MCNC: 0.2 MG/DL (ref 0.1–1)
BUN SERPL-MCNC: 21 MG/DL (ref 6–20)
CALCIUM SERPL-MCNC: 9.1 MG/DL (ref 8.7–10.5)
CHLORIDE SERPL-SCNC: 104 MMOL/L (ref 95–110)
CO2 SERPL-SCNC: 25 MMOL/L (ref 23–29)
CREAT SERPL-MCNC: 0.8 MG/DL (ref 0.5–1.4)
DIFFERENTIAL METHOD: ABNORMAL
EOSINOPHIL # BLD AUTO: 0.1 K/UL (ref 0–0.5)
EOSINOPHIL NFR BLD: 1.1 % (ref 0–8)
ERYTHROCYTE [DISTWIDTH] IN BLOOD BY AUTOMATED COUNT: 14 % (ref 11.5–14.5)
EST. GFR  (AFRICAN AMERICAN): >60 ML/MIN/1.73 M^2
EST. GFR  (NON AFRICAN AMERICAN): >60 ML/MIN/1.73 M^2
GLUCOSE SERPL-MCNC: 96 MG/DL (ref 70–110)
HCT VFR BLD AUTO: 36.3 % (ref 37–48.5)
HGB BLD-MCNC: 10.9 G/DL (ref 12–16)
IMM GRANULOCYTES # BLD AUTO: 0 K/UL (ref 0–0.04)
IMM GRANULOCYTES NFR BLD AUTO: 0 % (ref 0–0.5)
LYMPHOCYTES # BLD AUTO: 2.1 K/UL (ref 1–4.8)
LYMPHOCYTES NFR BLD: 39.5 % (ref 18–48)
MAGNESIUM SERPL-MCNC: 2.1 MG/DL (ref 1.6–2.6)
MCH RBC QN AUTO: 25.1 PG (ref 27–31)
MCHC RBC AUTO-ENTMCNC: 30 G/DL (ref 32–36)
MCV RBC AUTO: 83 FL (ref 82–98)
MONOCYTES # BLD AUTO: 0.4 K/UL (ref 0.3–1)
MONOCYTES NFR BLD: 6.8 % (ref 4–15)
NEUTROPHILS # BLD AUTO: 2.8 K/UL (ref 1.8–7.7)
NEUTROPHILS NFR BLD: 52 % (ref 38–73)
NRBC BLD-RTO: 0 /100 WBC
PLATELET # BLD AUTO: 255 K/UL (ref 150–350)
PMV BLD AUTO: 9.6 FL (ref 9.2–12.9)
POTASSIUM SERPL-SCNC: 4 MMOL/L (ref 3.5–5.1)
PROT SERPL-MCNC: 7.1 G/DL (ref 6–8.4)
RBC # BLD AUTO: 4.35 M/UL (ref 4–5.4)
SODIUM SERPL-SCNC: 140 MMOL/L (ref 136–145)
WBC # BLD AUTO: 5.32 K/UL (ref 3.9–12.7)

## 2020-06-25 PROCEDURE — 85025 COMPLETE CBC W/AUTO DIFF WBC: CPT

## 2020-06-25 PROCEDURE — 96365 THER/PROPH/DIAG IV INF INIT: CPT

## 2020-06-25 PROCEDURE — 96361 HYDRATE IV INFUSION ADD-ON: CPT

## 2020-06-25 PROCEDURE — 99284 EMERGENCY DEPT VISIT MOD MDM: CPT | Mod: 25

## 2020-06-25 PROCEDURE — 80053 COMPREHEN METABOLIC PANEL: CPT

## 2020-06-25 PROCEDURE — 96366 THER/PROPH/DIAG IV INF ADDON: CPT

## 2020-06-25 PROCEDURE — 25000003 PHARM REV CODE 250: Performed by: EMERGENCY MEDICINE

## 2020-06-25 PROCEDURE — 63600175 PHARM REV CODE 636 W HCPCS: Performed by: EMERGENCY MEDICINE

## 2020-06-25 PROCEDURE — 83735 ASSAY OF MAGNESIUM: CPT

## 2020-06-25 RX ORDER — KETOROLAC TROMETHAMINE 30 MG/ML
15 INJECTION, SOLUTION INTRAMUSCULAR; INTRAVENOUS
Status: COMPLETED | OUTPATIENT
Start: 2020-06-25 | End: 2020-06-25

## 2020-06-25 RX ORDER — MIDAZOLAM HYDROCHLORIDE 1 MG/ML
1 INJECTION INTRAMUSCULAR; INTRAVENOUS ONCE
Status: COMPLETED | OUTPATIENT
Start: 2020-06-25 | End: 2020-06-25

## 2020-06-25 RX ORDER — DIPHENHYDRAMINE HYDROCHLORIDE 50 MG/ML
12.5 INJECTION INTRAMUSCULAR; INTRAVENOUS
Status: COMPLETED | OUTPATIENT
Start: 2020-06-25 | End: 2020-06-25

## 2020-06-25 RX ORDER — LIDOCAINE HYDROCHLORIDE ANHYDROUS AND DEXTROSE MONOHYDRATE .8; 5 G/100ML; G/100ML
1 INJECTION, SOLUTION INTRAVENOUS CONTINUOUS
Status: DISCONTINUED | OUTPATIENT
Start: 2020-06-25 | End: 2020-06-25

## 2020-06-25 RX ORDER — NAPROXEN 500 MG/1
500 TABLET ORAL 2 TIMES DAILY WITH MEALS
Qty: 60 TABLET | Refills: 0 | Status: SHIPPED | OUTPATIENT
Start: 2020-06-25 | End: 2020-07-25

## 2020-06-25 RX ORDER — METOCLOPRAMIDE HYDROCHLORIDE 5 MG/ML
10 INJECTION INTRAMUSCULAR; INTRAVENOUS
Status: COMPLETED | OUTPATIENT
Start: 2020-06-25 | End: 2020-06-25

## 2020-06-25 RX ORDER — VALACYCLOVIR HYDROCHLORIDE 500 MG/1
TABLET, FILM COATED ORAL
COMMUNITY
Start: 2020-06-16 | End: 2023-03-09

## 2020-06-25 RX ORDER — MAGNESIUM SULFATE 1 G/100ML
1 INJECTION INTRAVENOUS
Status: COMPLETED | OUTPATIENT
Start: 2020-06-25 | End: 2020-06-25

## 2020-06-25 RX ADMIN — SODIUM CHLORIDE 1000 ML: 0.9 INJECTION, SOLUTION INTRAVENOUS at 12:06

## 2020-06-25 RX ADMIN — MAGNESIUM SULFATE 1 G: 1 INJECTION INTRAVENOUS at 11:06

## 2020-06-25 RX ADMIN — METOCLOPRAMIDE 10 MG: 5 INJECTION, SOLUTION INTRAMUSCULAR; INTRAVENOUS at 11:06

## 2020-06-25 RX ADMIN — KETOROLAC TROMETHAMINE 15 MG: 30 INJECTION, SOLUTION INTRAMUSCULAR; INTRAVENOUS at 01:06

## 2020-06-25 RX ADMIN — DIPHENHYDRAMINE HYDROCHLORIDE 12.5 MG: 50 INJECTION, SOLUTION INTRAMUSCULAR; INTRAVENOUS at 11:06

## 2020-06-25 RX ADMIN — KETOROLAC TROMETHAMINE 15 MG: 30 INJECTION, SOLUTION INTRAMUSCULAR at 11:06

## 2020-06-25 RX ADMIN — MIDAZOLAM HYDROCHLORIDE 1 MG: 1 INJECTION, SOLUTION INTRAMUSCULAR; INTRAVENOUS at 11:06

## 2020-06-25 NOTE — ED NOTES
Pt resting comfortably with spouse at bs. Attached to cardiac monitor, pulse ox. Will notify Mary LEHMAN of pt arrival & provide report.

## 2020-06-25 NOTE — ED PROVIDER NOTES
.Encounter Date: 6/25/2020    SCRIBE #1 NOTE: I, Donal Mark, am scribing for, and in the presence of,  Griffin Santos MD. I have scribed the following portions of the note - Other sections scribed: MDM.       History     Chief Complaint   Patient presents with    Migraine     45 y/o F with PMH of Migraines, Cluster headaches presents to the ED with c/o 2 weeks of constant, severe, right sided headache that is typical of her migraines. The pain is localized to the right, behind the right eye, and down the right jaw. Patient with complicated history, and has been refractory to multiple medications in the past. Patient denies any N/V/D, Fever, Chills. Barbi Mcclure MD is her Neurologist.         Review of patient's allergies indicates:   Allergen Reactions    Hydrocodone Other (See Comments)     Chest pains. Note: patient tolerated hydromorphone in 2/2019.     Chlorhexidine Rash     Per  after surgery patient had large rash across abdomen where chlorhexidine was applied.    Mastisol adhesive [gum jkmobm-edzkhg-bzdo-alcohol] Rash     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    Endometriosis     General anesthetics causing adverse effect in therapeutic use     wakes up with severe anxiety attacks    Hemicrania continua     Migraines      Past Surgical History:   Procedure Laterality Date    APPENDECTOMY      CYST REMOVAL Right 12/30/2019    Procedure: EXCISION, CYST;  Surgeon: NATALI Shah MD;  Location: [facility] OR;  Service: OB/GYN;  Laterality: Right;  Sebaceous cyst    diagnostic laprascopy      ESOPHAGOGASTRODUODENOSCOPY N/A 11/30/2018    Procedure: EGD (ESOPHAGOGASTRODUODENOSCOPY);  Surgeon: Bossman Bustos MD;  Location: [facility];  Service: Endoscopy;  Laterality: N/A;    FULGURATION OF ENDOMETRIOSIS  12/26/2018    Procedure: FULGURATION, ENDOMETRIOSIS;  Surgeon: Zehra Jensen MD;  Location: [facility] OR;  Service: OB/GYN;;  endometriosis  implant resection    HYSTEROSCOPY WITH HYDROTHERMAL ABLATION OF ENDOMETRIUM WITH DILATION AND CURETTAGE N/A 12/26/2018    Procedure: HYSTEROSCOPY, WITH DILATION AND CURETTAGE OF UTERUS AND HYDROTHERMAL ENDOMETRIAL ABLATION;  Surgeon: Zehra Jensen MD;  Location: [facility] OR;  Service: OB/GYN;  Laterality: N/A;    LAPAROSCOPIC SALPINGECTOMY Bilateral 12/26/2018    Procedure: SALPINGECTOMY, LAPAROSCOPIC;  Surgeon: Zehra Jensen MD;  Location: [facility] OR;  Service: OB/GYN;  Laterality: Bilateral;    LAPAROTOMY, EXPLORATORY N/A 2/7/2019    Procedure: LAPAROTOMY, EXPLORATORY;  Surgeon: Ramakrishna Boone MD;  Location: [facility] OR;  Service: General;  Laterality: N/A;    ROBOT-ASSISTED LAPAROSCOPIC ABDOMINAL HYSTERECTOMY USING DA MAVIS XI N/A 12/30/2019    Procedure: XI ROBOTIC HYSTERECTOMY;  Surgeon: NATALI Shah MD;  Location: [facility] OR;  Service: OB/GYN;  Laterality: N/A;    ROBOT-ASSISTED LAPAROSCOPIC SURGICAL REMOVAL OF OVARY USING DA MAVIS XI Bilateral 12/30/2019    Procedure: XI ROBOTIC OOPHORECTOMY;  Surgeon: NATALI Shah MD;  Location: [facility] OR;  Service: OB/GYN;  Laterality: Bilateral;    robotic assisted laparoscopy with excision of ovarian endometrioma and chromotubation       Family History   Problem Relation Age of Onset    Strabismus Neg Hx     Retinal detachment Neg Hx     Macular degeneration Neg Hx     Glaucoma Neg Hx     Blindness Neg Hx     Amblyopia Neg Hx     Breast cancer Neg Hx     Colon cancer Neg Hx     Ovarian cancer Neg Hx     Thyroid disease Neg Hx     Migraines Neg Hx     Asthma Neg Hx      Social History     Tobacco Use    Smoking status: Never Smoker    Smokeless tobacco: Never Used   Substance Use Topics    Alcohol use: Yes     Frequency: Monthly or less     Drinks per session: 1 or 2     Binge frequency: Never     Comment: rarely No alcohol 72h prior to sx    Drug use: No     Review of Systems   Constitutional: Negative for diaphoresis and fever.   HENT: Negative  for congestion, dental problem and sore throat.    Eyes: Negative for pain and visual disturbance.   Respiratory: Negative for cough and shortness of breath.    Cardiovascular: Negative for chest pain and palpitations.   Gastrointestinal: Negative for abdominal pain, diarrhea, nausea and vomiting.   Genitourinary: Negative for dysuria and flank pain.   Musculoskeletal: Negative for back pain and neck pain.   Skin: Negative for rash and wound.   Neurological: Positive for headaches. Negative for weakness and numbness.   Hematological: Does not bruise/bleed easily.   Psychiatric/Behavioral: Negative for agitation and confusion.   All other systems reviewed and are negative.      Physical Exam     Initial Vitals [06/25/20 1109]   BP Pulse Resp Temp SpO2   106/69 75 18 97.9 °F (36.6 °C) 98 %      MAP       --         Physical Exam    Constitutional: She appears well-developed and well-nourished.   Appears in moderate pain.    HENT:   Head: Normocephalic and atraumatic.   Eyes: Conjunctivae and EOM are normal. Pupils are equal, round, and reactive to light.   Neck: Normal range of motion. Neck supple.   Cardiovascular: Normal rate and regular rhythm.   Pulmonary/Chest: Breath sounds normal. No respiratory distress.   Abdominal: She exhibits no distension. There is no abdominal tenderness.   Neurological: She is alert and oriented to person, place, and time. She has normal strength. No sensory deficit.   Skin: Skin is warm and dry.   Psychiatric: She has a normal mood and affect.         ED Course   Procedures  Labs Reviewed   CBC W/ AUTO DIFFERENTIAL - Abnormal; Notable for the following components:       Result Value    Hemoglobin 10.9 (*)     Hematocrit 36.3 (*)     Mean Corpuscular Hemoglobin 25.1 (*)     Mean Corpuscular Hemoglobin Conc 30.0 (*)     All other components within normal limits   COMPREHENSIVE METABOLIC PANEL - Abnormal; Notable for the following components:    BUN, Bld 21 (*)     All other components  within normal limits   MAGNESIUM     Results for orders placed or performed during the hospital encounter of 06/25/20   CBC auto differential   Result Value Ref Range    WBC 5.32 3.90 - 12.70 K/uL    RBC 4.35 4.00 - 5.40 M/uL    Hemoglobin 10.9 (L) 12.0 - 16.0 g/dL    Hematocrit 36.3 (L) 37.0 - 48.5 %    Mean Corpuscular Volume 83 82 - 98 fL    Mean Corpuscular Hemoglobin 25.1 (L) 27.0 - 31.0 pg    Mean Corpuscular Hemoglobin Conc 30.0 (L) 32.0 - 36.0 g/dL    RDW 14.0 11.5 - 14.5 %    Platelets 255 150 - 350 K/uL    MPV 9.6 9.2 - 12.9 fL    Immature Granulocytes 0.0 0.0 - 0.5 %    Gran # (ANC) 2.8 1.8 - 7.7 K/uL    Immature Grans (Abs) 0.00 0.00 - 0.04 K/uL    Lymph # 2.1 1.0 - 4.8 K/uL    Mono # 0.4 0.3 - 1.0 K/uL    Eos # 0.1 0.0 - 0.5 K/uL    Baso # 0.03 0.00 - 0.20 K/uL    nRBC 0 0 /100 WBC    Gran% 52.0 38.0 - 73.0 %    Lymph% 39.5 18.0 - 48.0 %    Mono% 6.8 4.0 - 15.0 %    Eosinophil% 1.1 0.0 - 8.0 %    Basophil% 0.6 0.0 - 1.9 %    Differential Method Automated    Comprehensive metabolic panel   Result Value Ref Range    Sodium 140 136 - 145 mmol/L    Potassium 4.0 3.5 - 5.1 mmol/L    Chloride 104 95 - 110 mmol/L    CO2 25 23 - 29 mmol/L    Glucose 96 70 - 110 mg/dL    BUN, Bld 21 (H) 6 - 20 mg/dL    Creatinine 0.8 0.5 - 1.4 mg/dL    Calcium 9.1 8.7 - 10.5 mg/dL    Total Protein 7.1 6.0 - 8.4 g/dL    Albumin 3.9 3.5 - 5.2 g/dL    Total Bilirubin 0.2 0.1 - 1.0 mg/dL    Alkaline Phosphatase 80 55 - 135 U/L    AST 27 10 - 40 U/L    ALT 38 10 - 44 U/L    Anion Gap 11 8 - 16 mmol/L    eGFR if African American >60 >60 mL/min/1.73 m^2    eGFR if non African American >60 >60 mL/min/1.73 m^2   Magnesium   Result Value Ref Range    Magnesium 2.1 1.6 - 2.6 mg/dL            Imaging Results    None       Patient declines CT head, reports multiple CT's in the recent past which have all been normal. Patient declines lidocaine as her headache is improved.    .1:38 PM: Reassessed pt at this time.  Pt states her condition has  "improved at this time. Discussed with pt all pertinent ED information and results. Discussed pt dx and plan of tx. Gave pt all f/u and return to the ED instructions. All questions and concerns were addressed at this time. Pt expresses understanding of information and instructions, and is comfortable with plan to discharge. Pt is stable for discharge.    I discussed with patient and/or family/caretaker that evaluation in the ED does not suggest any emergent or life threatening medical conditions requiring immediate intervention beyond what was provided in the ED, and I believe patient is safe for discharge.  Regardless, an unremarkable evaluation in the ED does not preclude the development or presence of a serious of life threatening condition. As such, patient was instructed to return immediately for any worsening or change in current symptoms.    Patient's headache is either consistent with previous headache and/or lacks features concerning for emergent or life threatening condition.  I do not suspect SAH, meningitis, increased IC pressure, infectious, toxic, vascular, CNS, or other EMC.  I have discussed this at length with patient and/or family/caretaker.       Medical Decision Making:   Clinical Tests:   Lab Tests: Ordered and Reviewed            Scribe Attestation:   Scribe #1: I performed the above scribed service and the documentation accurately describes the services I performed. I attest to the accuracy of the note.    Attending Attestation:           Physician Attestation for Scribe:  Physician Attestation Statement for Scribe #1: I, Griffin Santos MD, reviewed documentation, as scribed by Donal Mark in my presence, and it is both accurate and complete.                 ED Course as of Jun 25 2310   Thu Jun 25, 2020   1208 Per Dr. Mcclure's response in march:     "Initial dose of 3 mg/ kg lidocaine (in normal saline) infused over 90 minutes.     Additional 1.5-2 mg/kg lidocaine to be infuse over 1 hour if " "there were no side effects and if the Migraine reduction was less than 50%."    [BA]   1339 Feeling all the way better. Declines Lidocaine.     [BA]      ED Course User Index  [BA] Griffin Santos MD                Clinical Impression:       ICD-10-CM ICD-9-CM   1. Migraine without status migrainosus, not intractable, unspecified migraine type  G43.909 346.90         Disposition:   Disposition: Discharged  Condition: Stable     ED Disposition Condition    Discharge Stable        ED Prescriptions     Medication Sig Dispense Start Date End Date Auth. Provider    naproxen (NAPROSYN) 500 MG tablet Take 1 tablet (500 mg total) by mouth 2 (two) times daily with meals. for 60 doses 60 tablet 6/25/2020 7/25/2020 Griffin Santos MD        Follow-up Information     Follow up With Specialties Details Why Contact Info    Barbi Mcclure MD Neurology Schedule an appointment as soon as possible for a visit in 2 days For re-evaluation and further treatment [address] [place] [address]  [phone]      [facility] Emergency Medicine Go today If symptoms worsen, For re-evaluation and further treatment, As needed [address]  [facility] [address] [phone]                                     Griffin Santos MD  06/25/20 2315    "

## 2020-07-08 ENCOUNTER — LAB VISIT (OUTPATIENT)
Dept: LAB | Facility: HOSPITAL | Age: 45
End: 2020-07-08
Attending: INTERNAL MEDICINE
Payer: COMMERCIAL

## 2020-07-08 DIAGNOSIS — D50.0 IRON DEFICIENCY ANEMIA DUE TO CHRONIC BLOOD LOSS: Primary | ICD-10-CM

## 2020-07-08 DIAGNOSIS — D50.0 IRON DEFICIENCY ANEMIA DUE TO CHRONIC BLOOD LOSS: ICD-10-CM

## 2020-07-08 LAB
BASOPHILS # BLD AUTO: 0.03 K/UL (ref 0–0.2)
BASOPHILS NFR BLD: 0.5 % (ref 0–1.9)
DIFFERENTIAL METHOD: ABNORMAL
EOSINOPHIL # BLD AUTO: 0.1 K/UL (ref 0–0.5)
EOSINOPHIL NFR BLD: 1.2 % (ref 0–8)
ERYTHROCYTE [DISTWIDTH] IN BLOOD BY AUTOMATED COUNT: 14.2 % (ref 11.5–14.5)
HCT VFR BLD AUTO: 38.8 % (ref 37–48.5)
HGB BLD-MCNC: 11.6 G/DL (ref 12–16)
IMM GRANULOCYTES # BLD AUTO: 0.02 K/UL (ref 0–0.04)
IMM GRANULOCYTES NFR BLD AUTO: 0.3 % (ref 0–0.5)
LYMPHOCYTES # BLD AUTO: 2.6 K/UL (ref 1–4.8)
LYMPHOCYTES NFR BLD: 44.8 % (ref 18–48)
MCH RBC QN AUTO: 25.3 PG (ref 27–31)
MCHC RBC AUTO-ENTMCNC: 29.9 G/DL (ref 32–36)
MCV RBC AUTO: 85 FL (ref 82–98)
MONOCYTES # BLD AUTO: 0.4 K/UL (ref 0.3–1)
MONOCYTES NFR BLD: 6.6 % (ref 4–15)
NEUTROPHILS # BLD AUTO: 2.7 K/UL (ref 1.8–7.7)
NEUTROPHILS NFR BLD: 46.6 % (ref 38–73)
NRBC BLD-RTO: 0 /100 WBC
PLATELET # BLD AUTO: 307 K/UL (ref 150–350)
PMV BLD AUTO: 10 FL (ref 9.2–12.9)
RBC # BLD AUTO: 4.59 M/UL (ref 4–5.4)
WBC # BLD AUTO: 5.89 K/UL (ref 3.9–12.7)

## 2020-07-08 PROCEDURE — 36415 COLL VENOUS BLD VENIPUNCTURE: CPT

## 2020-07-08 PROCEDURE — 85025 COMPLETE CBC W/AUTO DIFF WBC: CPT

## 2020-07-08 PROCEDURE — 82728 ASSAY OF FERRITIN: CPT

## 2020-07-08 PROCEDURE — 83540 ASSAY OF IRON: CPT

## 2020-07-09 ENCOUNTER — OFFICE VISIT (OUTPATIENT)
Dept: OBSTETRICS AND GYNECOLOGY | Facility: CLINIC | Age: 45
End: 2020-07-09
Payer: COMMERCIAL

## 2020-07-09 ENCOUNTER — HOSPITAL ENCOUNTER (EMERGENCY)
Facility: HOSPITAL | Age: 45
Discharge: HOME | End: 2020-07-09
Attending: EMERGENCY MEDICINE
Payer: COMMERCIAL

## 2020-07-09 ENCOUNTER — ANESTHESIA EVENT (OUTPATIENT)
Dept: SURGERY | Facility: HOSPITAL | Age: 45
End: 2020-07-09
Payer: COMMERCIAL

## 2020-07-09 ENCOUNTER — ANESTHESIA (OUTPATIENT)
Dept: SURGERY | Facility: HOSPITAL | Age: 45
End: 2020-07-09
Payer: COMMERCIAL

## 2020-07-09 VITALS
HEIGHT: 68 IN | DIASTOLIC BLOOD PRESSURE: 68 MMHG | SYSTOLIC BLOOD PRESSURE: 96 MMHG | BODY MASS INDEX: 23.19 KG/M2 | WEIGHT: 153 LBS

## 2020-07-09 DIAGNOSIS — T81.31XA DEHISCENCE OF VAGINAL CUFF, INITIAL ENCOUNTER: Primary | ICD-10-CM

## 2020-07-09 PROBLEM — T81.328A VAGINAL CUFF DEHISCENCE, INITIAL ENCOUNTER: Status: ACTIVE | Noted: 2020-07-09

## 2020-07-09 LAB
ALBUMIN SERPL BCP-MCNC: 4.1 G/DL (ref 3.5–5.2)
ALP SERPL-CCNC: 56 U/L (ref 55–135)
ALT SERPL W/O P-5'-P-CCNC: 13 U/L (ref 10–44)
ANION GAP SERPL CALC-SCNC: 11 MMOL/L (ref 8–16)
AST SERPL-CCNC: 17 U/L (ref 10–40)
BASOPHILS # BLD AUTO: 0.03 K/UL (ref 0–0.2)
BASOPHILS NFR BLD: 0.3 % (ref 0–1.9)
BILIRUB SERPL-MCNC: 0.6 MG/DL (ref 0.1–1)
BUN SERPL-MCNC: 16 MG/DL (ref 6–20)
CALCIUM SERPL-MCNC: 9.1 MG/DL (ref 8.7–10.5)
CHLORIDE SERPL-SCNC: 107 MMOL/L (ref 95–110)
CO2 SERPL-SCNC: 23 MMOL/L (ref 23–29)
CREAT SERPL-MCNC: 0.7 MG/DL (ref 0.5–1.4)
DIFFERENTIAL METHOD: ABNORMAL
EOSINOPHIL # BLD AUTO: 0 K/UL (ref 0–0.5)
EOSINOPHIL NFR BLD: 0.2 % (ref 0–8)
ERYTHROCYTE [DISTWIDTH] IN BLOOD BY AUTOMATED COUNT: 13.9 % (ref 11.5–14.5)
EST. GFR  (AFRICAN AMERICAN): >60 ML/MIN/1.73 M^2
EST. GFR  (NON AFRICAN AMERICAN): >60 ML/MIN/1.73 M^2
FERRITIN SERPL-MCNC: 217 NG/ML (ref 20–300)
GLUCOSE SERPL-MCNC: 83 MG/DL (ref 70–110)
HCT VFR BLD AUTO: 33.9 % (ref 37–48.5)
HGB BLD-MCNC: 10.3 G/DL (ref 12–16)
IMM GRANULOCYTES # BLD AUTO: 0.03 K/UL (ref 0–0.04)
IMM GRANULOCYTES NFR BLD AUTO: 0.3 % (ref 0–0.5)
IRON SERPL-MCNC: 69 UG/DL (ref 30–160)
LYMPHOCYTES # BLD AUTO: 2.1 K/UL (ref 1–4.8)
LYMPHOCYTES NFR BLD: 23.2 % (ref 18–48)
MCH RBC QN AUTO: 24.9 PG (ref 27–31)
MCHC RBC AUTO-ENTMCNC: 30.4 G/DL (ref 32–36)
MCV RBC AUTO: 82 FL (ref 82–98)
MONOCYTES # BLD AUTO: 0.5 K/UL (ref 0.3–1)
MONOCYTES NFR BLD: 5.7 % (ref 4–15)
NEUTROPHILS # BLD AUTO: 6.5 K/UL (ref 1.8–7.7)
NEUTROPHILS NFR BLD: 70.3 % (ref 38–73)
NRBC BLD-RTO: 0 /100 WBC
PLATELET # BLD AUTO: 279 K/UL (ref 150–350)
PMV BLD AUTO: 9.7 FL (ref 9.2–12.9)
POTASSIUM SERPL-SCNC: 3.6 MMOL/L (ref 3.5–5.1)
PROT SERPL-MCNC: 6.8 G/DL (ref 6–8.4)
RBC # BLD AUTO: 4.13 M/UL (ref 4–5.4)
SARS-COV-2 RDRP RESP QL NAA+PROBE: NEGATIVE
SATURATED IRON: 20 % (ref 20–50)
SODIUM SERPL-SCNC: 141 MMOL/L (ref 136–145)
TOTAL IRON BINDING CAPACITY: 345 UG/DL (ref 250–450)
TRANSFERRIN SERPL-MCNC: 233 MG/DL (ref 200–375)
WBC # BLD AUTO: 9.18 K/UL (ref 3.9–12.7)

## 2020-07-09 PROCEDURE — 99284 EMERGENCY DEPT VISIT MOD MDM: CPT | Mod: 25

## 2020-07-09 PROCEDURE — 80053 COMPREHEN METABOLIC PANEL: CPT

## 2020-07-09 PROCEDURE — 63600175 PHARM REV CODE 636 W HCPCS: Performed by: ANESTHESIOLOGY

## 2020-07-09 PROCEDURE — 3008F PR BODY MASS INDEX (BMI) DOCUMENTED: ICD-10-PCS | Mod: CPTII,S$GLB,, | Performed by: NURSE PRACTITIONER

## 2020-07-09 PROCEDURE — 71000033 HC RECOVERY, INTIAL HOUR: Performed by: OBSTETRICS & GYNECOLOGY

## 2020-07-09 PROCEDURE — 58999 UNLISTED PX FML GENITAL SYS: CPT | Mod: ,,, | Performed by: OBSTETRICS & GYNECOLOGY

## 2020-07-09 PROCEDURE — 99214 PR OFFICE/OUTPT VISIT, EST, LEVL IV, 30-39 MIN: ICD-10-PCS | Mod: S$GLB,,, | Performed by: NURSE PRACTITIONER

## 2020-07-09 PROCEDURE — 25000003 PHARM REV CODE 250: Performed by: NURSE ANESTHETIST, CERTIFIED REGISTERED

## 2020-07-09 PROCEDURE — 36415 COLL VENOUS BLD VENIPUNCTURE: CPT

## 2020-07-09 PROCEDURE — 63600175 PHARM REV CODE 636 W HCPCS: Performed by: OBSTETRICS & GYNECOLOGY

## 2020-07-09 PROCEDURE — 25000003 PHARM REV CODE 250: Performed by: ANESTHESIOLOGY

## 2020-07-09 PROCEDURE — 96375 TX/PRO/DX INJ NEW DRUG ADDON: CPT

## 2020-07-09 PROCEDURE — 37000009 HC ANESTHESIA EA ADD 15 MINS: Performed by: OBSTETRICS & GYNECOLOGY

## 2020-07-09 PROCEDURE — 36000707: Performed by: OBSTETRICS & GYNECOLOGY

## 2020-07-09 PROCEDURE — 96365 THER/PROPH/DIAG IV INF INIT: CPT

## 2020-07-09 PROCEDURE — 99214 OFFICE O/P EST MOD 30 MIN: CPT | Mod: S$GLB,,, | Performed by: NURSE PRACTITIONER

## 2020-07-09 PROCEDURE — 85025 COMPLETE CBC W/AUTO DIFF WBC: CPT

## 2020-07-09 PROCEDURE — 37000008 HC ANESTHESIA 1ST 15 MINUTES: Performed by: OBSTETRICS & GYNECOLOGY

## 2020-07-09 PROCEDURE — 58999 PR REPAIR OF VAGINAL CUFF DEHISCENCE: ICD-10-PCS | Mod: ,,, | Performed by: OBSTETRICS & GYNECOLOGY

## 2020-07-09 PROCEDURE — 36000706: Performed by: OBSTETRICS & GYNECOLOGY

## 2020-07-09 PROCEDURE — U0002 COVID-19 LAB TEST NON-CDC: HCPCS

## 2020-07-09 PROCEDURE — 99999 PR PBB SHADOW E&M-EST. PATIENT-LVL IV: ICD-10-PCS | Mod: PBBFAC,,, | Performed by: NURSE PRACTITIONER

## 2020-07-09 PROCEDURE — 99999 PR PBB SHADOW E&M-EST. PATIENT-LVL IV: CPT | Mod: PBBFAC,,, | Performed by: NURSE PRACTITIONER

## 2020-07-09 PROCEDURE — 71000015 HC POSTOP RECOV 1ST HR: Performed by: OBSTETRICS & GYNECOLOGY

## 2020-07-09 PROCEDURE — 63600175 PHARM REV CODE 636 W HCPCS: Performed by: NURSE ANESTHETIST, CERTIFIED REGISTERED

## 2020-07-09 PROCEDURE — 63600175 PHARM REV CODE 636 W HCPCS: Performed by: EMERGENCY MEDICINE

## 2020-07-09 PROCEDURE — 3008F BODY MASS INDEX DOCD: CPT | Mod: CPTII,S$GLB,, | Performed by: NURSE PRACTITIONER

## 2020-07-09 RX ORDER — SODIUM CHLORIDE 9 MG/ML
INJECTION, SOLUTION INTRAVENOUS CONTINUOUS
Status: CANCELLED | OUTPATIENT
Start: 2020-07-09

## 2020-07-09 RX ORDER — CEFAZOLIN SODIUM 2 G/50ML
2 SOLUTION INTRAVENOUS
Status: CANCELLED | OUTPATIENT
Start: 2020-07-09

## 2020-07-09 RX ORDER — METOPROLOL SUCCINATE 50 MG/1
50 TABLET, EXTENDED RELEASE ORAL DAILY
Status: DISCONTINUED | OUTPATIENT
Start: 2020-07-10 | End: 2020-07-09

## 2020-07-09 RX ORDER — OXYCODONE AND ACETAMINOPHEN 10; 325 MG/1; MG/1
1 TABLET ORAL EVERY 6 HOURS PRN
Qty: 15 TABLET | Refills: 0 | Status: SHIPPED | OUTPATIENT
Start: 2020-07-09 | End: 2021-06-05

## 2020-07-09 RX ORDER — EPHEDRINE SULFATE 50 MG/ML
INJECTION, SOLUTION INTRAVENOUS
Status: DISCONTINUED | OUTPATIENT
Start: 2020-07-09 | End: 2020-07-09

## 2020-07-09 RX ORDER — ONDANSETRON 2 MG/ML
4 INJECTION INTRAMUSCULAR; INTRAVENOUS DAILY PRN
Status: DISCONTINUED | OUTPATIENT
Start: 2020-07-09 | End: 2020-07-09 | Stop reason: HOSPADM

## 2020-07-09 RX ORDER — KETOROLAC TROMETHAMINE 30 MG/ML
30 INJECTION, SOLUTION INTRAMUSCULAR; INTRAVENOUS EVERY 8 HOURS PRN
Status: DISCONTINUED | OUTPATIENT
Start: 2020-07-09 | End: 2020-07-09 | Stop reason: HOSPADM

## 2020-07-09 RX ORDER — ESTRADIOL 0.1 MG/G
2 CREAM VAGINAL DAILY
Qty: 60 G | Refills: 11 | Status: SHIPPED | OUTPATIENT
Start: 2020-07-09 | End: 2020-10-07

## 2020-07-09 RX ORDER — ONDANSETRON 2 MG/ML
4 INJECTION INTRAMUSCULAR; INTRAVENOUS
Status: COMPLETED | OUTPATIENT
Start: 2020-07-09 | End: 2020-07-09

## 2020-07-09 RX ORDER — MORPHINE SULFATE 4 MG/ML
4 INJECTION, SOLUTION INTRAMUSCULAR; INTRAVENOUS
Status: COMPLETED | OUTPATIENT
Start: 2020-07-09 | End: 2020-07-09

## 2020-07-09 RX ORDER — FENTANYL CITRATE 50 UG/ML
INJECTION, SOLUTION INTRAMUSCULAR; INTRAVENOUS
Status: DISCONTINUED | OUTPATIENT
Start: 2020-07-09 | End: 2020-07-09

## 2020-07-09 RX ORDER — CEFAZOLIN SODIUM 1 G/3ML
INJECTION, POWDER, FOR SOLUTION INTRAMUSCULAR; INTRAVENOUS
Status: DISCONTINUED | OUTPATIENT
Start: 2020-07-09 | End: 2020-07-09

## 2020-07-09 RX ORDER — ROCURONIUM BROMIDE 10 MG/ML
INJECTION, SOLUTION INTRAVENOUS
Status: DISCONTINUED | OUTPATIENT
Start: 2020-07-09 | End: 2020-07-09

## 2020-07-09 RX ORDER — HYDROMORPHONE HYDROCHLORIDE 2 MG/ML
2 INJECTION, SOLUTION INTRAMUSCULAR; INTRAVENOUS; SUBCUTANEOUS ONCE
Status: COMPLETED | OUTPATIENT
Start: 2020-07-09 | End: 2020-07-09

## 2020-07-09 RX ORDER — PROPOFOL 10 MG/ML
VIAL (ML) INTRAVENOUS
Status: DISCONTINUED | OUTPATIENT
Start: 2020-07-09 | End: 2020-07-09

## 2020-07-09 RX ORDER — ATENOLOL 25 MG/1
25 TABLET ORAL ONCE
Status: COMPLETED | OUTPATIENT
Start: 2020-07-09 | End: 2020-07-09

## 2020-07-09 RX ORDER — CHLORHEXIDINE GLUCONATE ORAL RINSE 1.2 MG/ML
10 SOLUTION DENTAL
Status: CANCELLED | OUTPATIENT
Start: 2020-07-09

## 2020-07-09 RX ORDER — FENTANYL CITRATE 50 UG/ML
25 INJECTION, SOLUTION INTRAMUSCULAR; INTRAVENOUS EVERY 5 MIN PRN
Status: DISCONTINUED | OUTPATIENT
Start: 2020-07-09 | End: 2020-07-09 | Stop reason: HOSPADM

## 2020-07-09 RX ORDER — SUCCINYLCHOLINE CHLORIDE 20 MG/ML
INJECTION INTRAMUSCULAR; INTRAVENOUS
Status: DISCONTINUED | OUTPATIENT
Start: 2020-07-09 | End: 2020-07-09

## 2020-07-09 RX ORDER — MIDAZOLAM HYDROCHLORIDE 1 MG/ML
INJECTION INTRAMUSCULAR; INTRAVENOUS
Status: DISCONTINUED | OUTPATIENT
Start: 2020-07-09 | End: 2020-07-09

## 2020-07-09 RX ADMIN — EPHEDRINE SULFATE 10 MG: 50 INJECTION INTRAVENOUS at 05:07

## 2020-07-09 RX ADMIN — EPHEDRINE SULFATE 25 MG: 50 INJECTION INTRAVENOUS at 05:07

## 2020-07-09 RX ADMIN — HYDROMORPHONE HYDROCHLORIDE 2 MG: 2 INJECTION INTRAMUSCULAR; INTRAVENOUS; SUBCUTANEOUS at 04:07

## 2020-07-09 RX ADMIN — FENTANYL CITRATE 50 MCG: 50 INJECTION, SOLUTION INTRAMUSCULAR; INTRAVENOUS at 04:07

## 2020-07-09 RX ADMIN — PIPERACILLIN AND TAZOBACTAM 4.5 G: 4; .5 INJECTION, POWDER, LYOPHILIZED, FOR SOLUTION INTRAVENOUS; PARENTERAL at 03:07

## 2020-07-09 RX ADMIN — MORPHINE SULFATE 4 MG: 4 INJECTION INTRAVENOUS at 03:07

## 2020-07-09 RX ADMIN — ROCURONIUM BROMIDE 5 MG: 10 INJECTION, SOLUTION INTRAVENOUS at 04:07

## 2020-07-09 RX ADMIN — KETOROLAC TROMETHAMINE 30 MG: 30 INJECTION, SOLUTION INTRAMUSCULAR at 05:07

## 2020-07-09 RX ADMIN — MIDAZOLAM HYDROCHLORIDE 2 MG: 1 INJECTION, SOLUTION INTRAMUSCULAR; INTRAVENOUS at 04:07

## 2020-07-09 RX ADMIN — PROPOFOL 140 MG: 10 INJECTION, EMULSION INTRAVENOUS at 04:07

## 2020-07-09 RX ADMIN — SODIUM CHLORIDE, SODIUM LACTATE, POTASSIUM CHLORIDE, AND CALCIUM CHLORIDE: 600; 310; 30; 20 INJECTION, SOLUTION INTRAVENOUS at 04:07

## 2020-07-09 RX ADMIN — ATENOLOL 25 MG: 25 TABLET ORAL at 06:07

## 2020-07-09 RX ADMIN — SUCCINYLCHOLINE CHLORIDE 100 MG: 20 INJECTION, SOLUTION INTRAMUSCULAR; INTRAVENOUS at 04:07

## 2020-07-09 RX ADMIN — EPHEDRINE SULFATE 5 MG: 50 INJECTION INTRAVENOUS at 05:07

## 2020-07-09 RX ADMIN — ONDANSETRON 4 MG: 2 INJECTION INTRAMUSCULAR; INTRAVENOUS at 03:07

## 2020-07-09 RX ADMIN — CEFAZOLIN 2 G: 1 INJECTION, POWDER, FOR SOLUTION INTRAMUSCULAR; INTRAVENOUS at 05:07

## 2020-07-09 NOTE — H&P
[facility]  Obstetrics & Gynecology  History & Physical    Patient Name: Sandra Dimas  MRN: [id number]  Admission Date: 2020  Primary Care Provider: OSIRIS Gamez Jr, MD    Subjective:     Chief Complaint/Reason for Admission: vaginal cuff dehiscense    History of Present Illness:  Sandra Dimas 44 y.o.  s/p RALH/BSO 2019 presented to clinic for severe lower abdominal/pelvic pain. examined by NP in office        OB History    Para Term  AB Living   4 3 3 0 1 3   SAB TAB Ectopic Multiple Live Births   0 0 0 0 0      # Outcome Date GA Lbr Dewayne/2nd Weight Sex Delivery Anes PTL Lv   4 Term      Vag-Spont      3 AB            2 Term      Vag-Spont      1 Term      Vag-Spont        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    Endometriosis     General anesthetics causing adverse effect in therapeutic use     wakes up with severe anxiety attacks    Hemicrania continua     Migraines      Past Surgical History:   Procedure Laterality Date    APPENDECTOMY      CYST REMOVAL Right 2019    Procedure: EXCISION, CYST;  Surgeon: NATALI Shah MD;  Location: [facility];  Service: OB/GYN;  Laterality: Right;  Sebaceous cyst    diagnostic laprascopy      ESOPHAGOGASTRODUODENOSCOPY N/A 2018    Procedure: EGD (ESOPHAGOGASTRODUODENOSCOPY);  Surgeon: Bossman Bustos MD;  Location: [facility];  Service: Endoscopy;  Laterality: N/A;    FULGURATION OF ENDOMETRIOSIS  2018    Procedure: FULGURATION, ENDOMETRIOSIS;  Surgeon: Zehra Jensen MD;  Location: [facility] OR;  Service: OB/GYN;;  endometriosis implant resection    HYSTEROSCOPY WITH HYDROTHERMAL ABLATION OF ENDOMETRIUM WITH DILATION AND CURETTAGE N/A 2018    Procedure: HYSTEROSCOPY, WITH DILATION AND CURETTAGE OF UTERUS AND HYDROTHERMAL ENDOMETRIAL ABLATION;  Surgeon: Zehra Jensen MD;  Location: [facility] OR;  Service: OB/GYN;  Laterality: N/A;    LAPAROSCOPIC  SALPINGECTOMY Bilateral 12/26/2018    Procedure: SALPINGECTOMY, LAPAROSCOPIC;  Surgeon: Zehra Jensen MD;  Location: [facility] OR;  Service: OB/GYN;  Laterality: Bilateral;    LAPAROTOMY, EXPLORATORY N/A 2/7/2019    Procedure: LAPAROTOMY, EXPLORATORY;  Surgeon: Ramakrishna Boone MD;  Location: [facility] OR;  Service: General;  Laterality: N/A;    ROBOT-ASSISTED LAPAROSCOPIC ABDOMINAL HYSTERECTOMY USING DA MAVIS XI N/A 12/30/2019    Procedure: XI ROBOTIC HYSTERECTOMY;  Surgeon: NATALI Shah MD;  Location: [facility] OR;  Service: OB/GYN;  Laterality: N/A;    ROBOT-ASSISTED LAPAROSCOPIC SURGICAL REMOVAL OF OVARY USING DA MAVIS XI Bilateral 12/30/2019    Procedure: XI ROBOTIC OOPHORECTOMY;  Surgeon: NATALI Shah MD;  Location: [facility] OR;  Service: OB/GYN;  Laterality: Bilateral;    robotic assisted laparoscopy with excision of ovarian endometrioma and chromotubation         (Not in a hospital admission)      Review of patient's allergies indicates:   Allergen Reactions    Hydrocodone Other (See Comments)     Chest pains. Note: patient tolerated hydromorphone in 2/2019.     Chlorhexidine Rash     Per  after surgery patient had large rash across abdomen where chlorhexidine was applied.    Mastisol adhesive [gum vusrsq-tbhukf-cwnc-alcohol] Rash        Family History     None        Tobacco Use    Smoking status: Never Smoker    Smokeless tobacco: Never Used   Substance and Sexual Activity    Alcohol use: Yes     Frequency: Monthly or less     Drinks per session: 1 or 2     Binge frequency: Never     Comment: rarely No alcohol 72h prior to sx    Drug use: No    Sexual activity: Yes     Partners: Male     Review of Systems   All other systems reviewed and are negative.     Objective:     Vital Signs (Most Recent):  Temp: 98.2 °F (36.8 °C) (07/09/20 1521)  Pulse: 69 (07/09/20 1601)  Resp: 18 (07/09/20 1616)  BP: 114/78 (07/09/20 1601)  SpO2: 100 % (07/09/20 1601) Vital Signs (24h Range):  Temp:  [98.2 °F (36.8  °C)] 98.2 °F (36.8 °C)  Pulse:  [69-80] 69  Resp:  [18-22] 18  SpO2:  [100 %] 100 %  BP: ()/(68-82) 114/78        There is no height or weight on file to calculate BMI.    No LMP recorded (lmp unknown). Patient has had a hysterectomy.    Physical Exam:   Constitutional: She is oriented to person, place, and time. She appears well-developed and well-nourished. She appears distressed.   Visibly in pain        Pulmonary/Chest: Effort normal.          Genitourinary:    Genitourinary Comments: Normal external genitalia. Speculum exam per NP in office-visible bowel. Pelvic exam by me: serous fluid-soaked gauze (peritoneal fluid). Significant pelvic pain             Musculoskeletal: Moves all extremeties.       Neurological: She is oriented to person, place, and time.    Skin: Skin is warm and dry.    Psychiatric: She has a normal mood and affect. Her behavior is normal.       Laboratory:  I have personallly reviewed all pertinent lab results from the last 24 hours.    Diagnostic Results:  Labs: Reviewed    Assessment/Plan:     Vaginal cuff dehiscence, initial encounter  To OR for exam under anesthesia/cuff repair    Status post laparoscopic hysterectomy  12/2019 RALH/BSO (h/o endometriosis)        Sailaja Addison MD  Obstetrics & Gynecology  [facility]

## 2020-07-09 NOTE — TRANSFER OF CARE
Anesthesia Transfer of Care Note    Patient: Sandra Dimas    Procedure(s) Performed: Procedure(s) (LRB):  REPAIR, VAGINAL CUFF (N/A)    Patient location: PACU    Anesthesia Type: general    Transport from OR: Transported from OR on room air with adequate spontaneous ventilation    Post pain: adequate analgesia    Post assessment: no apparent anesthetic complications    Post vital signs: stable    Level of consciousness: awake    Nausea/Vomiting: no nausea/vomiting    Complications: none    Transfer of care protocol was followed      Last vitals:   Visit Vitals  /67   Pulse (!) 123   Temp 36.5 °C (97.7 °F)   Resp 17   LMP  (LMP Unknown)   SpO2 100%

## 2020-07-09 NOTE — ANESTHESIA POSTPROCEDURE EVALUATION
Anesthesia Post Evaluation    Patient: Sandra Dimas    Procedure(s) Performed: Procedure(s) (LRB):  REPAIR, VAGINAL CUFF (N/A)    Final Anesthesia Type: general    Patient location during evaluation: PACU  Patient participation: Yes- Able to Participate  Level of consciousness: awake and alert  Post-procedure vital signs: reviewed and stable  Pain management: adequate  Airway patency: patent  JACQUES mitigation strategies: Extubation while patient is awake  PONV status at discharge: No PONV  Anesthetic complications: no      Cardiovascular status: hemodynamically stable  Respiratory status: spontaneous ventilation  Hydration status: euvolemic  Follow-up not needed.          Vitals Value Taken Time   /76 07/09/20 1751   Temp 36.5 °C (97.7 °F) 07/09/20 1743   Pulse 116 07/09/20 1753   Resp 19 07/09/20 1753   SpO2 100 % 07/09/20 1753   Vitals shown include unvalidated device data.      No case tracking events are documented in the log.      Pain/Kamila Score: Pain Rating Prior to Med Admin: 7 (7/9/2020  5:53 PM)  Kamila Score: 7 (7/9/2020  5:45 PM)

## 2020-07-09 NOTE — ED PROVIDER NOTES
SCRIBE #1 NOTE: I, Donal Mark, am scribing for, and in the presence of, William Robles Jr., MD. I have scribed the entire note.       History     Chief Complaint   Patient presents with    incision problem     pt states her hysterectomy incision  came open     Review of patient's allergies indicates:   Allergen Reactions    Hydrocodone Other (See Comments)     Chest pains. Note: patient tolerated hydromorphone in 2/2019.     Chlorhexidine Rash     Per  after surgery patient had large rash across abdomen where chlorhexidine was applied.    Mastisol adhesive [gum lxumei-vumnjf-cxza-alcohol] Rash         History of Present Illness     HPI    7/9/2020, 3:25 PM  History obtained from the patient      History of Present Illness: Sandra Dimas is a 44 y.o. female patient with a history of Endometriosis, hysterectomy who presents to the Emergency Department for evaluation of dehiscence of vaginal cuff. Patient seen in clinic by Brionna Ritchie NP (OBGyn) and referred to ER for surgery. Patient c/o pelvic pain s/p intercourse which onset last night. Symptoms are constant and moderate in severity. No mitigating or exacerbating factors reported. Associated sxs include none. Patient denies any fever, chills, n/v, chest pain, SOB, vaginal bleeding/discharge, and all other sxs at this time. Prior Tx includes none. No further complaints or concerns at this time.       Arrival mode:  EMS/AASI    PCP: OSIRIS Gamez Jr, MD      Past Medical History:  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    Endometriosis     General anesthetics causing adverse effect in therapeutic use     wakes up with severe anxiety attacks    Hemicrania continua     Migraines        Past Surgical History:  Past Surgical History:   Procedure Laterality Date    APPENDECTOMY      CYST REMOVAL Right 12/30/2019    Procedure: EXCISION, CYST;  Surgeon: NATALI Shah MD;  Location: [facility] OR;   Service: OB/GYN;  Laterality: Right;  Sebaceous cyst    diagnostic laprascopy      ESOPHAGOGASTRODUODENOSCOPY N/A 11/30/2018    Procedure: EGD (ESOPHAGOGASTRODUODENOSCOPY);  Surgeon: Bossman Bustos MD;  Location[facility];  Service: Endoscopy;  Laterality: N/A;    FULGURATION OF ENDOMETRIOSIS  12/26/2018    Procedure: FULGURATION, ENDOMETRIOSIS;  Surgeon: Zehra Jensen MD;  Location: [facility] OR;  Service: OB/GYN;;  endometriosis implant resection    HYSTEROSCOPY WITH HYDROTHERMAL ABLATION OF ENDOMETRIUM WITH DILATION AND CURETTAGE N/A 12/26/2018    Procedure: HYSTEROSCOPY, WITH DILATION AND CURETTAGE OF UTERUS AND HYDROTHERMAL ENDOMETRIAL ABLATION;  Surgeon: Zehra Jensen MD;  Location: [facility] OR;  Service: OB/GYN;  Laterality: N/A;    LAPAROSCOPIC SALPINGECTOMY Bilateral 12/26/2018    Procedure: SALPINGECTOMY, LAPAROSCOPIC;  Surgeon: Zehra Jensen MD;  Location: [facility] OR;  Service: OB/GYN;  Laterality: Bilateral;    LAPAROTOMY, EXPLORATORY N/A 2/7/2019    Procedure: LAPAROTOMY, EXPLORATORY;  Surgeon: Ramakrishna Boone MD;  Location: [facility];  Service: General;  Laterality: N/A;    ROBOT-ASSISTED LAPAROSCOPIC ABDOMINAL HYSTERECTOMY USING DA MAVIS XI N/A 12/30/2019    Procedure: XI ROBOTIC HYSTERECTOMY;  Surgeon: NATALI Shah MD;  Location: [facility] OR;  Service: OB/GYN;  Laterality: N/A;    ROBOT-ASSISTED LAPAROSCOPIC SURGICAL REMOVAL OF OVARY USING DA MAVIS XI Bilateral 12/30/2019    Procedure: XI ROBOTIC OOPHORECTOMY;  Surgeon: NATALI Shah MD;  Location: [facility];  Service: OB/GYN;  Laterality: Bilateral;    robotic assisted laparoscopy with excision of ovarian endometrioma and chromotubation           Family History:  Family History   Problem Relation Age of Onset    Strabismus Neg Hx     Retinal detachment Neg Hx     Macular degeneration Neg Hx     Glaucoma Neg Hx     Blindness Neg Hx     Amblyopia Neg Hx     Breast cancer Neg Hx     Colon cancer Neg Hx     Ovarian cancer Neg Hx      Thyroid disease Neg Hx     Migraines Neg Hx     Asthma Neg Hx        Social History:   Social History     Tobacco Use    Smoking status: Never Smoker    Smokeless tobacco: Never Used   Substance and Sexual Activity    Alcohol use: Yes     Frequency: Monthly or less     Drinks per session: 1 or 2     Binge frequency: Never     Comment: rarely No alcohol 72h prior to sx    Drug use: No    Sexual activity: Yes     Partners: Male        Review of Systems     Review of Systems   Constitutional: Negative for chills and fever.   HENT: Negative for sore throat.    Respiratory: Negative for shortness of breath.    Cardiovascular: Negative for chest pain.   Gastrointestinal: Negative for nausea and vomiting.   Genitourinary: Positive for pelvic pain. Negative for dysuria, vaginal bleeding and vaginal discharge.   Musculoskeletal: Negative for back pain.   Skin: Negative for rash.   Neurological: Negative for weakness.   Hematological: Does not bruise/bleed easily.   All other systems reviewed and are negative.       Physical Exam     Initial Vitals [07/09/20 1521]   BP Pulse Resp Temp SpO2   122/82 80 (!) 22 98.2 °F (36.8 °C) 100 %      MAP       --          Physical Exam  Nursing Notes and Vital Signs Reviewed.  Constitutional: Well-developed and well-nourished. Patient is in moderate distress.  Head: Atraumatic. Normocephalic.  Eyes: . EOM intact. No scleral icterus.  ENT: Mucous membranes are moist.  Nares clear  Neck: . Full ROM.  Trachea midline  Cardiovascular: Regular rate. Regular rhythm. No murmurs, rubs, or gallops. Distal pulses are 2+ and symmetric.  Pulmonary/Chest: No respiratory distress. Clear to auscultation bilaterally. No wheezing or rales.  Abdominal: Soft and non-distended.  diffuse suprapubic tenderness.  There is guarding.  No rebound  Genitourinary: No CVA tenderness.  Suprapubic tenderness present.  Vaginal exam deferred 2nd packing from OB.  Musculoskeletal: Moves all extremities. No  obvious deformities. No calf tenderness.  Skin: Warm and dry.  Neurological:  Alert, awake, and appropriate.  Normal speech.  No acute focal neurological deficits are appreciated.  Psychiatric: Normal affect. Good eye contact. Appropriate in content.     ED Course   Procedures  ED Vital Signs:  Vitals:    07/09/20 1521 07/09/20 1533 07/09/20 1601   BP: 122/82  114/78   Pulse: 80  69   Resp: (!) 22 (!) 22 18   Temp: 98.2 °F (36.8 °C)     TempSrc: Oral     SpO2: 100%  100%       Abnormal Lab Results:  Labs Reviewed   CBC W/ AUTO DIFFERENTIAL   COMPREHENSIVE METABOLIC PANEL   SARS-COV-2 RNA AMPLIFICATION, QUAL        All Lab Results:  Pending           The Emergency Provider reviewed the vital signs and test results, which are outlined above.     ED Discussion     3:29 PM: Discussed pt's case with Dr. Boone (General Surgery) who recommends OB-Gyn consult.    4:04 PM: Dr. Reyes (OB-Gyn) agrees with current care and management of pt and accepts admission.   Admitting Service: Surgery  Admit to: observation / OR    Re-evaluated pt. I have discussed test results, shared treatment plan, and the need for admission with patient and family at bedside. Pt and family express understanding at this time and agree with all information. All questions answered. Pt and family have no further questions or concerns at this time. Pt is ready for admit.       MDM        Medical Decision Making:   Clinical Tests:   Lab Tests: Ordered           ED Medication(s):  Medications   piperacillin-tazobactam 4.5 g in dextrose 5 % 100 mL IVPB (ready to mix system) (4.5 g Intravenous New Bag 7/9/20 8638)   hydromorphone (PF) injection 2 mg (has no administration in time range)   morphine injection 4 mg (4 mg Intravenous Given 7/9/20 1533)   ondansetron injection 4 mg (4 mg Intravenous Given 7/9/20 1533)       New Prescriptions    No medications on file               Scribe Attestation:   Scribe #1: I performed the above scribed service and the  documentation accurately describes the services I performed. I attest to the accuracy of the note.     Attending:   Physician Attestation Statement for Scribe #1: I, William Robles Jr., MD, personally performed the services described in this documentation, as scribed by Donal Mark, in my presence, and it is both accurate and complete.           Clinical Impression       ICD-10-CM ICD-9-CM   1. Dehiscence of vaginal cuff, initial encounter  T81.31XA 998.32       Disposition:   Disposition: Placed in Observation  Condition: Fair         William Robles Jr., MD  07/09/20 1605

## 2020-07-09 NOTE — HOSPITAL COURSE
Pt reported intercourse last pm, pain began after/during. Could not tolerate today  Pt underwent vaginal cuff repair without difficulty. She was discharged home in stable condition

## 2020-07-09 NOTE — HPI
Sandra Elba Dimas 44 y.o.  s/p RALH/BSO 2019 presented to clinic for severe lower abdominal/pelvic pain.

## 2020-07-09 NOTE — ANESTHESIA PROCEDURE NOTES
Intubation  Performed by: Inocente May Jr., CRNA  Authorized by: Inocente Sampson II, MD     Intubation:     Induction:  Intravenous    Intubated:  Postinduction    Mask Ventilation:  Easy mask    Attempts:  1    Attempted By:  CRNA    Method of Intubation:  Direct    Blade:  Brant 3    Laryngeal View Grade: Grade I - full view of chords      Difficult Airway Encountered?: No      Complications:  None    Airway Device:  Direct and oral endotracheal tube    Airway Device Size:  7.0    Style/Cuff Inflation:  Cuffed (inflated to minimal occlusive pressure)    Inflation Amount (mL):  7    Tube secured:  20    Secured at:  The lips    Placement Verified By:  Auscultation and Capnometry    Complicating Factors:  None    Findings Post-Intubation:  Bilateral breath sounds, positive ETCO2 and atraumatic / condition of teeth unchanged

## 2020-07-09 NOTE — ANESTHESIA PREPROCEDURE EVALUATION
07/09/2020  Sandra Dimas is a 44 y.o., female.    Anesthesia Evaluation    I have reviewed the Patient Summary Reports.    I have reviewed the Nursing Notes.    I have reviewed the Medications.     Review of Systems  Anesthesia Hx:  Has anxiety upon emergence.     Social:  Non-Smoker    Hematology/Oncology:  Hematology Normal        Cardiovascular:  Cardiovascular Normal     Pulmonary:  Pulmonary Normal    Renal/:  Renal/ Normal     Hepatic/GI:   GERD    Neurological:   Headaches    Endocrine:  Endocrine Normal      Patient Active Problem List   Diagnosis    Primary snoring    Recurrent major depressive disorder, in partial remission    GERD (gastroesophageal reflux disease)    Migraines    Iron deficiency anemia due to chronic blood loss    NSAID long-term use    Hiatal hernia    Chronic paroxysmal hemicrania    Anxiety    Intractable headache    Pelvic pain    Status post laparoscopic hysterectomy    Preoperative exam for gynecologic surgery     Current Facility-Administered Medications on File Prior to Encounter   Medication Dose Route Frequency Provider Last Rate Last Dose    lactated ringers infusion   Intravenous Continuous Rip Doss MD 0 mL/hr at 12/26/18 1542      lidocaine (PF) 10 mg/ml (1%) injection 10 mg  1 mL Intradermal Once Rip Doss MD         Current Outpatient Medications on File Prior to Encounter   Medication Sig Dispense Refill    atenoloL (TENORMIN) 25 MG tablet Take 1 tablet (25 mg total) by mouth once daily. 90 tablet 3    B2/magnesium cit,oxid/feverfew (MIGRELIEF ORAL) Take by mouth once daily.       estradioL (VIVELLE-DOT) 0.075 mg/24 hr Place 1 patch onto the skin twice a week. 8 patch 11    FLUoxetine 20 MG capsule Take one capsule by mouth once daily (Patient taking differently: Take 20 mg by mouth once daily. ) 90 capsule 3     galcanezumab-gnlm (GALCANEZUMAB-GNLM) 300 mg/3 mL (100 mg/mL x 3) Syrg Inject 300 mg into the skin every 28 days. 3 mL 5    lamoTRIgine (LAMICTAL) 100 MG tablet Take 1 tablet (100 mg total) by mouth 2 (two) times daily. 60 tablet 11    pantoprazole (PROTONIX) 40 MG tablet Take 1 tablet (40 mg total) by mouth once daily. 90 tablet 3    valACYclovir (VALTREX) 500 MG tablet       vitamin D (VITAMIN D3) 1000 units Tab Take 1,000 Units by mouth once daily.      metoclopramide HCl (REGLAN) 10 MG tablet Take 1 tablet (10 mg total) by mouth every 6 (six) hours as needed (headache). 30 tablet 0    naproxen (NAPROSYN) 500 MG tablet Take 1 tablet (500 mg total) by mouth 2 (two) times daily with meals. for 60 doses 60 tablet 0    ondansetron (ZOFRAN) 8 MG tablet Take 1 tablet (8 mg total) by mouth every 8 (eight) hours as needed for Nausea. 30 tablet 0    oxyCODONE-acetaminophen (PERCOCET)  mg per tablet       prochlorperazine (COMPAZINE) 10 MG tablet Take 1 tablet (10 mg total) by mouth 4 (four) times daily as needed for headache 20 tablet 0    promethazine (PHENERGAN) 25 MG tablet Take 1 tablet (25 mg total) by mouth every 6 (six) hours as needed for Nausea. 30 tablet 1     Past Surgical History:   Procedure Laterality Date    APPENDECTOMY      CYST REMOVAL Right 12/30/2019    Procedure: EXCISION, CYST;  Surgeon: NATALI Shah MD;  Location: [facility] OR;  Service: OB/GYN;  Laterality: Right;  Sebaceous cyst    diagnostic laprascopy      ESOPHAGOGASTRODUODENOSCOPY N/A 11/30/2018    Procedure: EGD (ESOPHAGOGASTRODUODENOSCOPY);  Surgeon: Bossman Bustos MD;  Location: [facility];  Service: Endoscopy;  Laterality: N/A;    FULGURATION OF ENDOMETRIOSIS  12/26/2018    Procedure: FULGURATION, ENDOMETRIOSIS;  Surgeon: Zehra Jensen MD;  Location: [facility] OR;  Service: OB/GYN;;  endometriosis implant resection    HYSTEROSCOPY WITH HYDROTHERMAL ABLATION OF ENDOMETRIUM WITH DILATION AND CURETTAGE N/A 12/26/2018     Procedure: HYSTEROSCOPY, WITH DILATION AND CURETTAGE OF UTERUS AND HYDROTHERMAL ENDOMETRIAL ABLATION;  Surgeon: Zehra Jensen MD;  Location: [facility] OR;  Service: OB/GYN;  Laterality: N/A;    LAPAROSCOPIC SALPINGECTOMY Bilateral 12/26/2018    Procedure: SALPINGECTOMY, LAPAROSCOPIC;  Surgeon: Zehra Jensen MD;  Location: [facility] OR;  Service: OB/GYN;  Laterality: Bilateral;    LAPAROTOMY, EXPLORATORY N/A 2/7/2019    Procedure: LAPAROTOMY, EXPLORATORY;  Surgeon: Ramakrishna Boone MD;  Location: [facility] OR;  Service: General;  Laterality: N/A;    ROBOT-ASSISTED LAPAROSCOPIC ABDOMINAL HYSTERECTOMY USING DA MAVIS XI N/A 12/30/2019    Procedure: XI ROBOTIC HYSTERECTOMY;  Surgeon: NATALI Shah MD;  Location: [facility] OR;  Service: OB/GYN;  Laterality: N/A;    ROBOT-ASSISTED LAPAROSCOPIC SURGICAL REMOVAL OF OVARY USING DA MAVIS XI Bilateral 12/30/2019    Procedure: XI ROBOTIC OOPHORECTOMY;  Surgeon: NATALI Shah MD;  Location: [facility] OR;  Service: OB/GYN;  Laterality: Bilateral;    robotic assisted laparoscopy with excision of ovarian endometrioma and chromotubation           Physical Exam  General:  Well nourished    Airway/Jaw/Neck:  Airway Findings: Mouth Opening: Normal Tongue: Normal  General Airway Assessment: Adult  Mallampati: II  TM Distance: 4 - 6 cm  Jaw/Neck Findings:  Neck ROM: Normal ROM      Dental:  Dental Findings: In tact   Chest/Lungs:  Chest/Lungs Findings: Clear to auscultation, Normal Respiratory Rate     Heart/Vascular:  Heart Findings: Rate: Normal  Rhythm: Regular Rhythm  Sounds: Normal        Mental Status:  Mental Status Findings:  Cooperative, Alert and Oriented         Anesthesia Plan  Type of Anesthesia, risks & benefits discussed:  Anesthesia Type:  general  Patient's Preference:   Intra-op Monitoring Plan: standard ASA monitors  Intra-op Monitoring Plan Comments:   Post Op Pain Control Plan: multimodal analgesia and per primary service following discharge from PACU  Post Op Pain  Control Plan Comments:   Induction:   IV  Beta Blocker:  Patient is on a Beta-Blocker and has received one dose within the past 24 hours (No further documentation required).       Informed Consent: Patient understands risks and agrees with Anesthesia plan.  Questions answered. Anesthesia consent signed with patient.  ASA Score: 2     Day of Surgery Review of History & Physical:  There are no significant changes.          Ready For Surgery From Anesthesia Perspective.       Chemistry        Component Value Date/Time     06/25/2020 1117    K 4.0 06/25/2020 1117     06/25/2020 1117    CO2 25 06/25/2020 1117    BUN 21 (H) 06/25/2020 1117    CREATININE 0.8 06/25/2020 1117    GLU 96 06/25/2020 1117        Component Value Date/Time    CALCIUM 9.1 06/25/2020 1117    ALKPHOS 80 06/25/2020 1117    AST 27 06/25/2020 1117    ALT 38 06/25/2020 1117    BILITOT 0.2 06/25/2020 1117    ESTGFRAFRICA >60 06/25/2020 1117    EGFRNONAA >60 06/25/2020 1117        Lab Results   Component Value Date    WBC 5.89 07/08/2020    HGB 11.6 (L) 07/08/2020    HCT 38.8 07/08/2020    MCV 85 07/08/2020     07/08/2020

## 2020-07-09 NOTE — PROGRESS NOTES
Sandra Dimas is a 44 y.o. female  presents for severe pelvic pain with intercourse last pm that has not resolved. No bleeding. Has had some bowel and bladder pain since intercourse last pm with the onset of the pelvic pain.   S/p lap hysterectomy on 2019         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    Endometriosis     General anesthetics causing adverse effect in therapeutic use     wakes up with severe anxiety attacks    Hemicrania continua     Migraines      Past Surgical History:   Procedure Laterality Date    APPENDECTOMY      CYST REMOVAL Right 2019    Procedure: EXCISION, CYST;  Surgeon: NATALI Shah MD;  [facility];  Service: OB/GYN;  Laterality: Right;  Sebaceous cyst    diagnostic laprascopy      ESOPHAGOGASTRODUODENOSCOPY N/A 2018    Procedure: EGD (ESOPHAGOGASTRODUODENOSCOPY);  Surgeon: Bossman Bustos MD;  Location: [facility];  Service: Endoscopy;  Laterality: N/A;    FULGURATION OF ENDOMETRIOSIS  2018    Procedure: FULGURATION, ENDOMETRIOSIS;  Surgeon: Zehra Jensen MD;  Location: [facility];  Service: OB/GYN;;  endometriosis implant resection    HYSTEROSCOPY WITH HYDROTHERMAL ABLATION OF ENDOMETRIUM WITH DILATION AND CURETTAGE N/A 2018    Procedure: HYSTEROSCOPY, WITH DILATION AND CURETTAGE OF UTERUS AND HYDROTHERMAL ENDOMETRIAL ABLATION;  Surgeon: Zehra Jensen MD;  Location: [facility] OR;  Service: OB/GYN;  Laterality: N/A;    LAPAROSCOPIC SALPINGECTOMY Bilateral 2018    Procedure: SALPINGECTOMY, LAPAROSCOPIC;  Surgeon: Zehra Jensen MD;  Location: [facility] OR;  Service: OB/GYN;  Laterality: Bilateral;    LAPAROTOMY, EXPLORATORY N/A 2019    Procedure: LAPAROTOMY, EXPLORATORY;  Surgeon: Ramakrishna Boone MD;  Location: [facility];  Service: General;  Laterality: N/A;    ROBOT-ASSISTED LAPAROSCOPIC ABDOMINAL HYSTERECTOMY USING DA MAVIS XI N/A 2019    Procedure: XI ROBOTIC  "HYSTERECTOMY;  Surgeon: NATALI Shah MD;  Location: [facility] OR;  Service: OB/GYN;  Laterality: N/A;    ROBOT-ASSISTED LAPAROSCOPIC SURGICAL REMOVAL OF OVARY USING DA MAVIS XI Bilateral 2019    Procedure: XI ROBOTIC OOPHORECTOMY;  Surgeon: NATALI Shah MD;  Location: [facility] OR;  Service: OB/GYN;  Laterality: Bilateral;    robotic assisted laparoscopy with excision of ovarian endometrioma and chromotubation       Family History   Problem Relation Age of Onset    Strabismus Neg Hx     Retinal detachment Neg Hx     Macular degeneration Neg Hx     Glaucoma Neg Hx     Blindness Neg Hx     Amblyopia Neg Hx     Breast cancer Neg Hx     Colon cancer Neg Hx     Ovarian cancer Neg Hx     Thyroid disease Neg Hx     Migraines Neg Hx     Asthma Neg Hx      Social History     Tobacco Use    Smoking status: Never Smoker    Smokeless tobacco: Never Used   Substance Use Topics    Alcohol use: Yes     Frequency: Monthly or less     Drinks per session: 1 or 2     Binge frequency: Never     Comment: rarely No alcohol 72h prior to sx    Drug use: No     OB History        4    Para   3    Term   3            AB   1    Living   3       SAB        TAB        Ectopic        Multiple        Live Births                     BP 96/68   Ht 5' 8" (1.727 m)   Wt 69.4 kg (153 lb)   LMP  (LMP Unknown)   BMI 23.26 kg/m²     ROS:    PE:   APPEARANCE: Well nourished, well developed, in no acute distress.  AFFECT: WNL, alert and oriented x 3.  PELVIC: placed speculum in vagina and bowel protruded from dehisce cuff - no bleeding - pt in pain   Dr. Addison was notified right away, ambulance called and pt was transferred to [facility] on [address] to take to Surgery     1. Dehiscence of vaginal cuff, initial encounter      and PLAN:    To ER on [address] for surgery                  "

## 2020-07-09 NOTE — ED NOTES
Patient identifiers verified and correct for Sandra Dimas.    LOC: The patient is awake, alert and aware of environment with an appropriate affect, the patient is oriented x 3 and speaking appropriately.  APPEARANCE: Patient resting comfortably and in no acute distress, patient is clean and well groomed, patient's clothing is properly fastened.  SKIN: The skin is warm and dry, color consistent with ethnicity, patient has normal skin turgor and moist mucus membranes, skin intact, no breakdown or bruising noted.  MUSCULOSKELETAL: Patient moving all extremities spontaneously.  RESPIRATORY: Airway is open and patent, respirations are spontaneous.  CARDIAC: Patient has a normal rate, no periphreal edema noted, capillary refill < 3 seconds.  ABDOMEN: Pt has torn cuff. Here to be evaluated for surgery. C/o lower abdominal pain.

## 2020-07-09 NOTE — SUBJECTIVE & OBJECTIVE
OB History    Para Term  AB Living   4 3 3 0 1 3   SAB TAB Ectopic Multiple Live Births   0 0 0 0 0      # Outcome Date GA Lbr Dewayne/2nd Weight Sex Delivery Anes PTL Lv   4 Term      Vag-Spont      3 AB            2 Term      Vag-Spont      1 Term      Vag-Spont        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    Endometriosis     General anesthetics causing adverse effect in therapeutic use     wakes up with severe anxiety attacks    Hemicrania continua     Migraines      Past Surgical History:   Procedure Laterality Date    APPENDECTOMY      CYST REMOVAL Right 2019    Procedure: EXCISION, CYST;  Surgeon: NATALI Shah MD;  Location: [facility] OR;  Service: OB/GYN;  Laterality: Right;  Sebaceous cyst    diagnostic laprascopy      ESOPHAGOGASTRODUODENOSCOPY N/A 2018    Procedure: EGD (ESOPHAGOGASTRODUODENOSCOPY);  Surgeon: Bossman Bustos MD;  Location: [facility];  Service: Endoscopy;  Laterality: N/A;    FULGURATION OF ENDOMETRIOSIS  2018    Procedure: FULGURATION, ENDOMETRIOSIS;  Surgeon: Zehra Jensen MD;  Location: [facility] OR;  Service: OB/GYN;;  endometriosis implant resection    HYSTEROSCOPY WITH HYDROTHERMAL ABLATION OF ENDOMETRIUM WITH DILATION AND CURETTAGE N/A 2018    Procedure: HYSTEROSCOPY, WITH DILATION AND CURETTAGE OF UTERUS AND HYDROTHERMAL ENDOMETRIAL ABLATION;  Surgeon: Zehra Jensen MD;  Location: [facility] OR;  Service: OB/GYN;  Laterality: N/A;    LAPAROSCOPIC SALPINGECTOMY Bilateral 2018    Procedure: SALPINGECTOMY, LAPAROSCOPIC;  Surgeon: Zehra Jensen MD;  Location: [facility] OR;  Service: OB/GYN;  Laterality: Bilateral;    LAPAROTOMY, EXPLORATORY N/A 2019    Procedure: LAPAROTOMY, EXPLORATORY;  Surgeon: Ramakrishna Boone MD;  Location: [facility] OR;  Service: General;  Laterality: N/A;    ROBOT-ASSISTED LAPAROSCOPIC ABDOMINAL HYSTERECTOMY USING DA MAVIS XI N/A 2019    Procedure: XI ROBOTIC  HYSTERECTOMY;  Surgeon: NATALI Shah MD;  Location: [facility] OR;  Service: OB/GYN;  Laterality: N/A;    ROBOT-ASSISTED LAPAROSCOPIC SURGICAL REMOVAL OF OVARY USING DA MAVIS XI Bilateral 12/30/2019    Procedure: XI ROBOTIC OOPHORECTOMY;  Surgeon: NATALI Shah MD;  Location: [facility] OR;  Service: OB/GYN;  Laterality: Bilateral;    robotic assisted laparoscopy with excision of ovarian endometrioma and chromotubation         (Not in a hospital admission)      Review of patient's allergies indicates:   Allergen Reactions    Hydrocodone Other (See Comments)     Chest pains. Note: patient tolerated hydromorphone in 2/2019.     Chlorhexidine Rash     Per  after surgery patient had large rash across abdomen where chlorhexidine was applied.    Mastisol adhesive [gum vfeljh-nddfel-jnkm-alcohol] Rash        Family History     None        Tobacco Use    Smoking status: Never Smoker    Smokeless tobacco: Never Used   Substance and Sexual Activity    Alcohol use: Yes     Frequency: Monthly or less     Drinks per session: 1 or 2     Binge frequency: Never     Comment: rarely No alcohol 72h prior to sx    Drug use: No    Sexual activity: Yes     Partners: Male     Review of Systems   All other systems reviewed and are negative.     Objective:     Vital Signs (Most Recent):  Temp: 98.2 °F (36.8 °C) (07/09/20 1521)  Pulse: 69 (07/09/20 1601)  Resp: 18 (07/09/20 1616)  BP: 114/78 (07/09/20 1601)  SpO2: 100 % (07/09/20 1601) Vital Signs (24h Range):  Temp:  [98.2 °F (36.8 °C)] 98.2 °F (36.8 °C)  Pulse:  [69-80] 69  Resp:  [18-22] 18  SpO2:  [100 %] 100 %  BP: ()/(68-82) 114/78        There is no height or weight on file to calculate BMI.    No LMP recorded (lmp unknown). Patient has had a hysterectomy.    Physical Exam:   Constitutional: She is oriented to person, place, and time. She appears well-developed and well-nourished. She appears distressed.   Visibly in pain        Pulmonary/Chest: Effort normal.           Genitourinary:    Genitourinary Comments: Normal external genitalia. Speculum exam per NP in office-visible bowel. Pelvic exam by me: serous fluid-soaked gauze (peritoneal fluid). Significant pelvic pain             Musculoskeletal: Moves all extremeties.       Neurological: She is oriented to person, place, and time.    Skin: Skin is warm and dry.    Psychiatric: She has a normal mood and affect. Her behavior is normal.       Laboratory:  I have personallly reviewed all pertinent lab results from the last 24 hours.    Diagnostic Results:  Labs: Reviewed

## 2020-07-10 ENCOUNTER — TELEPHONE (OUTPATIENT)
Dept: OBSTETRICS AND GYNECOLOGY | Facility: CLINIC | Age: 45
End: 2020-07-10

## 2020-07-10 RX ORDER — MEPERIDINE HYDROCHLORIDE 50 MG/1
50 TABLET ORAL EVERY 6 HOURS PRN
Qty: 10 TABLET | Refills: 0 | Status: SHIPPED | OUTPATIENT
Start: 2020-07-10 | End: 2020-07-10 | Stop reason: RX

## 2020-07-10 RX ORDER — MEPERIDINE HYDROCHLORIDE 50 MG/5ML
50 SOLUTION ORAL EVERY 6 HOURS PRN
Qty: 140 ML | Refills: 0 | Status: SHIPPED | OUTPATIENT
Start: 2020-07-10 | End: 2020-07-20

## 2020-07-10 RX ORDER — HYDROXYZINE PAMOATE 25 MG/1
25 CAPSULE ORAL EVERY 4 HOURS PRN
Qty: 20 CAPSULE | Refills: 0 | Status: SHIPPED | OUTPATIENT
Start: 2020-07-10 | End: 2021-02-22 | Stop reason: ALTCHOICE

## 2020-07-10 NOTE — DISCHARGE SUMMARY
[facility] -   Obstetrics & Gynecology  Discharge Summary    Patient Name: Sandra Dimas  MRN: [id number]  Admission Date: 2020  Hospital Length of Stay: 0 days  Discharge Date and Time: 20  Attending Physician: No att. providers found   Discharging Provider: Sailaja Addison MD  Primary Care Provider: OSIRIS Gamez Jr, MD    HPI:  Sandra Dimas 44 y.o.  s/p RALH/BSO 2019 presented to clinic for severe lower abdominal/pelvic pain.    Hospital Course:  Pt reported intercourse last pm, pain began after/during. Could not tolerate today  Pt underwent vaginal cuff repair without difficulty. She was discharged home in stable condition    Procedure(s) (LRB):  REPAIR, VAGINAL CUFF (N/A)         Significant Diagnostic Studies: Labs: All labs within the past 24 hours have been reviewed      Pending Diagnostic Studies:     None        Final Active Diagnoses:    Diagnosis Date Noted POA    PRINCIPAL PROBLEM:  Dehiscence of vaginal cuff [T81.31XA] 2020 Yes      Problems Resolved During this Admission:        Discharged Condition: good    Disposition: Home or Self Care    Follow Up:  Follow-up Information     Sailaja Addison MD On 2020.    Specialties: Obstetrics, Obstetrics and Gynecology  Why: 10am Benge  Contact information:  [address]  [place] [address] [phone]                 Patient Instructions:   1-2 weeks pelvic rest; 12 weeks no intercourse  Medications:  Reconciled Home Medications:      Medication List      CHANGE how you take these medications    * KEVIN 0.075 mg/24 hr  Generic drug: estradioL  Place 1 patch onto the skin twice a week.  What changed: Another medication with the same name was added. Make sure you understand how and when to take each.     * estradioL 0.01 % (0.1 mg/gram) vaginal cream  Commonly known as: ESTRACE  Place 2 g vaginally once daily.  What changed: You were already taking a medication with the same name, and this prescription  was added. Make sure you understand how and when to take each.     FLUoxetine 20 MG capsule  Take one capsule by mouth once daily  What changed:   · how much to take  · how to take this  · when to take this     oxyCODONE-acetaminophen  mg per tablet  Commonly known as: PERCOCET  Take 1 tablet by mouth every 6 (six) hours as needed for Pain.  What changed:   · how much to take  · how to take this  · when to take this  · reasons to take this         * This list has 2 medication(s) that are the same as other medications prescribed for you. Read the directions carefully, and ask your doctor or other care provider to review them with you.            CONTINUE taking these medications    atenoloL 25 MG tablet  Commonly known as: TENORMIN  Take 1 tablet (25 mg total) by mouth once daily.     galcanezumab-gnlm 300 mg/3 mL (100 mg/mL x 3) Syrg  Commonly known as: galcanezumab-gnlm  Inject 300 mg into the skin every 28 days.     lamoTRIgine 100 MG tablet  Commonly known as: LAMICTAL  Take 1 tablet (100 mg total) by mouth 2 (two) times daily.     metoclopramide HCl 10 MG tablet  Commonly known as: REGLAN  Take 1 tablet (10 mg total) by mouth every 6 (six) hours as needed (headache).     MIGRELIEF ORAL  Take by mouth once daily.     naproxen 500 MG tablet  Commonly known as: NAPROSYN  Take 1 tablet (500 mg total) by mouth 2 (two) times daily with meals. for 60 doses     ondansetron 8 MG tablet  Commonly known as: ZOFRAN  Take 1 tablet (8 mg total) by mouth every 8 (eight) hours as needed for Nausea.     pantoprazole 40 MG tablet  Commonly known as: PROTONIX  Take 1 tablet (40 mg total) by mouth once daily.     prochlorperazine 10 MG tablet  Commonly known as: COMPAZINE  Take 1 tablet (10 mg total) by mouth 4 (four) times daily as needed for headache     promethazine 25 MG tablet  Commonly known as: PHENERGAN  Take 1 tablet (25 mg total) by mouth every 6 (six) hours as needed for Nausea.     valACYclovir 500 MG  tablet  Commonly known as: VALTREX     vitamin D 1000 units Tab  Commonly known as: VITAMIN D3  Take 1,000 Units by mouth once daily.            Sailaja Addison MD  Obstetrics & Gynecology  [facility] -

## 2020-07-10 NOTE — OP NOTE
DATE OF PROCEDURE: 7/9/2020    PRE-OPERATIVE DIAGNOSIS:  1. H/o RALH/BSO 12/2019  2. Vaginal cuff dehiscence    POST-OPERATIVE DIAGNOSIS:  Same as above    PROCEDURE:  Vaginal cuff dehiscence repair    SURGEON:  MD DONALD Paul    IVF: 700cc    EBL: 10cc    UOP: red rubber    SPECIMENS: none    COMPLICATIONS: none    FINDINGS: complete dehiscence of vaginal cuff along entire length, visible bowel, thin anterior & posterior vaginal cuff edges.     DETAILS:  Pt was brought to OR & placed under general anesthesia without difficulty. She was placed in dorsal lithotomy position, prepped & draped appropriately. Time out performed confirmed correct pt & procedure. Weighted speculum placed in vagina & anterior vaginal wall retracted w/ Garcia retractor. Cuff appeared normal with no signs of induration/inflammation. Bowel visible & non-adhesed, gently pushed cephalad away from cuff.  Allis clamps used to grasp cuff angles. 0-vicryl figure 8 sutures placed closing the entire cuff. Minimal bleeding at end of case. All instruments removed. All counts correct

## 2020-07-10 NOTE — TELEPHONE ENCOUNTER
reports pt having itching w/ oxycodone. H/o of allergy to hydrocodone as well. Will try demerol & have vistaril prn itching

## 2020-07-14 ENCOUNTER — OFFICE VISIT (OUTPATIENT)
Dept: OBSTETRICS AND GYNECOLOGY | Facility: CLINIC | Age: 45
End: 2020-07-14
Payer: COMMERCIAL

## 2020-07-14 ENCOUNTER — HOSPITAL ENCOUNTER (OUTPATIENT)
Dept: RADIOLOGY | Facility: HOSPITAL | Age: 45
Discharge: HOME | End: 2020-07-14
Attending: NURSE PRACTITIONER
Payer: COMMERCIAL

## 2020-07-14 VITALS
BODY MASS INDEX: 23.65 KG/M2 | SYSTOLIC BLOOD PRESSURE: 121 MMHG | WEIGHT: 156.06 LBS | HEIGHT: 68 IN | DIASTOLIC BLOOD PRESSURE: 74 MMHG

## 2020-07-14 DIAGNOSIS — R10.2 PELVIC PAIN IN FEMALE: Primary | ICD-10-CM

## 2020-07-14 DIAGNOSIS — R10.2 PELVIC PAIN IN FEMALE: ICD-10-CM

## 2020-07-14 LAB
BILIRUB UR QL STRIP: NEGATIVE
CLARITY UR: CLEAR
COLOR UR: YELLOW
GLUCOSE UR QL STRIP: NEGATIVE
HGB UR QL STRIP: NEGATIVE
KETONES UR QL STRIP: ABNORMAL
LEUKOCYTE ESTERASE UR QL STRIP: NEGATIVE
NITRITE UR QL STRIP: NEGATIVE
PH UR STRIP: >8 [PH] (ref 5–8)
PROT UR QL STRIP: NEGATIVE
SP GR UR STRIP: 1.01 (ref 1–1.03)
URN SPEC COLLECT METH UR: ABNORMAL

## 2020-07-14 PROCEDURE — 3008F BODY MASS INDEX DOCD: CPT | Mod: CPTII,S$GLB,, | Performed by: NURSE PRACTITIONER

## 2020-07-14 PROCEDURE — 74018 XR KUB: ICD-10-PCS | Mod: 26,,, | Performed by: RADIOLOGY

## 2020-07-14 PROCEDURE — 99999 PR PBB SHADOW E&M-EST. PATIENT-LVL IV: CPT | Mod: PBBFAC,,, | Performed by: NURSE PRACTITIONER

## 2020-07-14 PROCEDURE — 74018 RADEX ABDOMEN 1 VIEW: CPT | Mod: TC

## 2020-07-14 PROCEDURE — 87086 URINE CULTURE/COLONY COUNT: CPT

## 2020-07-14 PROCEDURE — 74018 RADEX ABDOMEN 1 VIEW: CPT | Mod: 26,,, | Performed by: RADIOLOGY

## 2020-07-14 PROCEDURE — 99214 OFFICE O/P EST MOD 30 MIN: CPT | Mod: S$GLB,,, | Performed by: NURSE PRACTITIONER

## 2020-07-14 PROCEDURE — 81003 URINALYSIS AUTO W/O SCOPE: CPT

## 2020-07-14 PROCEDURE — 3008F PR BODY MASS INDEX (BMI) DOCUMENTED: ICD-10-PCS | Mod: CPTII,S$GLB,, | Performed by: NURSE PRACTITIONER

## 2020-07-14 PROCEDURE — 99999 PR PBB SHADOW E&M-EST. PATIENT-LVL IV: ICD-10-PCS | Mod: PBBFAC,,, | Performed by: NURSE PRACTITIONER

## 2020-07-14 PROCEDURE — 99214 PR OFFICE/OUTPT VISIT, EST, LEVL IV, 30-39 MIN: ICD-10-PCS | Mod: S$GLB,,, | Performed by: NURSE PRACTITIONER

## 2020-07-14 RX ORDER — HYDROCODONE BITARTRATE AND ACETAMINOPHEN 7.5; 325 MG/1; MG/1
1 TABLET ORAL
Qty: 15 TABLET | Refills: 0 | Status: ON HOLD | OUTPATIENT
Start: 2020-07-14 | End: 2020-11-12 | Stop reason: ALTCHOICE

## 2020-07-14 NOTE — PROGRESS NOTES
Sandra Dimas is a 44 y.o. female  s/p cuff dehiscence repair on 2020 - she is having pain that awoke her this am and did not relieve with having BM.  Did take ex lax last night.  Has had some issues with urinary retention - has to sit for urine and is painful.  No vaginal bleeding.   Pt was afebrile as she entered building - at low 98 F .      LMP: No LMP recorded (lmp unknown). Patient has had a hysterectomy..     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    Endometriosis     General anesthetics causing adverse effect in therapeutic use     wakes up with severe anxiety attacks    Hemicrania continua     Migraines      Past Surgical History:   Procedure Laterality Date    APPENDECTOMY      CYST REMOVAL Right 2019    Procedure: EXCISION, CYST;  Surgeon: NATALI Shah MD;  Location: [facility] OR;  Service: OB/GYN;  Laterality: Right;  Sebaceous cyst    diagnostic laprascopy      ESOPHAGOGASTRODUODENOSCOPY N/A 2018    Procedure: EGD (ESOPHAGOGASTRODUODENOSCOPY);  Surgeon: Bossman Bustos MD;  Location: [facility];  Service: Endoscopy;  Laterality: N/A;    FULGURATION OF ENDOMETRIOSIS  2018    Procedure: FULGURATION, ENDOMETRIOSIS;  Surgeon: Zehra Jensen MD;  Location: [facility] OR;  Service: OB/GYN;;  endometriosis implant resection    HYSTEROSCOPY WITH HYDROTHERMAL ABLATION OF ENDOMETRIUM WITH DILATION AND CURETTAGE N/A 2018    Procedure: HYSTEROSCOPY, WITH DILATION AND CURETTAGE OF UTERUS AND HYDROTHERMAL ENDOMETRIAL ABLATION;  Surgeon: Zehra Jensen MD;  Location: [facility] OR;  Service: OB/GYN;  Laterality: N/A;    LAPAROSCOPIC SALPINGECTOMY Bilateral 2018    Procedure: SALPINGECTOMY, LAPAROSCOPIC;  Surgeon: Zehra Jensen MD;  Location: [facility] OR;  Service: OB/GYN;  Laterality: Bilateral;    LAPAROTOMY, EXPLORATORY N/A 2019    Procedure: LAPAROTOMY, EXPLORATORY;  Surgeon: Ramakrishna Boone MD;  Location: [facility];   Service: General;  Laterality: N/A;    REPAIR OF VAGINAL CUFF N/A 7/9/2020    Procedure: REPAIR, VAGINAL CUFF;  Surgeon: Sailaja Addison MD;  Location: [facility] OR;  Service: OB/GYN;  Laterality: N/A;    ROBOT-ASSISTED LAPAROSCOPIC ABDOMINAL HYSTERECTOMY USING DA MAVIS XI N/A 12/30/2019    Procedure: XI ROBOTIC HYSTERECTOMY;  Surgeon: NATALI Shah MD;  Location: [facility] OR;  Service: OB/GYN;  Laterality: N/A;    ROBOT-ASSISTED LAPAROSCOPIC SURGICAL REMOVAL OF OVARY USING DA MAVIS XI Bilateral 12/30/2019    Procedure: XI ROBOTIC OOPHORECTOMY;  Surgeon: NATALI Shah MD;  Location: [facility] OR;  Service: OB/GYN;  Laterality: Bilateral;    robotic assisted laparoscopy with excision of ovarian endometrioma and chromotubation       Social History     Socioeconomic History    Marital status:      Spouse name: Not on file    Number of children: Not on file    Years of education: Not on file    Highest education level: Not on file   Occupational History    Not on file   Social Needs    Financial resource strain: Not on file    Food insecurity     Worry: Not on file     Inability: Not on file    Transportation needs     Medical: Not on file     Non-medical: Not on file   Tobacco Use    Smoking status: Never Smoker    Smokeless tobacco: Never Used   Substance and Sexual Activity    Alcohol use: Not Currently     Frequency: Monthly or less     Drinks per session: 1 or 2     Binge frequency: Never     Comment: rarely No alcohol 72h prior to sx    Drug use: No    Sexual activity: Yes     Partners: Male   Lifestyle    Physical activity     Days per week: Not on file     Minutes per session: Not on file    Stress: Not on file   Relationships    Social connections     Talks on phone: Not on file     Gets together: Not on file     Attends Yarsanism service: Not on file     Active member of club or organization: Not on file     Attends meetings of clubs or organizations: Not on file     Relationship  "status: Not on file   Other Topics Concern    Not on file   Social History Narrative    No pets or smokers in household.     Family History   Problem Relation Age of Onset    Strabismus Neg Hx     Retinal detachment Neg Hx     Macular degeneration Neg Hx     Glaucoma Neg Hx     Blindness Neg Hx     Amblyopia Neg Hx     Breast cancer Neg Hx     Colon cancer Neg Hx     Ovarian cancer Neg Hx     Thyroid disease Neg Hx     Migraines Neg Hx     Asthma Neg Hx      OB History        4    Para   3    Term   3            AB   1    Living   3       SAB        TAB        Ectopic        Multiple        Live Births                     /74   Ht 5' 8" (1.727 m)   Wt 70.8 kg (156 lb 1.4 oz)   LMP  (LMP Unknown)   BMI 23.73 kg/m²       ROS:  Per hpi    PHYSICAL EXAM:  APPEARANCE: Well nourished, well developed, in no acute distress.  AFFECT: WNL, alert and oriented x 3  PELVIC: Normal external genitalia without lesions.  Normal hair distribution.  Adequate perineal body, normal urethral meatus.  Vagina moist and well rugated without lesions or discharge, cuff appeared intact with sutures noted, no bleeding. Abdomen is soft but tender with palpation, bowel sounds hypoactive.     1. Pelvic pain in female  CBC auto differential    Comprehensive metabolic panel    X-Ray KUB    AND PLAN:    UA dip showed only trace leukocytes.  Discussed case with Dr. Addison - sending for labs and KUB  Will send home try otc gas medication  CBC and KUB normal and comp is normal   Comp metabolic still pending will send home at this time                "

## 2020-07-16 LAB — BACTERIA UR CULT: NO GROWTH

## 2020-07-21 ENCOUNTER — PATIENT OUTREACH (OUTPATIENT)
Dept: ADMINISTRATIVE | Facility: OTHER | Age: 45
End: 2020-07-21

## 2020-07-21 NOTE — PROGRESS NOTES
Requested updates within Care Everywhere.  Patient's chart was reviewed for overdue CARIDAD topics.  Immunizations reconciled.    Mammogram tasked to patient.

## 2020-07-23 VITALS
RESPIRATION RATE: 16 BRPM | TEMPERATURE: 98 F | DIASTOLIC BLOOD PRESSURE: 85 MMHG | OXYGEN SATURATION: 100 % | HEART RATE: 99 BPM | SYSTOLIC BLOOD PRESSURE: 119 MMHG

## 2020-07-24 ENCOUNTER — LAB VISIT (OUTPATIENT)
Dept: LAB | Facility: HOSPITAL | Age: 45
End: 2020-07-24
Attending: PEDIATRICS
Payer: COMMERCIAL

## 2020-07-24 ENCOUNTER — HOSPITAL ENCOUNTER (OUTPATIENT)
Dept: RADIOLOGY | Facility: HOSPITAL | Age: 45
Discharge: HOME | End: 2020-07-24
Attending: PEDIATRICS
Payer: COMMERCIAL

## 2020-07-24 ENCOUNTER — OFFICE VISIT (OUTPATIENT)
Dept: INTERNAL MEDICINE | Facility: CLINIC | Age: 45
End: 2020-07-24
Payer: COMMERCIAL

## 2020-07-24 VITALS
TEMPERATURE: 98 F | HEART RATE: 86 BPM | RESPIRATION RATE: 16 BRPM | DIASTOLIC BLOOD PRESSURE: 66 MMHG | WEIGHT: 155.19 LBS | BODY MASS INDEX: 23.52 KG/M2 | OXYGEN SATURATION: 98 % | HEIGHT: 68 IN | SYSTOLIC BLOOD PRESSURE: 98 MMHG

## 2020-07-24 DIAGNOSIS — R10.12 LEFT UPPER QUADRANT PAIN: ICD-10-CM

## 2020-07-24 DIAGNOSIS — G89.18 POST-OP PAIN: ICD-10-CM

## 2020-07-24 DIAGNOSIS — G89.18 POST-OP PAIN: Primary | ICD-10-CM

## 2020-07-24 LAB
BILIRUB UR QL STRIP: NEGATIVE
CLARITY UR: ABNORMAL
COLOR UR: YELLOW
GLUCOSE UR QL STRIP: NEGATIVE
HGB UR QL STRIP: NEGATIVE
KETONES UR QL STRIP: NEGATIVE
LEUKOCYTE ESTERASE UR QL STRIP: NEGATIVE
NITRITE UR QL STRIP: NEGATIVE
PH UR STRIP: 6 [PH] (ref 5–8)
PROT UR QL STRIP: NEGATIVE
SP GR UR STRIP: >=1.03 (ref 1–1.03)
URN SPEC COLLECT METH UR: ABNORMAL

## 2020-07-24 PROCEDURE — 74019 RADEX ABDOMEN 2 VIEWS: CPT | Mod: 26,,, | Performed by: RADIOLOGY

## 2020-07-24 PROCEDURE — 81003 URINALYSIS AUTO W/O SCOPE: CPT

## 2020-07-24 PROCEDURE — 3008F BODY MASS INDEX DOCD: CPT | Mod: CPTII,S$GLB,, | Performed by: PEDIATRICS

## 2020-07-24 PROCEDURE — 99999 PR PBB SHADOW E&M-EST. PATIENT-LVL V: CPT | Mod: PBBFAC,,, | Performed by: PEDIATRICS

## 2020-07-24 PROCEDURE — 74019 RADEX ABDOMEN 2 VIEWS: CPT | Mod: TC

## 2020-07-24 PROCEDURE — 99214 PR OFFICE/OUTPT VISIT, EST, LEVL IV, 30-39 MIN: ICD-10-PCS | Mod: S$GLB,,, | Performed by: PEDIATRICS

## 2020-07-24 PROCEDURE — 99999 PR PBB SHADOW E&M-EST. PATIENT-LVL V: ICD-10-PCS | Mod: PBBFAC,,, | Performed by: PEDIATRICS

## 2020-07-24 PROCEDURE — 3008F PR BODY MASS INDEX (BMI) DOCUMENTED: ICD-10-PCS | Mod: CPTII,S$GLB,, | Performed by: PEDIATRICS

## 2020-07-24 PROCEDURE — 74019 XR ABDOMEN FLAT AND ERECT: ICD-10-PCS | Mod: 26,,, | Performed by: RADIOLOGY

## 2020-07-24 PROCEDURE — 87086 URINE CULTURE/COLONY COUNT: CPT

## 2020-07-24 PROCEDURE — 99214 OFFICE O/P EST MOD 30 MIN: CPT | Mod: S$GLB,,, | Performed by: PEDIATRICS

## 2020-07-24 RX ORDER — BUTORPHANOL TARTRATE 10 MG/ML
SPRAY NASAL
COMMUNITY
Start: 2020-07-22

## 2020-07-24 NOTE — PROGRESS NOTES
Subjective:       Patient ID: Sandra Dimas is a 45 y.o. female.    Chief Complaint: Abdominal Pain and Nausea    She is having near continual abd pain since vaginal cuff repair 7/9/20. Saw gyn in f/u 7/14 with negative evaluation. Symptoms persist. Usually annoying but will come in short spasms. Generally left sided but can be supra pubically. Had constipation, but  had her take mag citrate/MOM and fiber and is now with 2-3x day liquid stools. Mild nausea, no emesis, no fever. Has had mild dysuria post op.    Review of Systems   Constitutional: Negative for fever and unexpected weight change.   HENT: Negative for congestion and rhinorrhea.    Eyes: Negative for discharge and redness.   Respiratory: Negative for cough and wheezing.    Cardiovascular: Negative for chest pain, palpitations and leg swelling.   Gastrointestinal: Positive for abdominal pain, constipation, diarrhea and nausea. Negative for abdominal distention, anal bleeding, blood in stool, rectal pain and vomiting.   Genitourinary: Negative for decreased urine volume, difficulty urinating and menstrual problem.   Musculoskeletal: Negative for arthralgias and joint swelling.   Skin: Negative for rash and wound.   Neurological: Negative for syncope and headaches.   Psychiatric/Behavioral: Negative for behavioral problems and sleep disturbance.       Objective:      Physical Exam  Constitutional:       General: She is not in acute distress.     Appearance: She is well-developed. She is not ill-appearing or toxic-appearing.      Comments: Mildly apprehensive   Neck:      Thyroid: No thyromegaly.      Vascular: No JVD.   Cardiovascular:      Rate and Rhythm: Normal rate and regular rhythm.      Heart sounds: Normal heart sounds. No murmur.   Pulmonary:      Effort: Pulmonary effort is normal. No respiratory distress.      Breath sounds: Normal breath sounds. No wheezing or rales.   Abdominal:      General: There is no distension.      Palpations:  Abdomen is soft. There is no mass.      Tenderness: There is abdominal tenderness (mild/mod LUQ to midline, no rebound). There is no right CVA tenderness, left CVA tenderness, guarding or rebound.      Hernia: No hernia is present.      Comments: BS quiet but present in all fields equally. Old surgical scars well healed.   Lymphadenopathy:      Cervical: No cervical adenopathy.   Skin:     Capillary Refill: Capillary refill takes less than 2 seconds.      Findings: No rash.   Neurological:      Mental Status: She is alert and oriented to person, place, and time.      Cranial Nerves: No cranial nerve deficit.      Coordination: Coordination normal.   Psychiatric:         Behavior: Behavior normal.         Thought Content: Thought content normal.         Judgment: Judgment normal.         Assessment:       1. Post-op pain    2. Left upper quadrant pain        Plan:       Post-op pain  -     Urinalysis; Future; Expected date: 07/24/2020  -     Urine culture; Future; Expected date: 07/24/2020  -     Comprehensive metabolic panel; Future; Expected date: 07/24/2020  -     CBC auto differential; Future; Expected date: 07/24/2020  -     X-Ray Abdomen Flat And Erect; Future; Expected date: 07/24/2020  -     CT Abdomen Pelvis W Wo Contrast; Future; Expected date: 07/24/2020  -     Amylase; Future; Expected date: 07/24/2020  -     Lipase; Future; Expected date: 07/24/2020    Left upper quadrant pain  -     Urinalysis; Future; Expected date: 07/24/2020  -     Urine culture; Future; Expected date: 07/24/2020  -     Comprehensive metabolic panel; Future; Expected date: 07/24/2020  -     CBC auto differential; Future; Expected date: 07/24/2020  -     X-Ray Abdomen Flat And Erect; Future; Expected date: 07/24/2020  -     CT Abdomen Pelvis W Wo Contrast; Future; Expected date: 07/24/2020  -     Amylase; Future; Expected date: 07/24/2020  -     Lipase; Future; Expected date: 07/24/2020    Repeat w/u and get CT abd/pelvis to look for  causes. If emesis/fever/worsening while awaiting CT, to ER.

## 2020-07-26 LAB — BACTERIA UR CULT: NORMAL

## 2020-07-27 ENCOUNTER — TELEPHONE (OUTPATIENT)
Dept: RADIOLOGY | Facility: HOSPITAL | Age: 45
End: 2020-07-27

## 2020-07-28 ENCOUNTER — TELEPHONE (OUTPATIENT)
Dept: INTERNAL MEDICINE | Facility: CLINIC | Age: 45
End: 2020-07-28

## 2020-07-28 ENCOUNTER — HOSPITAL ENCOUNTER (OUTPATIENT)
Dept: RADIOLOGY | Facility: HOSPITAL | Age: 45
Discharge: HOME | End: 2020-07-28
Attending: PEDIATRICS
Payer: COMMERCIAL

## 2020-07-28 DIAGNOSIS — G89.18 POST-OP PAIN: ICD-10-CM

## 2020-07-28 DIAGNOSIS — R10.12 LEFT UPPER QUADRANT PAIN: ICD-10-CM

## 2020-07-28 PROCEDURE — 25500020 PHARM REV CODE 255: Performed by: PEDIATRICS

## 2020-07-28 PROCEDURE — 74178 CT ABDOMEN PELVIS W WO CONTRAST: ICD-10-PCS | Mod: 26,,, | Performed by: RADIOLOGY

## 2020-07-28 PROCEDURE — 74178 CT ABD&PLV WO CNTR FLWD CNTR: CPT | Mod: 26,,, | Performed by: RADIOLOGY

## 2020-07-28 PROCEDURE — 74178 CT ABD&PLV WO CNTR FLWD CNTR: CPT | Mod: TC

## 2020-07-28 RX ORDER — SODIUM, POTASSIUM,MAG SULFATES 17.5-3.13G
1 SOLUTION, RECONSTITUTED, ORAL ORAL ONCE
Qty: 354 ML | Refills: 0 | Status: SHIPPED | OUTPATIENT
Start: 2020-07-28 | End: 2020-07-29

## 2020-07-28 RX ADMIN — IOHEXOL 75 ML: 350 INJECTION, SOLUTION INTRAVENOUS at 10:07

## 2020-07-28 RX ADMIN — IOHEXOL 30 ML: 350 INJECTION, SOLUTION INTRAVENOUS at 07:07

## 2020-07-29 ENCOUNTER — TELEPHONE (OUTPATIENT)
Dept: GASTROENTEROLOGY | Facility: CLINIC | Age: 45
End: 2020-07-29

## 2020-07-29 NOTE — TELEPHONE ENCOUNTER
Called patient to reschedule her recently missed appointment. Patient declined her appointment and stated that she was feeling better.

## 2020-08-05 ENCOUNTER — TELEPHONE (OUTPATIENT)
Dept: INTERNAL MEDICINE | Facility: CLINIC | Age: 45
End: 2020-08-05

## 2020-08-05 RX ORDER — PHENTERMINE HYDROCHLORIDE 37.5 MG/1
37.5 TABLET ORAL
Qty: 30 TABLET | Refills: 0 | Status: SHIPPED | OUTPATIENT
Start: 2020-08-05 | End: 2022-04-29 | Stop reason: SDUPTHER

## 2020-08-14 DIAGNOSIS — R00.2 PALPITATION: ICD-10-CM

## 2020-08-14 DIAGNOSIS — K21.9 GASTROESOPHAGEAL REFLUX DISEASE WITHOUT ESOPHAGITIS: ICD-10-CM

## 2020-08-14 RX ORDER — ATENOLOL 25 MG/1
25 TABLET ORAL DAILY
Qty: 90 TABLET | Refills: 3 | Status: SHIPPED | OUTPATIENT
Start: 2020-08-14 | End: 2021-08-15 | Stop reason: SDUPTHER

## 2020-08-14 RX ORDER — PANTOPRAZOLE SODIUM 40 MG/1
40 TABLET, DELAYED RELEASE ORAL DAILY
Qty: 90 TABLET | Refills: 3 | Status: SHIPPED | OUTPATIENT
Start: 2020-08-14 | End: 2021-08-15 | Stop reason: SDUPTHER

## 2020-08-17 ENCOUNTER — OFFICE VISIT (OUTPATIENT)
Dept: INTERNAL MEDICINE | Facility: CLINIC | Age: 45
End: 2020-08-17
Payer: COMMERCIAL

## 2020-08-17 VITALS
OXYGEN SATURATION: 99 % | HEART RATE: 79 BPM | BODY MASS INDEX: 23.5 KG/M2 | SYSTOLIC BLOOD PRESSURE: 110 MMHG | DIASTOLIC BLOOD PRESSURE: 80 MMHG | TEMPERATURE: 98 F | WEIGHT: 154.56 LBS

## 2020-08-17 DIAGNOSIS — J06.9 UPPER RESPIRATORY TRACT INFECTION, UNSPECIFIED TYPE: Primary | ICD-10-CM

## 2020-08-17 DIAGNOSIS — Z03.818 ENCOUNTER FOR OBSERVATION FOR SUSPECTED EXPOSURE TO OTHER BIOLOGICAL AGENTS RULED OUT: ICD-10-CM

## 2020-08-17 PROCEDURE — U0003 INFECTIOUS AGENT DETECTION BY NUCLEIC ACID (DNA OR RNA); SEVERE ACUTE RESPIRATORY SYNDROME CORONAVIRUS 2 (SARS-COV-2) (CORONAVIRUS DISEASE [COVID-19]), AMPLIFIED PROBE TECHNIQUE, MAKING USE OF HIGH THROUGHPUT TECHNOLOGIES AS DESCRIBED BY CMS-2020-01-R: HCPCS

## 2020-08-17 PROCEDURE — 99999 PR PBB SHADOW E&M-EST. PATIENT-LVL IV: CPT | Mod: PBBFAC,,, | Performed by: PEDIATRICS

## 2020-08-17 PROCEDURE — 3008F PR BODY MASS INDEX (BMI) DOCUMENTED: ICD-10-PCS | Mod: CPTII,S$GLB,, | Performed by: PEDIATRICS

## 2020-08-17 PROCEDURE — 99213 PR OFFICE/OUTPT VISIT, EST, LEVL III, 20-29 MIN: ICD-10-PCS | Mod: S$GLB,,, | Performed by: PEDIATRICS

## 2020-08-17 PROCEDURE — 99999 PR PBB SHADOW E&M-EST. PATIENT-LVL IV: ICD-10-PCS | Mod: PBBFAC,,, | Performed by: PEDIATRICS

## 2020-08-17 PROCEDURE — 99213 OFFICE O/P EST LOW 20 MIN: CPT | Mod: S$GLB,,, | Performed by: PEDIATRICS

## 2020-08-17 PROCEDURE — 3008F BODY MASS INDEX DOCD: CPT | Mod: CPTII,S$GLB,, | Performed by: PEDIATRICS

## 2020-08-17 NOTE — PROGRESS NOTES
Subjective:       Patient ID: Sandra Dimas is a 45 y.o. female.    Chief Complaint: throat pain    Daughter in w/u for covid. Patient has mild ST and uri. NO LOSS OF SMELL/taste, cough/SOB/n/v/d/HA(does have chronic HAs however).    Review of Systems   Constitutional: Negative for fever and unexpected weight change.   HENT: Positive for congestion, postnasal drip, rhinorrhea and sore throat. Negative for sinus pressure, sinus pain and sneezing.    Eyes: Negative for discharge and redness.   Respiratory: Negative for cough, shortness of breath and wheezing.    Cardiovascular: Negative for chest pain, palpitations and leg swelling.   Gastrointestinal: Negative for abdominal pain, constipation, diarrhea and vomiting.   Endocrine: Negative for polydipsia, polyphagia and polyuria.   Genitourinary: Negative for decreased urine volume, difficulty urinating and menstrual problem.   Musculoskeletal: Negative for arthralgias and joint swelling.   Skin: Negative for rash and wound.   Neurological: Negative for syncope and headaches.   Psychiatric/Behavioral: Negative for behavioral problems and sleep disturbance.       Objective:      Physical Exam  Constitutional:       General: She is not in acute distress.     Appearance: She is well-developed.   HENT:      Right Ear: External ear normal.      Left Ear: External ear normal.      Nose: Nose normal. No congestion or rhinorrhea.      Mouth/Throat:      Pharynx: No oropharyngeal exudate or posterior oropharyngeal erythema.   Eyes:      Conjunctiva/sclera: Conjunctivae normal.      Pupils: Pupils are equal, round, and reactive to light.   Neck:      Musculoskeletal: Normal range of motion.      Thyroid: No thyromegaly.      Vascular: No JVD.   Cardiovascular:      Rate and Rhythm: Normal rate and regular rhythm.      Heart sounds: Normal heart sounds. No murmur.   Pulmonary:      Effort: Pulmonary effort is normal. No respiratory distress.      Breath sounds: Normal breath  sounds. No wheezing or rales.   Abdominal:      General: There is no distension.      Palpations: Abdomen is soft. There is no mass.      Tenderness: There is no abdominal tenderness. There is no guarding or rebound.   Lymphadenopathy:      Cervical: No cervical adenopathy.   Skin:     Capillary Refill: Capillary refill takes less than 2 seconds.      Findings: No rash.   Neurological:      Mental Status: She is alert and oriented to person, place, and time.      Cranial Nerves: No cranial nerve deficit.      Coordination: Coordination normal.   Psychiatric:         Behavior: Behavior normal.         Thought Content: Thought content normal.         Judgment: Judgment normal.         Assessment:       1. Upper respiratory tract infection, unspecified type    2. Encounter for observation for suspected exposure to other biological agents ruled out        Plan:       Upper respiratory tract infection, unspecified type    Encounter for observation for suspected exposure to other biological agents ruled out  -     COVID-19 Routine Screening    Presumed Covid until patient's and daughter's test back. Isolation d/w, symptomatic treatment. CDC guidelines d/w.

## 2020-08-18 RX ORDER — FLUOXETINE HYDROCHLORIDE 20 MG/1
CAPSULE ORAL
Qty: 90 CAPSULE | Refills: 3 | Status: SHIPPED | OUTPATIENT
Start: 2020-08-18 | End: 2021-02-10 | Stop reason: SDUPTHER

## 2020-08-20 LAB — SARS-COV-2 RNA RESP QL NAA+PROBE: NOT DETECTED

## 2020-08-21 ENCOUNTER — OFFICE VISIT (OUTPATIENT)
Dept: INTERNAL MEDICINE | Facility: CLINIC | Age: 45
End: 2020-08-21
Payer: COMMERCIAL

## 2020-08-21 VITALS
BODY MASS INDEX: 23.56 KG/M2 | OXYGEN SATURATION: 97 % | WEIGHT: 155.44 LBS | HEART RATE: 61 BPM | RESPIRATION RATE: 16 BRPM | SYSTOLIC BLOOD PRESSURE: 116 MMHG | TEMPERATURE: 97 F | HEIGHT: 68 IN | DIASTOLIC BLOOD PRESSURE: 82 MMHG

## 2020-08-21 DIAGNOSIS — J20.8 ACUTE BACTERIAL BRONCHITIS: Primary | ICD-10-CM

## 2020-08-21 DIAGNOSIS — B96.89 ACUTE BACTERIAL BRONCHITIS: Primary | ICD-10-CM

## 2020-08-21 PROCEDURE — 3008F PR BODY MASS INDEX (BMI) DOCUMENTED: ICD-10-PCS | Mod: CPTII,S$GLB,, | Performed by: PEDIATRICS

## 2020-08-21 PROCEDURE — 99999 PR PBB SHADOW E&M-EST. PATIENT-LVL V: ICD-10-PCS | Mod: PBBFAC,,, | Performed by: PEDIATRICS

## 2020-08-21 PROCEDURE — 3008F BODY MASS INDEX DOCD: CPT | Mod: CPTII,S$GLB,, | Performed by: PEDIATRICS

## 2020-08-21 PROCEDURE — 99999 PR PBB SHADOW E&M-EST. PATIENT-LVL V: CPT | Mod: PBBFAC,,, | Performed by: PEDIATRICS

## 2020-08-21 PROCEDURE — 99213 OFFICE O/P EST LOW 20 MIN: CPT | Mod: S$GLB,,, | Performed by: PEDIATRICS

## 2020-08-21 PROCEDURE — 99213 PR OFFICE/OUTPT VISIT, EST, LEVL III, 20-29 MIN: ICD-10-PCS | Mod: S$GLB,,, | Performed by: PEDIATRICS

## 2020-08-21 RX ORDER — AZITHROMYCIN 250 MG/1
TABLET, FILM COATED ORAL
Qty: 6 TABLET | Refills: 0 | Status: SHIPPED | OUTPATIENT
Start: 2020-08-21 | End: 2020-08-21 | Stop reason: SDUPTHER

## 2020-08-21 RX ORDER — AZITHROMYCIN 250 MG/1
TABLET, FILM COATED ORAL
Qty: 6 TABLET | Refills: 0 | Status: SHIPPED | OUTPATIENT
Start: 2020-08-21 | End: 2020-08-26

## 2020-08-21 RX ORDER — DIPHENHYDRAMINE HCL 25 MG
CAPSULE ORAL
Status: ON HOLD | COMMUNITY
End: 2020-11-12 | Stop reason: ALTCHOICE

## 2020-08-21 NOTE — PROGRESS NOTES
Subjective:       Patient ID: Sandra Dimas is a 45 y.o. female.    Chief Complaint: Ear Problem (BL), Sore Throat, Cough, Generalized Body Aches, and Chills    Her resp symptoms are worsening. No fever but worsening congestion, full ears, ST. PND, productive cough, HA, and myalgias. No SON, N/V/D, loss smell/taste. Daughters strep and both her and daughter covid negative.    Review of Systems   Constitutional: Positive for chills and fatigue. Negative for fever and unexpected weight change.   HENT: Positive for congestion, ear pain, postnasal drip, rhinorrhea, sinus pressure, sinus pain and sore throat. Negative for trouble swallowing.    Eyes: Negative for discharge and redness.   Respiratory: Positive for cough. Negative for shortness of breath and wheezing.    Cardiovascular: Negative for chest pain, palpitations and leg swelling.   Gastrointestinal: Negative for abdominal pain, constipation, diarrhea, nausea and vomiting.   Genitourinary: Negative for decreased urine volume, difficulty urinating and menstrual problem.   Musculoskeletal: Positive for myalgias. Negative for arthralgias and joint swelling.   Skin: Negative for rash and wound.   Neurological: Negative for syncope and headaches.   Psychiatric/Behavioral: Negative for behavioral problems and sleep disturbance.       Objective:      Physical Exam  Constitutional:       General: She is not in acute distress.     Appearance: Normal appearance. She is well-developed. She is ill-appearing (tired, miserable). She is not toxic-appearing.   HENT:      Right Ear: Tympanic membrane, ear canal and external ear normal.      Left Ear: Tympanic membrane, ear canal and external ear normal.      Nose: Congestion and rhinorrhea present.      Mouth/Throat:      Pharynx: No posterior oropharyngeal erythema.   Eyes:      General:         Right eye: No discharge.         Left eye: No discharge.      Conjunctiva/sclera: Conjunctivae normal.      Pupils: Pupils are  equal, round, and reactive to light.   Neck:      Musculoskeletal: Normal range of motion.      Thyroid: No thyromegaly.      Vascular: No JVD.   Cardiovascular:      Rate and Rhythm: Normal rate and regular rhythm.      Heart sounds: Normal heart sounds. No murmur.   Pulmonary:      Effort: Pulmonary effort is normal. No respiratory distress.      Breath sounds: Normal breath sounds. No wheezing or rales.   Abdominal:      General: There is no distension.      Palpations: Abdomen is soft. There is no mass.      Tenderness: There is no abdominal tenderness. There is no guarding or rebound.   Lymphadenopathy:      Cervical: No cervical adenopathy.   Skin:     Capillary Refill: Capillary refill takes less than 2 seconds.      Findings: No rash.   Neurological:      Mental Status: She is alert and oriented to person, place, and time.      Cranial Nerves: No cranial nerve deficit.      Coordination: Coordination normal.   Psychiatric:         Mood and Affect: Mood normal.         Behavior: Behavior normal.         Thought Content: Thought content normal.         Judgment: Judgment normal.         Assessment:       1. Acute bacterial bronchitis        Plan:       Acute bacterial bronchitis  -     Discontinue: azithromycin (Z-PAOLA) 250 MG tablet; Take 2 tablets by mouth on day 1; Take 1 tablet by mouth on days 2-5  Dispense: 6 tablet; Refill: 0  -     azithromycin (Z-PAOLA) 250 MG tablet; Take 2 tablets by mouth on day 1; Take 1 tablet by mouth on days 2-5  Dispense: 6 tablet; Refill: 0    Zpack due to duration and worsening symptoms. Mucinex DM, tylenol prn. She declines steroids. Hydration and rest discussed. F/U as needed.

## 2020-09-11 DIAGNOSIS — R06.83 PRIMARY SNORING: Primary | ICD-10-CM

## 2020-09-11 DIAGNOSIS — G43.919 INTRACTABLE MIGRAINE WITHOUT STATUS MIGRAINOSUS, UNSPECIFIED MIGRAINE TYPE: ICD-10-CM

## 2020-09-11 DIAGNOSIS — D50.0 IRON DEFICIENCY ANEMIA DUE TO CHRONIC BLOOD LOSS: ICD-10-CM

## 2020-09-17 ENCOUNTER — LAB VISIT (OUTPATIENT)
Dept: LAB | Facility: HOSPITAL | Age: 45
End: 2020-09-17
Attending: INTERNAL MEDICINE
Payer: COMMERCIAL

## 2020-09-17 DIAGNOSIS — R06.83 PRIMARY SNORING: ICD-10-CM

## 2020-09-17 DIAGNOSIS — G43.919 INTRACTABLE MIGRAINE WITHOUT STATUS MIGRAINOSUS, UNSPECIFIED MIGRAINE TYPE: ICD-10-CM

## 2020-09-17 DIAGNOSIS — D50.0 IRON DEFICIENCY ANEMIA DUE TO CHRONIC BLOOD LOSS: ICD-10-CM

## 2020-09-17 LAB
25(OH)D3+25(OH)D2 SERPL-MCNC: 37 NG/ML (ref 30–96)
BASOPHILS # BLD AUTO: 0.03 K/UL (ref 0–0.2)
BASOPHILS NFR BLD: 0.5 % (ref 0–1.9)
CHOLEST SERPL-MCNC: 283 MG/DL (ref 120–199)
CHOLEST/HDLC SERPL: 5 {RATIO} (ref 2–5)
DIFFERENTIAL METHOD: ABNORMAL
EOSINOPHIL # BLD AUTO: 0.1 K/UL (ref 0–0.5)
EOSINOPHIL NFR BLD: 1.4 % (ref 0–8)
ERYTHROCYTE [DISTWIDTH] IN BLOOD BY AUTOMATED COUNT: 13.4 % (ref 11.5–14.5)
ESTRADIOL SERPL-MCNC: 42 PG/ML
FERRITIN SERPL-MCNC: 201 NG/ML (ref 20–300)
HCT VFR BLD AUTO: 41.5 % (ref 37–48.5)
HDLC SERPL-MCNC: 57 MG/DL (ref 40–75)
HDLC SERPL: 20.1 % (ref 20–50)
HGB BLD-MCNC: 12.7 G/DL (ref 12–16)
IMM GRANULOCYTES # BLD AUTO: 0.01 K/UL (ref 0–0.04)
IMM GRANULOCYTES NFR BLD AUTO: 0.2 % (ref 0–0.5)
IRON SERPL-MCNC: 43 UG/DL (ref 30–160)
LDLC SERPL CALC-MCNC: 173 MG/DL (ref 63–159)
LYMPHOCYTES # BLD AUTO: 2.6 K/UL (ref 1–4.8)
LYMPHOCYTES NFR BLD: 45.4 % (ref 18–48)
MCH RBC QN AUTO: 25.2 PG (ref 27–31)
MCHC RBC AUTO-ENTMCNC: 30.6 G/DL (ref 32–36)
MCV RBC AUTO: 82 FL (ref 82–98)
MONOCYTES # BLD AUTO: 0.4 K/UL (ref 0.3–1)
MONOCYTES NFR BLD: 7.6 % (ref 4–15)
NEUTROPHILS # BLD AUTO: 2.6 K/UL (ref 1.8–7.7)
NEUTROPHILS NFR BLD: 44.9 % (ref 38–73)
NONHDLC SERPL-MCNC: 226 MG/DL
NRBC BLD-RTO: 0 /100 WBC
PLATELET # BLD AUTO: 268 K/UL (ref 150–350)
PMV BLD AUTO: 9.7 FL (ref 9.2–12.9)
RBC # BLD AUTO: 5.04 M/UL (ref 4–5.4)
SATURATED IRON: 12 % (ref 20–50)
T3 SERPL-MCNC: 78 NG/DL (ref 60–180)
T4 FREE SERPL-MCNC: 0.82 NG/DL (ref 0.71–1.51)
TESTOST SERPL-MCNC: 15 NG/DL (ref 5–73)
TOTAL IRON BINDING CAPACITY: 367 UG/DL (ref 250–450)
TRANSFERRIN SERPL-MCNC: 248 MG/DL (ref 200–375)
TRIGL SERPL-MCNC: 265 MG/DL (ref 30–150)
TSH SERPL DL<=0.005 MIU/L-ACNC: 1.96 UIU/ML (ref 0.4–4)
WBC # BLD AUTO: 5.81 K/UL (ref 3.9–12.7)

## 2020-09-17 PROCEDURE — 83540 ASSAY OF IRON: CPT

## 2020-09-17 PROCEDURE — 82670 ASSAY OF TOTAL ESTRADIOL: CPT

## 2020-09-17 PROCEDURE — 84443 ASSAY THYROID STIM HORMONE: CPT

## 2020-09-17 PROCEDURE — 84480 ASSAY TRIIODOTHYRONINE (T3): CPT

## 2020-09-17 PROCEDURE — 84439 ASSAY OF FREE THYROXINE: CPT

## 2020-09-17 PROCEDURE — 36415 COLL VENOUS BLD VENIPUNCTURE: CPT

## 2020-09-17 PROCEDURE — 82530 CORTISOL FREE: CPT

## 2020-09-17 PROCEDURE — 82306 VITAMIN D 25 HYDROXY: CPT

## 2020-09-17 PROCEDURE — 84403 ASSAY OF TOTAL TESTOSTERONE: CPT

## 2020-09-17 PROCEDURE — 85025 COMPLETE CBC W/AUTO DIFF WBC: CPT

## 2020-09-17 PROCEDURE — 82728 ASSAY OF FERRITIN: CPT

## 2020-09-17 PROCEDURE — 80061 LIPID PANEL: CPT

## 2020-09-18 DIAGNOSIS — D50.0 IRON DEFICIENCY ANEMIA DUE TO CHRONIC BLOOD LOSS: Primary | ICD-10-CM

## 2020-09-18 RX ORDER — ONDANSETRON HYDROCHLORIDE 8 MG/1
8 TABLET, FILM COATED ORAL EVERY 8 HOURS PRN
Qty: 30 TABLET | Refills: 1 | Status: SHIPPED | OUTPATIENT
Start: 2020-09-18 | End: 2021-06-05

## 2020-09-18 RX ORDER — SODIUM CHLORIDE 0.9 % (FLUSH) 0.9 %
10 SYRINGE (ML) INJECTION
Status: CANCELLED | OUTPATIENT
Start: 2020-09-18

## 2020-09-18 RX ORDER — HEPARIN 100 UNIT/ML
500 SYRINGE INTRAVENOUS
Status: CANCELLED | OUTPATIENT
Start: 2020-09-18

## 2020-09-18 RX ORDER — HEPARIN 100 UNIT/ML
500 SYRINGE INTRAVENOUS
Status: CANCELLED | OUTPATIENT
Start: 2020-10-05

## 2020-09-18 RX ORDER — SODIUM CHLORIDE 0.9 % (FLUSH) 0.9 %
10 SYRINGE (ML) INJECTION
Status: CANCELLED | OUTPATIENT
Start: 2020-10-05

## 2020-09-18 NOTE — PROGRESS NOTES
Called by physician notify me patient of intolerance of oral iron treated with intravenous iron in the past documented iron deficiency orders have been written and reviewed

## 2020-09-21 ENCOUNTER — TELEPHONE (OUTPATIENT)
Dept: HEMATOLOGY/ONCOLOGY | Facility: CLINIC | Age: 45
End: 2020-09-21

## 2020-09-23 ENCOUNTER — TELEPHONE (OUTPATIENT)
Dept: RADIOLOGY | Facility: HOSPITAL | Age: 45
End: 2020-09-23

## 2020-09-25 LAB — CORTIS F SERPL-MCNC: 0.46 MCG/DL

## 2020-09-28 ENCOUNTER — TELEPHONE (OUTPATIENT)
Dept: RADIOLOGY | Facility: HOSPITAL | Age: 45
End: 2020-09-28

## 2020-09-28 ENCOUNTER — INFUSION (OUTPATIENT)
Dept: INFUSION THERAPY | Facility: HOSPITAL | Age: 45
End: 2020-09-28
Attending: INTERNAL MEDICINE
Payer: COMMERCIAL

## 2020-09-28 ENCOUNTER — OFFICE VISIT (OUTPATIENT)
Dept: HEMATOLOGY/ONCOLOGY | Facility: CLINIC | Age: 45
End: 2020-09-28
Payer: COMMERCIAL

## 2020-09-28 VITALS
SYSTOLIC BLOOD PRESSURE: 97 MMHG | HEART RATE: 61 BPM | HEIGHT: 68 IN | OXYGEN SATURATION: 98 % | DIASTOLIC BLOOD PRESSURE: 64 MMHG | BODY MASS INDEX: 23.63 KG/M2 | WEIGHT: 155.88 LBS | TEMPERATURE: 98 F

## 2020-09-28 VITALS
TEMPERATURE: 98 F | RESPIRATION RATE: 17 BRPM | DIASTOLIC BLOOD PRESSURE: 64 MMHG | SYSTOLIC BLOOD PRESSURE: 96 MMHG | HEART RATE: 55 BPM | OXYGEN SATURATION: 96 %

## 2020-09-28 DIAGNOSIS — D50.0 IRON DEFICIENCY ANEMIA DUE TO CHRONIC BLOOD LOSS: Primary | ICD-10-CM

## 2020-09-28 PROCEDURE — 25000003 PHARM REV CODE 250: Performed by: INTERNAL MEDICINE

## 2020-09-28 PROCEDURE — 63600175 PHARM REV CODE 636 W HCPCS: Mod: JG | Performed by: INTERNAL MEDICINE

## 2020-09-28 PROCEDURE — 99999 PR PBB SHADOW E&M-EST. PATIENT-LVL IV: ICD-10-PCS | Mod: PBBFAC,,, | Performed by: INTERNAL MEDICINE

## 2020-09-28 PROCEDURE — 96365 THER/PROPH/DIAG IV INF INIT: CPT

## 2020-09-28 PROCEDURE — 3008F BODY MASS INDEX DOCD: CPT | Mod: CPTII,S$GLB,, | Performed by: INTERNAL MEDICINE

## 2020-09-28 PROCEDURE — 99214 PR OFFICE/OUTPT VISIT, EST, LEVL IV, 30-39 MIN: ICD-10-PCS | Mod: S$GLB,,, | Performed by: INTERNAL MEDICINE

## 2020-09-28 PROCEDURE — 3008F PR BODY MASS INDEX (BMI) DOCUMENTED: ICD-10-PCS | Mod: CPTII,S$GLB,, | Performed by: INTERNAL MEDICINE

## 2020-09-28 PROCEDURE — 99999 PR PBB SHADOW E&M-EST. PATIENT-LVL IV: CPT | Mod: PBBFAC,,, | Performed by: INTERNAL MEDICINE

## 2020-09-28 PROCEDURE — 99214 OFFICE O/P EST MOD 30 MIN: CPT | Mod: S$GLB,,, | Performed by: INTERNAL MEDICINE

## 2020-09-28 RX ADMIN — FERRIC CARBOXYMALTOSE INJECTION 750 MG: 50 INJECTION, SOLUTION INTRAVENOUS at 11:09

## 2020-09-28 NOTE — DISCHARGE INSTRUCTIONS
[facility]  [address] [facility] [address]  [address]  [phone] phone     [phone] fax  Hours of Operation: Monday- Friday 8:00am- 5:00pm  After hours phone  [phone]  Hematology / Oncology Physicians on call      LENOARDA Rosales Dr., Dr., Dr., Dr., NP Cheree Bodden, NP Sydney Prescott, NP      Please call with any concerns regarding your appointment today.

## 2020-09-28 NOTE — PROGRESS NOTES
Subjective:       Patient ID: Sandra Dimas is a 45 y.o. female.    Chief Complaint: Results and Anemia    HPI 45-year-old female returns documented iron deficiency.  Continued after surgery in July of 2020.  Increasing fatigue weakness myalgias    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    Endometriosis     General anesthetics causing adverse effect in therapeutic use     wakes up with severe anxiety attacks    Hemicrania continua     Migraines      Family History   Problem Relation Age of Onset    Strabismus Neg Hx     Retinal detachment Neg Hx     Macular degeneration Neg Hx     Glaucoma Neg Hx     Blindness Neg Hx     Amblyopia Neg Hx     Breast cancer Neg Hx     Colon cancer Neg Hx     Ovarian cancer Neg Hx     Thyroid disease Neg Hx     Migraines Neg Hx     Asthma Neg Hx      Social History     Socioeconomic History    Marital status:      Spouse name: Not on file    Number of children: Not on file    Years of education: Not on file    Highest education level: Not on file   Occupational History    Not on file   Social Needs    Financial resource strain: Not on file    Food insecurity     Worry: Not on file     Inability: Not on file    Transportation needs     Medical: Not on file     Non-medical: Not on file   Tobacco Use    Smoking status: Never Smoker    Smokeless tobacco: Never Used   Substance and Sexual Activity    Alcohol use: Not Currently     Frequency: Monthly or less     Drinks per session: 1 or 2     Binge frequency: Never     Comment: rarely No alcohol 72h prior to sx    Drug use: No    Sexual activity: Yes     Partners: Male   Lifestyle    Physical activity     Days per week: Not on file     Minutes per session: Not on file    Stress: Not on file   Relationships    Social connections     Talks on phone: Not on file     Gets together: Not on file     Attends Zoroastrianism service: Not on file     Active member of club  or organization: Not on file     Attends meetings of clubs or organizations: Not on file     Relationship status: Not on file   Other Topics Concern    Not on file   Social History Narrative    No pets or smokers in household.     Past Surgical History:   Procedure Laterality Date    APPENDECTOMY      CYST REMOVAL Right 12/30/2019    Procedure: EXCISION, CYST;  Surgeon: NATALI Shah MD;  Location: [facility];  Service: OB/GYN;  Laterality: Right;  Sebaceous cyst    diagnostic laprascopy      ESOPHAGOGASTRODUODENOSCOPY N/A 11/30/2018    Procedure: EGD (ESOPHAGOGASTRODUODENOSCOPY);  Surgeon: Bossman Bustos MD;  Location: [facility];  Service: Endoscopy;  Laterality: N/A;    FULGURATION OF ENDOMETRIOSIS  12/26/2018    Procedure: FULGURATION, ENDOMETRIOSIS;  Surgeon: Zehra Jensen MD;  Location: [facility] OR;  Service: OB/GYN;;  endometriosis implant resection    HYSTEROSCOPY WITH HYDROTHERMAL ABLATION OF ENDOMETRIUM WITH DILATION AND CURETTAGE N/A 12/26/2018    Procedure: HYSTEROSCOPY, WITH DILATION AND CURETTAGE OF UTERUS AND HYDROTHERMAL ENDOMETRIAL ABLATION;  Surgeon: Zehra Jensen MD;  Location: [facility];  Service: OB/GYN;  Laterality: N/A;    LAPAROSCOPIC SALPINGECTOMY Bilateral 12/26/2018    Procedure: SALPINGECTOMY, LAPAROSCOPIC;  Surgeon: Zehra Jensen MD;  Location: [facility];  Service: OB/GYN;  Laterality: Bilateral;    LAPAROTOMY, EXPLORATORY N/A 2/7/2019    Procedure: LAPAROTOMY, EXPLORATORY;  Surgeon: Ramakrishna Boone MD;  Location: [facility] OR;  Service: General;  Laterality: N/A;    REPAIR OF VAGINAL CUFF N/A 7/9/2020    Procedure: REPAIR, VAGINAL CUFF;  Surgeon: Sailaja Addison MD;  Location: [facility] OR;  Service: OB/GYN;  Laterality: N/A;    ROBOT-ASSISTED LAPAROSCOPIC ABDOMINAL HYSTERECTOMY USING DA MAVIS XI N/A 12/30/2019    Procedure: XI ROBOTIC HYSTERECTOMY;  Surgeon: NATALI Shah MD;  Location: [facility];  Service: OB/GYN;  Laterality: N/A;    ROBOT-ASSISTED LAPAROSCOPIC SURGICAL  REMOVAL OF OVARY USING DA MAVIS XI Bilateral 12/30/2019    Procedure: XI ROBOTIC OOPHORECTOMY;  Surgeon: NATALI Shah MD;  Location: [facility];  Service: OB/GYN;  Laterality: Bilateral;    robotic assisted laparoscopy with excision of ovarian endometrioma and chromotubation         Labs:  Lab Results   Component Value Date    WBC 5.81 09/17/2020    HGB 12.7 09/17/2020    HCT 41.5 09/17/2020    MCV 82 09/17/2020     09/17/2020     BMP  Lab Results   Component Value Date     07/24/2020    K 4.0 07/24/2020     07/24/2020    CO2 23 07/24/2020    BUN 15 07/24/2020    CREATININE 0.7 07/24/2020    CALCIUM 9.3 07/24/2020    ANIONGAP 11 07/24/2020    ESTGFRAFRICA >60 07/24/2020    EGFRNONAA >60 07/24/2020     Lab Results   Component Value Date    ALT 11 07/24/2020    AST 14 07/24/2020    ALKPHOS 67 07/24/2020    BILITOT 0.2 07/24/2020       Lab Results   Component Value Date    IRON 43 09/17/2020    TIBC 367 09/17/2020    FERRITIN 201 09/17/2020     Lab Results   Component Value Date    KDKBUEIF56 493 06/12/2009     Lab Results   Component Value Date    FOLATE 19.0 06/12/2009     Lab Results   Component Value Date    TSH 1.956 09/17/2020         Review of Systems   Constitutional: Positive for fatigue. Negative for activity change, appetite change, chills, diaphoresis, fever and unexpected weight change.   HENT: Negative for congestion, dental problem, drooling, ear discharge, ear pain, facial swelling, hearing loss, mouth sores, nosebleeds, postnasal drip, rhinorrhea, sinus pressure, sneezing, sore throat, tinnitus, trouble swallowing and voice change.    Eyes: Negative for photophobia, pain, discharge, redness, itching and visual disturbance.   Respiratory: Negative for cough, choking, chest tightness, shortness of breath, wheezing and stridor.    Cardiovascular: Negative for chest pain, palpitations and leg swelling.   Gastrointestinal: Negative for abdominal distention, abdominal pain, anal  bleeding, blood in stool, constipation, diarrhea, nausea, rectal pain and vomiting.   Endocrine: Negative for cold intolerance, heat intolerance, polydipsia, polyphagia and polyuria.   Genitourinary: Negative for decreased urine volume, difficulty urinating, dyspareunia, dysuria, enuresis, flank pain, frequency, genital sores, hematuria, menstrual problem, pelvic pain, urgency, vaginal bleeding, vaginal discharge and vaginal pain.   Musculoskeletal: Positive for arthralgias and myalgias. Negative for back pain, gait problem, joint swelling, neck pain and neck stiffness.   Skin: Negative for color change, pallor and rash.   Allergic/Immunologic: Negative for environmental allergies, food allergies and immunocompromised state.   Neurological: Negative for dizziness, tremors, seizures, syncope, facial asymmetry, speech difficulty, weakness, light-headedness, numbness and headaches.   Hematological: Negative for adenopathy. Does not bruise/bleed easily.   Psychiatric/Behavioral: Positive for dysphoric mood. Negative for agitation, behavioral problems, confusion, decreased concentration, hallucinations, self-injury, sleep disturbance and suicidal ideas. The patient is nervous/anxious. The patient is not hyperactive.        Objective:      Physical Exam  Vitals signs reviewed.   Constitutional:       General: She is not in acute distress.     Appearance: She is well-developed. She is not diaphoretic.   HENT:      Head: Normocephalic and atraumatic.      Right Ear: External ear normal.      Left Ear: External ear normal.      Nose: Nose normal.      Right Sinus: No maxillary sinus tenderness or frontal sinus tenderness.      Left Sinus: No maxillary sinus tenderness or frontal sinus tenderness.      Mouth/Throat:      Pharynx: No oropharyngeal exudate.   Eyes:      General: Lids are normal. No scleral icterus.        Right eye: No discharge.         Left eye: No discharge.      Conjunctiva/sclera: Conjunctivae normal.       Right eye: Right conjunctiva is not injected. No hemorrhage.     Left eye: Left conjunctiva is not injected. No hemorrhage.     Pupils: Pupils are equal, round, and reactive to light.   Neck:      Musculoskeletal: Normal range of motion and neck supple.      Thyroid: No thyromegaly.      Vascular: No JVD.      Trachea: No tracheal deviation.   Cardiovascular:      Rate and Rhythm: Normal rate.   Pulmonary:      Effort: Pulmonary effort is normal. No respiratory distress.      Breath sounds: No stridor.   Chest:      Chest wall: No tenderness.   Abdominal:      General: Bowel sounds are normal. There is no distension.      Palpations: Abdomen is soft. There is no hepatomegaly, splenomegaly or mass.      Tenderness: There is no abdominal tenderness. There is no rebound.   Musculoskeletal: Normal range of motion.         General: No tenderness.   Lymphadenopathy:      Cervical: No cervical adenopathy.      Upper Body:      Right upper body: No supraclavicular adenopathy.      Left upper body: No supraclavicular adenopathy.   Skin:     General: Skin is dry.      Findings: No erythema or rash.   Neurological:      Mental Status: She is alert and oriented to person, place, and time.      Cranial Nerves: No cranial nerve deficit.      Coordination: Coordination normal.   Psychiatric:         Behavior: Behavior normal.         Thought Content: Thought content normal.         Judgment: Judgment normal.             Assessment:      1. Iron deficiency anemia due to chronic blood loss           Plan:       Documented persistent iron deficiency will proceed with intravenous iron.  At age 45 with persistent iron deficiency despite surgical repair and removal of gyn blood loss would recommend EGD and colon patient agrees orders placed will return in 2-3 months for follow-up and review for response to symptomatology problem secondary iron deficiency      Judd Hendrickson Jr, MD FACP

## 2020-10-02 ENCOUNTER — TELEPHONE (OUTPATIENT)
Dept: INTERNAL MEDICINE | Facility: CLINIC | Age: 45
End: 2020-10-02

## 2020-10-02 RX ORDER — ALPRAZOLAM 0.5 MG/1
0.5 TABLET ORAL 3 TIMES DAILY PRN
Qty: 40 TABLET | Refills: 0 | Status: SHIPPED | OUTPATIENT
Start: 2020-10-02 | End: 2021-06-05

## 2020-10-02 NOTE — TELEPHONE ENCOUNTER
called to notify me of father in law's unexpected death and due to covid and civil unrest, they will not be able to travel there. Alprazolam sent erx to help with wife's grief.

## 2020-10-05 ENCOUNTER — INFUSION (OUTPATIENT)
Dept: INFUSION THERAPY | Facility: HOSPITAL | Age: 45
End: 2020-10-05
Attending: INTERNAL MEDICINE
Payer: COMMERCIAL

## 2020-10-05 VITALS
RESPIRATION RATE: 18 BRPM | HEART RATE: 67 BPM | OXYGEN SATURATION: 98 % | DIASTOLIC BLOOD PRESSURE: 71 MMHG | SYSTOLIC BLOOD PRESSURE: 101 MMHG | TEMPERATURE: 98 F

## 2020-10-05 DIAGNOSIS — D50.0 IRON DEFICIENCY ANEMIA DUE TO CHRONIC BLOOD LOSS: Primary | ICD-10-CM

## 2020-10-05 PROCEDURE — 63600175 PHARM REV CODE 636 W HCPCS: Performed by: INTERNAL MEDICINE

## 2020-10-05 PROCEDURE — 25000003 PHARM REV CODE 250: Performed by: INTERNAL MEDICINE

## 2020-10-05 PROCEDURE — 63600175 PHARM REV CODE 636 W HCPCS: Mod: JG | Performed by: INTERNAL MEDICINE

## 2020-10-05 PROCEDURE — 96361 HYDRATE IV INFUSION ADD-ON: CPT

## 2020-10-05 PROCEDURE — 96375 TX/PRO/DX INJ NEW DRUG ADDON: CPT

## 2020-10-05 PROCEDURE — 96365 THER/PROPH/DIAG IV INF INIT: CPT

## 2020-10-05 RX ORDER — ONDANSETRON 2 MG/ML
8 INJECTION INTRAMUSCULAR; INTRAVENOUS ONCE
Status: COMPLETED | OUTPATIENT
Start: 2020-10-05 | End: 2020-10-05

## 2020-10-05 RX ORDER — SODIUM CHLORIDE 9 MG/ML
INJECTION, SOLUTION INTRAVENOUS CONTINUOUS
Status: ACTIVE | OUTPATIENT
Start: 2020-10-05 | End: 2020-10-05

## 2020-10-05 RX ORDER — SODIUM CHLORIDE 450 MG/100ML
INJECTION, SOLUTION INTRAVENOUS CONTINUOUS
Status: ACTIVE | OUTPATIENT
Start: 2020-10-05 | End: 2020-10-05

## 2020-10-05 RX ADMIN — SODIUM CHLORIDE 1000 ML: 0.45 INJECTION, SOLUTION INTRAVENOUS at 12:10

## 2020-10-05 RX ADMIN — ONDANSETRON 8 MG: 2 INJECTION INTRAMUSCULAR; INTRAVENOUS at 12:10

## 2020-10-05 RX ADMIN — FERRIC CARBOXYMALTOSE INJECTION 750 MG: 50 INJECTION, SOLUTION INTRAVENOUS at 11:10

## 2020-10-05 RX ADMIN — SODIUM CHLORIDE: 0.9 INJECTION, SOLUTION INTRAVENOUS at 11:10

## 2020-10-05 NOTE — NURSING
Pt started to complain of nausea and head feeling like it is numb. Injectafer complete at this time and fluids stopped, Dr. Seay notified.

## 2020-10-05 NOTE — NURSING
Dr. Seay at chairside and pt's  (Dr. Dimas) on phone as well. Dr. Seay and  decided to admin Zofran 8 mg to help with vomiting and nausea and change to 1/2 NS 1 Liter to help with symptoms and increase in BP. /87 at this time.

## 2020-10-05 NOTE — DISCHARGE INSTRUCTIONS
.[facility]  [address] [address]  [address]  [phone] phone     [phone] fax  Hours of Operation: Monday- Friday 8:00am- 5:00pm  After hours phone  [phone]  Hematology / Oncology Physicians on call      LEONARDA Rodriguez Dr., Dr., Dr., Dr., NP Sydney Prescott, NP Tyesha Taylor, NP    Please call with any concerns regarding your appointment today.

## 2020-10-06 ENCOUNTER — TELEPHONE (OUTPATIENT)
Dept: ENDOSCOPY | Facility: HOSPITAL | Age: 45
End: 2020-10-06

## 2020-10-06 ENCOUNTER — PATIENT MESSAGE (OUTPATIENT)
Dept: ADMINISTRATIVE | Facility: HOSPITAL | Age: 45
End: 2020-10-06

## 2020-10-07 ENCOUNTER — HOSPITAL ENCOUNTER (EMERGENCY)
Facility: HOSPITAL | Age: 45
Discharge: HOME | End: 2020-10-07
Attending: STUDENT IN AN ORGANIZED HEALTH CARE EDUCATION/TRAINING PROGRAM
Payer: COMMERCIAL

## 2020-10-07 VITALS
HEIGHT: 68 IN | HEART RATE: 62 BPM | RESPIRATION RATE: 16 BRPM | OXYGEN SATURATION: 98 % | SYSTOLIC BLOOD PRESSURE: 103 MMHG | TEMPERATURE: 98 F | BODY MASS INDEX: 25.73 KG/M2 | DIASTOLIC BLOOD PRESSURE: 67 MMHG | WEIGHT: 169.75 LBS

## 2020-10-07 DIAGNOSIS — G43.909 MIGRAINE WITHOUT STATUS MIGRAINOSUS, NOT INTRACTABLE, UNSPECIFIED MIGRAINE TYPE: Primary | ICD-10-CM

## 2020-10-07 LAB
ALBUMIN SERPL BCP-MCNC: 4.4 G/DL (ref 3.5–5.2)
ALP SERPL-CCNC: 67 U/L (ref 55–135)
ALT SERPL W/O P-5'-P-CCNC: 17 U/L (ref 10–44)
ANION GAP SERPL CALC-SCNC: 9 MMOL/L (ref 8–16)
AST SERPL-CCNC: 26 U/L (ref 10–40)
BASOPHILS # BLD AUTO: 0.03 K/UL (ref 0–0.2)
BASOPHILS NFR BLD: 0.5 % (ref 0–1.9)
BILIRUB SERPL-MCNC: 0.2 MG/DL (ref 0.1–1)
BUN SERPL-MCNC: 15 MG/DL (ref 6–20)
CALCIUM SERPL-MCNC: 8.4 MG/DL (ref 8.7–10.5)
CHLORIDE SERPL-SCNC: 108 MMOL/L (ref 95–110)
CO2 SERPL-SCNC: 21 MMOL/L (ref 23–29)
CREAT SERPL-MCNC: 0.8 MG/DL (ref 0.5–1.4)
DIFFERENTIAL METHOD: ABNORMAL
EOSINOPHIL # BLD AUTO: 0.1 K/UL (ref 0–0.5)
EOSINOPHIL NFR BLD: 1.1 % (ref 0–8)
ERYTHROCYTE [DISTWIDTH] IN BLOOD BY AUTOMATED COUNT: 13.5 % (ref 11.5–14.5)
EST. GFR  (AFRICAN AMERICAN): >60 ML/MIN/1.73 M^2
EST. GFR  (NON AFRICAN AMERICAN): >60 ML/MIN/1.73 M^2
GLUCOSE SERPL-MCNC: 96 MG/DL (ref 70–110)
HCT VFR BLD AUTO: 35.5 % (ref 37–48.5)
HGB BLD-MCNC: 11.2 G/DL (ref 12–16)
IMM GRANULOCYTES # BLD AUTO: 0.03 K/UL (ref 0–0.04)
IMM GRANULOCYTES NFR BLD AUTO: 0.5 % (ref 0–0.5)
LYMPHOCYTES # BLD AUTO: 2.6 K/UL (ref 1–4.8)
LYMPHOCYTES NFR BLD: 41.5 % (ref 18–48)
MAGNESIUM SERPL-MCNC: 2 MG/DL (ref 1.6–2.6)
MCH RBC QN AUTO: 25.5 PG (ref 27–31)
MCHC RBC AUTO-ENTMCNC: 31.5 G/DL (ref 32–36)
MCV RBC AUTO: 81 FL (ref 82–98)
MONOCYTES # BLD AUTO: 0.4 K/UL (ref 0.3–1)
MONOCYTES NFR BLD: 6.2 % (ref 4–15)
NEUTROPHILS # BLD AUTO: 3.2 K/UL (ref 1.8–7.7)
NEUTROPHILS NFR BLD: 50.2 % (ref 38–73)
NRBC BLD-RTO: 0 /100 WBC
PLATELET # BLD AUTO: 279 K/UL (ref 150–350)
PMV BLD AUTO: 9.6 FL (ref 9.2–12.9)
POTASSIUM SERPL-SCNC: 4.2 MMOL/L (ref 3.5–5.1)
PROT SERPL-MCNC: 7.3 G/DL (ref 6–8.4)
RBC # BLD AUTO: 4.39 M/UL (ref 4–5.4)
SODIUM SERPL-SCNC: 138 MMOL/L (ref 136–145)
TROPONIN I SERPL DL<=0.01 NG/ML-MCNC: <0.006 NG/ML (ref 0–0.03)
WBC # BLD AUTO: 6.29 K/UL (ref 3.9–12.7)

## 2020-10-07 PROCEDURE — 84484 ASSAY OF TROPONIN QUANT: CPT

## 2020-10-07 PROCEDURE — 96361 HYDRATE IV INFUSION ADD-ON: CPT

## 2020-10-07 PROCEDURE — 36415 COLL VENOUS BLD VENIPUNCTURE: CPT

## 2020-10-07 PROCEDURE — 80053 COMPREHEN METABOLIC PANEL: CPT

## 2020-10-07 PROCEDURE — 25000003 PHARM REV CODE 250: Performed by: STUDENT IN AN ORGANIZED HEALTH CARE EDUCATION/TRAINING PROGRAM

## 2020-10-07 PROCEDURE — 99284 EMERGENCY DEPT VISIT MOD MDM: CPT | Mod: 25

## 2020-10-07 PROCEDURE — 83735 ASSAY OF MAGNESIUM: CPT

## 2020-10-07 PROCEDURE — 85025 COMPLETE CBC W/AUTO DIFF WBC: CPT

## 2020-10-07 PROCEDURE — 96374 THER/PROPH/DIAG INJ IV PUSH: CPT

## 2020-10-07 PROCEDURE — 96375 TX/PRO/DX INJ NEW DRUG ADDON: CPT

## 2020-10-07 PROCEDURE — 63600175 PHARM REV CODE 636 W HCPCS: Performed by: STUDENT IN AN ORGANIZED HEALTH CARE EDUCATION/TRAINING PROGRAM

## 2020-10-07 RX ORDER — LORAZEPAM 2 MG/ML
1 INJECTION INTRAMUSCULAR
Status: COMPLETED | OUTPATIENT
Start: 2020-10-07 | End: 2020-10-07

## 2020-10-07 RX ORDER — DEXAMETHASONE SODIUM PHOSPHATE 4 MG/ML
10 INJECTION, SOLUTION INTRA-ARTICULAR; INTRALESIONAL; INTRAMUSCULAR; INTRAVENOUS; SOFT TISSUE
Status: DISCONTINUED | OUTPATIENT
Start: 2020-10-07 | End: 2020-10-07

## 2020-10-07 RX ORDER — KETOROLAC TROMETHAMINE 30 MG/ML
15 INJECTION, SOLUTION INTRAMUSCULAR; INTRAVENOUS
Status: COMPLETED | OUTPATIENT
Start: 2020-10-07 | End: 2020-10-07

## 2020-10-07 RX ORDER — DIPHENHYDRAMINE HYDROCHLORIDE 50 MG/ML
25 INJECTION INTRAMUSCULAR; INTRAVENOUS
Status: COMPLETED | OUTPATIENT
Start: 2020-10-07 | End: 2020-10-07

## 2020-10-07 RX ORDER — PROCHLORPERAZINE EDISYLATE 5 MG/ML
10 INJECTION INTRAMUSCULAR; INTRAVENOUS ONCE
Status: COMPLETED | OUTPATIENT
Start: 2020-10-07 | End: 2020-10-07

## 2020-10-07 RX ADMIN — LORAZEPAM 1 MG: 2 INJECTION INTRAMUSCULAR; INTRAVENOUS at 09:10

## 2020-10-07 RX ADMIN — PROCHLORPERAZINE EDISYLATE 10 MG: 5 INJECTION INTRAMUSCULAR; INTRAVENOUS at 08:10

## 2020-10-07 RX ADMIN — KETOROLAC TROMETHAMINE 15 MG: 30 INJECTION, SOLUTION INTRAMUSCULAR; INTRAVENOUS at 08:10

## 2020-10-07 RX ADMIN — DIPHENHYDRAMINE HYDROCHLORIDE 25 MG: 50 INJECTION, SOLUTION INTRAMUSCULAR; INTRAVENOUS at 08:10

## 2020-10-07 RX ADMIN — SODIUM CHLORIDE 1000 ML: 0.9 INJECTION, SOLUTION INTRAVENOUS at 08:10

## 2020-10-08 NOTE — ED PROVIDER NOTES
SCRIBE #1 NOTE: I, Lisa Holt, am scribing for, and in the presence of, Tee Birmingham MD. I have scribed the entire note.       History     Chief Complaint   Patient presents with    Headache     Pt c/o of migraine since Friday with nausea and photophobia.      Review of patient's allergies indicates:   Allergen Reactions    Hydrocodone Other (See Comments)     Chest pains. Note: patient tolerated hydromorphone in 2/2019.     Chlorhexidine Rash     Per  after surgery patient had large rash across abdomen where chlorhexidine was applied.    Mastisol adhesive [gum ghrdiq-ejnyxz-jqwr-alcohol] Rash         History of Present Illness     HPI    10/7/2020, 8:10 PM  History obtained from the patient's  (Dr. Dimas, Cardiology)      History of Present Illness: Sandra Dimas is a 45 y.o. female patient with a PMHx of endometrioses and migraines who presents to the Emergency Department for evaluation of worsening severe migraine headaches which onset gradually 5 days ago. Dr. Dimas reports pt had 12 episodes lasting 5-10 minutes each beginning at 12 pm today. Symptoms are episodic and severe in severity. Sxs are worsened with light and noise. Pt has no relief with OTC medications. Past tx include ketamine, lortab, and hydrocodone with no relief. Associated sxs include nausea, BL hand numbness and BL foot numbness. Pt states sensation in hands in feet feels like electricity. Patient denies any head injury/trauma, dizziness, extremity weakness, fever, chills, emesis, neck pain/stiffness, dysuria, and all other sxs at this time. States long hx with severe migraines and presents with typical sxs consistent with previous episodes. Pt is followed by Dr. Mcclure (Neurology) on an outpatient basis. No further complaints or concerns at this time.       Arrival mode: Personal vehicle     PCP: OSIRIS Gamez Jr, MD        Past Medical History:  Past Medical History:   Diagnosis Date    Chronic paroxysmal hemicrania  1/3/2019    Endometriosis     General anesthetics causing adverse effect in therapeutic use     wakes up with severe anxiety attacks    Hemicrania continua     Migraines        Past Surgical History:  Past Surgical History:   Procedure Laterality Date    APPENDECTOMY      CYST REMOVAL Right 12/30/2019    Procedure: EXCISION, CYST;  Surgeon: NATALI Shah MD;  Location: [facility] OR;  Service: OB/GYN;  Laterality: Right;  Sebaceous cyst    diagnostic laprascopy      ESOPHAGOGASTRODUODENOSCOPY N/A 11/30/2018    Procedure: EGD (ESOPHAGOGASTRODUODENOSCOPY);  Surgeon: Bossman Bustos MD;  Location: [facility];  Service: Endoscopy;  Laterality: N/A;    FULGURATION OF ENDOMETRIOSIS  12/26/2018    Procedure: FULGURATION, ENDOMETRIOSIS;  Surgeon: Zehra Jensen MD;  Location: [facility] OR;  Service: OB/GYN;;  endometriosis implant resection    HYSTEROSCOPY WITH HYDROTHERMAL ABLATION OF ENDOMETRIUM WITH DILATION AND CURETTAGE N/A 12/26/2018    Procedure: HYSTEROSCOPY, WITH DILATION AND CURETTAGE OF UTERUS AND HYDROTHERMAL ENDOMETRIAL ABLATION;  Surgeon: Zehra Jensen MD;  Location: [facility] OR;  Service: OB/GYN;  Laterality: N/A;    LAPAROSCOPIC SALPINGECTOMY Bilateral 12/26/2018    Procedure: SALPINGECTOMY, LAPAROSCOPIC;  Surgeon: Zehra Jensen MD;  Location: [facility] OR;  Service: OB/GYN;  Laterality: Bilateral;    LAPAROTOMY, EXPLORATORY N/A 2/7/2019    Procedure: LAPAROTOMY, EXPLORATORY;  Surgeon: Ramakrishna Boone MD;  Location: [facility] OR;  Service: General;  Laterality: N/A;    REPAIR OF VAGINAL CUFF N/A 7/9/2020    Procedure: REPAIR, VAGINAL CUFF;  Surgeon: Sailaja Addison MD;  Location: [facility] OR;  Service: OB/GYN;  Laterality: N/A;    ROBOT-ASSISTED LAPAROSCOPIC ABDOMINAL HYSTERECTOMY USING DA MAVIS XI N/A 12/30/2019    Procedure: XI ROBOTIC HYSTERECTOMY;  Surgeon: NATALI Shah MD;  Location: [facility] OR;  Service: OB/GYN;  Laterality: N/A;    ROBOT-ASSISTED LAPAROSCOPIC SURGICAL REMOVAL OF OVARY USING DA  MAVIS XI Bilateral 12/30/2019    Procedure: XI ROBOTIC OOPHORECTOMY;  Surgeon: NATALI Shah MD;  Location: [facility];  Service: OB/GYN;  Laterality: Bilateral;    robotic assisted laparoscopy with excision of ovarian endometrioma and chromotubation           Family History:  Family History   Problem Relation Age of Onset    Strabismus Neg Hx     Retinal detachment Neg Hx     Macular degeneration Neg Hx     Glaucoma Neg Hx     Blindness Neg Hx     Amblyopia Neg Hx     Breast cancer Neg Hx     Colon cancer Neg Hx     Ovarian cancer Neg Hx     Thyroid disease Neg Hx     Migraines Neg Hx     Asthma Neg Hx        Social History:  Social History     Tobacco Use    Smoking status: Never Smoker    Smokeless tobacco: Never Used   Substance and Sexual Activity    Alcohol use: Not Currently     Frequency: Monthly or less     Drinks per session: 1 or 2     Binge frequency: Never     Comment: rarely No alcohol 72h prior to sx    Drug use: No    Sexual activity: Yes     Partners: Male        Review of Systems     Review of Systems   Constitutional: Negative for chills and fever.   HENT: Negative for sore throat.    Eyes: Positive for photophobia.   Respiratory: Negative for shortness of breath.    Cardiovascular: Negative for chest pain.   Gastrointestinal: Positive for nausea. Negative for vomiting.   Genitourinary: Negative for dysuria.   Musculoskeletal: Negative for back pain, neck pain and neck stiffness.   Skin: Negative for rash.   Neurological: Positive for facial asymmetry (R-sided droop), numbness (BL hands, BL feet) and headaches. Negative for dizziness and weakness.   Hematological: Does not bruise/bleed easily.   All other systems reviewed and are negative.       Physical Exam     Initial Vitals   BP Pulse Resp Temp SpO2   10/07/20 2023 10/07/20 2032 10/07/20 2023 10/07/20 2023 10/07/20 2032   103/67 62 16 98.4 °F (36.9 °C) 98 %      MAP       --                 Physical Exam  Nursing Notes and  "Vital Signs Reviewed.  Constitutional: Patient is in moderate distress. Well-developed and well-nourished.  Head: Atraumatic. Normocephalic.  Eyes: PERRL. EOM intact. Conjunctivae are not pale. No scleral icterus.  ENT: Mucous membranes are moist.   Neck: Supple. Full ROM.   Cardiovascular: Regular rate. Regular rhythm. No murmurs, rubs, or gallops. Distal pulses are 2+ and symmetric.  Pulmonary/Chest: No respiratory distress. Clear to auscultation bilaterally. No wheezing or rales.  Abdominal: Soft and non-distended.   Genitourinary: No CVA tenderness  Musculoskeletal: Moves all extremities. No obvious deformities. No edema.   Skin: Warm and dry.  Neurological:  Alert, awake, and appropriate.  Normal speech.  R-sided facial droop. Motor strength 5/5 and symmetric. Sensations intact. GCS 15.  Psychiatric: Normal affect. Good eye contact. Appropriate in content.     ED Course   Procedures  ED Vital Signs:  Vitals:    10/07/20 2023 10/07/20 2032   BP: 103/67    Pulse:  62   Resp: 16    Temp: 98.4 °F (36.9 °C)    TempSrc: Oral    SpO2:  98%   Weight: 77 kg (169 lb 12.1 oz)    Height: 5' 8" (1.727 m)        Abnormal Lab Results:  Labs Reviewed   CBC W/ AUTO DIFFERENTIAL - Abnormal; Notable for the following components:       Result Value    Hemoglobin 11.2 (*)     Hematocrit 35.5 (*)     Mean Corpuscular Volume 81 (*)     Mean Corpuscular Hemoglobin 25.5 (*)     Mean Corpuscular Hemoglobin Conc 31.5 (*)     All other components within normal limits   COMPREHENSIVE METABOLIC PANEL - Abnormal; Notable for the following components:    CO2 21 (*)     Calcium 8.4 (*)     All other components within normal limits   TROPONIN I   MAGNESIUM        All Lab Results:  Results for orders placed or performed during the hospital encounter of 10/07/20   CBC auto differential   Result Value Ref Range    WBC 6.29 3.90 - 12.70 K/uL    RBC 4.39 4.00 - 5.40 M/uL    Hemoglobin 11.2 (L) 12.0 - 16.0 g/dL    Hematocrit 35.5 (L) 37.0 - 48.5 %    " Mean Corpuscular Volume 81 (L) 82 - 98 fL    Mean Corpuscular Hemoglobin 25.5 (L) 27.0 - 31.0 pg    Mean Corpuscular Hemoglobin Conc 31.5 (L) 32.0 - 36.0 g/dL    RDW 13.5 11.5 - 14.5 %    Platelets 279 150 - 350 K/uL    MPV 9.6 9.2 - 12.9 fL    Immature Granulocytes 0.5 0.0 - 0.5 %    Gran # (ANC) 3.2 1.8 - 7.7 K/uL    Immature Grans (Abs) 0.03 0.00 - 0.04 K/uL    Lymph # 2.6 1.0 - 4.8 K/uL    Mono # 0.4 0.3 - 1.0 K/uL    Eos # 0.1 0.0 - 0.5 K/uL    Baso # 0.03 0.00 - 0.20 K/uL    nRBC 0 0 /100 WBC    Gran% 50.2 38.0 - 73.0 %    Lymph% 41.5 18.0 - 48.0 %    Mono% 6.2 4.0 - 15.0 %    Eosinophil% 1.1 0.0 - 8.0 %    Basophil% 0.5 0.0 - 1.9 %    Differential Method Automated    Comprehensive metabolic panel   Result Value Ref Range    Sodium 138 136 - 145 mmol/L    Potassium 4.2 3.5 - 5.1 mmol/L    Chloride 108 95 - 110 mmol/L    CO2 21 (L) 23 - 29 mmol/L    Glucose 96 70 - 110 mg/dL    BUN, Bld 15 6 - 20 mg/dL    Creatinine 0.8 0.5 - 1.4 mg/dL    Calcium 8.4 (L) 8.7 - 10.5 mg/dL    Total Protein 7.3 6.0 - 8.4 g/dL    Albumin 4.4 3.5 - 5.2 g/dL    Total Bilirubin 0.2 0.1 - 1.0 mg/dL    Alkaline Phosphatase 67 55 - 135 U/L    AST 26 10 - 40 U/L    ALT 17 10 - 44 U/L    Anion Gap 9 8 - 16 mmol/L    eGFR if African American >60 >60 mL/min/1.73 m^2    eGFR if non African American >60 >60 mL/min/1.73 m^2   Troponin I   Result Value Ref Range    Troponin I <0.006 0.000 - 0.026 ng/mL   Magnesium   Result Value Ref Range    Magnesium 2.0 1.6 - 2.6 mg/dL              The Emergency Provider reviewed the vital signs and test results, which are outlined above.     ED Discussion     10:33 PM: Pt is sleeping and easily arousable.    11:32 PM: Reassessed pt at this time. Discussed with pt all pertinent ED information and results. Discussed pt dx and plan of tx. Gave pt all f/u and return to the ED instructions. All questions and concerns were addressed at this time. Pt expresses understanding of information and instructions, and is  comfortable with plan to discharge. Pt is stable for discharge.    I discussed with patient and/or family/caretaker that evaluation in the ED does not suggest any emergent or life threatening medical conditions requiring immediate intervention beyond what was provided in the ED, and I believe patient is safe for discharge.  Regardless, an unremarkable evaluation in the ED does not preclude the development or presence of a serious of life threatening condition. As such, patient was instructed to return immediately for any worsening or change in current symptoms.         Medical Decision Making:   Clinical Tests:   Lab Tests: Ordered and Reviewed           ED Medication(s):  Medications   sodium chloride 0.9% bolus 1,000 mL (0 mLs Intravenous Stopped 10/7/20 2311)   ketorolac injection 15 mg (15 mg Intravenous Given 10/7/20 2041)   prochlorperazine injection Soln 10 mg (10 mg Intravenous Given 10/7/20 2046)   diphenhydrAMINE injection 25 mg (25 mg Intravenous Given 10/7/20 2041)   lorazepam injection 1 mg (1 mg Intravenous Given 10/7/20 2103)       Discharge Medication List as of 10/7/2020 11:29 PM          Follow-up Information     E Jose Gamez Jr, MD. Schedule an appointment as soon as possible for a visit in 2 days.    Specialties: Internal Medicine, Pediatrics  Contact information:  [address]  [place] [address] [phone]             Go to  [facility].    Specialty: Emergency Medicine  Why: As needed, If symptoms worsen  Contact information:  [address] [facility] [id number] [phone]                     Scribe Attestation:   Scribe #1: I performed the above scribed service and the documentation accurately describes the services I performed. I attest to the accuracy of the note.     Attending:   Physician Attestation Statement for Scribe #1: I, Tee Birmingham MD, personally performed the services described in this documentation, as scribed by  Lisa Holt, in my presence, and it is both accurate and complete.         Attending Attestation:             Attending ED Notes:   45-year-old female with history of complex migraines presents for evaluation of right-sided migraine with right-sided facial droop, lacrimation, rhinorrhea and photophobia.  This is typical of her symptoms of normal migraine.  She has been found to be heavily refractory to typical migraine cocktails in the past.  Luckily today she had good response to Compazine, Benadryl, Toradol, and IV fluids.  Patient was able to sleep peacefully for over an hour.  Symptoms of photophobia and facial droop were relieved on re-evaluation.  Low suspicion of stroke/TIA given history of complex migraines.  Told to follow-up with neurologist/PCP and to also return emergency department should symptoms worsen, return, or change in character.       Clinical Impression       ICD-10-CM ICD-9-CM   1. Migraine without status migrainosus, not intractable, unspecified migraine type  G43.909 346.90       Disposition:   Disposition: Discharged  Condition: Stable         Tee Birmingham MD  10/08/20 [id number]

## 2020-10-13 ENCOUNTER — OFFICE VISIT (OUTPATIENT)
Dept: URGENT CARE | Facility: CLINIC | Age: 45
End: 2020-10-13
Payer: COMMERCIAL

## 2020-10-13 VITALS
TEMPERATURE: 98 F | DIASTOLIC BLOOD PRESSURE: 59 MMHG | HEIGHT: 68 IN | WEIGHT: 147 LBS | BODY MASS INDEX: 22.28 KG/M2 | HEART RATE: 63 BPM | OXYGEN SATURATION: 98 % | SYSTOLIC BLOOD PRESSURE: 93 MMHG

## 2020-10-13 DIAGNOSIS — G43.519 INTRACTABLE PERSISTENT MIGRAINE AURA WITHOUT CEREBRAL INFARCTION AND WITHOUT STATUS MIGRAINOSUS: Primary | ICD-10-CM

## 2020-10-13 LAB
GLUCOSE SERPL-MCNC: 95 MG/DL (ref 70–110)
POC ANION GAP: 17 MMOL/L
POC BUN: 20 MMOL/L
POC CHLORIDE: 110 MMOL/L
POC CREATININE: 0.3 MG/DL (ref 0.6–1.3)
POC HEMATOCRIT: 35 %PCV (ref 37–47)
POC HEMOGLOBIN: 11.9 G/DL (ref 12.5–16)
POC ICA: 1.09 MMOL/L
POC POTASSIUM: 3.5 MMOL/L
POC SODIUM: 140 MMOL/L
POC TCO2: 18 MMOL/L

## 2020-10-13 PROCEDURE — S0119 ONDANSETRON 4 MG: HCPCS | Mod: S$GLB,,, | Performed by: PHYSICIAN ASSISTANT

## 2020-10-13 PROCEDURE — 99214 PR OFFICE/OUTPT VISIT, EST, LEVL IV, 30-39 MIN: ICD-10-PCS | Mod: 25,S$GLB,, | Performed by: PHYSICIAN ASSISTANT

## 2020-10-13 PROCEDURE — S0119 PR ONDANSETRON, ORAL, 4MG: ICD-10-PCS | Mod: S$GLB,,, | Performed by: PHYSICIAN ASSISTANT

## 2020-10-13 PROCEDURE — 96372 PR INJECTION,THERAP/PROPH/DIAG2ST, IM OR SUBCUT: ICD-10-PCS | Mod: S$GLB,,, | Performed by: PHYSICIAN ASSISTANT

## 2020-10-13 PROCEDURE — 99214 OFFICE O/P EST MOD 30 MIN: CPT | Mod: 25,S$GLB,, | Performed by: PHYSICIAN ASSISTANT

## 2020-10-13 PROCEDURE — 96372 THER/PROPH/DIAG INJ SC/IM: CPT | Mod: S$GLB,,, | Performed by: PHYSICIAN ASSISTANT

## 2020-10-13 RX ORDER — KETOROLAC TROMETHAMINE 30 MG/ML
30 INJECTION, SOLUTION INTRAMUSCULAR; INTRAVENOUS
Status: COMPLETED | OUTPATIENT
Start: 2020-10-13 | End: 2020-10-13

## 2020-10-13 RX ORDER — ONDANSETRON 4 MG/1
4 TABLET, ORALLY DISINTEGRATING ORAL
Status: COMPLETED | OUTPATIENT
Start: 2020-10-13 | End: 2020-10-13

## 2020-10-13 RX ADMIN — ONDANSETRON 4 MG: 4 TABLET, ORALLY DISINTEGRATING ORAL at 08:10

## 2020-10-13 RX ADMIN — KETOROLAC TROMETHAMINE 30 MG: 30 INJECTION, SOLUTION INTRAMUSCULAR; INTRAVENOUS at 08:10

## 2020-10-14 NOTE — PROGRESS NOTES
"Subjective:       Patient ID: Sandra Dimas is a 45 y.o. female.    Vitals:  height is 5' 8" (1.727 m) and weight is 66.7 kg (147 lb). Her other (see comments) temperature is 97.7 °F (36.5 °C). Her blood pressure is 93/59 (abnormal) and her pulse is 63. Her oxygen saturation is 98%.     Chief Complaint: Migraine    Pt presents with persistent migraine headache that has gradually worsened over the past week. Pt has suffered with migraines since high school and is established with Neurologist, Dr. Mcclure. Pt's  states they have tried nearly every medication with not much success. They are planning to follow up with her neurologist again soon. Pt usually requires toradol injection when migraine attacks occur. Pt also recently lost her father, which has caused much stress. Pt has not been eating as much do to this. She reports nausea and photophobia which is typical for her migraines.  Pt was recently seen in ED for migraine on 10/7 which was resolved with Compazine, Benadryl, Toradol, and IV fluids.     Pt's  also states that she recently started taking iron supplements for her anemia and has been having dark watery stools. He is requesting H&H be checked during visit as well.      Migraine   This is a new problem. The current episode started 1 to 4 weeks ago (1 week). The problem occurs intermittently. The problem has been gradually worsening. The pain is located in the bilateral region. The pain radiates to the right neck, left neck and face. The quality of the pain is described as shooting. The pain is at a severity of 10/10. The pain is severe. Associated symptoms include abdominal pain, dizziness, eye pain, nausea, phonophobia, photophobia and scalp tenderness. Pertinent negatives include no abnormal behavior, anorexia, back pain, blurred vision, coughing, drainage, ear pain, eye redness, eye watering, facial sweating, fever, hearing loss, insomnia, loss of balance, muscle aches, neck pain, " numbness, rhinorrhea, seizures, sinus pressure, sore throat, swollen glands, tingling, tinnitus, visual change, vomiting, weakness or weight loss. The symptoms are aggravated by activity, bright light and noise. She has tried NSAIDs for the symptoms. The treatment provided no relief. Her past medical history is significant for migraine headaches.       Constitution: Negative for chills, sweating and fever.   HENT: Negative for ear pain, tinnitus, hearing loss, facial swelling, congestion, sinus pain, sinus pressure and sore throat.    Neck: negative. Negative for neck pain and neck stiffness.   Cardiovascular: Negative.    Eyes: Positive for eye pain and photophobia. Negative for eye redness, vision loss, double vision and blurred vision.   Respiratory: Negative for cough.    Gastrointestinal: Positive for abdominal pain and nausea. Negative for vomiting.   Endocrine: negative.   Genitourinary: Negative for missed menses.   Musculoskeletal: Negative for trauma, back pain and muscle ache.   Skin: Negative for rash, wound and lesion.   Allergic/Immunologic: Negative.    Neurological: Positive for dizziness, headaches and history of migraines. Negative for history of vertigo, light-headedness, facial drooping, speech difficulty, coordination disturbances, loss of balance, disorientation, loss of consciousness, numbness and seizures.   Psychiatric/Behavioral: Negative for disorientation, confusion, nervous/anxious, sleep disturbance and depression. The patient is not nervous/anxious and does not have insomnia.        Objective:      Physical Exam   Constitutional: She is oriented to person, place, and time. She appears well-developed.  Non-toxic appearance. She appears ill.      Comments:Pt uncomfortable and seems to be in decent amount of pain. Laying on exam table with blanket covering her eyes.    HENT:   Head: Normocephalic and atraumatic.   Ears:   Right Ear: Hearing and external ear normal.   Left Ear: Hearing and  external ear normal.   Nose: Nose normal.   Mouth/Throat: Mucous membranes are normal.   Eyes: Pupils are equal, round, and reactive to light. Conjunctivae, EOM and lids are normal. No scleral icterus. extraocular movement intact  Neck: Trachea normal, normal range of motion, full passive range of motion without pain and phonation normal. Neck supple. No neck rigidity.   Cardiovascular: Normal rate, regular rhythm, normal heart sounds and normal pulses.   Pulmonary/Chest: Effort normal and breath sounds normal. No respiratory distress.   Abdominal: Soft. Normal appearance and bowel sounds are normal. She exhibits no distension. There is no abdominal tenderness.   Musculoskeletal: Normal range of motion.         General: No deformity.   Neurological: She is alert and oriented to person, place, and time. No cranial nerve deficit. She exhibits normal muscle tone. Coordination normal.   Skin: Skin is warm, dry, intact, not diaphoretic and not pale. Psychiatric: Her speech is normal and behavior is normal. Judgment and thought content normal.   Nursing note and vitals reviewed.    Results for orders placed or performed in visit on 10/13/20   POCT Chemistry Panel   Result Value Ref Range    POC Sodium 140 MMOL/L    POC Potassium 3.5 MMOL/L    POC Chloride 110 MMOL/L    POC BUN 20 MMOL/L    POC Glucose 95 70 - 110 MG/DL    POC Creatinine 0.3 (A) 0.6 - 1.3 mg/dL    POC iCA 1.09 MMOL/L    POC TCO2 18 MMOL/L    POC Hematocrit 35 (A) 37 - 47 %PCV    POC Hemoglobin 11.9 (A) 12.5 - 16 g/dL    POC Anion Gap 17 MMOL/L           Assessment:       1. Intractable persistent migraine aura without cerebral infarction and without status migrainosus        Plan:       Pt seems to be in less pain upon leaving clinic. States she has migraine medications and zofran at home if needed. Rest and drink plenty of fluids. Recommend to f/u with neurologist as soon as possible. ER precautions.    Intractable persistent migraine aura without  cerebral infarction and without status migrainosus  -     POCT Chemistry Panel  -     ondansetron disintegrating tablet 4 mg  -     ketorolac injection 30 mg

## 2020-10-16 ENCOUNTER — TELEPHONE (OUTPATIENT)
Dept: URGENT CARE | Facility: CLINIC | Age: 45
End: 2020-10-16

## 2020-10-20 ENCOUNTER — PATIENT OUTREACH (OUTPATIENT)
Dept: ADMINISTRATIVE | Facility: OTHER | Age: 45
End: 2020-10-20

## 2020-10-20 ENCOUNTER — OFFICE VISIT (OUTPATIENT)
Dept: PHYSICAL MEDICINE AND REHAB | Facility: CLINIC | Age: 45
End: 2020-10-20
Payer: COMMERCIAL

## 2020-10-20 VITALS
BODY MASS INDEX: 22.28 KG/M2 | WEIGHT: 147 LBS | SYSTOLIC BLOOD PRESSURE: 99 MMHG | RESPIRATION RATE: 12 BRPM | HEIGHT: 68 IN | HEART RATE: 59 BPM | DIASTOLIC BLOOD PRESSURE: 65 MMHG

## 2020-10-20 DIAGNOSIS — M79.18 CERVICAL MYOFASCIAL PAIN SYNDROME: Primary | ICD-10-CM

## 2020-10-20 DIAGNOSIS — M53.82 MUSCULOSKELETAL DISORDER OF THE SUBOCCIPITAL: ICD-10-CM

## 2020-10-20 DIAGNOSIS — R29.898 ARM WEAKNESS: ICD-10-CM

## 2020-10-20 DIAGNOSIS — G43.919 INTRACTABLE MIGRAINE WITHOUT STATUS MIGRAINOSUS, UNSPECIFIED MIGRAINE TYPE: Primary | ICD-10-CM

## 2020-10-20 DIAGNOSIS — M46.00 SPINAL ENTHESOPATHY: ICD-10-CM

## 2020-10-20 PROCEDURE — 20553 NJX 1/MLT TRIGGER POINTS 3/>: CPT | Mod: S$GLB,,, | Performed by: PHYSICAL MEDICINE & REHABILITATION

## 2020-10-20 PROCEDURE — 99999 PR PBB SHADOW E&M-EST. PATIENT-LVL III: CPT | Mod: PBBFAC,,, | Performed by: PHYSICAL MEDICINE & REHABILITATION

## 2020-10-20 PROCEDURE — 3008F BODY MASS INDEX DOCD: CPT | Mod: CPTII,S$GLB,, | Performed by: PHYSICAL MEDICINE & REHABILITATION

## 2020-10-20 PROCEDURE — 99204 PR OFFICE/OUTPT VISIT, NEW, LEVL IV, 45-59 MIN: ICD-10-PCS | Mod: 25,S$GLB,, | Performed by: PHYSICAL MEDICINE & REHABILITATION

## 2020-10-20 PROCEDURE — 20553 PR INJECT TRIGGER POINTS, > 3: ICD-10-PCS | Mod: S$GLB,,, | Performed by: PHYSICAL MEDICINE & REHABILITATION

## 2020-10-20 PROCEDURE — 3008F PR BODY MASS INDEX (BMI) DOCUMENTED: ICD-10-PCS | Mod: CPTII,S$GLB,, | Performed by: PHYSICAL MEDICINE & REHABILITATION

## 2020-10-20 PROCEDURE — 99999 PR PBB SHADOW E&M-EST. PATIENT-LVL III: ICD-10-PCS | Mod: PBBFAC,,, | Performed by: PHYSICAL MEDICINE & REHABILITATION

## 2020-10-20 PROCEDURE — 99204 OFFICE O/P NEW MOD 45 MIN: CPT | Mod: 25,S$GLB,, | Performed by: PHYSICAL MEDICINE & REHABILITATION

## 2020-10-20 RX ORDER — TOPIRAMATE 100 MG/1
100 CAPSULE, EXTENDED RELEASE ORAL
COMMUNITY
End: 2021-02-22

## 2020-10-20 RX ORDER — NAPROXEN 500 MG/1
500 TABLET ORAL 2 TIMES DAILY
Qty: 60 TABLET | Refills: 1 | Status: SHIPPED | OUTPATIENT
Start: 2020-10-20 | End: 2021-06-05

## 2020-10-20 NOTE — PATIENT INSTRUCTIONS
Myofascial Pain Syndrome: Fibrositis  Your pain is caused by a state of chronic muscle tension. This condition is called by various names: myofascial pain, fibrositis and trigger point pain. This can also be due to mechanical stress (such as working at a computer terminal for long periods; or work that requires repetitive motions of the arms or hands) or emotional stress (such as problems on the job or in your personal life). Sometimes there is no obvious cause. The pain can occur in the area of the muscle spasm or at a site distant to it. For example, spasm of a neck muscle can cause headache. Spasm of the muscle near the shoulder blade can cause pain shooting down the arm.  Home Care:  · Try to identify the factors that may be causing your problem and change them:  ¨ If you feel that emotional stress is a cause of your pain, learn methods to deal more effectively with the stress in your life. These may include regular exercise, muscle relaxation techniques, meditation or simply taking time out for yourself. Consult your doctor or go to a local bookstore and review the many books and tapes available on the subject of stress reduction.  ¨ If you feel that physical stress is a cause for your pain, try to modify any poor work habits.  · You may use acetaminophen (Tylenol) or ibuprofen (Motrin, Advil) to control pain, unless another medicine was prescribed. [NOTE: If you have chronic liver or kidney disease or ever had a stomach ulcer or GI bleeding, talk with your doctor before using these medicines.]  · The use of heat to the muscle (hot compress or heating pad) will be helpful to reduce muscle spasm. Some persons get relief with ice packs. Apply an ice pack (crushed or cubed ice in a plastic bag, wrapped in a towel) for 20 minutes at a time as needed. Use the method that feels best to you.  · Massaging the trigger point and stretching out the muscle are an important parts of prevention and treatment. Trigger point  massage can be done by first applying heat to the area to warm and prepare the muscle. Have someone apply steady thumb pressure directly on the knot in the muscle (the most tender point) for 30 seconds. Release the pressure, then massage the surrounding muscle. Repeat the process, applying more pressure to the trigger point each time. Do this up to the limit of pain. With each treatment, the trigger point should become less tender and the pain should decrease. You can apply local pressure to trigger points in the back by lying on the floor with a tennis ball under the trigger point.  Follow Up  with your doctor as advised or if not improving within the next week. It may be necessary for you to receive physical therapy if you do not respond to home treatment alone.  Get Prompt Medical Attention  if any of the following occur:  · If your trigger point is in the chest muscles, observe for pain that becomes more severe, lasts longer, or spreads into your shoulder/arm, neck or back; you develop trouble breathing, sweating, nausea or vomiting in association with chest pain  · If you develop weakness or numbness in an extremity  · If your pain worsens, regardless of its location  © 4947-4915 [employer]. [address] [place] [address]. All rights reserved. This information is not intended as a substitute for professional medical care. Always follow your healthcare professional's instructions.          Trigger Point Injection  The cause of your muscle pain or spasms may be one or more trigger points. Your health care provider may decide to inject the painful spots to relax the muscle. This can help relieve your pain. Relaxing the muscle can also make movement easier. You may then be able to exercise to strengthen the muscle and help it heal.    What is a trigger point?  A trigger point is a tight, painful knot of muscle fiber. It can form where a muscle is strained or injured. The knot can  sometimes be felt under the skin. A trigger point is very tender to the touch. Pain may also spread to other parts of the affected muscle. Muscles around a knee, shoulder blade, or other bones are prone to trigger points. This is because these muscles are more likely to be injured.    About the injections  Any muscle in the body can have one or more trigger points. Several injections may be needed in each trigger point to best relieve pain. These injections may be given in sessions about 2 weeks apart, depending on the preference of your health care provider. In some cases, you may not feel much change in your symptoms until after the third injection.     © 3964-7486 [employer]. [address] [place] [address]. All rights reserved. This information is not intended as a substitute for professional medical care. Always follow your healthcare professional's instructions.            Your Neck Muscles  The muscles in the neck and shoulders need to be strong to hold the neck and head in place. These muscles also help move the neck and shoulders. Your health care provider can recommend exercises to help stretch and strengthen your neck muscles.    © 5436-7955 [employer]. [address] [place] [address]. All rights reserved. This information is not intended as a substitute for professional medical care. Always follow your healthcare professional's instructions.          Neck Problems: Relieving Your Symptoms  The first goal of treatment is to relieve your symptoms. Your health care provider may recommend self-care treatments. These include resting, applying ice and heat, taking medication, and doing exercises. Your health care provider may also recommend that you see a physical therapist, who can teach you ways to care for and strengthen your neck.    Self-Care Treatments  Pain can end quickly or last awhile. Either way, youll want relief as soon as possible. Your health  care provider can tell you which treatments to do at home to help relieve your pain.  · Lying down for a short time takes pressure from the head off the neck.  · Ice and heat can help reduce pain. To bring down swelling, rest an ice pack wrapped in a thin towel on your neck for 15 minutes. To relax sore muscles, apply a warm, wet towel to the area. Or take a warm bath or shower.  · Over-the-counter medications, such as ibuprofen, naproxen, and aspirin, can help reduce pain and swelling. Acetaminophen can help relieve pain. Use these only as directed.  · Exercises can relax muscles and prevent stiffness. To prepare, drape a warm, wet towel around your neck and shoulders for 5 minutes. Remove the towel. Then do any exercises recommended to you by your health care provider.  Physical Therapy  If self-care treatments arent helping relieve neck pain, your health care provider may suggest one or more sessions of physical therapy. Physical therapy is performed by a specialist trained to treat injuries. Your physical therapist (PT) will teach you how to strengthen muscles, improve the spines alignment, and help you move properly. Treatment methods used in physical therapy may include:  · Heat. A special heating pad called a neck pack may be applied to your neck.  · Exercises. Your PT will teach you exercises to help strengthen your neck and improve its range of motion.  · Joint mobilization. The PT gently moves your vertebrae to help restore motion in your neck joints and reduce neck pain.  · Soft tissue mobilization. The PT massages and stretches the muscles in your neck and shoulders.  · Electrical stimulation. Electrical impulses are sent into your neck. This helps reduce soreness and inflammation.  · Education in body mechanics. The PT shows you ways to position and move your body that protect the neck.  Other Treatments  If physical therapy doesnt relieve your neck pain, your health care provider may suggest other  treatments. For example, medications or injections can help relieve pain and swelling. In some cases, surgery may be needed to treat neck problems.  © 2092-8379 [employer]. [address] [place] [address], [place] [address]. All rights reserved. This information is not intended as a substitute for professional medical care. Always follow your healthcare professional's instructions.          Understanding Neck Problems       If you suffer from neck pain, youre not alone. Many people have neck pain at some point in their lives. Problems such as poor posture, injury, and wear and tear can lead to neck pain. Your health care provider will work with you to find the treatment thats best for your neck.  Types of Neck Problems  The following problems can cause pain or injury in your neck:  · Strains and sprains: Strains (stretched or torn muscles) and sprains (stretched or torn ligaments) can cause neck pain. Strains and sprains can occur during an accident, or when you overuse your neck through repetitive motion. They can also cause your muscles and ligaments to become inflamed (swollen and painful).  · Whiplash and other injuries: Whiplash can result when an impact throws your head, forcing your neck too far forward (hyperflexion), then too far backward (hyperextension). When combined, the two motions can cause a painful injury to different parts of your neck, such as muscles, ligaments, or joints. The most common cause of whiplash is a car accident. But it can also happen during a fall or sports injury.  · Weakened disks: A simple action, such as a sneeze or a cough, can cause one of your disks to bulge (herniate). A herniated disk can put pressure on your nerve and cause pain. Over time, disks can also thin out (degenerate). Flattened disks dont cushion vertebrae well and can cause vertebrae to rub together. Rubbing vertebrae can pinch nerves and cause pain.  · Weakened joints: Aging and injury can cause joints  to slowly degenerate. Thinned joints can also cause vertebrae to rub together. This can cause abnormal growths of bone (bone spurs) to form on vertebrae. Bone spurs put pressure on nerves, causing pain.  Common Symptoms  If you have a neck problem, you may have one or more of the following symptoms:  · Muscle tension and spasm: You may not be able to move your neck, arms, or shoulders comfortably if you have muscle tension or stiffness in your neck. If your symptoms arent relieved, you may experience muscle spasms, or knots of contracted tissue (trigger points) in areas of your neck and shoulders.  · Aches and pains: Dull aches in your head or neck, sharp pains, and swelling of the soft tissue of your neck and shoulders are common symptoms. If theres pressure on the nerves in your neck, you may feel pain in your arms or hands (referred pain).  · Numbness or weakness: If you injure the nerves in your neck, you may experience numbness, tingling, or weakness in your shoulders, arms, or hands. These symptoms arise when disks or bone spurs press on the nerves in your neck.  © 5078-0194 [employer]. [address] [place] [id number]. All rights reserved. This information is not intended as a substitute for professional medical care. Always follow your healthcare professional's instructions.          Neck Spasm [No Trauma]    Spasm of the neck muscles can occur after a sudden awkward neck movement. Sleeping with your neck in a crooked position can also cause spasm. Some persons respond to emotional stress by tensing the muscles of their neck, shoulders and upper back. If neck spasm lasts long enough, it can cause headache.  The treatment described below will usually help the pain to go away in 5-7 days. Pain that continues may require further evaluation or other types of treatment such as physical therapy.  Home Care:  1. Rest and relax the muscles. Use a comfortable pillow that supports the head  and keeps the spine in a neutral position. The position of the head should not be tilted forward or backward. A rolled up towel may help for a custom fit.  2. Some persons find relief with heat (hot shower, hot bath or heating pad) and massage, while others prefer cold packs (crushed or cubed ice in a plastic bag, wrapped in a towel). Try both and use the method that feels best for 20 minutes several times a day.  3. You may use acetaminophen (Tylenol) or ibuprofen (Motrin, Advil) to control pain, unless another medicine was prescribed. [NOTE: If you have chronic liver or kidney disease or ever had a stomach ulcer or GI bleeding, talk with your doctor before using these medicines.]  Follow Up  with your doctor or this facility if your symptoms do not show signs of improvement after one week. Physical therapy or further evaluation may be needed.  [NOTE: If x-rays were taken, they will be reviewed by a radiologist. You will be notified of any new findings that may affect your care.]  Return Promptly  or contact your doctor if any of the following occurs:  · Pain becomes worse or spreads into one or both arms  · Weakness or numbness in one or both arms  · Increasing headache with nausea or vomiting  · Fever over 100.4ºF (38.0ºC)  © 9413-8406 [employer]. [address] [place] [address], [place] [address]. All rights reserved. This information is not intended as a substitute for professional medical care. Always follow your healthcare professional's instructions.          Know Your Neck: The Cervical Spine  By learning about the parts of the neck, you can better understand your neck problem. The bones of the neck are called cervical vertebrae, commonly identified as C1 through C7. Together, they form a bony column called the spine. Vertebrae also protect the spinal cord, a pathway for messages to reach the brain. Surrounding the spine are soft tissues such as muscles, tendons, and nerves.        Flexibility Is  Key  For the neck to function normally, it has to be flexible enough to move without discomfort. A healthy neck can move easily in six different directions.    © 9814-5263 [employer]. [address] [place] [address], [place] [address]. All rights reserved. This information is not intended as a substitute for professional medical care. Always follow your healthcare professional's instructions.          Protecting Your Neck: Posture and Body Mechanics  Protecting your neck from injuries and pain involves practicing good posture and body mechanics. This may mean correcting bad habits you have related to the way you hold and move your body. The tips below can help you improve your posture and body mechanics.    What Is Posture and Why Does It Matter?  Posture is the way you hold your body. For many of us, this means hunching over, thrusting the chin forward, and slouching the shoulders. But this kind of poor posture keeps muscles from properly supporting the neck and puts stress on muscles, disks, ligaments, and joints in your neck. As a result, injury and pain can occur.  How Is Your Posture?  Use a full-length mirror to check your posture. To begin, stand normally. Then slowly back up against a wall. Is there space between your head and the wall? Do you slouch your shoulders? Is your chin pointing up or down? All these can cause neck pain and injury.  Improving Your Posture  Follow these steps to improve your posture:  · Pull your shoulders back.  · Think of the ears, shoulders, and hips as a series of dots. Now, adjust your body to connect the dots in a straight line.  · Keep your chin level.  What Are Body Mechanics and Why Do They Matter?  The way you move and position your body during daily activities is called body mechanics. Good body mechanics help protect the neck. This means learning the right ways to stand, sit, and even sleep. So do whats best for your neck and practice good body  mechanics.  Standing   To protect your neck while standing:  · Carry objects close to your body.  · Keep your ears and shoulders in a line while standing or walking.  · To lower yourself, bend at the knees with a straight back. Do this instead of looking down and reaching for objects.  · Work at eye level. Dont reach above your head or tilt your head back.  Sitting   To protect your neck while sitting:  · Set up your workstation so your monitor is at eye level. Also, use a document baca when viewing papers or books.  · Keep your knees at or slightly below the level of your hips.  · Sit up straight, with feet flat on the floor. If your feet dont touch the floor, use a footrest.  · Avoid sitting or driving for long periods. Take frequent breaks.  Sleeping   To protect your neck while sleeping:  · Sleep on your back with a pillow under your knees, or on your side with a pillow between bent knees. This helps align the spine.  · Avoid using pillows that are too high or too low. Instead, use a neck roll or pillow under your neck while you sleep to keep the neck straight.  · Sleep on a mattress that supports you, with a pillow under your neck.  © 2323-9770 [employer]. [address] [place] [address], [place] [address]. All rights reserved. This information is not intended as a substitute for professional medical care. Always follow your healthcare professional's instructions.          Exercises at Your Workstation: Eyes, Neck, and Head     Tired eyes? Stiff neck? A few easy moves can help prevent these kinds of problems. Take a few minutes during your day to do these exercises--right at your desk. They'll loosen up your muscles, keep you more alert, and make a big difference in how you work and feel.    For your eyes  Eye cup  · Lean forward with your elbows on your desk.  · Cup your hands and place them lightly over your closed eyes. Hold for a minute, while breathing deeply in and out.  · Slowly uncover and  open your eyes. Repeat 2 times.  Eye roll  · Close your eyes. Slowly roll your eyeballs clockwise all the way around. Repeat 3 times.  · Now slowly roll them all the way around counterclockwise. Repeat 3 times.  Eye rest  · Every 20 minutes, look away from the computer screen. Focus on an object at least 20 feet away. Stay focused on this object for a full 20 seconds.    For your neck and head  Warm-up  · Drop your head gently to your chest. While breathing in, slowly roll your head up to your left shoulder. While breathing out, slowly roll your head back to center. Repeat to the right.  · Repeat 3 times on each side.  Head tilt  · Sit up straight. Tuck in your chin.  · Slowly tip your head to the left. Return to the center. Then, tip your head to the right.  · Repeat 3 times on each side.    Head turn  · Sit up straight.  · Slowly turn your head and look over your left shoulder. Hold for a few seconds. Go back to the center, then repeat to your right.  · Repeat 3 times on each side.  © 6188-5716 [employer], [employer]. [address] [place], [place] [id number]. All rights reserved. This information is not intended as a substitute for professional medical care. Always follow your healthcare professional's instructions.          Reach and Hold Exercise    Do this exercise on your hands and knees. Keep your knees under your hips and your hands under your shoulders. Keep your spine in a neutral position (not arched or sagging). Keep your ears in line with your shoulders. Hold for a few seconds before starting the exercise:  4. Tighten your abdominal muscles and raise one arm straight in front of you, palm down. Hold for 5 seconds, then lower. Repeat 5 times.  5. Do the exercise again, this time lifting your arm to the side. Repeat 5 times.  6. Do the exercise again, this time lifting your arm backward, palm up. Repeat 5 times.  Switch sides and do each exercise with the other arm.  © 5428-3681 [employer],  [employer]. [address] [place] [address]. All rights reserved. This information is not intended as a substitute for professional medical care. Always follow your healthcare professional's instructions.        Shoulder and Upper Back Stretch  To start, stand tall with your ears, shoulders, and hips in line. Your feet should be slightly apart, positioned just under your hips. Focus your eyes directly in front of you.  this position for a few seconds before starting your exercise. This helps increase your awareness of proper posture.  Reach overhead and slightly back with both arms. Keep your shoulders and neck aligned and your elbows behind your shoulders:  · With your palms facing the ceiling, turn your fingers inward.  · Take a deep breath. Breathe out, and lower your elbows toward your buttocks. Hold for 5 seconds, then return to starting position.  · Repeat 3 times.    © 9589-5498 [employer]. [address] [place] [address]. All rights reserved. This information is not intended as a substitute for professional medical care. Always follow your healthcare professional's instructions.          Shoulder Clock Exercise  To start, stand tall with your ears, shoulders, and hips in line. Your feet should be slightly apart, positioned just under your hips. Focus your eyes directly in front of you.  this position for a few seconds before starting your exercise. This helps increase your awareness of proper posture.  · Imagine that your right shoulder is the center of a clock. With the outer point of your shoulder, roll it around to slowly trace the outer edge of the clock.  · Move clockwise first, then counterclockwise.  · Repeat 3 times. Switch shoulders.   © 3786-9185 [employer]. [address] [place] [address]. All rights reserved. This information is not intended as a substitute for professional medical care. Always follow your healthcare  professional's instructions.          Shoulder Girdle Stretch     To start, sit in a chair with your feet flat on the floor. Your weight should be slightly forward so that youre balanced evenly on your buttocks. Relax your shoulders and keep your head level. Using a chair with arms may help you keep your balance:  · Place 1 hand on the outside elbow of the other arm.  · Pull the arm across your body. Hold for 30 to 60 seconds. Repeat once.  · Switch sides.    © 9866-6622 [employer]. [address] [place] [address]. All rights reserved. This information is not intended as a substitute for professional medical care. Always follow your healthcare professional's instructions.          Shoulder Exercises      To start, sit in a chair with your feet flat on the floor. Your weight should be slightly forward so that youre balanced evenly on your buttocks. Relax your shoulders and keep your head level. Avoid arching your back or rounding your shoulders. Using a chair with arms may help you keep your balance.  · Raise your arms, elbows bent, to shoulder height.  · Slowly move your forearms together. Hold for 5 seconds.  · Return to starting position. Repeat 5 times.  © 5356-5180 [employer]. [address] [place] [address]. All rights reserved. This information is not intended as a substitute for professional medical care. Always follow your healthcare professional's instructions.        Shoulder Shrug Exercise  To start, sit in a chair with your feet flat on the floor. Shift your weight slightly forward to avoid rounding your back. Relax. Keep your ears, shoulders, and hips aligned:  · Raise both of your shoulders as high as you can, as if you were trying to touch them to your ears. Keep your head and neck still and relaxed.  · Hold for a count of 10. Release.  · Repeat 5 times.    © 5119-9786 [employer]. [address] [place] [address]. All rights  reserved. This information is not intended as a substitute for professional medical care. Always follow your healthcare professional's instructions.          Shoulder Squeeze Exercise     To start, sit in a chair with your feet flat on the floor. Shift your weight slightly forward to avoid rounding your back. Relax. Keep your ears, shoulders, and hips aligned:  · Raise your arms to shoulder height, elbows bent and palms forward.  · Move your arms back, squeezing your shoulder blades together.  · Hold for 10 seconds. Return to starting position.   · Repeat 5 times.     © 2497-7916 [employer]. [address] [place] [address]. All rights reserved. This information is not intended as a substitute for professional medical care. Always follow your healthcare professional's instructions.          Shoulder Exercises: Biceps Curl    This exercise stretches and strengthens your shoulders and arms. Before starting, read through all the instructions. During the exercise, breathe normally and use smooth movements. Stop if you feel any pain. If pain persists, call your healthcare provider.  · Hold a ____ pound weight in each hand, with your palms facing your body. Tuck your arms close to your sides.  · Bend your left elbow and raise the weight to your left shoulder. As you lower that weight, bend your right elbow and raise the weight to your right shoulder. Continue to alternate arms.  · Repeat ____ times. Do ____ sets ____ times a day.     CAUTION: Keep your arms close to your body throughout the exercise. Keep your wrists straight.   Date Last Reviewed: 8/16/2015  © 2875-7599 [employer]. [address] [place] [address]. All rights reserved. This information is not intended as a substitute for professional medical care. Always follow your healthcare professional's instructions.        Shoulder Exercises: External Rotation    Strengthening exercises help make your injured shoulder  more stable. To warm up, do flexibility (stretching) exercises first. Your healthcare provider will tell you what size hand weights to use for the strengthening exercise below. If you dont have hand weights, try using cans of soup instead:  · Lie on your uninjured side with your head supported by a pillow or your arm. Place a small rolled-up towel under your top elbow.  · Grasp a hand weight with your top hand and bend that arm to a right angle, resting your forearm against your stomach.  · Keeping your elbow against the towel, slowly lift the weight until your forearm is slightly higher than your elbow. Return to the starting position. Repeat.  · Work up to 5 to 15 lifts.  Date Last Reviewed: 8/16/2015  © 5126-1798 [employer]. [address] [place] [address]. All rights reserved. This information is not intended as a substitute for professional medical care. Always follow your healthcare professional's instructions.        Shoulder Exercises: Internal Rotation    Strengthening exercises help make your injured shoulder more stable. To warm up, do flexibility (stretching) exercises first. Your healthcare provider will tell you what size hand weights to use for the strengthening exercise below. If you dont have hand weights, try using cans of soup instead:  · With knees bent, lie on a firm surface. Using the hand on the same side as your injured shoulder, grasp a weight. Bend that arm to a right angle (90 degrees).  · Rest your elbow on the floor.  · Keeping your elbow next to your side, lower your forearm toward the floor, away from your body. Do not lower your hand all the way to the floor.  · Slowly return your forearm to your side. Repeat.  · Work up to 5 to 15 lifts.     Note: Support your head and neck with a pillow.   Date Last Reviewed: 9/30/2015  © 7516-1955 [employer]. [address] [place] [id number]. All rights reserved. This information is not intended as  a substitute for professional medical care. Always follow your healthcare professional's instructions.        Shoulder Exercises: Shoulder Press    This exercise stretches and strengthens your shoulders. Before starting, read through all the instructions. During the exercise, breathe normally and use smooth movements. Stop if you feel any pain. If pain persists, call your healthcare provider.  · Hold a ____ pound weight in each hand, elbows at shoulder level, palms facing forward. Arms to the side and slightly forward.   · Raise one arm up until its almost straight. Hold for a second. Lower the weight, extending the other arm up.  · Repeat ____ times with each arm. Do ____ sets ____ times a day.  CAUTION: If you have shoulder problems, consult your health care provider before doing this exercise. Keep your head and body still during the exercise. Only your arms should move.   Date Last Reviewed: 8/16/2015  © 6548-0353 [employer]. [address] [place] [address]. All rights reserved. This information is not intended as a substitute for professional medical care. Always follow your healthcare professional's instructions.        Shoulder Exercises: Side Raise    This exercise stretches and strengthens your shoulders. Before starting, read through all the instructions. During the exercise, breathe normally and use smooth movements. Stop if you feel any pain. If pain persists, call your healthcare provider.  · Stand straight, holding a ____ pound weight in each hand, arms at sides, feet shoulder-width apart.  · Slowly extend your arms up and out until weights are at shoulder level. Slowly return to starting position.  · Repeat ____ times. Do ____ sets ____ times a day.     CAUTION: Dont swing the weights or raise weights above shoulder level.   Date Last Reviewed: 8/16/2015  © 9161-2258 [employer]. [address] [place] [address]. All rights reserved. This information  is not intended as a substitute for professional medical care. Always follow your healthcare professional's instructions.        Shoulder Exercises: Triceps Press    This exercise stretches and strengthens your shoulders. Before starting, read through all the instructions. During the exercise, breathe normally and use smooth movements. Stop if you feel any pain. If pain persists, call your healthcare provider.  · Grasp a ___ pound  weight in each hand. Raise one arm overhead. Hold that arm close to your ear. Bend your elbow and lower the weight behind your head, as far as you can, being careful not to hit your head.   · Slowly straighten your elbow, extending your arm upward. Return to starting position.  · Repeat ____ times with each arm. Do ____ sets ____ times a day.  CAUTION: Keep your head still and neck straight. Keep your arm close to your ear. Dont arch your back.   Date Last Reviewed: 8/16/2015  © 3479-0449 [employer]. [address] [place] [address]. All rights reserved. This information is not intended as a substitute for professional medical care. Always follow your healthcare professional's instructions.        Shoulder Exercises: Wall Pushup    Strengthening exercises help make your injured shoulder more stable by making the muscles that support your shoulder stronger. To warm up, do flexibility (stretching) exercises first.  · With feet and hands shoulder-width apart, place your palms on the wall, standing about an arms length away.  · Keeping your knees straight and heels on the floor, bend your elbows and lean forward as far as you comfortably can. Your elbows should be pointing downward. Then push away from the wall to the starting position. Repeat.  · Work up to 15 wall pushups.     Note: Wear shoes that keep you from slipping.   Date Last Reviewed: 9/3/2015  © 1399-3321 [employer]. [address] [place] [address]. All rights reserved. This  information is not intended as a substitute for professional medical care. Always follow your healthcare professional's instructions.

## 2020-10-20 NOTE — PROGRESS NOTES
PM&R NEW PATIENT HISTORY & PHYSICAL :    Referring Physician: Dr Dimas    Chief Complaint   Patient presents with    Muscle Pain     neck        HPI: This is a 45 y.o.  female being seen in clinic today for evaluation of neck/shoulder achy pain and tightness that started a few weeks ago and has calmed somewhat with [place] and stretching.  She still has significant tightness at the base of her head on the right with radiation causing headaches.  She has a history of migraines which have been triggered by the tension.  With the migraines she can experience numbness/tingling into her arms.      History obtained from patient    Functional History:  Walking: Not limited  Transfers: Independent  Assistive devices: No  Power mobility: No  Falls: None       Needs help with:  Nothing - all ADLS normal    Cooking   Cleaning  Bathing   Dressing   Toileting     Past family, medical, social, and surgical history reviewed in chart    Review of Systems:     General- denies lethargy, weight change, fever, chills  Head/neck- denies swallowing difficulties  ENT- denies hearing changes  Cardiovascular-denies chest pain  Pulmonary- denies shortness of breath  GI- denies constipation or bowel incontinence  - denies bladder incontinence  Skin- denies wounds or rashes  Musculoskeletal- denies weakness, +pain  Neurologic- +/-numbness and tingling  Psychiatric- denies depressive or psychotic features, denies anxiety  Lymphatic-denies swelling  Endocrine- denies hypoglycemic symptoms/DM history  All other pertinent systems negative     Physical Examination:  General: Well developed, well nourished female, NAD  HEENT:NCAT EOMI bilaterally   Pulmonary:Normal respirations    Spinal Examination: CERVICAL  Active ROM is within normal limits.  Inspection: No deformity of spinal alignment.  Palpation: No vertebral tenderness to percussion.  Tight and tender at bilateral trapezius, levator scapula, splenius capitus-worse on right, local  twitch at bilateral trapezius  Spurling test: neg    Spinal Examination: LUMBAR or THORACIC  Active ROM is within normal limits.  Inspection: No deformity of spinal alignment.      Musculoskeletal Tests:    Elbow compression (ulnar): neg  Tinels at wrist: neg  Phalen: neg    Bilateral Upper and Lower Extremities:  Pulses are 2+ at radial,bilaterally.  Shoulder/Elbow/Wrist/Hand ROM wnl except mild rotator cuff weakness  Hip/Knee/Ankle ROM   Bilateral Extremities show normal capillary refill.  No signs of cyanosis, rubor, edema, skin changes, or dysvascular changes of appendages.  Nails appear intact.    Neurological Exam:  Cranial Nerves:  II-XII grossly intact    Manual Muscle Testing: (Motor 5=normal)    RIGHT Upper extremity: Shoulder abduction 5/5, Biceps 5/5, Triceps 5/5, Wrist extension 5/5, Abductor pollicis brevis 5/5, Ulnar hand intrinsics 5/5,  LEFT Upper extremity: Shoulder abduction 5/5, Biceps 5/5, Triceps 5/5, Wrist extension 5/5, Abductor pollicis brevis 5/5, Ulnar hand intrinsics 5/5,  No focal atrophy is noted of either upper or lower extremity.    Bilateral Reflexes:1+bic tric br  Bedoya's response is absent bilaterally.      Sensation: tested to light touch  - intact in arms  Gait: Narrow base and good arm swing.      IMPRESSION/PLAN: This is a 45 y.o.  female with cervical myofascial pain, mild rotator cuff weakness, tension/migraine headaches    1. Trigger pt injections today  2. Handouts on myofascial pain, cuff strengthening, stretches, posture, etc provided  3. If not improving, consider formal PT and repeat injections prn  4. Cont topical agents, ice/heat modalities, natural supplements (magnesium, potassium, increase water intake)    Loren Caraballo M.D.  Physical Medicine and Rehab    PROCEDURE NOTE    Diagnosis: Myofascial pain  Procedure: trigger point injections to bilateral trapezius, splenius capitus, levator scapula     Risks and benefits of procedure explained to patient including  risks of infection, bleeding, pain, or damage to surrounding tissues. All questions answered. Informed consent obtained prior to proceeding. Areas marked and prepped in sterile fashion. Using a 27g 1.25inch needle, 8cc of 1% lidocaine was injected evenly into the above mentioned muscles. None to minimal bleeding noted. ER and post injection instructions given.    Loren Caraballo M.D.

## 2020-10-29 ENCOUNTER — OFFICE VISIT (OUTPATIENT)
Dept: OPHTHALMOLOGY | Facility: CLINIC | Age: 45
End: 2020-10-29
Payer: COMMERCIAL

## 2020-10-29 DIAGNOSIS — H52.203 MYOPIA WITH ASTIGMATISM AND PRESBYOPIA, BILATERAL: Primary | ICD-10-CM

## 2020-10-29 DIAGNOSIS — H52.4 MYOPIA WITH ASTIGMATISM AND PRESBYOPIA, BILATERAL: Primary | ICD-10-CM

## 2020-10-29 DIAGNOSIS — H52.13 MYOPIA WITH ASTIGMATISM AND PRESBYOPIA, BILATERAL: Primary | ICD-10-CM

## 2020-10-29 PROCEDURE — 92015 PR REFRACTION: ICD-10-PCS | Mod: S$GLB,,, | Performed by: OPTOMETRIST

## 2020-10-29 PROCEDURE — 99999 PR PBB SHADOW E&M-EST. PATIENT-LVL III: ICD-10-PCS | Mod: PBBFAC,,, | Performed by: OPTOMETRIST

## 2020-10-29 PROCEDURE — 92014 PR EYE EXAM, EST PATIENT,COMPREHESV: ICD-10-PCS | Mod: S$GLB,,, | Performed by: OPTOMETRIST

## 2020-10-29 PROCEDURE — 99999 PR PBB SHADOW E&M-EST. PATIENT-LVL III: CPT | Mod: PBBFAC,,, | Performed by: OPTOMETRIST

## 2020-10-29 PROCEDURE — 92015 DETERMINE REFRACTIVE STATE: CPT | Mod: S$GLB,,, | Performed by: OPTOMETRIST

## 2020-10-29 PROCEDURE — 92014 COMPRE OPH EXAM EST PT 1/>: CPT | Mod: S$GLB,,, | Performed by: OPTOMETRIST

## 2020-10-29 NOTE — PROGRESS NOTES
HPI     Pts last exam was 11/15/17 with DNl. PT c/o blurred distance va and wears   cls full time.   HPI    Any vision changes since last exam: no   Eye pain: no  Other ocular symptoms: no    Do you wear currently wear glasses or contacts? both    Interested in contacts today? yes    Do you plan on getting new glasses today? If needed          Last edited by Yamilet Mcclain MA on 10/29/2020  1:26 PM. (History)            Assessment /Plan     For exam results, see Encounter Report.    Myopia with astigmatism and presbyopia, bilateral      Eyeglass Final Rx     Eyeglass Final Rx       Sphere Cylinder Axis Add    Right -4.50 +0.50 075 +1.50    Left -6.50 +0.75 100 +1.50    Expiration Date: 10/30/2021              Contact Lens Prescription (10/29/2020)        Brand Base Curve Diameter Sphere    Right Acuvue 1 Day Moist 8.50 14.2 -3.50    Left Acuvue 1 Day Moist 8.50 14.2 -4.50    Expiration Date: 10/30/2021    Replacement: Daily    Wearing Schedule: Daily wear          RTC 1 yr for dilated eye exam or PRN if any problems.   Discussed above and answered questions.

## 2020-11-03 ENCOUNTER — TELEPHONE (OUTPATIENT)
Dept: OBSTETRICS AND GYNECOLOGY | Facility: CLINIC | Age: 45
End: 2020-11-03

## 2020-11-03 NOTE — TELEPHONE ENCOUNTER
Assisted Pt with scheduling appointment for follow up with JOHAN Shah on 11/6/20 at 1:45 pm. Pt verbalized understanding.

## 2020-11-03 NOTE — TELEPHONE ENCOUNTER
----- Message from Chata Campbell sent at 11/3/2020  1:10 PM CST -----  Regarding: schedule appt  Dr. Dimas called to schedule spouse an appt with JOHAN Shah on Friday11/6 at [facility] ...  call back [phone]

## 2020-11-06 ENCOUNTER — OFFICE VISIT (OUTPATIENT)
Dept: OBSTETRICS AND GYNECOLOGY | Facility: CLINIC | Age: 45
End: 2020-11-06
Payer: COMMERCIAL

## 2020-11-06 ENCOUNTER — TELEPHONE (OUTPATIENT)
Dept: GASTROENTEROLOGY | Facility: CLINIC | Age: 45
End: 2020-11-06

## 2020-11-06 VITALS
HEIGHT: 68 IN | SYSTOLIC BLOOD PRESSURE: 100 MMHG | WEIGHT: 146.38 LBS | BODY MASS INDEX: 22.19 KG/M2 | DIASTOLIC BLOOD PRESSURE: 64 MMHG

## 2020-11-06 DIAGNOSIS — Z01.419 ENCOUNTER FOR GYNECOLOGICAL EXAMINATION WITHOUT ABNORMAL FINDING: ICD-10-CM

## 2020-11-06 DIAGNOSIS — E89.40 SURGICAL MENOPAUSE: Primary | ICD-10-CM

## 2020-11-06 PROBLEM — R10.2 PELVIC PAIN: Status: RESOLVED | Noted: 2019-12-27 | Resolved: 2020-11-06

## 2020-11-06 PROBLEM — Z90.710 STATUS POST LAPAROSCOPIC HYSTERECTOMY: Status: RESOLVED | Noted: 2019-12-30 | Resolved: 2020-11-06

## 2020-11-06 PROBLEM — T81.328A DEHISCENCE OF VAGINAL CUFF: Status: RESOLVED | Noted: 2020-07-09 | Resolved: 2020-11-06

## 2020-11-06 PROBLEM — T81.31XA DEHISCENCE OF VAGINAL CUFF: Status: RESOLVED | Noted: 2020-07-09 | Resolved: 2020-11-06

## 2020-11-06 PROBLEM — Z01.818 PREOPERATIVE EXAM FOR GYNECOLOGIC SURGERY: Status: RESOLVED | Noted: 2019-12-30 | Resolved: 2020-11-06

## 2020-11-06 PROBLEM — R51.9 INTRACTABLE HEADACHE: Status: RESOLVED | Noted: 2019-10-07 | Resolved: 2020-11-06

## 2020-11-06 PROCEDURE — 3008F PR BODY MASS INDEX (BMI) DOCUMENTED: ICD-10-PCS | Mod: CPTII,S$GLB,, | Performed by: OBSTETRICS & GYNECOLOGY

## 2020-11-06 PROCEDURE — 99213 PR OFFICE/OUTPT VISIT, EST, LEVL III, 20-29 MIN: ICD-10-PCS | Mod: S$GLB,,, | Performed by: OBSTETRICS & GYNECOLOGY

## 2020-11-06 PROCEDURE — 99213 OFFICE O/P EST LOW 20 MIN: CPT | Mod: S$GLB,,, | Performed by: OBSTETRICS & GYNECOLOGY

## 2020-11-06 PROCEDURE — 3008F BODY MASS INDEX DOCD: CPT | Mod: CPTII,S$GLB,, | Performed by: OBSTETRICS & GYNECOLOGY

## 2020-11-06 PROCEDURE — 99999 PR PBB SHADOW E&M-EST. PATIENT-LVL IV: CPT | Mod: PBBFAC,,, | Performed by: OBSTETRICS & GYNECOLOGY

## 2020-11-06 PROCEDURE — 99999 PR PBB SHADOW E&M-EST. PATIENT-LVL IV: ICD-10-PCS | Mod: PBBFAC,,, | Performed by: OBSTETRICS & GYNECOLOGY

## 2020-11-06 RX ORDER — BUTALBITAL, ASPIRIN, CAFFEINE AND CODEINE PHOSPHATE 50; 325; 40; 30 MG/1; MG/1; MG/1; MG/1
CAPSULE ORAL
COMMUNITY
Start: 2020-10-07 | End: 2022-03-03

## 2020-11-06 RX ORDER — ESTRADIOL 10 UG/1
1 INSERT VAGINAL
Qty: 12 TABLET | Refills: 11 | Status: SHIPPED | OUTPATIENT
Start: 2020-11-06 | End: 2020-12-24 | Stop reason: SDUPTHER

## 2020-11-06 NOTE — PROGRESS NOTES
Subjective:       Patient ID: Sandra Dimas is a 45 y.o. female.    Chief Complaint:  Follow-up      History of Present Illness  HPI  Follow up from repair of vaginal cuff separation    On vaginal estrogen and Vivelle patch.  Having some issue with vaginal cream, advise conversion to Vagifem 3 times weekly   No vasomotor symptoms   No vaginal bleeding or abnormal discharge.   Some LLQ abd discomfort intermittently, No significant GI associated symptoms and no  symptoms.     Health Maintenance   Topic Date Due    Hepatitis C Screening  1975    Mammogram  2020    TETANUS VACCINE  2026    Lipid Panel  Completed     GYN & OB History  No LMP recorded (lmp unknown). Patient has had a hysterectomy.   Date of Last Pap: No result found    OB History    Para Term  AB Living   4 3 3   1 3   SAB TAB Ectopic Multiple Live Births                  # Outcome Date GA Lbr Dewayne/2nd Weight Sex Delivery Anes PTL Lv   4 Term      Vag-Spont      3 AB            2 Term      Vag-Spont      1 Term      Vag-Spont          Review of Systems  Review of Systems        Objective:   Physical Exam:             Abdominal: Soft. Bowel sounds are normal. She exhibits no distension, no mass and no abdominal incision. There is no abdominal tenderness. There is no guarding. No hernia. Hernia confirmed negative in the right inguinal area and confirmed negative in the left inguinal area.     Genitourinary:    Vagina and rectum normal.   There is no rash, tenderness or lesion on the right labia. There is no rash, tenderness or lesion on the left labia. Uterus is absent. Right adnexum displays no mass, no tenderness and no fullness. Left adnexum displays no mass, no tenderness and no fullness. No tenderness, bleeding, rectocele, cystocele or unspecified prolapse of vaginal walls in the vagina.    No foreign body in the vagina.      No signs of injury in the vagina.   Labial bartholins normal.Cervix  exhibits absence.    Genitourinary Comments: Good vaginal depth.  Cuff is mobile and intact with no granulation tissue and no tenderness to palpation    negative for vaginal discharge                     Assessment:        1. Surgical menopause    2. Encounter for gynecological examination without abnormal finding                Plan:            Sandra was seen today for follow-up.    Diagnoses and all orders for this visit:    Surgical menopause  -     estradioL (VAGIFEM) 10 mcg Tab; Place 1 tablet (10 mcg total) vaginally 3 (three) times a week.    Encounter for gynecological examination without abnormal finding      Should be ok to resume vaginal intercourse   Switch to Vagifem for ease of use and compliance   Continue Estrogen patch.     If LLQ pain continues, consider evaluation for colitis

## 2020-11-06 NOTE — TELEPHONE ENCOUNTER
Called pt, no answer. Msg left. appt booked with Dr Bustos for Monday 11/09/20 at 11:30 am per Dr Bustos recommendations.   Sent a msg to pt via pt portal.

## 2020-11-09 ENCOUNTER — PATIENT MESSAGE (OUTPATIENT)
Dept: GASTROENTEROLOGY | Facility: CLINIC | Age: 45
End: 2020-11-09

## 2020-11-09 ENCOUNTER — ANESTHESIA EVENT (OUTPATIENT)
Dept: ENDOSCOPY | Facility: HOSPITAL | Age: 45
End: 2020-11-09
Payer: COMMERCIAL

## 2020-11-09 ENCOUNTER — OFFICE VISIT (OUTPATIENT)
Dept: GASTROENTEROLOGY | Facility: CLINIC | Age: 45
End: 2020-11-09
Payer: COMMERCIAL

## 2020-11-09 VITALS
BODY MASS INDEX: 22.16 KG/M2 | SYSTOLIC BLOOD PRESSURE: 100 MMHG | HEART RATE: 58 BPM | HEIGHT: 68 IN | WEIGHT: 146.19 LBS | DIASTOLIC BLOOD PRESSURE: 60 MMHG

## 2020-11-09 DIAGNOSIS — R10.32 LLQ ABDOMINAL PAIN: ICD-10-CM

## 2020-11-09 DIAGNOSIS — D50.9 IRON DEFICIENCY ANEMIA, UNSPECIFIED IRON DEFICIENCY ANEMIA TYPE: Primary | ICD-10-CM

## 2020-11-09 PROCEDURE — 99999 PR PBB SHADOW E&M-EST. PATIENT-LVL V: CPT | Mod: PBBFAC,,, | Performed by: INTERNAL MEDICINE

## 2020-11-09 PROCEDURE — 99999 PR PBB SHADOW E&M-EST. PATIENT-LVL V: ICD-10-PCS | Mod: PBBFAC,,, | Performed by: INTERNAL MEDICINE

## 2020-11-09 PROCEDURE — 99213 PR OFFICE/OUTPT VISIT, EST, LEVL III, 20-29 MIN: ICD-10-PCS | Mod: S$GLB,,, | Performed by: INTERNAL MEDICINE

## 2020-11-09 PROCEDURE — 3008F BODY MASS INDEX DOCD: CPT | Mod: CPTII,S$GLB,, | Performed by: INTERNAL MEDICINE

## 2020-11-09 PROCEDURE — 99213 OFFICE O/P EST LOW 20 MIN: CPT | Mod: S$GLB,,, | Performed by: INTERNAL MEDICINE

## 2020-11-09 PROCEDURE — 3008F PR BODY MASS INDEX (BMI) DOCUMENTED: ICD-10-PCS | Mod: CPTII,S$GLB,, | Performed by: INTERNAL MEDICINE

## 2020-11-09 RX ORDER — SODIUM, POTASSIUM,MAG SULFATES 17.5-3.13G
SOLUTION, RECONSTITUTED, ORAL ORAL
Qty: 354 ML | Refills: 0 | Status: ON HOLD | OUTPATIENT
Start: 2020-11-09 | End: 2020-11-12 | Stop reason: ALTCHOICE

## 2020-11-09 NOTE — PATIENT INSTRUCTIONS
Iron deficiency anemia, unspecified iron deficiency anemia type  -     sodium,potassium,mag sulfates (SUPREP BOWEL PREP KIT) 17.5-3.13-1.6 gram SolR; As directed  Dispense: 1 Bottle; Refill: 0  -     COVID-19 Routine Screening; Future; Expected date: 11/09/2020    LLQ abdominal pain      We will schedule an Upper and lower endoscopy before your trip.   Thanks for trusting us with your healthcare needs and using [facility]. If you want to ask us a question, you can do so by replying to this message or by calling [phone].    Sincerely,    Bossman Bustos M.D.      To rate your experience with Dr. Bustos please click on the link below:    [email or web address]          Abdominal Pain    Abdominal pain is pain in the stomach or belly area. Everyone has this pain from time to time. In many cases it goes away on its own. But abdominal pain can sometimes be due to a serious problem, such as appendicitis. So its important to know when to seek help.  Causes of abdominal pain  There are many possible causes of abdominal pain. Common causes in adults include:  · Constipation, diarrhea, or gas  · Stomach acid flowing back up into the esophagus (acid reflux or heartburn)  · Severe acid reflux, called GERD (gastroesophageal reflux disease)  · A sore in the lining of the stomach or small intestine (peptic ulcer)  · Inflammation of the gallbladder, liver, or pancreas  · Gallstones or kidney stones  · Appendicitis   · Intestinal blockage   · An internal organ pushing through a muscle or other tissue (hernia)  · Urinary tract infections  · In women, menstrual cramps, fibroids, or endometriosis  · Inflammation or infection of the intestines  Diagnosing the cause of abdominal pain  Your healthcare provider will do a physical exam help find the cause of your pain. If needed, tests will be ordered. Belly pain has many possible causes. So it can be hard to find the reason for your pain.  Giving details about your pain can help. Tell your provider where and when you feel the pain, and what makes it better or worse. Also let your provider know if you have other symptoms such as:  · Fever  · Tiredness  · Upset stomach (nausea)  · Vomiting  · Changes in bathroom habits  Treating abdominal pain  Some causes of pain need emergency medical treatment right away. These include appendicitis or a bowel blockage. Other problems can be treated with rest, fluids, or medicines. Your healthcare provider can give you specific instructions for treatment or self-care based on what is causing your pain.  If you have vomiting or diarrhea, sip water or other clear fluids. When you are ready to eat solid foods again, start with small amounts of easy-to-digest, low-fat foods. These include apple sauce, toast, or crackers.   When to seek medical care  Call 911 or go to the hospital right away if you:  · Cant pass stool and are vomiting  · Are vomiting blood or have bloody diarrhea or black, tarry diarrhea  · Have chest, neck, or shoulder pain  · Feel like you might pass out  · Have pain in your shoulder blades with nausea  · Have sudden, severe belly pain  · Have new, severe pain unlike any you have felt before  · Have a belly that is rigid, hard, and tender to touch  Call your healthcare provider if you have:  · Pain for more than 5 days  · Bloating for more than 2 days  · Diarrhea for more than 5 days  · A fever of 100.4°F (38.0°C) or higher, or as directed by your provider  · Pain that gets worse  · Weight loss for no reason  · Continued lack of appetite  · Blood in your stool  How to prevent abdominal pain  Here are some tips to help prevent abdominal pain:  · Eat smaller amounts of food at one time.  · Avoid greasy, fried, or other high-fat foods.  · Avoid foods that give you gas.  · Exercise regularly.  · Drink plenty of fluids.  To help prevent GERD symptoms:  · Quit smoking.  · Reduce alcohol and certain foods that  increase stomach acid.  · Avoid aspirin and over-the-counter pain and fever medicines (NSAIDS or nonsteroidal anti-inflammatory drugs), if possible  · Lose extra weight.  · Finish eating at least 2 hours before you go to bed or lie down.  · Raise the head of your bed.  Date Last Reviewed: 7/1/2016 © 2000-2017 [employer]. [address] [place] [address], [place] [address]. All rights reserved. This information is not intended as a substitute for professional medical care. Always follow your healthcare professional's instructions.        Anemia  Anemia is a condition that occurs when your body does not have enough healthy red blood cells (RBCs). RBCs are the parts of your blood that carry oxygen throughout your body. A protein called hemoglobin allows your RBCs to absorb and release oxygen. Without enough RBCs or hemoglobin, your body doesn't get enough oxygen. Symptoms of anemia may then occur.    What are the symptoms of anemia?  Some people with anemia have no symptoms. But most people have symptoms that range from mild to severe. These can include:  · Tiredness (fatigue)  · Weakness  · Pale skin  · Shortness of breath  · Dizziness or fainting  · Rapid heartbeat  · Trouble doing normal amounts of activity  · Jaundice (yellowing of your eyes, skin, or mouth; dark urine)  What causes anemia?  Anemia can occur when your body:  · Loses too much blood  · Does not make enough RBCs  · Destroys your RBCs at a faster rate than it can replace them  · Does not make a normal amount of hemoglobin in your RBCs  These problems can occur for many reasons, including:  · A condition that you are born with (congenital or inherited), such as sickle cell disease or thalassemia  · Heavy bleeding for any reason, including injury, surgery, childbirth, or even heavy menstrual periods  · Being low in certain nutrients, such as iron, folate, or vitamin B12, possibly from a poor diet or a condition like celiac disease or Crohn's  disease  · Certain chronic conditions like diabetes, arthritis, or kidney disease  · Certain chronic infections like tuberculosis or HIV  · Exposure to certain medicines, such as those used for chemotherapy  There are different types of anemia. Your healthcare provider can tell you more about the type of anemia you have and what may have caused it.  How is anemia diagnosed?  To diagnose anemia, your healthcare provider orders blood tests. These can include:  · Complete blood cell count (CBC). This test measures the amounts of the different types of blood cells.  · Blood smear. This test checks the size and shape of your blood cells. To do the test, a drop of your blood is viewed under a microscope. A stain is used to make the blood cells easier to see.  · Iron studies. These tests measure the amount of iron in your blood. Your body needs iron to make hemoglobin in your RBCs.  · Vitamin B12 and folate studies. These tests check for some of the components that help give RBCs a normal size and shape.  · Reticulocyte count. This test measures the amount of new RBCs that your bone marrow makes.  · Hemoglobin electrophoresis. This test checks for problems with your hemoglobin in RBCs.  How is anemia treated?  Treatment for anemia is based on the type of anemia, its cause, and the severity of your symptoms. Treatments may include:  · Diet changes. This involves increasing the amount of certain nutrients in your diet, such as iron, vitamin B12, or folate. Your healthcare provider may also prescribe nutrient supplements.  · Medicines. Certain medicines treat the cause of your anemia. Others help build new RBCs or relieve symptoms. If a medicine is the cause of your anemia, you may need to stop or change it.  · Blood transfusions. Replacing some of your blood can increase the number of healthy RBCs in your body.  · Surgery. In some cases, your doctor may do surgery to treat the underlying cause of anemia. If you need surgery,  your healthcare provider will explain the procedure and outline the risks and benefits for you.  What are the long-term concerns?  If you have a certain type of anemia, you can expect a full recovery after treatment. If you have other types of anemia (especially a type you're born with), you will need to manage it for life. Your doctor can tell you more.  Date Last Reviewed: 12/1/2016  © 5912-1160 [employer]. [address] [place] [address], [place] [address]. All rights reserved. This information is not intended as a substitute for professional medical care. Always follow your healthcare professional's instructions.

## 2020-11-09 NOTE — PROGRESS NOTES
Subjective:       Sandra Dimas is a 45 y.o. female who is referred for evaluation of abdominal pain. The pain is located in the LLQ. The pain is described as dull. Onset was several months ago. Symptoms have been unchanged since. Aggravating factors include: none.  Alleviating factors include: none. Associated symptoms include: anorexia. The patient denies chills, diarrhea, fever, hematochezia and melena. Patient had a normal CT scan several months ago when her symptoms began and her recent labs showed evidence of iron deficiency. She has lost her appetite and continues with weight loss. I believe this is a side effect of some of her medications.     Past Medical History:   Diagnosis Date    Chronic paroxysmal hemicrania 01/03/2019    Endometriosis     General anesthetics causing adverse effect in therapeutic use     wakes up with severe anxiety attacks    Hemicrania continua     Migraines      Past Surgical History:   Procedure Laterality Date    APPENDECTOMY      CYST REMOVAL Right 12/30/2019    Procedure: EXCISION, CYST;  Surgeon: NATALI Shah MD;  Location: [facility] OR;  Service: OB/GYN;  Laterality: Right;  Sebaceous cyst    diagnostic laprascopy      ESOPHAGOGASTRODUODENOSCOPY N/A 11/30/2018    Procedure: EGD (ESOPHAGOGASTRODUODENOSCOPY);  Surgeon: Bossman Bustos MD;  Location: [facility];  Service: Endoscopy;  Laterality: N/A;    FULGURATION OF ENDOMETRIOSIS  12/26/2018    Procedure: FULGURATION, ENDOMETRIOSIS;  Surgeon: Zehra Jensen MD;  Location: [facility] OR;  Service: OB/GYN;;  endometriosis implant resection    HYSTEROSCOPY WITH HYDROTHERMAL ABLATION OF ENDOMETRIUM WITH DILATION AND CURETTAGE N/A 12/26/2018    Procedure: HYSTEROSCOPY, WITH DILATION AND CURETTAGE OF UTERUS AND HYDROTHERMAL ENDOMETRIAL ABLATION;  Surgeon: Zehra Jensen MD;  Location: [facility] OR;  Service: OB/GYN;  Laterality: N/A;    LAPAROSCOPIC SALPINGECTOMY Bilateral 12/26/2018    Procedure: SALPINGECTOMY,  LAPAROSCOPIC;  Surgeon: Zehra Jensen MD;  Location: [facility] OR;  Service: OB/GYN;  Laterality: Bilateral;    LAPAROTOMY, EXPLORATORY N/A 2/7/2019    Procedure: LAPAROTOMY, EXPLORATORY;  Surgeon: Ramakrishna Boone MD;  Location: [facility] OR;  Service: General;  Laterality: N/A;    REPAIR OF VAGINAL CUFF N/A 7/9/2020    Procedure: REPAIR, VAGINAL CUFF;  Surgeon: Sailaja Addison MD;  Location: [facility] OR;  Service: OB/GYN;  Laterality: N/A;    ROBOT-ASSISTED LAPAROSCOPIC ABDOMINAL HYSTERECTOMY USING DA MAVIS XI N/A 12/30/2019    Procedure: XI ROBOTIC HYSTERECTOMY;  Surgeon: NATALI Shah MD;  Location: [facility] OR;  Service: OB/GYN;  Laterality: N/A;    ROBOT-ASSISTED LAPAROSCOPIC SURGICAL REMOVAL OF OVARY USING DA MAVIS XI Bilateral 12/30/2019    Procedure: XI ROBOTIC OOPHORECTOMY;  Surgeon: NATALI Shah MD;  Location: [facility] OR;  Service: OB/GYN;  Laterality: Bilateral;    robotic assisted laparoscopy with excision of ovarian endometrioma and chromotubation       Current Outpatient Medications on File Prior to Visit   Medication Sig Dispense Refill    ASCOMP WITH CODEINE 05--40 mg Cap TAKE 1 TABLET BY MOUTH EVERY 4 TO 6 HOURS AT BEDTIME AS NEEDED      atenoloL (TENORMIN) 25 MG tablet Take 1 tablet (25 mg total) by mouth once daily. 90 tablet 3    azithromycin (Z-PAOLA) 250 MG tablet Take 2 tablets by mouth on day 1, then take 1 tablet once daily on days 2-5 6 tablet 0    B2/magnesium cit,oxid/feverfew (MIGRELIEF ORAL) Take by mouth once daily.       estradioL (VAGIFEM) 10 mcg Tab Place 1 tablet (10 mcg total) vaginally 3 (three) times a week. 12 tablet 11    estradioL (VIVELLE-DOT) 0.075 mg/24 hr Place 1 patch onto the skin twice a week. 8 patch 11    FLUoxetine 20 MG capsule Take one capsule by mouth once daily 90 capsule 3    hydrOXYzine pamoate (VISTARIL) 25 MG Cap Take 1 capsule (25 mg total) by mouth every 4 (four) hours as needed. 20 capsule 0    lamoTRIgine (LAMICTAL) 100 MG tablet Take 1 tablet  (100 mg total) by mouth 2 (two) times daily. 60 tablet 11    naproxen (EC NAPROSYN) 500 MG EC tablet Take 1 tablet (500 mg total) by mouth 2 (two) times daily. 60 tablet 1    ondansetron (ZOFRAN) 8 MG tablet Take 1 tablet (8 mg total) by mouth every 8 (eight) hours as needed for Nausea. 30 tablet 1    oxyCODONE-acetaminophen (PERCOCET)  mg per tablet Take 1 tablet by mouth every 6 (six) hours as needed for Pain. 15 tablet 0    pantoprazole (PROTONIX) 40 MG tablet Take 1 tablet (40 mg total) by mouth once daily. 90 tablet 3    promethazine (PHENERGAN) 25 MG tablet Take 1 tablet (25 mg total) by mouth every 6 (six) hours as needed for Nausea. 30 tablet 1    topiramate (TROKENDI XR) 100 mg Cp24 Take 100 mg by mouth.      valACYclovir (VALTREX) 500 MG tablet       vitamin D (VITAMIN D3) 1000 units Tab Take 1,000 Units by mouth once daily.      ALPRAZolam (XANAX) 0.5 MG tablet Take 1 tablet (0.5 mg total) by mouth 3 (three) times daily as needed for Anxiety. 40 tablet 0    butalbital-acetaminophen-caffeine -40 mg (FIORICET, ESGIC) -40 mg per tablet Take 1 tablet by mouth twice a day as needed for Headache (Patient not taking: Reported on 11/9/2020) 30 tablet 1    butorphanol (STADOL) 10 mg/mL nasal spray as needed.      HYDROcodone-acetaminophen (NORCO) 7.5-325 mg per tablet Take 1 tablet by mouth every 6 to 8 hours as needed for Pain. (Patient not taking: Reported on 10/13/2020) 15 tablet 0    phenylephrine-DM-acetaminophen (VICKS DAYQUIL COLD-FLU RELIEF) 5- mg Cap Take by mouth as needed.       Current Facility-Administered Medications on File Prior to Visit   Medication Dose Route Frequency Provider Last Rate Last Dose    lactated ringers infusion   Intravenous Continuous Rip Doss MD 0 mL/hr at 12/26/18 1542      lidocaine (PF) 10 mg/ml (1%) injection 10 mg  1 mL Intradermal Once Rip Doss MD         Review of patient's allergies indicates:   Allergen Reactions     Hydrocodone Other (See Comments)     Chest pains. Note: patient tolerated hydromorphone in 2/2019.     Chlorhexidine Rash     Per  after surgery patient had large rash across abdomen where chlorhexidine was applied.    Mastisol adhesive [gum pvbnxq-ymwzkb-vemc-alcohol] Rash     Social History     Socioeconomic History    Marital status:      Spouse name: Not on file    Number of children: Not on file    Years of education: Not on file    Highest education level: Not on file   Occupational History    Not on file   Social Needs    Financial resource strain: Not on file    Food insecurity     Worry: Not on file     Inability: Not on file    Transportation needs     Medical: Not on file     Non-medical: Not on file   Tobacco Use    Smoking status: Never Smoker    Smokeless tobacco: Never Used   Substance and Sexual Activity    Alcohol use: Not Currently     Frequency: Monthly or less     Drinks per session: 1 or 2     Binge frequency: Never     Comment: rarely No alcohol 72h prior to sx    Drug use: No    Sexual activity: Yes     Partners: Male   Lifestyle    Physical activity     Days per week: Not on file     Minutes per session: Not on file    Stress: Not on file   Relationships    Social connections     Talks on phone: Not on file     Gets together: Not on file     Attends Christianity service: Not on file     Active member of club or organization: Not on file     Attends meetings of clubs or organizations: Not on file     Relationship status: Not on file   Other Topics Concern    Not on file   Social History Narrative    No pets or smokers in household.     Family History   Problem Relation Age of Onset    Strabismus Neg Hx     Retinal detachment Neg Hx     Macular degeneration Neg Hx     Glaucoma Neg Hx     Blindness Neg Hx     Amblyopia Neg Hx     Breast cancer Neg Hx     Colon cancer Neg Hx     Ovarian cancer Neg Hx     Thyroid disease Neg Hx     Migraines Neg Hx      "Asthma Neg Hx      Review of Systems  Review of Systems   Constitutional: Positive for weight loss. Negative for chills, fever and malaise/fatigue.   HENT: Negative for congestion, hearing loss, nosebleeds, sinus pain and sore throat.    Eyes: Negative for blurred vision, double vision, pain and redness.   Respiratory: Negative for hemoptysis, sputum production, shortness of breath and wheezing.    Cardiovascular: Negative for chest pain, palpitations, orthopnea, claudication and leg swelling.   Gastrointestinal: Positive for abdominal pain. Negative for blood in stool, constipation, diarrhea, heartburn, melena, nausea and vomiting.   Genitourinary: Negative for dysuria, frequency, hematuria and urgency.   Musculoskeletal: Negative for back pain, joint pain, myalgias and neck pain.   Skin: Negative for itching and rash.   Neurological: Negative for dizziness, tingling, tremors, sensory change, seizures, weakness and headaches.   Endo/Heme/Allergies: Negative for environmental allergies and polydipsia. Does not bruise/bleed easily.   Psychiatric/Behavioral: Negative for depression, hallucinations, memory loss and suicidal ideas. The patient is not nervous/anxious.           Objective:       /60   Pulse (!) 58   Ht 5' 8" (1.727 m)   Wt 66.3 kg (146 lb 2.6 oz)   LMP  (LMP Unknown)   BMI 22.22 kg/m²   Physical Exam  Constitutional:       Appearance: She is well-developed.   HENT:      Head: Normocephalic and atraumatic.   Eyes:      Pupils: Pupils are equal, round, and reactive to light.   Neck:      Musculoskeletal: Normal range of motion and neck supple.      Thyroid: No thyromegaly.   Cardiovascular:      Rate and Rhythm: Normal rate and regular rhythm.      Heart sounds: Normal heart sounds. No murmur.   Pulmonary:      Effort: Pulmonary effort is normal. No respiratory distress.      Breath sounds: Normal breath sounds. No wheezing or rales.   Abdominal:      General: Bowel sounds are normal. There is no " distension.      Palpations: Abdomen is soft. There is no mass.      Tenderness: There is abdominal tenderness.   Musculoskeletal: Normal range of motion.         General: No tenderness.   Lymphadenopathy:      Cervical: No cervical adenopathy.   Skin:     General: Skin is warm and dry.      Findings: No erythema.   Neurological:      Mental Status: She is alert and oriented to person, place, and time.      Cranial Nerves: No cranial nerve deficit.      Deep Tendon Reflexes: Reflexes are normal and symmetric.   Psychiatric:         Behavior: Behavior normal.       Laboratory  Lab Results   Component Value Date    WBC 6.29 10/07/2020    RBC 4.39 10/07/2020    HGB 11.2 (L) 10/07/2020    HCT 35.5 (L) 10/07/2020     10/07/2020     Lab Results   Component Value Date    AMYLASE 65 07/24/2020     Lab Results   Component Value Date    LIPASE 46 07/24/2020      Lab Results   Component Value Date    IRON 43 09/17/2020    TIBC 367 09/17/2020    FERRITIN 201 09/17/2020     Iron Saturation: 12.      Assessment:     1. Iron deficiency anemia, unspecified iron deficiency anemia type    2. LLQ abdominal pain         Plan:      Schedule for colonoscopy.  Schedule for upper endoscopy.  The risks and benefits of my recommendations, as well as other treatment options were discussed with the patient today. Questions were answered.  Follow up: as needed.    Iron deficiency anemia, unspecified iron deficiency anemia type  -     sodium,potassium,mag sulfates (SUPREP BOWEL PREP KIT) 17.5-3.13-1.6 gram SolR; As directed  Dispense: 354 mL; Refill: 0  -     COVID-19 Routine Screening; Future; Expected date: 11/09/2020    LLQ abdominal pain      Risks, benefits and alternative options were discussed with the patient. Risks including but not limited to infection, bleeding, heart or respiratory problems and perforation that may require surgery were all explained to the patient. The patient had a chance for questions if there were doubts or  concerns about the test. The referring provider will be notified that our consultation is complete and available through the patient's records.    Thanks for letting us participate in the care of this nice patient,      Bossman Bustos

## 2020-11-09 NOTE — ANESTHESIA PREPROCEDURE EVALUATION
11/09/2020  Sandra Dimas is a 45 y.o., female.  Past Medical History:   Diagnosis Date    Chronic paroxysmal hemicrania 01/03/2019    Endometriosis     General anesthetics causing adverse effect in therapeutic use     wakes up with severe anxiety attacks    Hemicrania continua     Migraines      Past Surgical History:   Procedure Laterality Date    APPENDECTOMY      CYST REMOVAL Right 12/30/2019    Procedure: EXCISION, CYST;  Surgeon: NATALI Shah MD;  Location: [facility] OR;  Service: OB/GYN;  Laterality: Right;  Sebaceous cyst    diagnostic laprascopy      ESOPHAGOGASTRODUODENOSCOPY N/A 11/30/2018    Procedure: EGD (ESOPHAGOGASTRODUODENOSCOPY);  Surgeon: Bossman Bustos MD;  Location: [facility];  Service: Endoscopy;  Laterality: N/A;    FULGURATION OF ENDOMETRIOSIS  12/26/2018    Procedure: FULGURATION, ENDOMETRIOSIS;  Surgeon: Zehra Jensen MD;  Location: [facility] OR;  Service: OB/GYN;;  endometriosis implant resection    HYSTEROSCOPY WITH HYDROTHERMAL ABLATION OF ENDOMETRIUM WITH DILATION AND CURETTAGE N/A 12/26/2018    Procedure: HYSTEROSCOPY, WITH DILATION AND CURETTAGE OF UTERUS AND HYDROTHERMAL ENDOMETRIAL ABLATION;  Surgeon: Zehra Jensen MD;  Location: [facility] OR;  Service: OB/GYN;  Laterality: N/A;    LAPAROSCOPIC SALPINGECTOMY Bilateral 12/26/2018    Procedure: SALPINGECTOMY, LAPAROSCOPIC;  Surgeon: Zehra Jensen MD;  Location: [facility] OR;  Service: OB/GYN;  Laterality: Bilateral;    LAPAROTOMY, EXPLORATORY N/A 2/7/2019    Procedure: LAPAROTOMY, EXPLORATORY;  Surgeon: Ramakrishna Boone MD;  Location: [facility] OR;  Service: General;  Laterality: N/A;    REPAIR OF VAGINAL CUFF N/A 7/9/2020    Procedure: REPAIR, VAGINAL CUFF;  Surgeon: Sailaja Addison MD;  Location: [facility] OR;  Service: OB/GYN;  Laterality: N/A;    ROBOT-ASSISTED LAPAROSCOPIC ABDOMINAL HYSTERECTOMY USING DA MAVIS XI N/A  12/30/2019    Procedure: XI ROBOTIC HYSTERECTOMY;  Surgeon: NATALI Shah MD;  Location: [facility] OR;  Service: OB/GYN;  Laterality: N/A;    ROBOT-ASSISTED LAPAROSCOPIC SURGICAL REMOVAL OF OVARY USING DA MAVIS XI Bilateral 12/30/2019    Procedure: XI ROBOTIC OOPHORECTOMY;  Surgeon: NATALI Shah MD;  Location: [facility] OR;  Service: OB/GYN;  Laterality: Bilateral;    robotic assisted laparoscopy with excision of ovarian endometrioma and chromotubation           Anesthesia Evaluation    I have reviewed the Patient Summary Reports.    I have reviewed the Nursing Notes. I have reviewed the NPO Status.   I have reviewed the Medications.     Review of Systems  Social:  Non-Smoker, No Alcohol Use    Cardiovascular:   ECG has been reviewed. Echo 10/2019    CONCLUSIONS     1 - Concentric remodeling.     2 - No wall motion abnormalities.     3 - Normal left ventricular systolic function (EF 55-60%).     4 - Normal left ventricular diastolic function.     5 - Normal right ventricular systolic function .     6 - The estimated PA systolic pressure is 30 mmHg.    Hepatic/GI:   Hiatal Hernia, GERD    Neurological:   Neuromuscular Disease, Headaches Cervical myofacial pain syndrome, hemicrania continua   Psych:   Psychiatric History depression Major depressive disorder, in partial remission         Physical Exam  General:  Well nourished    Airway/Jaw/Neck:  Airway Findings: Mouth Opening: Normal Tongue: Normal  General Airway Assessment: Adult  Mallampati: II  TM Distance: Normal, at least 6 cm  Jaw/Neck Findings:  Neck ROM: Normal ROM      Dental:  Dental Findings: In tact, Periodontal disease, MildPt denies loose or removable dentition    Chest/Lungs:  Chest/Lungs Findings: Clear to auscultation, Normal Respiratory Rate     Heart/Vascular:  Heart Findings: Rate: Normal  Rhythm: Regular Rhythm             Anesthesia Plan  Type of Anesthesia, risks & benefits discussed:  Anesthesia Type:  MAC  Patient's Preference:    Intra-op Monitoring Plan: standard ASA monitors  Intra-op Monitoring Plan Comments:   Post Op Pain Control Plan: multimodal analgesia and per primary service following discharge from PACU  Post Op Pain Control Plan Comments:   Induction:   IV  Beta Blocker:  Patient is on a Beta-Blocker and has received one dose within the past 24 hours (No further documentation required).       Informed Consent: Patient understands risks and agrees with Anesthesia plan.  Questions answered. Anesthesia consent signed with patient.  ASA Score: 2     Day of Surgery Review of History & Physical: I have interviewed and examined the patient. I have reviewed the patient's H&P dated:  There are no significant changes.  H&P update referred to the surgeon.         Ready For Surgery From Anesthesia Perspective.

## 2020-11-11 ENCOUNTER — ANESTHESIA (OUTPATIENT)
Dept: ENDOSCOPY | Facility: HOSPITAL | Age: 45
End: 2020-11-11
Payer: COMMERCIAL

## 2020-11-12 ENCOUNTER — HOSPITAL ENCOUNTER (OUTPATIENT)
Facility: HOSPITAL | Age: 45
Discharge: HOME | End: 2020-11-12
Attending: INTERNAL MEDICINE | Admitting: INTERNAL MEDICINE
Payer: COMMERCIAL

## 2020-11-12 DIAGNOSIS — R10.32 LEFT LOWER QUADRANT ABDOMINAL PAIN: ICD-10-CM

## 2020-11-12 DIAGNOSIS — D50.0 IRON DEFICIENCY ANEMIA DUE TO CHRONIC BLOOD LOSS: ICD-10-CM

## 2020-11-12 DIAGNOSIS — D50.9 IDA (IRON DEFICIENCY ANEMIA): Primary | ICD-10-CM

## 2020-11-12 DIAGNOSIS — D50.9 IRON DEFICIENCY ANEMIA, UNSPECIFIED IRON DEFICIENCY ANEMIA TYPE: Primary | ICD-10-CM

## 2020-11-12 LAB
BASOPHILS # BLD AUTO: 0.02 K/UL (ref 0–0.2)
BASOPHILS NFR BLD: 0.4 % (ref 0–1.9)
DIFFERENTIAL METHOD: ABNORMAL
EOSINOPHIL # BLD AUTO: 0.1 K/UL (ref 0–0.5)
EOSINOPHIL NFR BLD: 1.4 % (ref 0–8)
ERYTHROCYTE [DISTWIDTH] IN BLOOD BY AUTOMATED COUNT: 15.7 % (ref 11.5–14.5)
HCT VFR BLD AUTO: 38.3 % (ref 37–48.5)
HGB BLD-MCNC: 11.8 G/DL (ref 12–16)
IMM GRANULOCYTES # BLD AUTO: 0 K/UL (ref 0–0.04)
IMM GRANULOCYTES NFR BLD AUTO: 0 % (ref 0–0.5)
LYMPHOCYTES # BLD AUTO: 2.5 K/UL (ref 1–4.8)
LYMPHOCYTES NFR BLD: 50.2 % (ref 18–48)
MCH RBC QN AUTO: 25.4 PG (ref 27–31)
MCHC RBC AUTO-ENTMCNC: 30.8 G/DL (ref 32–36)
MCV RBC AUTO: 82 FL (ref 82–98)
MONOCYTES # BLD AUTO: 0.3 K/UL (ref 0.3–1)
MONOCYTES NFR BLD: 6.7 % (ref 4–15)
NEUTROPHILS # BLD AUTO: 2 K/UL (ref 1.8–7.7)
NEUTROPHILS NFR BLD: 41.3 % (ref 38–73)
NRBC BLD-RTO: 0 /100 WBC
PLATELET # BLD AUTO: 292 K/UL (ref 150–350)
PMV BLD AUTO: 10.3 FL (ref 9.2–12.9)
RBC # BLD AUTO: 4.65 M/UL (ref 4–5.4)
SARS-COV-2 RDRP RESP QL NAA+PROBE: NEGATIVE
WBC # BLD AUTO: 4.94 K/UL (ref 3.9–12.7)

## 2020-11-12 PROCEDURE — 45378 DIAGNOSTIC COLONOSCOPY: CPT | Performed by: INTERNAL MEDICINE

## 2020-11-12 PROCEDURE — 88342 IMHCHEM/IMCYTCHM 1ST ANTB: CPT | Performed by: PATHOLOGY

## 2020-11-12 PROCEDURE — 63600175 PHARM REV CODE 636 W HCPCS: Performed by: NURSE ANESTHETIST, CERTIFIED REGISTERED

## 2020-11-12 PROCEDURE — 37000008 HC ANESTHESIA 1ST 15 MINUTES: Performed by: INTERNAL MEDICINE

## 2020-11-12 PROCEDURE — 27201012 HC FORCEPS, HOT/COLD, DISP: Performed by: INTERNAL MEDICINE

## 2020-11-12 PROCEDURE — 43239 PR EGD, FLEX, W/BIOPSY, SGL/MULTI: ICD-10-PCS | Mod: 51,,, | Performed by: INTERNAL MEDICINE

## 2020-11-12 PROCEDURE — 43239 EGD BIOPSY SINGLE/MULTIPLE: CPT | Mod: 51,,, | Performed by: INTERNAL MEDICINE

## 2020-11-12 PROCEDURE — 45378 DIAGNOSTIC COLONOSCOPY: CPT | Mod: ,,, | Performed by: INTERNAL MEDICINE

## 2020-11-12 PROCEDURE — 85025 COMPLETE CBC W/AUTO DIFF WBC: CPT

## 2020-11-12 PROCEDURE — 25000003 PHARM REV CODE 250: Performed by: NURSE ANESTHETIST, CERTIFIED REGISTERED

## 2020-11-12 PROCEDURE — 88305 TISSUE EXAM BY PATHOLOGIST: CPT | Performed by: PATHOLOGY

## 2020-11-12 PROCEDURE — 45378 PR COLONOSCOPY,DIAGNOSTIC: ICD-10-PCS | Mod: ,,, | Performed by: INTERNAL MEDICINE

## 2020-11-12 PROCEDURE — 88342 CHG IMMUNOCYTOCHEMISTRY: ICD-10-PCS | Mod: 26,,, | Performed by: PATHOLOGY

## 2020-11-12 PROCEDURE — 88342 IMHCHEM/IMCYTCHM 1ST ANTB: CPT | Mod: 26,,, | Performed by: PATHOLOGY

## 2020-11-12 PROCEDURE — 88305 TISSUE EXAM BY PATHOLOGIST: ICD-10-PCS | Mod: 26,,, | Performed by: PATHOLOGY

## 2020-11-12 PROCEDURE — 37000009 HC ANESTHESIA EA ADD 15 MINS: Performed by: INTERNAL MEDICINE

## 2020-11-12 PROCEDURE — U0002 COVID-19 LAB TEST NON-CDC: HCPCS

## 2020-11-12 PROCEDURE — 88305 TISSUE EXAM BY PATHOLOGIST: CPT | Mod: 26,,, | Performed by: PATHOLOGY

## 2020-11-12 PROCEDURE — 43239 EGD BIOPSY SINGLE/MULTIPLE: CPT | Performed by: INTERNAL MEDICINE

## 2020-11-12 RX ORDER — PROPOFOL 10 MG/ML
VIAL (ML) INTRAVENOUS
Status: DISCONTINUED | OUTPATIENT
Start: 2020-11-12 | End: 2020-11-12

## 2020-11-12 RX ORDER — MIDAZOLAM HYDROCHLORIDE 1 MG/ML
INJECTION, SOLUTION INTRAMUSCULAR; INTRAVENOUS
Status: DISCONTINUED | OUTPATIENT
Start: 2020-11-12 | End: 2020-11-12

## 2020-11-12 RX ORDER — LIDOCAINE HYDROCHLORIDE 10 MG/ML
INJECTION, SOLUTION EPIDURAL; INFILTRATION; INTRACAUDAL; PERINEURAL
Status: DISCONTINUED | OUTPATIENT
Start: 2020-11-12 | End: 2020-11-12

## 2020-11-12 RX ORDER — SODIUM CHLORIDE, SODIUM LACTATE, POTASSIUM CHLORIDE, CALCIUM CHLORIDE 600; 310; 30; 20 MG/100ML; MG/100ML; MG/100ML; MG/100ML
INJECTION, SOLUTION INTRAVENOUS CONTINUOUS
Status: DISCONTINUED | OUTPATIENT
Start: 2020-11-12 | End: 2020-11-12 | Stop reason: HOSPADM

## 2020-11-12 RX ORDER — ONDANSETRON 2 MG/ML
INJECTION INTRAMUSCULAR; INTRAVENOUS
Status: DISCONTINUED | OUTPATIENT
Start: 2020-11-12 | End: 2020-11-12

## 2020-11-12 RX ORDER — SODIUM CHLORIDE, SODIUM LACTATE, POTASSIUM CHLORIDE, CALCIUM CHLORIDE 600; 310; 30; 20 MG/100ML; MG/100ML; MG/100ML; MG/100ML
INJECTION, SOLUTION INTRAVENOUS CONTINUOUS PRN
Status: DISCONTINUED | OUTPATIENT
Start: 2020-11-12 | End: 2020-11-12

## 2020-11-12 RX ADMIN — PROPOFOL 20 MG: 10 INJECTION, EMULSION INTRAVENOUS at 10:11

## 2020-11-12 RX ADMIN — PROPOFOL 30 MG: 10 INJECTION, EMULSION INTRAVENOUS at 10:11

## 2020-11-12 RX ADMIN — LIDOCAINE HYDROCHLORIDE 100 MG: 10 INJECTION, SOLUTION EPIDURAL; INFILTRATION; INTRACAUDAL; PERINEURAL at 10:11

## 2020-11-12 RX ADMIN — GLYCOPYRROLATE 0.2 MG: 0.2 INJECTION, SOLUTION INTRAMUSCULAR; INTRAVITREAL at 10:11

## 2020-11-12 RX ADMIN — PROPOFOL 100 MG: 10 INJECTION, EMULSION INTRAVENOUS at 10:11

## 2020-11-12 RX ADMIN — SODIUM CHLORIDE, SODIUM LACTATE, POTASSIUM CHLORIDE, AND CALCIUM CHLORIDE: .6; .31; .03; .02 INJECTION, SOLUTION INTRAVENOUS at 10:11

## 2020-11-12 RX ADMIN — ONDANSETRON 4 MG: 2 INJECTION, SOLUTION INTRAMUSCULAR; INTRAVENOUS at 10:11

## 2020-11-12 RX ADMIN — MIDAZOLAM HYDROCHLORIDE 2 MG: 1 INJECTION, SOLUTION INTRAMUSCULAR; INTRAVENOUS at 10:11

## 2020-11-12 NOTE — [employer] PATIENT INSTRUCTIONS
Discharge Summary/Instructions after an Endoscopic Procedure  Patient Name: Sandra Dimas  Patient MRN: [id number]  Patient YOB: 1975 Thursday, November 12, 2020 Mylene Russ MD  RESTRICTIONS:  During your procedure today, you received medications for sedation.  These   medications may affect your judgment, balance and coordination.  Therefore,   for 24 hours, you have the following restrictions:   - DO NOT drive a car, operate machinery, make legal/financial decisions,   sign important papers or drink alcohol.    ACTIVITY:  Today: no heavy lifting, straining or running due to procedural   sedation/anesthesia.  The following day: return to full activity including work.  DIET:  Eat and drink normally unless instructed otherwise.     TREATMENT FOR COMMON SIDE EFFECTS:  - Mild abdominal pain, nausea, belching, bloating or excessive gas:  rest,   eat lightly and use a heating pad.  - Sore Throat: treat with throat lozenges and/or gargle with warm salt   water.  - Because air was used during the procedure, expelling large amounts of air   from your rectum or belching is normal.  - If a bowel prep was taken, you may not have a bowel movement for 1-3 days.    This is normal.  SYMPTOMS TO WATCH FOR AND REPORT TO YOUR PHYSICIAN:  1. Abdominal pain or bloating, other than gas cramps.  2. Chest pain.  3. Back pain.  4. Signs of infection such as: chills or fever occurring within 24 hours   after the procedure.  5. Rectal bleeding, which would show as bright red, maroon, or black stools.   (A tablespoon of blood from the rectum is not serious, especially if   hemorrhoids are present.)  6. Vomiting.  7. Weakness or dizziness.  GO DIRECTLY TO THE NEAREST EMERGENCY ROOM IF YOU HAVE ANY OF THE FOLLOWING:      Difficulty breathing              Chills and/or fever over 101 F   Persistent vomiting and/or vomiting blood   Severe abdominal pain   Severe chest pain   Black, tarry stools   Bleeding- more than one  tablespoon   Any other symptom or condition that you feel may need urgent attention  Your doctor recommends these additional instructions:  If any biopsies were taken, your doctors clinic will contact you in 1 to 2   weeks with any results.  - Patient has a contact number available for emergencies.  The signs and   symptoms of potential delayed complications were discussed with the   patient.  Return to normal activities tomorrow.  Written discharge   instructions were provided to the patient.   - Discharge patient to home (via wheelchair).   - Resume previous diet today.   - Continue present medications.   - Await pathology results.   - Use a proton pump inhibitor PO daily.  For questions, problems or results please call your physician Mylene Russ MD at Work:  [phone]  If you have any questions about the above instructions, call the GI   department at [phone] or call the endoscopy unit at [phone]   from 7am until 3 pm.  [facility] [place], EMERGENCY ROOM PHONE NUMBER:   [phone]  IF A COMPLICATION OR EMERGENCY SITUATION ARISES AND YOU ARE UNABLE TO REACH   YOUR PHYSICIAN - GO DIRECTLY TO THE EMERGENCY ROOM.  I have read or have had read to me these discharge instructions for my   procedure and have received a written copy.  I understand these   instructions and will follow-up with my physician if I have any questions.     __________________________________       _____________________________________  Nurse Signature                                          Patient/Designated   Responsible Party Signature  MD Mylene Romo MD  11/12/2020 10:41:53 AM  This report has been verified and signed electronically.  [employer]

## 2020-11-12 NOTE — PROVATION PATIENT INSTRUCTIONS
Discharge Summary/Instructions after an Endoscopic Procedure  Patient Name: Sandra Dimas  Patient MRN: [id number]  Patient YOB: 1975 Thursday, November 12, 2020 Mylene Russ MD  RESTRICTIONS:  During your procedure today, you received medications for sedation.  These   medications may affect your judgment, balance and coordination.  Therefore,   for 24 hours, you have the following restrictions:   - DO NOT drive a car, operate machinery, make legal/financial decisions,   sign important papers or drink alcohol.    ACTIVITY:  Today: no heavy lifting, straining or running due to procedural   sedation/anesthesia.  The following day: return to full activity including work.  DIET:  Eat and drink normally unless instructed otherwise.     TREATMENT FOR COMMON SIDE EFFECTS:  - Mild abdominal pain, nausea, belching, bloating or excessive gas:  rest,   eat lightly and use a heating pad.  - Sore Throat: treat with throat lozenges and/or gargle with warm salt   water.  - Because air was used during the procedure, expelling large amounts of air   from your rectum or belching is normal.  - If a bowel prep was taken, you may not have a bowel movement for 1-3 days.    This is normal.  SYMPTOMS TO WATCH FOR AND REPORT TO YOUR PHYSICIAN:  1. Abdominal pain or bloating, other than gas cramps.  2. Chest pain.  3. Back pain.  4. Signs of infection such as: chills or fever occurring within 24 hours   after the procedure.  5. Rectal bleeding, which would show as bright red, maroon, or black stools.   (A tablespoon of blood from the rectum is not serious, especially if   hemorrhoids are present.)  6. Vomiting.  7. Weakness or dizziness.  GO DIRECTLY TO THE NEAREST EMERGENCY ROOM IF YOU HAVE ANY OF THE FOLLOWING:      Difficulty breathing              Chills and/or fever over 101 F   Persistent vomiting and/or vomiting blood   Severe abdominal pain   Severe chest pain   Black, tarry stools   Bleeding- more than one  tablespoon   Any other symptom or condition that you feel may need urgent attention  Your doctor recommends these additional instructions:  If any biopsies were taken, your doctors clinic will contact you in 1 to 2   weeks with any results.  - Patient has a contact number available for emergencies.  The signs and   symptoms of potential delayed complications were discussed with the   patient.  Return to normal activities tomorrow.  Written discharge   instructions were provided to the patient.   - Discharge patient to home (via wheelchair).   - Resume previous diet today.   - Continue present medications.   - Repeat colonoscopy in 10 years for screening purposes.  For questions, problems or results please call your physician Mylene Russ MD at Work:  [phone]  If you have any questions about the above instructions, call the GI   department at [phone] or call the endoscopy unit at [phone]   from 7am until 3 pm.  [facility], EMERGENCY ROOM PHONE NUMBER:   [phone]  IF A COMPLICATION OR EMERGENCY SITUATION ARISES AND YOU ARE UNABLE TO REACH   YOUR PHYSICIAN - GO DIRECTLY TO THE EMERGENCY ROOM.  I have read or have had read to me these discharge instructions for my   procedure and have received a written copy.  I understand these   instructions and will follow-up with my physician if I have any questions.     __________________________________       _____________________________________  Nurse Signature                                          Patient/Designated   Responsible Party Signature  MD Mylene Romo MD  11/12/2020 10:37:26 AM  This report has been verified and signed electronically.  [employer]

## 2020-11-12 NOTE — TRANSFER OF CARE
Anesthesia Transfer of Care Note    Patient: Sandra Dimas    Procedure(s) Performed: Procedure(s) (LRB):  ESOPHAGOGASTRODUODENOSCOPY (EGD) needs rapid covid test (N/A)  COLONOSCOPY (N/A)    Patient location: GI    Anesthesia Type: MAC    Transport from OR: Transported from OR on room air with adequate spontaneous ventilation    Post pain: adequate analgesia    Post assessment: no apparent anesthetic complications and tolerated procedure well    Post vital signs: stable    Level of consciousness: awake    Nausea/Vomiting: no nausea/vomiting    Complications: none    Transfer of care protocol was followed      Last vitals:   Visit Vitals  BP (!) 141/65   Pulse 86   Temp 36.7 °C (98.1 °F) (Temporal)   Resp 18   Wt 65.2 kg (143 lb 11.8 oz)   LMP  (LMP Unknown)   SpO2 99%   Breastfeeding No   BMI 21.86 kg/m²

## 2020-11-12 NOTE — H&P
PRE PROCEDURE H&P    Patient Name: Sandra Dimas  MRN: [id number]  : 1975  Date of Procedure:  2020  Referring Physician: Judd Hendrickson MD  Primary Physician: OSIRIS Gamez Jr, MD  Procedure Physician: Mylene Russ MD       Planned Procedure: Colonoscopy and EGD  Diagnosis: iron deficiency anemia  Chief Complaint: Same as above    HPI: Patient is an 45 y.o. female is here for the above.     Last colonoscopy: no prior   Family history: negative   Anticoagulation: none     Past Medical History:   Past Medical History:   Diagnosis Date    Chronic paroxysmal hemicrania 2019    Endometriosis     General anesthetics causing adverse effect in therapeutic use     wakes up with severe anxiety attacks    Hemicrania continua     Migraines         Past Surgical History:  Past Surgical History:   Procedure Laterality Date    APPENDECTOMY      CYST REMOVAL Right 2019    Procedure: EXCISION, CYST;  Surgeon: NATALI Shah MD;  Location: [facility];  Service: OB/GYN;  Laterality: Right;  Sebaceous cyst    diagnostic laprascopy      ESOPHAGOGASTRODUODENOSCOPY N/A 2018    Procedure: EGD (ESOPHAGOGASTRODUODENOSCOPY);  Surgeon: Bossman Bustos MD;  Location: [facility];  Service: Endoscopy;  Laterality: N/A;    FULGURATION OF ENDOMETRIOSIS  2018    Procedure: FULGURATION, ENDOMETRIOSIS;  Surgeon: Zehra Jensen MD;  Location: [facility];  Service: OB/GYN;;  endometriosis implant resection    HYSTEROSCOPY WITH HYDROTHERMAL ABLATION OF ENDOMETRIUM WITH DILATION AND CURETTAGE N/A 2018    Procedure: HYSTEROSCOPY, WITH DILATION AND CURETTAGE OF UTERUS AND HYDROTHERMAL ENDOMETRIAL ABLATION;  Surgeon: Zehra Jensen MD;  Location: [facility] OR;  Service: OB/GYN;  Laterality: N/A;    LAPAROSCOPIC SALPINGECTOMY Bilateral 2018    Procedure: SALPINGECTOMY, LAPAROSCOPIC;  Surgeon: Zehra Jensen MD;  Location: [facility];  Service: OB/GYN;  Laterality: Bilateral;    LAPAROTOMY,  EXPLORATORY N/A 2/7/2019    Procedure: LAPAROTOMY, EXPLORATORY;  Surgeon: Ramakrishna Boone MD;  Location: [facility] OR;  Service: General;  Laterality: N/A;    REPAIR OF VAGINAL CUFF N/A 7/9/2020    Procedure: REPAIR, VAGINAL CUFF;  Surgeon: Sailaja Addison MD;  Location: [facility] OR;  Service: OB/GYN;  Laterality: N/A;    ROBOT-ASSISTED LAPAROSCOPIC ABDOMINAL HYSTERECTOMY USING DA MAVIS XI N/A 12/30/2019    Procedure: XI ROBOTIC HYSTERECTOMY;  Surgeon: NATALI Shah MD;  Location: [facility] OR;  Service: OB/GYN;  Laterality: N/A;    ROBOT-ASSISTED LAPAROSCOPIC SURGICAL REMOVAL OF OVARY USING DA MAVIS XI Bilateral 12/30/2019    Procedure: XI ROBOTIC OOPHORECTOMY;  Surgeon: NATALI Shah MD;  Location: [facility] OR;  Service: OB/GYN;  Laterality: Bilateral;    robotic assisted laparoscopy with excision of ovarian endometrioma and chromotubation          Home Medications:  Prior to Admission medications    Medication Sig Start Date End Date Taking? Authorizing Provider   ALPRAZolam (XANAX) 0.5 MG tablet Take 1 tablet (0.5 mg total) by mouth 3 (three) times daily as needed for Anxiety. 10/2/20 11/12/20 Yes JEREMY Gamez Jr., MD   ASCOMP WITH CODEINE 12--40 mg Cap TAKE 1 TABLET BY MOUTH EVERY 4 TO 6 HOURS AT BEDTIME AS NEEDED 10/7/20  Yes Historical Provider   atenoloL (TENORMIN) 25 MG tablet Take 1 tablet (25 mg total) by mouth once daily. 8/14/20 8/14/21 Yes Luz Dimas MD   B2/magnesium cit,oxid/feverfew (MIGRELIEF ORAL) Take by mouth once daily.    Yes Historical Provider   butalbital-acetaminophen-caffeine -40 mg (FIORICET, ESGIC) -40 mg per tablet Take 1 tablet by mouth twice a day as needed for Headache 10/7/20  Yes Luz Dimas MD   FLUoxetine 20 MG capsule Take one capsule by mouth once daily 8/18/20  Yes Luz Dimas MD   hydrOXYzine pamoate (VISTARIL) 25 MG Cap Take 1 capsule (25 mg total) by mouth every 4 (four) hours as needed. 7/10/20  Yes Sailaja Addison MD   lamoTRIgine (LAMICTAL)  100 MG tablet Take 1 tablet (100 mg total) by mouth 2 (two) times daily. 1/9/20 1/8/21 Yes Barbi Mcclure MD   naproxen (EC NAPROSYN) 500 MG EC tablet Take 1 tablet (500 mg total) by mouth 2 (two) times daily. 10/20/20  Yes Luz Dimas MD   ondansetron (ZOFRAN) 8 MG tablet Take 1 tablet (8 mg total) by mouth every 8 (eight) hours as needed for Nausea. 9/18/20  Yes Luz Dimas MD   oxyCODONE-acetaminophen (PERCOCET)  mg per tablet Take 1 tablet by mouth every 6 (six) hours as needed for Pain. 7/9/20  Yes Sailaja Addison MD   pantoprazole (PROTONIX) 40 MG tablet Take 1 tablet (40 mg total) by mouth once daily. 8/14/20 8/14/21 Yes Luz Dimas MD   promethazine (PHENERGAN) 25 MG tablet Take 1 tablet (25 mg total) by mouth every 6 (six) hours as needed for Nausea. 2/6/20  Yes Silvia Johnson,    topiramate (TROKENDI XR) 100 mg Cp24 Take 100 mg by mouth.   Yes Historical Provider   valACYclovir (VALTREX) 500 MG tablet  6/16/20  Yes Historical Provider   vitamin D (VITAMIN D3) 1000 units Tab Take 1,000 Units by mouth once daily.   Yes Historical Provider   butorphanol (STADOL) 10 mg/mL nasal spray as needed. 7/22/20   Historical Provider   estradioL (VAGIFEM) 10 mcg Tab Place 1 tablet (10 mcg total) vaginally 3 (three) times a week. 11/6/20 11/6/21  F. Filipe Shah MD   estradioL (VIVELLE-DOT) 0.075 mg/24 hr Place 1 patch onto the skin twice a week. 2/13/20 2/12/21  F. Filipe Shah MD   azithromycin (Z-PAOLA) 250 MG tablet Take 2 tablets by mouth on day 1, then take 1 tablet once daily on days 2-5 11/6/20 11/12/20  Luz Dimas MD   HYDROcodone-acetaminophen (NORCO) 7.5-325 mg per tablet Take 1 tablet by mouth every 6 to 8 hours as needed for Pain.  Patient not taking: Reported on 10/13/2020 7/14/20 11/12/20  Sailaja Addison MD   phenylephrine-DM-acetaminophen (VICKS DAYQUIL COLD-FLU RELIEF) 5- mg Cap Take by mouth as needed.  11/12/20  Historical Provider   sodium,potassium,mag  sulfates (SUPREP BOWEL PREP KIT) 17.5-3.13-1.6 gram SolR As directed 11/9/20 11/12/20  Bossman Bustos MD        Allergies:  Review of patient's allergies indicates:   Allergen Reactions    Hydrocodone Other (See Comments)     Chest pains. Note: patient tolerated hydromorphone in 2/2019.     Chlorhexidine Rash     Per  after surgery patient had large rash across abdomen where chlorhexidine was applied.    Mastisol adhesive [gum cfudug-miadft-vrfg-alcohol] Rash        Social History:   Social History     Socioeconomic History    Marital status:      Spouse name: Not on file    Number of children: Not on file    Years of education: Not on file    Highest education level: Not on file   Occupational History    Not on file   Social Needs    Financial resource strain: Not on file    Food insecurity     Worry: Not on file     Inability: Not on file    Transportation needs     Medical: Not on file     Non-medical: Not on file   Tobacco Use    Smoking status: Never Smoker    Smokeless tobacco: Never Used   Substance and Sexual Activity    Alcohol use: Not Currently     Frequency: Monthly or less     Drinks per session: 1 or 2     Binge frequency: Never     Comment: rarely No alcohol 72h prior to sx    Drug use: No    Sexual activity: Yes     Partners: Male   Lifestyle    Physical activity     Days per week: Not on file     Minutes per session: Not on file    Stress: Not on file   Relationships    Social connections     Talks on phone: Not on file     Gets together: Not on file     Attends Islam service: Not on file     Active member of club or organization: Not on file     Attends meetings of clubs or organizations: Not on file     Relationship status: Not on file   Other Topics Concern    Not on file   Social History Narrative    No pets or smokers in household.       Family History:  Family History   Problem Relation Age of Onset    Strabismus Neg Hx     Retinal detachment Neg Hx      Macular degeneration Neg Hx     Glaucoma Neg Hx     Blindness Neg Hx     Amblyopia Neg Hx     Breast cancer Neg Hx     Colon cancer Neg Hx     Ovarian cancer Neg Hx     Thyroid disease Neg Hx     Migraines Neg Hx     Asthma Neg Hx        ROS: No acute cardiac events, no acute respiratory complaints.     Physical Exam (all patients):    /76 (BP Location: Left arm, Patient Position: Sitting)   Pulse (!) 59   Temp 98.8 °F (37.1 °C) (Temporal)   Resp 15   Wt 65.2 kg (143 lb 11.8 oz)   LMP  (LMP Unknown)   SpO2 100%   Breastfeeding No   BMI 21.86 kg/m²   Lungs: Clear to auscultation bilaterally, respirations unlabored  Heart: Regular rate and rhythm, S1 and S2 normal, no obvious murmurs  Abdomen:         Soft, non-tender, bowel sounds normal, no masses, no organomegaly    Lab Results   Component Value Date    WBC 6.29 10/07/2020    MCV 81 (L) 10/07/2020    RDW 13.5 10/07/2020     10/07/2020    GLU 96 10/07/2020    BUN 15 10/07/2020     10/07/2020    K 4.2 10/07/2020     10/07/2020        SEDATION PLAN: per anesthesia      History reviewed, vital signs satisfactory, cardiopulmonary status satisfactory, sedation options, risks and plans have been discussed with the patient  All their questions were answered and the patient agrees to the sedation procedures as planned and the patient is deemed an appropriate candidate for the sedation as planned.    Procedure explained to patient, informed consent obtained and placed in chart.    Mylene Russ  11/12/2020  9:47 AM

## 2020-11-12 NOTE — ANESTHESIA POSTPROCEDURE EVALUATION
Anesthesia Post Evaluation    Patient: Sandra Dimas    Procedure(s) Performed: Procedure(s) (LRB):  ESOPHAGOGASTRODUODENOSCOPY (EGD) needs rapid covid test (N/A)  COLONOSCOPY (N/A)    Final Anesthesia Type: MAC    Patient location during evaluation: GI PACU  Patient participation: Yes- Able to Participate  Level of consciousness: awake and alert  Post-procedure vital signs: reviewed and stable  Pain management: adequate  Airway patency: patent    PONV status at discharge: No PONV  Anesthetic complications: no      Cardiovascular status: blood pressure returned to baseline and hemodynamically stable  Respiratory status: unassisted, spontaneous ventilation and room air  Hydration status: euvolemic  Follow-up not needed.          Vitals Value Taken Time   /74 11/12/20 1055   Temp 36.7 °C (98.1 °F) 11/12/20 1035   Pulse 93 11/12/20 1055   Resp 17 11/12/20 1055   SpO2 100 % 11/12/20 1055         No case tracking events are documented in the log.      Pain/Kamila Score: Kamila Score: 10 (11/12/2020 10:55 AM)

## 2020-11-12 NOTE — DISCHARGE INSTRUCTIONS
Colonoscopy     A camera attached to a flexible tube with a viewing lens is used to take video pictures.     Colonoscopy is a test to view the inside of your lower digestive tract (colon and rectum). Sometimes it can show the last part of the small intestine (ileum). During the test, small pieces of tissue may be removed for testing. This is called a biopsy. Small growths, such as polyps, may also be removed.   Why is colonoscopy done?  The test is done to help look for colon cancer. And it can help find the source of abdominal pain, bleeding, and changes in bowel habits. It may be needed once a year, depending on factors such as your:  · Age  · Health history  · Family health history  · Symptoms  · Results from any prior colonoscopy  Risks and possible complications  These include:  · Bleeding               · A puncture or tear in the colon   · Risks of anesthesia  · A cancer lesion not being seen  Getting ready   To prepare for the test:  · Talk with your healthcare provider about the risks of the test (see below). Also ask your healthcare provider about alternatives to the test.  · Tell your healthcare provider about any medicines you take. Also tell him or her about any health conditions you may have.  · Make sure your rectum and colon are empty for the test. Follow the diet and bowel prep instructions exactly. If you dont, the test may need to be rescheduled.  · Plan for a friend or family member to drive you home after the test.     Colonoscopy provides an inside view of the entire colon.     You may discuss the results with your doctor right away or at a future visit.  During the test   The test is usually done in the hospital on an outpatient basis. This means you go home the same day. The procedure takes about 30 minutes. During that time:  · You are given relaxing (sedating) medicine through an IV line. You may be drowsy, or fully asleep.  · The healthcare provider will first give you a physical exam to  check for anal and rectal problems.  · Then the anus is lubricated and the scope inserted.  · If you are awake, you may have a feeling similar to needing to have a bowel movement. You may also feel pressure as air is pumped into the colon. Its OK to pass gas during the procedure.  · Biopsy, polyp removal, or other treatments may be done during the test.  After the test   You may have gas right after the test. It can help to try to pass it to help prevent later bloating. Your healthcare provider may discuss the results with you right away. Or you may need to schedule a follow-up visit to talk about the results. After the test, you can go back to your normal eating and other activities. You may be tired from the sedation and need to rest for a few hours.  Date Last Reviewed: 11/1/2016 © 2000-2017 [employer]. [address] [place] [address]. All rights reserved. This information is not intended as a substitute for professional medical care. Always follow your healthcare professional's instructions.        Gastritis (Adult)    Gastritis is inflammation and irritation of the stomach lining. It can be present for a short time (acute) or be long lasting (chronic). Gastritis is often caused by infection with bacteria called H pylori. More than a third of people in the [place] have this bacteria in their bodies. In many cases, H pylori causes no problems or symptoms. In some people, though, the infection irritates the stomach lining and causes gastritis. Other causes of stomach irritation include drinking alcohol or taking pain-relieving medicines called NSAIDs (such as aspirin or ibuprofen).   Symptoms of gastritis can include:  · Abdominal pain or bloating  · Loss of appetite  · Nausea or vomiting  · Vomiting blood or having black stools  · Feeling more tired than usual  An inflamed and irritated stomach lining is more likely to develop a sore called an ulcer. To help prevent this, gastritis should be  treated.  Home care  If needed, medicines may be prescribed. If you have H pylori infection, treating it will likely relieve your symptoms. Other changes can help reduce stomach irritation and help it heal.  · If you have been prescribed medicines for H pylori infection, take them as directed. Take all of the medicine until it is finished or your healthcare provider tells you to stop, even if you feel better.  · Your healthcare provider may recommend avoiding NSAIDs. If you take daily aspirin for your heart or other medical reasons, do not stop without talking to your healthcare provider first.  · Avoid drinking alcohol.  · Stop smoking. Smoking can irritate the stomach and delay healing. As much as possible, stay away from second hand smoke.  Follow-up care  Follow up with your healthcare provider, or as advised by our staff. Testing may be needed to check for inflammation or an ulcer.  When to seek medical advice  Call your healthcare provider for any of the following:  · Stomach pain that gets worse or moves to the lower right abdomen (appendix area)  · Chest pain that appears or gets worse, or spreads to the back, neck, shoulder, or arm  · Frequent vomiting (cant keep down liquids)  · Blood in the stool or vomit (red or black in color)  · Feeling weak or dizzy  · Fever of 100.4ºF (38ºC) or higher, or as directed by your healthcare provider  Date Last Reviewed: 6/22/2015  © 1926-5709 [employer]. [address] [place] [address], [place] [address]. All rights reserved. This information is not intended as a substitute for professional medical care. Always follow your healthcare professional's instructions.

## 2020-11-13 VITALS
OXYGEN SATURATION: 100 % | SYSTOLIC BLOOD PRESSURE: 110 MMHG | RESPIRATION RATE: 17 BRPM | DIASTOLIC BLOOD PRESSURE: 74 MMHG | WEIGHT: 143.75 LBS | HEART RATE: 93 BPM | TEMPERATURE: 98 F | BODY MASS INDEX: 21.86 KG/M2

## 2020-11-13 DIAGNOSIS — F33.41 RECURRENT MAJOR DEPRESSIVE DISORDER, IN PARTIAL REMISSION: Primary | ICD-10-CM

## 2020-11-13 DIAGNOSIS — K21.9 GASTROESOPHAGEAL REFLUX DISEASE WITHOUT ESOPHAGITIS: ICD-10-CM

## 2020-11-13 RX ORDER — SUCRALFATE 1 G/10ML
1 SUSPENSION ORAL 4 TIMES DAILY
Qty: 1200 ML | Refills: 1 | Status: SHIPPED | OUTPATIENT
Start: 2020-11-13 | End: 2020-12-14

## 2020-11-16 ENCOUNTER — TELEPHONE (OUTPATIENT)
Dept: INTERNAL MEDICINE | Facility: CLINIC | Age: 45
End: 2020-11-16

## 2020-11-16 ENCOUNTER — TELEPHONE (OUTPATIENT)
Dept: RADIOLOGY | Facility: HOSPITAL | Age: 45
End: 2020-11-16

## 2020-11-16 DIAGNOSIS — Z03.818 ENCOUNTER FOR OBSERVATION FOR SUSPECTED EXPOSURE TO OTHER BIOLOGICAL AGENTS RULED OUT: ICD-10-CM

## 2020-11-16 DIAGNOSIS — R09.81 NASAL CONGESTION: Primary | ICD-10-CM

## 2020-11-16 NOTE — TELEPHONE ENCOUNTER
Per Dr. Gamez pt's  Dr. Dimas confirmed pt will need testing w/in 72hrs travel/as of tomorrow 11/17/20. Order placed per [facility] guidelines.

## 2020-11-16 NOTE — TELEPHONE ENCOUNTER
S/w Dr. Gamez per  requesting COVID-19 swab order d/t pt's s/s and traveling to [place]. Dr. Gamez confirmed pt's recent negative test 11/12/20, to discuss w/ pt's  and to confirm need for further testing.

## 2020-11-17 ENCOUNTER — HOSPITAL ENCOUNTER (OUTPATIENT)
Dept: RADIOLOGY | Facility: HOSPITAL | Age: 45
Discharge: HOME | End: 2020-11-17
Attending: INTERNAL MEDICINE
Payer: COMMERCIAL

## 2020-11-17 ENCOUNTER — LAB VISIT (OUTPATIENT)
Dept: INTERNAL MEDICINE | Facility: CLINIC | Age: 45
End: 2020-11-17
Payer: COMMERCIAL

## 2020-11-17 DIAGNOSIS — Z03.818 ENCOUNTER FOR OBSERVATION FOR SUSPECTED EXPOSURE TO OTHER BIOLOGICAL AGENTS RULED OUT: ICD-10-CM

## 2020-11-17 DIAGNOSIS — R10.32 LEFT LOWER QUADRANT ABDOMINAL PAIN: ICD-10-CM

## 2020-11-17 DIAGNOSIS — R09.81 NASAL CONGESTION: ICD-10-CM

## 2020-11-17 LAB
FINAL PATHOLOGIC DIAGNOSIS: NORMAL
GROSS: NORMAL
Lab: NORMAL
MICROSCOPIC EXAM: NORMAL

## 2020-11-17 PROCEDURE — 74177 CT ABD & PELVIS W/CONTRAST: CPT | Mod: TC

## 2020-11-17 PROCEDURE — A9689 PHARM REV CODE 250: HCPCS | Performed by: INTERNAL MEDICINE

## 2020-11-17 PROCEDURE — 25000003 PHARM REV CODE 250: Performed by: INTERNAL MEDICINE

## 2020-11-17 PROCEDURE — U0003 INFECTIOUS AGENT DETECTION BY NUCLEIC ACID (DNA OR RNA); SEVERE ACUTE RESPIRATORY SYNDROME CORONAVIRUS 2 (SARS-COV-2) (CORONAVIRUS DISEASE [COVID-19]), AMPLIFIED PROBE TECHNIQUE, MAKING USE OF HIGH THROUGHPUT TECHNOLOGIES AS DESCRIBED BY CMS-2020-01-R: HCPCS

## 2020-11-17 PROCEDURE — 74177 CT ABD & PELVIS W/CONTRAST: CPT | Mod: 26,,, | Performed by: RADIOLOGY

## 2020-11-17 PROCEDURE — 74177 CT ABDOMEN PELVIS WITH CONTRAST: ICD-10-PCS | Mod: 26,,, | Performed by: RADIOLOGY

## 2020-11-17 PROCEDURE — 25500020 PHARM REV CODE 255: Performed by: INTERNAL MEDICINE

## 2020-11-17 RX ADMIN — IOHEXOL 75 ML: 350 INJECTION, SOLUTION INTRAVENOUS at 10:11

## 2020-11-17 RX ADMIN — IOHEXOL 1000 ML: 12 SOLUTION ORAL at 09:11

## 2020-11-20 LAB — SARS-COV-2 RNA RESP QL NAA+PROBE: NOT DETECTED

## 2020-11-23 ENCOUNTER — PATIENT MESSAGE (OUTPATIENT)
Dept: GASTROENTEROLOGY | Facility: CLINIC | Age: 45
End: 2020-11-23

## 2020-11-27 ENCOUNTER — PATIENT MESSAGE (OUTPATIENT)
Dept: GASTROENTEROLOGY | Facility: CLINIC | Age: 45
End: 2020-11-27

## 2020-11-30 ENCOUNTER — OFFICE VISIT (OUTPATIENT)
Dept: INTERNAL MEDICINE | Facility: CLINIC | Age: 45
End: 2020-11-30
Payer: COMMERCIAL

## 2020-11-30 VITALS
TEMPERATURE: 97 F | BODY MASS INDEX: 22.09 KG/M2 | DIASTOLIC BLOOD PRESSURE: 74 MMHG | HEIGHT: 68 IN | SYSTOLIC BLOOD PRESSURE: 100 MMHG | WEIGHT: 145.75 LBS | HEART RATE: 79 BPM | OXYGEN SATURATION: 99 %

## 2020-11-30 DIAGNOSIS — H65.93 MEE (MIDDLE EAR EFFUSION), BILATERAL: Primary | ICD-10-CM

## 2020-11-30 DIAGNOSIS — S20.211A CONTUSION OF RIB ON RIGHT SIDE, INITIAL ENCOUNTER: ICD-10-CM

## 2020-11-30 PROCEDURE — 1125F PR PAIN SEVERITY QUANTIFIED, PAIN PRESENT: ICD-10-PCS | Mod: S$GLB,,, | Performed by: PEDIATRICS

## 2020-11-30 PROCEDURE — 3008F PR BODY MASS INDEX (BMI) DOCUMENTED: ICD-10-PCS | Mod: CPTII,S$GLB,, | Performed by: PEDIATRICS

## 2020-11-30 PROCEDURE — 1125F AMNT PAIN NOTED PAIN PRSNT: CPT | Mod: S$GLB,,, | Performed by: PEDIATRICS

## 2020-11-30 PROCEDURE — 99213 PR OFFICE/OUTPT VISIT, EST, LEVL III, 20-29 MIN: ICD-10-PCS | Mod: S$GLB,,, | Performed by: PEDIATRICS

## 2020-11-30 PROCEDURE — 99999 PR PBB SHADOW E&M-EST. PATIENT-LVL IV: ICD-10-PCS | Mod: PBBFAC,,, | Performed by: PEDIATRICS

## 2020-11-30 PROCEDURE — 99213 OFFICE O/P EST LOW 20 MIN: CPT | Mod: S$GLB,,, | Performed by: PEDIATRICS

## 2020-11-30 PROCEDURE — 3008F BODY MASS INDEX DOCD: CPT | Mod: CPTII,S$GLB,, | Performed by: PEDIATRICS

## 2020-11-30 PROCEDURE — 99999 PR PBB SHADOW E&M-EST. PATIENT-LVL IV: CPT | Mod: PBBFAC,,, | Performed by: PEDIATRICS

## 2020-11-30 NOTE — PROGRESS NOTES
Subjective:       Patient ID: Sandra Dimas is a 45 y.o. female.    Chief Complaint: Ear Fullness (congestion vic ) and Rib Injury (right)    Just returned from home visit to [place] due to father's death. Covid negative prior to travel and on leaving [place]. Maintained covid precautions as best she could. Had URI symptoms prior to travel was given zithromax prior to leaving for [place] by . On return symptoms all gone but bilateral ear fullness. Also has right rib/chest wall pain with cough, deep inspiration,twisting- she pulled heavy luggage and hit self with it during travel. No SOB, fever. Was not immobile during flight.    Review of Systems   Constitutional: Negative for fever and unexpected weight change.   HENT: Positive for ear pain. Negative for congestion and rhinorrhea.         Decreased hearing.   Eyes: Negative for discharge and redness.   Respiratory: Negative for cough, shortness of breath and wheezing.    Cardiovascular: Positive for chest pain. Negative for palpitations and leg swelling.   Gastrointestinal: Negative for abdominal pain, constipation, diarrhea and vomiting.   Endocrine: Negative for polydipsia, polyphagia and polyuria.   Genitourinary: Negative for decreased urine volume, difficulty urinating and menstrual problem.   Musculoskeletal: Negative for arthralgias and joint swelling.   Skin: Negative for rash and wound.   Neurological: Negative for syncope and headaches.   Psychiatric/Behavioral: Negative for behavioral problems and sleep disturbance.       Objective:      Physical Exam  Constitutional:       General: She is not in acute distress.     Appearance: She is well-developed.   HENT:      Right Ear: Ear canal and external ear normal. There is no impacted cerumen.      Left Ear: Ear canal and external ear normal. There is no impacted cerumen.      Ears:      Comments: Both TMs distorted by clear ZAYNAB     Nose: Nose normal. No congestion or rhinorrhea.      Mouth/Throat:       Pharynx: No oropharyngeal exudate or posterior oropharyngeal erythema.   Eyes:      Conjunctiva/sclera: Conjunctivae normal.   Neck:      Musculoskeletal: No neck rigidity or muscular tenderness.      Thyroid: No thyromegaly.      Vascular: No JVD.   Cardiovascular:      Rate and Rhythm: Normal rate and regular rhythm.      Heart sounds: Normal heart sounds. No murmur.   Pulmonary:      Effort: Pulmonary effort is normal. No respiratory distress.      Breath sounds: Normal breath sounds. No wheezing or rales.   Chest:      Chest wall: Tenderness (right mid axillary lateral to breast) present.   Abdominal:      General: There is no distension.      Palpations: Abdomen is soft. There is no mass.      Tenderness: There is no abdominal tenderness. There is no guarding.   Lymphadenopathy:      Cervical: No cervical adenopathy.   Skin:     Capillary Refill: Capillary refill takes less than 2 seconds.      Findings: No bruising, erythema or rash.   Neurological:      Mental Status: She is alert and oriented to person, place, and time.      Cranial Nerves: No cranial nerve deficit.      Coordination: Coordination normal.   Psychiatric:         Mood and Affect: Mood normal.         Behavior: Behavior normal.         Thought Content: Thought content normal.         Judgment: Judgment normal.         Assessment:       1. ZAYNAB (middle ear effusion), bilateral    2. Contusion of rib on right side, initial encounter        Plan:       ZAYNAB (middle ear effusion), bilateral    Contusion of rib on right side, initial encounter    Rib belt, NSAIDS OTC. Offered CXR, she declined. HR and pulse ox normal, patient was not immobilized and clinical exam fitting picture of PE. Steroids offered for ZAYNAB, she declined. Use Afrin, claritin, nasacort, insufflation.

## 2020-12-09 ENCOUNTER — HOSPITAL ENCOUNTER (EMERGENCY)
Facility: HOSPITAL | Age: 45
Discharge: HOME | End: 2020-12-09
Attending: FAMILY MEDICINE
Payer: COMMERCIAL

## 2020-12-09 VITALS
OXYGEN SATURATION: 100 % | RESPIRATION RATE: 18 BRPM | SYSTOLIC BLOOD PRESSURE: 139 MMHG | TEMPERATURE: 99 F | DIASTOLIC BLOOD PRESSURE: 86 MMHG | BODY MASS INDEX: 21.39 KG/M2 | HEIGHT: 68 IN | WEIGHT: 141.13 LBS | HEART RATE: 54 BPM

## 2020-12-09 DIAGNOSIS — G43.001 MIGRAINE WITHOUT AURA AND WITH STATUS MIGRAINOSUS, NOT INTRACTABLE: Primary | ICD-10-CM

## 2020-12-09 DIAGNOSIS — R07.81 RIB PAIN: ICD-10-CM

## 2020-12-09 LAB
ALBUMIN SERPL BCP-MCNC: 4.2 G/DL (ref 3.5–5.2)
ALP SERPL-CCNC: 85 U/L (ref 55–135)
ALT SERPL W/O P-5'-P-CCNC: 88 U/L (ref 10–44)
ANION GAP SERPL CALC-SCNC: 8 MMOL/L (ref 8–16)
AST SERPL-CCNC: 67 U/L (ref 10–40)
BASOPHILS # BLD AUTO: 0.03 K/UL (ref 0–0.2)
BASOPHILS NFR BLD: 0.5 % (ref 0–1.9)
BILIRUB SERPL-MCNC: 0.4 MG/DL (ref 0.1–1)
BUN SERPL-MCNC: 16 MG/DL (ref 6–20)
CALCIUM SERPL-MCNC: 8.9 MG/DL (ref 8.7–10.5)
CHLORIDE SERPL-SCNC: 112 MMOL/L (ref 95–110)
CO2 SERPL-SCNC: 23 MMOL/L (ref 23–29)
CREAT SERPL-MCNC: 0.8 MG/DL (ref 0.5–1.4)
DIFFERENTIAL METHOD: ABNORMAL
EOSINOPHIL # BLD AUTO: 0.1 K/UL (ref 0–0.5)
EOSINOPHIL NFR BLD: 1.2 % (ref 0–8)
ERYTHROCYTE [DISTWIDTH] IN BLOOD BY AUTOMATED COUNT: 14.7 % (ref 11.5–14.5)
EST. GFR  (AFRICAN AMERICAN): >60 ML/MIN/1.73 M^2
EST. GFR  (NON AFRICAN AMERICAN): >60 ML/MIN/1.73 M^2
GLUCOSE SERPL-MCNC: 98 MG/DL (ref 70–110)
HCT VFR BLD AUTO: 34.2 % (ref 37–48.5)
HCV AB SERPL QL IA: POSITIVE
HGB BLD-MCNC: 10.5 G/DL (ref 12–16)
HIV 1+2 AB+HIV1 P24 AG SERPL QL IA: NEGATIVE
IMM GRANULOCYTES # BLD AUTO: 0.01 K/UL (ref 0–0.04)
IMM GRANULOCYTES NFR BLD AUTO: 0.2 % (ref 0–0.5)
LYMPHOCYTES # BLD AUTO: 2.2 K/UL (ref 1–4.8)
LYMPHOCYTES NFR BLD: 37.1 % (ref 18–48)
MCH RBC QN AUTO: 26.1 PG (ref 27–31)
MCHC RBC AUTO-ENTMCNC: 30.7 G/DL (ref 32–36)
MCV RBC AUTO: 85 FL (ref 82–98)
MONOCYTES # BLD AUTO: 0.4 K/UL (ref 0.3–1)
MONOCYTES NFR BLD: 6.8 % (ref 4–15)
NEUTROPHILS # BLD AUTO: 3.2 K/UL (ref 1.8–7.7)
NEUTROPHILS NFR BLD: 54.2 % (ref 38–73)
NRBC BLD-RTO: 0 /100 WBC
PLATELET # BLD AUTO: 246 K/UL (ref 150–350)
PMV BLD AUTO: 9.6 FL (ref 9.2–12.9)
POTASSIUM SERPL-SCNC: 3.9 MMOL/L (ref 3.5–5.1)
PROT SERPL-MCNC: 7.1 G/DL (ref 6–8.4)
RBC # BLD AUTO: 4.03 M/UL (ref 4–5.4)
SODIUM SERPL-SCNC: 143 MMOL/L (ref 136–145)
WBC # BLD AUTO: 5.87 K/UL (ref 3.9–12.7)

## 2020-12-09 PROCEDURE — 96361 HYDRATE IV INFUSION ADD-ON: CPT

## 2020-12-09 PROCEDURE — 82728 ASSAY OF FERRITIN: CPT

## 2020-12-09 PROCEDURE — 85025 COMPLETE CBC W/AUTO DIFF WBC: CPT

## 2020-12-09 PROCEDURE — 96365 THER/PROPH/DIAG IV INF INIT: CPT

## 2020-12-09 PROCEDURE — 80053 COMPREHEN METABOLIC PANEL: CPT

## 2020-12-09 PROCEDURE — 63600175 PHARM REV CODE 636 W HCPCS: Performed by: FAMILY MEDICINE

## 2020-12-09 PROCEDURE — 96375 TX/PRO/DX INJ NEW DRUG ADDON: CPT

## 2020-12-09 PROCEDURE — 99284 EMERGENCY DEPT VISIT MOD MDM: CPT | Mod: 25

## 2020-12-09 PROCEDURE — 83540 ASSAY OF IRON: CPT

## 2020-12-09 PROCEDURE — 87522 HEPATITIS C REVRS TRNSCRPJ: CPT

## 2020-12-09 PROCEDURE — 36415 COLL VENOUS BLD VENIPUNCTURE: CPT

## 2020-12-09 PROCEDURE — 25000003 PHARM REV CODE 250: Performed by: FAMILY MEDICINE

## 2020-12-09 PROCEDURE — 86803 HEPATITIS C AB TEST: CPT

## 2020-12-09 PROCEDURE — 86703 HIV-1/HIV-2 1 RESULT ANTBDY: CPT

## 2020-12-09 RX ORDER — PROCHLORPERAZINE EDISYLATE 5 MG/ML
2.5 INJECTION INTRAMUSCULAR; INTRAVENOUS EVERY 6 HOURS PRN
Status: DISCONTINUED | OUTPATIENT
Start: 2020-12-09 | End: 2020-12-10 | Stop reason: HOSPADM

## 2020-12-09 RX ORDER — HYDROMORPHONE HYDROCHLORIDE 2 MG/ML
1 INJECTION, SOLUTION INTRAMUSCULAR; INTRAVENOUS; SUBCUTANEOUS
Status: COMPLETED | OUTPATIENT
Start: 2020-12-09 | End: 2020-12-09

## 2020-12-09 RX ORDER — ONDANSETRON 2 MG/ML
4 INJECTION INTRAMUSCULAR; INTRAVENOUS
Status: COMPLETED | OUTPATIENT
Start: 2020-12-09 | End: 2020-12-09

## 2020-12-09 RX ORDER — LORAZEPAM 2 MG/ML
1 INJECTION INTRAMUSCULAR
Status: COMPLETED | OUTPATIENT
Start: 2020-12-09 | End: 2020-12-09

## 2020-12-09 RX ORDER — DIPHENHYDRAMINE HYDROCHLORIDE 50 MG/ML
25 INJECTION INTRAMUSCULAR; INTRAVENOUS
Status: COMPLETED | OUTPATIENT
Start: 2020-12-09 | End: 2020-12-09

## 2020-12-09 RX ORDER — FAMOTIDINE 10 MG/ML
20 INJECTION INTRAVENOUS
Status: COMPLETED | OUTPATIENT
Start: 2020-12-09 | End: 2020-12-09

## 2020-12-09 RX ORDER — LIDOCAINE HYDROCHLORIDE ANHYDROUS AND DEXTROSE MONOHYDRATE .8; 5 G/100ML; G/100ML
2 INJECTION, SOLUTION INTRAVENOUS
Status: COMPLETED | OUTPATIENT
Start: 2020-12-09 | End: 2020-12-09

## 2020-12-09 RX ORDER — LIDOCAINE HYDROCHLORIDE ANHYDROUS AND DEXTROSE MONOHYDRATE .8; 5 G/100ML; G/100ML
2 INJECTION, SOLUTION INTRAVENOUS CONTINUOUS
Status: DISCONTINUED | OUTPATIENT
Start: 2020-12-09 | End: 2020-12-09

## 2020-12-09 RX ORDER — KETOROLAC TROMETHAMINE 30 MG/ML
30 INJECTION, SOLUTION INTRAMUSCULAR; INTRAVENOUS
Status: COMPLETED | OUTPATIENT
Start: 2020-12-09 | End: 2020-12-09

## 2020-12-09 RX ADMIN — LIDOCAINE HYDROCHLORIDE 2 MG/MIN: 8 INJECTION, SOLUTION INTRAVENOUS at 08:12

## 2020-12-09 RX ADMIN — HYDROMORPHONE HYDROCHLORIDE 1 MG: 2 INJECTION INTRAMUSCULAR; INTRAVENOUS; SUBCUTANEOUS at 07:12

## 2020-12-09 RX ADMIN — FAMOTIDINE 20 MG: 10 INJECTION INTRAVENOUS at 08:12

## 2020-12-09 RX ADMIN — ONDANSETRON 4 MG: 2 INJECTION INTRAMUSCULAR; INTRAVENOUS at 07:12

## 2020-12-09 RX ADMIN — LORAZEPAM 1 MG: 2 INJECTION INTRAMUSCULAR; INTRAVENOUS at 08:12

## 2020-12-09 RX ADMIN — KETOROLAC TROMETHAMINE 30 MG: 30 INJECTION, SOLUTION INTRAMUSCULAR; INTRAVENOUS at 09:12

## 2020-12-09 RX ADMIN — DIPHENHYDRAMINE HYDROCHLORIDE 25 MG: 50 INJECTION, SOLUTION INTRAMUSCULAR; INTRAVENOUS at 09:12

## 2020-12-09 RX ADMIN — SODIUM CHLORIDE 1000 ML: 0.9 INJECTION, SOLUTION INTRAVENOUS at 06:12

## 2020-12-10 ENCOUNTER — HOSPITAL ENCOUNTER (EMERGENCY)
Facility: HOSPITAL | Age: 45
Discharge: HOME | End: 2020-12-10
Attending: EMERGENCY MEDICINE
Payer: COMMERCIAL

## 2020-12-10 VITALS
SYSTOLIC BLOOD PRESSURE: 107 MMHG | RESPIRATION RATE: 16 BRPM | HEART RATE: 48 BPM | TEMPERATURE: 99 F | DIASTOLIC BLOOD PRESSURE: 71 MMHG | OXYGEN SATURATION: 100 %

## 2020-12-10 DIAGNOSIS — G43.819 OTHER MIGRAINE WITHOUT STATUS MIGRAINOSUS, INTRACTABLE: Primary | ICD-10-CM

## 2020-12-10 DIAGNOSIS — D50.0 IRON DEFICIENCY ANEMIA DUE TO CHRONIC BLOOD LOSS: Primary | ICD-10-CM

## 2020-12-10 LAB
FERRITIN SERPL-MCNC: 1109 NG/ML (ref 20–300)
IRON SERPL-MCNC: 93 UG/DL (ref 30–160)
SATURATED IRON: 35 % (ref 20–50)
TOTAL IRON BINDING CAPACITY: 266 UG/DL (ref 250–450)
TRANSFERRIN SERPL-MCNC: 180 MG/DL (ref 200–375)

## 2020-12-10 PROCEDURE — 96361 HYDRATE IV INFUSION ADD-ON: CPT

## 2020-12-10 PROCEDURE — 63600175 PHARM REV CODE 636 W HCPCS: Performed by: EMERGENCY MEDICINE

## 2020-12-10 PROCEDURE — 96375 TX/PRO/DX INJ NEW DRUG ADDON: CPT

## 2020-12-10 PROCEDURE — 25000003 PHARM REV CODE 250: Performed by: EMERGENCY MEDICINE

## 2020-12-10 PROCEDURE — 99284 EMERGENCY DEPT VISIT MOD MDM: CPT | Mod: 25

## 2020-12-10 PROCEDURE — 96374 THER/PROPH/DIAG INJ IV PUSH: CPT

## 2020-12-10 RX ORDER — LIDOCAINE HYDROCHLORIDE ANHYDROUS AND DEXTROSE MONOHYDRATE .8; 5 G/100ML; G/100ML
2 INJECTION, SOLUTION INTRAVENOUS CONTINUOUS
Status: DISCONTINUED | OUTPATIENT
Start: 2020-12-10 | End: 2020-12-10 | Stop reason: HOSPADM

## 2020-12-10 RX ORDER — PROCHLORPERAZINE EDISYLATE 5 MG/ML
10 INJECTION INTRAMUSCULAR; INTRAVENOUS ONCE
Status: COMPLETED | OUTPATIENT
Start: 2020-12-10 | End: 2020-12-10

## 2020-12-10 RX ORDER — KETOROLAC TROMETHAMINE 30 MG/ML
30 INJECTION, SOLUTION INTRAMUSCULAR; INTRAVENOUS
Status: COMPLETED | OUTPATIENT
Start: 2020-12-10 | End: 2020-12-10

## 2020-12-10 RX ORDER — CHLORPROMAZINE HYDROCHLORIDE 25 MG/1
50 TABLET, FILM COATED ORAL NIGHTLY
Qty: 60 TABLET | Refills: 0 | Status: SHIPPED | OUTPATIENT
Start: 2020-12-10 | End: 2020-12-10 | Stop reason: SDUPTHER

## 2020-12-10 RX ORDER — CHLORPROMAZINE HYDROCHLORIDE 25 MG/1
50 TABLET, FILM COATED ORAL NIGHTLY
Qty: 60 TABLET | Refills: 0 | Status: SHIPPED | OUTPATIENT
Start: 2020-12-10 | End: 2021-02-22

## 2020-12-10 RX ORDER — DIPHENHYDRAMINE HYDROCHLORIDE 50 MG/ML
50 INJECTION INTRAMUSCULAR; INTRAVENOUS
Status: COMPLETED | OUTPATIENT
Start: 2020-12-10 | End: 2020-12-10

## 2020-12-10 RX ORDER — FAMOTIDINE 10 MG/ML
20 INJECTION INTRAVENOUS
Status: COMPLETED | OUTPATIENT
Start: 2020-12-10 | End: 2020-12-10

## 2020-12-10 RX ADMIN — FAMOTIDINE 20 MG: 10 INJECTION INTRAVENOUS at 01:12

## 2020-12-10 RX ADMIN — PROCHLORPERAZINE EDISYLATE 10 MG: 5 INJECTION INTRAMUSCULAR; INTRAVENOUS at 01:12

## 2020-12-10 RX ADMIN — LIDOCAINE HYDROCHLORIDE 2 MG/MIN: 8 INJECTION, SOLUTION INTRAVENOUS at 02:12

## 2020-12-10 RX ADMIN — DIPHENHYDRAMINE HYDROCHLORIDE 50 MG: 50 INJECTION, SOLUTION INTRAMUSCULAR; INTRAVENOUS at 01:12

## 2020-12-10 RX ADMIN — KETOROLAC TROMETHAMINE 30 MG: 30 INJECTION, SOLUTION INTRAMUSCULAR; INTRAVENOUS at 01:12

## 2020-12-10 RX ADMIN — SODIUM CHLORIDE 1000 ML: 0.9 INJECTION, SOLUTION INTRAVENOUS at 02:12

## 2020-12-10 NOTE — ED PROVIDER NOTES
SCRIBE #1 NOTE: IBooker, am scribing for, and in the presence of, Charlotte Fang Do, MD. I have scribed the HPI, ROS, and PEx.     SCRIBE #2 NOTE: I, Nakul Cortez, am scribing for, and in the presence of,  Heydi Barajas MD. I have scribed the remaining portions of the note not scribed by Scribe #1.     History      Chief Complaint   Patient presents with    Migraine     Review of patient's allergies indicates:   Allergen Reactions    Hydrocodone Other (See Comments)     Chest pains. Note: patient tolerated hydromorphone in 2/2019.     Chlorhexidine Rash     Per  after surgery patient had large rash across abdomen where chlorhexidine was applied.    Mastisol adhesive [gum dmmeyd-jvnwqc-ccih-alcohol] Rash        HPI   HPI    12/10/2020, 1:34 PM   History obtained from the patient      History of Present Illness: Sandra Dimas is a 45 y.o. female patient with a PMHx of migraines who presents to the Emergency Department for migraine headache, onset 1 week PTA. Pt states that she has been having migraines for many years, and presented to the ED last night for similar sxs. Symptoms are constant and moderate in severity. No mitigating or exacerbating factors reported. Associated sxs include photophobia. Patient denies any fever, chills, n/v/d, SOB, CP, weakness, numbness, dizziness, and all other sxs at this time. Pt currently follows with Dr. Rochelle Mcclure (Neurology). Pt is currently on Topamax 200 mg. No further complaints or concerns at this time.     Arrival mode: Personal vehicle    PCP: OSIRIS Gamez Jr, MD       Past Medical History:  Past Medical History:   Diagnosis Date    Chronic paroxysmal hemicrania 01/03/2019    Endometriosis     General anesthetics causing adverse effect in therapeutic use     wakes up with severe anxiety attacks    Hemicrania continua     Hepatitis C antibody positive in blood 12/09/2020    RNA NEGATIVE 12/14/2020    Migraines        Past Surgical  History:  Past Surgical History:   Procedure Laterality Date    APPENDECTOMY      COLONOSCOPY N/A 11/12/2020    Procedure: COLONOSCOPY;  Surgeon: Mylene Russ MD;  Location: [facility];  Service: Endoscopy;  Laterality: N/A;    CYST REMOVAL Right 12/30/2019    Procedure: EXCISION, CYST;  Surgeon: NATALI Shah MD;  Location: [facility] OR;  Service: OB/GYN;  Laterality: Right;  Sebaceous cyst    diagnostic laprascopy      ESOPHAGOGASTRODUODENOSCOPY N/A 11/30/2018    Procedure: EGD (ESOPHAGOGASTRODUODENOSCOPY);  Surgeon: Bossman Bustos MD;  Location: [facility];  Service: Endoscopy;  Laterality: N/A;    ESOPHAGOGASTRODUODENOSCOPY N/A 11/12/2020    Procedure: ESOPHAGOGASTRODUODENOSCOPY (EGD) needs rapid covid test;  Surgeon: Mylene Russ MD;  Location: [facility];  Service: Endoscopy;  Laterality: N/A;    FULGURATION OF ENDOMETRIOSIS  12/26/2018    Procedure: FULGURATION, ENDOMETRIOSIS;  Surgeon: Zehra Jensen MD;  Location: [facility] OR;  Service: OB/GYN;;  endometriosis implant resection    HYSTEROSCOPY WITH HYDROTHERMAL ABLATION OF ENDOMETRIUM WITH DILATION AND CURETTAGE N/A 12/26/2018    Procedure: HYSTEROSCOPY, WITH DILATION AND CURETTAGE OF UTERUS AND HYDROTHERMAL ENDOMETRIAL ABLATION;  Surgeon: Zehra Jensen MD;  Location: [facility] OR;  Service: OB/GYN;  Laterality: N/A;    LAPAROSCOPIC SALPINGECTOMY Bilateral 12/26/2018    Procedure: SALPINGECTOMY, LAPAROSCOPIC;  Surgeon: Zehra Jensen MD;  Location: [facility] OR;  Service: OB/GYN;  Laterality: Bilateral;    LAPAROTOMY, EXPLORATORY N/A 2/7/2019    Procedure: LAPAROTOMY, EXPLORATORY;  Surgeon: Ramakrishna Boone MD;  Location: [facility] OR;  Service: General;  Laterality: N/A;    REPAIR OF VAGINAL CUFF N/A 7/9/2020    Procedure: REPAIR, VAGINAL CUFF;  Surgeon: Sailaja Addison MD;  Location: [facility] OR;  Service: OB/GYN;  Laterality: N/A;    ROBOT-ASSISTED LAPAROSCOPIC ABDOMINAL HYSTERECTOMY USING DA MAVIS XI N/A 12/30/2019    Procedure: XI ROBOTIC  HYSTERECTOMY;  Surgeon: NATALI Shah MD;  Location: [facility] OR;  Service: OB/GYN;  Laterality: N/A;    ROBOT-ASSISTED LAPAROSCOPIC SURGICAL REMOVAL OF OVARY USING DA MAVIS XI Bilateral 12/30/2019    Procedure: XI ROBOTIC OOPHORECTOMY;  Surgeon: NATALI Shah MD;  Location: [facility] OR;  Service: OB/GYN;  Laterality: Bilateral;    robotic assisted laparoscopy with excision of ovarian endometrioma and chromotubation           Family History:  Family History   Problem Relation Age of Onset    Strabismus Neg Hx     Retinal detachment Neg Hx     Macular degeneration Neg Hx     Glaucoma Neg Hx     Blindness Neg Hx     Amblyopia Neg Hx     Breast cancer Neg Hx     Colon cancer Neg Hx     Ovarian cancer Neg Hx     Thyroid disease Neg Hx     Migraines Neg Hx     Asthma Neg Hx        Social History:  Social History     Tobacco Use    Smoking status: Never Smoker    Smokeless tobacco: Never Used   Substance and Sexual Activity    Alcohol use: Not Currently     Frequency: Monthly or less     Drinks per session: 1 or 2     Binge frequency: Never     Comment: rarely No alcohol 72h prior to sx    Drug use: No    Sexual activity: Yes     Partners: Male       ROS   Review of Systems   Constitutional: Negative for chills and fever.   HENT: Negative for sore throat.    Eyes: Positive for photophobia.   Respiratory: Negative for shortness of breath.    Cardiovascular: Negative for chest pain.   Gastrointestinal: Negative for diarrhea, nausea and vomiting.   Genitourinary: Negative for dysuria.   Musculoskeletal: Negative for back pain.   Skin: Negative for rash.   Neurological: Positive for headaches. Negative for dizziness, seizures, weakness, light-headedness and numbness.   Hematological: Does not bruise/bleed easily.   All other systems reviewed and are negative.    Physical Exam      Initial Vitals [12/10/20 1335]   BP Pulse Resp Temp SpO2   96/64 (!) 47 17 99 °F (37.2 °C) 98 %      MAP       --           Physical Exam  Nursing Notes and Vital Signs Reviewed.  Constitutional: Patient is in no acute distress. Well-developed and well-nourished.  Head: Atraumatic. Normocephalic.  Eyes: PERRL. EOM intact. Conjunctivae are not pale. No scleral icterus.  ENT: Mucous membranes are moist. Oropharynx is clear and symmetric.    Neck: Supple. Full ROM. No lymphadenopathy.  Cardiovascular: Regular rate. Regular rhythm. No murmurs, rubs, or gallops. Distal pulses are 2+ and symmetric.  Pulmonary/Chest: No respiratory distress. Clear to auscultation bilaterally. No wheezing or rales.  Abdominal: Soft and non-distended.  There is no tenderness.  No rebound, guarding, or rigidity.   Musculoskeletal: Moves all extremities. No obvious deformities. No edema.  Skin: Warm and dry.  Neurological:  Alert, awake, and appropriate.  Normal speech.  No acute focal neurological deficits are appreciated.  Psychiatric: Normal affect. Good eye contact. Appropriate in content.    ED Course    Procedures  ED Vital Signs:  Vitals:    12/10/20 1335 12/10/20 1345 12/10/20 1405 12/10/20 1415   BP: 96/64 100/64 119/75 100/60   Pulse: (!) 47 (!) 50 61 (!) 50   Resp: 17 18 20 14   Temp: 99 °F (37.2 °C)      TempSrc: Oral      SpO2: 98% 99% 96% 98%    12/10/20 1430 12/10/20 1445 12/10/20 1500 12/10/20 1530   BP: 119/70 113/63 111/68 94/60   Pulse: (!) 54 (!) 46 (!) 47 (!) 47   Resp: 17 13 13 13   Temp:       TempSrc:       SpO2: 100% 99% 100% 100%    12/10/20 1600 12/10/20 1700   BP: (!) 88/59 107/71   Pulse: (!) 47 (!) 48   Resp: 20 16   Temp:     TempSrc:     SpO2: 99% 100%                The Emergency Provider reviewed the vital signs and test results, which are outlined above.    ED Discussion     2:29 PM: Discussed pt's case with pt's neurologist, Dr. Rochelle Mcclure (Neurology) who recommends that the pt take 25 mg thorazine and 50 mg Benadryl at home to control her migraines.    3:00 PM: Re-evaluated pt. D/w pt and her , Dr. Dimas  (Cardiology), regarding Dr. Rochelle Mcclure's (Neurology) recommendations. Pt is currently on Topamax, and is concerned about a possible interaction between thorazine and Topamax. D/w pt any concerns expressed at this time. Answered all questions. Pt expresses understanding at this time.    3:42 PM: Discussed pt's case with Pharmacy, who states that there should not be any interaction between Topamax and thorazine, other than an additive sedative effect. Discussed this w/ pt and Dr. Dimas. If pt's headache resolves in the ED she can discharged with a prescription for thorazine 50 mg qHS x 60 tablets. Pt and  agree with plan of care and express understanding.    4:00 PM: Dr. Mcghee transfers care of patient to Dr. Barajas.    4:40 PM: Re-evaluated pt. Pt is resting comfortably and is in no acute distress.  D/w pt all pertinent results. D/w pt any concerns expressed at this time. Answered all questions. Pt expresses understanding at this time.    5:20 PM: Reassessed pt at this time.  Pt states her condition has improved at this time. Discussed with pt all pertinent ED information and results. Discussed pt dx and plan of tx. Gave pt all f/u and return to the ED instructions. All questions and concerns were addressed at this time. Pt expresses understanding of information and instructions, and is comfortable with plan to discharge. Pt is stable for discharge.    I discussed with patient and/or family/caretaker that evaluation in the ED does not suggest any emergent or life threatening medical conditions requiring immediate intervention beyond what was provided in the ED, and I believe patient is safe for discharge.  Regardless, an unremarkable evaluation in the ED does not preclude the development or presence of a serious of life threatening condition. As such, patient was instructed to return immediately for any worsening or change in current symptoms.           ED Medication(s):  Medications   ketorolac injection 30  mg (30 mg Intravenous Given 12/10/20 1351)   prochlorperazine injection Soln 10 mg (10 mg Intravenous Given 12/10/20 1350)   famotidine (PF) injection 20 mg (20 mg Intravenous Given 12/10/20 1353)   sodium chloride 0.9% bolus 1,000 mL (0 mLs Intravenous Stopped 12/10/20 1710)   diphenhydrAMINE injection 50 mg (50 mg Intravenous Given 12/10/20 1358)     Follow-up Information     Schedule an appointment as soon as possible for a visit  with OSIRIS Gamez Jr, MD.    Specialties: Internal Medicine, Pediatrics  Why: As needed  Contact information:  [address]  [place] [address] [phone]                  Discharge Medication List as of 12/10/2020  4:41 PM      START taking these medications    Details   chlorproMAZINE (THORAZINE) 25 MG tablet Take 2 tablets (50 mg total) by mouth every evening., Starting Thu 12/10/2020, Until Fri 12/10/2021, Normal               Medical Decision Making              Scribe Attestation:   Scribe #1: I performed the above scribed service and the documentation accurately describes the services I performed. I attest to the accuracy of the note.    Attending:   Physician Attestation Statement for Scribe #1: I, Charlotte Fang Do, MD, personally performed the services described in this documentation, as scribed by Booker Argueta, in my presence, and it is both accurate and complete.       Scribe Attestation:   Scribe #2: I performed the above scribed service and the documentation accurately describes the services I performed. I attest to the accuracy of the note.    Attending Attestation:           Physician Attestation for Scribe:    Physician Attestation Statement for Scribe #2: I, Heydi Barajas MD, reviewed documentation, as scribed by Nakul Cortez in my presence, and it is both accurate and complete. I also acknowledge and confirm the content of the note done by Guidoibe #1.          Clinical Impression       ICD-10-CM ICD-9-CM   1. Other migraine without status  migrainosus, intractable  G43.819 346.81       Disposition:   Disposition: Discharged  Condition: Stable         Heydi Barajas MD  12/20/20 0616

## 2020-12-10 NOTE — ED PROVIDER NOTES
SCRIBE #1 NOTE: I, Gretel Gaspar, am scribing for, and in the presence of, Heydi Barajas MD. I have scribed the entire note.       History     Chief Complaint   Patient presents with    Headache     hx of migraines, reports headache x 7 days     Review of patient's allergies indicates:   Allergen Reactions    Hydrocodone Other (See Comments)     Chest pains. Note: patient tolerated hydromorphone in 2/2019.     Chlorhexidine Rash     Per  after surgery patient had large rash across abdomen where chlorhexidine was applied.    Mastisol adhesive [gum rwvzco-psxgql-zbbz-alcohol] Rash         History of Present Illness     HPI    12/9/2020, 6:31 PM  History obtained from the patient      History of Present Illness: Sandra Dimas is a 45 y.o. female patient with a PMHx of endometriosis, hemicrania continua, migraines, and chronic paroxysmal hemicrania, who presents to the Emergency Department for evaluation of worsening HA which onset 7 days PTA. Pt reports migraines occurred after thanksgiving. Symptoms are constant and moderate in severity. No mitigating or exacerbating factors reported. No associated sxs reported. Patient denies any fever, cough, chills, CP, SOB, n/v/d, dizziness, and all other sxs at this time. Pt was seen by neurologist today. All home medications have not improved sxs, which is typical for her migraines. No further complaints or concerns at this time.       Arrival mode: Personal vehicle     PCP: OSIRIS Gamez Jr, MD        Past Medical History:  Past Medical History:   Diagnosis Date    Chronic paroxysmal hemicrania 01/03/2019    Endometriosis     General anesthetics causing adverse effect in therapeutic use     wakes up with severe anxiety attacks    Hemicrania continua     Hepatitis C antibody positive in blood 12/09/2020    Migraines        Past Surgical History:  Past Surgical History:   Procedure Laterality Date    APPENDECTOMY      COLONOSCOPY N/A 11/12/2020     Procedure: COLONOSCOPY;  Surgeon: Mylene Russ MD;  Location: [facility];  Service: Endoscopy;  Laterality: N/A;    CYST REMOVAL Right 12/30/2019    Procedure: EXCISION, CYST;  Surgeon: NATALI Shah MD;  Location: [facility];  Service: OB/GYN;  Laterality: Right;  Sebaceous cyst    diagnostic laprascopy      ESOPHAGOGASTRODUODENOSCOPY N/A 11/30/2018    Procedure: EGD (ESOPHAGOGASTRODUODENOSCOPY);  Surgeon: Bossman Bustos MD;  Location: [facility];  Service: Endoscopy;  Laterality: N/A;    ESOPHAGOGASTRODUODENOSCOPY N/A 11/12/2020    Procedure: ESOPHAGOGASTRODUODENOSCOPY (EGD) needs rapid covid test;  Surgeon: Mylene Russ MD;  Location: [facility];  Service: Endoscopy;  Laterality: N/A;    FULGURATION OF ENDOMETRIOSIS  12/26/2018    Procedure: FULGURATION, ENDOMETRIOSIS;  Surgeon: Zehra Jensen MD;  Location: [facility];  Service: OB/GYN;;  endometriosis implant resection    HYSTEROSCOPY WITH HYDROTHERMAL ABLATION OF ENDOMETRIUM WITH DILATION AND CURETTAGE N/A 12/26/2018    Procedure: HYSTEROSCOPY, WITH DILATION AND CURETTAGE OF UTERUS AND HYDROTHERMAL ENDOMETRIAL ABLATION;  Surgeon: Zehra Jensen MD;  Location: [facility];  Service: OB/GYN;  Laterality: N/A;    LAPAROSCOPIC SALPINGECTOMY Bilateral 12/26/2018    Procedure: SALPINGECTOMY, LAPAROSCOPIC;  Surgeon: Zehra Jensen MD;  Location: [facility];  Service: OB/GYN;  Laterality: Bilateral;    LAPAROTOMY, EXPLORATORY N/A 2/7/2019    Procedure: LAPAROTOMY, EXPLORATORY;  Surgeon: Ramakrishna Boone MD;  Location: [facility];  Service: General;  Laterality: N/A;    REPAIR OF VAGINAL CUFF N/A 7/9/2020    Procedure: REPAIR, VAGINAL CUFF;  Surgeon: Sailaja Addison MD;  Location: [facility];  Service: OB/GYN;  Laterality: N/A;    ROBOT-ASSISTED LAPAROSCOPIC ABDOMINAL HYSTERECTOMY USING DA MAVIS XI N/A 12/30/2019    Procedure: XI ROBOTIC HYSTERECTOMY;  Surgeon: NATALI Shah MD;  Location: [facility];  Service: OB/GYN;  Laterality: N/A;    ROBOT-ASSISTED  LAPAROSCOPIC SURGICAL REMOVAL OF OVARY USING DA MAVIS XI Bilateral 12/30/2019    Procedure: XI ROBOTIC OOPHORECTOMY;  Surgeon: NATALI Shah MD;  Location: [facility];  Service: OB/GYN;  Laterality: Bilateral;    robotic assisted laparoscopy with excision of ovarian endometrioma and chromotubation           Family History:  Family History   Problem Relation Age of Onset    Strabismus Neg Hx     Retinal detachment Neg Hx     Macular degeneration Neg Hx     Glaucoma Neg Hx     Blindness Neg Hx     Amblyopia Neg Hx     Breast cancer Neg Hx     Colon cancer Neg Hx     Ovarian cancer Neg Hx     Thyroid disease Neg Hx     Migraines Neg Hx     Asthma Neg Hx        Social History:  Social History     Tobacco Use    Smoking status: Never Smoker    Smokeless tobacco: Never Used   Substance and Sexual Activity    Alcohol use: Not Currently     Frequency: Monthly or less     Drinks per session: 1 or 2     Binge frequency: Never     Comment: rarely No alcohol 72h prior to sx    Drug use: No    Sexual activity: Yes     Partners: Male        Review of Systems     Review of Systems   Constitutional: Negative for chills and fever.   HENT: Negative for sore throat.    Respiratory: Negative for cough and shortness of breath.    Cardiovascular: Negative for chest pain.   Gastrointestinal: Negative for diarrhea, nausea and vomiting.   Genitourinary: Negative for dysuria.   Musculoskeletal: Negative for back pain.   Skin: Negative for rash.   Neurological: Positive for headaches (worsening). Negative for dizziness and weakness.   Hematological: Does not bruise/bleed easily.   All other systems reviewed and are negative.       Physical Exam     Initial Vitals [12/09/20 1827]   BP Pulse Resp Temp SpO2   99/60 (!) 46 18 98.9 °F (37.2 °C) 100 %      MAP       --          Physical Exam  Nursing Notes and Vital Signs Reviewed.  Constitutional: Patient is in no acute distress. Well-developed and well-nourished.  Head:  "Atraumatic. Normocephalic.  Eyes: PERRL. EOM intact. Conjunctivae are not pale. No scleral icterus.  ENT: Mucous membranes are moist. Oropharynx is clear and symmetric.    Neck: Supple. Full ROM. No lymphadenopathy.  Cardiovascular: Bradycardic. Regular rhythm. No murmurs, rubs, or gallops. Distal pulses are 2+ and symmetric.  Pulmonary/Chest: No respiratory distress. Clear to auscultation bilaterally. No wheezing or rales.  Abdominal: Soft and non-distended.  There is no tenderness.  No rebound, guarding, or rigidity. Good bowel sounds.  Genitourinary: No CVA tenderness  Musculoskeletal: Moves all extremities. No obvious deformities. No edema. No calf tenderness.  Skin: Warm and dry.  Neurological:  Alert, awake, and appropriate.  Normal speech.  No acute focal neurological deficits are appreciated.  Psychiatric: Normal affect. Good eye contact. Appropriate in content.     ED Course   Procedures  ED Vital Signs:  Vitals:    12/09/20 1827 12/09/20 1901 12/09/20 2001 12/09/20 2015   BP: 99/60  108/79 102/71   Pulse: (!) 46  (!) 51 (!) 46   Resp: 18 20 18 20   Temp: 98.9 °F (37.2 °C)      TempSrc: Oral      SpO2: 100%  100% 100%   Weight: 64 kg (141 lb 1.5 oz)      Height: 5' 8" (1.727 m)       12/09/20 2030 12/09/20 2045 12/09/20 2100 12/09/20 2115   BP:  125/69 97/62 107/74   Pulse: (!) 51 (!) 47 (!) 49 (!) 48   Resp: 16 18 16 20   Temp:       TempSrc:       SpO2: 100% 100% 100% 100%   Weight:       Height:        12/09/20 2145 12/09/20 2200 12/09/20 2215   BP: 97/64 99/71 139/86   Pulse: (!) 49 (!) 48 (!) 54   Resp: 20 20 18   Temp:      TempSrc:      SpO2: 100% 100% 100%   Weight:      Height:          Abnormal Lab Results:  Labs Reviewed   CBC W/ AUTO DIFFERENTIAL - Abnormal; Notable for the following components:       Result Value    Hemoglobin 10.5 (*)     Hematocrit 34.2 (*)     MCH 26.1 (*)     MCHC 30.7 (*)     RDW 14.7 (*)     All other components within normal limits   COMPREHENSIVE METABOLIC PANEL - " Abnormal; Notable for the following components:    Chloride 112 (*)     AST 67 (*)     ALT 88 (*)     All other components within normal limits   HEPATITIS C ANTIBODY - Abnormal; Notable for the following components:    Hepatitis C Ab Positive (*)     All other components within normal limits   IRON AND TIBC - Abnormal; Notable for the following components:    Transferrin 180 (*)     All other components within normal limits   FERRITIN - Abnormal; Notable for the following components:    Ferritin 1,109 (*)     All other components within normal limits   HIV 1 / 2 ANTIBODY   HEPATITIS C RNA, QUANTITATIVE, PCR        All Lab Results:  Results for orders placed or performed during the hospital encounter of 12/09/20   CBC auto differential   Result Value Ref Range    WBC 5.87 3.90 - 12.70 K/uL    RBC 4.03 4.00 - 5.40 M/uL    Hemoglobin 10.5 (L) 12.0 - 16.0 g/dL    Hematocrit 34.2 (L) 37.0 - 48.5 %    MCV 85 82 - 98 fL    MCH 26.1 (L) 27.0 - 31.0 pg    MCHC 30.7 (L) 32.0 - 36.0 g/dL    RDW 14.7 (H) 11.5 - 14.5 %    Platelets 246 150 - 350 K/uL    MPV 9.6 9.2 - 12.9 fL    Immature Granulocytes 0.2 0.0 - 0.5 %    Gran # (ANC) 3.2 1.8 - 7.7 K/uL    Immature Grans (Abs) 0.01 0.00 - 0.04 K/uL    Lymph # 2.2 1.0 - 4.8 K/uL    Mono # 0.4 0.3 - 1.0 K/uL    Eos # 0.1 0.0 - 0.5 K/uL    Baso # 0.03 0.00 - 0.20 K/uL    nRBC 0 0 /100 WBC    Gran % 54.2 38.0 - 73.0 %    Lymph % 37.1 18.0 - 48.0 %    Mono % 6.8 4.0 - 15.0 %    Eosinophil % 1.2 0.0 - 8.0 %    Basophil % 0.5 0.0 - 1.9 %    Differential Method Automated    Comprehensive metabolic panel   Result Value Ref Range    Sodium 143 136 - 145 mmol/L    Potassium 3.9 3.5 - 5.1 mmol/L    Chloride 112 (H) 95 - 110 mmol/L    CO2 23 23 - 29 mmol/L    Glucose 98 70 - 110 mg/dL    BUN 16 6 - 20 mg/dL    Creatinine 0.8 0.5 - 1.4 mg/dL    Calcium 8.9 8.7 - 10.5 mg/dL    Total Protein 7.1 6.0 - 8.4 g/dL    Albumin 4.2 3.5 - 5.2 g/dL    Total Bilirubin 0.4 0.1 - 1.0 mg/dL    Alkaline  Phosphatase 85 55 - 135 U/L    AST 67 (H) 10 - 40 U/L    ALT 88 (H) 10 - 44 U/L    Anion Gap 8 8 - 16 mmol/L    eGFR if African American >60 >60 mL/min/1.73 m^2    eGFR if non African American >60 >60 mL/min/1.73 m^2   HIV 1/2 Ag/Ab (4th Gen)   Result Value Ref Range    HIV 1/2 Ag/Ab Negative Negative   Hepatitis C antibody   Result Value Ref Range    Hepatitis C Ab Positive (A) Negative   Iron and TIBC   Result Value Ref Range    Iron 93 30 - 160 ug/dL    Transferrin 180 (L) 200 - 375 mg/dL    TIBC 266 250 - 450 ug/dL    Saturated Iron 35 20 - 50 %   Ferritin   Result Value Ref Range    Ferritin 1,109 (H) 20.0 - 300.0 ng/mL         Imaging Results:  Imaging Results          X-Ray Ribs 2 View Right (Final result)  Result time 12/09/20 19:21:51    Final result by Sania Arias MD (12/09/20 19:21:51)                 Impression:      As above      Electronically signed by: Hugo Lui  Date:    12/09/2020  Time:    19:21             Narrative:    EXAMINATION:  XR RIBS 2 VIEW RIGHT    CLINICAL HISTORY:  Pleurodynia    TECHNIQUE:  Two views of the right ribs were performed.    COMPARISON:  None    FINDINGS:  No definite rib fracture.  No pneumothorax.  No acute osseous injury.                                        The Emergency Provider reviewed the vital signs and test results, which are outlined above.     ED Discussion       10:27 PM: Reassessed pt at this time. Discussed with pt all pertinent ED information and results. Discussed pt dx and plan of tx. Gave pt all f/u and return to the ED instructions. All questions and concerns were addressed at this time. Pt expresses understanding of information and instructions, and is comfortable with plan to discharge. Pt is stable for discharge.    I discussed with patient and/or family/caretaker that evaluation in the ED does not suggest any emergent or life threatening medical conditions requiring immediate intervention beyond what was provided in the ED, and I believe  patient is safe for discharge.  Regardless, an unremarkable evaluation in the ED does not preclude the development or presence of a serious of life threatening condition. As such, patient was instructed to return immediately for any worsening or change in current symptoms.         Medical Decision Making:   Clinical Tests:   Lab Tests: Ordered and Reviewed  Radiological Study: Ordered and Reviewed           ED Medication(s):  Medications   sodium chloride 0.9% bolus 1,000 mL (0 mLs Intravenous Stopped 12/9/20 2209)   lidocaine 2000 mg in dextrose 5% 250 mL infusion (0 mg/min Intravenous Stopped 12/9/20 2209)   hydromorphone (PF) injection 1 mg (1 mg Intravenous Given 12/9/20 1901)   ondansetron injection 4 mg (4 mg Intravenous Given 12/9/20 1901)   famotidine (PF) injection 20 mg (20 mg Intravenous Given 12/9/20 2009)   lorazepam injection 1 mg (1 mg Intravenous Given 12/9/20 2009)   diphenhydrAMINE injection 25 mg (25 mg Intravenous Given 12/9/20 2113)   ketorolac injection 30 mg (30 mg Intravenous Given 12/9/20 2114)       Discharge Medication List as of 12/9/2020 10:05 PM          Follow-up Information     Schedule an appointment as soon as possible for a visit  with OSIRIS Gamez Jr, MD.    Specialties: Internal Medicine, Pediatrics  Why: As needed  Contact information:  [address]  [place] [address]  [phone]                       Scribe Attestation:   Scribe #1: I performed the above scribed service and the documentation accurately describes the services I performed. I attest to the accuracy of the note.     Attending:   Physician Attestation Statement for Scribe #1: I, Heydi Barajas MD, personally performed the services described in this documentation, as scribed by Gretel Gaspar, in my presence, and it is both accurate and complete.           Clinical Impression       ICD-10-CM ICD-9-CM   1. Migraine without aura and with status migrainosus, not intractable  G43.001 346.12   2. Rib  pain  R07.81 786.50       Disposition:   Disposition: Discharged  Condition: Stable         Heydi Barajas MD  12/10/20 [id number]

## 2020-12-10 NOTE — ED NOTES
Pt is resting comfortably with visible chest rise and fall. Bed is locked and in lowest position. Pt shows no signs of distress at this time. Pt's  is at the bedside. Will continue to monitor.

## 2020-12-14 ENCOUNTER — OFFICE VISIT (OUTPATIENT)
Dept: OTOLARYNGOLOGY | Facility: CLINIC | Age: 45
End: 2020-12-14
Payer: COMMERCIAL

## 2020-12-14 ENCOUNTER — PATIENT OUTREACH (OUTPATIENT)
Dept: ADMINISTRATIVE | Facility: OTHER | Age: 45
End: 2020-12-14

## 2020-12-14 ENCOUNTER — CLINICAL SUPPORT (OUTPATIENT)
Dept: AUDIOLOGY | Facility: CLINIC | Age: 45
End: 2020-12-14
Payer: COMMERCIAL

## 2020-12-14 ENCOUNTER — TELEPHONE (OUTPATIENT)
Dept: OTOLARYNGOLOGY | Facility: CLINIC | Age: 45
End: 2020-12-14

## 2020-12-14 VITALS
HEART RATE: 61 BPM | BODY MASS INDEX: 22.43 KG/M2 | WEIGHT: 147.5 LBS | SYSTOLIC BLOOD PRESSURE: 128 MMHG | TEMPERATURE: 98 F | DIASTOLIC BLOOD PRESSURE: 81 MMHG

## 2020-12-14 DIAGNOSIS — H90.3 SENSORINEURAL HEARING LOSS (SNHL) OF BOTH EARS: Primary | ICD-10-CM

## 2020-12-14 LAB
HCV RNA SERPL NAA+PROBE-LOG IU: <1.08 LOG (10) IU/ML
HCV RNA SERPL QL NAA+PROBE: NOT DETECTED IU/ML
HCV RNA SPEC NAA+PROBE-ACNC: <12 IU/ML

## 2020-12-14 PROCEDURE — 99213 OFFICE O/P EST LOW 20 MIN: CPT | Mod: S$GLB,,, | Performed by: PHYSICIAN ASSISTANT

## 2020-12-14 PROCEDURE — 99999 PR PBB SHADOW E&M-EST. PATIENT-LVL I: ICD-10-PCS | Mod: PBBFAC,,, | Performed by: AUDIOLOGIST-HEARING AID FITTER

## 2020-12-14 PROCEDURE — 99999 PR PBB SHADOW E&M-EST. PATIENT-LVL I: CPT | Mod: PBBFAC,,, | Performed by: AUDIOLOGIST-HEARING AID FITTER

## 2020-12-14 PROCEDURE — 92567 TYMPANOMETRY: CPT | Mod: S$GLB,,, | Performed by: AUDIOLOGIST-HEARING AID FITTER

## 2020-12-14 PROCEDURE — 1125F PR PAIN SEVERITY QUANTIFIED, PAIN PRESENT: ICD-10-PCS | Mod: S$GLB,,, | Performed by: PHYSICIAN ASSISTANT

## 2020-12-14 PROCEDURE — 92557 PR COMPREHENSIVE HEARING TEST: ICD-10-PCS | Mod: S$GLB,,, | Performed by: AUDIOLOGIST-HEARING AID FITTER

## 2020-12-14 PROCEDURE — 92567 PR TYMPA2METRY: ICD-10-PCS | Mod: S$GLB,,, | Performed by: AUDIOLOGIST-HEARING AID FITTER

## 2020-12-14 PROCEDURE — 99213 PR OFFICE/OUTPT VISIT, EST, LEVL III, 20-29 MIN: ICD-10-PCS | Mod: S$GLB,,, | Performed by: PHYSICIAN ASSISTANT

## 2020-12-14 PROCEDURE — 1125F AMNT PAIN NOTED PAIN PRSNT: CPT | Mod: S$GLB,,, | Performed by: PHYSICIAN ASSISTANT

## 2020-12-14 PROCEDURE — 3008F BODY MASS INDEX DOCD: CPT | Mod: CPTII,S$GLB,, | Performed by: PHYSICIAN ASSISTANT

## 2020-12-14 PROCEDURE — 3008F PR BODY MASS INDEX (BMI) DOCUMENTED: ICD-10-PCS | Mod: CPTII,S$GLB,, | Performed by: PHYSICIAN ASSISTANT

## 2020-12-14 PROCEDURE — 92557 COMPREHENSIVE HEARING TEST: CPT | Mod: S$GLB,,, | Performed by: AUDIOLOGIST-HEARING AID FITTER

## 2020-12-14 PROCEDURE — 99999 PR PBB SHADOW E&M-EST. PATIENT-LVL IV: CPT | Mod: PBBFAC,,, | Performed by: PHYSICIAN ASSISTANT

## 2020-12-14 PROCEDURE — 99999 PR PBB SHADOW E&M-EST. PATIENT-LVL IV: ICD-10-PCS | Mod: PBBFAC,,, | Performed by: PHYSICIAN ASSISTANT

## 2020-12-14 NOTE — PROGRESS NOTES
Subjective:   Patient ID: Sandra Dimas is a 45 y.o. female.    Chief Complaint: Hearing Loss, Ear Fullness, and Headache    Ms. Dimas is a very pleasant 44 yo female here to see me today with bilateral ear fullness. She first started having issues when she was flying home from [place]. She is having decreased hearing. Her last audiogram was 2019.  She has a history of chronic migraines and has been symptomatic recently. She was told that she had fluid in both of her ears.    Review of patient's allergies indicates:   Allergen Reactions    Hydrocodone Other (See Comments)     Chest pains. Note: patient tolerated hydromorphone in 2/2019.     Chlorhexidine Rash     Per  after surgery patient had large rash across abdomen where chlorhexidine was applied.    Mastisol adhesive [gum xlarcr-txylwe-jxgb-alcohol] Rash           Review of Systems   Constitutional: Negative for chills, fatigue, fever and unexpected weight change.   HENT: Positive for ear pain and hearing loss. Negative for congestion, dental problem, ear discharge, facial swelling, nosebleeds, postnasal drip, rhinorrhea, sinus pressure, sneezing, sore throat, tinnitus, trouble swallowing and voice change.    Eyes: Negative for redness, itching and visual disturbance.   Respiratory: Negative for cough, choking, shortness of breath and wheezing.    Cardiovascular: Negative for chest pain and palpitations.   Gastrointestinal: Negative for abdominal pain.        No reflux.   Musculoskeletal: Negative for gait problem.   Skin: Negative for rash.   Neurological: Positive for headaches. Negative for dizziness and light-headedness.         Objective:   /81   Pulse 61   Temp 98.4 °F (36.9 °C) (Temporal)   Wt 66.9 kg (147 lb 7.8 oz)   LMP  (LMP Unknown)   BMI 22.43 kg/m²     Physical Exam  Constitutional:       General: She is not in acute distress.     Appearance: She is well-developed.   HENT:      Head: Normocephalic and atraumatic.       Right Ear: Ear canal and external ear normal. Tympanic membrane is bulging.      Left Ear: Ear canal and external ear normal. Tympanic membrane is retracted.      Nose: Nose normal. No nasal deformity, septal deviation, mucosal edema or rhinorrhea.      Right Sinus: No maxillary sinus tenderness or frontal sinus tenderness.      Left Sinus: No maxillary sinus tenderness or frontal sinus tenderness.      Mouth/Throat:      Mouth: Mucous membranes are not pale and not dry.      Dentition: No dental caries.      Pharynx: Uvula midline. No oropharyngeal exudate or posterior oropharyngeal erythema.   Eyes:      General: Lids are normal. No scleral icterus.     Extraocular Movements:      Right eye: Normal extraocular motion and no nystagmus.      Left eye: Normal extraocular motion and no nystagmus.      Conjunctiva/sclera: Conjunctivae normal.      Right eye: Right conjunctiva is not injected. No chemosis.     Left eye: Left conjunctiva is not injected. No chemosis.     Pupils: Pupils are equal, round, and reactive to light.   Neck:      Thyroid: No thyroid mass or thyromegaly.      Trachea: Trachea and phonation normal. No tracheal tenderness or tracheal deviation.   Pulmonary:      Effort: Pulmonary effort is normal. No respiratory distress.      Breath sounds: No stridor.   Abdominal:      General: There is no distension.   Lymphadenopathy:      Head:      Right side of head: No submental, submandibular, preauricular, posterior auricular or occipital adenopathy.      Left side of head: No submental, submandibular, preauricular, posterior auricular or occipital adenopathy.      Cervical: No cervical adenopathy.   Skin:     General: Skin is warm and dry.      Findings: No erythema or rash.   Neurological:      Mental Status: She is alert and oriented to person, place, and time.      Cranial Nerves: No cranial nerve deficit.   Psychiatric:         Behavior: Behavior normal.      packet.              Assessment:     1.  Sensorineural hearing loss (SNHL) of both ears        No fluid on exam today. We did discuss ETD.  We had a long discussion regarding the anatomy and function of the eustachian tube.  We discussed that the eustachian tube acts as a pump to keep the appropriate amount of pressure behind the ear drum.  I gave the patient a prescription for a nasal steroid spray to be used on a daily basis, and we discussed that it will take 2-3 weeks of daily use to achieve maximal effectiveness.  Plan:     Sensorineural hearing loss (SNHL) of both ears      No fluid on exam today.     We did discuss ETD.  We had a long discussion regarding the anatomy and function of the eustachian tube.  We discussed that the eustachian tube acts as a pump to keep the appropriate amount of pressure behind the ear drum.  I gave the patient a prescription for a nasal steroid spray to be used on a daily basis, and we discussed that it will take 2-3 weeks of daily use to achieve maximal effectiveness.    See audiogram above. She is a candidate for hearing aids.

## 2020-12-14 NOTE — PROGRESS NOTES
Referring provider: Hellen Jiménez PA-C    Sandra Dimas was seen 12/14/2020 for an audiological evaluation.  Patient complains of hearing loss, right poorer than left. She has history of migraine and notes her hearing in her right ear worsens with headache. She reports increased difficulty hearing and understanding speech in background noise over the past year, especially her children and social occasions. No tinnitus or vertigo. Her last audiogram was July 2019. Had MRI of brain March 2020: within normal limits.    Results reveal a mild sensorineural hearing loss 250-8000 Hz for the right ear, and a normal-to-mild sensorineural hearing loss 250-8000 Hz for the left ear.   Speech Reception Thresholds were 35 dBHL for the right ear and 25 dBHL for the left ear.   Word recognition scores were excellent for the right ear and excellent for the left ear.   Tympanograms were Type A for the right ear and Type A for the left ear.    Patient was counseled on the above findings.    Recommendations:  1. ENT review  2. Annual audiogram  3. Consider trial with hearing aids. Discussed potential efrects of listening strain and fatigue. Patient is provided [facility] hearing aid information packet.

## 2020-12-14 NOTE — PROGRESS NOTES
Health Maintenance Due   Topic Date Due    Mammogram  02/07/2020    Influenza Vaccine (1) 08/01/2020     Updates were requested from care everywhere.  Chart was reviewed for overdue Proactive [facility] Encounters ([facility]) topics (CRS, Breast Cancer Screening, Eye exam)  Health Maintenance has been updated.  [facility] immunization registry triggered.  Immunizations were reconciled.

## 2020-12-14 NOTE — TELEPHONE ENCOUNTER
Spoke with the patient and let her know that she already had an appt with Tiffanie Jessy today and that we could not see her any sooner than her already scheduled appt time.       ----- Message from Gee Newman sent at 12/14/2020  7:07 AM CST -----  Contact: pt   .Type:  Same Day Appointment Request    Caller is requesting a same day appointment.  Caller declined first available appointment listed below.    Name of Caller: Dr Luz Dimas   When is the first available appointment? 12/28/2020  Symptoms:  [phone]  Best Call Back Number:  Additional Information:

## 2020-12-24 DIAGNOSIS — E89.40 SURGICAL MENOPAUSE: ICD-10-CM

## 2020-12-26 RX ORDER — ESTRADIOL 10 UG/1
1 INSERT VAGINAL
Qty: 12 TABLET | Refills: 11 | Status: SHIPPED | OUTPATIENT
Start: 2020-12-28 | End: 2021-06-05

## 2021-01-22 RX ORDER — AMOXICILLIN AND CLAVULANATE POTASSIUM 875; 125 MG/1; MG/1
1 TABLET, FILM COATED ORAL EVERY 12 HOURS
Qty: 20 TABLET | Refills: 0 | Status: SHIPPED | OUTPATIENT
Start: 2021-01-22 | End: 2021-06-05

## 2021-02-01 DIAGNOSIS — D50.8 OTHER IRON DEFICIENCY ANEMIA: ICD-10-CM

## 2021-02-01 DIAGNOSIS — R06.83 SNORING: ICD-10-CM

## 2021-02-01 DIAGNOSIS — K21.9 GASTROESOPHAGEAL REFLUX DISEASE WITHOUT ESOPHAGITIS: Primary | ICD-10-CM

## 2021-02-05 ENCOUNTER — LAB VISIT (OUTPATIENT)
Dept: LAB | Facility: HOSPITAL | Age: 46
End: 2021-02-05
Attending: INTERNAL MEDICINE
Payer: COMMERCIAL

## 2021-02-05 DIAGNOSIS — R06.83 SNORING: ICD-10-CM

## 2021-02-05 DIAGNOSIS — K21.9 GASTROESOPHAGEAL REFLUX DISEASE WITHOUT ESOPHAGITIS: ICD-10-CM

## 2021-02-05 DIAGNOSIS — D50.8 OTHER IRON DEFICIENCY ANEMIA: ICD-10-CM

## 2021-02-05 DIAGNOSIS — D50.0 IRON DEFICIENCY ANEMIA DUE TO CHRONIC BLOOD LOSS: ICD-10-CM

## 2021-02-05 LAB
ALBUMIN SERPL BCP-MCNC: 4.4 G/DL (ref 3.5–5.2)
ALP SERPL-CCNC: 67 U/L (ref 55–135)
ALT SERPL W/O P-5'-P-CCNC: 15 U/L (ref 10–44)
ANION GAP SERPL CALC-SCNC: 8 MMOL/L (ref 8–16)
AST SERPL-CCNC: 17 U/L (ref 10–40)
BASOPHILS # BLD AUTO: 0.03 K/UL (ref 0–0.2)
BASOPHILS NFR BLD: 0.5 % (ref 0–1.9)
BILIRUB SERPL-MCNC: 0.3 MG/DL (ref 0.1–1)
BUN SERPL-MCNC: 22 MG/DL (ref 6–20)
CALCIUM SERPL-MCNC: 9.8 MG/DL (ref 8.7–10.5)
CHLORIDE SERPL-SCNC: 108 MMOL/L (ref 95–110)
CHOLEST SERPL-MCNC: 247 MG/DL (ref 120–199)
CHOLEST/HDLC SERPL: 4.9 {RATIO} (ref 2–5)
CO2 SERPL-SCNC: 24 MMOL/L (ref 23–29)
CREAT SERPL-MCNC: 0.7 MG/DL (ref 0.5–1.4)
DIFFERENTIAL METHOD: ABNORMAL
EOSINOPHIL # BLD AUTO: 0.1 K/UL (ref 0–0.5)
EOSINOPHIL NFR BLD: 1.4 % (ref 0–8)
ERYTHROCYTE [DISTWIDTH] IN BLOOD BY AUTOMATED COUNT: 12.8 % (ref 11.5–14.5)
EST. GFR  (AFRICAN AMERICAN): >60 ML/MIN/1.73 M^2
EST. GFR  (NON AFRICAN AMERICAN): >60 ML/MIN/1.73 M^2
GLUCOSE SERPL-MCNC: 91 MG/DL (ref 70–110)
HCT VFR BLD AUTO: 37.3 % (ref 37–48.5)
HDLC SERPL-MCNC: 50 MG/DL (ref 40–75)
HDLC SERPL: 20.2 % (ref 20–50)
HGB BLD-MCNC: 11.6 G/DL (ref 12–16)
IMM GRANULOCYTES # BLD AUTO: 0.01 K/UL (ref 0–0.04)
IMM GRANULOCYTES NFR BLD AUTO: 0.2 % (ref 0–0.5)
LDLC SERPL CALC-MCNC: 142 MG/DL (ref 63–159)
LYMPHOCYTES # BLD AUTO: 2.4 K/UL (ref 1–4.8)
LYMPHOCYTES NFR BLD: 42.8 % (ref 18–48)
MCH RBC QN AUTO: 26.5 PG (ref 27–31)
MCHC RBC AUTO-ENTMCNC: 31.1 G/DL (ref 32–36)
MCV RBC AUTO: 85 FL (ref 82–98)
MONOCYTES # BLD AUTO: 0.4 K/UL (ref 0.3–1)
MONOCYTES NFR BLD: 6.4 % (ref 4–15)
NEUTROPHILS # BLD AUTO: 2.7 K/UL (ref 1.8–7.7)
NEUTROPHILS NFR BLD: 48.7 % (ref 38–73)
NONHDLC SERPL-MCNC: 197 MG/DL
NRBC BLD-RTO: 0 /100 WBC
PLATELET # BLD AUTO: 239 K/UL (ref 150–350)
PMV BLD AUTO: 9 FL (ref 9.2–12.9)
POTASSIUM SERPL-SCNC: 4.3 MMOL/L (ref 3.5–5.1)
PROT SERPL-MCNC: 7.5 G/DL (ref 6–8.4)
RBC # BLD AUTO: 4.38 M/UL (ref 4–5.4)
SODIUM SERPL-SCNC: 140 MMOL/L (ref 136–145)
TRIGL SERPL-MCNC: 275 MG/DL (ref 30–150)
WBC # BLD AUTO: 5.59 K/UL (ref 3.9–12.7)

## 2021-02-05 PROCEDURE — 83036 HEMOGLOBIN GLYCOSYLATED A1C: CPT

## 2021-02-05 PROCEDURE — 85025 COMPLETE CBC W/AUTO DIFF WBC: CPT

## 2021-02-05 PROCEDURE — 80061 LIPID PANEL: CPT

## 2021-02-05 PROCEDURE — 36415 COLL VENOUS BLD VENIPUNCTURE: CPT

## 2021-02-05 PROCEDURE — 83540 ASSAY OF IRON: CPT

## 2021-02-05 PROCEDURE — 82728 ASSAY OF FERRITIN: CPT

## 2021-02-05 PROCEDURE — 80053 COMPREHEN METABOLIC PANEL: CPT

## 2021-02-06 LAB
ESTIMATED AVG GLUCOSE: 108 MG/DL (ref 68–131)
FERRITIN SERPL-MCNC: 874 NG/ML (ref 20–300)
HBA1C MFR BLD: 5.4 % (ref 4–5.6)
IRON SERPL-MCNC: 88 UG/DL (ref 30–160)
SATURATED IRON: 33 % (ref 20–50)
TOTAL IRON BINDING CAPACITY: 269 UG/DL (ref 250–450)
TRANSFERRIN SERPL-MCNC: 182 MG/DL (ref 200–375)

## 2021-02-10 RX ORDER — FLUOXETINE HYDROCHLORIDE 20 MG/1
CAPSULE ORAL
Qty: 90 CAPSULE | Refills: 3 | OUTPATIENT
Start: 2021-02-10 | End: 2021-08-15 | Stop reason: SDUPTHER

## 2021-05-06 DIAGNOSIS — E89.41 SURGICAL MENOPAUSE, SYMPTOMATIC: ICD-10-CM

## 2021-05-07 RX ORDER — ESTRADIOL 0.07 MG/D
1 FILM, EXTENDED RELEASE TRANSDERMAL
Qty: 8 PATCH | Refills: 11 | Status: SHIPPED | OUTPATIENT
Start: 2021-05-10 | End: 2021-09-20 | Stop reason: SDUPTHER

## 2021-06-05 ENCOUNTER — HOSPITAL ENCOUNTER (EMERGENCY)
Facility: HOSPITAL | Age: 46
Discharge: HOME | End: 2021-06-05
Attending: EMERGENCY MEDICINE
Payer: COMMERCIAL

## 2021-06-05 VITALS
SYSTOLIC BLOOD PRESSURE: 115 MMHG | TEMPERATURE: 98 F | HEIGHT: 68 IN | OXYGEN SATURATION: 98 % | DIASTOLIC BLOOD PRESSURE: 65 MMHG | HEART RATE: 58 BPM | RESPIRATION RATE: 14 BRPM | WEIGHT: 147.5 LBS | BODY MASS INDEX: 22.35 KG/M2

## 2021-06-05 DIAGNOSIS — G43.809 OTHER MIGRAINE WITHOUT STATUS MIGRAINOSUS, NOT INTRACTABLE: Primary | ICD-10-CM

## 2021-06-05 PROCEDURE — 99284 EMERGENCY DEPT VISIT MOD MDM: CPT | Mod: 25

## 2021-06-05 PROCEDURE — 25000003 PHARM REV CODE 250: Performed by: EMERGENCY MEDICINE

## 2021-06-05 PROCEDURE — 96365 THER/PROPH/DIAG IV INF INIT: CPT

## 2021-06-05 PROCEDURE — 96375 TX/PRO/DX INJ NEW DRUG ADDON: CPT

## 2021-06-05 PROCEDURE — 63600175 PHARM REV CODE 636 W HCPCS: Performed by: EMERGENCY MEDICINE

## 2021-06-05 RX ORDER — KETOROLAC TROMETHAMINE 30 MG/ML
30 INJECTION, SOLUTION INTRAMUSCULAR; INTRAVENOUS ONCE
Status: COMPLETED | OUTPATIENT
Start: 2021-06-05 | End: 2021-06-05

## 2021-06-05 RX ORDER — FAMOTIDINE 10 MG/ML
20 INJECTION INTRAVENOUS
Status: COMPLETED | OUTPATIENT
Start: 2021-06-05 | End: 2021-06-05

## 2021-06-05 RX ORDER — BUTORPHANOL TARTRATE 1 MG/ML
1 INJECTION INTRAMUSCULAR; INTRAVENOUS ONCE
Status: COMPLETED | OUTPATIENT
Start: 2021-06-05 | End: 2021-06-05

## 2021-06-05 RX ORDER — METOCLOPRAMIDE HYDROCHLORIDE 5 MG/ML
10 INJECTION INTRAMUSCULAR; INTRAVENOUS
Status: COMPLETED | OUTPATIENT
Start: 2021-06-05 | End: 2021-06-05

## 2021-06-05 RX ADMIN — SODIUM CHLORIDE 1000 ML: 0.9 INJECTION, SOLUTION INTRAVENOUS at 04:06

## 2021-06-05 RX ADMIN — FAMOTIDINE 20 MG: 10 INJECTION INTRAVENOUS at 05:06

## 2021-06-05 RX ADMIN — KETOROLAC TROMETHAMINE 30 MG: 30 INJECTION, SOLUTION INTRAMUSCULAR; INTRAVENOUS at 04:06

## 2021-06-05 RX ADMIN — METOCLOPRAMIDE 10 MG: 5 INJECTION, SOLUTION INTRAMUSCULAR; INTRAVENOUS at 06:06

## 2021-06-05 RX ADMIN — PROMETHAZINE HYDROCHLORIDE 12.5 MG: 25 INJECTION INTRAMUSCULAR; INTRAVENOUS at 04:06

## 2021-06-05 RX ADMIN — BUTORPHANOL TARTRATE 1 MG: 1 INJECTION, SOLUTION INTRAMUSCULAR; INTRAVENOUS at 04:06

## 2021-08-15 DIAGNOSIS — K21.9 GASTROESOPHAGEAL REFLUX DISEASE WITHOUT ESOPHAGITIS: ICD-10-CM

## 2021-08-15 DIAGNOSIS — R00.2 PALPITATION: ICD-10-CM

## 2021-08-15 RX ORDER — ATENOLOL 25 MG/1
25 TABLET ORAL DAILY
Qty: 90 TABLET | Refills: 3 | Status: SHIPPED | OUTPATIENT
Start: 2021-08-15 | End: 2022-08-09 | Stop reason: SDUPTHER

## 2021-08-15 RX ORDER — PANTOPRAZOLE SODIUM 40 MG/1
40 TABLET, DELAYED RELEASE ORAL DAILY
Qty: 90 TABLET | Refills: 3 | Status: SHIPPED | OUTPATIENT
Start: 2021-08-15 | End: 2022-08-09 | Stop reason: SDUPTHER

## 2021-08-15 RX ORDER — FLUOXETINE HYDROCHLORIDE 20 MG/1
CAPSULE ORAL
Qty: 90 CAPSULE | Refills: 3 | Status: SHIPPED | OUTPATIENT
Start: 2021-08-15 | End: 2021-11-05

## 2021-09-20 ENCOUNTER — TELEPHONE (OUTPATIENT)
Dept: OBSTETRICS AND GYNECOLOGY | Facility: CLINIC | Age: 46
End: 2021-09-20

## 2021-09-20 DIAGNOSIS — E89.41 SURGICAL MENOPAUSE, SYMPTOMATIC: ICD-10-CM

## 2021-09-20 RX ORDER — ESTRADIOL 0.07 MG/D
1 FILM, EXTENDED RELEASE TRANSDERMAL
Qty: 8 PATCH | Refills: 11 | Status: SHIPPED | OUTPATIENT
Start: 2021-09-20 | End: 2021-11-05

## 2021-10-11 ENCOUNTER — HOSPITAL ENCOUNTER (EMERGENCY)
Facility: HOSPITAL | Age: 46
Discharge: HOME | End: 2021-10-11
Attending: EMERGENCY MEDICINE
Payer: COMMERCIAL

## 2021-10-11 VITALS
SYSTOLIC BLOOD PRESSURE: 120 MMHG | BODY MASS INDEX: 25.39 KG/M2 | WEIGHT: 167.56 LBS | HEIGHT: 68 IN | DIASTOLIC BLOOD PRESSURE: 74 MMHG | TEMPERATURE: 99 F | RESPIRATION RATE: 14 BRPM | HEART RATE: 63 BPM | OXYGEN SATURATION: 98 %

## 2021-10-11 DIAGNOSIS — R51.9 ACUTE NONINTRACTABLE HEADACHE, UNSPECIFIED HEADACHE TYPE: Primary | ICD-10-CM

## 2021-10-11 LAB
ALBUMIN SERPL BCP-MCNC: 4 G/DL (ref 3.5–5.2)
ALP SERPL-CCNC: 50 U/L (ref 55–135)
ALT SERPL W/O P-5'-P-CCNC: 16 U/L (ref 10–44)
ANION GAP SERPL CALC-SCNC: 9 MMOL/L (ref 8–16)
AST SERPL-CCNC: 15 U/L (ref 10–40)
BASOPHILS # BLD AUTO: 0.03 K/UL (ref 0–0.2)
BASOPHILS NFR BLD: 0.6 % (ref 0–1.9)
BILIRUB SERPL-MCNC: 0.3 MG/DL (ref 0.1–1)
BUN SERPL-MCNC: 25 MG/DL (ref 6–20)
CALCIUM SERPL-MCNC: 9.3 MG/DL (ref 8.7–10.5)
CHLORIDE SERPL-SCNC: 111 MMOL/L (ref 95–110)
CO2 SERPL-SCNC: 23 MMOL/L (ref 23–29)
CREAT SERPL-MCNC: 0.8 MG/DL (ref 0.5–1.4)
DIFFERENTIAL METHOD: ABNORMAL
EOSINOPHIL # BLD AUTO: 0.1 K/UL (ref 0–0.5)
EOSINOPHIL NFR BLD: 1.1 % (ref 0–8)
ERYTHROCYTE [DISTWIDTH] IN BLOOD BY AUTOMATED COUNT: 12.5 % (ref 11.5–14.5)
ERYTHROCYTE [SEDIMENTATION RATE] IN BLOOD BY WESTERGREN METHOD: 4 MM/HR (ref 0–20)
EST. GFR  (AFRICAN AMERICAN): >60 ML/MIN/1.73 M^2
EST. GFR  (NON AFRICAN AMERICAN): >60 ML/MIN/1.73 M^2
FERRITIN SERPL-MCNC: 572 NG/ML (ref 20–300)
GLUCOSE SERPL-MCNC: 98 MG/DL (ref 70–110)
HCT VFR BLD AUTO: 35.9 % (ref 37–48.5)
HGB BLD-MCNC: 10.9 G/DL (ref 12–16)
IMM GRANULOCYTES # BLD AUTO: 0.01 K/UL (ref 0–0.04)
IMM GRANULOCYTES NFR BLD AUTO: 0.2 % (ref 0–0.5)
LYMPHOCYTES # BLD AUTO: 2.1 K/UL (ref 1–4.8)
LYMPHOCYTES NFR BLD: 38.4 % (ref 18–48)
MCH RBC QN AUTO: 25.9 PG (ref 27–31)
MCHC RBC AUTO-ENTMCNC: 30.4 G/DL (ref 32–36)
MCV RBC AUTO: 85 FL (ref 82–98)
MONOCYTES # BLD AUTO: 0.4 K/UL (ref 0.3–1)
MONOCYTES NFR BLD: 6.6 % (ref 4–15)
NEUTROPHILS # BLD AUTO: 2.8 K/UL (ref 1.8–7.7)
NEUTROPHILS NFR BLD: 53.1 % (ref 38–73)
NRBC BLD-RTO: 0 /100 WBC
PLATELET # BLD AUTO: 213 K/UL (ref 150–450)
PMV BLD AUTO: 9.1 FL (ref 9.2–12.9)
POTASSIUM SERPL-SCNC: 4 MMOL/L (ref 3.5–5.1)
PROT SERPL-MCNC: 6.8 G/DL (ref 6–8.4)
RBC # BLD AUTO: 4.21 M/UL (ref 4–5.4)
SODIUM SERPL-SCNC: 143 MMOL/L (ref 136–145)
WBC # BLD AUTO: 5.34 K/UL (ref 3.9–12.7)

## 2021-10-11 PROCEDURE — 96375 TX/PRO/DX INJ NEW DRUG ADDON: CPT

## 2021-10-11 PROCEDURE — 96365 THER/PROPH/DIAG IV INF INIT: CPT

## 2021-10-11 PROCEDURE — 25000003 PHARM REV CODE 250: Performed by: EMERGENCY MEDICINE

## 2021-10-11 PROCEDURE — 82728 ASSAY OF FERRITIN: CPT | Performed by: EMERGENCY MEDICINE

## 2021-10-11 PROCEDURE — 85651 RBC SED RATE NONAUTOMATED: CPT | Performed by: EMERGENCY MEDICINE

## 2021-10-11 PROCEDURE — 96361 HYDRATE IV INFUSION ADD-ON: CPT

## 2021-10-11 PROCEDURE — 25500020 PHARM REV CODE 255: Performed by: EMERGENCY MEDICINE

## 2021-10-11 PROCEDURE — 63600175 PHARM REV CODE 636 W HCPCS: Performed by: EMERGENCY MEDICINE

## 2021-10-11 PROCEDURE — 85025 COMPLETE CBC W/AUTO DIFF WBC: CPT | Performed by: EMERGENCY MEDICINE

## 2021-10-11 PROCEDURE — 84466 ASSAY OF TRANSFERRIN: CPT | Performed by: EMERGENCY MEDICINE

## 2021-10-11 PROCEDURE — 36415 COLL VENOUS BLD VENIPUNCTURE: CPT | Performed by: EMERGENCY MEDICINE

## 2021-10-11 PROCEDURE — 80053 COMPREHEN METABOLIC PANEL: CPT | Performed by: EMERGENCY MEDICINE

## 2021-10-11 PROCEDURE — 99285 EMERGENCY DEPT VISIT HI MDM: CPT | Mod: 25

## 2021-10-11 PROCEDURE — 96376 TX/PRO/DX INJ SAME DRUG ADON: CPT

## 2021-10-11 RX ORDER — HYDROMORPHONE HYDROCHLORIDE 2 MG/ML
1 INJECTION, SOLUTION INTRAMUSCULAR; INTRAVENOUS; SUBCUTANEOUS
Status: COMPLETED | OUTPATIENT
Start: 2021-10-11 | End: 2021-10-11

## 2021-10-11 RX ORDER — KETOROLAC TROMETHAMINE 30 MG/ML
10 INJECTION, SOLUTION INTRAMUSCULAR; INTRAVENOUS
Status: COMPLETED | OUTPATIENT
Start: 2021-10-11 | End: 2021-10-11

## 2021-10-11 RX ORDER — PROCHLORPERAZINE EDISYLATE 5 MG/ML
5 INJECTION INTRAMUSCULAR; INTRAVENOUS ONCE
Status: COMPLETED | OUTPATIENT
Start: 2021-10-11 | End: 2021-10-11

## 2021-10-11 RX ORDER — SUMATRIPTAN 6 MG/.5ML
6 INJECTION, SOLUTION SUBCUTANEOUS
Status: COMPLETED | OUTPATIENT
Start: 2021-10-11 | End: 2021-10-11

## 2021-10-11 RX ORDER — MEPERIDINE HYDROCHLORIDE 25 MG/ML
25 INJECTION INTRAMUSCULAR; INTRAVENOUS; SUBCUTANEOUS
Status: COMPLETED | OUTPATIENT
Start: 2021-10-11 | End: 2021-10-11

## 2021-10-11 RX ORDER — LORAZEPAM 2 MG/ML
0.5 INJECTION INTRAMUSCULAR
Status: COMPLETED | OUTPATIENT
Start: 2021-10-11 | End: 2021-10-11

## 2021-10-11 RX ORDER — DIPHENHYDRAMINE HYDROCHLORIDE 50 MG/ML
12.5 INJECTION INTRAMUSCULAR; INTRAVENOUS
Status: COMPLETED | OUTPATIENT
Start: 2021-10-11 | End: 2021-10-11

## 2021-10-11 RX ADMIN — MEPERIDINE HYDROCHLORIDE 25 MG: 25 INJECTION INTRAMUSCULAR; INTRAVENOUS; SUBCUTANEOUS at 03:10

## 2021-10-11 RX ADMIN — KETOROLAC TROMETHAMINE 10 MG: 30 INJECTION, SOLUTION INTRAMUSCULAR at 08:10

## 2021-10-11 RX ADMIN — LORAZEPAM 0.5 MG: 2 INJECTION INTRAMUSCULAR; INTRAVENOUS at 04:10

## 2021-10-11 RX ADMIN — IOHEXOL 100 ML: 350 INJECTION, SOLUTION INTRAVENOUS at 08:10

## 2021-10-11 RX ADMIN — SUMATRIPTAN 6 MG: 6 INJECTION, SOLUTION SUBCUTANEOUS at 03:10

## 2021-10-11 RX ADMIN — PROMETHAZINE HYDROCHLORIDE 25 MG: 25 INJECTION INTRAMUSCULAR; INTRAVENOUS at 03:10

## 2021-10-11 RX ADMIN — SODIUM CHLORIDE 1000 ML: 0.9 INJECTION, SOLUTION INTRAVENOUS at 03:10

## 2021-10-11 RX ADMIN — HYDROMORPHONE HYDROCHLORIDE 1 MG: 2 INJECTION INTRAMUSCULAR; INTRAVENOUS; SUBCUTANEOUS at 07:10

## 2021-10-11 RX ADMIN — PROCHLORPERAZINE EDISYLATE 5 MG: 5 INJECTION INTRAMUSCULAR; INTRAVENOUS at 08:10

## 2021-10-11 RX ADMIN — DIPHENHYDRAMINE HYDROCHLORIDE 12.5 MG: 50 INJECTION, SOLUTION INTRAMUSCULAR; INTRAVENOUS at 07:10

## 2021-10-11 RX ADMIN — SODIUM CHLORIDE 1000 ML: 0.9 INJECTION, SOLUTION INTRAVENOUS at 07:10

## 2021-10-11 RX ADMIN — KETOROLAC TROMETHAMINE 10 MG: 30 INJECTION, SOLUTION INTRAMUSCULAR at 04:10

## 2021-10-12 LAB
IRON SERPL-MCNC: 75 UG/DL (ref 30–160)
SATURATED IRON: 26 % (ref 20–50)
TOTAL IRON BINDING CAPACITY: 289 UG/DL (ref 250–450)
TRANSFERRIN SERPL-MCNC: 195 MG/DL (ref 200–375)

## 2021-10-13 ENCOUNTER — HOSPITAL ENCOUNTER (EMERGENCY)
Facility: HOSPITAL | Age: 46
Discharge: HOME | End: 2021-10-14
Attending: EMERGENCY MEDICINE
Payer: COMMERCIAL

## 2021-10-13 VITALS
OXYGEN SATURATION: 97 % | SYSTOLIC BLOOD PRESSURE: 128 MMHG | DIASTOLIC BLOOD PRESSURE: 82 MMHG | BODY MASS INDEX: 24.43 KG/M2 | HEIGHT: 68 IN | WEIGHT: 161.19 LBS | TEMPERATURE: 99 F | HEART RATE: 51 BPM | RESPIRATION RATE: 14 BRPM

## 2021-10-13 DIAGNOSIS — R07.9 CHEST PAIN: ICD-10-CM

## 2021-10-13 DIAGNOSIS — G43.909 MIGRAINE WITHOUT STATUS MIGRAINOSUS, NOT INTRACTABLE, UNSPECIFIED MIGRAINE TYPE: Primary | ICD-10-CM

## 2021-10-13 LAB
ALBUMIN SERPL BCP-MCNC: 3.9 G/DL (ref 3.5–5.2)
ALP SERPL-CCNC: 53 U/L (ref 55–135)
ALT SERPL W/O P-5'-P-CCNC: 19 U/L (ref 10–44)
ANION GAP SERPL CALC-SCNC: 8 MMOL/L (ref 8–16)
AST SERPL-CCNC: 18 U/L (ref 10–40)
BASOPHILS # BLD AUTO: 0.04 K/UL (ref 0–0.2)
BASOPHILS NFR BLD: 0.8 % (ref 0–1.9)
BILIRUB SERPL-MCNC: 0.1 MG/DL (ref 0.1–1)
BUN SERPL-MCNC: 20 MG/DL (ref 6–20)
CALCIUM SERPL-MCNC: 9.4 MG/DL (ref 8.7–10.5)
CHLORIDE SERPL-SCNC: 111 MMOL/L (ref 95–110)
CO2 SERPL-SCNC: 22 MMOL/L (ref 23–29)
CREAT SERPL-MCNC: 0.7 MG/DL (ref 0.5–1.4)
DIFFERENTIAL METHOD: ABNORMAL
EOSINOPHIL # BLD AUTO: 0.1 K/UL (ref 0–0.5)
EOSINOPHIL NFR BLD: 1.6 % (ref 0–8)
ERYTHROCYTE [DISTWIDTH] IN BLOOD BY AUTOMATED COUNT: 12.3 % (ref 11.5–14.5)
EST. GFR  (AFRICAN AMERICAN): >60 ML/MIN/1.73 M^2
EST. GFR  (NON AFRICAN AMERICAN): >60 ML/MIN/1.73 M^2
GLUCOSE SERPL-MCNC: 88 MG/DL (ref 70–110)
HCT VFR BLD AUTO: 31.9 % (ref 37–48.5)
HGB BLD-MCNC: 10 G/DL (ref 12–16)
IMM GRANULOCYTES # BLD AUTO: 0 K/UL (ref 0–0.04)
IMM GRANULOCYTES NFR BLD AUTO: 0 % (ref 0–0.5)
LYMPHOCYTES # BLD AUTO: 2.2 K/UL (ref 1–4.8)
LYMPHOCYTES NFR BLD: 45.4 % (ref 18–48)
MCH RBC QN AUTO: 26.2 PG (ref 27–31)
MCHC RBC AUTO-ENTMCNC: 31.3 G/DL (ref 32–36)
MCV RBC AUTO: 84 FL (ref 82–98)
MONOCYTES # BLD AUTO: 0.4 K/UL (ref 0.3–1)
MONOCYTES NFR BLD: 7.1 % (ref 4–15)
NEUTROPHILS # BLD AUTO: 2.2 K/UL (ref 1.8–7.7)
NEUTROPHILS NFR BLD: 45.1 % (ref 38–73)
NRBC BLD-RTO: 0 /100 WBC
PLATELET # BLD AUTO: 202 K/UL (ref 150–450)
PMV BLD AUTO: 9.4 FL (ref 9.2–12.9)
POTASSIUM SERPL-SCNC: 3.9 MMOL/L (ref 3.5–5.1)
PROT SERPL-MCNC: 6.8 G/DL (ref 6–8.4)
RBC # BLD AUTO: 3.82 M/UL (ref 4–5.4)
SODIUM SERPL-SCNC: 141 MMOL/L (ref 136–145)
WBC # BLD AUTO: 4.93 K/UL (ref 3.9–12.7)

## 2021-10-13 PROCEDURE — 96365 THER/PROPH/DIAG IV INF INIT: CPT

## 2021-10-13 PROCEDURE — 93010 EKG 12-LEAD: ICD-10-PCS | Mod: ,,, | Performed by: INTERNAL MEDICINE

## 2021-10-13 PROCEDURE — 96375 TX/PRO/DX INJ NEW DRUG ADDON: CPT

## 2021-10-13 PROCEDURE — 83001 ASSAY OF GONADOTROPIN (FSH): CPT | Performed by: EMERGENCY MEDICINE

## 2021-10-13 PROCEDURE — 96361 HYDRATE IV INFUSION ADD-ON: CPT

## 2021-10-13 PROCEDURE — 85025 COMPLETE CBC W/AUTO DIFF WBC: CPT | Performed by: EMERGENCY MEDICINE

## 2021-10-13 PROCEDURE — 93005 ELECTROCARDIOGRAM TRACING: CPT

## 2021-10-13 PROCEDURE — C9113 INJ PANTOPRAZOLE SODIUM, VIA: HCPCS | Performed by: EMERGENCY MEDICINE

## 2021-10-13 PROCEDURE — 25000003 PHARM REV CODE 250: Performed by: EMERGENCY MEDICINE

## 2021-10-13 PROCEDURE — 93010 ELECTROCARDIOGRAM REPORT: CPT | Mod: ,,, | Performed by: INTERNAL MEDICINE

## 2021-10-13 PROCEDURE — 63600175 PHARM REV CODE 636 W HCPCS: Performed by: EMERGENCY MEDICINE

## 2021-10-13 PROCEDURE — 99284 EMERGENCY DEPT VISIT MOD MDM: CPT | Mod: 25

## 2021-10-13 PROCEDURE — 80053 COMPREHEN METABOLIC PANEL: CPT | Performed by: EMERGENCY MEDICINE

## 2021-10-13 PROCEDURE — 83002 ASSAY OF GONADOTROPIN (LH): CPT | Performed by: EMERGENCY MEDICINE

## 2021-10-13 RX ORDER — BUTORPHANOL TARTRATE 1 MG/ML
1 INJECTION INTRAMUSCULAR; INTRAVENOUS
Status: COMPLETED | OUTPATIENT
Start: 2021-10-13 | End: 2021-10-13

## 2021-10-13 RX ORDER — HALOPERIDOL 5 MG/ML
5 INJECTION INTRAMUSCULAR
Status: DISCONTINUED | OUTPATIENT
Start: 2021-10-13 | End: 2021-10-13

## 2021-10-13 RX ORDER — HALOPERIDOL 5 MG/ML
5 INJECTION INTRAMUSCULAR
Status: COMPLETED | OUTPATIENT
Start: 2021-10-13 | End: 2021-10-13

## 2021-10-13 RX ORDER — DIHYDROERGOTAMINE MESYLATE 1 MG/ML
1 INJECTION, SOLUTION INTRAMUSCULAR; INTRAVENOUS; SUBCUTANEOUS ONCE
Status: COMPLETED | OUTPATIENT
Start: 2021-10-13 | End: 2021-10-13

## 2021-10-13 RX ORDER — PANTOPRAZOLE SODIUM 40 MG/10ML
40 INJECTION, POWDER, LYOPHILIZED, FOR SOLUTION INTRAVENOUS
Status: COMPLETED | OUTPATIENT
Start: 2021-10-13 | End: 2021-10-13

## 2021-10-13 RX ADMIN — SODIUM CHLORIDE 1000 ML: 0.9 INJECTION, SOLUTION INTRAVENOUS at 08:10

## 2021-10-13 RX ADMIN — DIHYDROERGOTAMINE MESYLATE 1 MG: 1 INJECTION, SOLUTION INTRAMUSCULAR; INTRAVENOUS; SUBCUTANEOUS at 09:10

## 2021-10-13 RX ADMIN — PROMETHAZINE HYDROCHLORIDE 12.5 MG: 25 INJECTION INTRAMUSCULAR; INTRAVENOUS at 08:10

## 2021-10-13 RX ADMIN — PANTOPRAZOLE SODIUM 40 MG: 40 INJECTION, POWDER, FOR SOLUTION INTRAVENOUS at 09:10

## 2021-10-13 RX ADMIN — HALOPERIDOL LACTATE 5 MG: 5 INJECTION, SOLUTION INTRAMUSCULAR at 09:10

## 2021-10-13 RX ADMIN — BUTORPHANOL TARTRATE 1 MG: 1 INJECTION, SOLUTION INTRAMUSCULAR; INTRAVENOUS at 08:10

## 2021-10-14 LAB
FSH SERPL-ACNC: 53.68 MIU/ML
LH SERPL-ACNC: 22 MIU/ML

## 2021-10-28 NOTE — PROGRESS NOTES
Pt seen by NP today for acute onset of abdominal pain. Had ex lax yesterday, normal BM today. Normal urination & appetite. Labs, KUB, & exam negative. Spoke to -reports percocet not helping as much. Will change back to norco (new Rx sent) since has vistaril Rx for itching.    
28-Oct-2021 13:35

## 2021-11-04 ENCOUNTER — TELEPHONE (OUTPATIENT)
Dept: INTERNAL MEDICINE | Facility: CLINIC | Age: 46
End: 2021-11-04
Payer: COMMERCIAL

## 2021-11-05 ENCOUNTER — OFFICE VISIT (OUTPATIENT)
Dept: INTERNAL MEDICINE | Facility: CLINIC | Age: 46
End: 2021-11-05
Payer: COMMERCIAL

## 2021-11-05 VITALS
WEIGHT: 155.63 LBS | SYSTOLIC BLOOD PRESSURE: 108 MMHG | DIASTOLIC BLOOD PRESSURE: 62 MMHG | OXYGEN SATURATION: 99 % | BODY MASS INDEX: 23.59 KG/M2 | HEIGHT: 68 IN | HEART RATE: 64 BPM | TEMPERATURE: 96 F

## 2021-11-05 DIAGNOSIS — F33.41 RECURRENT MAJOR DEPRESSIVE DISORDER, IN PARTIAL REMISSION: Primary | ICD-10-CM

## 2021-11-05 PROCEDURE — 3008F BODY MASS INDEX DOCD: CPT | Mod: CPTII,S$GLB,, | Performed by: PEDIATRICS

## 2021-11-05 PROCEDURE — 99999 PR PBB SHADOW E&M-EST. PATIENT-LVL IV: ICD-10-PCS | Mod: PBBFAC,,, | Performed by: PEDIATRICS

## 2021-11-05 PROCEDURE — 99214 PR OFFICE/OUTPT VISIT, EST, LEVL IV, 30-39 MIN: ICD-10-PCS | Mod: S$GLB,,, | Performed by: PEDIATRICS

## 2021-11-05 PROCEDURE — 3044F HG A1C LEVEL LT 7.0%: CPT | Mod: CPTII,S$GLB,, | Performed by: PEDIATRICS

## 2021-11-05 PROCEDURE — 3008F PR BODY MASS INDEX (BMI) DOCUMENTED: ICD-10-PCS | Mod: CPTII,S$GLB,, | Performed by: PEDIATRICS

## 2021-11-05 PROCEDURE — 1160F RVW MEDS BY RX/DR IN RCRD: CPT | Mod: CPTII,S$GLB,, | Performed by: PEDIATRICS

## 2021-11-05 PROCEDURE — 1160F PR REVIEW ALL MEDS BY PRESCRIBER/CLIN PHARMACIST DOCUMENTED: ICD-10-PCS | Mod: CPTII,S$GLB,, | Performed by: PEDIATRICS

## 2021-11-05 PROCEDURE — 3074F PR MOST RECENT SYSTOLIC BLOOD PRESSURE < 130 MM HG: ICD-10-PCS | Mod: CPTII,S$GLB,, | Performed by: PEDIATRICS

## 2021-11-05 PROCEDURE — 3078F DIAST BP <80 MM HG: CPT | Mod: CPTII,S$GLB,, | Performed by: PEDIATRICS

## 2021-11-05 PROCEDURE — 3044F PR MOST RECENT HEMOGLOBIN A1C LEVEL <7.0%: ICD-10-PCS | Mod: CPTII,S$GLB,, | Performed by: PEDIATRICS

## 2021-11-05 PROCEDURE — 1159F PR MEDICATION LIST DOCUMENTED IN MEDICAL RECORD: ICD-10-PCS | Mod: CPTII,S$GLB,, | Performed by: PEDIATRICS

## 2021-11-05 PROCEDURE — 3078F PR MOST RECENT DIASTOLIC BLOOD PRESSURE < 80 MM HG: ICD-10-PCS | Mod: CPTII,S$GLB,, | Performed by: PEDIATRICS

## 2021-11-05 PROCEDURE — 3074F SYST BP LT 130 MM HG: CPT | Mod: CPTII,S$GLB,, | Performed by: PEDIATRICS

## 2021-11-05 PROCEDURE — 99214 OFFICE O/P EST MOD 30 MIN: CPT | Mod: S$GLB,,, | Performed by: PEDIATRICS

## 2021-11-05 PROCEDURE — 1159F MED LIST DOCD IN RCRD: CPT | Mod: CPTII,S$GLB,, | Performed by: PEDIATRICS

## 2021-11-05 PROCEDURE — 99999 PR PBB SHADOW E&M-EST. PATIENT-LVL IV: CPT | Mod: PBBFAC,,, | Performed by: PEDIATRICS

## 2021-11-05 RX ORDER — ESCITALOPRAM OXALATE 10 MG/1
10 TABLET ORAL DAILY
Qty: 90 TABLET | Refills: 3 | Status: SHIPPED | OUTPATIENT
Start: 2021-11-05 | End: 2022-03-03

## 2021-12-16 ENCOUNTER — OFFICE VISIT (OUTPATIENT)
Dept: OPHTHALMOLOGY | Facility: CLINIC | Age: 46
End: 2021-12-16
Payer: COMMERCIAL

## 2021-12-16 DIAGNOSIS — H52.13 MYOPIA WITH ASTIGMATISM AND PRESBYOPIA, BILATERAL: Primary | ICD-10-CM

## 2021-12-16 DIAGNOSIS — H52.4 MYOPIA WITH ASTIGMATISM AND PRESBYOPIA, BILATERAL: Primary | ICD-10-CM

## 2021-12-16 DIAGNOSIS — H52.203 MYOPIA WITH ASTIGMATISM AND PRESBYOPIA, BILATERAL: Primary | ICD-10-CM

## 2021-12-16 PROCEDURE — 99999 PR PBB SHADOW E&M-EST. PATIENT-LVL II: CPT | Mod: PBBFAC,,, | Performed by: OPTOMETRIST

## 2021-12-16 PROCEDURE — 92015 PR REFRACTION: ICD-10-PCS | Mod: S$GLB,,, | Performed by: OPTOMETRIST

## 2021-12-16 PROCEDURE — 92014 COMPRE OPH EXAM EST PT 1/>: CPT | Mod: S$GLB,,, | Performed by: OPTOMETRIST

## 2021-12-16 PROCEDURE — 99999 PR PBB SHADOW E&M-EST. PATIENT-LVL II: ICD-10-PCS | Mod: PBBFAC,,, | Performed by: OPTOMETRIST

## 2021-12-16 PROCEDURE — 92310 PR CONTACT LENS FITTING (NO CHANGE): ICD-10-PCS | Mod: S$GLB,,, | Performed by: OPTOMETRIST

## 2021-12-16 PROCEDURE — 92014 PR EYE EXAM, EST PATIENT,COMPREHESV: ICD-10-PCS | Mod: S$GLB,,, | Performed by: OPTOMETRIST

## 2021-12-16 PROCEDURE — 92015 DETERMINE REFRACTIVE STATE: CPT | Mod: S$GLB,,, | Performed by: OPTOMETRIST

## 2021-12-16 PROCEDURE — 92310 CONTACT LENS FITTING OU: CPT | Mod: S$GLB,,, | Performed by: OPTOMETRIST

## 2021-12-31 ENCOUNTER — PATIENT MESSAGE (OUTPATIENT)
Dept: OPHTHALMOLOGY | Facility: CLINIC | Age: 46
End: 2021-12-31
Payer: COMMERCIAL

## 2022-01-13 ENCOUNTER — OFFICE VISIT (OUTPATIENT)
Dept: URGENT CARE | Facility: CLINIC | Age: 47
End: 2022-01-13
Payer: COMMERCIAL

## 2022-01-13 ENCOUNTER — PATIENT MESSAGE (OUTPATIENT)
Dept: OPHTHALMOLOGY | Facility: CLINIC | Age: 47
End: 2022-01-13
Payer: COMMERCIAL

## 2022-01-13 VITALS
DIASTOLIC BLOOD PRESSURE: 62 MMHG | HEART RATE: 61 BPM | SYSTOLIC BLOOD PRESSURE: 99 MMHG | TEMPERATURE: 99 F | OXYGEN SATURATION: 98 % | RESPIRATION RATE: 18 BRPM

## 2022-01-13 DIAGNOSIS — J02.9 VIRAL PHARYNGITIS: ICD-10-CM

## 2022-01-13 DIAGNOSIS — J02.9 SORE THROAT: Primary | ICD-10-CM

## 2022-01-13 LAB
CTP QC/QA: YES
SARS-COV-2 RDRP RESP QL NAA+PROBE: NEGATIVE

## 2022-01-13 PROCEDURE — 3078F PR MOST RECENT DIASTOLIC BLOOD PRESSURE < 80 MM HG: ICD-10-PCS | Mod: CPTII,S$GLB,, | Performed by: NURSE PRACTITIONER

## 2022-01-13 PROCEDURE — 99213 PR OFFICE/OUTPT VISIT, EST, LEVL III, 20-29 MIN: ICD-10-PCS | Mod: S$GLB,,, | Performed by: NURSE PRACTITIONER

## 2022-01-13 PROCEDURE — 99213 OFFICE O/P EST LOW 20 MIN: CPT | Mod: S$GLB,,, | Performed by: NURSE PRACTITIONER

## 2022-01-13 PROCEDURE — 3078F DIAST BP <80 MM HG: CPT | Mod: CPTII,S$GLB,, | Performed by: NURSE PRACTITIONER

## 2022-01-13 PROCEDURE — U0002 COVID-19 LAB TEST NON-CDC: HCPCS | Mod: QW,S$GLB,, | Performed by: NURSE PRACTITIONER

## 2022-01-13 PROCEDURE — 1159F PR MEDICATION LIST DOCUMENTED IN MEDICAL RECORD: ICD-10-PCS | Mod: CPTII,S$GLB,, | Performed by: NURSE PRACTITIONER

## 2022-01-13 PROCEDURE — 3074F SYST BP LT 130 MM HG: CPT | Mod: CPTII,S$GLB,, | Performed by: NURSE PRACTITIONER

## 2022-01-13 PROCEDURE — U0002: ICD-10-PCS | Mod: QW,S$GLB,, | Performed by: NURSE PRACTITIONER

## 2022-01-13 PROCEDURE — 3074F PR MOST RECENT SYSTOLIC BLOOD PRESSURE < 130 MM HG: ICD-10-PCS | Mod: CPTII,S$GLB,, | Performed by: NURSE PRACTITIONER

## 2022-01-13 PROCEDURE — 1159F MED LIST DOCD IN RCRD: CPT | Mod: CPTII,S$GLB,, | Performed by: NURSE PRACTITIONER

## 2022-01-14 NOTE — PROGRESS NOTES
Subjective:       Patient ID: Sandra Dimas is a 46 y.o. female.    Vitals:  temperature is 98.5 °F (36.9 °C). Her blood pressure is 99/62 and her pulse is 61. Her respiration is 18 and oxygen saturation is 98%.     Chief Complaint: Sore Throat (PT PRESENTS TO U/C WITH A SORE THROAT SINCE Tuesday.)    PT PRESENTS TO [facility] WITH SORE THROAT SINCE Tuesday.    Sore Throat   This is a new problem. The current episode started in the past 7 days. The problem has been unchanged. There has been no fever. The pain is at a severity of 0/10. The patient is experiencing no pain. Associated symptoms include headaches. Pertinent negatives include no vomiting. She has tried nothing for the symptoms. The treatment provided no relief.       Constitution: Negative for chills and fever.   HENT: Positive for sore throat.    Neck: Negative for neck stiffness.   Cardiovascular: Negative for chest pain.   Eyes: Negative for eye pain.   Gastrointestinal: Negative for nausea and vomiting.   Genitourinary: Negative for dysuria.   Musculoskeletal: Negative for joint pain.   Skin: Negative for rash.   Allergic/Immunologic: Negative for immunocompromised state.   Neurological: Positive for headaches. Negative for disorientation.   Hematologic/Lymphatic: Negative for easy bruising/bleeding. Does not bruise/bleed easily.   Psychiatric/Behavioral: Negative for disorientation and confusion.       Objective:      Physical Exam   Constitutional: She is oriented to person, place, and time. She appears well-developed and well-nourished. She is cooperative.  Non-toxic appearance. She does not appear ill. No distress.   HENT:   Head: Normocephalic and atraumatic.   Ears:   Right Ear: Hearing, tympanic membrane, external ear and ear canal normal.   Left Ear: Hearing, tympanic membrane, external ear and ear canal normal.   Nose: Nose normal. No mucosal edema, rhinorrhea or nasal deformity. No epistaxis. Right sinus exhibits no maxillary sinus tenderness  and no frontal sinus tenderness. Left sinus exhibits no maxillary sinus tenderness and no frontal sinus tenderness.   Mouth/Throat: Uvula is midline, oropharynx is clear and moist and mucous membranes are normal. No trismus in the jaw. Normal dentition. No uvula swelling. No posterior oropharyngeal erythema.   Eyes: Conjunctivae and lids are normal. Right eye exhibits no discharge. Left eye exhibits no discharge. No scleral icterus.   Neck: Trachea normal and phonation normal. Neck supple.   Cardiovascular: Normal rate, regular rhythm, normal heart sounds, intact distal pulses and normal pulses.   Pulmonary/Chest: Effort normal and breath sounds normal. No respiratory distress.   Abdominal: Normal appearance and bowel sounds are normal. She exhibits no distension and no mass. Soft. There is no abdominal tenderness.   Musculoskeletal: Normal range of motion.         General: No deformity or edema. Normal range of motion.   Neurological: She is alert and oriented to person, place, and time. She exhibits normal muscle tone. Coordination normal.   Skin: Skin is warm, dry, intact, not diaphoretic and not pale.   Psychiatric: She has a normal mood and affect. Her speech is normal and behavior is normal. Judgment and thought content normal.   Nursing note and vitals reviewed.        Assessment:       1. Sore throat    2. Viral pharyngitis          Plan:         Sore throat  -     POCT COVID-19 Rapid Screening    Viral pharyngitis         If symptoms worsen or fail to improve, follow-up with primary care doctor or nearest ER. After visit summary given and discussed. Patient verbalized understanding and agrees with treatment plan. Patient remained stable and was discharged in no acute distress.

## 2022-01-14 NOTE — PATIENT INSTRUCTIONS
Patient Education       Viral Pharyngitis   About this topic   Viral pharyngitis is also known as a sore throat. It is an infection of the throat caused by a tiny germ called a virus. A sore throat can easily spread from person to person. Coughing, sneezing, and touching something with the germ on it spreads the sore throat.  Viral infections most often go away after 7 to 10 days. You may not need any treatment. Some can cause very serious health problems.     What are the causes?   A germ called a virus causes this condition.  What can make this more likely to happen?   You are more likely to get viral pharyngitis with a cold or the flu. These illnesses spread easily from one person to others. You are more likely to have a sore throat if you have a weak immune system.  What are the main signs?   · Sore throat. May also have trouble swallowing or breathing.  · Swollen tonsils or glands  · Throat appears red  · Muscle aches and joint pain  · Feeling tired  · Fever of 100.4°F (38°C) or higher  How does the doctor diagnose this health problem?   Your doctor will take your history and do an exam. Your doctor will look at your throat. The doctor may order a throat swab to test for a strep infection. They may also test you for the flu.  How does the doctor treat this health problem?   There is no specific treatment to cure a virus. Antibiotics will not work. The goal will be to treat your signs.  Your doctor may suggest:  · Gargle with warm salt water 3 to 4 times each day. Mix 1/2 teaspoon (2.5 grams) salt with a cup (240 mL) of warm water.  · Suck on hard candy or cough drops.  · Use a cool mist humidifier to help you breathe easier.  · Do not smoke. Stay away from others who are smoking.  What drugs may be needed?   The doctor may order drugs to:  · Help with pain   · Soothe the throat  · Lower fever  What changes to diet are needed?   · If your throat feels too sore to eat solid foods, you may drink water, juice,  milk, milkshakes, or soups.  · Do not drink sports drinks, soft drinks, undiluted fruit juice, or drinks with a lot of sugar. These may cause fluid loss and bother your throat.  · Stay away from caffeine; acidic juices like orange juice or lemonade; and beer, wine, and mixed drinks (alcohol). These can worsen your signs.  What problems could happen?   · Ear or sinus infection  · Asthma attack  · Abscess in the throat  · Lung problems like pneumonia or bronchitis  · Very bad fluid loss. This is dehydration.  What can be done to prevent this health problem?   · Wash your hands often with soap and water for at least 20 seconds, especially after coughing or sneezing. Alcohol-based hand sanitizers also work to kill germs.  · If you are sick, cover your mouth and nose with tissue when you cough or sneeze. You can also cough or sneeze into your elbow. Throw away tissues in the trash and wash your hands after touching used tissues.  · Do not get too close (kissing, hugging) to people who are sick.  · Avoid going to crowded places.  · Avoid sharing your towels or hankies with anyone who is sick. Clean often handled things like door handles, remotes, toys, and phones. Wipe them with a disinfectant.  · Do not share knives and forks or drinking glasses with someone who has a sore throat. Wash these objects with hot, soapy water.  · Get at least 6 to 8 hours of sleep each night.  · Get a flu shot each year.  Where can I learn more?   American Academy of Otolaryngology - Head and Neck Surgery  https://www.enthealth.org/conditions/sore-throats/   American Academy of Otolaryngology - Head and Neck Surgery  https://www.enthealth.org/conditions/tonsillitis/   [facility]  https://www.health.govt.nz/your-health/conditions-and-treatments/diseases-and-illnesses/sore-throat   [facility] Choices  http://www.nhs.uk/conditions/Sore-throat/Pages/Introduction.aspx   Last Reviewed Date   2020-05-12  Consumer Information Use and Disclaimer    This information is not specific medical advice and does not replace information you receive from your health care provider. This is only a brief summary of general information. It does NOT include all information about conditions, illnesses, injuries, tests, procedures, treatments, therapies, discharge instructions or life-style choices that may apply to you. You must talk with your health care provider for complete information about your health and treatment options. This information should not be used to decide whether or not to accept your health care providers advice, instructions or recommendations. Only your health care provider has the knowledge and training to provide advice that is right for you.  Copyright   Copyright © 2021 [employer] and its affiliates and/or licensors. All rights reserved.

## 2022-01-23 ENCOUNTER — OFFICE VISIT (OUTPATIENT)
Dept: URGENT CARE | Facility: CLINIC | Age: 47
End: 2022-01-23
Payer: COMMERCIAL

## 2022-01-23 VITALS
HEIGHT: 68 IN | RESPIRATION RATE: 18 BRPM | SYSTOLIC BLOOD PRESSURE: 96 MMHG | OXYGEN SATURATION: 98 % | WEIGHT: 156 LBS | DIASTOLIC BLOOD PRESSURE: 59 MMHG | TEMPERATURE: 98 F | HEART RATE: 66 BPM | BODY MASS INDEX: 23.64 KG/M2

## 2022-01-23 DIAGNOSIS — U07.1 COVID-19 VIRUS INFECTION: Primary | ICD-10-CM

## 2022-01-23 DIAGNOSIS — R09.81 CONGESTION OF NASAL SINUS: ICD-10-CM

## 2022-01-23 LAB
CTP QC/QA: YES
SARS-COV-2 RDRP RESP QL NAA+PROBE: POSITIVE

## 2022-01-23 PROCEDURE — 3008F BODY MASS INDEX DOCD: CPT | Mod: CPTII,S$GLB,, | Performed by: PHYSICIAN ASSISTANT

## 2022-01-23 PROCEDURE — 3078F DIAST BP <80 MM HG: CPT | Mod: CPTII,S$GLB,, | Performed by: PHYSICIAN ASSISTANT

## 2022-01-23 PROCEDURE — 3074F SYST BP LT 130 MM HG: CPT | Mod: CPTII,S$GLB,, | Performed by: PHYSICIAN ASSISTANT

## 2022-01-23 PROCEDURE — 3078F PR MOST RECENT DIASTOLIC BLOOD PRESSURE < 80 MM HG: ICD-10-PCS | Mod: CPTII,S$GLB,, | Performed by: PHYSICIAN ASSISTANT

## 2022-01-23 PROCEDURE — U0002: ICD-10-PCS | Mod: QW,S$GLB,, | Performed by: PHYSICIAN ASSISTANT

## 2022-01-23 PROCEDURE — 99213 OFFICE O/P EST LOW 20 MIN: CPT | Mod: S$GLB,,, | Performed by: PHYSICIAN ASSISTANT

## 2022-01-23 PROCEDURE — 3008F PR BODY MASS INDEX (BMI) DOCUMENTED: ICD-10-PCS | Mod: CPTII,S$GLB,, | Performed by: PHYSICIAN ASSISTANT

## 2022-01-23 PROCEDURE — U0002 COVID-19 LAB TEST NON-CDC: HCPCS | Mod: QW,S$GLB,, | Performed by: PHYSICIAN ASSISTANT

## 2022-01-23 PROCEDURE — 1159F PR MEDICATION LIST DOCUMENTED IN MEDICAL RECORD: ICD-10-PCS | Mod: CPTII,S$GLB,, | Performed by: PHYSICIAN ASSISTANT

## 2022-01-23 PROCEDURE — 3074F PR MOST RECENT SYSTOLIC BLOOD PRESSURE < 130 MM HG: ICD-10-PCS | Mod: CPTII,S$GLB,, | Performed by: PHYSICIAN ASSISTANT

## 2022-01-23 PROCEDURE — 99213 PR OFFICE/OUTPT VISIT, EST, LEVL III, 20-29 MIN: ICD-10-PCS | Mod: S$GLB,,, | Performed by: PHYSICIAN ASSISTANT

## 2022-01-23 PROCEDURE — 1159F MED LIST DOCD IN RCRD: CPT | Mod: CPTII,S$GLB,, | Performed by: PHYSICIAN ASSISTANT

## 2022-01-23 NOTE — PATIENT INSTRUCTIONS
You have tested positive for COVID-19 today.      Please note that patients who test positive for COVID-19 are required by the CDC to undergo isolation for 5 days after their symptoms first began.   - If you tested positive and do not have symptoms, you must isolate for 5 days starting on the day of the positive test.   - If you tested positive and have symptoms, you must isolate for 5 days starting on the day of the first symptoms,  not the day of the positive test.    This is the most important part, both the CDC and the [facility] emphasize that you do not test out of isolation.  In fact, we do not retest if you were positive in the last 90 days.    After 5 days, if your symptoms have improved and you have not had fever on day 5, you can return to the community on day 6- NO TESTING REQUIRED!     After your 5 days of isolation are completed, the CDC recommends strict mask use for the first 5 days that you come out of isolation.     During quarantine:   · Separate yourself from other people and animals in your home.  · Call ahead before visiting your doctor.  · Wear a facemask.  · Cover your coughs and sneezes.  · Wash your hands often with soap and water; hand  can be used, too.  · Avoid sharing personal household items.  · Wipe down surfaces used daily.  · Monitor your symptoms. Seek prompt medical attention if your illness is worsening (e.g., difficulty breathing).   · Before seeking care, call your healthcare provider.  · If you have a medical emergency and need to call 911, notify the dispatch personnel that you have, or are being evaluated for COVID-19. If possible, put on a facemask before emergency medical services arrive.         CDC Testing and Quarantine Guidelines for household members, intimate partners, and caregivers in a home setting of a known-positive COVID-19 person:    ·     A 'close exposure' is defined as anyone who has had an exposure (masked or unmasked) to a known COVID -19 positive  person within 6 feet of someone for a cumulative total of 15 minutes or more over a 24-hour period.    ·     Vaccinated: patients who have been boosted or completed the primary series of Pfizer or Moderna vaccine within the last 6 months or completed the primary series of J&J vaccine within the last 2 months and/or had a positive test within 90 days   - do NOT need to quarantine after contact with someone who had COVID-19 unless they have symptoms.   - should get tested 3-5 days after their exposure (if they have not had a positive test within the last 90 days), even if they don't have symptoms and wear a mask indoors in public for 10 days following exposure or until their test result is negative on day 5.  - If you develop symptoms test and quarantine.    ·     Unvaccinated, or are more than six months out from their second mRNA dose (or more than 2 months after the J&J vaccine) and not yet boosted,  and/or NOT had a positive test within 90 days and meet 'close exposure'  - you are required by CDC guidelines to quarantine for at least 5 days from time of exposure followed by 5 days of strict masking. It is recommended, but not required to test after 5 days, unless you develop symptoms, in which case you should test at that time.  - If you do decide to test at 5 days and are asymptomatic, the risk is that if you test without symptoms (on Day 5 for example) and you are positive, your 5 day isolation begins on that day, and you turned your 5 day quarantine into 10 days.  - If your exposure does not meet the above definition, you can return to your normal daily activities to include social distancing, wearing a mask and frequent handwashing.       Close contacts should also follow these recommendations:  · Make sure that you understand and can help the patient follow their provider's instructions for medication(s) and care. You should help the patient with basic needs in the home and provide support for getting  groceries, prescriptions, and other personal needs.  · Monitor the patient's symptoms. If the patient is getting sicker, call his or her healthcare provider and tell them that the patient has laboratory-confirmed COVID-19. If the patient has a medical emergency and you need to call 911, notify the dispatch personnel that the patient has, or is being evaluated for COVID-19.  · Household members should stay in another room or be  from the patient. Household members should use a separate bedroom and bathroom, if available.  · Prohibit visitors.  · Household members should care for any pets in the home.  · Make sure that shared spaces in the home have good air flow, such as by an air conditioner or an opened window, weather permitting.  · Perform hand hygiene frequently. Wash your hands often with soap and water for at least 20 seconds or use an alcohol-based hand  (that contains > 60% alcohol) covering all surfaces of your hands and rubbing them together until they feel dry. Soap and water should be used preferentially.  · Avoid touching your eyes, nose, and mouth.  · The patient should wear a facemask. If the patient is not able to wear a facemask (for example, because it causes trouble breathing), caregivers should wear a mask when they are in the same room as the patient.  · Wear a disposable facemask and gloves when you touch or have contact with the patient's blood, stool, or body fluids, such as saliva, sputum, nasal mucus, vomit, urine.  ? Throw out disposable facemasks and gloves after using them. Do not reuse.  ? When removing personal protective equipment, first remove and dispose of gloves. Then, immediately clean your hands with soap and water or alcohol-based hand . Next, remove and dispose of facemask, and immediately clean your hands again with soap and water or alcohol-based hand .  · You should not share dishes, drinking glasses, cups, eating utensils, towels,  bedding, or other items with the patient. After the patient uses these items, you should wash them thoroughly (see below Wash laundry thoroughly).  · Clean all high-touch surfaces, such as counters, tabletops, doorknobs, bathroom fixtures, toilets, phones, keyboards, tablets, and bedside tables, every day. Also, clean any surfaces that may have blood, stool, or body fluids on them.  · Use a household cleaning spray or wipe, according to the label instructions. Labels contain instructions for safe and effective use of the cleaning product including precautions you should take when applying the product, such as wearing gloves and making sure you have good ventilation during use of the product.  · Wash laundry thoroughly.  ? Immediately remove and wash clothes or bedding that have blood, stool, or body fluids on them.  ? Wear disposable gloves while handling soiled items and keep soiled items away from your body. Clean your hands (with soap and water or an alcohol-based hand ) immediately after removing your gloves.  ? Read and follow directions on labels of laundry or clothing items and detergent. In general, using a normal laundry detergent according to washing machine instructions and dry thoroughly using the warmest temperatures recommended on the clothing label.  · Place all used disposable gloves, facemasks, and other contaminated items in a lined container before disposing of them with other household waste. Clean your hands (with soap and water or an alcohol-based hand ) immediately after handling these items. Soap and water should be used preferentially if hands are visibly dirty.  · Discuss any additional questions with your state or local health department or healthcare provider. Check available hours when contacting your local health department.            You must understand that you've received an Urgent Care treatment only and that you may be released before all your medical  "problems are known or treated. You, the patient, will arrange for follow up care as instructed. Follow up with your PCP or specialty clinic as directed within 2-5 days if not improved or as needed.  You can call [phone] to schedule an appointment with the appropriate provider.   If your condition worsens we recommend that you receive another evaluation at the emergency room immediately or contact your primary medical clinics after hours call service to discuss your concerns.   Please return here or go to the Emergency Department for any concerns or worsening of condition.           If you are considered "high risk" referral to the Emergency Use Authorization (EUA) infusion clinic may have been sent on your behalf.  This was discussed with you during your visit. You can expect to be called within the next 1-2 business days to discuss possible appointment to get the infusion. If you do not receive a call, you may call to schedule the infusion. Reach out to Zahra at [phone].  The infusion clinic should be available by phone Mon-Fri 7:30 -4:30. Infusions are given Monday, Wednesday, and Friday as well as limited times on Saturday.           "

## 2022-01-23 NOTE — PROGRESS NOTES
"Subjective:       Patient ID: Sandra Dimas is a 46 y.o. female.    Vitals:  height is 5' 8" (1.727 m) and weight is 70.8 kg (156 lb). Her tympanic temperature is 97.6 °F (36.4 °C). Her blood pressure is 96/59 (abnormal) and her pulse is 66. Her respiration is 18 and oxygen saturation is 98%.     Chief Complaint: COVID-19 Concerns and Otalgia    Pt's children recently covid+. Symptoms started yesterday.     Otalgia   There is pain in both ears. This is a new problem. The current episode started today. The problem occurs constantly. The problem has been unchanged. There has been no fever. The pain is at a severity of 4/10. The pain is mild. Associated symptoms include coughing, diarrhea, headaches, rhinorrhea and a sore throat. Pertinent negatives include no abdominal pain, ear discharge, hearing loss, neck pain, rash or vomiting. Treatments tried: advil. The treatment provided mild relief.   Other  This is a new problem. The current episode started yesterday. The problem occurs daily. The problem has been gradually worsening. Associated symptoms include congestion, coughing, diaphoresis, fatigue, headaches and a sore throat. Pertinent negatives include no abdominal pain, anorexia, arthralgias, change in bowel habit, chest pain, chills, fever, joint swelling, myalgias, nausea, neck pain, numbness, rash, swollen glands, urinary symptoms, vertigo, visual change, vomiting or weakness. Nothing aggravates the symptoms. Treatments tried: advil. The treatment provided no relief.       Constitution: Positive for sweating and fatigue. Negative for chills and fever.   HENT: Positive for ear pain, congestion and sore throat. Negative for ear discharge and hearing loss.    Neck: Negative for neck pain.   Cardiovascular: Negative for chest pain.   Respiratory: Positive for cough.    Gastrointestinal: Positive for diarrhea. Negative for abdominal pain, nausea and vomiting.   Musculoskeletal: Negative for joint pain, joint " swelling and muscle ache.   Skin: Negative for rash.   Neurological: Positive for headaches. Negative for history of vertigo and numbness.       Objective:      Physical Exam   Constitutional: She appears well-developed.  Non-toxic appearance. She does not appear ill. No distress.   HENT:   Head: Normocephalic and atraumatic.   Ears:   Right Ear: Tympanic membrane and external ear normal.   Left Ear: Tympanic membrane and external ear normal.   Nose: Congestion present.   Mouth/Throat: Mucous membranes are moist. No oropharyngeal exudate. Oropharynx is clear.   Eyes: Conjunctivae and EOM are normal.   Neck: Neck supple.   Pulmonary/Chest: Effort normal and breath sounds normal.   Abdominal: Normal appearance.   Musculoskeletal: Normal range of motion.         General: Normal range of motion.   Neurological: no focal deficit. She is alert. She displays no weakness. Gait normal.   Skin: Skin is warm, dry, not diaphoretic, not pale and no rash.   Psychiatric: Her behavior is normal.         Results for orders placed or performed in visit on 01/23/22   POCT COVID-19 Rapid Screening   Result Value Ref Range    POC Rapid COVID Positive (A) Negative     Acceptable Yes        Assessment:       1. COVID-19 virus infection    2. Congestion of nasal sinus          Plan:       - Rapid COVID-19 positive    - Covid Risk Score: 0    Pt is considered low risk (score < 3) and therefore does not meet criteria for EUA infusion referral.    - Advised patient to stay home and self quarantine for 5 days from onset of symptoms, or 5 days from positive test if asymptomatic. Advised must be fever free for at least 24 hours to discontinue isolation.  -Tylenol as needed for fever control. OTC medications prn for cold symptoms.   -Rest and drink plenty of fluids. BP slightly low, but pt says she has not yet eaten or drank anything today. Denies any dizziness, light headedness, or palpitations.   - Strict ED precautions given for  any emergent symptoms.    COVID-19 virus infection    Congestion of nasal sinus  -     POCT COVID-19 Rapid Screening

## 2022-01-23 NOTE — LETTER
[id number] [facility] [address] ? [place], [address] ? Phone [phone] ? Fax             Return to Work/School    Patient: Sandra Dimas  YOB: 1975   Date: 01/23/2022      To Whom It May Concern:     Sandra Dimas was in contact with/seen in my office on 01/23/2022. COVID-19 is present in our communities across the [facility]. Not all patients are eligible or appropriate to be tested. In this situation, your employee meets the following criteria:     Sandra Dimas has met the criteria for COVID-19 testing and has a POSITIVE result. She can return to work once they are asymptomatic for 24 hours without the use of fever reducing medications AND at least five days from the start of symptoms (or from the first positive result if they have no symptoms).  Patient does NOT need to be re-tested to return to work.        If you have any questions or concerns, or if I can be of further assistance, please do not hesitate to contact me.     Sincerely,    Camila Mclaughlin PA-C

## 2022-01-26 ENCOUNTER — TELEPHONE (OUTPATIENT)
Dept: URGENT CARE | Facility: CLINIC | Age: 47
End: 2022-01-26
Payer: COMMERCIAL

## 2022-02-14 ENCOUNTER — PATIENT MESSAGE (OUTPATIENT)
Dept: PHYSICAL MEDICINE AND REHAB | Facility: CLINIC | Age: 47
End: 2022-02-14
Payer: COMMERCIAL

## 2022-03-03 ENCOUNTER — OFFICE VISIT (OUTPATIENT)
Dept: INTERNAL MEDICINE | Facility: CLINIC | Age: 47
End: 2022-03-03
Payer: COMMERCIAL

## 2022-03-03 ENCOUNTER — LAB VISIT (OUTPATIENT)
Dept: LAB | Facility: HOSPITAL | Age: 47
End: 2022-03-03
Attending: PEDIATRICS
Payer: COMMERCIAL

## 2022-03-03 VITALS
HEART RATE: 74 BPM | OXYGEN SATURATION: 98 % | BODY MASS INDEX: 24.19 KG/M2 | WEIGHT: 159.63 LBS | DIASTOLIC BLOOD PRESSURE: 62 MMHG | TEMPERATURE: 98 F | SYSTOLIC BLOOD PRESSURE: 94 MMHG | HEIGHT: 68 IN

## 2022-03-03 DIAGNOSIS — D50.9 IRON DEFICIENCY ANEMIA, UNSPECIFIED IRON DEFICIENCY ANEMIA TYPE: ICD-10-CM

## 2022-03-03 DIAGNOSIS — F33.0 MILD EPISODE OF RECURRENT MAJOR DEPRESSIVE DISORDER: ICD-10-CM

## 2022-03-03 DIAGNOSIS — F41.9 ANXIETY: Primary | ICD-10-CM

## 2022-03-03 DIAGNOSIS — F33.41 RECURRENT MAJOR DEPRESSIVE DISORDER, IN PARTIAL REMISSION: ICD-10-CM

## 2022-03-03 PROBLEM — F33.9 EPISODE OF RECURRENT MAJOR DEPRESSIVE DISORDER: Status: ACTIVE | Noted: 2022-03-03

## 2022-03-03 LAB
BASOPHILS # BLD AUTO: 0.04 K/UL (ref 0–0.2)
BASOPHILS NFR BLD: 0.7 % (ref 0–1.9)
DIFFERENTIAL METHOD: ABNORMAL
EOSINOPHIL # BLD AUTO: 0.1 K/UL (ref 0–0.5)
EOSINOPHIL NFR BLD: 1.3 % (ref 0–8)
ERYTHROCYTE [DISTWIDTH] IN BLOOD BY AUTOMATED COUNT: 13.6 % (ref 11.5–14.5)
FERRITIN SERPL-MCNC: 571 NG/ML (ref 20–300)
HCT VFR BLD AUTO: 38.1 % (ref 37–48.5)
HGB BLD-MCNC: 11.3 G/DL (ref 12–16)
IMM GRANULOCYTES # BLD AUTO: 0.01 K/UL (ref 0–0.04)
IMM GRANULOCYTES NFR BLD AUTO: 0.2 % (ref 0–0.5)
IRON SERPL-MCNC: 50 UG/DL (ref 30–160)
LYMPHOCYTES # BLD AUTO: 2.3 K/UL (ref 1–4.8)
LYMPHOCYTES NFR BLD: 42.4 % (ref 18–48)
MCH RBC QN AUTO: 26.3 PG (ref 27–31)
MCHC RBC AUTO-ENTMCNC: 29.7 G/DL (ref 32–36)
MCV RBC AUTO: 89 FL (ref 82–98)
MONOCYTES # BLD AUTO: 0.3 K/UL (ref 0.3–1)
MONOCYTES NFR BLD: 6.2 % (ref 4–15)
NEUTROPHILS # BLD AUTO: 2.7 K/UL (ref 1.8–7.7)
NEUTROPHILS NFR BLD: 49.2 % (ref 38–73)
NRBC BLD-RTO: 0 /100 WBC
PLATELET # BLD AUTO: 305 K/UL (ref 150–450)
PMV BLD AUTO: 10 FL (ref 9.2–12.9)
RBC # BLD AUTO: 4.29 M/UL (ref 4–5.4)
SATURATED IRON: 15 % (ref 20–50)
TOTAL IRON BINDING CAPACITY: 336 UG/DL (ref 250–450)
TRANSFERRIN SERPL-MCNC: 227 MG/DL (ref 200–375)
WBC # BLD AUTO: 5.5 K/UL (ref 3.9–12.7)

## 2022-03-03 PROCEDURE — 84466 ASSAY OF TRANSFERRIN: CPT | Performed by: PEDIATRICS

## 2022-03-03 PROCEDURE — 99214 OFFICE O/P EST MOD 30 MIN: CPT | Mod: S$GLB,,, | Performed by: PEDIATRICS

## 2022-03-03 PROCEDURE — 99214 PR OFFICE/OUTPT VISIT, EST, LEVL IV, 30-39 MIN: ICD-10-PCS | Mod: S$GLB,,, | Performed by: PEDIATRICS

## 2022-03-03 PROCEDURE — 3074F PR MOST RECENT SYSTOLIC BLOOD PRESSURE < 130 MM HG: ICD-10-PCS | Mod: CPTII,S$GLB,, | Performed by: PEDIATRICS

## 2022-03-03 PROCEDURE — 1159F PR MEDICATION LIST DOCUMENTED IN MEDICAL RECORD: ICD-10-PCS | Mod: CPTII,S$GLB,, | Performed by: PEDIATRICS

## 2022-03-03 PROCEDURE — 36415 COLL VENOUS BLD VENIPUNCTURE: CPT | Performed by: PEDIATRICS

## 2022-03-03 PROCEDURE — 85025 COMPLETE CBC W/AUTO DIFF WBC: CPT | Performed by: PEDIATRICS

## 2022-03-03 PROCEDURE — 1160F RVW MEDS BY RX/DR IN RCRD: CPT | Mod: CPTII,S$GLB,, | Performed by: PEDIATRICS

## 2022-03-03 PROCEDURE — 1160F PR REVIEW ALL MEDS BY PRESCRIBER/CLIN PHARMACIST DOCUMENTED: ICD-10-PCS | Mod: CPTII,S$GLB,, | Performed by: PEDIATRICS

## 2022-03-03 PROCEDURE — 3078F PR MOST RECENT DIASTOLIC BLOOD PRESSURE < 80 MM HG: ICD-10-PCS | Mod: CPTII,S$GLB,, | Performed by: PEDIATRICS

## 2022-03-03 PROCEDURE — 99999 PR PBB SHADOW E&M-EST. PATIENT-LVL IV: CPT | Mod: PBBFAC,,, | Performed by: PEDIATRICS

## 2022-03-03 PROCEDURE — 82728 ASSAY OF FERRITIN: CPT | Performed by: PEDIATRICS

## 2022-03-03 PROCEDURE — 3008F PR BODY MASS INDEX (BMI) DOCUMENTED: ICD-10-PCS | Mod: CPTII,S$GLB,, | Performed by: PEDIATRICS

## 2022-03-03 PROCEDURE — 3078F DIAST BP <80 MM HG: CPT | Mod: CPTII,S$GLB,, | Performed by: PEDIATRICS

## 2022-03-03 PROCEDURE — 1159F MED LIST DOCD IN RCRD: CPT | Mod: CPTII,S$GLB,, | Performed by: PEDIATRICS

## 2022-03-03 PROCEDURE — 3074F SYST BP LT 130 MM HG: CPT | Mod: CPTII,S$GLB,, | Performed by: PEDIATRICS

## 2022-03-03 PROCEDURE — 99999 PR PBB SHADOW E&M-EST. PATIENT-LVL IV: ICD-10-PCS | Mod: PBBFAC,,, | Performed by: PEDIATRICS

## 2022-03-03 PROCEDURE — 3008F BODY MASS INDEX DOCD: CPT | Mod: CPTII,S$GLB,, | Performed by: PEDIATRICS

## 2022-03-03 RX ORDER — PHENTERMINE HYDROCHLORIDE 37.5 MG/1
37.5 CAPSULE ORAL EVERY MORNING
Qty: 21 CAPSULE | Refills: 0 | Status: SHIPPED | OUTPATIENT
Start: 2022-03-03 | End: 2022-03-24

## 2022-03-03 RX ORDER — OXYCODONE AND ACETAMINOPHEN 10; 325 MG/1; MG/1
TABLET ORAL
COMMUNITY
Start: 2021-12-02 | End: 2022-12-16 | Stop reason: SDUPTHER

## 2022-03-03 RX ORDER — ESCITALOPRAM OXALATE 20 MG/1
20 TABLET ORAL DAILY
Qty: 90 TABLET | Refills: 3 | Status: SHIPPED | OUTPATIENT
Start: 2022-03-03 | End: 2022-12-01 | Stop reason: SDUPTHER

## 2022-03-03 NOTE — PROGRESS NOTES
Subjective:       Patient ID: Sandra Dimas is a 46 y.o. female.    Chief Complaint: Follow-up (Med adjustment)    Sandra Dimas is a 46 y.o. female who presents to clinic for increasing depression. Would like to increase Lexapro 10mg. Has been feeling very tired. No HI/SI. Asked to repeat MATT labs.    Review of Systems   Constitutional: Negative for activity change, appetite change, chills, diaphoresis, fatigue, fever and unexpected weight change.   HENT: Negative for nasal congestion, ear pain, mouth sores, nosebleeds, postnasal drip, rhinorrhea, sneezing and sore throat.    Eyes: Negative for photophobia, pain, discharge, redness and visual disturbance.   Respiratory: Negative for cough, chest tightness, shortness of breath, wheezing and stridor.    Cardiovascular: Negative for chest pain, palpitations and leg swelling.   Gastrointestinal: Negative for constipation, diarrhea, nausea and vomiting.   Genitourinary: Negative for decreased urine volume, difficulty urinating, dysuria, flank pain, frequency, hematuria and urgency.   Musculoskeletal: Negative for arthralgias, back pain, joint swelling, neck pain and neck stiffness.   Integumentary:  Negative for color change and rash.   Neurological: Positive for headaches (migraines persist). Negative for dizziness, syncope, speech difficulty, weakness and light-headedness.   Hematological: Negative for adenopathy. Does not bruise/bleed easily.   Psychiatric/Behavioral: Positive for dysphoric mood. Negative for confusion, decreased concentration, hallucinations, self-injury, sleep disturbance and suicidal ideas. The patient is nervous/anxious.    All other systems reviewed and are negative.        Objective:      Physical Exam  Vitals and nursing note reviewed.   Constitutional:       General: She is not in acute distress.     Appearance: She is well-developed.   Neck:      Thyroid: No thyromegaly.      Vascular: No JVD.   Cardiovascular:      Rate and Rhythm:  Normal rate and regular rhythm.      Heart sounds: Normal heart sounds. No murmur heard.  Pulmonary:      Effort: Pulmonary effort is normal. No respiratory distress.      Breath sounds: Normal breath sounds. No wheezing or rales.   Abdominal:      General: There is no distension.      Palpations: Abdomen is soft. There is no mass.      Tenderness: There is no abdominal tenderness. There is no guarding.   Musculoskeletal:      Right lower leg: No edema.      Left lower leg: No edema.   Lymphadenopathy:      Cervical: No cervical adenopathy.   Skin:     Capillary Refill: Capillary refill takes less than 2 seconds.      Findings: No rash.   Neurological:      General: No focal deficit present.      Mental Status: She is alert and oriented to person, place, and time.      Cranial Nerves: No cranial nerve deficit.      Coordination: Coordination normal.   Psychiatric:         Mood and Affect: Mood normal.         Behavior: Behavior normal.         Thought Content: Thought content normal.         Judgment: Judgment normal.         Assessment:       Problem List Items Addressed This Visit     Recurrent major depressive disorder, in partial remission    Relevant Medications    EScitalopram oxalate (LEXAPRO) 20 MG tablet    Anxiety - Primary    MATT (iron deficiency anemia)    Relevant Orders    Ferritin    Iron and TIBC    CBC Auto Differential    Episode of recurrent major depressive disorder          Plan:     Anxiety    Mild episode of recurrent major depressive disorder    Recurrent major depressive disorder, in partial remission  -     EScitalopram oxalate (LEXAPRO) 20 MG tablet; Take 1 tablet (20 mg total) by mouth once daily.  Dispense: 90 tablet; Refill: 3    Iron deficiency anemia, unspecified iron deficiency anemia type  -     Ferritin; Future; Expected date: 03/03/2022  -     Iron and TIBC; Future; Expected date: 03/03/2022  -     CBC Auto Differential; Future; Expected date: 03/03/2022    Other orders  -      phentermine 37.5 MG capsule; Take 1 capsule (37.5 mg total) by mouth every morning. for 21 days  Dispense: 21 capsule; Refill: 0    She is now ready to increase therapy for depression. If no response in f/u in 6 weeks, consider psychiatry consult. Await MATT studies. 3 week course of phenteramine as she has he daughter's graduation party and she would like it to aid D&E for 10 pound weight loss.  Scribe Attestation:   I, Kasi House, am scribing for, and in the presence of, Dr. Kurtis Gamez Jr. I performed the above scribed service and the documentation accurately describes the services I performed. I attest to the accuracy of the note.    I, Dr. Kurtis Gamez Jr, reviewed documentation as scribed above. I personally performed the services described in this documentation.  I agree that the record reflects my personal performance and is accurate and complete. Kurtis Gamez Jr., MD.  03/03/2022

## 2022-03-14 ENCOUNTER — PATIENT MESSAGE (OUTPATIENT)
Dept: HEMATOLOGY/ONCOLOGY | Facility: CLINIC | Age: 47
End: 2022-03-14
Payer: COMMERCIAL

## 2022-03-14 DIAGNOSIS — D50.0 IRON DEFICIENCY ANEMIA DUE TO CHRONIC BLOOD LOSS: Primary | ICD-10-CM

## 2022-03-14 RX ORDER — SODIUM CHLORIDE 0.9 % (FLUSH) 0.9 %
10 SYRINGE (ML) INJECTION
Status: CANCELLED | OUTPATIENT
Start: 2022-03-26

## 2022-03-14 RX ORDER — HEPARIN 100 UNIT/ML
500 SYRINGE INTRAVENOUS
Status: CANCELLED | OUTPATIENT
Start: 2022-03-26

## 2022-03-14 RX ORDER — SODIUM CHLORIDE 0.9 % (FLUSH) 0.9 %
10 SYRINGE (ML) INJECTION
Status: CANCELLED | OUTPATIENT
Start: 2022-03-14

## 2022-03-14 RX ORDER — HEPARIN 100 UNIT/ML
500 SYRINGE INTRAVENOUS
Status: CANCELLED | OUTPATIENT
Start: 2022-03-14

## 2022-03-15 ENCOUNTER — PATIENT MESSAGE (OUTPATIENT)
Dept: HEMATOLOGY/ONCOLOGY | Facility: CLINIC | Age: 47
End: 2022-03-15
Payer: COMMERCIAL

## 2022-03-25 ENCOUNTER — INFUSION (OUTPATIENT)
Dept: INFUSION THERAPY | Facility: HOSPITAL | Age: 47
End: 2022-03-25
Attending: INTERNAL MEDICINE
Payer: COMMERCIAL

## 2022-03-25 VITALS
SYSTOLIC BLOOD PRESSURE: 104 MMHG | HEART RATE: 78 BPM | OXYGEN SATURATION: 99 % | DIASTOLIC BLOOD PRESSURE: 69 MMHG | TEMPERATURE: 98 F | RESPIRATION RATE: 16 BRPM

## 2022-03-25 DIAGNOSIS — D50.0 IRON DEFICIENCY ANEMIA DUE TO CHRONIC BLOOD LOSS: Primary | ICD-10-CM

## 2022-03-25 PROCEDURE — 63600175 PHARM REV CODE 636 W HCPCS: Mod: JG | Performed by: INTERNAL MEDICINE

## 2022-03-25 PROCEDURE — 96365 THER/PROPH/DIAG IV INF INIT: CPT

## 2022-03-25 PROCEDURE — 25000003 PHARM REV CODE 250: Performed by: INTERNAL MEDICINE

## 2022-03-25 RX ORDER — HEPARIN 100 UNIT/ML
500 SYRINGE INTRAVENOUS
OUTPATIENT
Start: 2022-04-01

## 2022-03-25 RX ORDER — SODIUM CHLORIDE 0.9 % (FLUSH) 0.9 %
10 SYRINGE (ML) INJECTION
OUTPATIENT
Start: 2022-04-01

## 2022-03-25 RX ADMIN — FERRIC CARBOXYMALTOSE INJECTION 750 MG: 50 INJECTION, SOLUTION INTRAVENOUS at 09:03

## 2022-03-25 NOTE — NURSING
patient asked if I could go slow with the rate because the last time she had a bad reaction. This was verified and I started the rate at 100 and let Kristel Campbell and Jose Conley know. Patient instructed to call me if she feels anything uncomfortable. Will continue to observe before bumping up.

## 2022-03-25 NOTE — PLAN OF CARE
Problem: Adult Inpatient Plan of Care  Goal: Plan of Care Review  Outcome: Ongoing, Progressing  Flowsheets (Taken 3/25/2022 0848)  Plan of Care Reviewed With: patient  Goal: Patient-Specific Goal (Individualized)  Outcome: Ongoing, Progressing  Flowsheets (Taken 3/25/2022 0848)  Anxieties, Fears or Concerns: none  Individualized Care Needs: feet up  Patient-Specific Goals (Include Timeframe): tolerate treatment  Goal: Optimal Comfort and Wellbeing  Outcome: Ongoing, Progressing     Problem: Fatigue  Goal: Improved Activity Tolerance  Outcome: Ongoing, Progressing  Intervention: Promote Improved Energy  Flowsheets (Taken 3/25/2022 0848)  Fatigue Management:   frequent rest breaks encouraged   paced activity encouraged  Sleep/Rest Enhancement: regular sleep/rest pattern promoted

## 2022-03-25 NOTE — DISCHARGE INSTRUCTIONS
[facility]  [address] [facility] [facility]  [address]  [phone] phone     [phone] fax  Hours of Operation: Monday- Friday 8:00am- 5:00pm  After hours phone  [phone]  Hematology / Oncology Physicians on call      LEONARDA Perkins Dr., Dr., Dr., NP    Please call with any concerns regarding your appointment today.

## 2022-04-13 ENCOUNTER — PATIENT MESSAGE (OUTPATIENT)
Dept: HEMATOLOGY/ONCOLOGY | Facility: CLINIC | Age: 47
End: 2022-04-13
Payer: COMMERCIAL

## 2022-04-22 ENCOUNTER — HOSPITAL ENCOUNTER (EMERGENCY)
Facility: HOSPITAL | Age: 47
Discharge: HOME | End: 2022-04-22
Attending: EMERGENCY MEDICINE
Payer: COMMERCIAL

## 2022-04-22 VITALS
SYSTOLIC BLOOD PRESSURE: 126 MMHG | TEMPERATURE: 99 F | DIASTOLIC BLOOD PRESSURE: 76 MMHG | WEIGHT: 171.31 LBS | OXYGEN SATURATION: 97 % | RESPIRATION RATE: 15 BRPM | HEART RATE: 54 BPM | BODY MASS INDEX: 26.05 KG/M2

## 2022-04-22 DIAGNOSIS — E87.6 HYPOKALEMIA: ICD-10-CM

## 2022-04-22 DIAGNOSIS — G43.809 OTHER MIGRAINE WITHOUT STATUS MIGRAINOSUS, NOT INTRACTABLE: Primary | ICD-10-CM

## 2022-04-22 DIAGNOSIS — G43.909 MIGRAINE: ICD-10-CM

## 2022-04-22 DIAGNOSIS — R11.0 NAUSEA: ICD-10-CM

## 2022-04-22 LAB
ALBUMIN SERPL BCP-MCNC: 3.5 G/DL (ref 3.5–5.2)
ALP SERPL-CCNC: 49 U/L (ref 55–135)
ALT SERPL W/O P-5'-P-CCNC: 18 U/L (ref 10–44)
ANION GAP SERPL CALC-SCNC: 9 MMOL/L (ref 8–16)
AST SERPL-CCNC: 13 U/L (ref 10–40)
BASOPHILS # BLD AUTO: 0.03 K/UL (ref 0–0.2)
BASOPHILS NFR BLD: 0.5 % (ref 0–1.9)
BILIRUB SERPL-MCNC: 0.2 MG/DL (ref 0.1–1)
BUN SERPL-MCNC: 24 MG/DL (ref 6–20)
CALCIUM SERPL-MCNC: 7.9 MG/DL (ref 8.7–10.5)
CHLORIDE SERPL-SCNC: 114 MMOL/L (ref 95–110)
CO2 SERPL-SCNC: 19 MMOL/L (ref 23–29)
CREAT SERPL-MCNC: 0.6 MG/DL (ref 0.5–1.4)
DIFFERENTIAL METHOD: ABNORMAL
EOSINOPHIL # BLD AUTO: 0.1 K/UL (ref 0–0.5)
EOSINOPHIL NFR BLD: 1.4 % (ref 0–8)
ERYTHROCYTE [DISTWIDTH] IN BLOOD BY AUTOMATED COUNT: 14.3 % (ref 11.5–14.5)
EST. GFR  (AFRICAN AMERICAN): >60 ML/MIN/1.73 M^2
EST. GFR  (NON AFRICAN AMERICAN): >60 ML/MIN/1.73 M^2
GLUCOSE SERPL-MCNC: 121 MG/DL (ref 70–110)
HCT VFR BLD AUTO: 34.7 % (ref 37–48.5)
HGB BLD-MCNC: 10.6 G/DL (ref 12–16)
IMM GRANULOCYTES # BLD AUTO: 0.02 K/UL (ref 0–0.04)
IMM GRANULOCYTES NFR BLD AUTO: 0.3 % (ref 0–0.5)
LYMPHOCYTES # BLD AUTO: 1.8 K/UL (ref 1–4.8)
LYMPHOCYTES NFR BLD: 30.5 % (ref 18–48)
MCH RBC QN AUTO: 26.3 PG (ref 27–31)
MCHC RBC AUTO-ENTMCNC: 30.5 G/DL (ref 32–36)
MCV RBC AUTO: 86 FL (ref 82–98)
MONOCYTES # BLD AUTO: 0.3 K/UL (ref 0.3–1)
MONOCYTES NFR BLD: 4.7 % (ref 4–15)
NEUTROPHILS # BLD AUTO: 3.7 K/UL (ref 1.8–7.7)
NEUTROPHILS NFR BLD: 62.6 % (ref 38–73)
NRBC BLD-RTO: 0 /100 WBC
PLATELET # BLD AUTO: 203 K/UL (ref 150–450)
PMV BLD AUTO: 9.3 FL (ref 9.2–12.9)
POTASSIUM SERPL-SCNC: 3.2 MMOL/L (ref 3.5–5.1)
PROT SERPL-MCNC: 5.9 G/DL (ref 6–8.4)
RBC # BLD AUTO: 4.03 M/UL (ref 4–5.4)
SODIUM SERPL-SCNC: 142 MMOL/L (ref 136–145)
WBC # BLD AUTO: 5.91 K/UL (ref 3.9–12.7)

## 2022-04-22 PROCEDURE — 96367 TX/PROPH/DG ADDL SEQ IV INF: CPT

## 2022-04-22 PROCEDURE — 85025 COMPLETE CBC W/AUTO DIFF WBC: CPT | Performed by: EMERGENCY MEDICINE

## 2022-04-22 PROCEDURE — 25000003 PHARM REV CODE 250: Performed by: EMERGENCY MEDICINE

## 2022-04-22 PROCEDURE — 63600175 PHARM REV CODE 636 W HCPCS: Performed by: EMERGENCY MEDICINE

## 2022-04-22 PROCEDURE — 96375 TX/PRO/DX INJ NEW DRUG ADDON: CPT

## 2022-04-22 PROCEDURE — 93010 EKG 12-LEAD: ICD-10-PCS | Mod: ,,, | Performed by: INTERNAL MEDICINE

## 2022-04-22 PROCEDURE — 80053 COMPREHEN METABOLIC PANEL: CPT | Performed by: EMERGENCY MEDICINE

## 2022-04-22 PROCEDURE — 99284 EMERGENCY DEPT VISIT MOD MDM: CPT | Mod: 25

## 2022-04-22 PROCEDURE — 93010 ELECTROCARDIOGRAM REPORT: CPT | Mod: ,,, | Performed by: INTERNAL MEDICINE

## 2022-04-22 PROCEDURE — 96366 THER/PROPH/DIAG IV INF ADDON: CPT

## 2022-04-22 PROCEDURE — 96365 THER/PROPH/DIAG IV INF INIT: CPT

## 2022-04-22 PROCEDURE — C9113 INJ PANTOPRAZOLE SODIUM, VIA: HCPCS | Performed by: EMERGENCY MEDICINE

## 2022-04-22 PROCEDURE — 96376 TX/PRO/DX INJ SAME DRUG ADON: CPT

## 2022-04-22 PROCEDURE — 93005 ELECTROCARDIOGRAM TRACING: CPT

## 2022-04-22 RX ORDER — KETOROLAC TROMETHAMINE 30 MG/ML
15 INJECTION, SOLUTION INTRAMUSCULAR; INTRAVENOUS
Status: COMPLETED | OUTPATIENT
Start: 2022-04-22 | End: 2022-04-22

## 2022-04-22 RX ORDER — DIHYDROERGOTAMINE MESYLATE 1 MG/ML
1 INJECTION, SOLUTION INTRAMUSCULAR; INTRAVENOUS; SUBCUTANEOUS ONCE
Status: COMPLETED | OUTPATIENT
Start: 2022-04-22 | End: 2022-04-22

## 2022-04-22 RX ORDER — MAGNESIUM SULFATE HEPTAHYDRATE 40 MG/ML
2 INJECTION, SOLUTION INTRAVENOUS
Status: COMPLETED | OUTPATIENT
Start: 2022-04-22 | End: 2022-04-22

## 2022-04-22 RX ORDER — POTASSIUM CHLORIDE 7.45 MG/ML
10 INJECTION INTRAVENOUS
Status: COMPLETED | OUTPATIENT
Start: 2022-04-22 | End: 2022-04-22

## 2022-04-22 RX ORDER — BUTORPHANOL TARTRATE 1 MG/ML
1 INJECTION INTRAMUSCULAR; INTRAVENOUS
Status: COMPLETED | OUTPATIENT
Start: 2022-04-22 | End: 2022-04-22

## 2022-04-22 RX ORDER — PROMETHAZINE HYDROCHLORIDE 25 MG/1
12.5 TABLET ORAL EVERY 6 HOURS PRN
Qty: 30 TABLET | Refills: 0 | Status: SHIPPED | OUTPATIENT
Start: 2022-04-22 | End: 2023-09-11 | Stop reason: ALTCHOICE

## 2022-04-22 RX ORDER — ALPRAZOLAM 0.5 MG/1
1 TABLET ORAL
Status: COMPLETED | OUTPATIENT
Start: 2022-04-22 | End: 2022-04-22

## 2022-04-22 RX ORDER — DIPHENHYDRAMINE HYDROCHLORIDE 50 MG/ML
12.5 INJECTION INTRAMUSCULAR; INTRAVENOUS
Status: COMPLETED | OUTPATIENT
Start: 2022-04-22 | End: 2022-04-22

## 2022-04-22 RX ORDER — LIDOCAINE HYDROCHLORIDE ANHYDROUS AND DEXTROSE MONOHYDRATE .8; 5 G/100ML; G/100ML
2 INJECTION, SOLUTION INTRAVENOUS CONTINUOUS
Status: DISCONTINUED | OUTPATIENT
Start: 2022-04-22 | End: 2022-04-22 | Stop reason: HOSPADM

## 2022-04-22 RX ORDER — METOCLOPRAMIDE 10 MG/1
10 TABLET ORAL EVERY 6 HOURS PRN
Qty: 30 TABLET | Refills: 0 | Status: SHIPPED | OUTPATIENT
Start: 2022-04-22 | End: 2022-11-28

## 2022-04-22 RX ORDER — METOCLOPRAMIDE HYDROCHLORIDE 5 MG/ML
10 INJECTION INTRAMUSCULAR; INTRAVENOUS
Status: COMPLETED | OUTPATIENT
Start: 2022-04-22 | End: 2022-04-22

## 2022-04-22 RX ORDER — MAG HYDROX/ALUMINUM HYD/SIMETH 200-200-20
30 SUSPENSION, ORAL (FINAL DOSE FORM) ORAL ONCE
Status: COMPLETED | OUTPATIENT
Start: 2022-04-22 | End: 2022-04-22

## 2022-04-22 RX ORDER — LIDOCAINE HYDROCHLORIDE 20 MG/ML
10 SOLUTION OROPHARYNGEAL ONCE
Status: COMPLETED | OUTPATIENT
Start: 2022-04-22 | End: 2022-04-22

## 2022-04-22 RX ORDER — PANTOPRAZOLE SODIUM 40 MG/10ML
40 INJECTION, POWDER, LYOPHILIZED, FOR SOLUTION INTRAVENOUS
Status: COMPLETED | OUTPATIENT
Start: 2022-04-22 | End: 2022-04-22

## 2022-04-22 RX ADMIN — DIHYDROERGOTAMINE MESYLATE 1 MG: 1 INJECTION, SOLUTION INTRAMUSCULAR; INTRAVENOUS; SUBCUTANEOUS at 12:04

## 2022-04-22 RX ADMIN — PANTOPRAZOLE SODIUM 40 MG: 40 INJECTION, POWDER, FOR SOLUTION INTRAVENOUS at 08:04

## 2022-04-22 RX ADMIN — POTASSIUM CHLORIDE 10 MEQ: 7.46 INJECTION, SOLUTION INTRAVENOUS at 02:04

## 2022-04-22 RX ADMIN — KETOROLAC TROMETHAMINE 15 MG: 30 INJECTION, SOLUTION INTRAMUSCULAR at 06:04

## 2022-04-22 RX ADMIN — LIDOCAINE HYDROCHLORIDE 2 MG/MIN: 8 INJECTION, SOLUTION INTRAVENOUS at 12:04

## 2022-04-22 RX ADMIN — SODIUM CHLORIDE 1000 ML: 0.9 INJECTION, SOLUTION INTRAVENOUS at 11:04

## 2022-04-22 RX ADMIN — SODIUM CHLORIDE 1000 ML: 0.9 INJECTION, SOLUTION INTRAVENOUS at 08:04

## 2022-04-22 RX ADMIN — METOCLOPRAMIDE 10 MG: 5 INJECTION, SOLUTION INTRAMUSCULAR; INTRAVENOUS at 06:04

## 2022-04-22 RX ADMIN — LIDOCAINE HYDROCHLORIDE 10 ML: 20 SOLUTION ORAL; TOPICAL at 06:04

## 2022-04-22 RX ADMIN — ALPRAZOLAM 0.5 MG: 0.5 TABLET ORAL at 08:04

## 2022-04-22 RX ADMIN — MAGNESIUM HYDROXIDE/ALUMINUM HYDROXICE/SIMETHICONE 30 ML: 120; 1200; 1200 SUSPENSION ORAL at 06:04

## 2022-04-22 RX ADMIN — DIPHENHYDRAMINE HYDROCHLORIDE 12.5 MG: 50 INJECTION, SOLUTION INTRAMUSCULAR; INTRAVENOUS at 06:04

## 2022-04-22 RX ADMIN — MAGNESIUM SULFATE HEPTAHYDRATE 2 G: 2 INJECTION, SOLUTION INTRAVENOUS at 11:04

## 2022-04-22 RX ADMIN — KETOROLAC TROMETHAMINE 15 MG: 30 INJECTION, SOLUTION INTRAMUSCULAR at 01:04

## 2022-04-22 RX ADMIN — PROMETHAZINE HYDROCHLORIDE 12.5 MG: 25 INJECTION INTRAMUSCULAR; INTRAVENOUS at 08:04

## 2022-04-22 RX ADMIN — BUTORPHANOL TARTRATE 1 MG: 1 INJECTION, SOLUTION INTRAMUSCULAR; INTRAVENOUS at 08:04

## 2022-04-22 NOTE — ED PROVIDER NOTES
SCRIBE #1 NOTE: I, Lisbet May, am scribing for, and in the presence of, Nicholas Montemayor MD. I have scribed the entire note.      History      Chief Complaint   Patient presents with    Migraine     Beginning this am;PMHx of chronic migraines       Review of patient's allergies indicates:   Allergen Reactions    Hydrocodone Other (See Comments)     Chest pains. Note: patient tolerated hydromorphone in 2/2019.     Chlorhexidine Rash     Per  after surgery patient had large rash across abdomen where chlorhexidine was applied.    Mastisol adhesive [gum polyfp-rgniny-mthc-alcohol] Rash        HPI   HPI    4/22/2022, 8:15 AM   History obtained from the patient      History of Present Illness: Sandra Dimas is a 46 y.o. female patient with a PMHx of chronic paroxysmal hemicrania, hemicrania continua, and migraines who presents to the Emergency Department for a headache which onset gradually this morning. The pt reports a history of chronic migraines, and that her sxs today are similar to her typical migraines. Symptoms are constant and moderate in severity. No mitigating or exacerbating factors reported. No associated sxs include reported. Patient denies any fever, chills, V/D, CP, SOB, numbness, weakness, and all other sxs at this time. No prior Tx reported. No further complaints or concerns at this time.       Arrival mode: Personal vehicle     PCP: OSIRIS Gamez Jr, MD       Past Medical History:  Past Medical History:   Diagnosis Date    Chronic paroxysmal hemicrania 01/03/2019    Endometriosis     General anesthetics causing adverse effect in therapeutic use     wakes up with severe anxiety attacks    Hemicrania continua     Hepatitis C antibody positive in blood 12/09/2020    RNA NEGATIVE 12/14/2020    Migraines        Past Surgical History:  Past Surgical History:   Procedure Laterality Date    APPENDECTOMY      COLONOSCOPY N/A 11/12/2020    Procedure: COLONOSCOPY;  Surgeon: Mylene GREGORIO  MD Petron[place];  Location: [facility];  Service: Endoscopy;  Laterality: N/A;    CYST REMOVAL Right 12/30/2019    Procedure: EXCISION, CYST;  Surgeon: NATALI Shah MD;  Location: [facility];  Service: OB/GYN;  Laterality: Right;  Sebaceous cyst    diagnostic laprascopy      ESOPHAGOGASTRODUODENOSCOPY N/A 11/30/2018    Procedure: EGD (ESOPHAGOGASTRODUODENOSCOPY);  Surgeon: Bossman Bustos MD;  Location: [facility];  Service: Endoscopy;  Laterality: N/A;    ESOPHAGOGASTRODUODENOSCOPY N/A 11/12/2020    Procedure: ESOPHAGOGASTRODUODENOSCOPY (EGD) needs rapid covid test;  Surgeon: Mylene Russ MD;  Location: [facility];  Service: Endoscopy;  Laterality: N/A;    FULGURATION OF ENDOMETRIOSIS  12/26/2018    Procedure: FULGURATION, ENDOMETRIOSIS;  Surgeon: Zehra Jensen MD;  Location: [facility];  Service: OB/GYN;;  endometriosis implant resection    HYSTEROSCOPY WITH HYDROTHERMAL ABLATION OF ENDOMETRIUM WITH DILATION AND CURETTAGE N/A 12/26/2018    Procedure: HYSTEROSCOPY, WITH DILATION AND CURETTAGE OF UTERUS AND HYDROTHERMAL ENDOMETRIAL ABLATION;  Surgeon: Zehra Jensen MD;  Location: [facility];  Service: OB/GYN;  Laterality: N/A;    LAPAROSCOPIC SALPINGECTOMY Bilateral 12/26/2018    Procedure: SALPINGECTOMY, LAPAROSCOPIC;  Surgeon: Zehra Jensen MD;  Location: [facility];  Service: OB/GYN;  Laterality: Bilateral;    LAPAROTOMY, EXPLORATORY N/A 2/7/2019    Procedure: LAPAROTOMY, EXPLORATORY;  Surgeon: Ramakrishna Boone MD;  Location: [facility];  Service: General;  Laterality: N/A;    REPAIR OF VAGINAL CUFF N/A 7/9/2020    Procedure: REPAIR, VAGINAL CUFF;  Surgeon: Sailaja Addison MD;  Location: [facility];  Service: OB/GYN;  Laterality: N/A;    ROBOT-ASSISTED LAPAROSCOPIC ABDOMINAL HYSTERECTOMY USING DA MAVIS XI N/A 12/30/2019    Procedure: XI ROBOTIC HYSTERECTOMY;  Surgeon: NATALI Shah MD;  Location: [facility];  Service: OB/GYN;  Laterality: N/A;    ROBOT-ASSISTED LAPAROSCOPIC SURGICAL REMOVAL OF OVARY USING  DA MAVIS XI Bilateral 12/30/2019    Procedure: XI ROBOTIC OOPHORECTOMY;  Surgeon: NATALI Shah MD;  Location: [facility];  Service: OB/GYN;  Laterality: Bilateral;    robotic assisted laparoscopy with excision of ovarian endometrioma and chromotubation           Family History:  Family History   Problem Relation Age of Onset    Strabismus Neg Hx     Retinal detachment Neg Hx     Macular degeneration Neg Hx     Glaucoma Neg Hx     Blindness Neg Hx     Amblyopia Neg Hx     Breast cancer Neg Hx     Colon cancer Neg Hx     Ovarian cancer Neg Hx     Thyroid disease Neg Hx     Migraines Neg Hx     Asthma Neg Hx        Social History:  Social History     Tobacco Use    Smoking status: Never Smoker    Smokeless tobacco: Never Used   Substance and Sexual Activity    Alcohol use: Not Currently     Comment: rarely No alcohol 72h prior to sx    Drug use: No    Sexual activity: Yes     Partners: Male       ROS   Review of Systems   Constitutional: Negative for chills and fever.   HENT: Negative for sore throat.    Respiratory: Negative for shortness of breath.    Cardiovascular: Negative for chest pain.   Gastrointestinal: Negative for diarrhea and vomiting.   Genitourinary: Negative for dysuria.   Musculoskeletal: Negative for back pain.   Skin: Negative for rash.   Neurological: Positive for headaches. Negative for weakness and numbness.   Hematological: Does not bruise/bleed easily.   All other systems reviewed and are negative.    Physical Exam      Initial Vitals   BP Pulse Resp Temp SpO2   04/22/22 0814 04/22/22 0814 04/22/22 0814 04/22/22 0917 04/22/22 0814   (!) 93/54 64 16 98.3 °F (36.8 °C) 96 %      MAP       --                 Physical Exam  Nursing Notes and Vital Signs Reviewed.  Constitutional: Patient is in mild distress. Well-developed and well-nourished.  Head: Atraumatic. Normocephalic.  Eyes: PERRL. EOM intact. Conjunctivae are not pale. No scleral icterus.  ENT: Mucous membranes are  moist. Oropharynx is clear and symmetric.    Neck: Supple. Full ROM. No lymphadenopathy.  Cardiovascular: Regular rate. Regular rhythm. No murmurs, rubs, or gallops. Distal pulses are 2+ and symmetric.  Pulmonary/Chest: No respiratory distress. Clear to auscultation bilaterally. No wheezing or rales.  Abdominal: Soft and non-distended.  There is no tenderness.  No rebound, guarding, or rigidity.   Musculoskeletal: Moves all extremities. No obvious deformities. No edema.  Skin: Warm and dry.  Neurological:  Alert, awake, and appropriate.  Normal speech.  No acute focal neurological deficits are appreciated.  Psychiatric: Normal affect. Good eye contact. Appropriate in content.    ED Course    Procedures  ED Vital Signs:  Vitals:    04/22/22 0814 04/22/22 0914 04/22/22 0917 04/22/22 1000   BP: (!) 93/54   112/65   Pulse: 64   71   Resp: 16   16   Temp:   98.3 °F (36.8 °C)    TempSrc:   Oral    SpO2: 96%   97%   Weight:  77.7 kg (171 lb 4.8 oz)      04/22/22 1259 04/22/22 1315 04/22/22 1332 04/22/22 1418   BP: (!) 106/57 118/70 118/70 123/67   Pulse: (!) 57 60 (!) 58 64   Resp: 17 15 16 17   Temp:   98.7 °F (37.1 °C)    TempSrc:   Oral    SpO2: 98% 96% 96% 96%   Weight:        04/22/22 1600 04/22/22 1733 04/22/22 1852   BP: 120/78 126/76    Pulse: (!) 56 (!) 59 (!) 54   Resp: 16 11 15   Temp:      TempSrc:      SpO2: 96% 97% 97%   Weight:          Abnormal Lab Results:  Labs Reviewed   CBC W/ AUTO DIFFERENTIAL - Abnormal; Notable for the following components:       Result Value    Hemoglobin 10.6 (*)     Hematocrit 34.7 (*)     MCH 26.3 (*)     MCHC 30.5 (*)     All other components within normal limits   COMPREHENSIVE METABOLIC PANEL - Abnormal; Notable for the following components:    Potassium 3.2 (*)     Chloride 114 (*)     CO2 19 (*)     Glucose 121 (*)     BUN 24 (*)     Calcium 7.9 (*)     Total Protein 5.9 (*)     Alkaline Phosphatase 49 (*)     All other components within normal limits        All Lab  Results:  Results for orders placed or performed during the hospital encounter of 04/22/22   CBC Auto Differential   Result Value Ref Range    WBC 5.91 3.90 - 12.70 K/uL    RBC 4.03 4.00 - 5.40 M/uL    Hemoglobin 10.6 (L) 12.0 - 16.0 g/dL    Hematocrit 34.7 (L) 37.0 - 48.5 %    MCV 86 82 - 98 fL    MCH 26.3 (L) 27.0 - 31.0 pg    MCHC 30.5 (L) 32.0 - 36.0 g/dL    RDW 14.3 11.5 - 14.5 %    Platelets 203 150 - 450 K/uL    MPV 9.3 9.2 - 12.9 fL    Immature Granulocytes 0.3 0.0 - 0.5 %    Gran # (ANC) 3.7 1.8 - 7.7 K/uL    Immature Grans (Abs) 0.02 0.00 - 0.04 K/uL    Lymph # 1.8 1.0 - 4.8 K/uL    Mono # 0.3 0.3 - 1.0 K/uL    Eos # 0.1 0.0 - 0.5 K/uL    Baso # 0.03 0.00 - 0.20 K/uL    nRBC 0 0 /100 WBC    Gran % 62.6 38.0 - 73.0 %    Lymph % 30.5 18.0 - 48.0 %    Mono % 4.7 4.0 - 15.0 %    Eosinophil % 1.4 0.0 - 8.0 %    Basophil % 0.5 0.0 - 1.9 %    Differential Method Automated    Comprehensive Metabolic Panel   Result Value Ref Range    Sodium 142 136 - 145 mmol/L    Potassium 3.2 (L) 3.5 - 5.1 mmol/L    Chloride 114 (H) 95 - 110 mmol/L    CO2 19 (L) 23 - 29 mmol/L    Glucose 121 (H) 70 - 110 mg/dL    BUN 24 (H) 6 - 20 mg/dL    Creatinine 0.6 0.5 - 1.4 mg/dL    Calcium 7.9 (L) 8.7 - 10.5 mg/dL    Total Protein 5.9 (L) 6.0 - 8.4 g/dL    Albumin 3.5 3.5 - 5.2 g/dL    Total Bilirubin 0.2 0.1 - 1.0 mg/dL    Alkaline Phosphatase 49 (L) 55 - 135 U/L    AST 13 10 - 40 U/L    ALT 18 10 - 44 U/L    Anion Gap 9 8 - 16 mmol/L    eGFR if African American >60 >60 mL/min/1.73 m^2    eGFR if non African American >60 >60 mL/min/1.73 m^2         Imaging Results:  Imaging Results    None                 The Emergency Provider reviewed the vital signs and test results, which are outlined above.    ED Discussion     9:52 AM: Reassessed pt at this time. Discussed with pt all pertinent ED information and results. Discussed pt dx and plan of tx. Gave pt all f/u and return to the ED instructions. All questions and concerns were addressed at  this time. Pt expresses understanding of information and instructions, and is comfortable with plan to discharge. Pt is stable for discharge.    I discussed with patient and/or family/caretaker that evaluation in the ED does not suggest any emergent or life threatening medical conditions requiring immediate intervention beyond what was provided in the ED, and I believe patient is safe for discharge.  Regardless, an unremarkable evaluation in the ED does not preclude the development or presence of a serious of life threatening condition. As such, patient was instructed to return immediately for any worsening or change in current symptoms.    9:58 PM: The pt reports that her headache has returned    2:54 PM: Reassessed pt at this time. Discussed with pt all pertinent ED information and results. Discussed pt dx and plan of tx. Gave pt all f/u and return to the ED instructions. All questions and concerns were addressed at this time. Pt expresses understanding of information and instructions, and is comfortable with plan to discharge. Pt is stable for discharge.    I discussed with patient and/or family/caretaker that evaluation in the ED does not suggest any emergent or life threatening medical conditions requiring immediate intervention beyond what was provided in the ED, and I believe patient is safe for discharge.  Regardless, an unremarkable evaluation in the ED does not preclude the development or presence of a serious of life threatening condition. As such, patient was instructed to return immediately for any worsening or change in current symptoms.        ED Course as of 04/23/22 0809   Fri Apr 22, 2022   1811 Patient having rebound headache. Discussed with Patient's . Will try more medication. Discussed case with neuro as well.     EKG ordered in order to determine QTc, which was 389 ms.   [BA]   1930 Pain improving.  [BA]   2013 Reassessment: I reassessed the pt.  The pt is resting comfortably and is  NAD.  Pt states their sx have improved. Discussed test results, shared treatment plan, specific conditions for return, and the need for f/u.  Answered their questions at this time.  Pt understands and agrees to the plan.  The pt has remained hemodynamically stable through ED course and is stable for discharge.     Added reglan and phenergan to prescriptions.    [BA]      ED Course User Index  [BA] Griffin Santos MD       ED Medication(s):  Medications   promethazine (PHENERGAN) 12.5 mg in dextrose 5 % 50 mL IVPB (0 mg Intravenous Stopped 4/22/22 0905)   butorphanol injection 1 mg (1 mg Intravenous Given 4/22/22 0843)   sodium chloride 0.9% bolus 1,000 mL (0 mLs Intravenous Stopped 4/22/22 1115)   ALPRAZolam tablet 1 mg (0.5 mg Oral Given 4/22/22 0854)   pantoprazole injection 40 mg (40 mg Intravenous Given 4/22/22 0854)   sodium chloride 0.9% bolus 1,000 mL (0 mLs Intravenous Stopped 4/22/22 1259)   dihydroergotamine injection 1 mg (1 mg Intravenous Given 4/22/22 1230)   magnesium sulfate 2g in water 50mL IVPB (premix) (0 g Intravenous Stopped 4/22/22 1259)   ketorolac injection 15 mg (15 mg Intravenous Given 4/22/22 1312)   potassium chloride 10 mEq in 100 mL IVPB (0 mEq Intravenous Stopped 4/22/22 1740)   aluminum-magnesium hydroxide-simethicone 200-200-20 mg/5 mL suspension 30 mL (30 mLs Oral Given 4/22/22 1854)     And   LIDOcaine HCl 2% oral solution 10 mL (10 mLs Oral Given 4/22/22 1850)   metoclopramide HCl injection 10 mg (10 mg Intravenous Given 4/22/22 1844)   diphenhydrAMINE injection 12.5 mg (12.5 mg Intravenous Given 4/22/22 1844)   ketorolac injection 15 mg (15 mg Intravenous Given 4/22/22 1843)     Discharge Medication List as of 4/22/2022  8:13 PM      START taking these medications    Details   metoclopramide HCl (REGLAN) 10 MG tablet Take 1 tablet (10 mg total) by mouth every 6 (six) hours as needed (for headache)., Starting Fri 4/22/2022, Print      promethazine (PHENERGAN) 25 MG tablet Take 0.5  tablets (12.5 mg total) by mouth every 6 (six) hours as needed for Nausea., Starting Fri 4/22/2022, Print              Follow-up Information     E Jose Gamez Jr, MD.    Specialties: Internal Medicine, Pediatrics  Contact information:  [address]  [place] [address]  [phone]                           Medical Decision Making    Medical Decision Making:   Clinical Tests:   Lab Tests: Ordered and Reviewed           Scribe Attestation:   Scribe #1: I performed the above scribed service and the documentation accurately describes the services I performed. I attest to the accuracy of the note.    Attending:   Physician Attestation Statement for Scribe #1: I, Nicholas Montemayor MD, personally performed the services described in this documentation, as scribed by Lisbet May, in my presence, and it is both accurate and complete.          Clinical Impression       ICD-10-CM ICD-9-CM   1. Other migraine without status migrainosus, not intractable  G43.809 346.80   2. Hypokalemia  E87.6 276.8   3. Nausea  R11.0 787.02   4. Migraine  G43.909 346.90       Disposition:   Disposition: Discharged  Condition: Stable         Nicholas Montemayor MD  04/22/22 1528       Nicholas Montemayor MD  04/23/22 0809

## 2022-04-22 NOTE — PROGRESS NOTES
ED Course as of 04/23/22 0141   Fri Apr 22, 2022   1811 Patient having rebound headache. Discussed with Patient's . Will try more medication. Discussed case with neuro as well.     EKG ordered in order to determine QTc, which was 389 ms.   [BA]   1930 Pain improving.  [BA]   2013 Reassessment: I reassessed the pt.  The pt is resting comfortably and is NAD.  Pt states their sx have improved. Discussed test results, shared treatment plan, specific conditions for return, and the need for f/u.  Answered their questions at this time.  Pt understands and agrees to the plan.  The pt has remained hemodynamically stable through ED course and is stable for discharge.     Added reglan and phenergan to prescriptions.    [BA]      ED Course User Index  [BA] Griffin Santos MD

## 2022-04-22 NOTE — ED NOTES
Two 0.5mg tablets pulled in pyxis. Pt only wanted 0.5mg. One 0.5mg tablet given to pt at this time. One 0.5mg tablet returned to pyxis. Witnessed by Minda MCKENNA RN

## 2022-04-23 DIAGNOSIS — G43.919 INTRACTABLE MIGRAINE WITHOUT STATUS MIGRAINOSUS, UNSPECIFIED MIGRAINE TYPE: Primary | ICD-10-CM

## 2022-04-23 RX ORDER — NAPROXEN 500 MG/1
500 TABLET ORAL 2 TIMES DAILY
Qty: 60 TABLET | Refills: 1 | Status: SHIPPED | OUTPATIENT
Start: 2022-04-23 | End: 2022-12-27

## 2022-04-29 RX ORDER — PHENTERMINE HYDROCHLORIDE 37.5 MG/1
37.5 TABLET ORAL
Qty: 30 TABLET | Refills: 0 | Status: SHIPPED | OUTPATIENT
Start: 2022-04-29 | End: 2022-05-29

## 2022-05-17 DIAGNOSIS — D50.0 IRON DEFICIENCY ANEMIA DUE TO CHRONIC BLOOD LOSS: Primary | ICD-10-CM

## 2022-05-18 ENCOUNTER — LAB VISIT (OUTPATIENT)
Dept: LAB | Facility: HOSPITAL | Age: 47
End: 2022-05-18
Attending: INTERNAL MEDICINE
Payer: COMMERCIAL

## 2022-05-18 DIAGNOSIS — D50.0 IRON DEFICIENCY ANEMIA DUE TO CHRONIC BLOOD LOSS: ICD-10-CM

## 2022-05-18 LAB
ALBUMIN SERPL BCP-MCNC: 4.5 G/DL (ref 3.5–5.2)
ALP SERPL-CCNC: 64 U/L (ref 55–135)
ALT SERPL W/O P-5'-P-CCNC: 20 U/L (ref 10–44)
ANION GAP SERPL CALC-SCNC: 8 MMOL/L (ref 8–16)
AST SERPL-CCNC: 18 U/L (ref 10–40)
BASOPHILS # BLD AUTO: 0.04 K/UL (ref 0–0.2)
BASOPHILS NFR BLD: 0.6 % (ref 0–1.9)
BILIRUB SERPL-MCNC: 0.4 MG/DL (ref 0.1–1)
BUN SERPL-MCNC: 25 MG/DL (ref 6–20)
CALCIUM SERPL-MCNC: 9.8 MG/DL (ref 8.7–10.5)
CHLORIDE SERPL-SCNC: 111 MMOL/L (ref 95–110)
CHOLEST SERPL-MCNC: 197 MG/DL (ref 120–199)
CHOLEST/HDLC SERPL: 4.9 {RATIO} (ref 2–5)
CO2 SERPL-SCNC: 23 MMOL/L (ref 23–29)
CREAT SERPL-MCNC: 0.9 MG/DL (ref 0.5–1.4)
DIFFERENTIAL METHOD: ABNORMAL
EOSINOPHIL # BLD AUTO: 0.1 K/UL (ref 0–0.5)
EOSINOPHIL NFR BLD: 1.2 % (ref 0–8)
ERYTHROCYTE [DISTWIDTH] IN BLOOD BY AUTOMATED COUNT: 13.2 % (ref 11.5–14.5)
EST. GFR  (AFRICAN AMERICAN): >60 ML/MIN/1.73 M^2
EST. GFR  (NON AFRICAN AMERICAN): >60 ML/MIN/1.73 M^2
ESTIMATED AVG GLUCOSE: 105 MG/DL (ref 68–131)
GLUCOSE SERPL-MCNC: 89 MG/DL (ref 70–110)
HBA1C MFR BLD: 5.3 % (ref 4–5.6)
HCT VFR BLD AUTO: 35.4 % (ref 37–48.5)
HDLC SERPL-MCNC: 40 MG/DL (ref 40–75)
HDLC SERPL: 20.3 % (ref 20–50)
HGB BLD-MCNC: 10.9 G/DL (ref 12–16)
IMM GRANULOCYTES # BLD AUTO: 0.01 K/UL (ref 0–0.04)
IMM GRANULOCYTES NFR BLD AUTO: 0.2 % (ref 0–0.5)
LDLC SERPL CALC-MCNC: 115.4 MG/DL (ref 63–159)
LYMPHOCYTES # BLD AUTO: 2.9 K/UL (ref 1–4.8)
LYMPHOCYTES NFR BLD: 44.1 % (ref 18–48)
MCH RBC QN AUTO: 26.2 PG (ref 27–31)
MCHC RBC AUTO-ENTMCNC: 30.8 G/DL (ref 32–36)
MCV RBC AUTO: 85 FL (ref 82–98)
MONOCYTES # BLD AUTO: 0.5 K/UL (ref 0.3–1)
MONOCYTES NFR BLD: 7.2 % (ref 4–15)
NEUTROPHILS # BLD AUTO: 3 K/UL (ref 1.8–7.7)
NEUTROPHILS NFR BLD: 46.7 % (ref 38–73)
NONHDLC SERPL-MCNC: 157 MG/DL
NRBC BLD-RTO: 0 /100 WBC
PLATELET # BLD AUTO: 223 K/UL (ref 150–450)
PMV BLD AUTO: 10.3 FL (ref 9.2–12.9)
POTASSIUM SERPL-SCNC: 3.9 MMOL/L (ref 3.5–5.1)
PROT SERPL-MCNC: 7.1 G/DL (ref 6–8.4)
RBC # BLD AUTO: 4.16 M/UL (ref 4–5.4)
SODIUM SERPL-SCNC: 142 MMOL/L (ref 136–145)
TRIGL SERPL-MCNC: 208 MG/DL (ref 30–150)
TSH SERPL DL<=0.005 MIU/L-ACNC: 1.48 UIU/ML (ref 0.4–4)
WBC # BLD AUTO: 6.49 K/UL (ref 3.9–12.7)

## 2022-05-18 PROCEDURE — 84443 ASSAY THYROID STIM HORMONE: CPT | Performed by: INTERNAL MEDICINE

## 2022-05-18 PROCEDURE — 83036 HEMOGLOBIN GLYCOSYLATED A1C: CPT | Performed by: INTERNAL MEDICINE

## 2022-05-18 PROCEDURE — 80053 COMPREHEN METABOLIC PANEL: CPT | Performed by: INTERNAL MEDICINE

## 2022-05-18 PROCEDURE — 36415 COLL VENOUS BLD VENIPUNCTURE: CPT | Performed by: INTERNAL MEDICINE

## 2022-05-18 PROCEDURE — 85025 COMPLETE CBC W/AUTO DIFF WBC: CPT | Performed by: INTERNAL MEDICINE

## 2022-05-18 PROCEDURE — 80061 LIPID PANEL: CPT | Performed by: INTERNAL MEDICINE

## 2022-06-24 ENCOUNTER — PATIENT MESSAGE (OUTPATIENT)
Dept: INTERNAL MEDICINE | Facility: CLINIC | Age: 47
End: 2022-06-24
Payer: COMMERCIAL

## 2022-06-27 RX ORDER — PHENTERMINE HYDROCHLORIDE 37.5 MG/1
37.5 TABLET ORAL
Qty: 21 TABLET | Refills: 0 | Status: SHIPPED | OUTPATIENT
Start: 2022-06-27 | End: 2022-07-18

## 2022-07-05 ENCOUNTER — PATIENT MESSAGE (OUTPATIENT)
Dept: PHARMACY | Facility: CLINIC | Age: 47
End: 2022-07-05
Payer: COMMERCIAL

## 2022-08-04 ENCOUNTER — PATIENT MESSAGE (OUTPATIENT)
Dept: OPTOMETRY | Facility: CLINIC | Age: 47
End: 2022-08-04
Payer: COMMERCIAL

## 2022-08-04 DIAGNOSIS — D50.0 IRON DEFICIENCY ANEMIA DUE TO CHRONIC BLOOD LOSS: Primary | ICD-10-CM

## 2022-08-04 DIAGNOSIS — G43.919 INTRACTABLE MIGRAINE WITHOUT STATUS MIGRAINOSUS, UNSPECIFIED MIGRAINE TYPE: ICD-10-CM

## 2022-08-05 ENCOUNTER — LAB VISIT (OUTPATIENT)
Dept: LAB | Facility: HOSPITAL | Age: 47
End: 2022-08-05
Attending: INTERNAL MEDICINE
Payer: COMMERCIAL

## 2022-08-05 DIAGNOSIS — D50.0 IRON DEFICIENCY ANEMIA DUE TO CHRONIC BLOOD LOSS: ICD-10-CM

## 2022-08-05 DIAGNOSIS — G43.919 INTRACTABLE MIGRAINE WITHOUT STATUS MIGRAINOSUS, UNSPECIFIED MIGRAINE TYPE: ICD-10-CM

## 2022-08-05 LAB
ALBUMIN SERPL BCP-MCNC: 4.2 G/DL (ref 3.5–5.2)
ALP SERPL-CCNC: 63 U/L (ref 55–135)
ALT SERPL W/O P-5'-P-CCNC: 40 U/L (ref 10–44)
ANION GAP SERPL CALC-SCNC: 10 MMOL/L (ref 8–16)
AST SERPL-CCNC: 26 U/L (ref 10–40)
BASOPHILS # BLD AUTO: 0.05 K/UL (ref 0–0.2)
BASOPHILS NFR BLD: 0.7 % (ref 0–1.9)
BILIRUB SERPL-MCNC: 0.3 MG/DL (ref 0.1–1)
BUN SERPL-MCNC: 25 MG/DL (ref 6–20)
CALCIUM SERPL-MCNC: 10.1 MG/DL (ref 8.7–10.5)
CHLORIDE SERPL-SCNC: 107 MMOL/L (ref 95–110)
CHOLEST SERPL-MCNC: 314 MG/DL (ref 120–199)
CHOLEST/HDLC SERPL: 5.4 {RATIO} (ref 2–5)
CO2 SERPL-SCNC: 22 MMOL/L (ref 23–29)
CREAT SERPL-MCNC: 0.7 MG/DL (ref 0.5–1.4)
DIFFERENTIAL METHOD: ABNORMAL
EOSINOPHIL # BLD AUTO: 0.2 K/UL (ref 0–0.5)
EOSINOPHIL NFR BLD: 2.7 % (ref 0–8)
ERYTHROCYTE [DISTWIDTH] IN BLOOD BY AUTOMATED COUNT: 13.2 % (ref 11.5–14.5)
EST. GFR  (NO RACE VARIABLE): >60 ML/MIN/1.73 M^2
ESTIMATED AVG GLUCOSE: 105 MG/DL (ref 68–131)
FERRITIN SERPL-MCNC: 1210 NG/ML (ref 20–300)
GLUCOSE SERPL-MCNC: 98 MG/DL (ref 70–110)
HBA1C MFR BLD: 5.3 % (ref 4–5.6)
HCT VFR BLD AUTO: 37.7 % (ref 37–48.5)
HDLC SERPL-MCNC: 58 MG/DL (ref 40–75)
HDLC SERPL: 18.5 % (ref 20–50)
HGB BLD-MCNC: 11.9 G/DL (ref 12–16)
IMM GRANULOCYTES # BLD AUTO: 0.03 K/UL (ref 0–0.04)
IMM GRANULOCYTES NFR BLD AUTO: 0.4 % (ref 0–0.5)
IRON SERPL-MCNC: 95 UG/DL (ref 30–160)
LDLC SERPL CALC-MCNC: 181 MG/DL (ref 63–159)
LYMPHOCYTES # BLD AUTO: 3.3 K/UL (ref 1–4.8)
LYMPHOCYTES NFR BLD: 45.5 % (ref 18–48)
MCH RBC QN AUTO: 27.4 PG (ref 27–31)
MCHC RBC AUTO-ENTMCNC: 31.6 G/DL (ref 32–36)
MCV RBC AUTO: 87 FL (ref 82–98)
MONOCYTES # BLD AUTO: 0.5 K/UL (ref 0.3–1)
MONOCYTES NFR BLD: 7 % (ref 4–15)
NEUTROPHILS # BLD AUTO: 3.2 K/UL (ref 1.8–7.7)
NEUTROPHILS NFR BLD: 43.7 % (ref 38–73)
NONHDLC SERPL-MCNC: 256 MG/DL
NRBC BLD-RTO: 0 /100 WBC
PLATELET # BLD AUTO: 275 K/UL (ref 150–450)
PMV BLD AUTO: 9.9 FL (ref 9.2–12.9)
POTASSIUM SERPL-SCNC: 4.1 MMOL/L (ref 3.5–5.1)
PROT SERPL-MCNC: 7.3 G/DL (ref 6–8.4)
RBC # BLD AUTO: 4.34 M/UL (ref 4–5.4)
SATURATED IRON: 32 % (ref 20–50)
SODIUM SERPL-SCNC: 139 MMOL/L (ref 136–145)
TOTAL IRON BINDING CAPACITY: 300 UG/DL (ref 250–450)
TRANSFERRIN SERPL-MCNC: 203 MG/DL (ref 200–375)
TRIGL SERPL-MCNC: 375 MG/DL (ref 30–150)
WBC # BLD AUTO: 7.28 K/UL (ref 3.9–12.7)

## 2022-08-05 PROCEDURE — 83036 HEMOGLOBIN GLYCOSYLATED A1C: CPT | Performed by: INTERNAL MEDICINE

## 2022-08-05 PROCEDURE — 80061 LIPID PANEL: CPT | Performed by: INTERNAL MEDICINE

## 2022-08-05 PROCEDURE — 82728 ASSAY OF FERRITIN: CPT | Performed by: INTERNAL MEDICINE

## 2022-08-05 PROCEDURE — 84466 ASSAY OF TRANSFERRIN: CPT | Performed by: INTERNAL MEDICINE

## 2022-08-05 PROCEDURE — 36415 COLL VENOUS BLD VENIPUNCTURE: CPT | Performed by: INTERNAL MEDICINE

## 2022-08-05 PROCEDURE — 85025 COMPLETE CBC W/AUTO DIFF WBC: CPT | Performed by: INTERNAL MEDICINE

## 2022-08-05 PROCEDURE — 80053 COMPREHEN METABOLIC PANEL: CPT | Performed by: INTERNAL MEDICINE

## 2022-08-09 DIAGNOSIS — K21.9 GASTROESOPHAGEAL REFLUX DISEASE WITHOUT ESOPHAGITIS: ICD-10-CM

## 2022-08-09 DIAGNOSIS — R00.2 PALPITATION: ICD-10-CM

## 2022-08-09 RX ORDER — PANTOPRAZOLE SODIUM 40 MG/1
40 TABLET, DELAYED RELEASE ORAL DAILY
Qty: 90 TABLET | Refills: 3 | Status: SHIPPED | OUTPATIENT
Start: 2022-08-09 | End: 2023-08-07 | Stop reason: SDUPTHER

## 2022-08-09 RX ORDER — ATENOLOL 25 MG/1
25 TABLET ORAL DAILY
Qty: 90 TABLET | Refills: 3 | Status: SHIPPED | OUTPATIENT
Start: 2022-08-09 | End: 2023-08-07 | Stop reason: SDUPTHER

## 2022-08-09 RX ORDER — VALACYCLOVIR HYDROCHLORIDE 1 G/1
1 TABLET, FILM COATED ORAL
Qty: 90 TABLET | Refills: 2 | Status: CANCELLED | OUTPATIENT
Start: 2022-08-09

## 2022-08-24 DIAGNOSIS — Z12.31 OTHER SCREENING MAMMOGRAM: ICD-10-CM

## 2022-08-25 ENCOUNTER — OFFICE VISIT (OUTPATIENT)
Dept: INTERNAL MEDICINE | Facility: CLINIC | Age: 47
End: 2022-08-25
Payer: COMMERCIAL

## 2022-08-25 VITALS
WEIGHT: 156.94 LBS | TEMPERATURE: 97 F | HEIGHT: 68 IN | HEART RATE: 64 BPM | DIASTOLIC BLOOD PRESSURE: 84 MMHG | SYSTOLIC BLOOD PRESSURE: 122 MMHG | OXYGEN SATURATION: 100 % | BODY MASS INDEX: 23.79 KG/M2

## 2022-08-25 DIAGNOSIS — G43.919 INTRACTABLE MIGRAINE WITHOUT STATUS MIGRAINOSUS, UNSPECIFIED MIGRAINE TYPE: ICD-10-CM

## 2022-08-25 DIAGNOSIS — F33.41 RECURRENT MAJOR DEPRESSIVE DISORDER, IN PARTIAL REMISSION: Primary | ICD-10-CM

## 2022-08-25 DIAGNOSIS — Z00.00 WELL ADULT EXAM: ICD-10-CM

## 2022-08-25 DIAGNOSIS — K21.9 GASTROESOPHAGEAL REFLUX DISEASE WITHOUT ESOPHAGITIS: ICD-10-CM

## 2022-08-25 DIAGNOSIS — E78.00 HYPERCHOLESTEROLEMIA: ICD-10-CM

## 2022-08-25 PROBLEM — F33.9 EPISODE OF RECURRENT MAJOR DEPRESSIVE DISORDER: Status: RESOLVED | Noted: 2022-03-03 | Resolved: 2022-08-25

## 2022-08-25 PROCEDURE — 99999 PR PBB SHADOW E&M-EST. PATIENT-LVL V: ICD-10-PCS | Mod: PBBFAC,,, | Performed by: PEDIATRICS

## 2022-08-25 PROCEDURE — 99396 PR PREVENTIVE VISIT,EST,40-64: ICD-10-PCS | Mod: S$GLB,,, | Performed by: PEDIATRICS

## 2022-08-25 PROCEDURE — 99396 PREV VISIT EST AGE 40-64: CPT | Mod: S$GLB,,, | Performed by: PEDIATRICS

## 2022-08-25 PROCEDURE — 1160F RVW MEDS BY RX/DR IN RCRD: CPT | Mod: CPTII,S$GLB,, | Performed by: PEDIATRICS

## 2022-08-25 PROCEDURE — 3074F PR MOST RECENT SYSTOLIC BLOOD PRESSURE < 130 MM HG: ICD-10-PCS | Mod: CPTII,S$GLB,, | Performed by: PEDIATRICS

## 2022-08-25 PROCEDURE — 3079F DIAST BP 80-89 MM HG: CPT | Mod: CPTII,S$GLB,, | Performed by: PEDIATRICS

## 2022-08-25 PROCEDURE — 3044F PR MOST RECENT HEMOGLOBIN A1C LEVEL <7.0%: ICD-10-PCS | Mod: CPTII,S$GLB,, | Performed by: PEDIATRICS

## 2022-08-25 PROCEDURE — 1159F PR MEDICATION LIST DOCUMENTED IN MEDICAL RECORD: ICD-10-PCS | Mod: CPTII,S$GLB,, | Performed by: PEDIATRICS

## 2022-08-25 PROCEDURE — 3008F BODY MASS INDEX DOCD: CPT | Mod: CPTII,S$GLB,, | Performed by: PEDIATRICS

## 2022-08-25 PROCEDURE — 3079F PR MOST RECENT DIASTOLIC BLOOD PRESSURE 80-89 MM HG: ICD-10-PCS | Mod: CPTII,S$GLB,, | Performed by: PEDIATRICS

## 2022-08-25 PROCEDURE — 3008F PR BODY MASS INDEX (BMI) DOCUMENTED: ICD-10-PCS | Mod: CPTII,S$GLB,, | Performed by: PEDIATRICS

## 2022-08-25 PROCEDURE — 1160F PR REVIEW ALL MEDS BY PRESCRIBER/CLIN PHARMACIST DOCUMENTED: ICD-10-PCS | Mod: CPTII,S$GLB,, | Performed by: PEDIATRICS

## 2022-08-25 PROCEDURE — 3074F SYST BP LT 130 MM HG: CPT | Mod: CPTII,S$GLB,, | Performed by: PEDIATRICS

## 2022-08-25 PROCEDURE — 99999 PR PBB SHADOW E&M-EST. PATIENT-LVL V: CPT | Mod: PBBFAC,,, | Performed by: PEDIATRICS

## 2022-08-25 PROCEDURE — 1159F MED LIST DOCD IN RCRD: CPT | Mod: CPTII,S$GLB,, | Performed by: PEDIATRICS

## 2022-08-25 PROCEDURE — 3044F HG A1C LEVEL LT 7.0%: CPT | Mod: CPTII,S$GLB,, | Performed by: PEDIATRICS

## 2022-08-25 RX ORDER — TIRZEPATIDE 2.5 MG/.5ML
2.5 INJECTION, SOLUTION SUBCUTANEOUS
Qty: 4 PEN | Refills: 11 | Status: SHIPPED | OUTPATIENT
Start: 2022-08-25 | End: 2022-10-11

## 2022-08-25 NOTE — PROGRESS NOTES
Subjective:       Patient ID: Sandra Dimas is a 47 y.o. female.    Chief Complaint: Weight Check and Annual Exam (Cholesterol check)    Sandra Dimas is a 47 y.o. female who presents to clinic for her annual       Depression/Anxiety: stable on Lexapro 20mg, still symptomatic but haider snot wish to adjust meds, has been in CBT before and does not wish to redstart, no HI/SI, overall feel not in control and has variable sleep     GERD: stable on PPI     Migraines: stress triggers. On Tx     MATT: nearly resolved, no ongoing blood loss         Review of Systems   Constitutional: Negative for activity change, appetite change, chills, diaphoresis, fatigue, fever and unexpected weight change.   HENT: Negative for nasal congestion, ear pain, mouth sores, nosebleeds, postnasal drip, rhinorrhea, sneezing and sore throat.    Eyes: Negative for photophobia, pain, discharge, redness and visual disturbance.   Respiratory: Negative for cough, chest tightness, shortness of breath, wheezing and stridor.    Cardiovascular: Negative for chest pain, palpitations and leg swelling.   Gastrointestinal: Negative for constipation, diarrhea, nausea and vomiting.   Genitourinary: Negative for decreased urine volume, difficulty urinating, dysuria, flank pain, frequency, hematuria and urgency.   Musculoskeletal: Negative for arthralgias, back pain, joint swelling, neck pain and neck stiffness.   Integumentary:  Negative for color change and rash.   Neurological: Negative for dizziness, syncope, speech difficulty, weakness, light-headedness and headaches.   Hematological: Negative for adenopathy. Does not bruise/bleed easily.   Psychiatric/Behavioral: Negative for confusion, decreased concentration, dysphoric mood, hallucinations, sleep disturbance and suicidal ideas. The patient is not nervous/anxious.    All other systems reviewed and are negative.        Objective:      Physical Exam  Vitals and nursing note reviewed.   Constitutional:        General: She is not in acute distress.     Appearance: She is well-developed.   Neck:      Thyroid: No thyromegaly.      Vascular: No JVD.   Cardiovascular:      Rate and Rhythm: Normal rate and regular rhythm.      Heart sounds: Normal heart sounds. No murmur heard.  Pulmonary:      Effort: Pulmonary effort is normal. No respiratory distress.      Breath sounds: Normal breath sounds. No wheezing or rales.   Abdominal:      General: There is no distension.      Palpations: Abdomen is soft. There is no mass.      Tenderness: There is no abdominal tenderness. There is no guarding.   Musculoskeletal:      Right lower leg: No edema.      Left lower leg: No edema.   Lymphadenopathy:      Cervical: No cervical adenopathy.   Skin:     Capillary Refill: Capillary refill takes less than 2 seconds.      Findings: No rash.   Neurological:      General: No focal deficit present.      Mental Status: She is alert and oriented to person, place, and time.      Cranial Nerves: No cranial nerve deficit.      Coordination: Coordination normal.   Psychiatric:         Mood and Affect: Mood normal.         Behavior: Behavior normal.         Thought Content: Thought content normal.         Judgment: Judgment normal.         Assessment:       Problem List Items Addressed This Visit     Recurrent major depressive disorder, in partial remission - Primary    GERD (gastroesophageal reflux disease)    Migraines      Other Visit Diagnoses     Hypercholesterolemia        Relevant Orders    Ambulatory referral/consult to [facility]    Lipid Panel    Comprehensive Metabolic Panel    Well adult exam              Plan:     Recurrent major depressive disorder, in partial remission    Intractable migraine without status migrainosus, unspecified migraine type    Gastroesophageal reflux disease without esophagitis    Hypercholesterolemia  -     Ambulatory referral/consult to [facility]; Future; Expected date:  09/01/2022  -     Lipid Panel; Future; Expected date: 08/25/2022  -     Comprehensive Metabolic Panel; Future; Expected date: 08/25/2022    Well adult exam    Other orders  -     tirzepatide (MOUNJARO) 2.5 mg/0.5 mL PnIj; Inject 2.5 mg into the skin every 7 days.  Dispense: 4 pen; Refill: 11         HMI reviewed. Offered counseling should she desire. Use phone ryan Noom. After discussions of SE/RB/CI start Mounjaro enroll in lifestyle clinc and continue adjustment as needed. Follow up in 6o with repeat lipids.     Scribe Attestation:   I, Kasi House, am scribing for, and in the presence of, Dr. Kurtis Gamez Jr. I performed the above scribed service and the documentation accurately describes the services I performed. I attest to the accuracy of the note.    I, Dr. Kurtis Gamez Jr, reviewed documentation as scribed above. I personally performed the services described in this documentation.  I agree that the record reflects my personal performance and is accurate and complete. Kurtis Gamez Jr., MD.  08/25/2022

## 2022-08-29 PROBLEM — K44.9 HIATAL HERNIA: Status: RESOLVED | Noted: 2018-11-30 | Resolved: 2022-08-29

## 2022-08-29 PROBLEM — K21.9 GERD (GASTROESOPHAGEAL REFLUX DISEASE): Status: RESOLVED | Noted: 2018-05-01 | Resolved: 2022-08-29

## 2022-09-12 ENCOUNTER — PATIENT MESSAGE (OUTPATIENT)
Dept: INTERNAL MEDICINE | Facility: CLINIC | Age: 47
End: 2022-09-12
Payer: COMMERCIAL

## 2022-10-11 ENCOUNTER — PATIENT MESSAGE (OUTPATIENT)
Dept: INTERNAL MEDICINE | Facility: CLINIC | Age: 47
End: 2022-10-11
Payer: COMMERCIAL

## 2022-10-11 RX ORDER — TIRZEPATIDE 5 MG/.5ML
5 INJECTION, SOLUTION SUBCUTANEOUS
Qty: 4 PEN | Refills: 11 | Status: SHIPPED | OUTPATIENT
Start: 2022-10-11 | End: 2022-12-02

## 2022-10-16 ENCOUNTER — OFFICE VISIT (OUTPATIENT)
Dept: URGENT CARE | Facility: CLINIC | Age: 47
End: 2022-10-16
Payer: COMMERCIAL

## 2022-10-16 VITALS
RESPIRATION RATE: 18 BRPM | HEART RATE: 80 BPM | DIASTOLIC BLOOD PRESSURE: 65 MMHG | OXYGEN SATURATION: 100 % | WEIGHT: 156 LBS | TEMPERATURE: 98 F | SYSTOLIC BLOOD PRESSURE: 109 MMHG | BODY MASS INDEX: 23.72 KG/M2

## 2022-10-16 DIAGNOSIS — G43.901 MIGRAINE WITH STATUS MIGRAINOSUS, NOT INTRACTABLE, UNSPECIFIED MIGRAINE TYPE: Primary | ICD-10-CM

## 2022-10-16 PROCEDURE — 99214 PR OFFICE/OUTPT VISIT, EST, LEVL IV, 30-39 MIN: ICD-10-PCS | Mod: 25,S$GLB,, | Performed by: PHYSICIAN ASSISTANT

## 2022-10-16 PROCEDURE — 1159F PR MEDICATION LIST DOCUMENTED IN MEDICAL RECORD: ICD-10-PCS | Mod: CPTII,S$GLB,, | Performed by: PHYSICIAN ASSISTANT

## 2022-10-16 PROCEDURE — 3078F PR MOST RECENT DIASTOLIC BLOOD PRESSURE < 80 MM HG: ICD-10-PCS | Mod: CPTII,S$GLB,, | Performed by: PHYSICIAN ASSISTANT

## 2022-10-16 PROCEDURE — 96372 PR INJECTION,THERAP/PROPH/DIAG2ST, IM OR SUBCUT: ICD-10-PCS | Mod: S$GLB,,, | Performed by: PHYSICIAN ASSISTANT

## 2022-10-16 PROCEDURE — 3044F PR MOST RECENT HEMOGLOBIN A1C LEVEL <7.0%: ICD-10-PCS | Mod: CPTII,S$GLB,, | Performed by: PHYSICIAN ASSISTANT

## 2022-10-16 PROCEDURE — 1159F MED LIST DOCD IN RCRD: CPT | Mod: CPTII,S$GLB,, | Performed by: PHYSICIAN ASSISTANT

## 2022-10-16 PROCEDURE — 3078F DIAST BP <80 MM HG: CPT | Mod: CPTII,S$GLB,, | Performed by: PHYSICIAN ASSISTANT

## 2022-10-16 PROCEDURE — 1160F PR REVIEW ALL MEDS BY PRESCRIBER/CLIN PHARMACIST DOCUMENTED: ICD-10-PCS | Mod: CPTII,S$GLB,, | Performed by: PHYSICIAN ASSISTANT

## 2022-10-16 PROCEDURE — 3074F PR MOST RECENT SYSTOLIC BLOOD PRESSURE < 130 MM HG: ICD-10-PCS | Mod: CPTII,S$GLB,, | Performed by: PHYSICIAN ASSISTANT

## 2022-10-16 PROCEDURE — 99214 OFFICE O/P EST MOD 30 MIN: CPT | Mod: 25,S$GLB,, | Performed by: PHYSICIAN ASSISTANT

## 2022-10-16 PROCEDURE — 96372 THER/PROPH/DIAG INJ SC/IM: CPT | Mod: S$GLB,,, | Performed by: PHYSICIAN ASSISTANT

## 2022-10-16 PROCEDURE — 3044F HG A1C LEVEL LT 7.0%: CPT | Mod: CPTII,S$GLB,, | Performed by: PHYSICIAN ASSISTANT

## 2022-10-16 PROCEDURE — 3074F SYST BP LT 130 MM HG: CPT | Mod: CPTII,S$GLB,, | Performed by: PHYSICIAN ASSISTANT

## 2022-10-16 PROCEDURE — 1160F RVW MEDS BY RX/DR IN RCRD: CPT | Mod: CPTII,S$GLB,, | Performed by: PHYSICIAN ASSISTANT

## 2022-10-16 RX ORDER — KETOROLAC TROMETHAMINE 30 MG/ML
30 INJECTION, SOLUTION INTRAMUSCULAR; INTRAVENOUS
Status: COMPLETED | OUTPATIENT
Start: 2022-10-16 | End: 2022-10-16

## 2022-10-16 RX ORDER — KETOROLAC TROMETHAMINE 30 MG/ML
60 INJECTION, SOLUTION INTRAMUSCULAR; INTRAVENOUS
Status: DISCONTINUED | OUTPATIENT
Start: 2022-10-16 | End: 2022-10-16

## 2022-10-16 RX ORDER — PROMETHAZINE HYDROCHLORIDE 25 MG/1
25 TABLET ORAL EVERY 6 HOURS PRN
Qty: 30 TABLET | Refills: 0 | Status: SHIPPED | OUTPATIENT
Start: 2022-10-16 | End: 2022-10-26

## 2022-10-16 RX ORDER — PROMETHAZINE HYDROCHLORIDE 25 MG/ML
25 INJECTION, SOLUTION INTRAMUSCULAR; INTRAVENOUS
Status: COMPLETED | OUTPATIENT
Start: 2022-10-16 | End: 2022-10-16

## 2022-10-16 RX ADMIN — KETOROLAC TROMETHAMINE 30 MG: 30 INJECTION, SOLUTION INTRAMUSCULAR; INTRAVENOUS at 03:10

## 2022-10-16 RX ADMIN — PROMETHAZINE HYDROCHLORIDE 25 MG: 25 INJECTION, SOLUTION INTRAMUSCULAR; INTRAVENOUS at 03:10

## 2022-10-16 NOTE — PROGRESS NOTES
Subjective:       Patient ID: Sandra Dimas is a 47 y.o. female.    Vitals:  weight is 70.8 kg (156 lb). Her temperature is 98 °F (36.7 °C). Her blood pressure is 109/65 and her pulse is 80. Her respiration is 18 and oxygen saturation is 100%.     Chief Complaint: Migraine (Pt presents to / with migraine since yesterday.)    47-year-old female presents today with complaints of significant migraine headache that was not responsive to her Topamax and Phenergan at home.  Comes in with photophobia, bent over and being helped by her .  States she has significant discomfort to loud noises and bright lights.  Headache is on her left side of her forehead to her temporal region.  Headache is constant stabbing sharp rated 10/10.  Has associated vomiting with migraine, states this is normal.  States usually ends up in the emergency room for treatment.  Her  is a cardiologist and has brought her in today.  Denies fever, chills, nasal congestion, sore throat or cough at this time.    Migraine   This is a chronic problem. The current episode started yesterday. The problem occurs constantly. The problem has been unchanged. The quality of the pain is described as aching. The pain is at a severity of 10/10. Associated symptoms include nausea and vomiting. Pertinent negatives include no coughing, dizziness, fever, loss of balance, numbness, seizures, sinus pressure or weakness. She has tried ketorolac injections for the symptoms. The treatment provided no relief. Her past medical history is significant for migraine headaches.     Constitution: Negative for chills, sweating, fatigue and fever.   HENT: Negative.  Negative for congestion, postnasal drip, sinus pain and sinus pressure.    Neck: neck negative.   Cardiovascular: Negative.  Negative for chest pain, palpitations and sob on exertion.   Eyes: Negative.    Respiratory: Negative.  Negative for chest tightness, cough and shortness of breath.     Gastrointestinal:  Positive for nausea, vomiting and constipation. Negative for diarrhea.   Endocrine: negative.   Genitourinary: Negative.    Musculoskeletal: Negative.  Negative for pain.   Skin: Negative.  Negative for color change and hives.   Allergic/Immunologic: Negative.  Negative for seasonal allergies, hives and itching.   Neurological:  Positive for headaches (migraine headaches she has tried at home medications with minimal results) and history of migraines. Negative for dizziness, history of vertigo, light-headedness, passing out, facial drooping, speech difficulty, coordination disturbances, loss of balance, disorientation, altered mental status, loss of consciousness, numbness, tingling, seizures and tremors.   Hematologic/Lymphatic: Negative.    Psychiatric/Behavioral: Negative.  Negative for altered mental status, disorientation, confusion, agitation and nervous/anxious. The patient is not nervous/anxious.      Objective:      Physical Exam   Constitutional: She is oriented to person, place, and time. She appears well-developed. She is cooperative.  Non-toxic appearance. She does not appear ill. No distress. normalawake  HENT:   Head: Normocephalic and atraumatic.   Ears:   Right Ear: Hearing, tympanic membrane, external ear and ear canal normal. impacted cerumen  Left Ear: Hearing, tympanic membrane, external ear and ear canal normal. impacted cerumen  Nose: Nose normal. No mucosal edema, rhinorrhea, nasal deformity or congestion. No epistaxis. Right sinus exhibits no maxillary sinus tenderness and no frontal sinus tenderness. Left sinus exhibits no maxillary sinus tenderness and no frontal sinus tenderness.   Mouth/Throat: Uvula is midline, oropharynx is clear and moist and mucous membranes are normal. Mucous membranes are moist. No trismus in the jaw. Normal dentition. No uvula swelling. No oropharyngeal exudate, posterior oropharyngeal erythema or cobblestoning. Tonsils are 1+ on the right.  Tonsils are 1+ on the left. No tonsillar exudate.   Eyes: Conjunctivae, EOM and lids are normal. Pupils are equal, round, and reactive to light. Right eye exhibits no discharge. Left eye exhibits no discharge. No scleral icterus. Right eye exhibits normal extraocular motion. Left eye exhibits normal extraocular motion.   Slit lamp exam:       The right eye shows photophobia.        The left eye shows photophobia. Extraocular movement intact      Comments: Positive photophobia bilaterally   Neck: Trachea normal and phonation normal. Neck supple. No neck rigidity present.   Cardiovascular: Normal rate, regular rhythm, normal heart sounds and normal pulses.   Pulses:       Radial pulses are 2+ on the right side and 2+ on the left side.   Pulmonary/Chest: Effort normal and breath sounds normal. No stridor. No respiratory distress. She has no decreased breath sounds. She has no wheezes. She has no rhonchi. She has no rales.   Abdominal: Normal appearance and bowel sounds are normal. She exhibits no distension. Soft. There is no abdominal tenderness.   Musculoskeletal: Normal range of motion.         General: No deformity. Normal range of motion.   Lymphadenopathy:        Head (right side): No submental, no submandibular, no tonsillar, no preauricular, no posterior auricular and no occipital adenopathy present.        Head (left side): No submental, no submandibular, no tonsillar, no preauricular, no posterior auricular and no occipital adenopathy present.   Neurological: She is alert and oriented to person, place, and time. She has normal motor skills. No cranial nerve deficit. She exhibits normal muscle tone. Coordination normal. Gait (slow and helped by her ) abnormal. Coordination normal. GCS eye subscore is 4. GCS verbal subscore is 5. GCS motor subscore is 6.   Skin: Skin is warm, dry, intact, not diaphoretic and not pale.   Psychiatric: She experiences Normal attention and Normal perception. Her speech is  normal. Judgment and thought content normal. She is slowed. Cognition normal  Nursing note and vitals reviewed.      Assessment:       1. Migraine with status migrainosus, not intractable, unspecified migraine type            Plan:         Migraine with status migrainosus, not intractable, unspecified migraine type  -     Discontinue: ketorolac injection 60 mg  -     promethazine injection 25 mg  -     ketorolac injection 30 mg    Other orders  -     promethazine (PHENERGAN) 25 MG tablet; Take 1 tablet (25 mg total) by mouth every 6 (six) hours as needed for Nausea.  Dispense: 30 tablet; Refill: 0  -     ketorolac injection 30 mg       Discussed possibility of ER visit, will treat with Toradol and Phenergan at this time.  Follow-up with PCP or neurologist for further evaluation if symptoms persist            You must understand that you've received an Urgent Care treatment only and that you may be released before all your medical problems are known or treated. You, the patient, will arrange for follow up care as instructed.  Follow up with your PCP or specialty clinic as directed in the next 1-2 weeks if not improved or as needed.  You can call to schedule an appointment with the appropriate provider.  If your condition worsens we recommend that you receive another evaluation at the emergency room immediately or contact your primary medical clinics after hours call service to discuss your concerns.  Please return here or go to the Emergency Department for any concerns or worsening of condition.    If you were prescribed a narcotic or controlled medication, do not drive or operate heavy equipment or machinery while taking these medications.  There are no Patient Instructions on file for this visit.      NASH Allen

## 2022-11-16 DIAGNOSIS — D50.0 IRON DEFICIENCY ANEMIA DUE TO CHRONIC BLOOD LOSS: Primary | ICD-10-CM

## 2022-11-17 ENCOUNTER — LAB VISIT (OUTPATIENT)
Dept: LAB | Facility: HOSPITAL | Age: 47
End: 2022-11-17
Attending: PEDIATRICS
Payer: COMMERCIAL

## 2022-11-17 DIAGNOSIS — D50.0 IRON DEFICIENCY ANEMIA DUE TO CHRONIC BLOOD LOSS: ICD-10-CM

## 2022-11-17 LAB
ALBUMIN SERPL BCP-MCNC: 4.7 G/DL (ref 3.5–5.2)
ALP SERPL-CCNC: 54 U/L (ref 55–135)
ALT SERPL W/O P-5'-P-CCNC: 12 U/L (ref 10–44)
ANION GAP SERPL CALC-SCNC: 9 MMOL/L (ref 8–16)
AST SERPL-CCNC: 18 U/L (ref 10–40)
BILIRUB SERPL-MCNC: 0.4 MG/DL (ref 0.1–1)
BUN SERPL-MCNC: 29 MG/DL (ref 6–20)
CALCIUM SERPL-MCNC: 10 MG/DL (ref 8.7–10.5)
CHLORIDE SERPL-SCNC: 110 MMOL/L (ref 95–110)
CHOLEST SERPL-MCNC: 205 MG/DL (ref 120–199)
CHOLEST/HDLC SERPL: 5.5 {RATIO} (ref 2–5)
CO2 SERPL-SCNC: 22 MMOL/L (ref 23–29)
CREAT SERPL-MCNC: 0.8 MG/DL (ref 0.5–1.4)
EST. GFR  (NO RACE VARIABLE): >60 ML/MIN/1.73 M^2
ESTIMATED AVG GLUCOSE: 103 MG/DL (ref 68–131)
FERRITIN SERPL-MCNC: 1175 NG/ML (ref 20–300)
GLUCOSE SERPL-MCNC: 73 MG/DL (ref 70–110)
HBA1C MFR BLD: 5.2 % (ref 4–5.6)
HDLC SERPL-MCNC: 37 MG/DL (ref 40–75)
HDLC SERPL: 18 % (ref 20–50)
IRON SERPL-MCNC: 80 UG/DL (ref 30–160)
LDLC SERPL CALC-MCNC: 134.4 MG/DL (ref 63–159)
NONHDLC SERPL-MCNC: 168 MG/DL
POTASSIUM SERPL-SCNC: 4 MMOL/L (ref 3.5–5.1)
PROT SERPL-MCNC: 7.5 G/DL (ref 6–8.4)
SATURATED IRON: 36 % (ref 20–50)
SODIUM SERPL-SCNC: 141 MMOL/L (ref 136–145)
TOTAL IRON BINDING CAPACITY: 221 UG/DL (ref 250–450)
TRANSFERRIN SERPL-MCNC: 149 MG/DL (ref 200–375)
TRIGL SERPL-MCNC: 168 MG/DL (ref 30–150)

## 2022-11-17 PROCEDURE — 36415 COLL VENOUS BLD VENIPUNCTURE: CPT | Performed by: INTERNAL MEDICINE

## 2022-11-17 PROCEDURE — 84466 ASSAY OF TRANSFERRIN: CPT | Performed by: INTERNAL MEDICINE

## 2022-11-17 PROCEDURE — 82728 ASSAY OF FERRITIN: CPT | Performed by: INTERNAL MEDICINE

## 2022-11-17 PROCEDURE — 83036 HEMOGLOBIN GLYCOSYLATED A1C: CPT | Performed by: INTERNAL MEDICINE

## 2022-11-17 PROCEDURE — 80053 COMPREHEN METABOLIC PANEL: CPT | Performed by: INTERNAL MEDICINE

## 2022-11-17 PROCEDURE — 80061 LIPID PANEL: CPT | Performed by: INTERNAL MEDICINE

## 2022-11-17 PROCEDURE — 85025 COMPLETE CBC W/AUTO DIFF WBC: CPT | Performed by: INTERNAL MEDICINE

## 2022-11-18 LAB
BASOPHILS # BLD AUTO: 0.04 K/UL (ref 0–0.2)
BASOPHILS NFR BLD: 0.7 % (ref 0–1.9)
DIFFERENTIAL METHOD: ABNORMAL
EOSINOPHIL # BLD AUTO: 0.1 K/UL (ref 0–0.5)
EOSINOPHIL NFR BLD: 1.5 % (ref 0–8)
ERYTHROCYTE [DISTWIDTH] IN BLOOD BY AUTOMATED COUNT: 12.8 % (ref 11.5–14.5)
HCT VFR BLD AUTO: 36.9 % (ref 37–48.5)
HGB BLD-MCNC: 11.5 G/DL (ref 12–16)
IMM GRANULOCYTES # BLD AUTO: 0.01 K/UL (ref 0–0.04)
IMM GRANULOCYTES NFR BLD AUTO: 0.2 % (ref 0–0.5)
LYMPHOCYTES # BLD AUTO: 2.5 K/UL (ref 1–4.8)
LYMPHOCYTES NFR BLD: 42.8 % (ref 18–48)
MCH RBC QN AUTO: 26.4 PG (ref 27–31)
MCHC RBC AUTO-ENTMCNC: 31.2 G/DL (ref 32–36)
MCV RBC AUTO: 85 FL (ref 82–98)
MONOCYTES # BLD AUTO: 0.4 K/UL (ref 0.3–1)
MONOCYTES NFR BLD: 6.1 % (ref 4–15)
NEUTROPHILS # BLD AUTO: 2.9 K/UL (ref 1.8–7.7)
NEUTROPHILS NFR BLD: 48.7 % (ref 38–73)
NRBC BLD-RTO: 0 /100 WBC
PLATELET # BLD AUTO: 272 K/UL (ref 150–450)
PMV BLD AUTO: 10 FL (ref 9.2–12.9)
RBC # BLD AUTO: 4.36 M/UL (ref 4–5.4)
WBC # BLD AUTO: 5.89 K/UL (ref 3.9–12.7)

## 2022-11-28 ENCOUNTER — HOSPITAL ENCOUNTER (OUTPATIENT)
Dept: RADIOLOGY | Facility: HOSPITAL | Age: 47
Discharge: HOME | End: 2022-11-28
Attending: INTERNAL MEDICINE
Payer: COMMERCIAL

## 2022-11-28 ENCOUNTER — OFFICE VISIT (OUTPATIENT)
Dept: GASTROENTEROLOGY | Facility: CLINIC | Age: 47
End: 2022-11-28
Payer: COMMERCIAL

## 2022-11-28 VITALS
BODY MASS INDEX: 21.26 KG/M2 | SYSTOLIC BLOOD PRESSURE: 80 MMHG | DIASTOLIC BLOOD PRESSURE: 52 MMHG | OXYGEN SATURATION: 98 % | HEART RATE: 64 BPM | WEIGHT: 140.31 LBS | HEIGHT: 68 IN

## 2022-11-28 DIAGNOSIS — R10.812 LEFT UPPER QUADRANT ABDOMINAL TENDERNESS WITHOUT REBOUND TENDERNESS: ICD-10-CM

## 2022-11-28 DIAGNOSIS — R10.814 LEFT LOWER QUADRANT ABDOMINAL TENDERNESS WITHOUT REBOUND TENDERNESS: ICD-10-CM

## 2022-11-28 DIAGNOSIS — R10.32 CHRONIC LLQ PAIN: ICD-10-CM

## 2022-11-28 DIAGNOSIS — G89.29 CHRONIC LLQ PAIN: Primary | ICD-10-CM

## 2022-11-28 DIAGNOSIS — R10.32 CHRONIC LLQ PAIN: Primary | ICD-10-CM

## 2022-11-28 DIAGNOSIS — G89.29 CHRONIC LLQ PAIN: ICD-10-CM

## 2022-11-28 DIAGNOSIS — Z79.1 NSAID LONG-TERM USE: ICD-10-CM

## 2022-11-28 PROCEDURE — 3074F PR MOST RECENT SYSTOLIC BLOOD PRESSURE < 130 MM HG: ICD-10-PCS | Mod: CPTII,S$GLB,, | Performed by: INTERNAL MEDICINE

## 2022-11-28 PROCEDURE — 1160F PR REVIEW ALL MEDS BY PRESCRIBER/CLIN PHARMACIST DOCUMENTED: ICD-10-PCS | Mod: CPTII,S$GLB,, | Performed by: INTERNAL MEDICINE

## 2022-11-28 PROCEDURE — 99214 PR OFFICE/OUTPT VISIT, EST, LEVL IV, 30-39 MIN: ICD-10-PCS | Mod: S$GLB,,, | Performed by: INTERNAL MEDICINE

## 2022-11-28 PROCEDURE — 1159F MED LIST DOCD IN RCRD: CPT | Mod: CPTII,S$GLB,, | Performed by: INTERNAL MEDICINE

## 2022-11-28 PROCEDURE — 3008F BODY MASS INDEX DOCD: CPT | Mod: CPTII,S$GLB,, | Performed by: INTERNAL MEDICINE

## 2022-11-28 PROCEDURE — 3078F PR MOST RECENT DIASTOLIC BLOOD PRESSURE < 80 MM HG: ICD-10-PCS | Mod: CPTII,S$GLB,, | Performed by: INTERNAL MEDICINE

## 2022-11-28 PROCEDURE — 99999 PR PBB SHADOW E&M-EST. PATIENT-LVL III: CPT | Mod: PBBFAC,,, | Performed by: INTERNAL MEDICINE

## 2022-11-28 PROCEDURE — 3044F HG A1C LEVEL LT 7.0%: CPT | Mod: CPTII,S$GLB,, | Performed by: INTERNAL MEDICINE

## 2022-11-28 PROCEDURE — 99999 PR PBB SHADOW E&M-EST. PATIENT-LVL III: ICD-10-PCS | Mod: PBBFAC,,, | Performed by: INTERNAL MEDICINE

## 2022-11-28 PROCEDURE — 74019 RADEX ABDOMEN 2 VIEWS: CPT | Mod: 26,,, | Performed by: RADIOLOGY

## 2022-11-28 PROCEDURE — 74019 RADEX ABDOMEN 2 VIEWS: CPT | Mod: TC

## 2022-11-28 PROCEDURE — 3044F PR MOST RECENT HEMOGLOBIN A1C LEVEL <7.0%: ICD-10-PCS | Mod: CPTII,S$GLB,, | Performed by: INTERNAL MEDICINE

## 2022-11-28 PROCEDURE — 3078F DIAST BP <80 MM HG: CPT | Mod: CPTII,S$GLB,, | Performed by: INTERNAL MEDICINE

## 2022-11-28 PROCEDURE — 3008F PR BODY MASS INDEX (BMI) DOCUMENTED: ICD-10-PCS | Mod: CPTII,S$GLB,, | Performed by: INTERNAL MEDICINE

## 2022-11-28 PROCEDURE — 74019 XR ABDOMEN FLAT AND ERECT: ICD-10-PCS | Mod: 26,,, | Performed by: RADIOLOGY

## 2022-11-28 PROCEDURE — 1160F RVW MEDS BY RX/DR IN RCRD: CPT | Mod: CPTII,S$GLB,, | Performed by: INTERNAL MEDICINE

## 2022-11-28 PROCEDURE — 3074F SYST BP LT 130 MM HG: CPT | Mod: CPTII,S$GLB,, | Performed by: INTERNAL MEDICINE

## 2022-11-28 PROCEDURE — 99214 OFFICE O/P EST MOD 30 MIN: CPT | Mod: S$GLB,,, | Performed by: INTERNAL MEDICINE

## 2022-11-28 PROCEDURE — 1159F PR MEDICATION LIST DOCUMENTED IN MEDICAL RECORD: ICD-10-PCS | Mod: CPTII,S$GLB,, | Performed by: INTERNAL MEDICINE

## 2022-11-28 NOTE — PROGRESS NOTES
Subjective:       Patient ID: Sandra Dimas is a 47 y.o. female.    Chief Complaint: Nausea, Constipation, and Abdominal Pain    The patient is here with complaint of Constipation for the past 3-4 years. Seemed to start after she had surgery for endometriosis. She had a hysterectomy about 10 months later without any residual noted. She has persistent LLQ abdominal pain and had CT done for this symptom 2 years ago but no abnormality was found.     The LLQ pain is sharp in character and is constant. Low intensity of about 2 on a scale of 1-10. There are no aggravating or alleviating factors with exception of when she has difficulty with constipation. Her last colonoscopy was in 2020 and had normal colon and distal small bowel. When she had surgery fore SBO she had her ileum removed.  She is on Carafate but says she rarely remembers to take it. She is also on NSAIDs, so takes PPI daily to protect her stomach. She had EGD with colonoscopy 2 years ago with gastritis found.     She is on an OTC medication which she had for constipation but had to back off because of incontinence.     She has no bloating, or distension, but when she has not gone for about 3 days she feels like her KB is acting up so she takes something. Review of her OTC med shows at least one of them has 1400 mg of magnesium. The second is a probiotic.     Review of Systems   Constitutional:  Negative for activity change, appetite change, chills, diaphoresis, fatigue, fever and unexpected weight change.   HENT:  Negative for congestion, ear discharge, ear pain, hearing loss, nosebleeds, postnasal drip and tinnitus.    Eyes:  Negative for photophobia and visual disturbance.   Respiratory:  Negative for apnea, cough, choking, chest tightness, shortness of breath and wheezing.    Cardiovascular:  Negative for chest pain, palpitations and leg swelling.   Gastrointestinal:  Positive for abdominal pain, constipation, diarrhea and nausea. Negative for  abdominal distention, anal bleeding, blood in stool, rectal pain and vomiting.        Diarrhea mainly with medication use.  KB   Genitourinary:  Negative for difficulty urinating, dyspareunia, dysuria, flank pain, frequency, hematuria, menstrual problem, pelvic pain, urgency, vaginal bleeding and vaginal discharge.   Musculoskeletal:  Negative for arthralgias, back pain, gait problem, joint swelling, myalgias and neck stiffness.   Skin:  Negative for pallor and rash.   Neurological:  Positive for headaches. Negative for dizziness, tremors, seizures, syncope, speech difficulty, weakness and numbness.   Hematological:  Negative for adenopathy.   Psychiatric/Behavioral:  Negative for agitation, confusion, hallucinations, sleep disturbance and suicidal ideas.      Objective:      Physical Exam  Vitals reviewed.   Constitutional:       Appearance: She is well-developed.   HENT:      Head: Normocephalic and atraumatic.   Eyes:      General: No scleral icterus.        Right eye: No discharge.         Left eye: No discharge.      Conjunctiva/sclera: Conjunctivae normal.      Pupils: Pupils are equal, round, and reactive to light.   Neck:      Thyroid: No thyromegaly.      Vascular: No JVD.   Cardiovascular:      Rate and Rhythm: Normal rate and regular rhythm.      Heart sounds: Normal heart sounds. No murmur heard.    No friction rub. No gallop.   Pulmonary:      Effort: Pulmonary effort is normal. No respiratory distress.      Breath sounds: Normal breath sounds. No wheezing or rales.   Chest:      Chest wall: No tenderness.   Abdominal:      General: Bowel sounds are normal. There is no distension.      Palpations: Abdomen is soft. There is no mass.      Tenderness: There is abdominal tenderness. There is no guarding or rebound.      Comments: LLQ and LUQ tenderness   Musculoskeletal:         General: Normal range of motion.      Cervical back: Normal range of motion and neck supple.   Lymphadenopathy:      Cervical: No  cervical adenopathy.   Skin:     General: Skin is warm and dry.      Coloration: Skin is not pale.      Findings: No erythema or rash.   Neurological:      Mental Status: She is alert and oriented to person, place, and time.      Motor: No abnormal muscle tone.      Coordination: Coordination normal.      Deep Tendon Reflexes: Reflexes are normal and symmetric.   Psychiatric:         Behavior: Behavior normal.         Thought Content: Thought content normal.         Judgment: Judgment normal.       Assessment:   Chronic LLQ pain  -     X-Ray Abdomen Flat And Erect; Future; Expected date: 11/28/2022    Left lower quadrant abdominal tenderness without rebound tenderness  -     X-Ray Abdomen Flat And Erect; Future; Expected date: 11/28/2022    Left upper quadrant abdominal tenderness without rebound tenderness  -     X-Ray Abdomen Flat And Erect; Future; Expected date: 11/28/2022    NSAID long-term use          Plan:   As above.

## 2022-11-29 ENCOUNTER — PATIENT MESSAGE (OUTPATIENT)
Dept: GASTROENTEROLOGY | Facility: CLINIC | Age: 47
End: 2022-11-29
Payer: COMMERCIAL

## 2022-11-29 DIAGNOSIS — K59.09 CHRONIC CONSTIPATION: Primary | ICD-10-CM

## 2022-11-29 RX ORDER — SODIUM, POTASSIUM,MAG SULFATES 17.5-3.13G
1 SOLUTION, RECONSTITUTED, ORAL ORAL DAILY
Qty: 1 KIT | Refills: 0 | Status: SHIPPED | OUTPATIENT
Start: 2022-11-29 | End: 2022-12-03

## 2022-12-01 DIAGNOSIS — F33.41 RECURRENT MAJOR DEPRESSIVE DISORDER, IN PARTIAL REMISSION: ICD-10-CM

## 2022-12-01 RX ORDER — ESCITALOPRAM OXALATE 20 MG/1
20 TABLET ORAL DAILY
Qty: 90 TABLET | Refills: 3 | Status: SHIPPED | OUTPATIENT
Start: 2022-12-01 | End: 2023-03-09 | Stop reason: ALTCHOICE

## 2022-12-01 NOTE — TELEPHONE ENCOUNTER
No new care gaps identified.  Health [facility] Embedded Care Gaps. Reference number: [id number]. 12/01/2022   8:24:31 AM CST

## 2022-12-06 ENCOUNTER — TELEPHONE (OUTPATIENT)
Dept: OBSTETRICS AND GYNECOLOGY | Facility: CLINIC | Age: 47
End: 2022-12-06
Payer: COMMERCIAL

## 2022-12-06 DIAGNOSIS — Z79.890 HORMONE REPLACEMENT THERAPY (POSTMENOPAUSAL): Primary | ICD-10-CM

## 2022-12-06 RX ORDER — TESTOSTERONE 50 MG/5G
1 GEL TRANSDERMAL DAILY
Qty: 5 G | Refills: 5 | Status: SHIPPED | OUTPATIENT
Start: 2022-12-06 | End: 2023-01-25

## 2022-12-06 NOTE — TELEPHONE ENCOUNTER
Having fatigue with negative workup for anemia and thyroid disease   Bilateral salpingo-oophorectomy in the past on no ERT   Interested in trial of testosterone low dose   Diagnoses and all orders for this visit:    Hormone replacement therapy (postmenopausal)  -     testosterone (TESTIM) 50 mg/5 gram (1 %) Gel; Apply 1 drop topically once daily.

## 2022-12-09 ENCOUNTER — OFFICE VISIT (OUTPATIENT)
Dept: OPHTHALMOLOGY | Facility: CLINIC | Age: 47
End: 2022-12-09
Payer: COMMERCIAL

## 2022-12-09 DIAGNOSIS — H52.4 MYOPIA WITH ASTIGMATISM AND PRESBYOPIA, BILATERAL: Primary | ICD-10-CM

## 2022-12-09 DIAGNOSIS — H52.203 MYOPIA WITH ASTIGMATISM AND PRESBYOPIA, BILATERAL: Primary | ICD-10-CM

## 2022-12-09 DIAGNOSIS — H52.13 MYOPIA WITH ASTIGMATISM AND PRESBYOPIA, BILATERAL: Primary | ICD-10-CM

## 2022-12-09 PROCEDURE — 92015 PR REFRACTION: ICD-10-PCS | Mod: S$GLB,,, | Performed by: OPTOMETRIST

## 2022-12-09 PROCEDURE — 92014 PR EYE EXAM, EST PATIENT,COMPREHESV: ICD-10-PCS | Mod: S$GLB,,, | Performed by: OPTOMETRIST

## 2022-12-09 PROCEDURE — 92015 DETERMINE REFRACTIVE STATE: CPT | Mod: S$GLB,,, | Performed by: OPTOMETRIST

## 2022-12-09 PROCEDURE — 92014 COMPRE OPH EXAM EST PT 1/>: CPT | Mod: S$GLB,,, | Performed by: OPTOMETRIST

## 2022-12-09 NOTE — PROGRESS NOTES
HPI     Annual Exam            Comments: Vision changes since last eye exam?: yes, near   Any eye pain today: no  Other ocular symptoms: no  Interested in contact lens fitting today? Yes                        Last edited by Krystyna Dukes MA on 12/9/2022 10:44 AM.            Assessment /Plan     For exam results, see Encounter Report.    Myopia with astigmatism and presbyopia, bilateral    Eyeglass Final Rx       Eyeglass Final Rx         Sphere Cylinder Axis Add    Right -5.25 +0.75 060 +1.75    Left -6.75 +1.00 095 +1.75      Type: SVL distance okay    Expiration Date: 12/9/2023              Eyeglass Final Rx #2         Sphere Cylinder Axis Add    Right +2.00       Left +2.00         Type: SVL reading only    Expiration Date: 12/9/2023    Comments: To wear over contacts              Eyeglass Final Rx #3         Sphere Cylinder Axis Add    Right [place]   +2.00    Left [place]   +2.00      Expiration Date: 12/9/2023    Comments: To be worn with contacts                  Contact Lens Prescription (12/9/2022)          Brand Base Curve Diameter Sphere Cylinder Axis    Right Acuvue Oasys 1 Day for Astigmatism 8.5 14.3 -4.00 -0.75 150    Left Acuvue Oasys 1 Day for Astigmatism 8.5 14.3 -5.50 -0.75 180      Expiration Date: 12/9/2023    Replacement: Daily    Wearing Schedule: Daily Wear        Pt to try +1.50 or +1.75 Readers with [facility] for [facility]  Discussed SVL vs Bifocal Spec Rx, Spec Rx written for both options    RTC 1 yr for undilated eye exam with gOCT or PRN if any problems.   Discussed above and answered questions.

## 2022-12-12 ENCOUNTER — PATIENT MESSAGE (OUTPATIENT)
Dept: OPTOMETRY | Facility: CLINIC | Age: 47
End: 2022-12-12
Payer: COMMERCIAL

## 2022-12-15 RX ORDER — TOPIRAMATE 200 MG/1
CAPSULE, EXTENDED RELEASE ORAL
Qty: 30 CAPSULE | Refills: 4 | Status: SHIPPED | OUTPATIENT
Start: 2022-12-15 | End: 2022-12-27 | Stop reason: SDUPTHER

## 2022-12-16 ENCOUNTER — HOSPITAL ENCOUNTER (EMERGENCY)
Facility: HOSPITAL | Age: 47
Discharge: HOME | End: 2022-12-16
Attending: EMERGENCY MEDICINE
Payer: COMMERCIAL

## 2022-12-16 VITALS
RESPIRATION RATE: 11 BRPM | SYSTOLIC BLOOD PRESSURE: 104 MMHG | BODY MASS INDEX: 21.13 KG/M2 | OXYGEN SATURATION: 100 % | DIASTOLIC BLOOD PRESSURE: 71 MMHG | HEART RATE: 79 BPM | TEMPERATURE: 98 F | WEIGHT: 139 LBS

## 2022-12-16 DIAGNOSIS — R51.9 HEADACHE: ICD-10-CM

## 2022-12-16 DIAGNOSIS — G44.049 CHRONIC PAROXYSMAL HEMICRANIA, NOT INTRACTABLE: Primary | ICD-10-CM

## 2022-12-16 LAB
ALBUMIN SERPL BCP-MCNC: 4 G/DL (ref 3.5–5.2)
ALP SERPL-CCNC: 49 U/L (ref 55–135)
ALT SERPL W/O P-5'-P-CCNC: 14 U/L (ref 10–44)
ANION GAP SERPL CALC-SCNC: 8 MMOL/L (ref 8–16)
AST SERPL-CCNC: 17 U/L (ref 10–40)
BASOPHILS # BLD AUTO: 0.04 K/UL (ref 0–0.2)
BASOPHILS NFR BLD: 0.7 % (ref 0–1.9)
BILIRUB SERPL-MCNC: 0.3 MG/DL (ref 0.1–1)
BUN SERPL-MCNC: 27 MG/DL (ref 6–20)
CALCIUM SERPL-MCNC: 9.1 MG/DL (ref 8.7–10.5)
CHLORIDE SERPL-SCNC: 114 MMOL/L (ref 95–110)
CO2 SERPL-SCNC: 20 MMOL/L (ref 23–29)
CREAT SERPL-MCNC: 0.8 MG/DL (ref 0.5–1.4)
DIFFERENTIAL METHOD: ABNORMAL
EOSINOPHIL # BLD AUTO: 0.1 K/UL (ref 0–0.5)
EOSINOPHIL NFR BLD: 1.3 % (ref 0–8)
ERYTHROCYTE [DISTWIDTH] IN BLOOD BY AUTOMATED COUNT: 13.2 % (ref 11.5–14.5)
EST. GFR  (NO RACE VARIABLE): >60 ML/MIN/1.73 M^2
GLUCOSE SERPL-MCNC: 86 MG/DL (ref 70–110)
HCT VFR BLD AUTO: 34 % (ref 37–48.5)
HGB BLD-MCNC: 10.8 G/DL (ref 12–16)
IMM GRANULOCYTES # BLD AUTO: 0 K/UL (ref 0–0.04)
IMM GRANULOCYTES NFR BLD AUTO: 0 % (ref 0–0.5)
LYMPHOCYTES # BLD AUTO: 2.5 K/UL (ref 1–4.8)
LYMPHOCYTES NFR BLD: 46.4 % (ref 18–48)
MAGNESIUM SERPL-MCNC: 2 MG/DL (ref 1.6–2.6)
MCH RBC QN AUTO: 26 PG (ref 27–31)
MCHC RBC AUTO-ENTMCNC: 31.8 G/DL (ref 32–36)
MCV RBC AUTO: 82 FL (ref 82–98)
MONOCYTES # BLD AUTO: 0.3 K/UL (ref 0.3–1)
MONOCYTES NFR BLD: 5.2 % (ref 4–15)
NEUTROPHILS # BLD AUTO: 2.5 K/UL (ref 1.8–7.7)
NEUTROPHILS NFR BLD: 46.4 % (ref 38–73)
NRBC BLD-RTO: 0 /100 WBC
PLATELET # BLD AUTO: 232 K/UL (ref 150–450)
PMV BLD AUTO: 9.5 FL (ref 9.2–12.9)
POTASSIUM SERPL-SCNC: 4.1 MMOL/L (ref 3.5–5.1)
PROT SERPL-MCNC: 7.1 G/DL (ref 6–8.4)
RBC # BLD AUTO: 4.15 M/UL (ref 4–5.4)
SODIUM SERPL-SCNC: 142 MMOL/L (ref 136–145)
WBC # BLD AUTO: 5.34 K/UL (ref 3.9–12.7)

## 2022-12-16 PROCEDURE — 63600175 PHARM REV CODE 636 W HCPCS: Performed by: EMERGENCY MEDICINE

## 2022-12-16 PROCEDURE — 93010 ELECTROCARDIOGRAM REPORT: CPT | Mod: ,,, | Performed by: INTERNAL MEDICINE

## 2022-12-16 PROCEDURE — 80053 COMPREHEN METABOLIC PANEL: CPT | Performed by: EMERGENCY MEDICINE

## 2022-12-16 PROCEDURE — 96365 THER/PROPH/DIAG IV INF INIT: CPT

## 2022-12-16 PROCEDURE — 99284 EMERGENCY DEPT VISIT MOD MDM: CPT | Mod: 25

## 2022-12-16 PROCEDURE — 93005 ELECTROCARDIOGRAM TRACING: CPT

## 2022-12-16 PROCEDURE — 25000003 PHARM REV CODE 250: Performed by: EMERGENCY MEDICINE

## 2022-12-16 PROCEDURE — 96376 TX/PRO/DX INJ SAME DRUG ADON: CPT

## 2022-12-16 PROCEDURE — 93010 EKG 12-LEAD: ICD-10-PCS | Mod: ,,, | Performed by: INTERNAL MEDICINE

## 2022-12-16 PROCEDURE — 85025 COMPLETE CBC W/AUTO DIFF WBC: CPT | Performed by: EMERGENCY MEDICINE

## 2022-12-16 PROCEDURE — 96366 THER/PROPH/DIAG IV INF ADDON: CPT

## 2022-12-16 PROCEDURE — 83735 ASSAY OF MAGNESIUM: CPT | Performed by: EMERGENCY MEDICINE

## 2022-12-16 PROCEDURE — 96375 TX/PRO/DX INJ NEW DRUG ADDON: CPT

## 2022-12-16 RX ORDER — DIPHENHYDRAMINE HYDROCHLORIDE 50 MG/ML
25 INJECTION INTRAMUSCULAR; INTRAVENOUS
Status: COMPLETED | OUTPATIENT
Start: 2022-12-16 | End: 2022-12-16

## 2022-12-16 RX ORDER — KETOROLAC TROMETHAMINE 30 MG/ML
15 INJECTION, SOLUTION INTRAMUSCULAR; INTRAVENOUS
Status: COMPLETED | OUTPATIENT
Start: 2022-12-16 | End: 2022-12-16

## 2022-12-16 RX ORDER — BUTORPHANOL TARTRATE 2 MG/ML
1 INJECTION INTRAMUSCULAR; INTRAVENOUS ONCE
Status: COMPLETED | OUTPATIENT
Start: 2022-12-16 | End: 2022-12-16

## 2022-12-16 RX ORDER — LORAZEPAM 2 MG/ML
0.5 INJECTION INTRAMUSCULAR
Status: COMPLETED | OUTPATIENT
Start: 2022-12-16 | End: 2022-12-16

## 2022-12-16 RX ORDER — MAGNESIUM SULFATE HEPTAHYDRATE 40 MG/ML
2 INJECTION, SOLUTION INTRAVENOUS
Status: COMPLETED | OUTPATIENT
Start: 2022-12-16 | End: 2022-12-16

## 2022-12-16 RX ORDER — ONDANSETRON 2 MG/ML
8 INJECTION INTRAMUSCULAR; INTRAVENOUS
Status: COMPLETED | OUTPATIENT
Start: 2022-12-16 | End: 2022-12-16

## 2022-12-16 RX ORDER — METOCLOPRAMIDE 10 MG/1
10 TABLET ORAL EVERY 6 HOURS PRN
Qty: 30 TABLET | Refills: 0 | Status: SHIPPED | OUTPATIENT
Start: 2022-12-16 | End: 2023-01-25

## 2022-12-16 RX ORDER — PROCHLORPERAZINE MALEATE 5 MG
10 TABLET ORAL
Status: COMPLETED | OUTPATIENT
Start: 2022-12-16 | End: 2022-12-16

## 2022-12-16 RX ORDER — KETOROLAC TROMETHAMINE 30 MG/ML
30 INJECTION, SOLUTION INTRAMUSCULAR; INTRAVENOUS
Status: COMPLETED | OUTPATIENT
Start: 2022-12-16 | End: 2022-12-16

## 2022-12-16 RX ORDER — BUTORPHANOL TARTRATE 2 MG/ML
2 INJECTION INTRAMUSCULAR; INTRAVENOUS ONCE
Status: COMPLETED | OUTPATIENT
Start: 2022-12-16 | End: 2022-12-16

## 2022-12-16 RX ORDER — OXYCODONE AND ACETAMINOPHEN 10; 325 MG/1; MG/1
1 TABLET ORAL EVERY 6 HOURS PRN
Qty: 12 TABLET | Refills: 0 | Status: SHIPPED | OUTPATIENT
Start: 2022-12-16 | End: 2023-01-25

## 2022-12-16 RX ADMIN — SODIUM CHLORIDE 2000 ML: 9 INJECTION, SOLUTION INTRAVENOUS at 09:12

## 2022-12-16 RX ADMIN — KETOROLAC TROMETHAMINE 30 MG: 30 INJECTION, SOLUTION INTRAMUSCULAR; INTRAVENOUS at 09:12

## 2022-12-16 RX ADMIN — BUTORPHANOL TARTRATE 2 MG: 2 INJECTION, SOLUTION INTRAMUSCULAR; INTRAVENOUS at 10:12

## 2022-12-16 RX ADMIN — MAGNESIUM SULFATE HEPTAHYDRATE 2 G: 40 INJECTION, SOLUTION INTRAVENOUS at 10:12

## 2022-12-16 RX ADMIN — ONDANSETRON 8 MG: 2 INJECTION INTRAMUSCULAR; INTRAVENOUS at 09:12

## 2022-12-16 RX ADMIN — KETOROLAC TROMETHAMINE 15 MG: 30 INJECTION, SOLUTION INTRAMUSCULAR at 01:12

## 2022-12-16 RX ADMIN — BUTORPHANOL TARTRATE 1 MG: 2 INJECTION, SOLUTION INTRAMUSCULAR; INTRAVENOUS at 01:12

## 2022-12-16 RX ADMIN — DIPHENHYDRAMINE HYDROCHLORIDE 25 MG: 50 INJECTION, SOLUTION INTRAMUSCULAR; INTRAVENOUS at 09:12

## 2022-12-16 RX ADMIN — PROCHLORPERAZINE MALEATE 10 MG: 5 TABLET ORAL at 09:12

## 2022-12-16 RX ADMIN — LORAZEPAM 0.5 MG: 2 INJECTION INTRAMUSCULAR; INTRAVENOUS at 09:12

## 2022-12-16 NOTE — ED PROVIDER NOTES
SCRIBE #1 NOTE: ILisbet, am scribing for, and in the presence of, Aisha Jiménez MD. I have scribed the entire note.      History      Chief Complaint   Patient presents with    Migraine     Pt c/o migraine, hx chronic migraines unrelieved by medications at home, +photophobia       Review of patient's allergies indicates:   Allergen Reactions    Hydrocodone Other (See Comments)     Chest pains. Note: patient tolerated hydromorphone in 2/2019.     Chlorhexidine Rash     Per  after surgery patient had large rash across abdomen where chlorhexidine was applied.    Mastisol adhesive [gum agyqhn-pdwehd-kexr-alcohol] Rash        HPI   HPI    12/16/2022, 9:32 AM   History obtained from the patient and the pt's  at bedside      History of Present Illness: Sandra Dimas is a 47 y.o. female patient with a PMHx of migraines who presents to the Emergency Department for a headache which onset gradually. Per the pt, her current sxs are consistent with the sxs that she normally has when she has a migraine. The pt's  states that the recent weather change may have triggered the pt's migraine as her migraines have been triggered by weather changes in the past. Symptoms are constant and moderate in severity. No mitigating or exacerbating factors reported. Associated sxs include bilateral photophobia and nausea. Patient denies any fever, chills, visual disturbance, weakness, numbness, vomiting, and all other sxs at this time. Prior Tx includes her at home medications for migraines with no relief of sxs. No further complaints or concerns at this time.         Arrival mode: Personal vehicle    PCP: OSIRIS Gamez Jr, MD       Past Medical History:  Past Medical History:   Diagnosis Date    Chronic paroxysmal hemicrania 01/03/2019    Endometriosis     General anesthetics causing adverse effect in therapeutic use     wakes up with severe anxiety attacks    Hemicrania continua     Hepatitis C antibody  positive in blood 12/09/2020    RNA NEGATIVE 12/14/2020    Migraines        Past Surgical History:  Past Surgical History:   Procedure Laterality Date    APPENDECTOMY      COLONOSCOPY N/A 11/12/2020    Procedure: COLONOSCOPY;  Surgeon: Mylene Russ MD;  Location: [facility];  Service: Endoscopy;  Laterality: N/A;    CYST REMOVAL Right 12/30/2019    Procedure: EXCISION, CYST;  Surgeon: NATALI Shah MD;  Location: [facility] OR;  Service: OB/GYN;  Laterality: Right;  Sebaceous cyst    diagnostic laprascopy      ESOPHAGOGASTRODUODENOSCOPY N/A 11/30/2018    Procedure: EGD (ESOPHAGOGASTRODUODENOSCOPY);  Surgeon: Bossman Bustos MD;  Location: [facility];  Service: Endoscopy;  Laterality: N/A;    ESOPHAGOGASTRODUODENOSCOPY N/A 11/12/2020    Procedure: ESOPHAGOGASTRODUODENOSCOPY (EGD) needs rapid covid test;  Surgeon: Mylene Russ MD;  Location: [facility];  Service: Endoscopy;  Laterality: N/A;    FULGURATION OF ENDOMETRIOSIS  12/26/2018    Procedure: FULGURATION, ENDOMETRIOSIS;  Surgeon: Zehra Jensen MD;  Location: [facility] OR;  Service: OB/GYN;;  endometriosis implant resection    HYSTEROSCOPY WITH HYDROTHERMAL ABLATION OF ENDOMETRIUM WITH DILATION AND CURETTAGE N/A 12/26/2018    Procedure: HYSTEROSCOPY, WITH DILATION AND CURETTAGE OF UTERUS AND HYDROTHERMAL ENDOMETRIAL ABLATION;  Surgeon: Zehra Jensen MD;  Location: [facility] OR;  Service: OB/GYN;  Laterality: N/A;    LAPAROSCOPIC SALPINGECTOMY Bilateral 12/26/2018    Procedure: SALPINGECTOMY, LAPAROSCOPIC;  Surgeon: Zehra Jensen MD;  Location: [facility] OR;  Service: OB/GYN;  Laterality: Bilateral;    LAPAROTOMY, EXPLORATORY N/A 2/7/2019    Procedure: LAPAROTOMY, EXPLORATORY;  Surgeon: Ramakrishna Boone MD;  Location: [facility] OR;  Service: General;  Laterality: N/A;    REPAIR OF VAGINAL CUFF N/A 7/9/2020    Procedure: REPAIR, VAGINAL CUFF;  Surgeon: Sailaja Addison MD;  Location: [facility] OR;  Service: OB/GYN;  Laterality: N/A;    ROBOT-ASSISTED LAPAROSCOPIC ABDOMINAL  HYSTERECTOMY USING DA MAVIS XI N/A 12/30/2019    Procedure: XI ROBOTIC HYSTERECTOMY;  Surgeon: NATALI Shah MD;  Location: [facility] OR;  Service: OB/GYN;  Laterality: N/A;    ROBOT-ASSISTED LAPAROSCOPIC SURGICAL REMOVAL OF OVARY USING DA MAVIS XI Bilateral 12/30/2019    Procedure: XI ROBOTIC OOPHORECTOMY;  Surgeon: NATALI Shah MD;  Location: [facility] OR;  Service: OB/GYN;  Laterality: Bilateral;    robotic assisted laparoscopy with excision of ovarian endometrioma and chromotubation           Family History:  Family History   Problem Relation Age of Onset    Hypertension Mother     Diabetes type II Mother     Heart attack Father     Strabismus Neg Hx     Retinal detachment Neg Hx     Macular degeneration Neg Hx     Glaucoma Neg Hx     Blindness Neg Hx     Amblyopia Neg Hx     Breast cancer Neg Hx     Colon cancer Neg Hx     Ovarian cancer Neg Hx     Thyroid disease Neg Hx     Migraines Neg Hx     Asthma Neg Hx        Social History:  Social History     Tobacco Use    Smoking status: Never    Smokeless tobacco: Never   Substance and Sexual Activity    Alcohol use: Not Currently     Comment: rarely No alcohol 72h prior to sx    Drug use: No    Sexual activity: Yes     Partners: Male       ROS   Review of Systems   Constitutional:  Negative for chills and fever.   HENT:  Negative for sore throat.    Eyes:  Positive for photophobia (bilateral). Negative for visual disturbance.   Respiratory:  Negative for shortness of breath.    Cardiovascular:  Negative for chest pain.   Gastrointestinal:  Positive for nausea. Negative for vomiting.   Genitourinary:  Negative for dysuria.   Musculoskeletal:  Negative for back pain.   Skin:  Negative for rash.   Neurological:  Positive for headaches. Negative for weakness and numbness.   Hematological:  Does not bruise/bleed easily.   All other systems reviewed and are negative.    Physical Exam      Initial Vitals   BP Pulse Resp Temp SpO2   12/16/22 0937 12/16/22 0936 12/16/22  0936 12/16/22 0949 12/16/22 0936   (!) 91/58 70 20 97.8 °F (36.6 °C) 98 %      MAP       --                 Physical Exam  Nursing Notes and Vital Signs Reviewed.  Constitutional: Patient is in mild distress. Well-developed and well-nourished.  Head: Atraumatic. Normocephalic.  Eyes: PERRL. EOM intact. Conjunctivae are not pale. No scleral icterus.  ENT: Mucous membranes are moist. Oropharynx is clear and symmetric.    Neck: Supple. Full ROM. No lymphadenopathy.  Cardiovascular: Regular rate. Regular rhythm. No murmurs, rubs, or gallops. Distal pulses are 2+ and symmetric.  Pulmonary/Chest: No respiratory distress. Clear to auscultation bilaterally. No wheezing or rales.  Abdominal: Soft and non-distended.  There is no tenderness.  No rebound, guarding, or rigidity.   Musculoskeletal: Moves all extremities. No obvious deformities. No edema.  Skin: Warm and dry.  Neurological:  Alert, awake, and appropriate.  Normal speech.  No acute focal neurological deficits are appreciated.  Psychiatric: Normal affect. Good eye contact. Appropriate in content.    ED Course    Procedures  ED Vital Signs:  Vitals:    12/16/22 0936 12/16/22 0937 12/16/22 0949 12/16/22 1002   BP:  (!) 91/58  114/74   Pulse: 70 70  84   Resp: 20   14   Temp:   97.8 °F (36.6 °C)    TempSrc:   Oral    SpO2: 98%   99%    12/16/22 1036 12/16/22 1100   BP:  (!) 91/58   Pulse: 82 81   Resp: 16 10   Temp:  97.9 °F (36.6 °C)   TempSrc:  Oral   SpO2: 99% 100%       Abnormal Lab Results:  Labs Reviewed   CBC W/ AUTO DIFFERENTIAL - Abnormal; Notable for the following components:       Result Value    Hemoglobin 10.8 (*)     Hematocrit 34.0 (*)     MCH 26.0 (*)     MCHC 31.8 (*)     All other components within normal limits   COMPREHENSIVE METABOLIC PANEL - Abnormal; Notable for the following components:    Chloride 114 (*)     CO2 20 (*)     BUN 27 (*)     Alkaline Phosphatase 49 (*)     All other components within normal limits   MAGNESIUM        All Lab  Results:  Results for orders placed or performed during the hospital encounter of 12/16/22   CBC auto differential   Result Value Ref Range    WBC 5.34 3.90 - 12.70 K/uL    RBC 4.15 4.00 - 5.40 M/uL    Hemoglobin 10.8 (L) 12.0 - 16.0 g/dL    Hematocrit 34.0 (L) 37.0 - 48.5 %    MCV 82 82 - 98 fL    MCH 26.0 (L) 27.0 - 31.0 pg    MCHC 31.8 (L) 32.0 - 36.0 g/dL    RDW 13.2 11.5 - 14.5 %    Platelets 232 150 - 450 K/uL    MPV 9.5 9.2 - 12.9 fL    Immature Granulocytes 0.0 0.0 - 0.5 %    Gran # (ANC) 2.5 1.8 - 7.7 K/uL    Immature Grans (Abs) 0.00 0.00 - 0.04 K/uL    Lymph # 2.5 1.0 - 4.8 K/uL    Mono # 0.3 0.3 - 1.0 K/uL    Eos # 0.1 0.0 - 0.5 K/uL    Baso # 0.04 0.00 - 0.20 K/uL    nRBC 0 0 /100 WBC    Gran % 46.4 38.0 - 73.0 %    Lymph % 46.4 18.0 - 48.0 %    Mono % 5.2 4.0 - 15.0 %    Eosinophil % 1.3 0.0 - 8.0 %    Basophil % 0.7 0.0 - 1.9 %    Differential Method Automated    Comprehensive metabolic panel   Result Value Ref Range    Sodium 142 136 - 145 mmol/L    Potassium 4.1 3.5 - 5.1 mmol/L    Chloride 114 (H) 95 - 110 mmol/L    CO2 20 (L) 23 - 29 mmol/L    Glucose 86 70 - 110 mg/dL    BUN 27 (H) 6 - 20 mg/dL    Creatinine 0.8 0.5 - 1.4 mg/dL    Calcium 9.1 8.7 - 10.5 mg/dL    Total Protein 7.1 6.0 - 8.4 g/dL    Albumin 4.0 3.5 - 5.2 g/dL    Total Bilirubin 0.3 0.1 - 1.0 mg/dL    Alkaline Phosphatase 49 (L) 55 - 135 U/L    AST 17 10 - 40 U/L    ALT 14 10 - 44 U/L    Anion Gap 8 8 - 16 mmol/L    eGFR >60 >60 mL/min/1.73 m^2   Magnesium   Result Value Ref Range    Magnesium 2.0 1.6 - 2.6 mg/dL         Imaging Results:  Imaging Results    None        The EKG was ordered, reviewed, and independently interpreted by the ED provider.  Interpretation time: 9:43  Rate: 66 BPM  Rhythm: normal sinus rhythm  Interpretation: Incomplete RBBB. No STEMI.           The Emergency Provider reviewed the vital signs and test results, which are outlined above.    ED Discussion     11:40 AM: Reassessed pt at this time. Pt states that  her sxs have improved at this time, reports headache improved from initial presentation, feels comfortable being discharged home, all questions answered. Discussed with pt all pertinent ED information and results. Discussed pt dx and plan of tx. Gave pt all f/u and return to the ED instructions. All questions and concerns were addressed at this time. Pt expresses understanding of information and instructions, and is comfortable with plan to discharge. Pt is stable for discharge.    I discussed with patient and/or family/caretaker that evaluation in the ED does not suggest any emergent or life threatening medical conditions requiring immediate intervention beyond what was provided in the ED, and I believe patient is safe for discharge.  Regardless, an unremarkable evaluation in the ED does not preclude the development or presence of a serious of life threatening condition. As such, patient was instructed to return immediately for any worsening or change in current symptoms.           ED Medication(s):  Medications   sodium chloride 0.9% bolus 2,000 mL 2,000 mL (0 mLs Intravenous Stopped 12/16/22 1201)   ketorolac injection 30 mg (30 mg Intravenous Given 12/16/22 0952)   diphenhydrAMINE injection 25 mg (25 mg Intravenous Given 12/16/22 0954)   magnesium sulfate 2g in water 50mL IVPB (premix) (0 g Intravenous Stopped 12/16/22 1201)   prochlorperazine tablet 10 mg (10 mg Oral Given 12/16/22 0955)   ondansetron injection 8 mg (8 mg Intravenous Given 12/16/22 0951)   LORazepam injection 0.5 mg (0.5 mg Intravenous Given 12/16/22 0952)   butorphanol injection 2 mg (2 mg Intravenous Given 12/16/22 1013)        Follow-up Information       [facility]. Schedule an appointment as soon as possible for a visit in 2 days.    Specialty: Neurology  Why: Return to the Emergency Room, If symptoms worsen  Contact information:  [address] [facility] [address] [phone]                           [facility]  Prescriptions    No medications on file         Medical Decision Making    Medical Decision Making:   Clinical Tests:   Lab Tests: Ordered and Reviewed  Medical Tests: Ordered and Reviewed         Scribe Attestation:   Scribe #1: I performed the above scribed service and the documentation accurately describes the services I performed. I attest to the accuracy of the note.    Attending:   Physician Attestation Statement for Scribe #1: I, Aisha Jiménez MD, personally performed the services described in this documentation, as scribed by Lisbet May, in my presence, and it is both accurate and complete.          Clinical Impression       ICD-10-CM ICD-9-CM   1. Chronic paroxysmal hemicrania, not intractable  G44.049 339.04   2. Headache  R51.9 784.0       Disposition:   Disposition: Discharged  Condition: Stable       Aisha Jiménez MD  12/16/22 1205

## 2022-12-20 ENCOUNTER — OFFICE VISIT (OUTPATIENT)
Dept: PHYSICAL MEDICINE AND REHAB | Facility: CLINIC | Age: 47
End: 2022-12-20
Payer: COMMERCIAL

## 2022-12-20 VITALS
HEART RATE: 76 BPM | WEIGHT: 139 LBS | DIASTOLIC BLOOD PRESSURE: 71 MMHG | BODY MASS INDEX: 21.07 KG/M2 | RESPIRATION RATE: 14 BRPM | HEIGHT: 68 IN | SYSTOLIC BLOOD PRESSURE: 103 MMHG

## 2022-12-20 DIAGNOSIS — M53.82 MUSCULOSKELETAL DISORDER OF THE SUBOCCIPITAL: ICD-10-CM

## 2022-12-20 DIAGNOSIS — M79.18 DIFFUSE MYOFASCIAL PAIN SYNDROME: Primary | ICD-10-CM

## 2022-12-20 DIAGNOSIS — M46.00 SPINAL ENTHESOPATHY: ICD-10-CM

## 2022-12-20 DIAGNOSIS — M75.80 ROTATOR CUFF TENDINITIS, UNSPECIFIED LATERALITY: ICD-10-CM

## 2022-12-20 PROCEDURE — 3044F PR MOST RECENT HEMOGLOBIN A1C LEVEL <7.0%: ICD-10-PCS | Mod: CPTII,S$GLB,, | Performed by: PHYSICAL MEDICINE & REHABILITATION

## 2022-12-20 PROCEDURE — 20553 PR INJECT TRIGGER POINTS, > 3: ICD-10-PCS | Mod: S$GLB,,, | Performed by: PHYSICAL MEDICINE & REHABILITATION

## 2022-12-20 PROCEDURE — 99999 PR PBB SHADOW E&M-EST. PATIENT-LVL III: CPT | Mod: PBBFAC,,, | Performed by: PHYSICAL MEDICINE & REHABILITATION

## 2022-12-20 PROCEDURE — 3078F DIAST BP <80 MM HG: CPT | Mod: CPTII,S$GLB,, | Performed by: PHYSICAL MEDICINE & REHABILITATION

## 2022-12-20 PROCEDURE — 3008F BODY MASS INDEX DOCD: CPT | Mod: CPTII,S$GLB,, | Performed by: PHYSICAL MEDICINE & REHABILITATION

## 2022-12-20 PROCEDURE — 99214 PR OFFICE/OUTPT VISIT, EST, LEVL IV, 30-39 MIN: ICD-10-PCS | Mod: 25,S$GLB,, | Performed by: PHYSICAL MEDICINE & REHABILITATION

## 2022-12-20 PROCEDURE — 99214 OFFICE O/P EST MOD 30 MIN: CPT | Mod: 25,S$GLB,, | Performed by: PHYSICAL MEDICINE & REHABILITATION

## 2022-12-20 PROCEDURE — 20553 NJX 1/MLT TRIGGER POINTS 3/>: CPT | Mod: S$GLB,,, | Performed by: PHYSICAL MEDICINE & REHABILITATION

## 2022-12-20 PROCEDURE — 3044F HG A1C LEVEL LT 7.0%: CPT | Mod: CPTII,S$GLB,, | Performed by: PHYSICAL MEDICINE & REHABILITATION

## 2022-12-20 PROCEDURE — 99999 PR PBB SHADOW E&M-EST. PATIENT-LVL III: ICD-10-PCS | Mod: PBBFAC,,, | Performed by: PHYSICAL MEDICINE & REHABILITATION

## 2022-12-20 PROCEDURE — 3078F PR MOST RECENT DIASTOLIC BLOOD PRESSURE < 80 MM HG: ICD-10-PCS | Mod: CPTII,S$GLB,, | Performed by: PHYSICAL MEDICINE & REHABILITATION

## 2022-12-20 PROCEDURE — 3074F SYST BP LT 130 MM HG: CPT | Mod: CPTII,S$GLB,, | Performed by: PHYSICAL MEDICINE & REHABILITATION

## 2022-12-20 PROCEDURE — 3074F PR MOST RECENT SYSTOLIC BLOOD PRESSURE < 130 MM HG: ICD-10-PCS | Mod: CPTII,S$GLB,, | Performed by: PHYSICAL MEDICINE & REHABILITATION

## 2022-12-20 PROCEDURE — 3008F PR BODY MASS INDEX (BMI) DOCUMENTED: ICD-10-PCS | Mod: CPTII,S$GLB,, | Performed by: PHYSICAL MEDICINE & REHABILITATION

## 2022-12-20 RX ORDER — METHYLPREDNISOLONE ACETATE 40 MG/ML
40 INJECTION, SUSPENSION INTRA-ARTICULAR; INTRALESIONAL; INTRAMUSCULAR; SOFT TISSUE
Status: COMPLETED | OUTPATIENT
Start: 2022-12-20 | End: 2022-12-20

## 2022-12-20 RX ADMIN — METHYLPREDNISOLONE ACETATE 40 MG: 40 INJECTION, SUSPENSION INTRA-ARTICULAR; INTRALESIONAL; INTRAMUSCULAR; SOFT TISSUE at 12:12

## 2022-12-20 NOTE — PROGRESS NOTES
PM&R CLINIC NOTE    Chief Complaint   Patient presents with    headache    Neck Pain       HPI: This is a 47 y.o.  female being seen in clinic today for re-evaluation of neck/shoulder achy pain and tightness that started a few days ago after making over 100 nativity costumes.  She has had increased headches/migraines and nothing has really provided relief.    History obtained from patient    Functional History:  Walking: Not limited  Transfers: Independent  Assistive devices: No  Power mobility: No  Falls: None       Needs help with:  Nothing - all ADLS normal    Past family, medical, social, and surgical history reviewed in chart    Review of Systems:     General- denies lethargy, weight change, fever, chills  Head/neck- denies swallowing difficulties  ENT- denies hearing changes  Cardiovascular-denies chest pain  Pulmonary- denies shortness of breath  GI- denies constipation or bowel incontinence  - denies bladder incontinence  Skin- denies wounds or rashes  Musculoskeletal- denies weakness, +pain  Neurologic- +/-numbness and tingling  Psychiatric- denies depressive or psychotic features, denies anxiety  Lymphatic-denies swelling  Endocrine- denies hypoglycemic symptoms/DM history  All other pertinent systems negative     Physical Examination:  General: Well developed, well nourished female, NAD  HEENT:NCAT EOMI bilaterally   Pulmonary:Normal respirations    Spinal Examination: CERVICAL  Active ROM is within normal limits.  Inspection: No deformity of spinal alignment.  Palpation: No vertebral tenderness to percussion.  Tight and tender at bilateral trapezius, levator scapula, splenius capitus, local twitch at bilateral trapezius  Spurling test: neg    Spinal Examination: LUMBAR or THORACIC  Active ROM is within normal limits.  Inspection: No deformity of spinal alignment.      Musculoskeletal Tests:    Elbow compression (ulnar): neg  Tinels at wrist: neg  Phalen: neg    Bilateral Upper and Lower  Extremities:  Pulses are 2+ at radial,bilaterally.  Shoulder/Elbow/Wrist/Hand ROM wnl   Hip/Knee/Ankle ROM   Bilateral Extremities show normal capillary refill.  No signs of cyanosis, rubor, edema, skin changes, or dysvascular changes of appendages.  Nails appear intact.    Neurological Exam:  Cranial Nerves:  II-XII grossly intact    Manual Muscle Testing: (Motor 5=normal)    RIGHT Upper extremity: Shoulder abduction 5/5, Biceps 5/5, Triceps 5/5, Wrist extension 5/5, Abductor pollicis brevis 5/5, Ulnar hand intrinsics 5/5,  LEFT Upper extremity: Shoulder abduction 5/5, Biceps 5/5, Triceps 5/5, Wrist extension 5/5, Abductor pollicis brevis 5/5, Ulnar hand intrinsics 5/5,  No focal atrophy is noted of either upper or lower extremity.    Bilateral Reflexes:1+bic tric br  Bedoya's response is absent bilaterally.    Sensation: tested to light touch  - intact in arms  Gait: Narrow base and good arm swing.      IMPRESSION/PLAN: This is a 47 y.o.  female with cervical myofascial pain, tension/migraine headaches    1. Trigger pt injections today  2. Cont stretch/exercises.  3. If not improving, consider formal PT and repeat injections prn  4. Cont topical agents, ice/heat modalities, natural supplements (magnesium, potassium, increase water intake)  5. Fu prn    Loren Caraballo M.D.  Physical Medicine and Rehab    PROCEDURE NOTE    Diagnosis: Myofascial pain  Procedure: trigger point injections to bilateral trapezius, splenius capitus, levator scapula     Risks and benefits of procedure explained to patient including risks of infection, bleeding, pain, or damage to surrounding tissues. All questions answered. Informed consent obtained prior to proceeding. Areas marked and prepped in sterile fashion. Using a 27g 1.25inch needle, 10cc of 1% lidocaine and depomedrol 40mg 0.5cc was injected evenly into the above mentioned muscles. None to minimal bleeding noted. ER and post injection instructions given.    Loren Caraballo  M.D.

## 2022-12-27 ENCOUNTER — PATIENT OUTREACH (OUTPATIENT)
Dept: ADMINISTRATIVE | Facility: HOSPITAL | Age: 47
End: 2022-12-27
Payer: COMMERCIAL

## 2022-12-27 ENCOUNTER — OFFICE VISIT (OUTPATIENT)
Dept: NEUROLOGY | Facility: CLINIC | Age: 47
End: 2022-12-27
Payer: COMMERCIAL

## 2022-12-27 VITALS
WEIGHT: 136.69 LBS | SYSTOLIC BLOOD PRESSURE: 100 MMHG | HEART RATE: 64 BPM | DIASTOLIC BLOOD PRESSURE: 69 MMHG | BODY MASS INDEX: 20.72 KG/M2 | RESPIRATION RATE: 16 BRPM | HEIGHT: 68 IN

## 2022-12-27 DIAGNOSIS — D50.0 IRON DEFICIENCY ANEMIA DUE TO CHRONIC BLOOD LOSS: ICD-10-CM

## 2022-12-27 DIAGNOSIS — G43.919 INTRACTABLE MIGRAINE WITHOUT STATUS MIGRAINOSUS, UNSPECIFIED MIGRAINE TYPE: Primary | ICD-10-CM

## 2022-12-27 DIAGNOSIS — R06.83 PRIMARY SNORING: ICD-10-CM

## 2022-12-27 DIAGNOSIS — Z79.1 NSAID LONG-TERM USE: ICD-10-CM

## 2022-12-27 DIAGNOSIS — M79.18 CERVICAL MYOFASCIAL PAIN SYNDROME: ICD-10-CM

## 2022-12-27 DIAGNOSIS — D50.9 IRON DEFICIENCY ANEMIA, UNSPECIFIED IRON DEFICIENCY ANEMIA TYPE: ICD-10-CM

## 2022-12-27 DIAGNOSIS — F41.9 ANXIETY: ICD-10-CM

## 2022-12-27 DIAGNOSIS — F33.41 RECURRENT MAJOR DEPRESSIVE DISORDER, IN PARTIAL REMISSION: ICD-10-CM

## 2022-12-27 PROBLEM — G44.049 CHRONIC PAROXYSMAL HEMICRANIA: Status: RESOLVED | Noted: 2019-01-03 | Resolved: 2022-12-27

## 2022-12-27 PROCEDURE — 3008F BODY MASS INDEX DOCD: CPT | Mod: CPTII,S$GLB,, | Performed by: PSYCHIATRY & NEUROLOGY

## 2022-12-27 PROCEDURE — 3044F HG A1C LEVEL LT 7.0%: CPT | Mod: CPTII,S$GLB,, | Performed by: PSYCHIATRY & NEUROLOGY

## 2022-12-27 PROCEDURE — 99999 PR PBB SHADOW E&M-EST. PATIENT-LVL V: CPT | Mod: PBBFAC,,, | Performed by: PSYCHIATRY & NEUROLOGY

## 2022-12-27 PROCEDURE — 99215 PR OFFICE/OUTPT VISIT, EST, LEVL V, 40-54 MIN: ICD-10-PCS | Mod: S$GLB,,, | Performed by: PSYCHIATRY & NEUROLOGY

## 2022-12-27 PROCEDURE — 3078F DIAST BP <80 MM HG: CPT | Mod: CPTII,S$GLB,, | Performed by: PSYCHIATRY & NEUROLOGY

## 2022-12-27 PROCEDURE — 3044F PR MOST RECENT HEMOGLOBIN A1C LEVEL <7.0%: ICD-10-PCS | Mod: CPTII,S$GLB,, | Performed by: PSYCHIATRY & NEUROLOGY

## 2022-12-27 PROCEDURE — 3008F PR BODY MASS INDEX (BMI) DOCUMENTED: ICD-10-PCS | Mod: CPTII,S$GLB,, | Performed by: PSYCHIATRY & NEUROLOGY

## 2022-12-27 PROCEDURE — 3078F PR MOST RECENT DIASTOLIC BLOOD PRESSURE < 80 MM HG: ICD-10-PCS | Mod: CPTII,S$GLB,, | Performed by: PSYCHIATRY & NEUROLOGY

## 2022-12-27 PROCEDURE — 99417 PR PROLONGED SVC, OUTPT, W/WO DIRECT PT CONTACT,  EA ADDTL 15 MIN: ICD-10-PCS | Mod: S$GLB,,, | Performed by: PSYCHIATRY & NEUROLOGY

## 2022-12-27 PROCEDURE — 99999 PR PBB SHADOW E&M-EST. PATIENT-LVL V: ICD-10-PCS | Mod: PBBFAC,,, | Performed by: PSYCHIATRY & NEUROLOGY

## 2022-12-27 PROCEDURE — 99215 OFFICE O/P EST HI 40 MIN: CPT | Mod: S$GLB,,, | Performed by: PSYCHIATRY & NEUROLOGY

## 2022-12-27 PROCEDURE — 1159F PR MEDICATION LIST DOCUMENTED IN MEDICAL RECORD: ICD-10-PCS | Mod: CPTII,S$GLB,, | Performed by: PSYCHIATRY & NEUROLOGY

## 2022-12-27 PROCEDURE — 1159F MED LIST DOCD IN RCRD: CPT | Mod: CPTII,S$GLB,, | Performed by: PSYCHIATRY & NEUROLOGY

## 2022-12-27 PROCEDURE — 3074F SYST BP LT 130 MM HG: CPT | Mod: CPTII,S$GLB,, | Performed by: PSYCHIATRY & NEUROLOGY

## 2022-12-27 PROCEDURE — 3074F PR MOST RECENT SYSTOLIC BLOOD PRESSURE < 130 MM HG: ICD-10-PCS | Mod: CPTII,S$GLB,, | Performed by: PSYCHIATRY & NEUROLOGY

## 2022-12-27 PROCEDURE — 99417 PROLNG OP E/M EACH 15 MIN: CPT | Mod: S$GLB,,, | Performed by: PSYCHIATRY & NEUROLOGY

## 2022-12-27 RX ORDER — SEMAGLUTIDE 1.34 MG/ML
0.5 INJECTION, SOLUTION SUBCUTANEOUS
Qty: 1 PEN | Refills: 11 | Status: SHIPPED | OUTPATIENT
Start: 2022-12-27 | End: 2023-12-27

## 2022-12-27 RX ORDER — TOPIRAMATE 200 MG/1
1 CAPSULE, EXTENDED RELEASE ORAL DAILY
Qty: 31 CAPSULE | Refills: 11 | Status: SHIPPED | OUTPATIENT
Start: 2022-12-27

## 2022-12-27 NOTE — PROGRESS NOTES
Subjective:       Patient ID: Sandra Dimas is a 47 y.o. female.    Chief Complaint: No chief complaint on file.    HPI           BACKGROUND HISTORY     The patient has been suffering of medically refractory headaches since the age of 18. The headache has complex and mixed features. Most of the time it is RT sides, could be LT sided, comes in clusters, each lasting 20-30 minutes and wakes her up from sleep with 10/10 severity associated with stimulus sensitivity and associated with droopy eyelid and watering. She failed all known migraine medications: abortive Triptans/NSAIDs, status medications (DHE, VPA, DMS) and preventative medications (TCA-Elavil, BB-Inderal, AED-TPM, AED-VPA, Botox, ON Block, OTC supplements). Was diagnosed with PH and Indomethacin helped initially but then stopped working. Failed all known cluster headache medications: Triptans, O2, CCB-Verapamil and Li. Tried her on Emgality which failed. Was subsequently tried on Nurtec, Ubrelvy, Lidocaine, LTG and LEV which failed. The patient tried surgical menopause which worsened the headaches.       INTERVAL HISTORY     Did reasonably well on TPM XR (Trokendi XR) 200 mg QD but the headaches escalated in the last 6 weeks. Unfortunately, she takes NSAIDs ATC in addition to Energy drinks and occasionally Narcotics as well.         Review of Systems   Constitutional:  Negative for appetite change, fatigue and unexpected weight change.   HENT:  Negative for hearing loss and tinnitus.    Eyes:  Negative for photophobia and visual disturbance.   Respiratory:  Negative for apnea and shortness of breath.    Cardiovascular:  Negative for chest pain and palpitations.   Gastrointestinal:  Negative for nausea and vomiting.   Endocrine: Negative for cold intolerance and heat intolerance.   Genitourinary:  Negative for difficulty urinating and urgency.   Musculoskeletal:  Negative for arthralgias, back pain, gait problem, joint swelling, myalgias, neck pain and  neck stiffness.   Skin:  Negative for color change and rash.   Allergic/Immunologic: Negative for environmental allergies and immunocompromised state.   Neurological:  Positive for headaches. Negative for dizziness, tremors, seizures, syncope, facial asymmetry, speech difficulty, weakness, light-headedness and numbness.   Hematological:  Negative for adenopathy. Does not bruise/bleed easily.   Psychiatric/Behavioral:  Positive for dysphoric mood and sleep disturbance. Negative for agitation, behavioral problems, confusion, decreased concentration, hallucinations, self-injury and suicidal ideas. The patient is nervous/anxious. The patient is not hyperactive.        Current Outpatient Medications:     atenoloL (TENORMIN) 25 MG tablet, Take 1 tablet (25 mg total) by mouth once daily., Disp: 90 tablet, Rfl: 3    B2/magnesium cit,oxid/feverfew (MIGRELIEF ORAL), Take by mouth once daily. , Disp: , Rfl:     butorphanol (STADOL) 10 mg/mL nasal spray, as needed., Disp: , Rfl:     EScitalopram oxalate (LEXAPRO) 20 MG tablet, Take 1 tablet (20 mg total) by mouth once daily., Disp: 90 tablet, Rfl: 3    linaCLOtide (LINZESS) 72 mcg Cap capsule, Take 1 capsule (72 mcg total) by mouth before breakfast., Disp: 30 capsule, Rfl: 5    metoclopramide HCl (REGLAN) 10 MG tablet, Take 1 tablet (10 mg total) by mouth every 6 (six) hours as needed (nausea)., Disp: 30 tablet, Rfl: 0    moxifloxacin (AVELOX) 400 mg tablet, Take 1 tablet by mouth once a day, Disp: 5 tablet, Rfl: 0    naproxen (EC NAPROSYN) 500 MG EC tablet, Take 1 tablet (500 mg total) by mouth 2 (two) times daily., Disp: 60 tablet, Rfl: 1    ondansetron (ZOFRAN-ODT) 4 MG TbDL, TAKE 1 TABLET BY MOUTH EVERY 8 HOURS AS NEEDED FOR NAUSEA, Disp: 30 tablet, Rfl: 1    oxyCODONE-acetaminophen (PERCOCET)  mg per tablet, Take 1 tablet by mouth every 6 (six) hours as needed for Pain., Disp: 12 tablet, Rfl: 0    pantoprazole (PROTONIX) 40 MG tablet, Take 1 tablet (40 mg total) by  mouth once daily., Disp: 90 tablet, Rfl: 3    promethazine (PHENERGAN) 25 MG tablet, Take 0.5 tablets (12.5 mg total) by mouth every 6 (six) hours as needed for Nausea., Disp: 30 tablet, Rfl: 0    semaglutide (OZEMPIC) 1 mg/dose (4 mg/3 mL), Inject 1 mg into the skin every 7 days., Disp: 1 pen, Rfl: 11    sucralfate (CARAFATE) 100 mg/mL suspension, TAKE 10 ML BY MOUTH EVERY 6 HOURS, Disp: 420 mL, Rfl: 1    testosterone (TESTIM) 50 mg/5 gram (1 %) Gel, Apply 1 gram topically once daily., Disp: 5 g, Rfl: 5    topiramate (TROKENDI XR) 200 mg Cp24, Take 1 capsule by mouth once daily, Disp: 30 capsule, Rfl: 4    valACYclovir (VALTREX) 1000 MG tablet, TAKE 1 TABLET BY MOUTH ONCE DAILY, Disp: 90 tablet, Rfl: 2    valACYclovir (VALTREX) 1000 MG tablet, Take 1 tablet by mouth once a day, Disp: 90 tablet, Rfl: 6    valACYclovir (VALTREX) 500 MG tablet, , Disp: , Rfl:     vitamin D (VITAMIN D3) 1000 units Tab, Take 1,000 Units by mouth once daily., Disp: , Rfl:   No current facility-administered medications for this visit.    Facility-Administered Medications Ordered in Other Visits:     lactated ringers infusion, , Intravenous, Continuous, Rip Doss MD, Last Rate: 0 mL/hr at 12/26/18 1542, New Bag at 07/09/20 1648    lidocaine (PF) 10 mg/ml (1%) injection 10 mg, 1 mL, Intradermal, Once, Rip Doss MD  Past Medical History:   Diagnosis Date    Chronic paroxysmal hemicrania 01/03/2019    Endometriosis     General anesthetics causing adverse effect in therapeutic use     wakes up with severe anxiety attacks    Hemicrania continua     Hepatitis C antibody positive in blood 12/09/2020    RNA NEGATIVE 12/14/2020    Migraines      Past Surgical History:   Procedure Laterality Date    APPENDECTOMY      COLONOSCOPY N/A 11/12/2020    Procedure: COLONOSCOPY;  Surgeon: Mylene Russ MD;  Location: [facility];  Service: Endoscopy;  Laterality: N/A;    CYST REMOVAL Right 12/30/2019    Procedure: EXCISION, CYST;  Surgeon: NATALI  Filipe Shah MD;  Location: [facility];  Service: OB/GYN;  Laterality: Right;  Sebaceous cyst    diagnostic laprascopy      ESOPHAGOGASTRODUODENOSCOPY N/A 11/30/2018    Procedure: EGD (ESOPHAGOGASTRODUODENOSCOPY);  Surgeon: Bossman Bustos MD;  Location: [facility];  Service: Endoscopy;  Laterality: N/A;    ESOPHAGOGASTRODUODENOSCOPY N/A 11/12/2020    Procedure: ESOPHAGOGASTRODUODENOSCOPY (EGD) needs rapid covid test;  Surgeon: Mylene Russ MD;  Location: [facility];  Service: Endoscopy;  Laterality: N/A;    FULGURATION OF ENDOMETRIOSIS  12/26/2018    Procedure: FULGURATION, ENDOMETRIOSIS;  Surgeon: Zehra Jensen MD;  Location: [facility];  Service: OB/GYN;;  endometriosis implant resection    HYSTEROSCOPY WITH HYDROTHERMAL ABLATION OF ENDOMETRIUM WITH DILATION AND CURETTAGE N/A 12/26/2018    Procedure: HYSTEROSCOPY, WITH DILATION AND CURETTAGE OF UTERUS AND HYDROTHERMAL ENDOMETRIAL ABLATION;  Surgeon: Zehra Jensen MD;  Location: [facility];  Service: OB/GYN;  Laterality: N/A;    LAPAROSCOPIC SALPINGECTOMY Bilateral 12/26/2018    Procedure: SALPINGECTOMY, LAPAROSCOPIC;  Surgeon: Zehra Jensen MD;  Location: [facility];  Service: OB/GYN;  Laterality: Bilateral;    LAPAROTOMY, EXPLORATORY N/A 2/7/2019    Procedure: LAPAROTOMY, EXPLORATORY;  Surgeon: Ramakrishna Boone MD;  Location: [facility];  Service: General;  Laterality: N/A;    REPAIR OF VAGINAL CUFF N/A 7/9/2020    Procedure: REPAIR, VAGINAL CUFF;  Surgeon: Sailaja Addison MD;  Location: [facility];  Service: OB/GYN;  Laterality: N/A;    ROBOT-ASSISTED LAPAROSCOPIC ABDOMINAL HYSTERECTOMY USING DA MAVIS XI N/A 12/30/2019    Procedure: XI ROBOTIC HYSTERECTOMY;  Surgeon: NATALI Shah MD;  Location: [facility];  Service: OB/GYN;  Laterality: N/A;    ROBOT-ASSISTED LAPAROSCOPIC SURGICAL REMOVAL OF OVARY USING DA MAVIS XI Bilateral 12/30/2019    Procedure: XI ROBOTIC OOPHORECTOMY;  Surgeon: NATALI Shah MD;  Location: [facility];  Service: OB/GYN;  Laterality:  Bilateral;    robotic assisted laparoscopy with excision of ovarian endometrioma and chromotubation       Social History     Socioeconomic History    Marital status:    Tobacco Use    Smoking status: Never    Smokeless tobacco: Never   Substance and Sexual Activity    Alcohol use: Not Currently     Comment: rarely No alcohol 72h prior to sx    Drug use: No    Sexual activity: Yes     Partners: Male   Social History Narrative    No pets or smokers in household.       Objective:       BASELINE EXAMINATION         VITAL SIGNS REVIEWED     GENERAL APPEARANCE:     The patient looks uncomfortable.    No signs of medical or psychiatric distress.    BMI 20.78    Normal breathing pattern.    No dysmorphic features    Normal eye contact.     GENERAL MEDICAL EXAM:    HEENT:  Head is atraumatic normocephalic. No tender temporal arteries.  Fundoscopy exam is normal.    Neck and Axillae: No JVD.     Cardiopulmonary: No cyanosis. No tachypnea. Normal respiratory effort.    Gastrointestinal:  No stomas or lesions. No hernias.    Skin, Hair and Nails: No pathognonomic skin rash. No neurofibromatosis. No stigmata of autoimmune disease.     Limbs: No varicose veins. No edema.     Muskoskeletal: No deformities.No signs of longstanding neuropathy. No dislocations or fractures.        Neurologic Exam     Mental Status   Oriented to person, place, and time.   Follows 3 step commands.   Attention: normal. Concentration: normal.   Speech: speech is normal   Level of consciousness: alert  Knowledge: good and consistent with education.   Able to name object. Able to repeat. Normal comprehension.     Cranial Nerves   Cranial nerves II through XII intact.     CN II   Visual fields full to confrontation.   Visual acuity: normal  Right visual field deficit: none  Left visual field deficit: none     CN III, IV, VI   Pupils are equal, round, and reactive to light.  Extraocular motions are normal.   Right pupil: Size: 2 mm. Shape: regular.  Reactivity: brisk. Consensual response: intact. Accommodation: intact.   Left pupil: Size: 2 mm. Shape: regular. Reactivity: brisk. Consensual response: intact. Accommodation: intact.   CN III: no CN III palsy  CN VI: no CN VI palsy  Nystagmus: none   Diplopia: none  Ophthalmoparesis: none  Upgaze: normal  Downgaze: normal  Conjugate gaze: present  Vestibulo-ocular reflex: present    CN V   Facial sensation intact.   Right facial sensation deficit: none  Left facial sensation deficit: none    CN VII   Facial expression full, symmetric.   Right facial weakness: none  Left facial weakness: none    CN VIII   CN VIII normal.   Hearing: intact    CN IX, X   CN IX normal.   CN X normal.   Palate: symmetric    CN XI   CN XI normal.   Right sternocleidomastoid strength: normal  Left sternocleidomastoid strength: normal  Right trapezius strength: normal  Left trapezius strength: normal    CN XII   CN XII normal.   Tongue: not atrophic  Fasciculations: absent  Tongue deviation: none    Motor Exam   Muscle bulk: normal  Overall muscle tone: normal  Right arm tone: normal  Left arm tone: normal  Right arm pronator drift: absent  Left arm pronator drift: absent  Right leg tone: normal  Left leg tone: normal    Strength   Right neck flexion: 5/5  Left neck flexion: 5/5  Right neck extension: 5/5  Left neck extension: 5/5  Right deltoid: 5/5  Left deltoid: 5/5  Right biceps: 5/5  Left biceps: 5/5  Right triceps: 5/5  Left triceps: 5/5  Right wrist flexion: 5/5  Left wrist flexion: 5/5  Right wrist extension: 5/5  Left wrist extension: 5/5  Right interossei: 5/5  Left interossei: 5/5  Right iliopsoas: 5/5  Left iliopsoas: 5/5  Right quadriceps: 5/5  Left quadriceps: 5/5  Right hamstrin/5  Left hamstrin/5  Right glutei: 5/5  Left glutei: 5/5  Right anterior tibial: 5/5  Left anterior tibial: 5/5  Right posterior tibial: 5/5  Left posterior tibial: 5/5  Right peroneal: 5/5  Left peroneal: 5/5  Right gastroc: 5/5  Left  gastroc: 5/5    Sensory Exam   Light touch normal.   Right arm light touch: normal  Left arm light touch: normal  Right leg light touch: normal  Left leg light touch: normal  Vibration normal.   Right arm vibration: normal  Left arm vibration: normal  Right leg vibration: normal  Left leg vibration: normal  Proprioception normal.   Right arm proprioception: normal  Left arm proprioception: normal  Right leg proprioception: normal  Left leg proprioception: normal  Pinprick normal.   Right arm pinprick: normal  Left arm pinprick: normal  Right leg pinprick: normal  Left leg pinprick: normal  Graphesthesia: normal  Stereognosis: normal    Gait, Coordination, and Reflexes     Gait  Gait: normal    Coordination   Romberg: negative  Finger to nose coordination: normal  Heel to shin coordination: normal  Tandem walking coordination: normal    Tremor   Resting tremor: absent  Intention tremor: absent  Action tremor: absent    Reflexes   Right brachioradialis: 2+  Left brachioradialis: 2+  Right biceps: 2+  Left biceps: 2+  Right triceps: 2+  Left triceps: 2+  Right patellar: 2+  Left patellar: 2+  Right achilles: 2+  Left achilles: 2+  Right plantar: normal  Left plantar: normal  Right Bedoya: absent  Left Bedoya: absent  Right ankle clonus: absent  Left ankle clonus: absent  Right pendular knee jerk: absent  Left pendular knee jerk: absent              Lab Results   Component Value Date    WBC 5.34 12/16/2022    HGB 10.8 (L) 12/16/2022    HCT 34.0 (L) 12/16/2022    MCV 82 12/16/2022     12/16/2022     Sodium   Date Value Ref Range Status   12/16/2022 142 136 - 145 mmol/L Final     Potassium   Date Value Ref Range Status   12/16/2022 4.1 3.5 - 5.1 mmol/L Final     Chloride   Date Value Ref Range Status   12/16/2022 114 (H) 95 - 110 mmol/L Final     CO2   Date Value Ref Range Status   12/16/2022 20 (L) 23 - 29 mmol/L Final     Glucose   Date Value Ref Range Status   12/16/2022 86 70 - 110 mg/dL Final     BUN   Date  Value Ref Range Status   12/16/2022 27 (H) 6 - 20 mg/dL Final     Creatinine   Date Value Ref Range Status   12/16/2022 0.8 0.5 - 1.4 mg/dL Final     Calcium   Date Value Ref Range Status   12/16/2022 9.1 8.7 - 10.5 mg/dL Final     Total Protein   Date Value Ref Range Status   12/16/2022 7.1 6.0 - 8.4 g/dL Final     Albumin   Date Value Ref Range Status   12/16/2022 4.0 3.5 - 5.2 g/dL Final     Total Bilirubin   Date Value Ref Range Status   12/16/2022 0.3 0.1 - 1.0 mg/dL Final     Comment:     For infants and newborns, interpretation of results should be based  on gestational age, weight and in agreement with clinical  observations.    Premature Infant recommended reference ranges:  Up to 24 hours.............<8.0 mg/dL  Up to 48 hours............<12.0 mg/dL  3-5 days..................<15.0 mg/dL  6-29 days.................<15.0 mg/dL       Alkaline Phosphatase   Date Value Ref Range Status   12/16/2022 49 (L) 55 - 135 U/L Final     AST   Date Value Ref Range Status   12/16/2022 17 10 - 40 U/L Final     ALT   Date Value Ref Range Status   12/16/2022 14 10 - 44 U/L Final     Anion Gap   Date Value Ref Range Status   12/16/2022 8 8 - 16 mmol/L Final     eGFR if    Date Value Ref Range Status   05/18/2022 >60.0 >60 mL/min/1.73 m^2 Final     eGFR if non    Date Value Ref Range Status   05/18/2022 >60.0 >60 mL/min/1.73 m^2 Final     Comment:     Calculation used to obtain the estimated glomerular filtration  rate (eGFR) is the CKD-EPI equation.        Lab Results   Component Value Date    HTIFHBBE57 493 06/12/2009     Lab Results   Component Value Date    TSH 1.483 05/18/2022    H3IJBQB 78 09/17/2020    A8JVWNB 7.1 06/28/2006    FREET4 0.82 09/17/2020       LABORATORY EVALUATION       05-    Dementia Genetic Panel NL        RADIOLOGY EVALUATION       10-    Brain MRI WWO and MRA NL.      07-    Brain MRI NL      03-    Brain MRI O NL      10-     Brain  MRI and MRA NL.       NEUROPHYSIOLOGY EVALUATION     11- Extensive NCS/EMG BUE BLE Face NL          Reviewed the neuroimaging independently       Assessment:           1. Intractable migraine without status migrainosus, unspecified migraine type    2. Primary snoring    3. Recurrent major depressive disorder, in partial remission    4. Iron deficiency anemia due to chronic blood loss    5. NSAID long-term use    6. Anxiety    7. Cervical myofascial pain syndrome    8. Iron deficiency anemia, unspecified iron deficiency anemia type            Plan:                     CHRONIC TRIGEMINAL CEPHALGIA WITH MIXED FEATURES (MIGRAINE, HEMICRANIA-CLUSTER, TENSION, ANALGESIC REBOUND)    VERY INTRACTABLE            HEADACHE DIARY             DISCUSSED THE THREE-FOLD MANAGEMENT OF MIGRAINE:      LIFESTYLE CHANGES:       Good sleep hygiene  Avoid general triggers like lack of sleep/too much sleep, prolonged sun exposure, excessive screen time and specific triggers based on you own diary   Minimize physical and emotional stress  Smoking avoidance and cessation  Limit caffeine drinks to 1-2 a day   Good hydration   Small frequent meals and avoid skipping meals   Moderate 30-minute-long aerobic exercises 3 times/week. Avoid strenuous exercise         ABORTIVE MEDICATIONS (ACUTE-RESCUE MEDICATIONS):     Should only be taken 2-3 times/week to avoid rebound and overuse headaches.    Recommended stopping the daily use of NSAIDs and avoid Narcotics.     Try Cefaly Dual Mode.     Failed  NSAIDs, Triptans, Gepants, Ditans and DHE.        PREVENTATIVE (MORE ACCURATELY MIGRAINE REDUCTION) MEDICATIONS:           Try Cefaly Dual Mode.    Continue TPM XR 20 mg QD.     Recommended Namenda, Aimovig-Ajovy, Nortriptyline (Pamelor) and Botox without neck injections.       Failed (TCA-Elavil, BB-Inderal, AED(TPM, VPA, LTG, LEV), Botox, ON Block, OTC supplements), CCB-Verapamil, Li and CGRP Blocker (Emgality).                   Thomas Graff MD,  FAAN    Attending Neurologist/Epileptologist         Diplomate, American Board-Psychiatry and Neurology (Neurology)    Diplomate, American Board-Clinical Neurophysiology (Epilpesy-Neuromuscular)     Fellow, American Academy of Neurology         I spent a total of 97 minutes on the day of the visit.  This includes face to face time and non-face to face time preparing to see the patient (eg, review of tests), obtaining and/or reviewing separately obtained history, documenting clinical information in the electronic or other health record, independently interpreting results and communicating results to the patient/family/caregiver, or care coordinator.

## 2022-12-27 NOTE — TELEPHONE ENCOUNTER
No new care gaps identified.  [facility] Embedded Care Gaps. Reference number: [id number]. 12/27/2022   8:47:41 AM CST

## 2023-01-18 DIAGNOSIS — G43.919 INTRACTABLE MIGRAINE WITHOUT STATUS MIGRAINOSUS, UNSPECIFIED MIGRAINE TYPE: Primary | ICD-10-CM

## 2023-01-18 RX ORDER — AMITRIPTYLINE HYDROCHLORIDE 10 MG/1
10 TABLET, FILM COATED ORAL NIGHTLY PRN
Qty: 93 TABLET | Refills: 3 | Status: SHIPPED | OUTPATIENT
Start: 2023-01-18 | End: 2023-03-09 | Stop reason: ALTCHOICE

## 2023-01-19 ENCOUNTER — PATIENT MESSAGE (OUTPATIENT)
Dept: NEUROLOGY | Facility: CLINIC | Age: 48
End: 2023-01-19
Payer: COMMERCIAL

## 2023-01-22 ENCOUNTER — HOSPITAL ENCOUNTER (EMERGENCY)
Facility: HOSPITAL | Age: 48
Discharge: HOME | End: 2023-01-22
Attending: EMERGENCY MEDICINE
Payer: COMMERCIAL

## 2023-01-22 VITALS
SYSTOLIC BLOOD PRESSURE: 111 MMHG | WEIGHT: 130 LBS | TEMPERATURE: 99 F | DIASTOLIC BLOOD PRESSURE: 75 MMHG | RESPIRATION RATE: 16 BRPM | HEART RATE: 69 BPM | BODY MASS INDEX: 19.77 KG/M2 | OXYGEN SATURATION: 98 %

## 2023-01-22 DIAGNOSIS — R51.9 ACUTE NONINTRACTABLE HEADACHE, UNSPECIFIED HEADACHE TYPE: Primary | ICD-10-CM

## 2023-01-22 LAB
ALBUMIN SERPL BCP-MCNC: 4.1 G/DL (ref 3.5–5.2)
ALP SERPL-CCNC: 44 U/L (ref 55–135)
ALT SERPL W/O P-5'-P-CCNC: 7 U/L (ref 10–44)
ANION GAP SERPL CALC-SCNC: 8 MMOL/L (ref 8–16)
AST SERPL-CCNC: 14 U/L (ref 10–40)
BASOPHILS # BLD AUTO: 0.03 K/UL (ref 0–0.2)
BASOPHILS NFR BLD: 0.5 % (ref 0–1.9)
BILIRUB SERPL-MCNC: 0.5 MG/DL (ref 0.1–1)
BILIRUB UR QL STRIP: NEGATIVE
BUN SERPL-MCNC: 17 MG/DL (ref 6–20)
CALCIUM SERPL-MCNC: 9.3 MG/DL (ref 8.7–10.5)
CHLORIDE SERPL-SCNC: 114 MMOL/L (ref 95–110)
CLARITY UR: CLEAR
CO2 SERPL-SCNC: 21 MMOL/L (ref 23–29)
COLOR UR: YELLOW
CREAT SERPL-MCNC: 0.7 MG/DL (ref 0.5–1.4)
DIFFERENTIAL METHOD: ABNORMAL
EOSINOPHIL # BLD AUTO: 0.1 K/UL (ref 0–0.5)
EOSINOPHIL NFR BLD: 1.1 % (ref 0–8)
ERYTHROCYTE [DISTWIDTH] IN BLOOD BY AUTOMATED COUNT: 13.2 % (ref 11.5–14.5)
EST. GFR  (NO RACE VARIABLE): >60 ML/MIN/1.73 M^2
GLUCOSE SERPL-MCNC: 98 MG/DL (ref 70–110)
GLUCOSE UR QL STRIP: NEGATIVE
HCT VFR BLD AUTO: 30.4 % (ref 37–48.5)
HGB BLD-MCNC: 9.9 G/DL (ref 12–16)
HGB UR QL STRIP: NEGATIVE
IMM GRANULOCYTES # BLD AUTO: 0.01 K/UL (ref 0–0.04)
IMM GRANULOCYTES NFR BLD AUTO: 0.2 % (ref 0–0.5)
KETONES UR QL STRIP: ABNORMAL
LEUKOCYTE ESTERASE UR QL STRIP: NEGATIVE
LYMPHOCYTES # BLD AUTO: 2.2 K/UL (ref 1–4.8)
LYMPHOCYTES NFR BLD: 34 % (ref 18–48)
MAGNESIUM SERPL-MCNC: 2 MG/DL (ref 1.6–2.6)
MCH RBC QN AUTO: 26.3 PG (ref 27–31)
MCHC RBC AUTO-ENTMCNC: 32.6 G/DL (ref 32–36)
MCV RBC AUTO: 81 FL (ref 82–98)
MONOCYTES # BLD AUTO: 0.3 K/UL (ref 0.3–1)
MONOCYTES NFR BLD: 4.4 % (ref 4–15)
NEUTROPHILS # BLD AUTO: 3.8 K/UL (ref 1.8–7.7)
NEUTROPHILS NFR BLD: 59.8 % (ref 38–73)
NITRITE UR QL STRIP: NEGATIVE
NRBC BLD-RTO: 0 /100 WBC
PH UR STRIP: 8 [PH] (ref 5–8)
PLATELET # BLD AUTO: 214 K/UL (ref 150–450)
PMV BLD AUTO: 9.3 FL (ref 9.2–12.9)
POTASSIUM SERPL-SCNC: 4.3 MMOL/L (ref 3.5–5.1)
PROT SERPL-MCNC: 6.5 G/DL (ref 6–8.4)
PROT UR QL STRIP: NEGATIVE
RBC # BLD AUTO: 3.77 M/UL (ref 4–5.4)
SODIUM SERPL-SCNC: 143 MMOL/L (ref 136–145)
SP GR UR STRIP: 1 (ref 1–1.03)
URN SPEC COLLECT METH UR: ABNORMAL
UROBILINOGEN UR STRIP-ACNC: NEGATIVE EU/DL
WBC # BLD AUTO: 6.39 K/UL (ref 3.9–12.7)

## 2023-01-22 PROCEDURE — 63600175 PHARM REV CODE 636 W HCPCS

## 2023-01-22 PROCEDURE — 63600175 PHARM REV CODE 636 W HCPCS: Performed by: EMERGENCY MEDICINE

## 2023-01-22 PROCEDURE — 85025 COMPLETE CBC W/AUTO DIFF WBC: CPT | Performed by: EMERGENCY MEDICINE

## 2023-01-22 PROCEDURE — 96374 THER/PROPH/DIAG INJ IV PUSH: CPT

## 2023-01-22 PROCEDURE — 99285 EMERGENCY DEPT VISIT HI MDM: CPT | Mod: 25

## 2023-01-22 PROCEDURE — 80053 COMPREHEN METABOLIC PANEL: CPT | Performed by: EMERGENCY MEDICINE

## 2023-01-22 PROCEDURE — 83735 ASSAY OF MAGNESIUM: CPT | Performed by: EMERGENCY MEDICINE

## 2023-01-22 PROCEDURE — 81003 URINALYSIS AUTO W/O SCOPE: CPT | Performed by: EMERGENCY MEDICINE

## 2023-01-22 PROCEDURE — 96361 HYDRATE IV INFUSION ADD-ON: CPT

## 2023-01-22 PROCEDURE — 96375 TX/PRO/DX INJ NEW DRUG ADDON: CPT

## 2023-01-22 RX ORDER — KETOROLAC TROMETHAMINE 30 MG/ML
INJECTION, SOLUTION INTRAMUSCULAR; INTRAVENOUS
Status: COMPLETED
Start: 2023-01-22 | End: 2023-01-22

## 2023-01-22 RX ORDER — ONDANSETRON 4 MG/1
4 TABLET, ORALLY DISINTEGRATING ORAL EVERY 8 HOURS PRN
Qty: 30 TABLET | Refills: 0 | Status: CANCELLED | OUTPATIENT
Start: 2023-01-22

## 2023-01-22 RX ORDER — LIDOCAINE HYDROCHLORIDE 20 MG/ML
15 SOLUTION OROPHARYNGEAL ONCE
Status: DISCONTINUED | OUTPATIENT
Start: 2023-01-22 | End: 2023-01-22 | Stop reason: HOSPADM

## 2023-01-22 RX ORDER — METOCLOPRAMIDE HYDROCHLORIDE 5 MG/ML
10 INJECTION INTRAMUSCULAR; INTRAVENOUS
Status: COMPLETED | OUTPATIENT
Start: 2023-01-22 | End: 2023-01-22

## 2023-01-22 RX ORDER — ONDANSETRON 8 MG/1
8 TABLET, ORALLY DISINTEGRATING ORAL EVERY 8 HOURS PRN
Qty: 30 TABLET | Refills: 2 | Status: SHIPPED | OUTPATIENT
Start: 2023-01-22 | End: 2023-09-11 | Stop reason: SDUPTHER

## 2023-01-22 RX ORDER — KETOROLAC TROMETHAMINE 30 MG/ML
10 INJECTION, SOLUTION INTRAMUSCULAR; INTRAVENOUS
Status: COMPLETED | OUTPATIENT
Start: 2023-01-22 | End: 2023-01-22

## 2023-01-22 RX ORDER — BUTORPHANOL TARTRATE 2 MG/ML
1 INJECTION INTRAMUSCULAR; INTRAVENOUS ONCE
Status: DISCONTINUED | OUTPATIENT
Start: 2023-01-22 | End: 2023-01-22 | Stop reason: HOSPADM

## 2023-01-22 RX ORDER — MAG HYDROX/ALUMINUM HYD/SIMETH 200-200-20
30 SUSPENSION, ORAL (FINAL DOSE FORM) ORAL ONCE
Status: DISCONTINUED | OUTPATIENT
Start: 2023-01-22 | End: 2023-01-22 | Stop reason: HOSPADM

## 2023-01-22 RX ORDER — LORAZEPAM 2 MG/ML
2 INJECTION INTRAMUSCULAR
Status: COMPLETED | OUTPATIENT
Start: 2023-01-22 | End: 2023-01-22

## 2023-01-22 RX ADMIN — METOCLOPRAMIDE 10 MG: 5 INJECTION, SOLUTION INTRAMUSCULAR; INTRAVENOUS at 02:01

## 2023-01-22 RX ADMIN — KETOROLAC TROMETHAMINE: 30 INJECTION, SOLUTION INTRAMUSCULAR; INTRAVENOUS at 02:01

## 2023-01-22 RX ADMIN — KETOROLAC TROMETHAMINE 9.99 MG: 30 INJECTION, SOLUTION INTRAMUSCULAR; INTRAVENOUS at 02:01

## 2023-01-22 RX ADMIN — LORAZEPAM 2 MG: 2 INJECTION INTRAMUSCULAR; INTRAVENOUS at 03:01

## 2023-01-22 RX ADMIN — SODIUM CHLORIDE, SODIUM LACTATE, POTASSIUM CHLORIDE, AND CALCIUM CHLORIDE 1000 ML: .6; .31; .03; .02 INJECTION, SOLUTION INTRAVENOUS at 02:01

## 2023-01-22 NOTE — ED PROVIDER NOTES
SCRIBE #1 NOTE: I, Dragan Francisca, am scribing for, and in the presence of, Fermin Ramírez MD. I have scribed the entire note.       History     Chief Complaint   Patient presents with    Migraine     Review of patient's allergies indicates:   Allergen Reactions    Hydrocodone Other (See Comments)     Chest pains. Note: patient tolerated hydromorphone in 2/2019.     Chlorhexidine Rash     Per  after surgery patient had large rash across abdomen where chlorhexidine was applied.    Mastisol adhesive [gum srcbtd-dwrbvc-wxbt-alcohol] Rash         History of Present Illness     HPI    1/22/2023, 2:08 AM  History obtained from the patient and spouse      History of Present Illness: Sandra Dimas is a 47 y.o. female patient with a PMHx of migraines who presents to the Emergency Department for evaluation of a migraine which onset gradually and intermittently since this recently [place]. Pt states her migraine progressively worsened in the past week and became worse and persistent yesterday afternoon. Pt's spouse states the pt has had decreased appetite and n/v. Symptoms are moderate to severe in severity. No mitigating or exacerbating factors reported. Patient denies any weakness, CP, SOB, dysuria, flank pain, fatigued, fever, chills, and all other sxs at this time. Pt's spouse states he started an IV to administer fluids to the patient. No further complaints or concerns at this time.       Arrival mode: EMS    PCP: OSIRIS Gamez Jr, MD        Past Medical History:  Past Medical History:   Diagnosis Date    Chronic paroxysmal hemicrania 01/03/2019    Endometriosis     General anesthetics causing adverse effect in therapeutic use     wakes up with severe anxiety attacks    Hemicrania continua     Hepatitis C antibody positive in blood 12/09/2020    RNA NEGATIVE 12/14/2020    Migraines        Past Surgical History:  Past Surgical History:   Procedure Laterality Date    APPENDECTOMY      COLONOSCOPY N/A  11/12/2020    Procedure: COLONOSCOPY;  Surgeon: Mylene Russ MD;  Location: [facility];  Service: Endoscopy;  Laterality: N/A;    CYST REMOVAL Right 12/30/2019    Procedure: EXCISION, CYST;  Surgeon: NATALI Shah MD;  Location: [facility];  Service: OB/GYN;  Laterality: Right;  Sebaceous cyst    diagnostic laprascopy      ESOPHAGOGASTRODUODENOSCOPY N/A 11/30/2018    Procedure: EGD (ESOPHAGOGASTRODUODENOSCOPY);  Surgeon: Bossman Bustos MD;  Location: [facility];  Service: Endoscopy;  Laterality: N/A;    ESOPHAGOGASTRODUODENOSCOPY N/A 11/12/2020    Procedure: ESOPHAGOGASTRODUODENOSCOPY (EGD) needs rapid covid test;  Surgeon: Mylene Rsus MD;  Location: [facility];  Service: Endoscopy;  Laterality: N/A;    FULGURATION OF ENDOMETRIOSIS  12/26/2018    Procedure: FULGURATION, ENDOMETRIOSIS;  Surgeon: Zehra Jensen MD;  Location: [facility];  Service: OB/GYN;;  endometriosis implant resection    HYSTEROSCOPY WITH HYDROTHERMAL ABLATION OF ENDOMETRIUM WITH DILATION AND CURETTAGE N/A 12/26/2018    Procedure: HYSTEROSCOPY, WITH DILATION AND CURETTAGE OF UTERUS AND HYDROTHERMAL ENDOMETRIAL ABLATION;  Surgeon: Zehra Jensen MD;  Location: [facility];  Service: OB/GYN;  Laterality: N/A;    LAPAROSCOPIC SALPINGECTOMY Bilateral 12/26/2018    Procedure: SALPINGECTOMY, LAPAROSCOPIC;  Surgeon: Zehra Jensen MD;  Location: [facility];  Service: OB/GYN;  Laterality: Bilateral;    LAPAROTOMY, EXPLORATORY N/A 2/7/2019    Procedure: LAPAROTOMY, EXPLORATORY;  Surgeon: Ramakrishna Boone MD;  Location: [facility];  Service: General;  Laterality: N/A;    REPAIR OF VAGINAL CUFF N/A 7/9/2020    Procedure: REPAIR, VAGINAL CUFF;  Surgeon: Sailaja Addison MD;  Location: [facility];  Service: OB/GYN;  Laterality: N/A;    ROBOT-ASSISTED LAPAROSCOPIC ABDOMINAL HYSTERECTOMY USING DA MAVIS XI N/A 12/30/2019    Procedure: XI ROBOTIC HYSTERECTOMY;  Surgeon: NATALI Shah MD;  Location: [facility];  Service: OB/GYN;  Laterality: N/A;     ROBOT-ASSISTED LAPAROSCOPIC SURGICAL REMOVAL OF OVARY USING DA MAVIS XI Bilateral 12/30/2019    Procedure: XI ROBOTIC OOPHORECTOMY;  Surgeon: NATALI Shah MD;  Location: [facility];  Service: OB/GYN;  Laterality: Bilateral;    robotic assisted laparoscopy with excision of ovarian endometrioma and chromotubation           Family History:  Family History   Problem Relation Age of Onset    Hypertension Mother     Diabetes type II Mother     Heart attack Father     Strabismus Neg Hx     Retinal detachment Neg Hx     Macular degeneration Neg Hx     Glaucoma Neg Hx     Blindness Neg Hx     Amblyopia Neg Hx     Breast cancer Neg Hx     Colon cancer Neg Hx     Ovarian cancer Neg Hx     Thyroid disease Neg Hx     Migraines Neg Hx     Asthma Neg Hx        Social History:  Social History     Tobacco Use    Smoking status: Never    Smokeless tobacco: Never   Substance and Sexual Activity    Alcohol use: Not Currently     Comment: rarely No alcohol 72h prior to sx    Drug use: No    Sexual activity: Yes     Partners: Male        Review of Systems     Review of Systems   Constitutional:  Negative for fever.   HENT:  Negative for congestion and sore throat.    Respiratory:  Negative for shortness of breath.    Cardiovascular:  Negative for chest pain.   Gastrointestinal:  Negative for abdominal pain, nausea and vomiting.   Genitourinary:  Negative for dysuria.   Musculoskeletal:  Negative for back pain.   Skin:  Negative for rash.   Neurological:  Positive for headaches. Negative for weakness.   Hematological:  Does not bruise/bleed easily.   All other systems reviewed and are negative.     Physical Exam     Initial Vitals   BP Pulse Resp Temp SpO2   01/22/23 0257 01/22/23 0257 01/22/23 0257 01/22/23 0700 01/22/23 0257   109/70 78 16 98.5 °F (36.9 °C) 98 %      MAP       --                 Physical Exam   Nursing Notes and Vital Signs Reviewed.  Constitutional: Patient is in moderate distress. Well-developed and  well-nourished.  Head: Atraumatic. Normocephalic.  Eyes: PERRL. EOM intact. Conjunctivae are not pale. No scleral icterus.  ENT: Mucous membranes are moist. Oropharynx is clear and symmetric.    Neck: Supple. Full ROM. No lymphadenopathy.  Cardiovascular: Regular rate. Regular rhythm. No murmurs, rubs, or gallops. Distal pulses are 2+ and symmetric.  Pulmonary/Chest: No respiratory distress. Clear to auscultation bilaterally. No wheezing or rales.  Abdominal: Soft and non-distended.  There is no tenderness.  No rebound, guarding, or rigidity. Good bowel sounds.  Genitourinary: No CVA tenderness  Musculoskeletal: Moves all extremities. No obvious deformities. No edema. No calf tenderness.  Skin: Warm and dry.  Neurological:  Alert, awake, and appropriate.  Normal speech.  No acute focal neurological deficits are appreciated.  Psychiatric: Normal affect. Good eye contact. Appropriate in content.     ED Course   Procedures  ED Vital Signs:  Vitals:    01/22/23 0257 01/22/23 0440 01/22/23 0558 01/22/23 0700   BP: 109/70 104/67 125/73 111/75   Pulse: 78  72 69   Resp: 16   16   Temp:    98.5 °F (36.9 °C)   SpO2: 98%  99% 98%   Weight: 59 kg (130 lb)          Abnormal Lab Results:  Labs Reviewed   CBC W/ AUTO DIFFERENTIAL - Abnormal; Notable for the following components:       Result Value    RBC 3.77 (*)     Hemoglobin 9.9 (*)     Hematocrit 30.4 (*)     MCV 81 (*)     MCH 26.3 (*)     All other components within normal limits   COMPREHENSIVE METABOLIC PANEL - Abnormal; Notable for the following components:    Chloride 114 (*)     CO2 21 (*)     Alkaline Phosphatase 44 (*)     ALT 7 (*)     All other components within normal limits   URINALYSIS, REFLEX TO URINE CULTURE - Abnormal; Notable for the following components:    Ketones, UA 1+ (*)     All other components within normal limits    Narrative:     Specimen Source->Urine   MAGNESIUM        All Lab Results:  Results for orders placed or performed during the hospital  encounter of 01/22/23   CBC Auto Differential   Result Value Ref Range    WBC 6.39 3.90 - 12.70 K/uL    RBC 3.77 (L) 4.00 - 5.40 M/uL    Hemoglobin 9.9 (L) 12.0 - 16.0 g/dL    Hematocrit 30.4 (L) 37.0 - 48.5 %    MCV 81 (L) 82 - 98 fL    MCH 26.3 (L) 27.0 - 31.0 pg    MCHC 32.6 32.0 - 36.0 g/dL    RDW 13.2 11.5 - 14.5 %    Platelets 214 150 - 450 K/uL    MPV 9.3 9.2 - 12.9 fL    Immature Granulocytes 0.2 0.0 - 0.5 %    Gran # (ANC) 3.8 1.8 - 7.7 K/uL    Immature Grans (Abs) 0.01 0.00 - 0.04 K/uL    Lymph # 2.2 1.0 - 4.8 K/uL    Mono # 0.3 0.3 - 1.0 K/uL    Eos # 0.1 0.0 - 0.5 K/uL    Baso # 0.03 0.00 - 0.20 K/uL    nRBC 0 0 /100 WBC    Gran % 59.8 38.0 - 73.0 %    Lymph % 34.0 18.0 - 48.0 %    Mono % 4.4 4.0 - 15.0 %    Eosinophil % 1.1 0.0 - 8.0 %    Basophil % 0.5 0.0 - 1.9 %    Differential Method Automated    Comprehensive Metabolic Panel   Result Value Ref Range    Sodium 143 136 - 145 mmol/L    Potassium 4.3 3.5 - 5.1 mmol/L    Chloride 114 (H) 95 - 110 mmol/L    CO2 21 (L) 23 - 29 mmol/L    Glucose 98 70 - 110 mg/dL    BUN 17 6 - 20 mg/dL    Creatinine 0.7 0.5 - 1.4 mg/dL    Calcium 9.3 8.7 - 10.5 mg/dL    Total Protein 6.5 6.0 - 8.4 g/dL    Albumin 4.1 3.5 - 5.2 g/dL    Total Bilirubin 0.5 0.1 - 1.0 mg/dL    Alkaline Phosphatase 44 (L) 55 - 135 U/L    AST 14 10 - 40 U/L    ALT 7 (L) 10 - 44 U/L    Anion Gap 8 8 - 16 mmol/L    eGFR >60 >60 mL/min/1.73 m^2   Magnesium   Result Value Ref Range    Magnesium 2.0 1.6 - 2.6 mg/dL   Urinalysis, Reflex to Urine Culture Urine, Clean Catch    Specimen: Urine   Result Value Ref Range    Specimen UA Urine, Clean Catch     Color, UA Yellow Yellow, Straw, Claudia    Appearance, UA Clear Clear    pH, UA 8.0 5.0 - 8.0    Specific Gravity, UA 1.005 1.005 - 1.030    Protein, UA Negative Negative    Glucose, UA Negative Negative    Ketones, UA 1+ (A) Negative    Bilirubin (UA) Negative Negative    Occult Blood UA Negative Negative    Nitrite, UA Negative Negative    Urobilinogen,  UA Negative <2.0 EU/dL    Leukocytes, UA Negative Negative         Imaging Results:  Imaging Results              MRI Brain Without Contrast (Final result)  Result time 01/22/23 09:26:43      Final result by Terrance Wilson MD (01/22/23 09:26:43)                   Impression:      No acute findings.      Electronically signed by: Terrance Wilson  Date:    01/22/2023  Time:    09:26               Narrative:    EXAMINATION:  MRI BRAIN WITHOUT CONTRAST    CLINICAL HISTORY:  Headache, chronic, new features or increased frequency;    COMPARISON:  MRI 10/11/2021.    FINDINGS:  No intracranial hemorrhage is identified.  No evidence of acute ischemia.  No mass lesion, mass effect or edema.  The ventricles cisterns and sulci appear normal.  No significant osseous abnormality is identified.  The paranasal sinuses appear clear.                                    5:46 AM: Per oTm Martínez MD from [facility], pt's MR head without IV contrast results: Normal head/brain mri.       The Emergency Provider reviewed the vital signs and test results, which are outlined above.     ED Discussion       5:51 AM: Reassessed pt at this time. Discussed with pt all pertinent ED information and results. Discussed pt dx and plan of tx. Gave pt all f/u and return to the ED instructions. All questions and concerns were addressed at this time. Pt expresses understanding of information and instructions, and is comfortable with plan to discharge. Pt is stable for discharge.    I discussed with patient and/or family/caretaker that evaluation in the ED does not suggest any emergent or life threatening medical conditions requiring immediate intervention beyond what was provided in the ED, and I believe patient is safe for discharge.  Regardless, an unremarkable evaluation in the ED does not preclude the development or presence of a serious of life threatening condition. As such, patient was instructed to return immediately for any worsening or change in  current symptoms.         Medical Decision Making:   Clinical Tests:   Lab Tests: Ordered and Reviewed  Radiological Study: Ordered and Reviewed         ED Medication(s):  Medications   lactated ringers bolus 1,000 mL (0 mLs Intravenous Stopped 1/22/23 0315)   ketorolac injection 9.999 mg (9.99 mg Intravenous Given 1/22/23 0239)   metoclopramide HCl injection 10 mg (10 mg Intravenous Given 1/22/23 0235)   ketorolac (TORADOL) 30 mg/mL (1 mL) injection (  Given 1/22/23 0200)   LORazepam injection 2 mg (2 mg Intravenous Given 1/22/23 0352)       Discharge Medication List as of 1/22/2023  7:12 AM           Follow-up Information       E Jose Gamez Jr, MD. Call in 2 days.    Specialties: Internal Medicine, Pediatrics  Contact information:  [address]  [place] [address]  [phone]                                 Scribe Attestation:   Scribe #1: I performed the above scribed service and the documentation accurately describes the services I performed. I attest to the accuracy of the note.     Attending:   Physician Attestation Statement for Scribe #1: I, Fermin Ramírez MD, personally performed the services described in this documentation, as scribed by Dragan Espinosa, in my presence, and it is both accurate and complete.           Clinical Impression       ICD-10-CM ICD-9-CM   1. Acute nonintractable headache, unspecified headache type  R51.9 784.0       Disposition:   Disposition: Discharged  Condition: Stable       Fermin Ramírez MD  01/23/23 0312

## 2023-01-25 ENCOUNTER — OFFICE VISIT (OUTPATIENT)
Dept: NEUROLOGY | Facility: CLINIC | Age: 48
End: 2023-01-25
Payer: COMMERCIAL

## 2023-01-25 VITALS
SYSTOLIC BLOOD PRESSURE: 91 MMHG | HEART RATE: 70 BPM | WEIGHT: 134 LBS | BODY MASS INDEX: 20.31 KG/M2 | HEIGHT: 68 IN | DIASTOLIC BLOOD PRESSURE: 62 MMHG

## 2023-01-25 DIAGNOSIS — M54.81 BILATERAL OCCIPITAL NEURALGIA: Primary | ICD-10-CM

## 2023-01-25 DIAGNOSIS — G43.919 INTRACTABLE MIGRAINE WITHOUT STATUS MIGRAINOSUS, UNSPECIFIED MIGRAINE TYPE: ICD-10-CM

## 2023-01-25 DIAGNOSIS — M50.30 DDD (DEGENERATIVE DISC DISEASE), CERVICAL: ICD-10-CM

## 2023-01-25 DIAGNOSIS — G44.86 CERVICOGENIC HEADACHE: ICD-10-CM

## 2023-01-25 DIAGNOSIS — M79.18 CERVICAL MYOFASCIAL PAIN SYNDROME: ICD-10-CM

## 2023-01-25 DIAGNOSIS — M50.123 CERVICAL DISC DISORDER AT C6-C7 LEVEL WITH RADICULOPATHY: ICD-10-CM

## 2023-01-25 DIAGNOSIS — R51.9 CHRONIC DAILY HEADACHE: ICD-10-CM

## 2023-01-25 PROCEDURE — 1159F PR MEDICATION LIST DOCUMENTED IN MEDICAL RECORD: ICD-10-PCS | Mod: CPTII,S$GLB,, | Performed by: PSYCHIATRY & NEUROLOGY

## 2023-01-25 PROCEDURE — 99999 PR PBB SHADOW E&M-EST. PATIENT-LVL III: CPT | Mod: PBBFAC,,, | Performed by: PSYCHIATRY & NEUROLOGY

## 2023-01-25 PROCEDURE — 99215 PR OFFICE/OUTPT VISIT, EST, LEVL V, 40-54 MIN: ICD-10-PCS | Mod: S$GLB,,, | Performed by: PSYCHIATRY & NEUROLOGY

## 2023-01-25 PROCEDURE — 3008F PR BODY MASS INDEX (BMI) DOCUMENTED: ICD-10-PCS | Mod: CPTII,S$GLB,, | Performed by: PSYCHIATRY & NEUROLOGY

## 2023-01-25 PROCEDURE — 3074F PR MOST RECENT SYSTOLIC BLOOD PRESSURE < 130 MM HG: ICD-10-PCS | Mod: CPTII,S$GLB,, | Performed by: PSYCHIATRY & NEUROLOGY

## 2023-01-25 PROCEDURE — 99999 PR PBB SHADOW E&M-EST. PATIENT-LVL III: ICD-10-PCS | Mod: PBBFAC,,, | Performed by: PSYCHIATRY & NEUROLOGY

## 2023-01-25 PROCEDURE — 3074F SYST BP LT 130 MM HG: CPT | Mod: CPTII,S$GLB,, | Performed by: PSYCHIATRY & NEUROLOGY

## 2023-01-25 PROCEDURE — 1159F MED LIST DOCD IN RCRD: CPT | Mod: CPTII,S$GLB,, | Performed by: PSYCHIATRY & NEUROLOGY

## 2023-01-25 PROCEDURE — 3078F PR MOST RECENT DIASTOLIC BLOOD PRESSURE < 80 MM HG: ICD-10-PCS | Mod: CPTII,S$GLB,, | Performed by: PSYCHIATRY & NEUROLOGY

## 2023-01-25 PROCEDURE — 3008F BODY MASS INDEX DOCD: CPT | Mod: CPTII,S$GLB,, | Performed by: PSYCHIATRY & NEUROLOGY

## 2023-01-25 PROCEDURE — 99215 OFFICE O/P EST HI 40 MIN: CPT | Mod: S$GLB,,, | Performed by: PSYCHIATRY & NEUROLOGY

## 2023-01-25 PROCEDURE — 3078F DIAST BP <80 MM HG: CPT | Mod: CPTII,S$GLB,, | Performed by: PSYCHIATRY & NEUROLOGY

## 2023-01-25 RX ORDER — GABAPENTIN 300 MG/1
300 CAPSULE ORAL NIGHTLY
Qty: 30 CAPSULE | Refills: 3 | Status: SHIPPED | OUTPATIENT
Start: 2023-01-25 | End: 2023-03-02 | Stop reason: SDUPTHER

## 2023-01-25 NOTE — PROGRESS NOTES
Subjective:       Patient ID: Sandra Dimas is a 47 y.o. female.    Reason for Consult: Headache      Interval History:  Sandra Dimas is here for follow up. Their condition is complex.  The patient has seen multiple of my colleagues for her headaches over the years.  She notes that she has been having headaches since before she turned 21 years old.  She notes that at this point she has headaches 30 days out of 30 which last more than 8 hours at a time.  She notes that her headaches start at her right occipital region and radiate towards her right eye.  She notes she is light sensitive.  She notes her headaches will occur sometimes in the left side at this point.  She notes that her triggers can include hunger and dehydration.  It was thought at 1 point, that hormonal cycles will worsen her headaches so more than a year ago she had a total abdominal hysterectomy and bilateral salpingo-oophorectomy, which the patient and her  believe actually worsened her headache condition.  She is currently on Trokendi 200 mg and at 1 point tried to reduce her dose to 150 mg and this actually worsened her headache.  She has initiated physical therapy as of January 25th and started a muscle relaxant, Zanaflex as needed on 01/23/2023.  She is had Botox for 2 rounds at the [facility] in [place] and this actually worsened her outcome she did have neck droop at that time.  She has had anatomic and fluoroscopic guided occipital nerve block which helped her headache for about 18 hours and then her headaches came back.  She has been referred to me by a PM&R colleague in [place].      She has tried and failed Trokendi, indomethacin, nasal lidocaine, Botox, Ubrelvy, Nurtec, Imitrex, Maxalt, Elavil, Xanax, atenolol, stadol, carbamazepine, clonazepam, Cambia, dihydroergotamine, diphenhydramine, Depakote, doxepin, Lexapro, Prozac, Lasix, Norco, ketorolac, Lamictal, meperidine, Reglan, metoprolol, midazolam,  naproxen, Zofran, oxycodone, prednisone, Phenergan, venlafaxine    Objective:     Vitals:    01/25/23 0954   BP: 91/62   Pulse: 70     Tinel sign positive at right greater and lesser occipital nerve with radiation forward on her scalp.  Tinel sign positive at left greater and lesser occipital nerve with radiation towards the right side of her scalp.  Tenderness to palpation of right cervical and suboccipital musculature with positive muscle twitch response stronger than left.  Focused examination was undertaken today. Most of the visit time was spent giving guidance, counseling and discussing treatment options.        Assessment/Plan:     Problem List Items Addressed This Visit          Neuro    Migraines       Orthopedic    Cervical myofascial pain syndrome     Other Visit Diagnoses       Bilateral occipital neuralgia    -  Primary    Relevant Medications    gabapentin (NEURONTIN) 300 MG capsule    Other Relevant Orders    NERVE BLOCK    Cervicogenic headache        Chronic daily headache        DDD (degenerative disc disease), cervical        Cervical disc disorder at C6-C7 level with radiculopathy              47-year-old female presents for evaluation of complex multifactorial headaches.  At this time we have had a discussion that it would be beneficial to think about her headaches as a complex multifactorial issue rather than any one, singular type of headache.  For now we have discussed that her migraines  are predominant occipital neuralgia driven headaches although based on the fact that she had some radiation into her right arm from physical therapy manipulation yesterday, there may be an element of C6-C7 radiculopathy that is going on here as well.  We have discussed continue her physical therapy and monitoring her condition.  If she continues to have radiating pain into her right arm that I would consider updating an MRI of her cervical spine.  We have reviewed her cervical spine imaging from 2018 where  she has multilevel degenerative changes, interestingly enough, left worse than right at that time.  I will ask for prior authorization of ultrasound-guided bilateral greater and lesser occipital nerve block for therapeutic benefit to see if we can stop her occipital neuralgia and see if that has any benefit from her migraine headaches.  Previously she had good response from the Trokendi 200 mg.  Upon reducing the Trokendi her headaches worsened.  Hopefully we are able to reduce her occipital neuralgia component of her headache to see if we can reduce some of the Trokendi as this is causing her an adverse effect of cognitive slowing.  We discussed stopping the amitriptyline to reduce any combination or interaction between the amitriptyline and her Lexapro.  I will initiate her on gabapentin 300 mg p.o. q.h.s..  I have advised her to continue taking tizanidine.  I have given her information on the neck hammock that she can use for gentle cervical traction at home and she can combine this with heat or ice as well for her current headache cycle.    The patient verbalizes understanding and agreement with the treatment plan. I have discussed risks, benefits and alternatives to the treatment plan. Questions were sought and answered to her stated verbal satisfaction.        Marcos Vazquez MD    This note is dictated on M*Modal Fluency Direct word recognition program. There are word recognition mistakes that are occasionally missed on review.

## 2023-01-30 ENCOUNTER — PATIENT MESSAGE (OUTPATIENT)
Dept: NEUROLOGY | Facility: CLINIC | Age: 48
End: 2023-01-30
Payer: COMMERCIAL

## 2023-01-30 DIAGNOSIS — M54.2 CERVICALGIA: ICD-10-CM

## 2023-01-30 DIAGNOSIS — M54.12 CERVICAL RADICULITIS: Primary | ICD-10-CM

## 2023-02-03 ENCOUNTER — OFFICE VISIT (OUTPATIENT)
Dept: NEUROLOGY | Facility: CLINIC | Age: 48
End: 2023-02-03
Payer: COMMERCIAL

## 2023-02-03 VITALS
WEIGHT: 134.5 LBS | DIASTOLIC BLOOD PRESSURE: 64 MMHG | HEIGHT: 68 IN | BODY MASS INDEX: 20.38 KG/M2 | SYSTOLIC BLOOD PRESSURE: 97 MMHG | HEART RATE: 80 BPM

## 2023-02-03 DIAGNOSIS — G44.86 CERVICOGENIC HEADACHE: ICD-10-CM

## 2023-02-03 DIAGNOSIS — M54.81 BILATERAL OCCIPITAL NEURALGIA: Primary | ICD-10-CM

## 2023-02-03 PROCEDURE — 64405 NJX AA&/STRD GR OCPL NRV: CPT | Mod: 50,51,S$GLB, | Performed by: PSYCHIATRY & NEUROLOGY

## 2023-02-03 PROCEDURE — 64405 PR NERVE BLOCK INJ, ANES/STEROID, OCCIPITAL: ICD-10-PCS | Mod: 50,51,S$GLB, | Performed by: PSYCHIATRY & NEUROLOGY

## 2023-02-03 PROCEDURE — 3074F PR MOST RECENT SYSTOLIC BLOOD PRESSURE < 130 MM HG: ICD-10-PCS | Mod: CPTII,S$GLB,, | Performed by: PSYCHIATRY & NEUROLOGY

## 2023-02-03 PROCEDURE — 3078F DIAST BP <80 MM HG: CPT | Mod: CPTII,S$GLB,, | Performed by: PSYCHIATRY & NEUROLOGY

## 2023-02-03 PROCEDURE — 1159F MED LIST DOCD IN RCRD: CPT | Mod: CPTII,S$GLB,, | Performed by: PSYCHIATRY & NEUROLOGY

## 2023-02-03 PROCEDURE — 1160F PR REVIEW ALL MEDS BY PRESCRIBER/CLIN PHARMACIST DOCUMENTED: ICD-10-PCS | Mod: CPTII,S$GLB,, | Performed by: PSYCHIATRY & NEUROLOGY

## 2023-02-03 PROCEDURE — 1159F PR MEDICATION LIST DOCUMENTED IN MEDICAL RECORD: ICD-10-PCS | Mod: CPTII,S$GLB,, | Performed by: PSYCHIATRY & NEUROLOGY

## 2023-02-03 PROCEDURE — 3008F BODY MASS INDEX DOCD: CPT | Mod: CPTII,S$GLB,, | Performed by: PSYCHIATRY & NEUROLOGY

## 2023-02-03 PROCEDURE — 99999 PR PBB SHADOW E&M-EST. PATIENT-LVL IV: ICD-10-PCS | Mod: PBBFAC,,, | Performed by: PSYCHIATRY & NEUROLOGY

## 2023-02-03 PROCEDURE — 64450 NJX AA&/STRD OTHER PN/BRANCH: CPT | Mod: 50,S$GLB,, | Performed by: PSYCHIATRY & NEUROLOGY

## 2023-02-03 PROCEDURE — 64450 PR NERVE BLOCK INJ, ANES/STEROID, OTHER PERIPHERAL: ICD-10-PCS | Mod: 50,S$GLB,, | Performed by: PSYCHIATRY & NEUROLOGY

## 2023-02-03 PROCEDURE — 76942 PR U/S GUIDANCE FOR NEEDLE GUIDANCE: ICD-10-PCS | Mod: S$GLB,,, | Performed by: PSYCHIATRY & NEUROLOGY

## 2023-02-03 PROCEDURE — 3008F PR BODY MASS INDEX (BMI) DOCUMENTED: ICD-10-PCS | Mod: CPTII,S$GLB,, | Performed by: PSYCHIATRY & NEUROLOGY

## 2023-02-03 PROCEDURE — 99499 UNLISTED E&M SERVICE: CPT | Mod: S$GLB,,, | Performed by: PSYCHIATRY & NEUROLOGY

## 2023-02-03 PROCEDURE — 1160F RVW MEDS BY RX/DR IN RCRD: CPT | Mod: CPTII,S$GLB,, | Performed by: PSYCHIATRY & NEUROLOGY

## 2023-02-03 PROCEDURE — 3074F SYST BP LT 130 MM HG: CPT | Mod: CPTII,S$GLB,, | Performed by: PSYCHIATRY & NEUROLOGY

## 2023-02-03 PROCEDURE — 99499 NO LOS: ICD-10-PCS | Mod: S$GLB,,, | Performed by: PSYCHIATRY & NEUROLOGY

## 2023-02-03 PROCEDURE — 3078F PR MOST RECENT DIASTOLIC BLOOD PRESSURE < 80 MM HG: ICD-10-PCS | Mod: CPTII,S$GLB,, | Performed by: PSYCHIATRY & NEUROLOGY

## 2023-02-03 PROCEDURE — 99999 PR PBB SHADOW E&M-EST. PATIENT-LVL IV: CPT | Mod: PBBFAC,,, | Performed by: PSYCHIATRY & NEUROLOGY

## 2023-02-03 PROCEDURE — 76942 ECHO GUIDE FOR BIOPSY: CPT | Mod: S$GLB,,, | Performed by: PSYCHIATRY & NEUROLOGY

## 2023-02-03 RX ORDER — DULOXETIN HYDROCHLORIDE 20 MG/1
20 CAPSULE, DELAYED RELEASE ORAL DAILY
Qty: 30 CAPSULE | Refills: 3 | Status: SHIPPED | OUTPATIENT
Start: 2023-02-03 | End: 2023-03-02 | Stop reason: SDUPTHER

## 2023-02-03 RX ORDER — BACLOFEN 10 MG/1
10 TABLET ORAL 3 TIMES DAILY PRN
Qty: 90 TABLET | Refills: 3 | Status: SHIPPED | OUTPATIENT
Start: 2023-02-03 | End: 2024-03-21

## 2023-02-07 ENCOUNTER — PATIENT MESSAGE (OUTPATIENT)
Dept: NEUROLOGY | Facility: CLINIC | Age: 48
End: 2023-02-07
Payer: COMMERCIAL

## 2023-02-07 DIAGNOSIS — M54.81 BILATERAL OCCIPITAL NEURALGIA: ICD-10-CM

## 2023-02-07 DIAGNOSIS — M54.12 CERVICAL RADICULITIS: Primary | ICD-10-CM

## 2023-02-10 RX ORDER — LORAZEPAM 2 MG/1
2 TABLET ORAL ONCE
Qty: 1 TABLET | Refills: 0 | Status: SHIPPED | OUTPATIENT
Start: 2023-02-10 | End: 2023-02-11

## 2023-02-10 RX ORDER — LORAZEPAM 1 MG/1
1 TABLET ORAL
Qty: 1 TABLET | Refills: 0 | Status: SHIPPED | OUTPATIENT
Start: 2023-02-10 | End: 2023-02-13

## 2023-02-11 ENCOUNTER — HOSPITAL ENCOUNTER (OUTPATIENT)
Dept: RADIOLOGY | Facility: HOSPITAL | Age: 48
Discharge: HOME | End: 2023-02-11
Attending: PSYCHIATRY & NEUROLOGY
Payer: COMMERCIAL

## 2023-02-11 DIAGNOSIS — M54.2 CERVICALGIA: ICD-10-CM

## 2023-02-11 DIAGNOSIS — M54.12 CERVICAL RADICULITIS: ICD-10-CM

## 2023-02-11 PROCEDURE — 72141 MRI NECK SPINE W/O DYE: CPT | Mod: TC

## 2023-02-11 PROCEDURE — 72141 MRI CERVICAL SPINE WITHOUT CONTRAST: ICD-10-PCS | Mod: 26,,, | Performed by: RADIOLOGY

## 2023-02-11 PROCEDURE — 72141 MRI NECK SPINE W/O DYE: CPT | Mod: 26,,, | Performed by: RADIOLOGY

## 2023-02-13 DIAGNOSIS — E04.1 THYROID NODULE: Primary | ICD-10-CM

## 2023-02-14 ENCOUNTER — HOSPITAL ENCOUNTER (OUTPATIENT)
Dept: RADIOLOGY | Facility: HOSPITAL | Age: 48
Discharge: HOME | End: 2023-02-14
Attending: INTERNAL MEDICINE
Payer: COMMERCIAL

## 2023-02-14 ENCOUNTER — TELEPHONE (OUTPATIENT)
Dept: PAIN MEDICINE | Facility: CLINIC | Age: 48
End: 2023-02-14
Payer: COMMERCIAL

## 2023-02-14 DIAGNOSIS — E04.1 THYROID NODULE: ICD-10-CM

## 2023-02-14 PROCEDURE — 76536 US EXAM OF HEAD AND NECK: CPT | Mod: TC

## 2023-02-14 PROCEDURE — 76536 US EXAM OF HEAD AND NECK: CPT | Mod: 26,,, | Performed by: RADIOLOGY

## 2023-02-14 PROCEDURE — 76536 US THYROID: ICD-10-PCS | Mod: 26,,, | Performed by: RADIOLOGY

## 2023-02-14 NOTE — TELEPHONE ENCOUNTER
Patient was contact and informed that this was the soonest visit the have  for New patients.    Verbalized understanding

## 2023-02-14 NOTE — TELEPHONE ENCOUNTER
----- Message from Kayla Elias sent at 2/14/2023  1:15 PM CST -----  Regarding: Sooner Appt Request  Who Is Calling : AMAN BROOKE [[id number]]        Reason For The Call: Patient is requesting a sooner appointment.  Patient accepted first available and added to the waitlist.  Please contact the patient to schedule.        Preferred Contact Method: [phone]        Additional Information:

## 2023-02-15 ENCOUNTER — TELEPHONE (OUTPATIENT)
Dept: INTERNAL MEDICINE | Facility: CLINIC | Age: 48
End: 2023-02-15
Payer: COMMERCIAL

## 2023-02-15 ENCOUNTER — PATIENT MESSAGE (OUTPATIENT)
Dept: INTERNAL MEDICINE | Facility: CLINIC | Age: 48
End: 2023-02-15
Payer: COMMERCIAL

## 2023-02-15 DIAGNOSIS — E04.2 MULTINODULAR THYROID: Primary | ICD-10-CM

## 2023-02-15 NOTE — TELEPHONE ENCOUNTER
Patient had follow up thyroid US following MRI which showed multinodular thyroid. Can repeat in 1 year per radiology or see ENT if patient/or  likes.

## 2023-02-27 ENCOUNTER — LAB VISIT (OUTPATIENT)
Dept: LAB | Facility: HOSPITAL | Age: 48
End: 2023-02-27
Attending: PEDIATRICS
Payer: COMMERCIAL

## 2023-02-27 ENCOUNTER — PATIENT MESSAGE (OUTPATIENT)
Dept: PAIN MEDICINE | Facility: CLINIC | Age: 48
End: 2023-02-27
Payer: COMMERCIAL

## 2023-02-27 DIAGNOSIS — E78.00 HYPERCHOLESTEROLEMIA: ICD-10-CM

## 2023-02-27 PROCEDURE — 80053 COMPREHEN METABOLIC PANEL: CPT | Performed by: PEDIATRICS

## 2023-02-27 PROCEDURE — 80061 LIPID PANEL: CPT | Performed by: PEDIATRICS

## 2023-02-27 PROCEDURE — 36415 COLL VENOUS BLD VENIPUNCTURE: CPT | Performed by: PEDIATRICS

## 2023-02-28 ENCOUNTER — OFFICE VISIT (OUTPATIENT)
Dept: PAIN MEDICINE | Facility: CLINIC | Age: 48
End: 2023-02-28
Payer: COMMERCIAL

## 2023-02-28 VITALS
WEIGHT: 134.5 LBS | SYSTOLIC BLOOD PRESSURE: 89 MMHG | HEIGHT: 64 IN | DIASTOLIC BLOOD PRESSURE: 65 MMHG | RESPIRATION RATE: 18 BRPM | HEART RATE: 65 BPM | TEMPERATURE: 97 F | BODY MASS INDEX: 22.96 KG/M2

## 2023-02-28 DIAGNOSIS — G89.4 CHRONIC PAIN DISORDER: Primary | ICD-10-CM

## 2023-02-28 DIAGNOSIS — G43.919 INTRACTABLE MIGRAINE WITHOUT STATUS MIGRAINOSUS, UNSPECIFIED MIGRAINE TYPE: ICD-10-CM

## 2023-02-28 DIAGNOSIS — M54.81 OCCIPITAL NEURALGIA, UNSPECIFIED LATERALITY: ICD-10-CM

## 2023-02-28 DIAGNOSIS — M79.18 CERVICAL MYOFASCIAL PAIN SYNDROME: ICD-10-CM

## 2023-02-28 LAB
ALBUMIN SERPL BCP-MCNC: 4.4 G/DL (ref 3.5–5.2)
ALP SERPL-CCNC: 53 U/L (ref 55–135)
ALT SERPL W/O P-5'-P-CCNC: 19 U/L (ref 10–44)
ANION GAP SERPL CALC-SCNC: 9 MMOL/L (ref 8–16)
AST SERPL-CCNC: 17 U/L (ref 10–40)
BILIRUB SERPL-MCNC: 0.3 MG/DL (ref 0.1–1)
BUN SERPL-MCNC: 21 MG/DL (ref 6–20)
CALCIUM SERPL-MCNC: 9.7 MG/DL (ref 8.7–10.5)
CHLORIDE SERPL-SCNC: 112 MMOL/L (ref 95–110)
CHOLEST SERPL-MCNC: 256 MG/DL (ref 120–199)
CHOLEST/HDLC SERPL: 6.7 {RATIO} (ref 2–5)
CO2 SERPL-SCNC: 23 MMOL/L (ref 23–29)
CREAT SERPL-MCNC: 1 MG/DL (ref 0.5–1.4)
EST. GFR  (NO RACE VARIABLE): >60 ML/MIN/1.73 M^2
GLUCOSE SERPL-MCNC: 96 MG/DL (ref 70–110)
HDLC SERPL-MCNC: 38 MG/DL (ref 40–75)
HDLC SERPL: 14.8 % (ref 20–50)
LDLC SERPL CALC-MCNC: 179.8 MG/DL (ref 63–159)
NONHDLC SERPL-MCNC: 218 MG/DL
POTASSIUM SERPL-SCNC: 4.1 MMOL/L (ref 3.5–5.1)
PROT SERPL-MCNC: 6.8 G/DL (ref 6–8.4)
SODIUM SERPL-SCNC: 144 MMOL/L (ref 136–145)
TRIGL SERPL-MCNC: 191 MG/DL (ref 30–150)

## 2023-02-28 PROCEDURE — 1160F PR REVIEW ALL MEDS BY PRESCRIBER/CLIN PHARMACIST DOCUMENTED: ICD-10-PCS | Mod: CPTII,S$GLB,, | Performed by: ANESTHESIOLOGY

## 2023-02-28 PROCEDURE — 3074F SYST BP LT 130 MM HG: CPT | Mod: CPTII,S$GLB,, | Performed by: ANESTHESIOLOGY

## 2023-02-28 PROCEDURE — 99204 PR OFFICE/OUTPT VISIT, NEW, LEVL IV, 45-59 MIN: ICD-10-PCS | Mod: 25,S$GLB,, | Performed by: ANESTHESIOLOGY

## 2023-02-28 PROCEDURE — 99999 PR PBB SHADOW E&M-EST. PATIENT-LVL V: CPT | Mod: PBBFAC,,, | Performed by: ANESTHESIOLOGY

## 2023-02-28 PROCEDURE — 99204 OFFICE O/P NEW MOD 45 MIN: CPT | Mod: 25,S$GLB,, | Performed by: ANESTHESIOLOGY

## 2023-02-28 PROCEDURE — 64405 NJX AA&/STRD GR OCPL NRV: CPT | Mod: 50,S$GLB,, | Performed by: ANESTHESIOLOGY

## 2023-02-28 PROCEDURE — 76942 ECHO GUIDE FOR BIOPSY: CPT | Mod: S$GLB,,, | Performed by: ANESTHESIOLOGY

## 2023-02-28 PROCEDURE — 76942 PR U/S GUIDANCE FOR NEEDLE GUIDANCE: ICD-10-PCS | Mod: S$GLB,,, | Performed by: ANESTHESIOLOGY

## 2023-02-28 PROCEDURE — 3074F PR MOST RECENT SYSTOLIC BLOOD PRESSURE < 130 MM HG: ICD-10-PCS | Mod: CPTII,S$GLB,, | Performed by: ANESTHESIOLOGY

## 2023-02-28 PROCEDURE — 64405 PR NERVE BLOCK INJ, ANES/STEROID, OCCIPITAL: ICD-10-PCS | Mod: 50,S$GLB,, | Performed by: ANESTHESIOLOGY

## 2023-02-28 PROCEDURE — 1159F MED LIST DOCD IN RCRD: CPT | Mod: CPTII,S$GLB,, | Performed by: ANESTHESIOLOGY

## 2023-02-28 PROCEDURE — 3008F PR BODY MASS INDEX (BMI) DOCUMENTED: ICD-10-PCS | Mod: CPTII,S$GLB,, | Performed by: ANESTHESIOLOGY

## 2023-02-28 PROCEDURE — 1160F RVW MEDS BY RX/DR IN RCRD: CPT | Mod: CPTII,S$GLB,, | Performed by: ANESTHESIOLOGY

## 2023-02-28 PROCEDURE — 1159F PR MEDICATION LIST DOCUMENTED IN MEDICAL RECORD: ICD-10-PCS | Mod: CPTII,S$GLB,, | Performed by: ANESTHESIOLOGY

## 2023-02-28 PROCEDURE — 99999 PR PBB SHADOW E&M-EST. PATIENT-LVL V: ICD-10-PCS | Mod: PBBFAC,,, | Performed by: ANESTHESIOLOGY

## 2023-02-28 PROCEDURE — 3008F BODY MASS INDEX DOCD: CPT | Mod: CPTII,S$GLB,, | Performed by: ANESTHESIOLOGY

## 2023-02-28 PROCEDURE — 3078F PR MOST RECENT DIASTOLIC BLOOD PRESSURE < 80 MM HG: ICD-10-PCS | Mod: CPTII,S$GLB,, | Performed by: ANESTHESIOLOGY

## 2023-02-28 PROCEDURE — 3078F DIAST BP <80 MM HG: CPT | Mod: CPTII,S$GLB,, | Performed by: ANESTHESIOLOGY

## 2023-02-28 NOTE — PROGRESS NOTES
"Chronic Pain - New Consult    Referring Physician: Arian Vazquez III, MD    Chief Complaint:   Chief Complaint   Patient presents with    Headache    Facial Pain        SUBJECTIVE:    Sandra Dimas presents to the clinic for the evaluation of right headache pain. The pain started 30 years ago following no inciting event and symptoms have been worsening.  Since Thanksgiving 2022, the pain has been persisant.  She saw Dr. Vazquez who blocked greater and lesser occipiital nerves.  The pain to her eye improved, however she had pain radiating into her right ear. The pain is located in the also had constant "ball like" pain area and radiates to the top of the head to the right forehead.  The pain is described as aching, shooting, and stabbing (aching is persistent) and is rated as 10/10. The pain is rated with a score of  10/10 on the BEST day and a score of 10/10 on the WORST day.  Symptoms interfere with daily activity and sleeping. The pain is exacerbated by low frequency noise, lights.  The pain is mitigated by compound creams, lidocaine, ice which helps a little. She reports spending most of the day reclining. The patient reports 2-3 hours of uninterrupted sleep per night.    Patient denies night fever/night sweats, significant weight loss, and significant motor weakness.    Physical Therapy/Home Exercise: yes      Pain Disability Index Review:  Last 3 PDI Scores 12/5/2017   Pain Disability Index (PDI) 48       Pain Medications:  NSAIDs  Percocet  Topamax 200mg (has speech side effects)  Baclofen  Gabapentin 300mg at night  Cymbalta 20mg (just started)     report:  Reviewed and consistent with medication use as prescribed.    Pain Procedures:   Bilateral greater and lesser occipital nerve blocks with Dr. Vazquez  Trigger point injections    Imaging:   MRI CERVICAL SPINE WITHOUT CONTRAST     CLINICAL HISTORY:  Chronic neck pain radiculopathy, cervical regionNeck pain, chronic;     TECHNIQUE:  Standard multiplanar " noncontrast MRI sequences of the cervical spine.     COMPARISON:  MRI 07/05/2018     FINDINGS:  The cervical cord reveals normal signal and morphology.     The cervical vertebra reveal signal and morphology.  Minor scoliosis of the cervical/thoracic spine is noted.     The posterior paraspinal soft tissues appear normal.  Incidentally a right thyroid nodule is present with a maximum vertical dimension 13.5 mm.     C2-C3: Unremarkable.     C3-C4: Unremarkable.     C4-C5: Minor annular disc bulge with minor left uncovertebral joint spurring.     C5-C6: Minor disc degeneration with disc desiccation and disc bulge with mild bilateral uncovertebral joint spurring.     C6-C7: Unremarkable.     C7-T1: Unremarkable.     Impression:     Minor degenerative changes C4-5 and C5-6.  No significant central or foraminal stenosis     Minor cervical/thoracic scoliosis     Incidental right thyroid nodule.        Electronically signed by: Boby Givens MD  Date:                                            02/13/2023  Time:                                           08:32    Past Medical History:   Diagnosis Date    Chronic paroxysmal hemicrania 01/03/2019    Endometriosis     General anesthetics causing adverse effect in therapeutic use     wakes up with severe anxiety attacks    Hemicrania continua     Hepatitis C antibody positive in blood 12/09/2020    RNA NEGATIVE 12/14/2020    Migraines      Past Surgical History:   Procedure Laterality Date    APPENDECTOMY      COLONOSCOPY N/A 11/12/2020    Procedure: COLONOSCOPY;  Surgeon: Mylene Russ MD;  Location: [facility];  Service: Endoscopy;  Laterality: N/A;    CYST REMOVAL Right 12/30/2019    Procedure: EXCISION, CYST;  Surgeon: NATALI Shah MD;  Location: [facility] OR;  Service: OB/GYN;  Laterality: Right;  Sebaceous cyst    diagnostic laprascopy      ESOPHAGOGASTRODUODENOSCOPY N/A 11/30/2018    Procedure: EGD (ESOPHAGOGASTRODUODENOSCOPY);  Surgeon: Bossman Bustos MD;   Location: [facility];  Service: Endoscopy;  Laterality: N/A;    ESOPHAGOGASTRODUODENOSCOPY N/A 11/12/2020    Procedure: ESOPHAGOGASTRODUODENOSCOPY (EGD) needs rapid covid test;  Surgeon: Mylene Russ MD;  Location: [facility];  Service: Endoscopy;  Laterality: N/A;    FULGURATION OF ENDOMETRIOSIS  12/26/2018    Procedure: FULGURATION, ENDOMETRIOSIS;  Surgeon: Zehra Jensen MD;  Location: [facility];  Service: OB/GYN;;  endometriosis implant resection    HYSTEROSCOPY WITH HYDROTHERMAL ABLATION OF ENDOMETRIUM WITH DILATION AND CURETTAGE N/A 12/26/2018    Procedure: HYSTEROSCOPY, WITH DILATION AND CURETTAGE OF UTERUS AND HYDROTHERMAL ENDOMETRIAL ABLATION;  Surgeon: Zehra Jensen MD;  Location: [facility];  Service: OB/GYN;  Laterality: N/A;    LAPAROSCOPIC SALPINGECTOMY Bilateral 12/26/2018    Procedure: SALPINGECTOMY, LAPAROSCOPIC;  Surgeon: Zehra Jensen MD;  Location: [facility];  Service: OB/GYN;  Laterality: Bilateral;    LAPAROTOMY, EXPLORATORY N/A 2/7/2019    Procedure: LAPAROTOMY, EXPLORATORY;  Surgeon: Ramakrishna Boone MD;  Location: [facility];  Service: General;  Laterality: N/A;    REPAIR OF VAGINAL CUFF N/A 7/9/2020    Procedure: REPAIR, VAGINAL CUFF;  Surgeon: Sailaja Addison MD;  Location: [facility];  Service: OB/GYN;  Laterality: N/A;    ROBOT-ASSISTED LAPAROSCOPIC ABDOMINAL HYSTERECTOMY USING DA MAVIS XI N/A 12/30/2019    Procedure: XI ROBOTIC HYSTERECTOMY;  Surgeon: NATALI Shah MD;  Location: [facility];  Service: OB/GYN;  Laterality: N/A;    ROBOT-ASSISTED LAPAROSCOPIC SURGICAL REMOVAL OF OVARY USING DA MAVIS XI Bilateral 12/30/2019    Procedure: XI ROBOTIC OOPHORECTOMY;  Surgeon: NATALI Shah MD;  Location: [facility];  Service: OB/GYN;  Laterality: Bilateral;    robotic assisted laparoscopy with excision of ovarian endometrioma and chromotubation       Social History     Socioeconomic History    Marital status:    Tobacco Use    Smoking status: Never    Smokeless tobacco: Never    Substance and Sexual Activity    Alcohol use: Not Currently     Comment: rarely No alcohol 72h prior to sx    Drug use: No    Sexual activity: Yes     Partners: Male   Social History Narrative    No pets or smokers in household.     Family History   Problem Relation Age of Onset    Hypertension Mother     Diabetes type II Mother     Heart attack Father     Strabismus Neg Hx     Retinal detachment Neg Hx     Macular degeneration Neg Hx     Glaucoma Neg Hx     Blindness Neg Hx     Amblyopia Neg Hx     Breast cancer Neg Hx     Colon cancer Neg Hx     Ovarian cancer Neg Hx     Thyroid disease Neg Hx     Migraines Neg Hx     Asthma Neg Hx        Review of patient's allergies indicates:   Allergen Reactions    Hydrocodone Other (See Comments)     Chest pains. Note: patient tolerated hydromorphone in 2/2019.     Chlorhexidine Rash     Per  after surgery patient had large rash across abdomen where chlorhexidine was applied.    Mastisol adhesive [gum gvvqtx-bwqbwh-vvvl-alcohol] Rash       Current Outpatient Medications   Medication Sig    atenoloL (TENORMIN) 25 MG tablet Take 1 tablet (25 mg total) by mouth once daily.    B2/magnesium cit,oxid/feverfew (MIGRELIEF ORAL) Take by mouth once daily.     baclofen (LIORESAL) 10 MG tablet Take 1 tablet (10 mg total) by mouth 3 (three) times daily as needed (neck muscle spasm).    butorphanol (STADOL) 10 mg/mL nasal spray as needed.    DULoxetine (CYMBALTA) 20 MG capsule Take 1 capsule (20 mg total) by mouth once daily.    gabapentin (NEURONTIN) 300 MG capsule Take 1 capsule (300 mg total) by mouth every evening.    ondansetron (ZOFRAN-ODT) 8 MG TbDL Take 1 tablet (8 mg total) by mouth every 8 (eight) hours as needed (nausea/vomiting).    pantoprazole (PROTONIX) 40 MG tablet Take 1 tablet (40 mg total) by mouth once daily.    promethazine (PHENERGAN) 25 MG tablet Take 0.5 tablets (12.5 mg total) by mouth every 6 (six) hours as needed for Nausea.    semaglutide (OZEMPIC) 1  mg/dose (4 mg/3 mL) Inject 1 mg into the skin every 7 days.    topiramate (TROKENDI XR) 200 mg Cp24 Take 1 capsule by mouth once daily.    valACYclovir (VALTREX) 1000 MG tablet Take 1 tablet by mouth once a day    valACYclovir (VALTREX) 500 MG tablet     vitamin D (VITAMIN D3) 1000 units Tab Take 1,000 Units by mouth once daily.    amitriptyline (ELAVIL) 10 MG tablet Take 1 tablet (10 mg total) by mouth nightly as needed for Insomnia. (Patient not taking: Reported on 2/28/2023)    EScitalopram oxalate (LEXAPRO) 20 MG tablet Take 1 tablet (20 mg total) by mouth once daily. (Patient not taking: Reported on 2/28/2023)    linaCLOtide (LINZESS) 72 mcg Cap capsule Take 1 capsule (72 mcg total) by mouth before breakfast. (Patient not taking: Reported on 2/28/2023)    sucralfate (CARAFATE) 100 mg/mL suspension TAKE 10 ML BY MOUTH EVERY 6 HOURS (Patient not taking: Reported on 2/28/2023)     No current facility-administered medications for this visit.     Facility-Administered Medications Ordered in Other Visits   Medication    lactated ringers infusion    lidocaine (PF) 10 mg/ml (1%) injection 10 mg       REVIEW OF SYSTEMS:    GENERAL:  No weight loss, malaise or fevers.  HEENT:  Positive for frequent or significant headaches.  NECK:  Negative for lumps, goiter, +pain and -significant neck swelling.  RESPIRATORY:  Negative for cough, wheezing or shortness of breath.  CARDIOVASCULAR:  Negative for chest pain, leg swelling or palpitations.  GI:  Negative for abdominal discomfort, blood in stools or black stools or change in bowel habits.  MUSCULOSKELETAL:  See HPI.  SKIN:  Negative for lesions, rash, and itching.  PSYCH:  +sleep disturbance, -mood disorder and recent psychosocial stressors.  HEMATOLOGY/LYMPHOLOGY:  Negative for prolonged bleeding, bruising easily or swollen nodes.  NEURO:   No history of syncope, paralysis, seizures or tremors.  All other reviewed and negative other than HPI.    OBJECTIVE:    BP (!) 89/65    "Pulse 65   Temp 97 °F (36.1 °C)   Resp 18   Ht 5' 4" (1.626 m)   Wt 61 kg (134 lb 7.7 oz)   LMP  (LMP Unknown)   BMI 23.08 kg/m²     PHYSICAL EXAMINATION:    General appearance: Well appearing, in moderate distress, alert and oriented x3.  Psych:  Mood and affect appropriate.  Skin: Skin color, texture, turgor normal, no rashes or lesions, in both upper and lower body.  Head/face:  Normocephalic, atraumatic. No palpable lymph nodes.  Neck: No pain to palpation over the cervical paraspinous muscles. Mild tenderness to palpation.  Some paraspinal tenderness which radiates to her head.  Tenderness to occiput bilaterally, right worse than left. Spurling Negative. Mild pain with neck flexion, extension, or lateral flexion.   Cor: regular rate  Pulm: non-labored  GI:  Soft and non-tender.  Back: No pain to palpation over the spine or costovertebral angles. Normal range of motion without pain reproduction.  Extremities: Peripheral joint ROM is full and pain free without obvious instability or laxity in all four extremities. No deformities, edema, or skin discoloration. Good capillary refill.  Musculoskeletal: Bilateral upper and lower extremity strength is normal and symmetric.  No atrophy or tone abnormalities are noted.  Neuro: Bilateral upper and lower extremity coordination and muscle stretch reflexes are physiologic and symmetric.  No loss of sensation is noted.  Gait: normal.    ASSESSMENT: 47 y.o. year old female with headache/forehead pain, consistent with:     1. Chronic pain disorder        2. Occipital neuralgia, unspecified laterality        3. Intractable migraine without status migrainosus, unspecified migraine type        4. Cervical myofascial pain syndrome            PLAN:   - Patient reports severe and debilitating pain today and her pain is 10/10 affecting her quality of life significantly. We will proceed with bilateral occipital nerve blocks today.   - Bilateral ultrasound guided occipital nerve " block done in office   - RTC 4-6 weeks  - I have stressed the importance of physical activity and a home exercise plan to help with pain and improve health.  - Patient can continue with medications for now since they are providing benefits, using them appropriately, and without side effects.  - Counseled patient regarding the importance of activity modification and physical therapy.    The above plan and management options were discussed at length with patient. Patient is in agreement with the above and verbalized understanding. It will be communicated with the referring physician via electronic record, fax, or mail.    Elly Carlos  02/28/2023    Patient Name: Sandra Dimas  MRN: [id number]      INFORMED CONSENT: The procedure, risks, benefits and options were discussed with patient. There are no contraindications to the procedure. The patient expressed understanding and agreed to proceed. The personnel performing the procedure was discussed. I verify that I personally obtained Sandra's consent prior to the start of the procedure and the signed consent can be found on the patient's chart.    Procedure Date: 02/28/2023    Anesthesia: Topical    Pre Procedure diagnosis:   1. Chronic pain disorder    2. Occipital neuralgia, unspecified laterality    3. Intractable migraine without status migrainosus, unspecified migraine type    4. Cervical myofascial pain syndrome        Post-Procedure diagnosis: same      Sedation: None    PROCEDURE: Bilateral OCCIPITAL NERVE BLOCK UNDER U/S   The patient was placed in prone position. The site of pain and procedure were confirmed with the patient prior to starting the procedure. The patient's nuchal ridge of the occipital bone was identified and prepped with chlorhexidine. After performing time out A 26 g gauge 3.5 inch was advanced toward the left through the skin and subcutaneous tissues lateral to C2 Between the Inferior oblique muscle and semispinalis capitis under ultrasound  guidance using in-plane technique with hydro dissection.  Aspiration for blood and CSF was negative.  A total of 5 ml of a solution containing 9 ml Bupivacaine 0.25% and 4 mg decadron was injected.  The needle was retracted and the procedure was repeated on the right. There were no signs or symptoms of intravascular injection. No complications were evident. No specimens collected.      Blood Loss: Nill  Specimen: None    Elly Carlos MD    I have reviewed and concur with the resident's history, physical, assessment, and plan.  I have personally interviewed and examined the patient at bedside.  See below addendum for my evaluation and additional findings.    Neftaly Marrero MD

## 2023-03-02 DIAGNOSIS — M54.81 BILATERAL OCCIPITAL NEURALGIA: ICD-10-CM

## 2023-03-02 RX ORDER — DULOXETIN HYDROCHLORIDE 30 MG/1
30 CAPSULE, DELAYED RELEASE ORAL DAILY
Qty: 90 CAPSULE | Refills: 1 | Status: SHIPPED | OUTPATIENT
Start: 2023-03-02 | End: 2023-08-07 | Stop reason: SDUPTHER

## 2023-03-02 RX ORDER — GABAPENTIN 300 MG/1
300 CAPSULE ORAL 3 TIMES DAILY
Qty: 270 CAPSULE | Refills: 1 | Status: SHIPPED | OUTPATIENT
Start: 2023-03-02

## 2023-03-06 ENCOUNTER — TELEPHONE (OUTPATIENT)
Dept: INTERNAL MEDICINE | Facility: CLINIC | Age: 48
End: 2023-03-06
Payer: COMMERCIAL

## 2023-03-06 ENCOUNTER — PATIENT MESSAGE (OUTPATIENT)
Dept: PAIN MEDICINE | Facility: CLINIC | Age: 48
End: 2023-03-06
Payer: COMMERCIAL

## 2023-03-06 ENCOUNTER — OFFICE VISIT (OUTPATIENT)
Dept: INTERNAL MEDICINE | Facility: CLINIC | Age: 48
End: 2023-03-06
Payer: COMMERCIAL

## 2023-03-06 DIAGNOSIS — D50.0 IRON DEFICIENCY ANEMIA DUE TO CHRONIC BLOOD LOSS: ICD-10-CM

## 2023-03-06 DIAGNOSIS — F33.41 RECURRENT MAJOR DEPRESSIVE DISORDER, IN PARTIAL REMISSION: Primary | ICD-10-CM

## 2023-03-06 DIAGNOSIS — G43.919 INTRACTABLE MIGRAINE WITHOUT STATUS MIGRAINOSUS, UNSPECIFIED MIGRAINE TYPE: ICD-10-CM

## 2023-03-06 DIAGNOSIS — E78.00 PURE HYPERCHOLESTEROLEMIA: ICD-10-CM

## 2023-03-06 PROCEDURE — 99214 PR OFFICE/OUTPT VISIT, EST, LEVL IV, 30-39 MIN: ICD-10-PCS | Mod: 95,,, | Performed by: PEDIATRICS

## 2023-03-06 PROCEDURE — 1160F RVW MEDS BY RX/DR IN RCRD: CPT | Mod: CPTII,95,, | Performed by: PEDIATRICS

## 2023-03-06 PROCEDURE — 1159F MED LIST DOCD IN RCRD: CPT | Mod: CPTII,95,, | Performed by: PEDIATRICS

## 2023-03-06 PROCEDURE — 1159F PR MEDICATION LIST DOCUMENTED IN MEDICAL RECORD: ICD-10-PCS | Mod: CPTII,95,, | Performed by: PEDIATRICS

## 2023-03-06 PROCEDURE — 99214 OFFICE O/P EST MOD 30 MIN: CPT | Mod: 95,,, | Performed by: PEDIATRICS

## 2023-03-06 PROCEDURE — 1160F PR REVIEW ALL MEDS BY PRESCRIBER/CLIN PHARMACIST DOCUMENTED: ICD-10-PCS | Mod: CPTII,95,, | Performed by: PEDIATRICS

## 2023-03-06 NOTE — PROGRESS NOTES
TELEMEDICINE VIRTUAL VISIT    Subjective:       Patient ID: Sandra Dimas is a 47 y.o. female.    Chief Complaint: Follow-up    Depression/Anxiety: stopped Lexapro 20mg, moved to cymbalta for pain control, still symptomatic, no HI/SI, overall feel not in control and has variable sleep  GERD: stable on PPI  Migraines: currently active and severe, affecting ADLs. On Tx, recent procedure not helping, got rash from procedure prep  MATT: from previous GYN loss, no ongoing blood loss      Labs reviewed with patient.    Review of Systems   Constitutional:  Negative for activity change, fever and unexpected weight change.   HENT:  Negative for congestion, hearing loss, rhinorrhea and trouble swallowing.    Eyes:  Negative for discharge, redness and visual disturbance.   Respiratory:  Negative for cough, chest tightness and wheezing.    Cardiovascular:  Negative for chest pain and palpitations.   Gastrointestinal:  Negative for blood in stool, constipation, diarrhea and vomiting.   Endocrine: Negative for polydipsia and polyuria.   Genitourinary:  Negative for decreased urine volume, difficulty urinating, dysuria, hematuria and menstrual problem.   Musculoskeletal:  Negative for arthralgias, joint swelling and neck pain.   Skin:  Positive for rash (see hpi). Negative for wound.   Neurological:  Positive for headaches. Negative for syncope and weakness.   Psychiatric/Behavioral:  Positive for dysphoric mood and sleep disturbance. Negative for behavioral problems, confusion, self-injury and suicidal ideas.      Objective:      CONSTITUTIONAL: No apparent distress. Does not appear acutely ill or septic. Appears adequately hydrated.  PULM: Breathing unlabored.  PSYCHIATRIC: Alert and conversant and grossly oriented. Mood is grossly neutral. Affect appropriate. Judgment and insight grossly intact.      Assessment:       1. Recurrent major depressive disorder, in partial remission    2. Intractable migraine without status  "migrainosus, unspecified migraine type    3. Iron deficiency anemia due to chronic blood loss    4. Pure hypercholesterolemia          Plan:       Recurrent major depressive disorder, in partial remission    Intractable migraine without status migrainosus, unspecified migraine type    Iron deficiency anemia due to chronic blood loss  -     CBC Auto Differential; Future; Expected date: 03/06/2023    Pure hypercholesterolemia  -     Lipid Panel; Future; Expected date: 03/06/2023  -     Comprehensive Metabolic Panel; Future; Expected date: 03/06/2023       Try yoga, Cuate-chi for depression/stress/migraine control. Move up cymbalta if needed after 4-6 weeks use. Keep supspecialty care. D&E as tolerated for weight and lipids, has not been on Ozempic since Thanksgiving. F/U 6 months with repeat labs.  Documentation entered by me for this encounter may have been done in part using speech-recognition technology. Although I have made an effort to ensure accuracy, "sound like" errors may exist and should be interpreted in context. SAMIR Gamez MD    Visit Details: This visit was a telemedicine virtual visit with synchronous audio and video. Sandra reported that her location at the time of this visit was in the [facility]. Sandra had the choice to come into office to receive these medical services. Sandra chose and consented to receive these medical services by telemedicine.  "

## 2023-03-06 NOTE — TELEPHONE ENCOUNTER
Called and left a voicemail for patient that I scheduled her a virtual visit today at 3:40 pm with Dr. Gamez.

## 2023-03-08 ENCOUNTER — TELEPHONE (OUTPATIENT)
Dept: PAIN MEDICINE | Facility: OTHER | Age: 48
End: 2023-03-08
Payer: COMMERCIAL

## 2023-03-08 DIAGNOSIS — G44.86 CERVICOGENIC HEADACHE: ICD-10-CM

## 2023-03-08 DIAGNOSIS — M47.812 CERVICAL SPONDYLOSIS: Primary | ICD-10-CM

## 2023-03-08 NOTE — TELEPHONE ENCOUNTER
----- Message from Neftaly Marrero MD sent at 3/8/2023  2:41 PM CST -----  Please schedule this patient for right C2, C3 and 3rd occipital nerves medial branch block x2

## 2023-03-09 ENCOUNTER — OFFICE VISIT (OUTPATIENT)
Dept: OTOLARYNGOLOGY | Facility: CLINIC | Age: 48
End: 2023-03-09
Payer: COMMERCIAL

## 2023-03-09 VITALS — BODY MASS INDEX: 21.99 KG/M2 | WEIGHT: 128.06 LBS | TEMPERATURE: 98 F

## 2023-03-09 DIAGNOSIS — G43.919 INTRACTABLE MIGRAINE WITHOUT STATUS MIGRAINOSUS, UNSPECIFIED MIGRAINE TYPE: ICD-10-CM

## 2023-03-09 DIAGNOSIS — E04.2 MULTINODULAR THYROID: ICD-10-CM

## 2023-03-09 PROCEDURE — 99999 PR PBB SHADOW E&M-EST. PATIENT-LVL IV: CPT | Mod: PBBFAC,,, | Performed by: STUDENT IN AN ORGANIZED HEALTH CARE EDUCATION/TRAINING PROGRAM

## 2023-03-09 PROCEDURE — 99999 PR PBB SHADOW E&M-EST. PATIENT-LVL IV: ICD-10-PCS | Mod: PBBFAC,,, | Performed by: STUDENT IN AN ORGANIZED HEALTH CARE EDUCATION/TRAINING PROGRAM

## 2023-03-09 PROCEDURE — 99213 OFFICE O/P EST LOW 20 MIN: CPT | Mod: S$GLB,,, | Performed by: STUDENT IN AN ORGANIZED HEALTH CARE EDUCATION/TRAINING PROGRAM

## 2023-03-09 PROCEDURE — 99213 PR OFFICE/OUTPT VISIT, EST, LEVL III, 20-29 MIN: ICD-10-PCS | Mod: S$GLB,,, | Performed by: STUDENT IN AN ORGANIZED HEALTH CARE EDUCATION/TRAINING PROGRAM

## 2023-03-09 NOTE — PROGRESS NOTES
Chief complaint:    Chief Complaint   Patient presents with    Thyroid Problem         Referring Provider:  [place]  [address] [place] [address]    History of present illness:     Ms. Dimas is a 47 y.o.  presenting for evaluation of  thyroid nodule/s.     Headaches - from back of head, to eyes and ears. For 4+ months. Has done occipital nerve blocks and motrin, without much improvement. Has been associated with right otalgia and hearing loss - comes and goes. Happens with the headaches. Also feels fullness. No tinnitus or vertigo.    Noted incidentally on c-spine imaging.    She states that she has not noted a mass in her neck.       Denies pain.       She denies sore throat, dysphagia, otalgia, voice change, hemoptysis.      No family/personal history of thyroid cancer.     No personal history of head and neck cancer/head and neck radiation.    No personal history of hypo- or hyperthyroidism.       History      Past Medical History:   Past Medical History:   Diagnosis Date    Chronic paroxysmal hemicrania 01/03/2019    Endometriosis     General anesthetics causing adverse effect in therapeutic use     wakes up with severe anxiety attacks    Hemicrania continua     Hepatitis C antibody positive in blood 12/09/2020    RNA NEGATIVE 12/14/2020    Migraines     .          Past Surgical History:  Past Surgical History:   Procedure Laterality Date    APPENDECTOMY      COLONOSCOPY N/A 11/12/2020    Procedure: COLONOSCOPY;  Surgeon: Mylene Russ MD;  Location: [facility];  Service: Endoscopy;  Laterality: N/A;    CYST REMOVAL Right 12/30/2019    Procedure: EXCISION, CYST;  Surgeon: NATALI Shah MD;  Location: [facility] OR;  Service: OB/GYN;  Laterality: Right;  Sebaceous cyst    diagnostic laprascopy      ESOPHAGOGASTRODUODENOSCOPY N/A 11/30/2018    Procedure: EGD (ESOPHAGOGASTRODUODENOSCOPY);  Surgeon: Bossman Bustos MD;  Location: [facility] ENDO;  Service: Endoscopy;  Laterality: N/A;     ESOPHAGOGASTRODUODENOSCOPY N/A 11/12/2020    Procedure: ESOPHAGOGASTRODUODENOSCOPY (EGD) needs rapid covid test;  Surgeon: Mylene Russ MD;  Location: [facility];  Service: Endoscopy;  Laterality: N/A;    FULGURATION OF ENDOMETRIOSIS  12/26/2018    Procedure: FULGURATION, ENDOMETRIOSIS;  Surgeon: Zehra Jensen MD;  Location: [facility] OR;  Service: OB/GYN;;  endometriosis implant resection    HYSTEROSCOPY WITH HYDROTHERMAL ABLATION OF ENDOMETRIUM WITH DILATION AND CURETTAGE N/A 12/26/2018    Procedure: HYSTEROSCOPY, WITH DILATION AND CURETTAGE OF UTERUS AND HYDROTHERMAL ENDOMETRIAL ABLATION;  Surgeon: Zehra Jensen MD;  Location: [facility] OR;  Service: OB/GYN;  Laterality: N/A;    LAPAROSCOPIC SALPINGECTOMY Bilateral 12/26/2018    Procedure: SALPINGECTOMY, LAPAROSCOPIC;  Surgeon: Zehra Jensen MD;  Location: [facility] OR;  Service: OB/GYN;  Laterality: Bilateral;    LAPAROTOMY, EXPLORATORY N/A 2/7/2019    Procedure: LAPAROTOMY, EXPLORATORY;  Surgeon: Ramakrishna Boone MD;  Location: [facility];  Service: General;  Laterality: N/A;    REPAIR OF VAGINAL CUFF N/A 7/9/2020    Procedure: REPAIR, VAGINAL CUFF;  Surgeon: Sailaja Addison MD;  Location: [facility] OR;  Service: OB/GYN;  Laterality: N/A;    ROBOT-ASSISTED LAPAROSCOPIC ABDOMINAL HYSTERECTOMY USING DA MAVIS XI N/A 12/30/2019    Procedure: XI ROBOTIC HYSTERECTOMY;  Surgeon: NATALI Shah MD;  Location: [facility] OR;  Service: OB/GYN;  Laterality: N/A;    ROBOT-ASSISTED LAPAROSCOPIC SURGICAL REMOVAL OF OVARY USING DA MAVIS XI Bilateral 12/30/2019    Procedure: XI ROBOTIC OOPHORECTOMY;  Surgeon: NATALI Shah MD;  Location: [facility] OR;  Service: OB/GYN;  Laterality: Bilateral;    robotic assisted laparoscopy with excision of ovarian endometrioma and chromotubation              Medications:  She  has a current medication list which includes the following prescription(s): amitriptyline, atenolol, b2/magnesium cit,oxid/feverfew, baclofen, butorphanol, duloxetine,  escitalopram oxalate, fluocinonide, gabapentin, hydrocortisone, linaclotide, ondansetron, pantoprazole, promethazine, ozempic, sucralfate, topiramate, valacyclovir, valacyclovir, and vitamin d, and the following Facility-Administered Medications: lactated ringers and lidocaine (pf) 10 mg/ml (1%).      Allergies:   Review of patient's allergies indicates:   Allergen Reactions    Chlorhexidine Rash     Per  after surgery patient had large rash across abdomen where chlorhexidine was applied.    Hydrocodone Other (See Comments)     Chest pains. Note: patient tolerated hydromorphone in 2/2019.     Mastisol adhesive [gum pemqem-sywsmv-yrhf-alcohol] Rash         Family history: family history includes Diabetes type II in her mother; Heart attack in her father; Hypertension in her mother.             Social History          Alcohol use:  reports that she does not currently use alcohol.            Tobacco:  reports that she has never smoked. She has never used smokeless tobacco.       Review of Systems     The patient was asked about symptoms referable to the constitutional, respiratory, cardiovascular, gastrointestinal, genitourinary, musculoskeletal, endocrine, hematologic/lymphatic, allergy/immunologic, eyes, otorhinolaryngologic, dermatologic, neurologic, and psychiatric systems. These were negative with the exception of those described in the HPI and PMH, as well as pertinent positives/negatives listed below.         Physical Examination     Temp 98.2 °F (36.8 °C) (Temporal)   Wt 58.1 kg (128 lb 1.4 oz)   LMP  (LMP Unknown)   BMI 21.99 kg/m²      The head and neck examination included the following elements which were found to be within normal limits unless otherwise noted below: general appearance, mood and affect, quality of voice, inspection of head and face, cranial nerve examination, external ears, ear canals, and tympanic membranes, external nose, nasal mucosa, septum and turbinates, lips, oral  cavity,oropharynx, larynx, pharyngeal walls and pyriform sinuses, cervical lymphatics,  neck and thyroid bed, pupils and extraocular motion, carotid pulses bilaterally, temporomandibular joint, respiratory assessment for effort, stridor, lung symmetry, and retractions, rashes or lesions on head or neck, and facial nerve function.     Salient findings:        normal voice     no palpable thyroid nodule     no enlarged cervical adenopathy    TMs clear      Data reviewed      Pathology    none    Laboratory    Lab Results   Component Value Date    TSH 1.483 05/18/2022        Lab Results   Component Value Date    CALCIUM 9.7 02/27/2023        Imaging    I have independently reviewed the following imaging with the findings noted below:      MRI c spine  Impression:     Minor degenerative changes C4-5 and C5-6.  No significant central or foraminal stenosis     Minor cervical/thoracic scoliosis     Incidental right thyroid nodule.    US thyroid, 2/14/23:  FINDINGS:  Right lobe: 5.1 x 1.6 x 1.7 cm     Left lobe: 5.9 x 1.4 x 1.5 cm     Isthmus: 0.1 cm     Total thyroid volume: 13 cc     Echotexture: Normal     Nodules: Multiple nodules present within both lobes, largest 1.9 cm the lower pole of the left lobe (TR 3)     Impression:     Multinodular thyroid.  One year follow-up recommended.         Impression/Plan     1. Multinodular thyroid        - US concerning for Multiple nodules present within both lobes, largest 1.9 cm the lower pole of the left lobe (TR 3)     - repeat US in 1 year, sooner if concerns arise          Ismael Clifton MD  [facility]   [facility]  [address]  [place] [address]  P: [phone]  F: [phone]

## 2023-03-13 ENCOUNTER — TELEPHONE (OUTPATIENT)
Dept: ADMINISTRATIVE | Facility: OTHER | Age: 48
End: 2023-03-13
Payer: COMMERCIAL

## 2023-03-13 NOTE — TELEPHONE ENCOUNTER
----- Message from Neftaly Marrero MD sent at 3/9/2023  8:45 AM CST -----  She is scheduled in April for cervical mbb. Can we please schedule her earlier? She is in a lot of pain and kind of VIP.

## 2023-03-17 ENCOUNTER — HOSPITAL ENCOUNTER (OUTPATIENT)
Facility: OTHER | Age: 48
Discharge: HOME | End: 2023-03-17
Attending: ANESTHESIOLOGY | Admitting: ANESTHESIOLOGY
Payer: COMMERCIAL

## 2023-03-17 VITALS
TEMPERATURE: 98 F | OXYGEN SATURATION: 96 % | BODY MASS INDEX: 18.34 KG/M2 | WEIGHT: 121 LBS | HEART RATE: 73 BPM | DIASTOLIC BLOOD PRESSURE: 73 MMHG | SYSTOLIC BLOOD PRESSURE: 95 MMHG | HEIGHT: 68 IN | RESPIRATION RATE: 16 BRPM

## 2023-03-17 DIAGNOSIS — M54.81 OCCIPITAL NEURALGIA OF RIGHT SIDE: ICD-10-CM

## 2023-03-17 DIAGNOSIS — M47.819 SPONDYLOSIS WITHOUT MYELOPATHY: ICD-10-CM

## 2023-03-17 DIAGNOSIS — G89.29 CHRONIC PAIN: ICD-10-CM

## 2023-03-17 DIAGNOSIS — M47.899 OTHER OSTEOARTHRITIS OF SPINE, UNSPECIFIED SPINAL REGION: Primary | ICD-10-CM

## 2023-03-17 PROCEDURE — 64491 PR INJ DX/THER AGNT PARAVERT FACET JOINT,IMG GUIDE,CERV/THORAC, 2ND LEVEL: ICD-10-PCS | Mod: KX,RT,, | Performed by: ANESTHESIOLOGY

## 2023-03-17 PROCEDURE — 64491 INJ PARAVERT F JNT C/T 2 LEV: CPT | Mod: KX,RT | Performed by: ANESTHESIOLOGY

## 2023-03-17 PROCEDURE — 64490 INJ PARAVERT F JNT C/T 1 LEV: CPT | Mod: KX,RT,, | Performed by: ANESTHESIOLOGY

## 2023-03-17 PROCEDURE — 64491 INJ PARAVERT F JNT C/T 2 LEV: CPT | Mod: KX,RT,, | Performed by: ANESTHESIOLOGY

## 2023-03-17 PROCEDURE — 63600175 PHARM REV CODE 636 W HCPCS: Performed by: ANESTHESIOLOGY

## 2023-03-17 PROCEDURE — 64490 PR INJ DX/THER AGNT PARAVERT FACET JOINT,IMG GUIDE,CERV/THORAC, 1ST LEVEL: ICD-10-PCS | Mod: KX,RT,, | Performed by: ANESTHESIOLOGY

## 2023-03-17 PROCEDURE — 25000003 PHARM REV CODE 250: Performed by: ANESTHESIOLOGY

## 2023-03-17 PROCEDURE — 64490 INJ PARAVERT F JNT C/T 1 LEV: CPT | Mod: KX,RT | Performed by: ANESTHESIOLOGY

## 2023-03-17 RX ORDER — BUPIVACAINE HYDROCHLORIDE 2.5 MG/ML
INJECTION, SOLUTION EPIDURAL; INFILTRATION; INTRACAUDAL
Status: DISCONTINUED | OUTPATIENT
Start: 2023-03-17 | End: 2023-03-17 | Stop reason: HOSPADM

## 2023-03-17 RX ORDER — SODIUM CHLORIDE 9 MG/ML
500 INJECTION, SOLUTION INTRAVENOUS CONTINUOUS
Status: DISCONTINUED | OUTPATIENT
Start: 2023-03-17 | End: 2023-03-17 | Stop reason: HOSPADM

## 2023-03-17 RX ORDER — LIDOCAINE HYDROCHLORIDE 20 MG/ML
INJECTION, SOLUTION INFILTRATION; PERINEURAL
Status: DISCONTINUED | OUTPATIENT
Start: 2023-03-17 | End: 2023-03-17 | Stop reason: HOSPADM

## 2023-03-17 RX ORDER — MIDAZOLAM HYDROCHLORIDE 2 MG/2ML
INJECTION, SOLUTION INTRAMUSCULAR; INTRAVENOUS
Status: DISCONTINUED | OUTPATIENT
Start: 2023-03-17 | End: 2023-03-17 | Stop reason: HOSPADM

## 2023-03-17 NOTE — OP NOTE
Diagnostic Cervical Medial Branch Block under Fluoroscopy Guidance    The procedure, risks, benefits, and options were discussed with the patient. There are no contraindications to the procedure. The patent expressed understanding and agreed to the procedure. Informed written consent was obtained prior to the start of the procedure and can be found in the patient's chart.        PATIENT NAME: Sandra Dimas   MRN: [id number]     DATE OF PROCEDURE: 03/17/2023                                                             PROCEDURE:  Diagnostic Right TON, C2, and C3 Cervical Medial Branch Block under Fluoroscopy Guidance    PRE-OP DIAGNOSIS: Cervical spondylosis [M47.812]  Cervicogenic headache [G44.86] Cervical Spondylosis [M47.812]    POST-OP DIAGNOSIS: Same    PHYSICIAN: Neftaly Marrero MD    ASSISTANTS:     MEDICATIONS INJECTED:  Bupivacaine 0.25%    LOCAL ANESTHETIC INJECTED:   Xylocaine 2%    SEDATION: Versed 2mg and Fentanyl 0                                                                                                                                                                                     Conscious sedation ordered by M.D. Patient re-evaluation prior to administration of conscious sedation. No changes noted in patient's status from initial evaluation. The patient's vital signs were monitored by RN and patient remained hemodynamically stable throughout the procedure.    Event Time In   Sedation Start 1443   Sedation End 1450       ESTIMATED BLOOD LOSS:  None    COMPLICATIONS:  None.    INDICATIONS: Patient has clinical and imaging findings suggestive of facet mediated pain. Patient had a previous diagnostic block performed with at least 80% relief in pain and/or at least 50% improvement in the ability to perform previously painful movements and ADLs for the expected duration of the local anesthetic utilized.    TECHNIQUE:   Time-out was performed to identify the patient and procedure to be performed. With  the patient laying in a prone position, the surgical area was prepped and draped in the usual sterile fashion using ChloraPrep and fenestrated drape. The levels were determined under fluoroscopic guidance. Skin anesthesia was achieved by injecting Lidocaine 2% over the injection sites.  A 22 gauge, 3.5 inch needle was introduced to each level using AP, lateral and/or contralateral oblique fluoroscopic imaging. After negative aspiration for blood or CSF was confirmed, 1 mL of the anesthetic listed above was then slowly injected at each site. The needles were removed and bleeding was nil. A sterile dressing was applied. No specimens collected. The patient tolerated the procedure well.       The patient was monitored after the procedure in the recovery area. They were given post-procedure and discharge instructions to follow at home. The patient was discharged in a stable condition.        Neftaly Marrero MD

## 2023-03-17 NOTE — H&P
HPI  Patient presenting for Procedure(s) (LRB):  BLOCK, NERVE, RIGHT C2,C3 AND THIRD OCCIPITAL NERVE DR KELLY ONLY 1 OF 2 (Right)     Patient on Anti-coagulation No    No health changes since previous encounter    Past Medical History:   Diagnosis Date    Chronic paroxysmal hemicrania 01/03/2019    Endometriosis     General anesthetics causing adverse effect in therapeutic use     wakes up with severe anxiety attacks    Hemicrania continua     Hepatitis C antibody positive in blood 12/09/2020    RNA NEGATIVE 12/14/2020    Migraines      Past Surgical History:   Procedure Laterality Date    APPENDECTOMY      COLONOSCOPY N/A 11/12/2020    Procedure: COLONOSCOPY;  Surgeon: Mylene Russ MD;  Location: [facility];  Service: Endoscopy;  Laterality: N/A;    CYST REMOVAL Right 12/30/2019    Procedure: EXCISION, CYST;  Surgeon: NATALI Shah MD;  Location: [facility];  Service: OB/GYN;  Laterality: Right;  Sebaceous cyst    diagnostic laprascopy      ESOPHAGOGASTRODUODENOSCOPY N/A 11/30/2018    Procedure: EGD (ESOPHAGOGASTRODUODENOSCOPY);  Surgeon: Bossman Bustos MD;  Location: [facility];  Service: Endoscopy;  Laterality: N/A;    ESOPHAGOGASTRODUODENOSCOPY N/A 11/12/2020    Procedure: ESOPHAGOGASTRODUODENOSCOPY (EGD) needs rapid covid test;  Surgeon: Mylene Russ MD;  Location: [facility];  Service: Endoscopy;  Laterality: N/A;    FULGURATION OF ENDOMETRIOSIS  12/26/2018    Procedure: FULGURATION, ENDOMETRIOSIS;  Surgeon: Zehra Jensen MD;  Location: [facility];  Service: OB/GYN;;  endometriosis implant resection    HYSTEROSCOPY WITH HYDROTHERMAL ABLATION OF ENDOMETRIUM WITH DILATION AND CURETTAGE N/A 12/26/2018    Procedure: HYSTEROSCOPY, WITH DILATION AND CURETTAGE OF UTERUS AND HYDROTHERMAL ENDOMETRIAL ABLATION;  Surgeon: Zehra Jensen MD;  Location: [facility];  Service: OB/GYN;  Laterality: N/A;    LAPAROSCOPIC SALPINGECTOMY Bilateral 12/26/2018    Procedure: SALPINGECTOMY, LAPAROSCOPIC;  Surgeon:  "Zehra Jensen MD;  Location: [facility] OR;  Service: OB/GYN;  Laterality: Bilateral;    LAPAROTOMY, EXPLORATORY N/A 2/7/2019    Procedure: LAPAROTOMY, EXPLORATORY;  Surgeon: Ramakrishna Boone MD;  Location: [facility] OR;  Service: General;  Laterality: N/A;    REPAIR OF VAGINAL CUFF N/A 7/9/2020    Procedure: REPAIR, VAGINAL CUFF;  Surgeon: Sailaja Addison MD;  Location: [facility] OR;  Service: OB/GYN;  Laterality: N/A;    ROBOT-ASSISTED LAPAROSCOPIC ABDOMINAL HYSTERECTOMY USING DA MAVIS XI N/A 12/30/2019    Procedure: XI ROBOTIC HYSTERECTOMY;  Surgeon: NATALI Shah MD;  Location: [facility] OR;  Service: OB/GYN;  Laterality: N/A;    ROBOT-ASSISTED LAPAROSCOPIC SURGICAL REMOVAL OF OVARY USING DA MAVIS XI Bilateral 12/30/2019    Procedure: XI ROBOTIC OOPHORECTOMY;  Surgeon: NATALI Shah MD;  Location: [facility] OR;  Service: OB/GYN;  Laterality: Bilateral;    robotic assisted laparoscopy with excision of ovarian endometrioma and chromotubation       Review of patient's allergies indicates:   Allergen Reactions    Chlorhexidine Rash     Per  after surgery patient had large rash across abdomen where chlorhexidine was applied.    Hydrocodone Other (See Comments)     Chest pains. Note: patient tolerated hydromorphone in 2/2019.     Mastisol adhesive [gum kulpmc-guuffs-guow-alcohol] Rash      Current Facility-Administered Medications   Medication    0.9%  NaCl infusion     Facility-Administered Medications Ordered in Other Encounters   Medication    lactated ringers infusion    lidocaine (PF) 10 mg/ml (1%) injection 10 mg       PMHx, PSHx, Allergies, Medications reviewed in epic    ROS negative except pain complaints in HPI    OBJECTIVE:    /64 (BP Location: Right arm)   Pulse 76   Temp 97.5 °F (36.4 °C) (Oral)   Resp 16   Ht 5' 8" (1.727 m)   Wt 54.9 kg (121 lb)   LMP  (LMP Unknown)   SpO2 100%   Breastfeeding No   BMI 18.40 kg/m²     PHYSICAL EXAMINATION:    GENERAL: Well appearing, in no acute distress, alert " and oriented x3.  PSYCH:  Mood and affect appropriate.  SKIN: Skin color, texture, turgor normal, no rashes or lesions which will impact the procedure.  CV: RRR with palpation of the radial artery.  PULM: No evidence of respiratory difficulty, symmetric chest rise. Clear to auscultation.  NEURO: Cranial nerves grossly intact.    Plan:    Proceed with procedure as planned Procedure(s) (LRB):  BLOCK, NERVE, RIGHT C2,C3 AND THIRD OCCIPITAL NERVE DR KELLY ONLY 1 OF 2 (Right)    LANI CAVAZOS  03/17/2023

## 2023-03-17 NOTE — DISCHARGE INSTRUCTIONS
Thank you for allowing us to care for you today. You may receive a survey about the care we provided. Your feedback is valuable and helps us provide excellent care throughout the community.     Home Care Instructions for Pain Management:    1. DIET:   You may resume your normal diet today.   2. BATHING:   You may shower with luke warm water. No tub baths or anything that will soak injection sites under water for the next 24 hours.  3. DRESSING:   You may remove your bandage today.   4. ACTIVITY LEVEL:   You may resume your normal activities 24 hrs after your procedure. Nothing strenuous today.  5. MEDICATIONS:   You may resume your normal medications today. To restart blood thinners, ask your doctor.  6. DRIVING    If you have received any sedatives by mouth today, you may not drive for 12 hours.    If you have received any sedation through your IV, you may not drive for 24 hrs.   7. SPECIAL INSTRUCTIONS:   No heat to the injection site for 24 hrs including, hot bath or shower, heating pad, moist heat, or hot tubs.    Use ice pack to injection site for any pain or discomfort.  Apply ice packs for 20 minute intervals as needed.    IF you have diabetes, be sure to monitor your blood sugar more closely. IF your injection contained steroids your blood sugar levels may become higher than normal.    If you are still having pain upon discharge:  Your pain may improve over the next 48 hours. The anesthetic (numbing medication) works immediately to 48 hours. IF your injection contained a steroid (anti-inflammatory medication), it takes approximately 3 days to start feeling relief and 7-10 days to see your greatest results from the medication. It is possible you may need subsequent injections. This would be discussed at your follow up appointment with pain management or your referring doctor.    Please call the PAIN MANAGEMENT office at [phone] or ON CALL pager at [phone] if you experienced any:   -Weakness or  loss of sensation  -Fever > 101.5  -Pain uncontrolled with oral medications   -Persistent nausea, vomiting, or diarrhea  -Redness or drainage from the injection sites, or any other worrisome concerns.   If physician on call was not reached or could not communicate with our office for any reason please go to the nearest emergency department.   Lumbar/Cervical/Joint Medial Branch Block Pain Diary    Patient Name:  :    Pre-procedure Pain Score: _____/10    Time of injection:     Please fill out the chart below to the best of your ability. We need to know how much percentage of relief in your pain that you have while the numbing medication is active. Please be mindful that this is a diagnostic test and may not last for a long time. If you are asleep during one of the times listed below, you do not have to record that time.     Time After the   Procedure % of pain relief   achieved Pain Score (0-10)   1 hour post-procedure     2 hours post-procedure     3 hours post-procedure     4 hours post-procedure     5 hours post-procedure     6 hours post-procedure     12 hours post-procedure     24 hours post-procedure       Please make sure to return this form or information to the clinic. This information is needed to proceed with your plan of care. There are several options that you can use to send this back to us.    Form can be faxed to us at [phone]  Call us to provide the information at [phone]  Send the information to us through your [facility]    If you have any questions about completing this diary, please call us at [phone].

## 2023-03-17 NOTE — DISCHARGE SUMMARY
Discharge Note  Short Stay      SUMMARY     Admit Date: 3/17/2023    Attending Physician: Neftaly Kelly      Discharge Physician: Neftaly Kelly      Discharge Date: 3/17/2023 2:49 PM    Procedure(s) (LRB):  BLOCK, NERVE, RIGHT C2,C3 AND THIRD OCCIPITAL NERVE DR KELLY ONLY 1 OF 2 (Right)    Final Diagnosis: Cervical spondylosis [M47.812]  Cervicogenic headache [G44.86]    Disposition: Home or self care    Patient Instructions:   Current Discharge Medication List        CONTINUE these medications which have NOT CHANGED    Details   atenoloL (TENORMIN) 25 MG tablet Take 1 tablet (25 mg total) by mouth once daily.  Qty: 90 tablet, Refills: 3    Comments: .  Associated Diagnoses: Palpitation      B2/magnesium cit,oxid/feverfew (MIGRELIEF ORAL) Take by mouth once daily.       baclofen (LIORESAL) 10 MG tablet Take 1 tablet (10 mg total) by mouth 3 (three) times daily as needed (neck muscle spasm).  Qty: 90 tablet, Refills: 3    Associated Diagnoses: Bilateral occipital neuralgia; Cervicogenic headache      butorphanol (STADOL) 10 mg/mL nasal spray as needed.    Comments: Quantity prescribed more than 7 day supply? Press F2 and select one:[id number]        DULoxetine (CYMBALTA) 30 MG capsule Take 1 capsule (30 mg total) by mouth once daily.  Qty: 90 capsule, Refills: 1    Associated Diagnoses: Bilateral occipital neuralgia      fluocinonide (LIDEX) 0.05 % ointment Apply topically to the affected area twice daily as directed  Qty: 60 g, Refills: 0      gabapentin (NEURONTIN) 300 MG capsule Take 1 capsule (300 mg total) by mouth 3 (three) times daily.  Qty: 270 capsule, Refills: 1    Associated Diagnoses: Bilateral occipital neuralgia      hydrocortisone 2.5 % cream Apply topically to the affected area twice daily as directed  Qty: 30 g, Refills: 0      linaCLOtide (LINZESS) 72 mcg Cap capsule Take 1 capsule (72 mcg total) by mouth before breakfast.  Qty: 30 capsule, Refills: 5      ondansetron (ZOFRAN-ODT) 8 MG TbDL Take 1  tablet (8 mg total) by mouth every 8 (eight) hours as needed (nausea/vomiting).  Qty: 30 tablet, Refills: 2      pantoprazole (PROTONIX) 40 MG tablet Take 1 tablet (40 mg total) by mouth once daily.  Qty: 90 tablet, Refills: 3    Associated Diagnoses: Gastroesophageal reflux disease without esophagitis      promethazine (PHENERGAN) 25 MG tablet Take 0.5 tablets (12.5 mg total) by mouth every 6 (six) hours as needed for Nausea.  Qty: 30 tablet, Refills: 0      semaglutide (OZEMPIC) 1 mg/dose (4 mg/3 mL) Inject 1 mg into the skin every 7 days.  Qty: 1 pen, Refills: 11      topiramate (TROKENDI XR) 200 mg Cp24 Take 1 capsule by mouth once daily.  Qty: 31 capsule, Refills: 11    Associated Diagnoses: Intractable migraine without status migrainosus, unspecified migraine type      valACYclovir (VALTREX) 1000 MG tablet Take 1 tablet by mouth once a day  Qty: 90 tablet, Refills: 6      vitamin D (VITAMIN D3) 1000 units Tab Take 1,000 Units by mouth once daily.                 Discharge Diagnosis: Cervical spondylosis [M47.812]  Cervicogenic headache [G44.86]  Condition on Discharge: Stable with no complications to procedure   Diet on Discharge: Same as before.  Activity: as per instruction sheet.  Discharge to: Home with a responsible adult.  Follow up: 2-4 weeks

## 2023-03-17 NOTE — H&P (VIEW-ONLY)
HPI  Patient presenting for Procedure(s) (LRB):  BLOCK, NERVE, RIGHT C2,C3 AND THIRD OCCIPITAL NERVE DR KELLY ONLY 1 OF 2 (Right)     Patient on Anti-coagulation No    No health changes since previous encounter    Past Medical History:   Diagnosis Date    Chronic paroxysmal hemicrania 01/03/2019    Endometriosis     General anesthetics causing adverse effect in therapeutic use     wakes up with severe anxiety attacks    Hemicrania continua     Hepatitis C antibody positive in blood 12/09/2020    RNA NEGATIVE 12/14/2020    Migraines      Past Surgical History:   Procedure Laterality Date    APPENDECTOMY      COLONOSCOPY N/A 11/12/2020    Procedure: COLONOSCOPY;  Surgeon: Mylene Russ MD;  Location: [facility];  Service: Endoscopy;  Laterality: N/A;    CYST REMOVAL Right 12/30/2019    Procedure: EXCISION, CYST;  Surgeon: NATALI Shah MD;  Location: [facility];  Service: OB/GYN;  Laterality: Right;  Sebaceous cyst    diagnostic laprascopy      ESOPHAGOGASTRODUODENOSCOPY N/A 11/30/2018    Procedure: EGD (ESOPHAGOGASTRODUODENOSCOPY);  Surgeon: Bossman Bustos MD;  Location: [facility];  Service: Endoscopy;  Laterality: N/A;    ESOPHAGOGASTRODUODENOSCOPY N/A 11/12/2020    Procedure: ESOPHAGOGASTRODUODENOSCOPY (EGD) needs rapid covid test;  Surgeon: Mylene Russ MD;  Location: [facility];  Service: Endoscopy;  Laterality: N/A;    FULGURATION OF ENDOMETRIOSIS  12/26/2018    Procedure: FULGURATION, ENDOMETRIOSIS;  Surgeon: Zehra Jensen MD;  Location: [facility];  Service: OB/GYN;;  endometriosis implant resection    HYSTEROSCOPY WITH HYDROTHERMAL ABLATION OF ENDOMETRIUM WITH DILATION AND CURETTAGE N/A 12/26/2018    Procedure: HYSTEROSCOPY, WITH DILATION AND CURETTAGE OF UTERUS AND HYDROTHERMAL ENDOMETRIAL ABLATION;  Surgeon: Zehra Jensen MD;  Location: [facility];  Service: OB/GYN;  Laterality: N/A;    LAPAROSCOPIC SALPINGECTOMY Bilateral 12/26/2018    Procedure: SALPINGECTOMY, LAPAROSCOPIC;  Surgeon:  "Zehra Jensen MD;  Location: [facility] OR;  Service: OB/GYN;  Laterality: Bilateral;    LAPAROTOMY, EXPLORATORY N/A 2/7/2019    Procedure: LAPAROTOMY, EXPLORATORY;  Surgeon: Ramakrishna Boone MD;  Location: [facility] OR;  Service: General;  Laterality: N/A;    REPAIR OF VAGINAL CUFF N/A 7/9/2020    Procedure: REPAIR, VAGINAL CUFF;  Surgeon: Sailaja Addison MD;  Location: [facility] OR;  Service: OB/GYN;  Laterality: N/A;    ROBOT-ASSISTED LAPAROSCOPIC ABDOMINAL HYSTERECTOMY USING DA MAVIS XI N/A 12/30/2019    Procedure: XI ROBOTIC HYSTERECTOMY;  Surgeon: NATALI Shah MD;  Location: [facility] OR;  Service: OB/GYN;  Laterality: N/A;    ROBOT-ASSISTED LAPAROSCOPIC SURGICAL REMOVAL OF OVARY USING DA MAVIS XI Bilateral 12/30/2019    Procedure: XI ROBOTIC OOPHORECTOMY;  Surgeon: NATALI Shah MD;  Location: [facility] OR;  Service: OB/GYN;  Laterality: Bilateral;    robotic assisted laparoscopy with excision of ovarian endometrioma and chromotubation       Review of patient's allergies indicates:   Allergen Reactions    Chlorhexidine Rash     Per  after surgery patient had large rash across abdomen where chlorhexidine was applied.    Hydrocodone Other (See Comments)     Chest pains. Note: patient tolerated hydromorphone in 2/2019.     Mastisol adhesive [gum krsudi-gmpqyr-ynez-alcohol] Rash      Current Facility-Administered Medications   Medication    0.9%  NaCl infusion     Facility-Administered Medications Ordered in Other Encounters   Medication    lactated ringers infusion    lidocaine (PF) 10 mg/ml (1%) injection 10 mg       PMHx, PSHx, Allergies, Medications reviewed in epic    ROS negative except pain complaints in HPI    OBJECTIVE:    /64 (BP Location: Right arm)   Pulse 76   Temp 97.5 °F (36.4 °C) (Oral)   Resp 16   Ht 5' 8" (1.727 m)   Wt 54.9 kg (121 lb)   LMP  (LMP Unknown)   SpO2 100%   Breastfeeding No   BMI 18.40 kg/m²     PHYSICAL EXAMINATION:    GENERAL: Well appearing, in no acute distress, alert " and oriented x3.  PSYCH:  Mood and affect appropriate.  SKIN: Skin color, texture, turgor normal, no rashes or lesions which will impact the procedure.  CV: RRR with palpation of the radial artery.  PULM: No evidence of respiratory difficulty, symmetric chest rise. Clear to auscultation.  NEURO: Cranial nerves grossly intact.    Plan:    Proceed with procedure as planned Procedure(s) (LRB):  BLOCK, NERVE, RIGHT C2,C3 AND THIRD OCCIPITAL NERVE DR KELLY ONLY 1 OF 2 (Right)    LANI CAVAZOS  03/17/2023

## 2023-03-20 ENCOUNTER — OFFICE VISIT (OUTPATIENT)
Dept: GASTROENTEROLOGY | Facility: CLINIC | Age: 48
End: 2023-03-20
Payer: COMMERCIAL

## 2023-03-20 ENCOUNTER — TELEPHONE (OUTPATIENT)
Dept: PHARMACY | Facility: CLINIC | Age: 48
End: 2023-03-20
Payer: COMMERCIAL

## 2023-03-20 VITALS
SYSTOLIC BLOOD PRESSURE: 122 MMHG | BODY MASS INDEX: 19.65 KG/M2 | DIASTOLIC BLOOD PRESSURE: 64 MMHG | HEIGHT: 68 IN | WEIGHT: 129.63 LBS

## 2023-03-20 DIAGNOSIS — Z79.1 NSAID LONG-TERM USE: ICD-10-CM

## 2023-03-20 DIAGNOSIS — K59.00 CONSTIPATION, UNSPECIFIED CONSTIPATION TYPE: ICD-10-CM

## 2023-03-20 DIAGNOSIS — K21.9 GASTROESOPHAGEAL REFLUX DISEASE, UNSPECIFIED WHETHER ESOPHAGITIS PRESENT: ICD-10-CM

## 2023-03-20 DIAGNOSIS — K58.0 IRRITABLE BOWEL SYNDROME WITH DIARRHEA: Primary | ICD-10-CM

## 2023-03-20 PROCEDURE — 99215 PR OFFICE/OUTPT VISIT, EST, LEVL V, 40-54 MIN: ICD-10-PCS | Mod: S$GLB,,, | Performed by: INTERNAL MEDICINE

## 2023-03-20 PROCEDURE — 3078F PR MOST RECENT DIASTOLIC BLOOD PRESSURE < 80 MM HG: ICD-10-PCS | Mod: CPTII,S$GLB,, | Performed by: INTERNAL MEDICINE

## 2023-03-20 PROCEDURE — 99999 PR PBB SHADOW E&M-EST. PATIENT-LVL IV: CPT | Mod: PBBFAC,,, | Performed by: INTERNAL MEDICINE

## 2023-03-20 PROCEDURE — 3074F SYST BP LT 130 MM HG: CPT | Mod: CPTII,S$GLB,, | Performed by: INTERNAL MEDICINE

## 2023-03-20 PROCEDURE — 3074F PR MOST RECENT SYSTOLIC BLOOD PRESSURE < 130 MM HG: ICD-10-PCS | Mod: CPTII,S$GLB,, | Performed by: INTERNAL MEDICINE

## 2023-03-20 PROCEDURE — 3008F BODY MASS INDEX DOCD: CPT | Mod: CPTII,S$GLB,, | Performed by: INTERNAL MEDICINE

## 2023-03-20 PROCEDURE — 99215 OFFICE O/P EST HI 40 MIN: CPT | Mod: S$GLB,,, | Performed by: INTERNAL MEDICINE

## 2023-03-20 PROCEDURE — 3008F PR BODY MASS INDEX (BMI) DOCUMENTED: ICD-10-PCS | Mod: CPTII,S$GLB,, | Performed by: INTERNAL MEDICINE

## 2023-03-20 PROCEDURE — 1159F PR MEDICATION LIST DOCUMENTED IN MEDICAL RECORD: ICD-10-PCS | Mod: CPTII,S$GLB,, | Performed by: INTERNAL MEDICINE

## 2023-03-20 PROCEDURE — 99999 PR PBB SHADOW E&M-EST. PATIENT-LVL IV: ICD-10-PCS | Mod: PBBFAC,,, | Performed by: INTERNAL MEDICINE

## 2023-03-20 PROCEDURE — 3078F DIAST BP <80 MM HG: CPT | Mod: CPTII,S$GLB,, | Performed by: INTERNAL MEDICINE

## 2023-03-20 PROCEDURE — 1159F MED LIST DOCD IN RCRD: CPT | Mod: CPTII,S$GLB,, | Performed by: INTERNAL MEDICINE

## 2023-03-20 NOTE — PATIENT INSTRUCTIONS
- berberine  - psyllium husk (for both diarrhea and constipation) -- take either 4 capsules daily or 1-2 tablespoons mixed with liquid  - start rifaximin   - Gutbliss by Dr. Chiara Valladares

## 2023-03-27 ENCOUNTER — OFFICE VISIT (OUTPATIENT)
Dept: NEUROLOGY | Facility: CLINIC | Age: 48
End: 2023-03-27
Payer: COMMERCIAL

## 2023-03-27 DIAGNOSIS — M54.12 CERVICAL RADICULITIS: ICD-10-CM

## 2023-03-27 DIAGNOSIS — M54.81 OCCIPITAL NEURALGIA OF RIGHT SIDE: Primary | ICD-10-CM

## 2023-03-27 PROCEDURE — 1160F RVW MEDS BY RX/DR IN RCRD: CPT | Mod: CPTII,95,, | Performed by: PSYCHIATRY & NEUROLOGY

## 2023-03-27 PROCEDURE — 1159F PR MEDICATION LIST DOCUMENTED IN MEDICAL RECORD: ICD-10-PCS | Mod: CPTII,95,, | Performed by: PSYCHIATRY & NEUROLOGY

## 2023-03-27 PROCEDURE — 99214 PR OFFICE/OUTPT VISIT, EST, LEVL IV, 30-39 MIN: ICD-10-PCS | Mod: 95,,, | Performed by: PSYCHIATRY & NEUROLOGY

## 2023-03-27 PROCEDURE — 99214 OFFICE O/P EST MOD 30 MIN: CPT | Mod: 95,,, | Performed by: PSYCHIATRY & NEUROLOGY

## 2023-03-27 PROCEDURE — 1160F PR REVIEW ALL MEDS BY PRESCRIBER/CLIN PHARMACIST DOCUMENTED: ICD-10-PCS | Mod: CPTII,95,, | Performed by: PSYCHIATRY & NEUROLOGY

## 2023-03-27 PROCEDURE — 1159F MED LIST DOCD IN RCRD: CPT | Mod: CPTII,95,, | Performed by: PSYCHIATRY & NEUROLOGY

## 2023-03-27 NOTE — PROGRESS NOTES
The patient location is: Home  The chief complaint leading to consultation is:  Occipital neuralgia  Visit type: Virtual visit with synchronous audio and video  Total time spent with patient: 15  Each patient to whom he or she provides medical services by telemedicine is:  (1) informed of the relationship between the physician and patient and the respective role of any other health care provider with respect to management of the patient; and (2) notified that he or she may decline to receive medical services by telemedicine and may withdraw from such care at any time.    Subjective:       Patient ID: Sandra Dimas is a 47 y.o. female.    Interval History:  Sandra Dimas is here for follow up. Their condition has clinically improved.  She recently had right-sided C2-C3, 3rd occipital nerve block with her pain management physician and she notes that she was actually headache free for 1 week.  She notes that her headaches have started coming back this week but she notes that her next right-sided C2, C3, 3rd occipital nerve block is coming this Thursday on March 30th.  We have reviewed her medications and have both agreed that she likely needs to come off of some of these medications.  We have discussed that her next step after this next nerve block with pain management may be discussing radiofrequency ablation, and after that possible procedure we may think about adjusting her medications.      Objective:   Patient is awake, alert and oriented to person, place and time. Moves all extremities antigravity. Cranial Nerves 2-12 are without gross focal deficit.     Focused telemedicine examination was undertaken today. Over 50% of face to face time of 15 minute visit time was in giving guidance, counseling and discussing treatment options.    Results for orders placed or performed during the hospital encounter of 01/22/23   MRI Brain Without Contrast    Narrative    EXAMINATION:  MRI BRAIN WITHOUT  CONTRAST    CLINICAL HISTORY:  Headache, chronic, new features or increased frequency;    COMPARISON:  MRI 10/11/2021.    FINDINGS:  No intracranial hemorrhage is identified.  No evidence of acute ischemia.  No mass lesion, mass effect or edema.  The ventricles cisterns and sulci appear normal.  No significant osseous abnormality is identified.  The paranasal sinuses appear clear.      Impression    No acute findings.      Electronically signed by: Terrance Wilson  Date:    01/22/2023  Time:    09:26   Results for orders placed or performed during the hospital encounter of 10/11/21   MRA Brain without contrast    Narrative    EXAMINATION:  MRA BRAIN WITHOUT CONTRAST    CLINICAL HISTORY:  Headache, intracranial hemorrhage suspected;    TECHNIQUE:  Standard 3D TOF MRA of the brain    COMPARISON:  None    FINDINGS:  The right and left internal carotid arteries appear normal. The anterior and middle cerebral arteries appear normal also.    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  Minimal fusiform enlargement of the left vertebral artery after it enters the dura, consider CTA.      Impression    No definite intracranial vascular abnormality.  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  Minimal fusiform enlargement of the left vertebral artery after it enters the dura.  Would consider CTA for further evaluation.      Electronically signed by: Jose David MD  Date:    10/11/2021  Time:    18:36   Results for orders placed or performed during the hospital encounter of 03/06/20   MRI Brain W WO Contrast    Narrative    EXAMINATION:  MRI BRAIN W WO CONTRAST    CLINICAL HISTORY:  Headache, acute, severe, thunderclap, worst HA of life;.    CONTRAST:  Seven ML of Gadavist    TECHNIQUE:  Multiplanar multisequence MR imaging of the brain was performed without contrast.    COMPARISON:  None    FINDINGS:  No evidence of signal abnormality in the brain.  No diffusion weighted sequence abnormality. No  mass lesion.    Ventricles and sulci are normal in size for age without evidence of hydrocephalus. No evidence of intra or extra-axial hemorrhage.    Normal vascular flow voids are preserved.    Sinuses are clear.    No evidence of abnormal enhancement post contrast material.    Bone marrow signal intensity is normal.      Impression    MRI of the brain is within normal limits.      Electronically signed by: Jose David MD  Date:    03/06/2020  Time:    13:20       Assessment/Plan:     Problem List Items Addressed This Visit          Neuro    Cervical radiculitis       Orthopedic    Occipital neuralgia of right side - Primary    Overview     Right JEAN PIERRE and MARY ANNE block 2/3 - only partial relief    Right C2, C3 and TON block 3/17 with Dr. Marrero - complete relief for 1 week, now headaches returning. Planned repeat on Thursday 3/30          47-year-old female is seen in follow-up for right-sided occipital neuralgia.  She notes that her quality of life has improved significantly after right-sided C2, C3, 3rd occipital nerve block with pain management.  I agree that the patient needs a repeat of this procedure.  Depending on results I defer to pain management for the next step but the patient and I have discussed that she might benefit from a longer lasting block if she continues to have a short window of benefit.  With regards to her medications, the medication that the patient is most concerned about is the Trokendi.  If she is stable from a procedural standpoint at the next time I see her in 2 or 3 months, we may discuss on how to reduce some of the medications that she is currently taking.      The patient verbalizes understanding and agreement with the treatment plan. I have discussed risks, benefits and alternatives to the treatment plan. Questions were sought and answered to her stated verbal satisfaction.        Marcos Vazquez MD    This note is dictated on M*Modal Fluency Direct word recognition program. There are  word recognition mistakes that are occasionally missed on review.

## 2023-03-29 ENCOUNTER — PATIENT MESSAGE (OUTPATIENT)
Dept: PAIN MEDICINE | Facility: OTHER | Age: 48
End: 2023-03-29
Payer: COMMERCIAL

## 2023-04-05 ENCOUNTER — PATIENT MESSAGE (OUTPATIENT)
Dept: GASTROENTEROLOGY | Facility: CLINIC | Age: 48
End: 2023-04-05
Payer: COMMERCIAL

## 2023-04-05 ENCOUNTER — PATIENT MESSAGE (OUTPATIENT)
Dept: PAIN MEDICINE | Facility: OTHER | Age: 48
End: 2023-04-05
Payer: COMMERCIAL

## 2023-04-06 ENCOUNTER — HOSPITAL ENCOUNTER (OUTPATIENT)
Facility: OTHER | Age: 48
Discharge: HOME | End: 2023-04-06
Attending: ANESTHESIOLOGY | Admitting: ANESTHESIOLOGY
Payer: COMMERCIAL

## 2023-04-06 VITALS
TEMPERATURE: 98 F | OXYGEN SATURATION: 100 % | BODY MASS INDEX: 18.19 KG/M2 | RESPIRATION RATE: 16 BRPM | HEIGHT: 68 IN | WEIGHT: 120 LBS | DIASTOLIC BLOOD PRESSURE: 61 MMHG | SYSTOLIC BLOOD PRESSURE: 88 MMHG | HEART RATE: 65 BPM

## 2023-04-06 DIAGNOSIS — G89.29 CHRONIC PAIN: ICD-10-CM

## 2023-04-06 DIAGNOSIS — M47.812 CERVICAL SPONDYLOSIS: Primary | ICD-10-CM

## 2023-04-06 DIAGNOSIS — M54.81 OCCIPITAL NEURALGIA OF RIGHT SIDE: ICD-10-CM

## 2023-04-06 DIAGNOSIS — M47.812 SPONDYLOSIS OF CERVICAL REGION WITHOUT MYELOPATHY OR RADICULOPATHY: ICD-10-CM

## 2023-04-06 PROCEDURE — 64490 INJ PARAVERT F JNT C/T 1 LEV: CPT | Mod: RT | Performed by: ANESTHESIOLOGY

## 2023-04-06 PROCEDURE — 25000003 PHARM REV CODE 250: Performed by: STUDENT IN AN ORGANIZED HEALTH CARE EDUCATION/TRAINING PROGRAM

## 2023-04-06 PROCEDURE — 64490 PR INJ DX/THER AGNT PARAVERT FACET JOINT,IMG GUIDE,CERV/THORAC, 1ST LEVEL: ICD-10-PCS | Mod: RT,,, | Performed by: ANESTHESIOLOGY

## 2023-04-06 PROCEDURE — 25000003 PHARM REV CODE 250: Performed by: ANESTHESIOLOGY

## 2023-04-06 PROCEDURE — 64490 INJ PARAVERT F JNT C/T 1 LEV: CPT | Mod: RT,,, | Performed by: ANESTHESIOLOGY

## 2023-04-06 PROCEDURE — 63600175 PHARM REV CODE 636 W HCPCS: Performed by: ANESTHESIOLOGY

## 2023-04-06 RX ORDER — SODIUM CHLORIDE 9 MG/ML
500 INJECTION, SOLUTION INTRAVENOUS CONTINUOUS
Status: ACTIVE | OUTPATIENT
Start: 2023-04-06 | End: 2023-04-07

## 2023-04-06 RX ORDER — BUPIVACAINE HYDROCHLORIDE 2.5 MG/ML
INJECTION, SOLUTION EPIDURAL; INFILTRATION; INTRACAUDAL
Status: DISCONTINUED | OUTPATIENT
Start: 2023-04-06 | End: 2023-04-06 | Stop reason: HOSPADM

## 2023-04-06 RX ORDER — MIDAZOLAM HYDROCHLORIDE 1 MG/ML
INJECTION INTRAMUSCULAR; INTRAVENOUS
Status: DISCONTINUED | OUTPATIENT
Start: 2023-04-06 | End: 2023-04-06 | Stop reason: HOSPADM

## 2023-04-06 RX ORDER — LIDOCAINE HYDROCHLORIDE 20 MG/ML
INJECTION, SOLUTION INFILTRATION; PERINEURAL
Status: DISCONTINUED | OUTPATIENT
Start: 2023-04-06 | End: 2023-04-06 | Stop reason: HOSPADM

## 2023-04-06 RX ORDER — FENTANYL CITRATE 50 UG/ML
INJECTION, SOLUTION INTRAMUSCULAR; INTRAVENOUS
Status: DISCONTINUED | OUTPATIENT
Start: 2023-04-06 | End: 2023-04-06 | Stop reason: HOSPADM

## 2023-04-06 NOTE — DISCHARGE SUMMARY
Discharge Note  Short Stay      SUMMARY     Admit Date: 4/6/2023    Attending Physician: Neftaly Kelly      Discharge Physician: Neftaly Kelly      Discharge Date: 4/6/2023 1:31 PM    Procedure(s) (LRB):  BLOCK, NERVE, RIGHT C2,C3 AND THIRD OCCIPITAL MEDIAL BRANCH DR KELLY ONLY 2 OF 2 (URGENT) *VIP* (Right)    Final Diagnosis: Cervical spondylosis [M47.812]  Cervicogenic headache [G44.86]    Disposition: Home or self care    Patient Instructions:   Current Discharge Medication List        CONTINUE these medications which have NOT CHANGED    Details   atenoloL (TENORMIN) 25 MG tablet Take 1 tablet (25 mg total) by mouth once daily.  Qty: 90 tablet, Refills: 3    Comments: .  Associated Diagnoses: Palpitation      B2/magnesium cit,oxid/feverfew (MIGRELIEF ORAL) Take by mouth once daily.       baclofen (LIORESAL) 10 MG tablet Take 1 tablet (10 mg total) by mouth 3 (three) times daily as needed (neck muscle spasm).  Qty: 90 tablet, Refills: 3    Associated Diagnoses: Bilateral occipital neuralgia; Cervicogenic headache      butorphanol (STADOL) 10 mg/mL nasal spray as needed.    Comments: Quantity prescribed more than 7 day supply? Press F2 and select one:[id number]        DULoxetine (CYMBALTA) 30 MG capsule Take 1 capsule (30 mg total) by mouth once daily.  Qty: 90 capsule, Refills: 1    Associated Diagnoses: Bilateral occipital neuralgia      fluocinonide (LIDEX) 0.05 % ointment Apply topically to the affected area twice daily as directed  Qty: 60 g, Refills: 0      gabapentin (NEURONTIN) 300 MG capsule Take 1 capsule (300 mg total) by mouth 3 (three) times daily.  Qty: 270 capsule, Refills: 1    Associated Diagnoses: Bilateral occipital neuralgia      hydrocortisone 2.5 % cream Apply topically to the affected area twice daily as directed  Qty: 30 g, Refills: 0      linaCLOtide (LINZESS) 72 mcg Cap capsule Take 1 capsule (72 mcg total) by mouth before breakfast.  Qty: 30 capsule, Refills: 5      ondansetron  (ZOFRAN-ODT) 8 MG TbDL Take 1 tablet (8 mg total) by mouth every 8 (eight) hours as needed (nausea/vomiting).  Qty: 30 tablet, Refills: 2      pantoprazole (PROTONIX) 40 MG tablet Take 1 tablet (40 mg total) by mouth once daily.  Qty: 90 tablet, Refills: 3    Associated Diagnoses: Gastroesophageal reflux disease without esophagitis      promethazine (PHENERGAN) 25 MG tablet Take 0.5 tablets (12.5 mg total) by mouth every 6 (six) hours as needed for Nausea.  Qty: 30 tablet, Refills: 0      semaglutide (OZEMPIC) 1 mg/dose (4 mg/3 mL) Inject 1 mg into the skin every 7 days.  Qty: 1 pen, Refills: 11      topiramate (TROKENDI XR) 200 mg Cp24 Take 1 capsule by mouth once daily.  Qty: 31 capsule, Refills: 11    Associated Diagnoses: Intractable migraine without status migrainosus, unspecified migraine type      valACYclovir (VALTREX) 1000 MG tablet Take 1 tablet by mouth once a day  Qty: 90 tablet, Refills: 6      vitamin D (VITAMIN D3) 1000 units Tab Take 1,000 Units by mouth once daily.                 Discharge Diagnosis: Cervical spondylosis [M47.812]  Cervicogenic headache [G44.86]  Condition on Discharge: Stable with no complications to procedure   Diet on Discharge: Same as before.  Activity: as per instruction sheet.  Discharge to: Home with a responsible adult.  Follow up: 2-4 weeks

## 2023-04-06 NOTE — DISCHARGE INSTRUCTIONS

## 2023-04-06 NOTE — OP NOTE
Diagnostic Cervical Medial Branch Block under Fluoroscopy Guidance    The procedure, risks, benefits, and options were discussed with the patient. There are no contraindications to the procedure. The patent expressed understanding and agreed to the procedure. Informed written consent was obtained prior to the start of the procedure and can be found in the patient's chart.        PATIENT NAME: Sandra Dimas   MRN: [id number]     DATE OF PROCEDURE: 04/06/2023                                                             PROCEDURE:  Diagnostic Right TON, C2, and C3 Cervical Medial Branch Block under Fluoroscopy Guidance    PRE-OP DIAGNOSIS: Cervical spondylosis [M47.812]  Cervicogenic headache [G44.86] Cervical Spondylosis [M47.812]    POST-OP DIAGNOSIS: Same    PHYSICIAN: Neftaly Marrero MD    ASSISTANTS: none    MEDICATIONS INJECTED:  Bupivacaine 0.25%    LOCAL ANESTHETIC INJECTED:   Xylocaine 2%    SEDATION: Versed 2mg and Fentanyl 50mcg                                                                                                                                                                                     Conscious sedation ordered by M.D. Patient re-evaluation prior to administration of conscious sedation. No changes noted in patient's status from initial evaluation. The patient's vital signs were monitored by RN and patient remained hemodynamically stable throughout the procedure.    Event Time In   Sedation Start 1327   Sedation End 1330       ESTIMATED BLOOD LOSS:  None    COMPLICATIONS:  None.    INDICATIONS: Patient has clinical and imaging findings suggestive of facet mediated pain. Patient had a previous diagnostic block performed with at least 80% relief in pain and/or at least 50% improvement in the ability to perform previously painful movements and ADLs for the expected duration of the local anesthetic utilized.    TECHNIQUE:   Time-out was performed to identify the patient and procedure to be  performed. With the patient laying in a prone position, the surgical area was prepped and draped in the usual sterile fashion using ChloraPrep and fenestrated drape. The levels were determined under fluoroscopic guidance. Skin anesthesia was achieved by injecting Lidocaine 2% over the injection sites.  A 22 gauge, 3.5 inch needle was introduced to each level using AP, lateral and/or contralateral oblique fluoroscopic imaging. After negative aspiration for blood or CSF was confirmed, 1 mL of the anesthetic listed above was then slowly injected at each site. The needles were removed and bleeding was nil. A sterile dressing was applied. No specimens collected. The patient tolerated the procedure well.       The patient was monitored after the procedure in the recovery area. They were given post-procedure and discharge instructions to follow at home. The patient was discharged in a stable condition.      Neftaly Marrero MD

## 2023-04-10 ENCOUNTER — PATIENT MESSAGE (OUTPATIENT)
Dept: GASTROENTEROLOGY | Facility: CLINIC | Age: 48
End: 2023-04-10
Payer: COMMERCIAL

## 2023-04-11 DIAGNOSIS — R10.9 ABDOMINAL PAIN, UNSPECIFIED ABDOMINAL LOCATION: Primary | ICD-10-CM

## 2023-04-12 ENCOUNTER — PATIENT MESSAGE (OUTPATIENT)
Dept: PAIN MEDICINE | Facility: CLINIC | Age: 48
End: 2023-04-12
Payer: COMMERCIAL

## 2023-04-12 DIAGNOSIS — M47.812 CERVICAL SPONDYLOSIS: Primary | ICD-10-CM

## 2023-04-13 ENCOUNTER — TELEPHONE (OUTPATIENT)
Dept: GASTROENTEROLOGY | Facility: CLINIC | Age: 48
End: 2023-04-13
Payer: COMMERCIAL

## 2023-04-13 ENCOUNTER — HOSPITAL ENCOUNTER (OUTPATIENT)
Dept: RADIOLOGY | Facility: HOSPITAL | Age: 48
Discharge: HOME | End: 2023-04-13
Attending: INTERNAL MEDICINE
Payer: COMMERCIAL

## 2023-04-13 ENCOUNTER — PATIENT MESSAGE (OUTPATIENT)
Dept: GASTROENTEROLOGY | Facility: CLINIC | Age: 48
End: 2023-04-13
Payer: COMMERCIAL

## 2023-04-13 ENCOUNTER — PATIENT MESSAGE (OUTPATIENT)
Dept: PAIN MEDICINE | Facility: OTHER | Age: 48
End: 2023-04-13
Payer: COMMERCIAL

## 2023-04-13 DIAGNOSIS — R10.9 ABDOMINAL PAIN, UNSPECIFIED ABDOMINAL LOCATION: Primary | ICD-10-CM

## 2023-04-13 DIAGNOSIS — D50.0 IRON DEFICIENCY ANEMIA DUE TO CHRONIC BLOOD LOSS: Primary | ICD-10-CM

## 2023-04-13 DIAGNOSIS — R19.7 DIARRHEA, UNSPECIFIED TYPE: ICD-10-CM

## 2023-04-13 DIAGNOSIS — R63.4 WEIGHT LOSS: ICD-10-CM

## 2023-04-13 DIAGNOSIS — R10.9 ABDOMINAL PAIN, UNSPECIFIED ABDOMINAL LOCATION: ICD-10-CM

## 2023-04-13 PROCEDURE — A9698 NON-RAD CONTRAST MATERIALNOC: HCPCS | Performed by: INTERNAL MEDICINE

## 2023-04-13 PROCEDURE — 25500020 PHARM REV CODE 255: Performed by: INTERNAL MEDICINE

## 2023-04-13 PROCEDURE — 74177 CT ABDOMEN PELVIS WITH CONTRAST: ICD-10-PCS | Mod: 26,,, | Performed by: RADIOLOGY

## 2023-04-13 PROCEDURE — 74177 CT ABD & PELVIS W/CONTRAST: CPT | Mod: TC

## 2023-04-13 PROCEDURE — 74177 CT ABD & PELVIS W/CONTRAST: CPT | Mod: 26,,, | Performed by: RADIOLOGY

## 2023-04-13 RX ORDER — SOD SULF/POT CHLORIDE/MAG SULF 1.479 G
12 TABLET ORAL DAILY
Qty: 24 TABLET | Refills: 0 | Status: SHIPPED | OUTPATIENT
Start: 2023-04-13

## 2023-04-13 RX ORDER — SODIUM, POTASSIUM,MAG SULFATES 17.5-3.13G
1 SOLUTION, RECONSTITUTED, ORAL ORAL ONCE
Qty: 354 ML | Refills: 0 | Status: SHIPPED | OUTPATIENT
Start: 2023-04-13 | End: 2023-04-13

## 2023-04-13 RX ADMIN — IOHEXOL 1000 ML: 9 SOLUTION ORAL at 12:04

## 2023-04-13 RX ADMIN — IOHEXOL 100 ML: 350 INJECTION, SOLUTION INTRAVENOUS at 12:04

## 2023-04-13 NOTE — TELEPHONE ENCOUNTER
Combined [facility] Endoscopy Questionnaire    Goals:  1. to safely screen and evaluate patients for endoscopic procedures  2. schedule healthy patients at the [place] and [facility]  3. schedule patients with complex medical comorbidities at [facility]    Location Screening:    If answers Yes to any of the following, schedule at [place] ONLY. If No, OK for either location.  Esophageal varices no  BMI >50 no  AICD no  Severe valvular disease no  Severe aortic stenosis (MEDARDO <1cm2) no  Pacemaker dependent no  Heart Failure (EF < 45%) no  Oxygen dependent no  Unable to walk 6 minutes no  Chronic respiratory failure no  Moderate or severe PFTs no  Advance procedures* no  Hemoglobin A1C >10 no    If answers Yes to any of the following, schedule at [place].  Can independently transfer? yes  Bariatric endoscopic procedures* no  *Advance cases and bariatric endoscopic procedures are scheduled by Darlene Neff [facility] charge nurse and Tammie May    If answers Yes to any of the following, OK for either location.  Pulmonary Hypertension (PAP <60) no  Pacemaker no  Nocturnal CPAP no  Insulin Pump no      Endoscopy screenin. Is the patient taking antibiotics for an infection? no  a. Yes - Defer endoscopic procedure till completion of antibiotics  i. Respiratory infections should be scheduled two weeks after antibiotics completed  ii. Diverticulitis must have been seen by GI or PCP. Schedule colonoscopy 2-3 months out  b. No - Schedule procedure    2. Are platelets less than 50? no  a. Yes - Procedure cannot be scheduled at ANY location  i. Send message to ordering provider and do NOT schedule the procedure  b. No - Schedule procedure    3. Does the patient have anemia? no  a. Is the hemoglobin <7?  i. Yes - Endoscopic procedure cannot be scheduled at any location  ii. Contact ordering providers office to arrange outpatient transfusion or review with ISRRAEL/MD  iii. No - Schedule procedure  b. Are there multiple comorbidities -  Schedule at [place]  c. Is the patient symptomatic - Consult PAT provider    4. Has the patient been admitted for cardiac, kidney or pulmonary causes at any hospital in the past 3 months? no  a. Yes - Schedule appointment with PCP  i. If patient has been seen PCP, Cardiology, Nephrology, Pulmonary or in GI clinic within 3 months then schedule procedure.  b. No - Schedule procedure    5. Has a cardiac stent been placed in the last 12 months? no  a. Yes - Review medical record and confirm patient has been cleared by Cardiology  b. No - Schedule procedure    6. Is patient on blood thinner or antiplatelet agent? no  a. Yes - PAT to send telephone encounter to endoscopist. Endoscopist to send Cardiology e-consult for cardiac clearance  b. No - Schedule procedure  c. Refer to anticoagulation medication list    7. Have you had a stroke or heart attack in the past 6 months? no  a. Yes - Review medical record to determine if patient has been seen by PCP, Cardiology or Neurology and cleared to undergo endoscopic procedure  b. No - Schedule procedure    8. Has there been chest pain in the past 3 months? no  a. Yes - Review medical record to confirm Cardiology has cleared patient for endoscopic procedure  i. If no previous Cardiology evaluation refer to Cardiology. Do not schedule procedure.  c. No - Schedule procedure    9. Is this patient taking weight loss medications? no  a. Yes - Medication must be held 2 weeks before scheduling endoscopic procedure  b. No - Schedule procedure    10. Is the patient on dialysis? no  a. Yes - Schedule day AFTER dialysis and schedule after 9am  i. Patient arrival time is 2 hours prior to procedure, for STAT labs  ii. Nulytely or miralax prep must be used for all patients with kidney disease  d. No - Schedule procedure    11. Is the patient diabetic? no  a. Yes - Schedule morning appt  i. Advise patient to hold all diabetic meds day of procedure.  c. No - Schedule procedure    12. Does the  patient have any piercings? no  a. Yes - Must be removed before endoscopic procedure  b. No - Schedule procedure    Additional Information:  - All patients 80 years of age and older must be seen in the GI clinic. Do not schedule endoscopic procedure. Schedule GI clinic appointment.  - Review previous colonoscopy recommendations. Look for history of poor prep  - Review medications and allergies  - Verify pharmacy information and appropriate prep sent  - Confirm prep instructions have been mailed or sent to patient

## 2023-04-14 ENCOUNTER — LAB VISIT (OUTPATIENT)
Dept: LAB | Facility: HOSPITAL | Age: 48
End: 2023-04-14
Attending: INTERNAL MEDICINE
Payer: COMMERCIAL

## 2023-04-14 DIAGNOSIS — D50.0 IRON DEFICIENCY ANEMIA DUE TO CHRONIC BLOOD LOSS: ICD-10-CM

## 2023-04-14 DIAGNOSIS — R10.9 ABDOMINAL PAIN, UNSPECIFIED ABDOMINAL LOCATION: ICD-10-CM

## 2023-04-14 LAB
AMYLASE SERPL-CCNC: 68 U/L (ref 20–110)
BASOPHILS # BLD AUTO: 0.04 K/UL (ref 0–0.2)
BASOPHILS NFR BLD: 0.8 % (ref 0–1.9)
DIFFERENTIAL METHOD: ABNORMAL
EOSINOPHIL # BLD AUTO: 0.1 K/UL (ref 0–0.5)
EOSINOPHIL NFR BLD: 2 % (ref 0–8)
ERYTHROCYTE [DISTWIDTH] IN BLOOD BY AUTOMATED COUNT: 13 % (ref 11.5–14.5)
FERRITIN SERPL-MCNC: 1066 NG/ML (ref 20–300)
HCT VFR BLD AUTO: 33.1 % (ref 37–48.5)
HGB BLD-MCNC: 10.4 G/DL (ref 12–16)
IMM GRANULOCYTES # BLD AUTO: 0.01 K/UL (ref 0–0.04)
IMM GRANULOCYTES NFR BLD AUTO: 0.2 % (ref 0–0.5)
IRON SERPL-MCNC: 109 UG/DL (ref 30–160)
LIPASE SERPL-CCNC: 33 U/L (ref 4–60)
LYMPHOCYTES # BLD AUTO: 2.6 K/UL (ref 1–4.8)
LYMPHOCYTES NFR BLD: 50.2 % (ref 18–48)
MCH RBC QN AUTO: 26.4 PG (ref 27–31)
MCHC RBC AUTO-ENTMCNC: 31.4 G/DL (ref 32–36)
MCV RBC AUTO: 84 FL (ref 82–98)
MONOCYTES # BLD AUTO: 0.3 K/UL (ref 0.3–1)
MONOCYTES NFR BLD: 5.7 % (ref 4–15)
NEUTROPHILS # BLD AUTO: 2.1 K/UL (ref 1.8–7.7)
NEUTROPHILS NFR BLD: 41.1 % (ref 38–73)
NRBC BLD-RTO: 0 /100 WBC
PLATELET # BLD AUTO: 281 K/UL (ref 150–450)
PMV BLD AUTO: 10.3 FL (ref 9.2–12.9)
RBC # BLD AUTO: 3.94 M/UL (ref 4–5.4)
SATURATED IRON: 45 % (ref 20–50)
TOTAL IRON BINDING CAPACITY: 240 UG/DL (ref 250–450)
TRANSFERRIN SERPL-MCNC: 162 MG/DL (ref 200–375)
WBC # BLD AUTO: 5.1 K/UL (ref 3.9–12.7)

## 2023-04-14 PROCEDURE — 83690 ASSAY OF LIPASE: CPT | Performed by: INTERNAL MEDICINE

## 2023-04-14 PROCEDURE — 85025 COMPLETE CBC W/AUTO DIFF WBC: CPT | Performed by: INTERNAL MEDICINE

## 2023-04-14 PROCEDURE — 84466 ASSAY OF TRANSFERRIN: CPT | Performed by: INTERNAL MEDICINE

## 2023-04-14 PROCEDURE — 36415 COLL VENOUS BLD VENIPUNCTURE: CPT | Performed by: INTERNAL MEDICINE

## 2023-04-14 PROCEDURE — 82150 ASSAY OF AMYLASE: CPT | Performed by: INTERNAL MEDICINE

## 2023-04-14 PROCEDURE — 82728 ASSAY OF FERRITIN: CPT | Performed by: INTERNAL MEDICINE

## 2023-04-15 DIAGNOSIS — D50.0 IRON DEFICIENCY ANEMIA DUE TO CHRONIC BLOOD LOSS: Primary | ICD-10-CM

## 2023-04-17 ENCOUNTER — ANESTHESIA EVENT (OUTPATIENT)
Dept: ENDOSCOPY | Facility: HOSPITAL | Age: 48
End: 2023-04-17
Payer: COMMERCIAL

## 2023-04-17 ENCOUNTER — LAB VISIT (OUTPATIENT)
Dept: LAB | Facility: HOSPITAL | Age: 48
End: 2023-04-17
Attending: INTERNAL MEDICINE
Payer: COMMERCIAL

## 2023-04-17 DIAGNOSIS — R19.7 DIARRHEA, UNSPECIFIED TYPE: Primary | ICD-10-CM

## 2023-04-17 DIAGNOSIS — R19.7 DIARRHEA, UNSPECIFIED TYPE: ICD-10-CM

## 2023-04-17 PROCEDURE — 82653 EL-1 FECAL QUANTITATIVE: CPT | Performed by: INTERNAL MEDICINE

## 2023-04-17 NOTE — ANESTHESIA PREPROCEDURE EVALUATION
04/17/2023  Sandra Dimas is a 47 y.o., female.  Patient Active Problem List   Diagnosis    Primary snoring    Recurrent major depressive disorder, in partial remission    Migraines    Iron deficiency anemia due to chronic blood loss    NSAID long-term use    Anxiety    Cervical myofascial pain syndrome    MATT (iron deficiency anemia)    Occipital neuralgia of right side    Cervical radiculitis     Past Surgical History:   Procedure Laterality Date    APPENDECTOMY      COLONOSCOPY N/A 11/12/2020    Procedure: COLONOSCOPY;  Surgeon: Mylene Russ MD;  Location: [facility] ENDO;  Service: Endoscopy;  Laterality: N/A;    CYST REMOVAL Right 12/30/2019    Procedure: EXCISION, CYST;  Surgeon: NATALI Shah MD;  Location: [facility] OR;  Service: OB/GYN;  Laterality: Right;  Sebaceous cyst    diagnostic laprascopy      ESOPHAGOGASTRODUODENOSCOPY N/A 11/30/2018    Procedure: EGD (ESOPHAGOGASTRODUODENOSCOPY);  Surgeon: Bossman Bustos MD;  Location: [facility];  Service: Endoscopy;  Laterality: N/A;    ESOPHAGOGASTRODUODENOSCOPY N/A 11/12/2020    Procedure: ESOPHAGOGASTRODUODENOSCOPY (EGD) needs rapid covid test;  Surgeon: Mylene Russ MD;  Location: [facility];  Service: Endoscopy;  Laterality: N/A;    FULGURATION OF ENDOMETRIOSIS  12/26/2018    Procedure: FULGURATION, ENDOMETRIOSIS;  Surgeon: Zehra Jensen MD;  Location: [facility] OR;  Service: OB/GYN;;  endometriosis implant resection    HYSTEROSCOPY WITH HYDROTHERMAL ABLATION OF ENDOMETRIUM WITH DILATION AND CURETTAGE N/A 12/26/2018    Procedure: HYSTEROSCOPY, WITH DILATION AND CURETTAGE OF UTERUS AND HYDROTHERMAL ENDOMETRIAL ABLATION;  Surgeon: Zehra Jensen MD;  Location: [facility] OR;  Service: OB/GYN;  Laterality: N/A;    INJECTION OF ANESTHETIC AGENT AROUND NERVE Right 3/17/2023    Procedure: BLOCK, NERVE, RIGHT C2,C3 AND THIRD OCCIPITAL  NERVE DR KELLY ONLY 1 OF 2;  Surgeon: Neftaly Kelly MD;  Location: [facility] PAIN MGT;  Service: Pain Management;  Laterality: Right;    INJECTION OF ANESTHETIC AGENT AROUND NERVE Right 4/6/2023    Procedure: BLOCK, NERVE, RIGHT C2,C3 AND THIRD OCCIPITAL MEDIAL BRANCH DR KELLY ONLY 2 OF 2 (URGENT) *VIP*;  Surgeon: Neftaly Kelly MD;  Location: [facility] PAIN MGT;  Service: Pain Management;  Laterality: Right;    LAPAROSCOPIC SALPINGECTOMY Bilateral 12/26/2018    Procedure: SALPINGECTOMY, LAPAROSCOPIC;  Surgeon: Zehra Jensen MD;  Location: [facility] OR;  Service: OB/GYN;  Laterality: Bilateral;    LAPAROTOMY, EXPLORATORY N/A 2/7/2019    Procedure: LAPAROTOMY, EXPLORATORY;  Surgeon: Ramakrishna Boone MD;  Location: [facility] OR;  Service: General;  Laterality: N/A;    REPAIR OF VAGINAL CUFF N/A 7/9/2020    Procedure: REPAIR, VAGINAL CUFF;  Surgeon: Sailaja Addison MD;  Location: [facility] OR;  Service: OB/GYN;  Laterality: N/A;    ROBOT-ASSISTED LAPAROSCOPIC ABDOMINAL HYSTERECTOMY USING DA MAVIS XI N/A 12/30/2019    Procedure: XI ROBOTIC HYSTERECTOMY;  Surgeon: NATALI Shah MD;  Location: [facility] OR;  Service: OB/GYN;  Laterality: N/A;    ROBOT-ASSISTED LAPAROSCOPIC SURGICAL REMOVAL OF OVARY USING DA MAVIS XI Bilateral 12/30/2019    Procedure: XI ROBOTIC OOPHORECTOMY;  Surgeon: NATALI Shah MD;  Location: [facility] OR;  Service: OB/GYN;  Laterality: Bilateral;    robotic assisted laparoscopy with excision of ovarian endometrioma and chromotubation             Pre-op Assessment    I have reviewed the Patient Summary Reports.    I have reviewed the NPO Status.   I have reviewed the Medications.     Review of Systems  Anesthesia Hx:  No problems with previous Anesthesia  History of prior surgery of interest to airway management or planning: Denies Family Hx of Anesthesia complications.   Denies Personal Hx of Anesthesia complications.   Social:  No Alcohol Use, Non-Smoker    Hematology/Oncology:     Oncology Normal    --  Anemia:   EENT/Dental:EENT/Dental Normal   Cardiovascular:   ECG has been reviewed. SR w RBBB   Pulmonary:  Pulmonary Normal    Renal/:  Renal/ Normal     Hepatic/GI:   Bowel Prep. GERD Hepatitis, C Abd pain, weight loss   Musculoskeletal:  Musculoskeletal Normal    Neurological:   Headaches Cervical myofascial pain syndrome    Endocrine:  Endocrine Normal    Dermatological:  Skin Normal    Psych:   Psychiatric History depression          Physical Exam  General: Well nourished, Cooperative, Alert and Oriented    Airway:  Mallampati: II   Mouth Opening: Normal  TM Distance: Normal  Tongue: Normal  Neck ROM: Normal ROM    Dental:  Intact    Chest/Lungs:  Clear to auscultation        Anesthesia Plan  Type of Anesthesia, risks & benefits discussed:    Anesthesia Type: Gen Natural Airway  Intra-op Monitoring Plan: Standard ASA Monitors  Post Op Pain Control Plan: multimodal analgesia  Induction:  IV  Informed Consent: Informed consent signed with the Patient and all parties understand the risks and agree with anesthesia plan.  All questions answered. Patient consented to blood products? No  ASA Score: 2  Day of Surgery Review of History & Physical: H&P Update referred to the surgeon/provider.    Ready For Surgery From Anesthesia Perspective.     .

## 2023-04-18 ENCOUNTER — HOSPITAL ENCOUNTER (OUTPATIENT)
Dept: RADIOLOGY | Facility: HOSPITAL | Age: 48
Discharge: HOME | End: 2023-04-18
Attending: PEDIATRICS
Payer: COMMERCIAL

## 2023-04-18 DIAGNOSIS — Z12.31 OTHER SCREENING MAMMOGRAM: ICD-10-CM

## 2023-04-18 PROCEDURE — 77063 BREAST TOMOSYNTHESIS BI: CPT | Mod: 26,,, | Performed by: RADIOLOGY

## 2023-04-18 PROCEDURE — 77067 MAMMO DIGITAL SCREENING BILAT WITH TOMO: ICD-10-PCS | Mod: 26,,, | Performed by: RADIOLOGY

## 2023-04-18 PROCEDURE — 77067 SCR MAMMO BI INCL CAD: CPT | Mod: 26,,, | Performed by: RADIOLOGY

## 2023-04-18 PROCEDURE — 77067 SCR MAMMO BI INCL CAD: CPT | Mod: TC

## 2023-04-18 PROCEDURE — 77063 MAMMO DIGITAL SCREENING BILAT WITH TOMO: ICD-10-PCS | Mod: 26,,, | Performed by: RADIOLOGY

## 2023-04-19 ENCOUNTER — ANESTHESIA (OUTPATIENT)
Dept: ENDOSCOPY | Facility: HOSPITAL | Age: 48
End: 2023-04-19
Payer: COMMERCIAL

## 2023-04-19 ENCOUNTER — HOSPITAL ENCOUNTER (OUTPATIENT)
Facility: HOSPITAL | Age: 48
Discharge: HOME | End: 2023-04-19
Attending: INTERNAL MEDICINE | Admitting: INTERNAL MEDICINE
Payer: COMMERCIAL

## 2023-04-19 DIAGNOSIS — K52.9 CHRONIC DIARRHEA: ICD-10-CM

## 2023-04-19 DIAGNOSIS — R63.4 WEIGHT LOSS: ICD-10-CM

## 2023-04-19 DIAGNOSIS — R10.9 ABDOMINAL PAIN: ICD-10-CM

## 2023-04-19 DIAGNOSIS — R10.9 ABDOMINAL PAIN, UNSPECIFIED ABDOMINAL LOCATION: Primary | ICD-10-CM

## 2023-04-19 PROCEDURE — 45380 COLONOSCOPY AND BIOPSY: CPT | Performed by: INTERNAL MEDICINE

## 2023-04-19 PROCEDURE — 88305 TISSUE EXAM BY PATHOLOGIST: CPT | Mod: 26,,, | Performed by: PATHOLOGY

## 2023-04-19 PROCEDURE — 45380 PR COLONOSCOPY,BIOPSY: ICD-10-PCS | Mod: ,,, | Performed by: INTERNAL MEDICINE

## 2023-04-19 PROCEDURE — 25000003 PHARM REV CODE 250: Performed by: NURSE ANESTHETIST, CERTIFIED REGISTERED

## 2023-04-19 PROCEDURE — D9220A PRA ANESTHESIA: ICD-10-PCS | Mod: CRNA,,, | Performed by: NURSE ANESTHETIST, CERTIFIED REGISTERED

## 2023-04-19 PROCEDURE — 63600175 PHARM REV CODE 636 W HCPCS: Performed by: INTERNAL MEDICINE

## 2023-04-19 PROCEDURE — 63600175 PHARM REV CODE 636 W HCPCS: Performed by: NURSE ANESTHETIST, CERTIFIED REGISTERED

## 2023-04-19 PROCEDURE — 37000009 HC ANESTHESIA EA ADD 15 MINS: Performed by: INTERNAL MEDICINE

## 2023-04-19 PROCEDURE — D9220A PRA ANESTHESIA: ICD-10-PCS | Mod: ANES,,, | Performed by: ANESTHESIOLOGY

## 2023-04-19 PROCEDURE — D9220A PRA ANESTHESIA: Mod: ANES,,, | Performed by: ANESTHESIOLOGY

## 2023-04-19 PROCEDURE — 88305 TISSUE EXAM BY PATHOLOGIST: ICD-10-PCS | Mod: 26,,, | Performed by: PATHOLOGY

## 2023-04-19 PROCEDURE — 37000008 HC ANESTHESIA 1ST 15 MINUTES: Performed by: INTERNAL MEDICINE

## 2023-04-19 PROCEDURE — 45380 COLONOSCOPY AND BIOPSY: CPT | Mod: ,,, | Performed by: INTERNAL MEDICINE

## 2023-04-19 PROCEDURE — 43239 PR EGD, FLEX, W/BIOPSY, SGL/MULTI: ICD-10-PCS | Mod: 51,,, | Performed by: INTERNAL MEDICINE

## 2023-04-19 PROCEDURE — D9220A PRA ANESTHESIA: Mod: CRNA,,, | Performed by: NURSE ANESTHETIST, CERTIFIED REGISTERED

## 2023-04-19 PROCEDURE — 43239 EGD BIOPSY SINGLE/MULTIPLE: CPT | Mod: 51,,, | Performed by: INTERNAL MEDICINE

## 2023-04-19 PROCEDURE — 27201012 HC FORCEPS, HOT/COLD, DISP: Performed by: INTERNAL MEDICINE

## 2023-04-19 PROCEDURE — 88305 TISSUE EXAM BY PATHOLOGIST: CPT | Performed by: PATHOLOGY

## 2023-04-19 PROCEDURE — 43239 EGD BIOPSY SINGLE/MULTIPLE: CPT | Performed by: INTERNAL MEDICINE

## 2023-04-19 RX ORDER — SODIUM CHLORIDE, SODIUM LACTATE, POTASSIUM CHLORIDE, CALCIUM CHLORIDE 600; 310; 30; 20 MG/100ML; MG/100ML; MG/100ML; MG/100ML
INJECTION, SOLUTION INTRAVENOUS CONTINUOUS
Status: ACTIVE | OUTPATIENT
Start: 2023-04-19

## 2023-04-19 RX ORDER — PROPOFOL 10 MG/ML
VIAL (ML) INTRAVENOUS
Status: DISCONTINUED | OUTPATIENT
Start: 2023-04-19 | End: 2023-04-19

## 2023-04-19 RX ORDER — LIDOCAINE HYDROCHLORIDE 20 MG/ML
INJECTION INTRAVENOUS
Status: DISCONTINUED | OUTPATIENT
Start: 2023-04-19 | End: 2023-04-19

## 2023-04-19 RX ADMIN — PROPOFOL 30 MG: 10 INJECTION, EMULSION INTRAVENOUS at 01:04

## 2023-04-19 RX ADMIN — LIDOCAINE HYDROCHLORIDE 40 MG: 20 INJECTION INTRAVENOUS at 01:04

## 2023-04-19 RX ADMIN — SODIUM CHLORIDE, POTASSIUM CHLORIDE, SODIUM LACTATE AND CALCIUM CHLORIDE: 600; 310; 30; 20 INJECTION, SOLUTION INTRAVENOUS at 12:04

## 2023-04-19 RX ADMIN — PROPOFOL 100 MG: 10 INJECTION, EMULSION INTRAVENOUS at 01:04

## 2023-04-19 NOTE — PLAN OF CARE
Dr. Vail at bedside informing patient and  of results and follow up care, all questions answered, verbalizes understanding.  at bedside per patient request.

## 2023-04-19 NOTE — H&P
Short Stay Endoscopy History and Physical    PCP - OSIRIS Gamez Jr, MD    Procedure - EGD and colonoscopy  ASA - 2  Mallampati - per anesthesia  History of Anesthesia problems - no  Family history Anesthesia problems -  no     HPI:  This is a 47 y.o. female here for evaluation of :   Active Hospital Problems    Diagnosis  POA    *Abdominal pain [R10.9]  No    Weight loss [R63.4]  No    Chronic diarrhea [K52.9]  No      Resolved Hospital Problems   No resolved problems to display.         Health Maintenance         Date Due Completion Date    Pneumococcal Vaccines (Age 0-64) (1 - PCV) Never done ---    Mammogram 02/07/2019 2/7/2018    COVID-19 Vaccine (4 - Booster) 05/13/2022 3/18/2022    TETANUS VACCINE 08/25/2026 8/25/2016    Lipid Panel 02/27/2028 2/27/2023    Colorectal Cancer Screening 11/12/2030 11/12/2020    Override on 11/12/2020: Done              ROS:  CONSTITUTIONAL: Denies weight change,  fatigue, fevers, chills, night sweats.  CARDIOVASCULAR: Denies chest pain, shortness of breath, orthopnea and edema.  RESPIRATORY: Denies cough, hemoptysis, dyspnea, and wheezing.  GI: See HPI.    Medical History:   Past Medical History:   Diagnosis Date    Chronic paroxysmal hemicrania 01/03/2019    Endometriosis     General anesthetics causing adverse effect in therapeutic use     wakes up with severe anxiety attacks    Hemicrania continua     Hepatitis C antibody positive in blood 12/09/2020    RNA NEGATIVE 12/14/2020    Migraines        Surgical History:   Past Surgical History:   Procedure Laterality Date    APPENDECTOMY      COLONOSCOPY N/A 11/12/2020    Procedure: COLONOSCOPY;  Surgeon: Mylene Russ MD;  Location: [facility] ENDO;  Service: Endoscopy;  Laterality: N/A;    CYST REMOVAL Right 12/30/2019    Procedure: EXCISION, CYST;  Surgeon: NATALI Shah MD;  Location: [facility] OR;  Service: OB/GYN;  Laterality: Right;  Sebaceous cyst    diagnostic laprascopy      ESOPHAGOGASTRODUODENOSCOPY N/A 11/30/2018     Procedure: EGD (ESOPHAGOGASTRODUODENOSCOPY);  Surgeon: Bossman Bustos MD;  Location: [facility];  Service: Endoscopy;  Laterality: N/A;    ESOPHAGOGASTRODUODENOSCOPY N/A 11/12/2020    Procedure: ESOPHAGOGASTRODUODENOSCOPY (EGD) needs rapid covid test;  Surgeon: Mylene Russ MD;  Location: [facility];  Service: Endoscopy;  Laterality: N/A;    FULGURATION OF ENDOMETRIOSIS  12/26/2018    Procedure: FULGURATION, ENDOMETRIOSIS;  Surgeon: Zehra Jensen MD;  Location: [facility] OR;  Service: OB/GYN;;  endometriosis implant resection    HYSTERECTOMY      HYSTEROSCOPY WITH HYDROTHERMAL ABLATION OF ENDOMETRIUM WITH DILATION AND CURETTAGE N/A 12/26/2018    Procedure: HYSTEROSCOPY, WITH DILATION AND CURETTAGE OF UTERUS AND HYDROTHERMAL ENDOMETRIAL ABLATION;  Surgeon: Zehra Jensen MD;  Location: [facility] OR;  Service: OB/GYN;  Laterality: N/A;    INJECTION OF ANESTHETIC AGENT AROUND NERVE Right 03/17/2023    Procedure: BLOCK, NERVE, RIGHT C2,C3 AND THIRD OCCIPITAL NERVE DR KELLY ONLY 1 OF 2;  Surgeon: Neftaly Kelly MD;  Location: [facility] PAIN MGT;  Service: Pain Management;  Laterality: Right;    INJECTION OF ANESTHETIC AGENT AROUND NERVE Right 04/06/2023    Procedure: BLOCK, NERVE, RIGHT C2,C3 AND THIRD OCCIPITAL MEDIAL BRANCH DR KELLY ONLY 2 OF 2 (URGENT) *[facility]IP*;  Surgeon: Neftaly Klely MD;  Location: [facility] PAIN MGT;  Service: Pain Management;  Laterality: Right;    LAPAROSCOPIC SALPINGECTOMY Bilateral 12/26/2018    Procedure: SALPINGECTOMY, LAPAROSCOPIC;  Surgeon: Zehra Jensen MD;  Location: [facility];  Service: OB/GYN;  Laterality: Bilateral;    LAPAROTOMY, EXPLORATORY N/A 02/07/2019    Procedure: LAPAROTOMY, EXPLORATORY;  Surgeon: Ramakrishna Boone MD;  Location: [facility] OR;  Service: General;  Laterality: N/A;    OOPHORECTOMY      REPAIR OF VAGINAL CUFF N/A 07/09/2020    Procedure: REPAIR, VAGINAL CUFF;  Surgeon: Sailaja Addison MD;  Location: [facility] OR;  Service: OB/GYN;  Laterality: N/A;    ROBOT-ASSISTED  LAPAROSCOPIC ABDOMINAL HYSTERECTOMY USING DA MAVIS XI N/A 12/30/2019    Procedure: XI ROBOTIC HYSTERECTOMY;  Surgeon: NATALI Shah MD;  Location: [facility] OR;  Service: OB/GYN;  Laterality: N/A;    ROBOT-ASSISTED LAPAROSCOPIC SURGICAL REMOVAL OF OVARY USING DA MAVIS XI Bilateral 12/30/2019    Procedure: XI ROBOTIC OOPHORECTOMY;  Surgeon: NATALI Shah MD;  Location: [facility] OR;  Service: OB/GYN;  Laterality: Bilateral;    robotic assisted laparoscopy with excision of ovarian endometrioma and chromotubation         Family History:   Family History   Problem Relation Age of Onset    Hypertension Mother     Diabetes type II Mother     Heart attack Father     Strabismus Neg Hx     Retinal detachment Neg Hx     Macular degeneration Neg Hx     Glaucoma Neg Hx     Blindness Neg Hx     Amblyopia Neg Hx     Breast cancer Neg Hx     Colon cancer Neg Hx     Ovarian cancer Neg Hx     Thyroid disease Neg Hx     Migraines Neg Hx     Asthma Neg Hx        Social History:   Social History     Tobacco Use    Smoking status: Never    Smokeless tobacco: Never   Substance Use Topics    Alcohol use: Not Currently     Comment: rarely No alcohol 72h prior to sx    Drug use: No       Allergies:   Review of patient's allergies indicates:   Allergen Reactions    Chlorhexidine Rash     Per  after surgery patient had large rash across abdomen where chlorhexidine was applied.    Hydrocodone Other (See Comments)     Chest pains. Note: patient tolerated hydromorphone in 2/2019.     Mastisol adhesive [gum [id number]-beoy-alcohol] Rash       Medications:   Current Facility-Administered Medications on File Prior to Encounter   Medication Dose Route Frequency Provider Last Rate Last Admin    lactated ringers infusion   Intravenous Continuous Rip Doss MD 0 mL/hr at 12/26/18 1542 New Bag at 07/09/20 1648    lidocaine (PF) 10 mg/ml (1%) injection 10 mg  1 mL Intradermal Once Rip Doss MD         Current Outpatient Medications  on File Prior to Encounter   Medication Sig Dispense Refill    atenoloL (TENORMIN) 25 MG tablet Take 1 tablet (25 mg total) by mouth once daily. 90 tablet 3    B2/magnesium cit,oxid/feverfew (MIGRELIEF ORAL) Take by mouth once daily.       baclofen (LIORESAL) 10 MG tablet Take 1 tablet (10 mg total) by mouth 3 (three) times daily as needed (neck muscle spasm). 90 tablet 3    DULoxetine (CYMBALTA) 30 MG capsule Take 1 capsule (30 mg total) by mouth once daily. 90 capsule 1    fluocinonide (LIDEX) 0.05 % ointment Apply topically to the affected area twice daily as directed 60 g 0    gabapentin (NEURONTIN) 300 MG capsule Take 1 capsule (300 mg total) by mouth 3 (three) times daily. 270 capsule 1    hydrocortisone 2.5 % cream Apply topically to the affected area twice daily as directed 30 g 0    linaCLOtide (LINZESS) 72 mcg Cap capsule Take 1 capsule (72 mcg total) by mouth before breakfast. 30 capsule 5    ondansetron (ZOFRAN-ODT) 8 MG TbDL Take 1 tablet (8 mg total) by mouth every 8 (eight) hours as needed (nausea/vomiting). 30 tablet 2    pantoprazole (PROTONIX) 40 MG tablet Take 1 tablet (40 mg total) by mouth once daily. 90 tablet 3    promethazine (PHENERGAN) 25 MG tablet Take 0.5 tablets (12.5 mg total) by mouth every 6 (six) hours as needed for Nausea. 30 tablet 0    semaglutide (OZEMPIC) 1 mg/dose (4 mg/3 mL) Inject 1 mg into the skin every 7 days. 1 pen 11    sod sulf-pot chloride-mag sulf (SUTAB) 1.479-0.188- 0.225 gram tablet Take 12 tablets by mouth once daily. Take according to package instructions with indicated amount of water. 24 tablet 0    topiramate (TROKENDI XR) 200 mg Cp24 Take 1 capsule by mouth once daily. 31 capsule 11    vitamin D (VITAMIN D3) 1000 units Tab Take 1,000 Units by mouth once daily.      butorphanol (STADOL) 10 mg/mL nasal spray as needed.      valACYclovir (VALTREX) 1000 MG tablet Take 1 tablet by mouth once a day 90 tablet 6       Physical Exam:  Vital Signs:   Vitals:     04/19/23 1223   BP: 113/72   Pulse: 86   Resp: 18   Temp: 97.4 °F (36.3 °C)     General Appearance: Well appearing in no acute distress  ENT: OP clear  Chest: CTA B  CV: RRR, no m/r/g  Abd: s/nt/nd/nabs  Ext: no edema    Labs:Reviewed    IMP:  Active Hospital Problems    Diagnosis  POA    *Abdominal pain [R10.9]  No    Weight loss [R63.4]  No    Chronic diarrhea [K52.9]  No      Resolved Hospital Problems   No resolved problems to display.         Plan:   I have explained the risks and benefits of upper endoscopy and colonoscopy to the patient including but not limited to bleeding, perforation, infection, and death. The patient wishes to proceed.

## 2023-04-19 NOTE — ANESTHESIA POSTPROCEDURE EVALUATION
Anesthesia Post Evaluation    Patient: Sandra Dimas    Procedure(s) Performed: Procedure(s) (LRB):  COLONOSCOPY (N/A)  EGD (ESOPHAGOGASTRODUODENOSCOPY) (N/A)    Final Anesthesia Type: general      Patient location during evaluation: GI PACU  Patient participation: Yes- Able to Participate  Level of consciousness: awake  Post-procedure vital signs: reviewed and stable  Pain management: adequate  Airway patency: patent    PONV status at discharge: No PONV  Anesthetic complications: no      Cardiovascular status: stable  Respiratory status: unassisted  Hydration status: euvolemic  Follow-up not needed.          Vitals Value Taken Time   BP 94/63 04/19/23 1426   Temp 36.7 °C (98.1 °F) 04/19/23 1358   Pulse 78 04/19/23 1426   Resp 22 04/19/23 1425   SpO2 100 % 04/19/23 1426   Vitals shown include unvalidated device data.      No case tracking events are documented in the log.      Pain/Kamila Score: Kamila Score: 10 (4/19/2023  2:13 PM)

## 2023-04-19 NOTE — TRANSFER OF CARE
"Anesthesia Transfer of Care Note    Patient: Sandra Dimas    Procedure(s) Performed: Procedure(s) (LRB):  COLONOSCOPY (N/A)  EGD (ESOPHAGOGASTRODUODENOSCOPY) (N/A)    Patient location: PACU    Anesthesia Type: general    Transport from OR: Transported from OR on room air with adequate spontaneous ventilation    Post pain: adequate analgesia    Post assessment: no apparent anesthetic complications    Post vital signs: stable    Level of consciousness: awake    Nausea/Vomiting: no nausea/vomiting    Complications: none    Transfer of care protocol was followed      Last vitals:   Visit Vitals  /72 (BP Location: Right arm, Patient Position: Sitting)   Pulse 86   Temp 36.3 °C (97.4 °F) (Temporal)   Resp 18   Ht 5' 8" (1.727 m)   Wt 55.8 kg (123 lb 2 oz)   LMP  (LMP Unknown)   SpO2 99%   Breastfeeding No   BMI 18.72 kg/m²     "

## 2023-04-19 NOTE — [employer] PATIENT INSTRUCTIONS
Discharge Summary/Instructions after an Endoscopic Procedure  Patient Name: Sandra Dimas  Patient MRN: [id number]  Patient YOB: 1975 Wednesday, April 19, 2023  Dian Vail MD  Dear patient,  As a result of recent federal legislation (The Federal Cures Act), you may   receive lab or pathology results from your procedure in your [facility]   account before your physician is able to contact you. Your physician or   their representative will relay the results to you with their   recommendations at their soonest availability.  Thank you,  RESTRICTIONS:  During your procedure today, you received medications for sedation.  These   medications may affect your judgment, balance and coordination.  Therefore,   for 24 hours, you have the following restrictions:   - DO NOT drive a car, operate machinery, make legal/financial decisions,   sign important papers or drink alcohol.    ACTIVITY:  Today: no heavy lifting, straining or running due to procedural   sedation/anesthesia.  The following day: return to full activity including work.  DIET:  Eat and drink normally unless instructed otherwise.     TREATMENT FOR COMMON SIDE EFFECTS:  - Mild abdominal pain, nausea, belching, bloating or excessive gas:  rest,   eat lightly and use a heating pad.  - Sore Throat: treat with throat lozenges and/or gargle with warm salt   water.  - Because air was used during the procedure, expelling large amounts of air   from your rectum or belching is normal.  - If a bowel prep was taken, you may not have a bowel movement for 1-3 days.    This is normal.  SYMPTOMS TO WATCH FOR AND REPORT TO YOUR PHYSICIAN:  1. Abdominal pain or bloating, other than gas cramps.  2. Chest pain.  3. Back pain.  4. Signs of infection such as: chills or fever occurring within 24 hours   after the procedure.  5. Rectal bleeding, which would show as bright red, maroon, or black stools.   (A tablespoon of blood from the rectum is not serious, especially  if   hemorrhoids are present.)  6. Vomiting.  7. Weakness or dizziness.  GO DIRECTLY TO THE NEAREST EMERGENCY ROOM IF YOU HAVE ANY OF THE FOLLOWING:      Difficulty breathing              Chills and/or fever over 101 F   Persistent vomiting and/or vomiting blood   Severe abdominal pain   Severe chest pain   Black, tarry stools   Bleeding- more than one tablespoon   Any other symptom or condition that you feel may need urgent attention  Your doctor recommends these additional instructions:  If any biopsies were taken, your doctors clinic will contact you in 1 to 2   weeks with any results.  - Discharge patient to home (via wheelchair).   - Resume previous diet.   - Continue present medications.   - Await pathology results.   - Telephone GI clinic for pathology results in 2 weeks.   - Patient has a contact number available for emergencies.  The signs and   symptoms of potential delayed complications were discussed with the   patient.  Return to normal activities tomorrow.  Written discharge   instructions were provided to the patient.   - Resume anticoagulant at prior dose.  For questions, problems or results please call your physician Dian Vail MD at Work:  [phone]  If you have any questions about the above instructions, call the GI   department at [phone] or call the endoscopy unit at [phone]   from 7am until 3 pm.  [facility] [place], EMERGENCY ROOM PHONE NUMBER:   [phone]  IF A COMPLICATION OR EMERGENCY SITUATION ARISES AND YOU ARE UNABLE TO REACH   YOUR PHYSICIAN - GO DIRECTLY TO THE EMERGENCY ROOM.  I have read or have had read to me these discharge instructions for my   procedure and have received a written copy.  I understand these   instructions and will follow-up with my physician if I have any questions.     __________________________________       _____________________________________  Nurse Signature                                           Patient/Designated   Responsible Party Signature  MD Dian Snow MD  4/19/2023 1:57:54 PM  [employer]

## 2023-04-19 NOTE — [employer]ATION PATIENT INSTRUCTIONS
Discharge Summary/Instructions after an Endoscopic Procedure  Patient Name: Sandra Dimas  Patient MRN: [id number]  Patient YOB: 1975 Wednesday, April 19, 2023  Dian Vail MD  Dear patient,  As a result of recent federal legislation (The Federal Cures Act), you may   receive lab or pathology results from your procedure in your [facility]   account before your physician is able to contact you. Your physician or   their representative will relay the results to you with their   recommendations at their soonest availability.  Thank you,  RESTRICTIONS:  During your procedure today, you received medications for sedation.  These   medications may affect your judgment, balance and coordination.  Therefore,   for 24 hours, you have the following restrictions:   - DO NOT drive a car, operate machinery, make legal/financial decisions,   sign important papers or drink alcohol.    ACTIVITY:  Today: no heavy lifting, straining or running due to procedural   sedation/anesthesia.  The following day: return to full activity including work.  DIET:  Eat and drink normally unless instructed otherwise.     TREATMENT FOR COMMON SIDE EFFECTS:  - Mild abdominal pain, nausea, belching, bloating or excessive gas:  rest,   eat lightly and use a heating pad.  - Sore Throat: treat with throat lozenges and/or gargle with warm salt   water.  - Because air was used during the procedure, expelling large amounts of air   from your rectum or belching is normal.  - If a bowel prep was taken, you may not have a bowel movement for 1-3 days.    This is normal.  SYMPTOMS TO WATCH FOR AND REPORT TO YOUR PHYSICIAN:  1. Abdominal pain or bloating, other than gas cramps.  2. Chest pain.  3. Back pain.  4. Signs of infection such as: chills or fever occurring within 24 hours   after the procedure.  5. Rectal bleeding, which would show as bright red, maroon, or black stools.   (A tablespoon of blood from the rectum is not serious, especially  if   hemorrhoids are present.)  6. Vomiting.  7. Weakness or dizziness.  GO DIRECTLY TO THE NEAREST EMERGENCY ROOM IF YOU HAVE ANY OF THE FOLLOWING:      Difficulty breathing              Chills and/or fever over 101 F   Persistent vomiting and/or vomiting blood   Severe abdominal pain   Severe chest pain   Black, tarry stools   Bleeding- more than one tablespoon   Any other symptom or condition that you feel may need urgent attention  Your doctor recommends these additional instructions:  If any biopsies were taken, your doctors clinic will contact you in 1 to 2   weeks with any results.  - Discharge patient to home (via wheelchair).   - Resume previous diet.   - Continue present medications.   - Await pathology results.   - Repeat colonoscopy in 10 years for screening purposes.   - Patient has a contact number available for emergencies.  The signs and   symptoms of potential delayed complications were discussed with the   patient.  Return to normal activities tomorrow.  Written discharge   instructions were provided to the patient.  For questions, problems or results please call your physician iDan Vail MD at Work:  [phone]  If you have any questions about the above instructions, call the GI   department at [phone] or call the endoscopy unit at [phone]   from 7am until 3 pm.  [facility] [place], EMERGENCY ROOM PHONE NUMBER:   [phone]  IF A COMPLICATION OR EMERGENCY SITUATION ARISES AND YOU ARE UNABLE TO REACH   YOUR PHYSICIAN - GO DIRECTLY TO THE EMERGENCY ROOM.  I have read or have had read to me these discharge instructions for my   procedure and have received a written copy.  I understand these   instructions and will follow-up with my physician if I have any questions.     __________________________________       _____________________________________  Nurse Signature                                          Patient/Designated   Responsible Party  Signature  MD Dian Snow MD  4/19/2023 1:56:06 PM  [employer]

## 2023-04-20 LAB — ELASTASE 1, FECAL: 364 MCG/G

## 2023-04-26 VITALS
WEIGHT: 123.13 LBS | TEMPERATURE: 99 F | BODY MASS INDEX: 18.66 KG/M2 | OXYGEN SATURATION: 100 % | RESPIRATION RATE: 20 BRPM | HEART RATE: 79 BPM | HEIGHT: 68 IN | DIASTOLIC BLOOD PRESSURE: 66 MMHG | SYSTOLIC BLOOD PRESSURE: 95 MMHG

## 2023-04-26 LAB
FINAL PATHOLOGIC DIAGNOSIS: NORMAL
Lab: NORMAL

## 2023-05-05 ENCOUNTER — PATIENT MESSAGE (OUTPATIENT)
Dept: NEUROLOGY | Facility: CLINIC | Age: 48
End: 2023-05-05
Payer: COMMERCIAL

## 2023-05-11 ENCOUNTER — HOSPITAL ENCOUNTER (OUTPATIENT)
Facility: OTHER | Age: 48
Discharge: HOME | End: 2023-05-11
Attending: ANESTHESIOLOGY | Admitting: ANESTHESIOLOGY
Payer: COMMERCIAL

## 2023-05-11 VITALS
SYSTOLIC BLOOD PRESSURE: 106 MMHG | OXYGEN SATURATION: 99 % | WEIGHT: 125 LBS | RESPIRATION RATE: 16 BRPM | HEART RATE: 74 BPM | DIASTOLIC BLOOD PRESSURE: 54 MMHG | TEMPERATURE: 98 F | BODY MASS INDEX: 18.94 KG/M2 | HEIGHT: 68 IN

## 2023-05-11 DIAGNOSIS — M47.819 SPONDYLOSIS WITHOUT MYELOPATHY: ICD-10-CM

## 2023-05-11 DIAGNOSIS — G89.29 CHRONIC PAIN: ICD-10-CM

## 2023-05-11 DIAGNOSIS — M47.9 OSTEOARTHRITIS OF SPINE, UNSPECIFIED SPINAL OSTEOARTHRITIS COMPLICATION STATUS, UNSPECIFIED SPINAL REGION: Primary | ICD-10-CM

## 2023-05-11 PROCEDURE — 25000003 PHARM REV CODE 250: Performed by: ANESTHESIOLOGY

## 2023-05-11 PROCEDURE — 64633 DESTROY CERV/THOR FACET JNT: CPT | Mod: RT,,, | Performed by: ANESTHESIOLOGY

## 2023-05-11 PROCEDURE — 64633 PR DESTROY CERV/THOR FACET JNT: ICD-10-PCS | Mod: RT,,, | Performed by: ANESTHESIOLOGY

## 2023-05-11 PROCEDURE — 64633 DESTROY CERV/THOR FACET JNT: CPT | Mod: RT | Performed by: ANESTHESIOLOGY

## 2023-05-11 PROCEDURE — 63600175 PHARM REV CODE 636 W HCPCS: Performed by: ANESTHESIOLOGY

## 2023-05-11 RX ORDER — BUPIVACAINE HYDROCHLORIDE 2.5 MG/ML
INJECTION, SOLUTION EPIDURAL; INFILTRATION; INTRACAUDAL
Status: DISCONTINUED | OUTPATIENT
Start: 2023-05-11 | End: 2023-05-11 | Stop reason: HOSPADM

## 2023-05-11 RX ORDER — MIDAZOLAM HYDROCHLORIDE 1 MG/ML
INJECTION INTRAMUSCULAR; INTRAVENOUS
Status: DISCONTINUED | OUTPATIENT
Start: 2023-05-11 | End: 2023-05-11 | Stop reason: HOSPADM

## 2023-05-11 RX ORDER — LIDOCAINE HYDROCHLORIDE 20 MG/ML
INJECTION, SOLUTION INFILTRATION; PERINEURAL
Status: DISCONTINUED | OUTPATIENT
Start: 2023-05-11 | End: 2023-05-11 | Stop reason: HOSPADM

## 2023-05-11 RX ORDER — FENTANYL CITRATE 50 UG/ML
INJECTION, SOLUTION INTRAMUSCULAR; INTRAVENOUS
Status: DISCONTINUED | OUTPATIENT
Start: 2023-05-11 | End: 2023-05-11 | Stop reason: HOSPADM

## 2023-05-11 RX ORDER — DEXAMETHASONE SODIUM PHOSPHATE 10 MG/ML
INJECTION INTRAMUSCULAR; INTRAVENOUS
Status: DISCONTINUED | OUTPATIENT
Start: 2023-05-11 | End: 2023-05-11 | Stop reason: HOSPADM

## 2023-05-11 NOTE — DISCHARGE INSTRUCTIONS

## 2023-05-11 NOTE — DISCHARGE SUMMARY
Discharge Note  Short Stay      SUMMARY     Admit Date: 5/11/2023    Attending Physician: Neftaly Marrero      Discharge Physician: Neftaly Marrero      Discharge Date: 5/11/2023 3:35 PM    Procedure(s) (LRB):  RADIOFREQUENCY ABLATION RIGHT C2,C3, TON (Right)    Final Diagnosis: Cervical spondylosis [M47.812]    Disposition: Home or self care    Patient Instructions:   Current Discharge Medication List        CONTINUE these medications which have NOT CHANGED    Details   atenoloL (TENORMIN) 25 MG tablet Take 1 tablet (25 mg total) by mouth once daily.  Qty: 90 tablet, Refills: 3    Comments: .  Associated Diagnoses: Palpitation      B2/magnesium cit,oxid/feverfew (MIGRELIEF ORAL) Take by mouth once daily.       baclofen (LIORESAL) 10 MG tablet Take 1 tablet (10 mg total) by mouth 3 (three) times daily as needed (neck muscle spasm).  Qty: 90 tablet, Refills: 3    Associated Diagnoses: Bilateral occipital neuralgia; Cervicogenic headache      butorphanol (STADOL) 10 mg/mL nasal spray as needed.    Comments: Quantity prescribed more than 7 day supply? Press F2 and select one:[id number]        DULoxetine (CYMBALTA) 30 MG capsule Take 1 capsule (30 mg total) by mouth once daily.  Qty: 90 capsule, Refills: 1    Associated Diagnoses: Bilateral occipital neuralgia      fluocinonide (LIDEX) 0.05 % ointment Apply topically to the affected area twice daily as directed  Qty: 60 g, Refills: 0      gabapentin (NEURONTIN) 300 MG capsule Take 1 capsule (300 mg total) by mouth 3 (three) times daily.  Qty: 270 capsule, Refills: 1    Associated Diagnoses: Bilateral occipital neuralgia      hydrocortisone 2.5 % cream Apply topically to the affected area twice daily as directed  Qty: 30 g, Refills: 0      linaCLOtide (LINZESS) 72 mcg Cap capsule Take 1 capsule (72 mcg total) by mouth before breakfast.  Qty: 30 capsule, Refills: 5      ondansetron (ZOFRAN-ODT) 8 MG TbDL Take 1 tablet (8 mg total) by mouth every 8 (eight) hours as needed  (nausea/vomiting).  Qty: 30 tablet, Refills: 2      pantoprazole (PROTONIX) 40 MG tablet Take 1 tablet (40 mg total) by mouth once daily.  Qty: 90 tablet, Refills: 3    Associated Diagnoses: Gastroesophageal reflux disease without esophagitis      promethazine (PHENERGAN) 25 MG tablet Take 0.5 tablets (12.5 mg total) by mouth every 6 (six) hours as needed for Nausea.  Qty: 30 tablet, Refills: 0      semaglutide (OZEMPIC) 1 mg/dose (4 mg/3 mL) Inject 1 mg into the skin every 7 days.  Qty: 1 pen, Refills: 11      sod sulf-pot chloride-mag sulf (SUTAB) 1.479-0.188- 0.225 gram tablet Take 12 tablets by mouth once daily. Take according to package instructions with indicated amount of water.  Qty: 24 tablet, Refills: 0    Associated Diagnoses: Abdominal pain, unspecified abdominal location; Weight loss      topiramate (TROKENDI XR) 200 mg Cp24 Take 1 capsule by mouth once daily.  Qty: 31 capsule, Refills: 11    Associated Diagnoses: Intractable migraine without status migrainosus, unspecified migraine type      valACYclovir (VALTREX) 1000 MG tablet Take 1 tablet by mouth once a day  Qty: 90 tablet, Refills: 6      vitamin D (VITAMIN D3) 1000 units Tab Take 1,000 Units by mouth once daily.                 Discharge Diagnosis: Cervical spondylosis [M47.812]  Condition on Discharge: Stable with no complications to procedure   Diet on Discharge: Same as before.  Activity: as per instruction sheet.  Discharge to: Home with a responsible adult.  Follow up: 2-4 weeks

## 2023-05-11 NOTE — H&P
HPI  Sandra Dimas presents for Procedure(s):  RADIOFREQUENCY ABLATION RIGHT C2,C3, TON    Recent anticoagulation/antiplatelets reviewed and verified with nursing.  Recent antibiotics/recent infections reviewed and verified with nursing.    Past Medical History:   Diagnosis Date    Chronic paroxysmal hemicrania 01/03/2019    Endometriosis     General anesthetics causing adverse effect in therapeutic use     wakes up with severe anxiety attacks    Hemicrania continua     Hepatitis C antibody positive in blood 12/09/2020    RNA NEGATIVE 12/14/2020    Migraines      Past Surgical History:   Procedure Laterality Date    APPENDECTOMY      COLONOSCOPY N/A 11/12/2020    Procedure: COLONOSCOPY;  Surgeon: Mylene Russ MD;  Location: [facility];  Service: Endoscopy;  Laterality: N/A;    COLONOSCOPY N/A 4/19/2023    Procedure: COLONOSCOPY;  Surgeon: Dian Vail MD;  Location: [facility];  Service: Endoscopy;  Laterality: N/A;    CYST REMOVAL Right 12/30/2019    Procedure: EXCISION, CYST;  Surgeon: NATALI Shah MD;  Location: [facility];  Service: OB/GYN;  Laterality: Right;  Sebaceous cyst    diagnostic laprascopy      ESOPHAGOGASTRODUODENOSCOPY N/A 11/30/2018    Procedure: EGD (ESOPHAGOGASTRODUODENOSCOPY);  Surgeon: Bossman Bustos MD;  Location: [facility];  Service: Endoscopy;  Laterality: N/A;    ESOPHAGOGASTRODUODENOSCOPY N/A 11/12/2020    Procedure: ESOPHAGOGASTRODUODENOSCOPY (EGD) needs rapid covid test;  Surgeon: Mylene Russ MD;  Location: [facility];  Service: Endoscopy;  Laterality: N/A;    ESOPHAGOGASTRODUODENOSCOPY N/A 4/19/2023    Procedure: EGD (ESOPHAGOGASTRODUODENOSCOPY);  Surgeon: Dian Vail MD;  Location: [facility];  Service: Endoscopy;  Laterality: N/A;    FULGURATION OF ENDOMETRIOSIS  12/26/2018    Procedure: FULGURATION, ENDOMETRIOSIS;  Surgeon: Zehra Jensen MD;  Location: [facility];  Service: OB/GYN;;  endometriosis implant resection    HYSTERECTOMY      HYSTEROSCOPY WITH  HYDROTHERMAL ABLATION OF ENDOMETRIUM WITH DILATION AND CURETTAGE N/A 12/26/2018    Procedure: HYSTEROSCOPY, WITH DILATION AND CURETTAGE OF UTERUS AND HYDROTHERMAL ENDOMETRIAL ABLATION;  Surgeon: Zehra Jensen MD;  Location: [facility];  Service: OB/GYN;  Laterality: N/A;    INJECTION OF ANESTHETIC AGENT AROUND NERVE Right 03/17/2023    Procedure: BLOCK, NERVE, RIGHT C2,C3 AND THIRD OCCIPITAL NERVE DR KELLY ONLY 1 OF 2;  Surgeon: Neftaly Kelly MD;  Location: [facility] [place], operated by [facility];  Service: Pain Management;  Laterality: Right;    INJECTION OF ANESTHETIC AGENT AROUND NERVE Right 04/06/2023    Procedure: BLOCK, NERVE, RIGHT C2,C3 AND THIRD OCCIPITAL MEDIAL BRANCH DR KELLY ONLY 2 OF 2 (URGENT) *VIP*;  Surgeon: Neftaly Kelly MD;  Location: [facility] [place], operated [facility];  Service: Pain Management;  Laterality: Right;    LAPAROSCOPIC SALPINGECTOMY Bilateral 12/26/2018    Procedure: SALPINGECTOMY, LAPAROSCOPIC;  Surgeon: Zehra Jensen MD;  Location: [facility];  Service: OB/GYN;  Laterality: Bilateral;    LAPAROTOMY, EXPLORATORY N/A 02/07/2019    Procedure: LAPAROTOMY, EXPLORATORY;  Surgeon: Ramakrishna Boone MD;  Location: [facility];  Service: General;  Laterality: N/A;    OOPHORECTOMY      REPAIR OF VAGINAL CUFF N/A 07/09/2020    Procedure: REPAIR, VAGINAL CUFF;  Surgeon: Sailaja Addison MD;  Location: [facility];  Service: OB/GYN;  Laterality: N/A;    ROBOT-ASSISTED LAPAROSCOPIC ABDOMINAL HYSTERECTOMY USING DA MAVIS XI N/A 12/30/2019    Procedure: XI ROBOTIC HYSTERECTOMY;  Surgeon: NATALI Shah MD;  Location: [facility];  Service: OB/GYN;  Laterality: N/A;    ROBOT-ASSISTED LAPAROSCOPIC SURGICAL REMOVAL OF OVARY USING DA MAVIS XI Bilateral 12/30/2019    Procedure: XI ROBOTIC OOPHORECTOMY;  Surgeon: NATALI Shah MD;  Location: [facility];  Service: OB/GYN;  Laterality: Bilateral;    robotic assisted laparoscopy with excision of ovarian endometrioma and chromotubation       Review of patient's allergies indicates:   Allergen  Reactions    Chlorhexidine Rash     Per  after surgery patient had large rash across abdomen where chlorhexidine was applied.    Hydrocodone Other (See Comments)     Chest pains. Note: patient tolerated hydromorphone in 2/2019.     Mastisol adhesive [gum jhtybj-zugdbq-yahl-alcohol] Rash        PMHx, PSHx, Allergies, Medications reviewed in epic      ROS negative except pain complaints in HPI    OBJECTIVE:    LMP  (LMP Unknown)     PHYSICAL EXAMINATION:    GENERAL: Well appearing, in no acute distress, alert and oriented to name, situation, location.  PSYCH:  Mood and affect appropriate.  SKIN: Skin color, texture, turgor normal, no rashes or lesions at site of procedure.  CV: regular rate with palpation of the radial artery.  PULM: No evidence of respiratory difficulty, symmetric chest rise. No audible abnormal respiratory sounds.  NEURO: Cranial nerves grossly intact.    Plan:    Proceed with procedure as planned    Rohan Mark  05/11/2023

## 2023-05-11 NOTE — OP NOTE
Therapeutic Cervical Medial Branch Radiofrequency Ablation Under Fluoroscopy     The procedure, risks, benefits, and options were discussed with the patient. There are no contraindications to the procedure. The patent expressed understanding and agreed to the procedure. Informed written consent was obtained prior to the start of the procedure and can be found in the patient's chart.        PATIENT NAME: Sandra Dimas   MRN: [id number]     DATE OF PROCEDURE: 05/11/2023       PROCEDURE: Therapeutic Right TON, C2, and C3 Cervical Radiofrequency Ablation under Fluoroscopy    PRE-OP DIAGNOSIS: Cervical spondylosis [M47.812] Cervical Spondylosis [M47.812]    POST-OP DIAGNOSIS: Same    PHYSICIAN: Neftaly Marrero MD    ASSISTANTS: Elly Carlos MD Fellow     MEDICATIONS INJECTED:  Preservative-free Decadron 10mg with 9cc of Bupivicaine 0.5%    LOCAL ANESTHETIC INJECTED:   Xylocaine 2%    SEDATION: Versed 2mg and Fentanyl 100mcg                                                                                                                                                                                     Conscious sedation ordered by M.D. Patient re-evaluation prior to administration of conscious sedation. No changes noted in patient's status from initial evaluation. The patient's vital signs were monitored by RN and patient remained hemodynamically stable throughout the procedure.    Event Time In   Sedation Start 1523   Sedation End 1534       ESTIMATED BLOOD LOSS:  None    COMPLICATIONS:  None.     INTERVAL HISTORY: Patient has clinical and imaging findings suggestive of facet mediated pain. Patients has completed 2 previous diagnostic medial branch blocks at specified levels with at least 80% relief for the expected duration of the local anesthetic utilized.    TECHNIQUE: Time-out was performed to identify the patient and procedure to be performed. With the patient laying in a prone position, the surgical area was prepped  and draped in the usual sterile fashion using ChloraPrep and fenestrated drape. The levels were determined under fluoroscopic guidance. Skin anesthesia was achieved by injecting Lidocaine 2% over the injection sites. A 20 gauge 10mm curved active tip needle was introduced to the anatomic local of the medial branch at each of the above levels using AP, lateral and/or contralateral oblique fluoroscopic imaging. Then the sensory and motor testing was performed to confirm that the needle tips were in the correct location. After negative aspiration for blood or CSF was confirmed, 1 mL of the lidocaine 2% listed above was injected slowly at each site. This was followed by thermal lesioning at 80 degrees celsius for 90 seconds. That was followed by slowly injecting 1 mL of the medication mixture listed above at each site. The needles were removed and bleeding was nil. A sterile dressing was applied. No specimens collected. The patient tolerated the procedure well and did not have any procedure related motor deficit at the conclusion of the procedure.    The patient was monitored after the procedure in the recovery area. They were given post-procedure and discharge instructions to follow at home. The patient was discharged in a stable condition.       Neftaly Marrero MD

## 2023-05-17 ENCOUNTER — TELEPHONE (OUTPATIENT)
Dept: NEUROLOGY | Facility: CLINIC | Age: 48
End: 2023-05-17
Payer: COMMERCIAL

## 2023-05-17 DIAGNOSIS — Z81.8 FAMILY HISTORY OF PRESENILE DEMENTIA: ICD-10-CM

## 2023-05-17 DIAGNOSIS — R41.89 COGNITIVE CHANGE: Primary | ICD-10-CM

## 2023-05-17 NOTE — TELEPHONE ENCOUNTER
Lab scheduled 05/18/2023 at [facility]    PAST MEDICAL HISTORY:  Anemia of chronic disease     Chronic systolic congestive heart failure     Hypertension     Hypothyroid     Lower extremity edema     NSTEMI (non-ST elevation myocardial infarction) june 2022    Paroxysmal atrial fibrillation     Stage 3 chronic kidney disease

## 2023-05-17 NOTE — TELEPHONE ENCOUNTER
----- Message from Thomas Graff MD sent at 5/17/2023 12:59 PM CDT -----      Ordered blood test for dementia genetics and other tests.    Sent a referral to Dr. Almeida.

## 2023-05-18 ENCOUNTER — LAB VISIT (OUTPATIENT)
Dept: LAB | Facility: HOSPITAL | Age: 48
End: 2023-05-18
Attending: PSYCHIATRY & NEUROLOGY
Payer: COMMERCIAL

## 2023-05-18 DIAGNOSIS — Z81.8 FAMILY HISTORY OF PRESENILE DEMENTIA: ICD-10-CM

## 2023-05-18 DIAGNOSIS — R41.89 COGNITIVE CHANGE: ICD-10-CM

## 2023-05-18 PROCEDURE — 30000890 MISCELLANEOUS SENDOUT TEST, BLOOD: Performed by: PSYCHIATRY & NEUROLOGY

## 2023-05-18 PROCEDURE — 83520 IMMUNOASSAY QUANT NOS NONAB: CPT | Performed by: PSYCHIATRY & NEUROLOGY

## 2023-05-18 PROCEDURE — 83519 RIA NONANTIBODY: CPT | Performed by: PSYCHIATRY & NEUROLOGY

## 2023-05-18 PROCEDURE — 36415 COLL VENOUS BLD VENIPUNCTURE: CPT | Performed by: PSYCHIATRY & NEUROLOGY

## 2023-05-24 RX ORDER — TRAZODONE HYDROCHLORIDE 50 MG/1
50 TABLET ORAL NIGHTLY
Qty: 30 TABLET | Refills: 11 | Status: SHIPPED | OUTPATIENT
Start: 2023-05-24 | End: 2023-05-30

## 2023-05-30 ENCOUNTER — TELEPHONE (OUTPATIENT)
Dept: NEUROLOGY | Facility: CLINIC | Age: 48
End: 2023-05-30
Payer: COMMERCIAL

## 2023-05-30 ENCOUNTER — TELEPHONE (OUTPATIENT)
Dept: INTERNAL MEDICINE | Facility: CLINIC | Age: 48
End: 2023-05-30
Payer: COMMERCIAL

## 2023-05-30 RX ORDER — TEMAZEPAM 15 MG/1
15 CAPSULE ORAL NIGHTLY PRN
Qty: 30 CAPSULE | Refills: 0 | Status: SHIPPED | OUTPATIENT
Start: 2023-05-30 | End: 2023-06-07

## 2023-05-30 NOTE — TELEPHONE ENCOUNTER
Attempted to contact patient in regards to scheduling appointment. I left a VM to contact our office to further discuss appointment.

## 2023-05-30 NOTE — TELEPHONE ENCOUNTER
Spoke with patient's . They are trying to taper off Topamax due to side effects. Insomnia problematic now. Trazodone was sent in, not effective. Will try up to 30 days temazepam and if not effective, one of the newer agents.

## 2023-05-30 NOTE — TELEPHONE ENCOUNTER
----- Message from Moira Reed NP sent at 5/26/2023  4:50 PM CDT -----  Regarding: FW: genetic testing  We need to get her on the schedule   ----- Message -----  From: Thomas Graff MD  Sent: 5/26/2023   3:16 PM CDT  To: Moira Reed NP  Subject: FW: genetic testing                                Megan Abraham,    Could you please bring her in early next week to do a memory evaluation?    We treat her for intractable migraines and recently found out that her family has early onset dementia. She wanted genetic testing (see below).     Thank you!      ----- Message -----  From: Sandra Meyers MD  Sent: 5/26/2023   2:51 PM CDT  To: Thomas Graff MD  Subject: genetic testing                                  Dear Dr. Graff-     Lab has received your request for dementia genetic panel, lumipulse and APOE.  Because of the high potential out of pocket cost I will need to send these to a genetic counseling service for review. Can you please complete a clinical note with clinical impression and how results will ?     Specimen is stable for a few days so you can do by early next week.    Lumipulse is not yet available in [place] so that one will not be sent.

## 2023-06-01 ENCOUNTER — TELEPHONE (OUTPATIENT)
Dept: NEUROLOGY | Facility: CLINIC | Age: 48
End: 2023-06-01
Payer: COMMERCIAL

## 2023-06-01 NOTE — TELEPHONE ENCOUNTER
Contact pt to advise her that the genetic test with her insurance is $4000. Send out lab  ran it has self pay and its 250 . Advised her that a company called [employer] will contact her  for payment . She verbalized understanding .

## 2023-06-02 LAB
MISCELLANEOUS TEST NAME: NORMAL
REFERENCE LAB: NORMAL
SPECIMEN TYPE: NORMAL
TEST RESULT: NORMAL

## 2023-06-07 ENCOUNTER — PATIENT MESSAGE (OUTPATIENT)
Dept: INTERNAL MEDICINE | Facility: CLINIC | Age: 48
End: 2023-06-07
Payer: COMMERCIAL

## 2023-06-07 RX ORDER — ESZOPICLONE 1 MG/1
1 TABLET, FILM COATED ORAL NIGHTLY
Qty: 14 TABLET | Refills: 0 | Status: SHIPPED | OUTPATIENT
Start: 2023-06-07 | End: 2023-06-21

## 2023-06-08 LAB — AMYLOID BETA-PROTEIN: 37 PG/ML (ref 20–80)

## 2023-06-09 ENCOUNTER — PATIENT MESSAGE (OUTPATIENT)
Dept: INTERNAL MEDICINE | Facility: CLINIC | Age: 48
End: 2023-06-09
Payer: COMMERCIAL

## 2023-06-12 RX ORDER — ALPRAZOLAM 0.5 MG/1
0.5 TABLET ORAL NIGHTLY PRN
Qty: 14 TABLET | Refills: 0 | Status: SHIPPED | OUTPATIENT
Start: 2023-06-12 | End: 2024-03-21

## 2023-06-19 ENCOUNTER — TELEPHONE (OUTPATIENT)
Dept: NEUROLOGY | Facility: CLINIC | Age: 48
End: 2023-06-19
Payer: COMMERCIAL

## 2023-06-19 NOTE — TELEPHONE ENCOUNTER
----- Message from Thomas Graff MD sent at 6/19/2023 10:05 AM CDT -----    05-    Dementia genetic tests are normal

## 2023-06-23 ENCOUNTER — TELEPHONE (OUTPATIENT)
Dept: PHARMACY | Facility: CLINIC | Age: 48
End: 2023-06-23
Payer: COMMERCIAL

## 2023-07-18 LAB
GENETIC COUNSELING?: NO
GENSO SPECIMEN TYPE: NORMAL
MISCELLANEOUS GENETIC TEST NAME: NORMAL
PARTENTAL OR SIBLING TESTING?: NO
REFERENCE LAB: NORMAL
TEST RESULT: NORMAL

## 2023-07-25 ENCOUNTER — TELEPHONE (OUTPATIENT)
Dept: DERMATOLOGY | Facility: CLINIC | Age: 48
End: 2023-07-25
Payer: COMMERCIAL

## 2023-07-25 NOTE — TELEPHONE ENCOUNTER
Called and spoke to patient. Pt scheduled to see Dr. Gutierrez on 7/27 at 1pm at the [facility] location. Directions given. Verbalized understanding   ----- Message from Kirstie Gutierrez MD sent at 7/25/2023  3:33 PM CDT -----  Regarding: overbook appt  Please overbook patient an in-person appt in the 1 pm slot on Thursday 7/27 for a rash. Thank you

## 2023-07-27 ENCOUNTER — OFFICE VISIT (OUTPATIENT)
Dept: DERMATOLOGY | Facility: CLINIC | Age: 48
End: 2023-07-27
Payer: COMMERCIAL

## 2023-07-27 DIAGNOSIS — B36.0 TINEA VERSICOLOR: Primary | ICD-10-CM

## 2023-07-27 PROCEDURE — 1159F MED LIST DOCD IN RCRD: CPT | Mod: CPTII,S$GLB,, | Performed by: DERMATOLOGY

## 2023-07-27 PROCEDURE — 1160F RVW MEDS BY RX/DR IN RCRD: CPT | Mod: CPTII,S$GLB,, | Performed by: DERMATOLOGY

## 2023-07-27 PROCEDURE — 1159F PR MEDICATION LIST DOCUMENTED IN MEDICAL RECORD: ICD-10-PCS | Mod: CPTII,S$GLB,, | Performed by: DERMATOLOGY

## 2023-07-27 PROCEDURE — 1160F PR REVIEW ALL MEDS BY PRESCRIBER/CLIN PHARMACIST DOCUMENTED: ICD-10-PCS | Mod: CPTII,S$GLB,, | Performed by: DERMATOLOGY

## 2023-07-27 PROCEDURE — 99999 PR PBB SHADOW E&M-EST. PATIENT-LVL III: ICD-10-PCS | Mod: PBBFAC,,, | Performed by: DERMATOLOGY

## 2023-07-27 PROCEDURE — 99203 OFFICE O/P NEW LOW 30 MIN: CPT | Mod: S$GLB,,, | Performed by: DERMATOLOGY

## 2023-07-27 PROCEDURE — 99999 PR PBB SHADOW E&M-EST. PATIENT-LVL III: CPT | Mod: PBBFAC,,, | Performed by: DERMATOLOGY

## 2023-07-27 PROCEDURE — 99203 PR OFFICE/OUTPT VISIT, NEW, LEVL III, 30-44 MIN: ICD-10-PCS | Mod: S$GLB,,, | Performed by: DERMATOLOGY

## 2023-07-27 RX ORDER — FLUCONAZOLE 150 MG/1
150 TABLET ORAL
Qty: 4 TABLET | Refills: 0 | Status: SHIPPED | OUTPATIENT
Start: 2023-07-27 | End: 2023-08-26

## 2023-07-27 RX ORDER — KETOCONAZOLE 20 MG/ML
SHAMPOO, SUSPENSION TOPICAL
Qty: 120 ML | Refills: 5 | Status: SHIPPED | OUTPATIENT
Start: 2023-07-27

## 2023-07-27 RX ORDER — KETOCONAZOLE 20 MG/G
CREAM TOPICAL
Qty: 60 G | Refills: 5 | Status: SHIPPED | OUTPATIENT
Start: 2023-07-27

## 2023-07-27 NOTE — PROGRESS NOTES
Subjective:      Patient ID:  Sandra Dimas is a 48 y.o. female who presents for   Chief Complaint   Patient presents with    Spot     White spots on face and down neck     History of Present Illness: The patient presents with chief complaint of light spots.  Location: lateral face and lateral neck  Duration: a few weeks  Signs/Symptoms: noticed after returning from [place]; daughter thinks it was there before that trip; no itching/burning/pain    Prior treatments:  OTC selsun shampoo - no change      Review of Systems   Skin:  Positive for rash. Negative for itching and dry skin.     Objective:   Physical Exam   Constitutional: She appears well-developed and well-nourished. No distress.   Neurological: She is alert and oriented to person, place, and time. She is not disoriented.   Psychiatric: She has a normal mood and affect.   Skin:   Areas Examined (abnormalities noted in diagram):   Head / Face Inspection Performed  Neck Inspection Performed  Chest / Axilla Inspection Performed  Back Inspection Performed          Diagram Legend     Erythematous scaling macule/papule c/w actinic keratosis       Vascular papule c/w angioma      Pigmented verrucoid papule/plaque c/w seborrheic keratosis      Yellow umbilicated papule c/w sebaceous hyperplasia      Irregularly shaped tan macule c/w lentigo     1-2 mm smooth white papules consistent with Milia      Movable subcutaneous cyst with punctum c/w epidermal inclusion cyst      Subcutaneous movable cyst c/w pilar cyst      Firm pink to brown papule c/w dermatofibroma      Pedunculated fleshy papule(s) c/w skin tag(s)      Evenly pigmented macule c/w junctional nevus     Mildly variegated pigmented, slightly irregular-bordered macule c/w mildly atypical nevus      Flesh colored to evenly pigmented papule c/w intradermal nevus       Pink pearly papule/plaque c/w basal cell carcinoma      Erythematous hyperkeratotic cursted plaque c/w SCC      Surgical scar with no sign  "of skin cancer recurrence      Open and closed comedones      Inflammatory papules and pustules      Verrucoid papule consistent consistent with wart     Erythematous eczematous patches and plaques     Dystrophic onycholytic nail with subungual debris c/w onychomycosis     Umbilicated papule    Erythematous-base heme-crusted tan verrucoid plaque consistent with inflamed seborrheic keratosis     Erythematous Silvery Scaling Plaque c/w Psoriasis     See annotation      Assessment / Plan:        Tinea versicolor  - scraping for KOH exam + for classic "spaghetti and meatball" appearance  -discussed etiology and nature of condition, management options. She would like topical and oral treatment  - discussed that the discoloration may remain for a while after treatment and that aggressive sun protection can help, and that condition may recur (recommend continuing keto shampoo as maintenance to try to help prevent recurrence)  -     fluconazole (DIFLUCAN) 150 MG Tab; Take 1 tablet (150 mg total) by mouth every 7 days.  Dispense: 4 tablet; Refill: 0. Discussed r/b/ae including rare but potential risk for liver injury. Discussed medication interaction with Xanax (increased Ses of Xanax including somnolence, resp depression) and recommended not taking xanax on day of fluconazole administration.  -     ketoconazole (NIZORAL) 2 % shampoo; Wash neck/face with medicated shampoo at least 2 to 3 times per week - let sit at least 5 minutes prior to rinsing  Dispense: 120 mL; Refill: 5  -     ketoconazole (NIZORAL) 2 % cream; Apply to the affected area 2 times daily  Dispense: 60 g; Refill: 5             Follow up if symptoms worsen or fail to improve.        LOS NUMBER AND COMPLEXITY OF PROBLEMS    COMPLEXITY OF DATA RISK TOTAL TIME (m)   15066  73234 [] 1 self-limited or minor problem [x] Minimal to none [] No treatment recommended or patient to monitor. Reassurance.  15-29  10-19   74034  76865 Low  [] 2 or more self limited or " minor problems  [] 1 stable chronic illness  [x] 1 acute, uncomplicated illness or injury Limited (2)  [] Prior external notes from each unique source  [] Review result of each unique test  [] Order each unique test  OR [] Independent historian Low  []  OTC medications   []  Discussed/Decision for minor skin surgery (no risk factors) 30-44  20-29   22773  93028 Moderate  []  1 or more chronic unstable illness (not at goal or progression or exacerbation) or SE of treatment  []  2 or more stable chronic illnesses  []  1 acute illness with systemic symptoms  []  1 acute complicated injury  []  1 undiagnosed new problem with uncertain prognosis Moderate (1/3 below)  []  3 or more data items        *Now includes independent historian  []  Independent interpretation of a test  []  Discuss management/test with another provider Moderate  [x]  Prescription drug mgmt  []  Discussed/Decision for Minor surgery with risk factors  []  Mgmt limited by social determinates 45-59  30-39   13649  60055 High  []  1 or more chronic illness with severe exacerbation, progression or SE of treatment  []  1 acute or chronic illness/injury that poses a threat to life or bodily function Extensive (2/3 below)  []  3 or more data items        *Now includes independent historian.  []  Independent interpretation of a test  []  Discuss management/test with another provider High  []  Major surgery with risk discussed  []  Drug therapy requiring intensive monitoring for toxicity  []  Hospitalization  []  Decision for DNR 60-74  40-54

## 2023-08-07 DIAGNOSIS — R00.2 PALPITATION: ICD-10-CM

## 2023-08-07 DIAGNOSIS — M54.81 BILATERAL OCCIPITAL NEURALGIA: Primary | ICD-10-CM

## 2023-08-07 DIAGNOSIS — K21.9 GASTROESOPHAGEAL REFLUX DISEASE WITHOUT ESOPHAGITIS: ICD-10-CM

## 2023-08-07 RX ORDER — DULOXETIN HYDROCHLORIDE 30 MG/1
30 CAPSULE, DELAYED RELEASE ORAL DAILY
Qty: 90 CAPSULE | Refills: 3 | Status: SHIPPED | OUTPATIENT
Start: 2023-08-07

## 2023-08-07 RX ORDER — PANTOPRAZOLE SODIUM 40 MG/1
40 TABLET, DELAYED RELEASE ORAL DAILY
Qty: 90 TABLET | Refills: 3 | Status: SHIPPED | OUTPATIENT
Start: 2023-08-07 | End: 2024-08-06

## 2023-08-07 RX ORDER — ATENOLOL 25 MG/1
25 TABLET ORAL DAILY
Qty: 90 TABLET | Refills: 3 | Status: SHIPPED | OUTPATIENT
Start: 2023-08-07 | End: 2024-08-06

## 2023-08-10 ENCOUNTER — PATIENT MESSAGE (OUTPATIENT)
Dept: NEUROLOGY | Facility: CLINIC | Age: 48
End: 2023-08-10
Payer: COMMERCIAL

## 2023-08-15 ENCOUNTER — TELEPHONE (OUTPATIENT)
Dept: NEUROLOGY | Facility: CLINIC | Age: 48
End: 2023-08-15
Payer: COMMERCIAL

## 2023-08-29 ENCOUNTER — OFFICE VISIT (OUTPATIENT)
Dept: NEUROLOGY | Facility: CLINIC | Age: 48
End: 2023-08-29
Payer: COMMERCIAL

## 2023-08-29 VITALS
DIASTOLIC BLOOD PRESSURE: 58 MMHG | BODY MASS INDEX: 19.51 KG/M2 | HEIGHT: 68 IN | WEIGHT: 128.75 LBS | SYSTOLIC BLOOD PRESSURE: 88 MMHG | HEART RATE: 66 BPM

## 2023-08-29 DIAGNOSIS — Z82.0 FAMILY HISTORY OF ALZHEIMER'S DISEASE: ICD-10-CM

## 2023-08-29 DIAGNOSIS — Z81.8 FAMILY HISTORY OF PRESENILE DEMENTIA: ICD-10-CM

## 2023-08-29 DIAGNOSIS — R41.9 COGNITIVE COMPLAINTS: ICD-10-CM

## 2023-08-29 DIAGNOSIS — M54.2 CERVICALGIA: ICD-10-CM

## 2023-08-29 DIAGNOSIS — M79.18 CERVICAL MYOFASCIAL PAIN SYNDROME: ICD-10-CM

## 2023-08-29 DIAGNOSIS — Z81.8 FAMILY HISTORY OF DEMENTIA: ICD-10-CM

## 2023-08-29 DIAGNOSIS — G44.86 CERVICOGENIC HEADACHE: ICD-10-CM

## 2023-08-29 DIAGNOSIS — F33.0 MILD EPISODE OF RECURRENT MAJOR DEPRESSIVE DISORDER: Primary | ICD-10-CM

## 2023-08-29 DIAGNOSIS — Z65.8 PSYCHOSOCIAL STRESSORS: ICD-10-CM

## 2023-08-29 DIAGNOSIS — R41.9 COGNITIVE COMPLAINTS WITH NORMAL EXAM: Primary | ICD-10-CM

## 2023-08-29 DIAGNOSIS — M54.81 OCCIPITAL NEURALGIA OF RIGHT SIDE: ICD-10-CM

## 2023-08-29 DIAGNOSIS — R41.89 COGNITIVE CHANGE: ICD-10-CM

## 2023-08-29 DIAGNOSIS — F41.9 ANXIETY: ICD-10-CM

## 2023-08-29 DIAGNOSIS — G43.919 INTRACTABLE MIGRAINE WITHOUT STATUS MIGRAINOSUS, UNSPECIFIED MIGRAINE TYPE: ICD-10-CM

## 2023-08-29 DIAGNOSIS — M54.81 BILATERAL OCCIPITAL NEURALGIA: ICD-10-CM

## 2023-08-29 PROCEDURE — 90791 PSYCH DIAGNOSTIC EVALUATION: CPT | Mod: S$GLB,,, | Performed by: STUDENT IN AN ORGANIZED HEALTH CARE EDUCATION/TRAINING PROGRAM

## 2023-08-29 PROCEDURE — 3008F PR BODY MASS INDEX (BMI) DOCUMENTED: ICD-10-PCS | Mod: CPTII,S$GLB,, | Performed by: NURSE PRACTITIONER

## 2023-08-29 PROCEDURE — 3078F DIAST BP <80 MM HG: CPT | Mod: CPTII,S$GLB,, | Performed by: NURSE PRACTITIONER

## 2023-08-29 PROCEDURE — 99417 PROLNG OP E/M EACH 15 MIN: CPT | Mod: S$GLB,,, | Performed by: NURSE PRACTITIONER

## 2023-08-29 PROCEDURE — 99999 PR PBB SHADOW E&M-EST. PATIENT-LVL V: CPT | Mod: PBBFAC,,, | Performed by: NURSE PRACTITIONER

## 2023-08-29 PROCEDURE — 3074F PR MOST RECENT SYSTOLIC BLOOD PRESSURE < 130 MM HG: ICD-10-PCS | Mod: CPTII,S$GLB,, | Performed by: NURSE PRACTITIONER

## 2023-08-29 PROCEDURE — 99999 PR PBB SHADOW E&M-EST. PATIENT-LVL II: CPT | Mod: PBBFAC,,, | Performed by: STUDENT IN AN ORGANIZED HEALTH CARE EDUCATION/TRAINING PROGRAM

## 2023-08-29 PROCEDURE — 99417 PR PROLONGED SVC, OUTPT, W/WO DIRECT PT CONTACT,  EA ADDTL 15 MIN: ICD-10-PCS | Mod: S$GLB,,, | Performed by: NURSE PRACTITIONER

## 2023-08-29 PROCEDURE — 3008F BODY MASS INDEX DOCD: CPT | Mod: CPTII,S$GLB,, | Performed by: NURSE PRACTITIONER

## 2023-08-29 PROCEDURE — 99499 NO LOS: ICD-10-PCS | Mod: S$GLB,,, | Performed by: STUDENT IN AN ORGANIZED HEALTH CARE EDUCATION/TRAINING PROGRAM

## 2023-08-29 PROCEDURE — 3078F PR MOST RECENT DIASTOLIC BLOOD PRESSURE < 80 MM HG: ICD-10-PCS | Mod: CPTII,S$GLB,, | Performed by: NURSE PRACTITIONER

## 2023-08-29 PROCEDURE — 99499 UNLISTED E&M SERVICE: CPT | Mod: S$GLB,,, | Performed by: STUDENT IN AN ORGANIZED HEALTH CARE EDUCATION/TRAINING PROGRAM

## 2023-08-29 PROCEDURE — 90791 PR PSYCHIATRIC DIAGNOSTIC EVALUATION: ICD-10-PCS | Mod: S$GLB,,, | Performed by: STUDENT IN AN ORGANIZED HEALTH CARE EDUCATION/TRAINING PROGRAM

## 2023-08-29 PROCEDURE — 99999 PR PBB SHADOW E&M-EST. PATIENT-LVL V: ICD-10-PCS | Mod: PBBFAC,,, | Performed by: NURSE PRACTITIONER

## 2023-08-29 PROCEDURE — 99999 PR PBB SHADOW E&M-EST. PATIENT-LVL II: ICD-10-PCS | Mod: PBBFAC,,, | Performed by: STUDENT IN AN ORGANIZED HEALTH CARE EDUCATION/TRAINING PROGRAM

## 2023-08-29 PROCEDURE — 99215 PR OFFICE/OUTPT VISIT, EST, LEVL V, 40-54 MIN: ICD-10-PCS | Mod: S$GLB,,, | Performed by: NURSE PRACTITIONER

## 2023-08-29 PROCEDURE — 3074F SYST BP LT 130 MM HG: CPT | Mod: CPTII,S$GLB,, | Performed by: NURSE PRACTITIONER

## 2023-08-29 PROCEDURE — 99215 OFFICE O/P EST HI 40 MIN: CPT | Mod: S$GLB,,, | Performed by: NURSE PRACTITIONER

## 2023-08-29 NOTE — PATIENT INSTRUCTIONS
[facility] - Find a Specialized Therapist    [facility]    [email or web address]        Psychology today    [email or web address]    Try [employer].    HERE ARE 10 WAYS TO REDUCE RISK OF DEMENTIA       1.Be physically active.    2. Avoid smoking and alcohol consumption.    3. Track your numbers. Keep your blood pressure, cholesterol, blood sugar and weight within recommended ranges.    4. Stay socially connected.    5. Make healthy food choices. Eat a well-balance and healthy diet that rich in cereals, fish, legumes, and vegetables.    6. Reduce stress.     7. Challenge your brain by trying something new, playing games, or learning a new language.    8. Take care of your hearing. Avoid being continuously exposed to loud sounds and wear a hearing aid if hearing does become a problem.    9. Lower risk of falls. Consider installing handrails on all stairs and grab bars in bathrooms.    10. Reduce your exposure to air pollution, such as exposure to exhaust in heavy traffic.       SLEEP HYGIENE     Poor sleep has a negative effect on cognition. Several strategies have been shown to improve sleep:     Caffeine intake in the afternoon and evening, as well as stuffing oneself at supper, can decrease the quality of restful sleep throughout the night.   Bedtime and wake-up times should be consistent every night and morning so the body becomes used to a single routine, even on the weekends.  Engage in daily physical activity, but not 2-3 hours before bedtime.   No technology use (television, computer, iPad) 1-2 hours before bed.   Have a wind down routine (e.g., soft lights in the house, bath before bed, reduced fluid intake, songs, reading, less noise) to promote sleep readiness.   Visit the www.sleepfoundation.org for more strategies.      COGNITIVE HYGIENE     Engage in regular exercise, which increases alertness and arousal and can improve attention and focus.  Consider  lower impact exercises, such as yoga or light walking.  Get a good nights sleep, as this can enhance alertness and cognition.  Eat healthy foods and balanced meals. It is notable that research indicates certain nutrients may aid in brain function, such as B vitamins (especially B6, B12, and folic acid), antioxidants (such as vitamins C and E, and beta carotene), and Omega-3 fatty acids. Talk with your physician or nutritionist about whats right for you.   Keep your brain active. Find activities to stay mentally active, such as reading, games (cards, checkers), puzzles (crosswords, Sudoku, jig saw), crafts (models, woodworking), gardening, or participating in activities in the community.  Stay socially engaged. Continue staying active with your family and friends.

## 2023-08-29 NOTE — PROGRESS NOTES
CONFIDENTIAL NEUROPSYCHOLOGICAL EVALUATION    NAME:  Sandra Dimas DATE OF SERVICE: 2023   MRN#:  [id number] EDUCATION: 18   AGE: 48 y.o. HANDEDNESS: Right    : 1975 RACE: White   SEX: Female REFERRAL: Moira Reed NP;  Neurology, [facility]     Referral question and neuropsychological necessity: Ms. Dimas is a 48 y.o., right-handed, woman born in [place] and raised in [place] with 18 years of formal education who was referred by her neurology team due to cognitive concerns in the context of a family history of early-onset dementia in her maternal grandmother, dementia in her mother, chronic migraine, depression, and anxiety.    Evaluation methods: I had the pleasure of seeing Sandra Dimas on 2023 in person at the [facility], Department of Neurology. Data sources for the below report include review of the available medical record and an interview with the patient. At the outset of the appointment, the undersigned explained the rationale for the evaluation along with the limits of confidentiality; and verbal informed consent for this evaluation was obtained.      Summary and Impressions     Ms. Dimas is a 48 y.o., right-handed, White, multilingual (Frisian, Latvian, and English) woman with 18 years of formal education. She was referred by her neurology team due to cognitive concerns in the context of a family history of early-onset dementia in her maternal grandmother, dementia in her mother, chronic migraine, depression, and anxiety. Cognitive screening completed by her referring neurology team on 2023 was within normal limits (MoCA = 29/30). Most-recent neurologic exam completed on 2023 was documented as normal. Most-recent brain MRI completed on 2023 was documented as reflecting no acute abnormality. Laboratory studies for genetic risk of early-onset dementia processes were interpreted as normal by her neurologist.    During interview, Ms. Dimas  reported the insidious onset and variable or slowly worsening course of cognitive concerns beginning approximately 2-3 years ago. Specifically, she characterized difficulties with attention and mental tracking, follow-through when completing planned activities, and difficulties with word-finding. Functionally, Ms. Dimas reported difficulties with tracking her medications, relying on her  more to assist her with medications haider to instances of forgetting; she discussed that she is otherwise independent and effective in daily living skills.     Emotionally, Ms. Dimas reported symptoms of anxiety, depression, and hopelessness regarding intractable migraine symptoms. She discussed that these symptoms along with migraine are affecting her sleep considerably. She discussed that she achieves fragmented sleep throughout the day and is often awake at night.     Ms. Dimas has a history of insomnia, anxiety, depression, and migraines which together are the most-likely cause of her day-to-day cognitive concerns particularly in light of her genetic test results. Given her reported cognitive difficulty there is value in ruling out a neurocognitive process and establishing a cognitive baseline for continued monitoring if clinically indicated in the future. She does not have psychological or medical concerns that would preclude gathering neuropsychological test data at this time. As such, formal neuropsychological testing is clinically indicated in order to aid in differential diagnosis and treatment planning.    We discussed the pros and cons of testing with an  vs. Testing in English particularly regarding tests of language. Given low suspicion for a current neurodegenerative process but the value of baseline assessment and monitoring in her case, collecting a valid dataset in English was determined to be the best course of action for her care.     ICD-10-CM Diagnoses     1. Mild episode of recurrent major  depressive disorder        2. Anxiety        3. Intractable migraine without status migrainosus, unspecified migraine type        4. Cognitive complaints        5. Family history of dementia          Plan/ Recommendations     Provider Recommendations:   On the basis of the above summary, neuropsychological testing is clinically indicated at this time. Ms. Dimas will be scheduled for comprehensive neuropsychological testing. A detailed report including detailed diagnostic information and recommendations will be completed after testing has been completed.    Patient Recommendations:   The next step in your care is to complete neuropsychological testing. Our office staff will reach out to you to schedule an appointment for the testing portion of your evaluation. Please review your after visit summary for more information about your testing appointment.     Presenting concerns      Presenting Problem/Primary Concern: Concern for dementia in the context of her mother's recent dementia diagnosis. She discussed concern that her grandmother had early onset dementia. Her father  three years ago due to a hear attack. She discussed that her mother began losing her words and having dementia symptom in her mid to late 70's.     Onset of Cognitive Concerns: Insidious, beginning approximately 2-3 years ago . She discussed that she has been taking Trokendi for three years, and thought that her difficulties with word-finding and perhaps memory may be in part a medication side-effect. Cognitive concerns have reportedly varied some from day-to-day and worsened over time overall.     Characterization of Cognitive Concerns  Attention/ working memory: Ms. Dimas reported difficulties with focus and mental tracking, forgetting to follow through on her planned actions.    Processing speed: Ms. Dimas denied slowing of processing speed.    Language: Ms. Dimas reported difficulties with expressive language, specifically word-finding  difficulty. She reported hearing loss vs. Difficulty understanding what others tell her.     Visual-spatial/ navigation: Ms. Dimas denied difficulties with visual-spatial skills or navigation.    Psychomotor: Ms. Dimas denied psychomotor difficulties.    Memory: Ms. Dimas discussed frequent forgetting, of recent information others tell her, or forgetting what it is she is supposed to do. She reported rare instances of becoming very briefly turned around when driving, with quick re-orientation and no instances of becoming lost.     Decision making: Ms. Dimas denied difficulties with decision-making or reasoning skills.    Behavioral changes: Ms. Dimas reported perceived personality changes that others have not discussed with her, saying that she is not fun anymore.    Orientation: Ms. Dimas denied errors in orientation to person, place, time, or situation.      Current Mental Health  Relevant current mood concerns: Ms. Dimas discussed psychosocial stressors, and in particular has children at two separate local schools and obligations related to their educational development and community involvement in those milieus are a source of stress. She discussed that she does not want to get out and interact as much with others as she used to. She discussed it is important to do her best for others, to please, and to present well for others, and that her level of perceived judgment in social settings has increased and her level of interest and motivation in presenting herself socially has waned as a result in part related to the insular nature of the well-established peer groups of parents in these settings not having grown up in [place] herself. Ms. Dimas discussed feelings of sadness, depression, and mild hopelessness related to the above, as well as hopelessness with regard to her medical care and the difficulty she has had in treating headache. She also discussed that chronic headache symptoms are a significant  "contributing factor to low motivation.     Hallucinations and delusions: Ms. Dimas denied experiencing hallucinations and there is no concern for delusional thinking.    Physical Status  Pain: Ms. Dimas discussed longstanding headache symptoms since age 18. She discussed daily headaches. She reported a headache rated as a "4"/ 6 for which she takes Advil q4h every day during wakefulness since her return from her most recent trip to [place].   Sleep: She discussed that sleep is very broken and she sleeps in naps throughout the day. She discussed that anxiety affects her ability to fall asleep along with headaches. She reported having tried a medication for sleep that caused dry mouth.   Energy: Ms. Dimas denied fatigue.   Exercise/ therapy: Ms. Dimas reported that exercise is reduced due to headache.  Balance/ gait: Ms. Dimas denied a history of gait disturbances.   Falls: Ms. Dimas denied a history of falls.   Sensory changes: Ms. Dimas reported that her sense of smell is a trigger for headache. She discussed hearing difficulties also.     Functional Abilities/Changes: Ms. Dimas reported that she is independent and effective in all basic and instrumental activities of daily living.   Medical appointments/adherence: Denied missed appointments or other errors following medical advice. She did report occasionally forgetting about appointments.   Medication management:  Ms. Dimas has begun to rely on her  to help her manage medications due to forgetting or forgetting she has taken medications .  Diet/nutrition: Denied difficulties with diet or dietary management.  Household duties: Reported difficulty with household tasks due to migraine.  Bill paying: Her  has always managed. .  Self-care: Reported that they are independent and effective with self-care activities.    Prior Neuropsychological Assessment: Ms. Dimas reported that she has not had prior neuropsychological testing.       Medical History "     Relevant past medical history:  Past Medical History:   Diagnosis Date    Chronic paroxysmal hemicrania 01/03/2019    Endometriosis     General anesthetics causing adverse effect in therapeutic use     wakes up with severe anxiety attacks    Hemicrania continua     Hepatitis C antibody positive in blood 12/09/2020    RNA NEGATIVE 12/14/2020    Migraines        Relevant early developmental history: Ms. Dimas denied any personal history of early life concerns that affected her cognitive functioning or development.    Additional neurological: Ms. Dimas denied a history of known neurologic concerns, except as documented above.    Relevant neuroimaging/ diagnostic studies:  Results for orders placed or performed during the hospital encounter of 01/22/23   MRI Brain Without Contrast    Narrative    EXAMINATION:  MRI BRAIN WITHOUT CONTRAST    CLINICAL HISTORY:  Headache, chronic, new features or increased frequency;    COMPARISON:  MRI 10/11/2021.    FINDINGS:  No intracranial hemorrhage is identified.  No evidence of acute ischemia.  No mass lesion, mass effect or edema.  The ventricles cisterns and sulci appear normal.  No significant osseous abnormality is identified.  The paranasal sinuses appear clear.      Impression    No acute findings.      Electronically signed by: Terrance Wilson  Date:    01/22/2023  Time:    09:26   Results for orders placed or performed during the hospital encounter of 10/11/21   MRA Brain without contrast    Narrative    EXAMINATION:  MRA BRAIN WITHOUT CONTRAST    CLINICAL HISTORY:  Headache, intracranial hemorrhage suspected;    TECHNIQUE:  Standard 3D TOF MRA of the brain    COMPARISON:  None    FINDINGS:  The right and left internal carotid arteries appear normal. The anterior and middle cerebral arteries appear normal also.    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  Minimal fusiform enlargement of the left vertebral artery after it enters the dura, consider CTA.       Impression    No definite intracranial vascular abnormality.  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  Minimal fusiform enlargement of the left vertebral artery after it enters the dura.  Would consider CTA for further evaluation.      Electronically signed by: Jose David MD  Date:    10/11/2021  Time:    18:36   Results for orders placed or performed during the hospital encounter of 20   MRI Brain W WO Contrast    Narrative    EXAMINATION:  MRI BRAIN W WO CONTRAST    CLINICAL HISTORY:  Headache, acute, severe, thunderclap, worst HA of life;.    CONTRAST:  Seven ML of Gadavist    TECHNIQUE:  Multiplanar multisequence MR imaging of the brain was performed without contrast.    COMPARISON:  None    FINDINGS:  No evidence of signal abnormality in the brain.  No diffusion weighted sequence abnormality. No mass lesion.    Ventricles and sulci are normal in size for age without evidence of hydrocephalus. No evidence of intra or extra-axial hemorrhage.    Normal vascular flow voids are preserved.    Sinuses are clear.    No evidence of abnormal enhancement post contrast material.    Bone marrow signal intensity is normal.      Impression    MRI of the brain is within normal limits.      Electronically signed by: Jose David MD  Date:    2020  Time:    13:20     *Note: Due to a large number of results and/or encounters for the requested time period, some results have not been displayed. A complete set of results can be found in Results Review.     Relevant family history: Ms. Dimas reported a history of early onset dementia in her maternal grandmother who  in her sixties.    Current medications: Ms. Dimas has a current medication list which includes the following prescription(s): alprazolam, atenolol, b2/magnesium cit,oxid/feverfew, baclofen, butorphanol, duloxetine, fluocinonide, gabapentin, hydrocortisone, ketoconazole, ketoconazole, linaclotide, ondansetron, pantoprazole, promethazine,  ozempic, sutab, topiramate, valacyclovir, and vitamin d, and the following Facility-Administered Medications: lactated ringers, lactated ringers, and lidocaine (pf) 10 mg/ml (1%).    Review of patient's allergies indicates:   Allergen Reactions    Chlorhexidine Rash     Per  after surgery patient had large rash across abdomen where chlorhexidine was applied.    Hydrocodone Other (See Comments)     Chest pains. Note: patient tolerated hydromorphone in 2/2019.     Mastisol adhesive [gum [id number]-sloe-alcohol] Rash         Mental Health History     Mental Health History: Ms. Dimas reported the onset of postpartum depression after her oldest daughter was born, and after her second child also. She denied a history of suicide attempt. She denied a history of past inpatient psychiatric hospitalization. She discussed a history of suicidal thoughts without plan or intent. She reported having previously engaged with psychotherapy; however, reported limited benefit    Family Mental Health History: Ms. Dimas reported depression in her parents, and the onset of symptoms related to dementia.     Assessment of Risk: Ms. Dimas denied recent past or present suicidal ideation, plan, or intent.  Based on today's interview, she was deemed to be at a low risk of harm to self at this time.    Substance Use:  Alcohol:  Denied  Tobacco:  Denied  Recreational drugs: Denied    Social History     Educational/ Linguistic History  Language:Ms. Dimas's first language is Burmese; she also speaks English and Serbian. She was educated in Burmese and Serbian; and learned English beginning at age 9. She discussed that Burmese is the language she would perform best in although discussed that she would likely perform adequately in English.   Education level: She completed 18 years of formal education in social studies.   Education trajectory: She did not report a history of attention problems, learning difficulties, or academic supports.      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   Work status: She continues to work in their normal capacity.    Living/Social History  Born/raised:  She was born in [place], [place] and moved to [place] at age 9 then moved to [place] at age 27 after she was .   Current living situation: She lives at home with her  and tow children.   Social support: She reported that she has sufficient social support, in the form of her best friend, , and family.      Behavioral Observations     Appearance:  Ms. Dimas arrived on time for her appointment and was unaccompanied. She was well dressed; well groomed; and appeared her stated age. Eye contact was appropriate.    Gait/ Motor: No fine or gross motor abnormalities were observed. Gait was within normal limits.      Sensory: Vision and hearing were adequate for testing purposes.    Speech/ language: Speech was normal in rate, volume, tone, and prosody. Receptive language appeared generally intact. Expressive language appeared generally intact.     Attention and Orientation: Ms. Dimas appeared alert and was oriented to person, place, time, and situation.     Thought processes: Ms. Dimas's thought processes appeared logical, sequential, and goal oriented. There was no evidence of hallucination or delusions. Insight and judgment appeared intact.    Mood/affect:  Rapport was easy to establish and maintain. Her affect was broad in range, appeared to range from anxious to sad to euthymic consistent with the subject of our conversation, and was consistent with stated mood. Behavior was within normal limits.       Billing Documentation     Time spent conducting face to face interview with the patient: 78 minutes; [id number].    Referral Diagnoses:  R41.89 (ICD-10-CM) - Cognitive change   Z81.8 (ICD-10-CM) - Family history of presenile dementia       Signatures     Thank you for the opportunity to assist in the care of your patient.  Please do not hesitate to contact me at [phone] or via Epic staff message if I can be of further assistance.          _____________________  Alexandr Almeida, Ph.D.  Neuropsychologist  Department of Neuropsychology  [facility], [place]

## 2023-08-29 NOTE — PROGRESS NOTES
Subjective:       Patient ID: Sandra Dimas is a 48 y.o. female.    Chief Complaint: memory issues     HPI           BACKGROUND HISTORY     The patient has been suffering of medically refractory headaches since the age of 18. The headache has complex and mixed features. Most of the time it is RT sides, could be LT sided, comes in clusters, each lasting 20-30 minutes and wakes her up from sleep with 10/10 severity associated with stimulus sensitivity and associated with droopy eyelid and watering. She failed all known migraine medications: abortive Triptans/NSAIDs, status medications (DHE, VPA, DMS) and preventative medications (TCA-Elavil, BB-Inderal, AED-TPM, AED-VPA, Botox, ON Block, OTC supplements). Was diagnosed with PH and Indomethacin helped initially but then stopped working. Failed all known cluster headache medications: Triptans, O2, CCB-Verapamil and Li. Tried her on Emgality which failed. Was subsequently tried on Nurtec, Ubrelvy, Lidocaine, LTG and LEV which failed. The patient tried surgical menopause which worsened the headaches.     Did reasonably well on TPM XR (Trokendi XR) 200 mg QD but the headaches escalated in the last 6 weeks. Unfortunately, she takes NSAIDs ATC in addition to Energy drinks and occasionally Narcotics as well.         INTERVAL HISTORY 08-    MEMORY    The patient is here for memory loss. The patient is presenting with memory changes that started approximately 2022. The patient is unaccompanied. The main problems the patient has are related to short term memory changes and forgetfulness, increased anxiety related to health concerns. For example, patient excessively forgets where placed certain things. The patient also starting to  forget names but able to recover. The patient is driving and denies getting lost.  She reports that she has had moments while driving she was confused on where she was going but able to recover in matter of seconds. She becomes very anxious  and reports she is stressed. No confusion around and inside the house. No trouble remembering the date and time, keeping up with medications and appointments and keeping up with major holidays and political changes. The patient spouse handling finances. The patient is still independent with ADLs. No hallucinations or delusions. No seizures. No behavioral problems. No language problems. No problems handling tools. No history of strokes. Chronic history of headaches, managed Trokendi 200 mg po QD, concerns related to side effects to medication. However patient is not wanting to discontinue treatment or start a new medication. No history of hypothyroidism. No history of alcoholism. No history of B12 deficiency. No history of depression. No history of STDs.  No history of HIV infection. No toxic exposures.  No history of traumatic brain injury. No tremors or abnormal movements. No falls or instability. No urinary incontinence. Patient has insomnia and good appetite. Mother has dementia Dx mid 70s'. 05- Dementia Genetic Panel NL.               Review of Systems   Constitutional:  Negative for appetite change, fatigue and unexpected weight change.   HENT:  Negative for hearing loss and tinnitus.    Eyes:  Negative for photophobia and visual disturbance.   Respiratory:  Negative for apnea and shortness of breath.    Cardiovascular:  Negative for chest pain and palpitations.   Gastrointestinal:  Negative for nausea and vomiting.   Endocrine: Negative for cold intolerance and heat intolerance.   Genitourinary:  Negative for difficulty urinating and urgency.   Musculoskeletal:  Negative for arthralgias, back pain, gait problem, joint swelling, myalgias, neck pain and neck stiffness.   Skin:  Negative for color change and rash.   Allergic/Immunologic: Negative for environmental allergies and immunocompromised state.   Neurological:  Positive for headaches. Negative for dizziness, tremors, seizures, syncope, facial  asymmetry, speech difficulty, weakness, light-headedness and numbness.   Hematological:  Negative for adenopathy. Does not bruise/bleed easily.   Psychiatric/Behavioral:  Positive for dysphoric mood and sleep disturbance. Negative for agitation, behavioral problems, confusion, decreased concentration, hallucinations, self-injury and suicidal ideas. The patient is nervous/anxious. The patient is not hyperactive.          Current Outpatient Medications:     atenoloL (TENORMIN) 25 MG tablet, Take 1 tablet (25 mg total) by mouth once daily., Disp: 90 tablet, Rfl: 3    B2/magnesium cit,oxid/feverfew (MIGRELIEF ORAL), Take by mouth once daily. , Disp: , Rfl:     DULoxetine (CYMBALTA) 30 MG capsule, Take 1 capsule (30 mg total) by mouth once daily., Disp: 90 capsule, Rfl: 3    fluocinonide (LIDEX) 0.05 % ointment, Apply topically to the affected area twice daily as directed, Disp: 60 g, Rfl: 0    gabapentin (NEURONTIN) 300 MG capsule, Take 1 capsule (300 mg total) by mouth 3 (three) times daily., Disp: 270 capsule, Rfl: 1    hydrocortisone 2.5 % cream, Apply topically to the affected area twice daily as directed, Disp: 30 g, Rfl: 0    ketoconazole (NIZORAL) 2 % cream, Apply to the affected area 2 times daily, Disp: 60 g, Rfl: 5    ketoconazole (NIZORAL) 2 % shampoo, Wash neck/face with medicated shampoo at least 2 to 3 times per week - let sit at least 5 minutes prior to rinsing, Disp: 120 mL, Rfl: 5    linaCLOtide (LINZESS) 72 mcg Cap capsule, Take 1 capsule (72 mcg total) by mouth before breakfast., Disp: 30 capsule, Rfl: 5    ondansetron (ZOFRAN-ODT) 8 MG TbDL, Take 1 tablet (8 mg total) by mouth every 8 (eight) hours as needed (nausea/vomiting)., Disp: 30 tablet, Rfl: 2    pantoprazole (PROTONIX) 40 MG tablet, Take 1 tablet (40 mg total) by mouth once daily., Disp: 90 tablet, Rfl: 3    promethazine (PHENERGAN) 25 MG tablet, Take 0.5 tablets (12.5 mg total) by mouth every 6 (six) hours as needed for Nausea., Disp: 30  tablet, Rfl: 0    semaglutide (OZEMPIC) 1 mg/dose (4 mg/3 mL), Inject 1 mg into the skin every 7 days., Disp: 1 pen, Rfl: 11    sod sulf-pot chloride-mag sulf (SUTAB) 1.479-0.188- 0.225 gram tablet, Take 12 tablets by mouth once daily. Take according to package instructions with indicated amount of water., Disp: 24 tablet, Rfl: 0    topiramate (TROKENDI XR) 200 mg Cp24, Take 1 capsule by mouth once daily., Disp: 31 capsule, Rfl: 11    valACYclovir (VALTREX) 1000 MG tablet, Take 1 tablet by mouth once a day, Disp: 90 tablet, Rfl: 6    vitamin D (VITAMIN D3) 1000 units Tab, Take 1,000 Units by mouth once daily., Disp: , Rfl:     ALPRAZolam (XANAX) 0.5 MG tablet, Take 1 tablet (0.5 mg total) by mouth nightly as needed for Anxiety., Disp: 14 tablet, Rfl: 0    baclofen (LIORESAL) 10 MG tablet, Take 1 tablet (10 mg total) by mouth 3 (three) times daily as needed (neck muscle spasm). (Patient not taking: Reported on 8/29/2023), Disp: 90 tablet, Rfl: 3    butorphanol (STADOL) 10 mg/mL nasal spray, as needed., Disp: , Rfl:   No current facility-administered medications for this visit.    Facility-Administered Medications Ordered in Other Visits:     lactated ringers infusion, , Intravenous, Continuous, Rip Doss MD, Last Rate: 0 mL/hr at 12/26/18 1542, New Bag at 07/09/20 1648    lactated ringers infusion, , Intravenous, Continuous, Mylene Russ MD, Last Rate: 10 mL/hr at 04/19/23 1238, New Bag at 04/19/23 1238    lidocaine (PF) 10 mg/ml (1%) injection 10 mg, 1 mL, Intradermal, Once, Rip Doss MD  Past Medical History:   Diagnosis Date    Chronic paroxysmal hemicrania 01/03/2019    Endometriosis     General anesthetics causing adverse effect in therapeutic use     wakes up with severe anxiety attacks    Hemicrania continua     Hepatitis C antibody positive in blood 12/09/2020    RNA NEGATIVE 12/14/2020    Migraines      Past Surgical History:   Procedure Laterality Date    APPENDECTOMY      COLONOSCOPY N/A  11/12/2020    Procedure: COLONOSCOPY;  Surgeon: Mylene Russ MD;  Location: [facility];  Service: Endoscopy;  Laterality: N/A;    COLONOSCOPY N/A 4/19/2023    Procedure: COLONOSCOPY;  Surgeon: Dian Vail MD;  Location: [facility];  Service: Endoscopy;  Laterality: N/A;    CYST REMOVAL Right 12/30/2019    Procedure: EXCISION, CYST;  Surgeon: NATALI Shah MD;  Location: [facility] OR;  Service: OB/GYN;  Laterality: Right;  Sebaceous cyst    diagnostic laprascopy      ESOPHAGOGASTRODUODENOSCOPY N/A 11/30/2018    Procedure: EGD (ESOPHAGOGASTRODUODENOSCOPY);  Surgeon: Bossman Bustos MD;  Location: [facility];  Service: Endoscopy;  Laterality: N/A;    ESOPHAGOGASTRODUODENOSCOPY N/A 11/12/2020    Procedure: ESOPHAGOGASTRODUODENOSCOPY (EGD) needs rapid covid test;  Surgeon: Mylene Russ MD;  Location: [facility];  Service: Endoscopy;  Laterality: N/A;    ESOPHAGOGASTRODUODENOSCOPY N/A 4/19/2023    Procedure: EGD (ESOPHAGOGASTRODUODENOSCOPY);  Surgeon: Dian Vail MD;  Location: [facility];  Service: Endoscopy;  Laterality: N/A;    FULGURATION OF ENDOMETRIOSIS  12/26/2018    Procedure: FULGURATION, ENDOMETRIOSIS;  Surgeon: Zehra Jensen MD;  Location: [facility];  Service: OB/GYN;;  endometriosis implant resection    HYSTERECTOMY      HYSTEROSCOPY WITH HYDROTHERMAL ABLATION OF ENDOMETRIUM WITH DILATION AND CURETTAGE N/A 12/26/2018    Procedure: HYSTEROSCOPY, WITH DILATION AND CURETTAGE OF UTERUS AND HYDROTHERMAL ENDOMETRIAL ABLATION;  Surgeon: Zehra Jensen MD;  Location: [facility] OR;  Service: OB/GYN;  Laterality: N/A;    INJECTION OF ANESTHETIC AGENT AROUND NERVE Right 03/17/2023    Procedure: BLOCK, NERVE, RIGHT C2,C3 AND THIRD OCCIPITAL NERVE DR MARRERO ONLY 1 OF 2;  Surgeon: Neftaly Marrero MD;  Location: [facility];  Service: Pain Management;  Laterality: Right;    INJECTION OF ANESTHETIC AGENT AROUND NERVE Right 04/06/2023    Procedure: BLOCK, NERVE, RIGHT C2,C3 AND THIRD OCCIPITAL MEDIAL  BRANCH DR KELLY ONLY 2 OF 2 (URGENT) *VIP*;  Surgeon: Neftaly Kelly MD;  Location: [facility];  Service: Pain Management;  Laterality: Right;    LAPAROSCOPIC SALPINGECTOMY Bilateral 12/26/2018    Procedure: SALPINGECTOMY, LAPAROSCOPIC;  Surgeon: Zehra Jensen MD;  Location: [facility] OR;  Service: OB/GYN;  Laterality: Bilateral;    LAPAROTOMY, EXPLORATORY N/A 02/07/2019    Procedure: LAPAROTOMY, EXPLORATORY;  Surgeon: Ramakrishna Boone MD;  Location: [facility] OR;  Service: General;  Laterality: N/A;    OOPHORECTOMY      RADIOFREQUENCY ABLATION Right 5/11/2023    Procedure: RADIOFREQUENCY ABLATION RIGHT C2,C3, TON;  Surgeon: Neftaly Kelly MD;  Location: [facility];  Service: Pain Management;  Laterality: Right;    REPAIR OF VAGINAL CUFF N/A 07/09/2020    Procedure: REPAIR, VAGINAL CUFF;  Surgeon: Sailaja Addison MD;  Location: [facility] OR;  Service: OB/GYN;  Laterality: N/A;    ROBOT-ASSISTED LAPAROSCOPIC ABDOMINAL HYSTERECTOMY USING DA MAVIS XI N/A 12/30/2019    Procedure: XI ROBOTIC HYSTERECTOMY;  Surgeon: NATALI Shah MD;  Location: [facility] OR;  Service: OB/GYN;  Laterality: N/A;    ROBOT-ASSISTED LAPAROSCOPIC SURGICAL REMOVAL OF OVARY USING DA MAVIS XI Bilateral 12/30/2019    Procedure: XI ROBOTIC OOPHORECTOMY;  Surgeon: NATALI Shah MD;  Location: [facility] OR;  Service: OB/GYN;  Laterality: Bilateral;    robotic assisted laparoscopy with excision of ovarian endometrioma and chromotubation       Social History     Socioeconomic History    Marital status:    Tobacco Use    Smoking status: Never    Smokeless tobacco: Never   Substance and Sexual Activity    Alcohol use: Not Currently     Comment: rarely No alcohol 72h prior to sx    Drug use: No    Sexual activity: Yes     Partners: Male   Social History Narrative    No pets or smokers in household.       Objective:       BASELINE EXAMINATION         VITAL SIGNS REVIEWED     GENERAL APPEARANCE:     The patient looks uncomfortable.    No signs of  medical or psychiatric distress.    BMI 20.78    Normal breathing pattern.    No dysmorphic features    Normal eye contact.     GENERAL MEDICAL EXAM:    HEENT:  Head is atraumatic normocephalic. No tender temporal arteries.  Fundoscopy exam is normal.    Neck and Axillae: No JVD.     Cardiopulmonary: No cyanosis. No tachypnea. Normal respiratory effort.    Gastrointestinal:  No stomas or lesions. No hernias.    Skin, Hair and Nails: No pathognonomic skin rash. No neurofibromatosis. No stigmata of autoimmune disease.     Limbs: No varicose veins. No edema.     Muskoskeletal: No deformities.No signs of longstanding neuropathy. No dislocations or fractures.        Neurologic Exam     Mental Status   Oriented to person, place, and time.   Follows 3 step commands.   Attention: normal. Concentration: normal.   Speech: speech is normal   Level of consciousness: alert  Knowledge: good and consistent with education.   Able to name object. Able to repeat. Normal comprehension.     Cranial Nerves   Cranial nerves II through XII intact.     CN II   Visual fields full to confrontation.   Visual acuity: normal  Right visual field deficit: none  Left visual field deficit: none     CN III, IV, VI   Pupils are equal, round, and reactive to light.  Extraocular motions are normal.   Right pupil: Size: 2 mm. Shape: regular. Reactivity: brisk. Consensual response: intact. Accommodation: intact.   Left pupil: Size: 2 mm. Shape: regular. Reactivity: brisk. Consensual response: intact. Accommodation: intact.   CN III: no CN III palsy  CN VI: no CN VI palsy  Nystagmus: none   Diplopia: none  Ophthalmoparesis: none  Upgaze: normal  Downgaze: normal  Conjugate gaze: present  Vestibulo-ocular reflex: present    CN V   Facial sensation intact.   Right facial sensation deficit: none  Left facial sensation deficit: none    CN VII   Facial expression full, symmetric.   Right facial weakness: none  Left facial weakness: none    CN VIII   CN VIII  normal.   Hearing: intact    CN IX, X   CN IX normal.   CN X normal.   Palate: symmetric    CN XI   CN XI normal.   Right sternocleidomastoid strength: normal  Left sternocleidomastoid strength: normal  Right trapezius strength: normal  Left trapezius strength: normal    CN XII   CN XII normal.   Tongue: not atrophic  Fasciculations: absent  Tongue deviation: none    Motor Exam   Muscle bulk: normal  Overall muscle tone: normal  Right arm tone: normal  Left arm tone: normal  Right arm pronator drift: absent  Left arm pronator drift: absent  Right leg tone: normal  Left leg tone: normal    Strength   Right neck flexion: 5/5  Left neck flexion: 5/5  Right neck extension: 5/5  Left neck extension: 5/5  Right deltoid: 5/5  Left deltoid: 5/5  Right biceps: 5/5  Left biceps: 5/5  Right triceps: 5/5  Left triceps: 5/5  Right wrist flexion: 5/5  Left wrist flexion: 5/5  Right wrist extension: 5/5  Left wrist extension: 5/5  Right interossei: 5/5  Left interossei: 5/5  Right iliopsoas: 5/5  Left iliopsoas: 5/5  Right quadriceps: 5/5  Left quadriceps: 5/5  Right hamstrin/5  Left hamstrin/5  Right glutei: 5/5  Left glutei: 5/5  Right anterior tibial: 5/5  Left anterior tibial: 5/5  Right posterior tibial: 5/5  Left posterior tibial: 5/5  Right peroneal: 5/5  Left peroneal: 5/5  Right gastroc: 5/5  Left gastroc: 5/5    Sensory Exam   Light touch normal.   Right arm light touch: normal  Left arm light touch: normal  Right leg light touch: normal  Left leg light touch: normal  Vibration normal.   Right arm vibration: normal  Left arm vibration: normal  Right leg vibration: normal  Left leg vibration: normal  Proprioception normal.   Right arm proprioception: normal  Left arm proprioception: normal  Right leg proprioception: normal  Left leg proprioception: normal  Pinprick normal.   Right arm pinprick: normal  Left arm pinprick: normal  Right leg pinprick: normal  Left leg pinprick: normal  Graphesthesia:  normal  Stereognosis: normal    Gait, Coordination, and Reflexes     Gait  Gait: normal    Coordination   Romberg: negative  Finger to nose coordination: normal  Heel to shin coordination: normal  Tandem walking coordination: normal    Tremor   Resting tremor: absent  Intention tremor: absent  Action tremor: absent    Reflexes   Right brachioradialis: 2+  Left brachioradialis: 2+  Right biceps: 2+  Left biceps: 2+  Right triceps: 2+  Left triceps: 2+  Right patellar: 2+  Left patellar: 2+  Right achilles: 2+  Left achilles: 2+  Right plantar: normal  Left plantar: normal  Right Bedoya: absent  Left Bedoya: absent  Right ankle clonus: absent  Left ankle clonus: absent  Right pendular knee jerk: absent  Left pendular knee jerk: absent        CAMILLE COGNITIVE ASSESSMENT (MOCA) TOTAL SCORE 29/30         NORMAL-MILD NCD 26-30      BARRIER: anxious, stopped several times during testing due to increased anxiety, patient reports she felt stressed        DATE 08-       TOTAL SCORE 29/30       VISUOSPATIAL EXECUTIVE (5) 5       NAMING (3) 3       ATTENTION (6) 6       LANGUAGE (3) 3       ABSTRACTION(2) 2       RECALL (5) 4       ORIENTATION (6) 6                 Lab Results   Component Value Date    WBC 5.10 04/14/2023    HGB 10.4 (L) 04/14/2023    HCT 33.1 (L) 04/14/2023    MCV 84 04/14/2023     04/14/2023     Sodium   Date Value Ref Range Status   02/27/2023 144 136 - 145 mmol/L Final     Potassium   Date Value Ref Range Status   02/27/2023 4.1 3.5 - 5.1 mmol/L Final     Chloride   Date Value Ref Range Status   02/27/2023 112 (H) 95 - 110 mmol/L Final     CO2   Date Value Ref Range Status   02/27/2023 23 23 - 29 mmol/L Final     Glucose   Date Value Ref Range Status   02/27/2023 96 70 - 110 mg/dL Final     BUN   Date Value Ref Range Status   02/27/2023 21 (H) 6 - 20 mg/dL Final     Creatinine   Date Value Ref Range Status   04/13/2023 1.2 0.5 - 1.4 mg/dL Final     Calcium   Date Value Ref Range Status    02/27/2023 9.7 8.7 - 10.5 mg/dL Final     Total Protein   Date Value Ref Range Status   02/27/2023 6.8 6.0 - 8.4 g/dL Final     Albumin   Date Value Ref Range Status   02/27/2023 4.4 3.5 - 5.2 g/dL Final     Total Bilirubin   Date Value Ref Range Status   02/27/2023 0.3 0.1 - 1.0 mg/dL Final     Comment:     For infants and newborns, interpretation of results should be based  on gestational age, weight and in agreement with clinical  observations.    Premature Infant recommended reference ranges:  Up to 24 hours.............<8.0 mg/dL  Up to 48 hours............<12.0 mg/dL  3-5 days..................<15.0 mg/dL  6-29 days.................<15.0 mg/dL       Alkaline Phosphatase   Date Value Ref Range Status   02/27/2023 53 (L) 55 - 135 U/L Final     AST   Date Value Ref Range Status   02/27/2023 17 10 - 40 U/L Final     ALT   Date Value Ref Range Status   02/27/2023 19 10 - 44 U/L Final     Anion Gap   Date Value Ref Range Status   02/27/2023 9 8 - 16 mmol/L Final     eGFR if    Date Value Ref Range Status   05/18/2022 >60.0 >60 mL/min/1.73 m^2 Final     eGFR if non    Date Value Ref Range Status   05/18/2022 >60.0 >60 mL/min/1.73 m^2 Final     Comment:     Calculation used to obtain the estimated glomerular filtration  rate (eGFR) is the CKD-EPI equation.        Lab Results   Component Value Date    DCABUPBU08 493 06/12/2009     Lab Results   Component Value Date    TSH 1.483 05/18/2022    P7TWHRP 78 09/17/2020    K2PLWWP 7.1 06/28/2006    FREET4 0.82 09/17/2020     06-: Vitamin B 12 493, FA level 19.0 NL     10- Vitamin D level     10-    Brain MRI WWO and MRA NL.      07-    Brain MRI NL      03-    Brain MRI WWO NL      10-     Brain MRI and MRA NL.       05-    Dementia Genetic Panel NL      Reviewed the neuroimaging independently       Assessment:           1. Cognitive complaints with normal exam    2. Psychosocial stressors     3. Anxiety    4. Cognitive change    5. Occipital neuralgia of right side    6. Family history of Alzheimer's disease    7. Cervicogenic headache    8. Cervical myofascial pain syndrome    9. Intractable migraine without status migrainosus, unspecified migraine type    10. Cervicalgia    11. Family history of presenile dementia    12. Bilateral occipital neuralgia              Plan:         COGNITIVE COMPLAINT WITH NORMAL EXAM/ STRESS/ SABI/ FAMILY HISTORY OF ALZHEIMER DISEASE/ POTENTIAL SIDE EFFECT OF TPM XR       EVALUATION       Comprehensive Neuropsychological Testing.    Explained to the patient and family that medications are intended to slow down the progression and not stop it or reverse the disease.The disease is relentless, progressive and so far cannot be controlled.     I counseled the patient and family that the rate of progression is extremely variable and the average (10 years) is an inaccurate measure.     NO DMA recommended at this time    Recommend optimization of MDD/SABI/STRESS    Recommend psychiatry and psychologist management patient declined at this time. Open to CNP evaluation only      Recommend changing TPM XR to another therapy Patient declined, reports she is not open to changing treatment     HOME CARE       Help with finances and decision making.    Join support group.    Proofing the house and use labeling.    Avoid antihistamines and anticholinergics.    Avoid changing routine.    Use written reminders.    Avoid multitasking.    Healthy diet, exercise (physical and cognitive).    Good sleep hygiene.    CHRONIC TRIGEMINAL CEPHALGIA WITH MIXED FEATURES (MIGRAINE, HEMICRANIA-CLUSTER, TENSION, ANALGESIC REBOUND)    VERY INTRACTABLE            HEADACHE DIARY       DISCUSSED THE THREE-FOLD MANAGEMENT OF MIGRAINE:      LIFESTYLE CHANGES:       Good sleep hygiene  Avoid general triggers like lack of sleep/too much sleep, prolonged sun exposure, excessive screen time and specific triggers  based on you own diary   Minimize physical and emotional stress  Smoking avoidance and cessation  Limit caffeine drinks to 1-2 a day   Good hydration   Small frequent meals and avoid skipping meals   Moderate 30-minute-long aerobic exercises 3 times/week. Avoid strenuous exercise         ABORTIVE MEDICATIONS (ACUTE-RESCUE MEDICATIONS):     Should only be taken 2-3 times/week to avoid rebound and overuse headaches.    Recommended stopping the daily use of NSAIDs and avoid Narcotics.     Try Cefaly Dual Mode.     Failed  NSAIDs, Triptans, Gepants, Ditans and DHE.        PREVENTATIVE (MORE ACCURATELY MIGRAINE REDUCTION) MEDICATIONS:       Try Cefaly Dual Mode.    Continue TPM  mg QD.     Recommend changing TPM XR to another therapy Patient declined, reports she is not open to changing treatment     Recommended Namenda, Ajovy, Nortriptyline (Pamelor) and Botox without neck injections.       Failed (TCA-Elavil, BB-Inderal, AED(TPM, VPA, LTG, LEV), Botox, ON Block, OTC supplements), CCB-Verapamil, Li and CGRP Blocker (Emgality). Laisha Reed, MSN NP      Collaborating Provider: Thomas Graff MD, FAAN Neurologist/Epileptologist          I spent a total of 85 minutes on the day of the visit.  This includes face to face time and non-face to face time preparing to see the patient (eg, review of tests), obtaining and/or reviewing separately obtained history, documenting clinical information in the electronic or other health record, independently interpreting results and communicating results to the patient/family/caregiver, or care coordinator.

## 2023-08-30 RX ORDER — KETOROLAC TROMETHAMINE 30 MG/ML
60 INJECTION, SOLUTION INTRAMUSCULAR; INTRAVENOUS
Status: COMPLETED | OUTPATIENT
Start: 2023-08-30 | End: 2023-08-30

## 2023-09-11 DIAGNOSIS — R11.0 NAUSEA: Primary | ICD-10-CM

## 2023-09-11 RX ORDER — ONDANSETRON 8 MG/1
8 TABLET, ORALLY DISINTEGRATING ORAL EVERY 8 HOURS PRN
Qty: 30 TABLET | Refills: 2 | OUTPATIENT
Start: 2023-09-11

## 2023-09-11 RX ORDER — ONDANSETRON 8 MG/1
8 TABLET, ORALLY DISINTEGRATING ORAL EVERY 8 HOURS PRN
Qty: 30 TABLET | Refills: 2 | OUTPATIENT
Start: 2023-09-11 | End: 2023-09-11 | Stop reason: SDUPTHER

## 2023-09-15 RX ORDER — NORTRIPTYLINE HYDROCHLORIDE 10 MG/1
10 CAPSULE ORAL NIGHTLY
Qty: 90 CAPSULE | Refills: 3 | Status: SHIPPED | OUTPATIENT
Start: 2023-09-15 | End: 2024-09-14

## 2023-09-25 RX ORDER — SUCRALFATE 1 G/10ML
1 SUSPENSION ORAL 4 TIMES DAILY
Qty: 414 ML | Refills: 2 | Status: SHIPPED | OUTPATIENT
Start: 2023-09-25

## 2023-11-20 ENCOUNTER — HOSPITAL ENCOUNTER (EMERGENCY)
Facility: HOSPITAL | Age: 48
Discharge: HOME | End: 2023-11-20
Attending: EMERGENCY MEDICINE
Payer: COMMERCIAL

## 2023-11-20 VITALS
SYSTOLIC BLOOD PRESSURE: 107 MMHG | HEART RATE: 73 BPM | OXYGEN SATURATION: 100 % | BODY MASS INDEX: 18.54 KG/M2 | HEIGHT: 68 IN | TEMPERATURE: 99 F | DIASTOLIC BLOOD PRESSURE: 72 MMHG | WEIGHT: 122.31 LBS | RESPIRATION RATE: 20 BRPM

## 2023-11-20 DIAGNOSIS — R51.9 NONINTRACTABLE HEADACHE, UNSPECIFIED CHRONICITY PATTERN, UNSPECIFIED HEADACHE TYPE: Primary | ICD-10-CM

## 2023-11-20 DIAGNOSIS — R20.2 NUMBNESS AND TINGLING: ICD-10-CM

## 2023-11-20 DIAGNOSIS — R20.0 NUMBNESS AND TINGLING: ICD-10-CM

## 2023-11-20 LAB
ALBUMIN SERPL BCP-MCNC: 4.9 G/DL (ref 3.5–5.2)
ALP SERPL-CCNC: 54 U/L (ref 55–135)
ALT SERPL W/O P-5'-P-CCNC: 25 U/L (ref 10–44)
ANION GAP SERPL CALC-SCNC: 14 MMOL/L (ref 8–16)
APTT PPP: 26.2 SEC (ref 21–32)
AST SERPL-CCNC: 23 U/L (ref 10–40)
BASOPHILS # BLD AUTO: 0.05 K/UL (ref 0–0.2)
BASOPHILS NFR BLD: 0.9 % (ref 0–1.9)
BILIRUB SERPL-MCNC: 0.3 MG/DL (ref 0.1–1)
BUN SERPL-MCNC: 20 MG/DL (ref 6–20)
CALCIUM SERPL-MCNC: 10.3 MG/DL (ref 8.7–10.5)
CHLORIDE SERPL-SCNC: 105 MMOL/L (ref 95–110)
CO2 SERPL-SCNC: 26 MMOL/L (ref 23–29)
CREAT SERPL-MCNC: 0.8 MG/DL (ref 0.5–1.4)
DIFFERENTIAL METHOD: ABNORMAL
EOSINOPHIL # BLD AUTO: 0.1 K/UL (ref 0–0.5)
EOSINOPHIL NFR BLD: 1.3 % (ref 0–8)
ERYTHROCYTE [DISTWIDTH] IN BLOOD BY AUTOMATED COUNT: 12.2 % (ref 11.5–14.5)
EST. GFR  (NO RACE VARIABLE): >60 ML/MIN/1.73 M^2
GLUCOSE SERPL-MCNC: 88 MG/DL (ref 70–110)
HCT VFR BLD AUTO: 36.9 % (ref 37–48.5)
HGB BLD-MCNC: 11.6 G/DL (ref 12–16)
IMM GRANULOCYTES # BLD AUTO: 0.01 K/UL (ref 0–0.04)
IMM GRANULOCYTES NFR BLD AUTO: 0.2 % (ref 0–0.5)
INR PPP: 1 (ref 0.8–1.2)
LYMPHOCYTES # BLD AUTO: 3.2 K/UL (ref 1–4.8)
LYMPHOCYTES NFR BLD: 57 % (ref 18–48)
MCH RBC QN AUTO: 26.2 PG (ref 27–31)
MCHC RBC AUTO-ENTMCNC: 31.4 G/DL (ref 32–36)
MCV RBC AUTO: 83 FL (ref 82–98)
MONOCYTES # BLD AUTO: 0.3 K/UL (ref 0.3–1)
MONOCYTES NFR BLD: 5.8 % (ref 4–15)
NEUTROPHILS # BLD AUTO: 1.9 K/UL (ref 1.8–7.7)
NEUTROPHILS NFR BLD: 34.8 % (ref 38–73)
NRBC BLD-RTO: 0 /100 WBC
PLATELET # BLD AUTO: 303 K/UL (ref 150–450)
PMV BLD AUTO: 9.4 FL (ref 9.2–12.9)
POTASSIUM SERPL-SCNC: 3.9 MMOL/L (ref 3.5–5.1)
PROT SERPL-MCNC: 8.2 G/DL (ref 6–8.4)
PROTHROMBIN TIME: 10.8 SEC (ref 9–12.5)
RBC # BLD AUTO: 4.43 M/UL (ref 4–5.4)
SODIUM SERPL-SCNC: 145 MMOL/L (ref 136–145)
WBC # BLD AUTO: 5.53 K/UL (ref 3.9–12.7)

## 2023-11-20 PROCEDURE — 25000003 PHARM REV CODE 250: Performed by: EMERGENCY MEDICINE

## 2023-11-20 PROCEDURE — 96375 TX/PRO/DX INJ NEW DRUG ADDON: CPT

## 2023-11-20 PROCEDURE — 96374 THER/PROPH/DIAG INJ IV PUSH: CPT

## 2023-11-20 PROCEDURE — 85730 THROMBOPLASTIN TIME PARTIAL: CPT | Performed by: EMERGENCY MEDICINE

## 2023-11-20 PROCEDURE — 99285 EMERGENCY DEPT VISIT HI MDM: CPT | Mod: 25

## 2023-11-20 PROCEDURE — 96376 TX/PRO/DX INJ SAME DRUG ADON: CPT

## 2023-11-20 PROCEDURE — 63600175 PHARM REV CODE 636 W HCPCS: Performed by: EMERGENCY MEDICINE

## 2023-11-20 PROCEDURE — 85025 COMPLETE CBC W/AUTO DIFF WBC: CPT | Performed by: EMERGENCY MEDICINE

## 2023-11-20 PROCEDURE — 25500020 PHARM REV CODE 255: Performed by: EMERGENCY MEDICINE

## 2023-11-20 PROCEDURE — 93005 ELECTROCARDIOGRAM TRACING: CPT

## 2023-11-20 PROCEDURE — 80053 COMPREHEN METABOLIC PANEL: CPT | Performed by: EMERGENCY MEDICINE

## 2023-11-20 PROCEDURE — 96361 HYDRATE IV INFUSION ADD-ON: CPT

## 2023-11-20 PROCEDURE — A9585 GADOBUTROL INJECTION: HCPCS | Performed by: EMERGENCY MEDICINE

## 2023-11-20 PROCEDURE — 93010 EKG 12-LEAD: ICD-10-PCS | Mod: ,,, | Performed by: INTERNAL MEDICINE

## 2023-11-20 PROCEDURE — 85610 PROTHROMBIN TIME: CPT | Performed by: EMERGENCY MEDICINE

## 2023-11-20 PROCEDURE — 93010 ELECTROCARDIOGRAM REPORT: CPT | Mod: ,,, | Performed by: INTERNAL MEDICINE

## 2023-11-20 RX ORDER — LORAZEPAM 2 MG/ML
1 INJECTION INTRAMUSCULAR
Status: COMPLETED | OUTPATIENT
Start: 2023-11-20 | End: 2023-11-20

## 2023-11-20 RX ORDER — MEPERIDINE HYDROCHLORIDE 25 MG/ML
12.5 INJECTION INTRAMUSCULAR; INTRAVENOUS; SUBCUTANEOUS
Status: COMPLETED | OUTPATIENT
Start: 2023-11-20 | End: 2023-11-20

## 2023-11-20 RX ORDER — LORAZEPAM 2 MG/ML
0.5 INJECTION INTRAMUSCULAR
Status: COMPLETED | OUTPATIENT
Start: 2023-11-20 | End: 2023-11-20

## 2023-11-20 RX ORDER — GADOBUTROL 604.72 MG/ML
10 INJECTION INTRAVENOUS
Status: COMPLETED | OUTPATIENT
Start: 2023-11-20 | End: 2023-11-20

## 2023-11-20 RX ADMIN — GADOBUTROL 5 ML: 604.72 INJECTION INTRAVENOUS at 04:11

## 2023-11-20 RX ADMIN — LORAZEPAM 1 MG: 2 INJECTION INTRAMUSCULAR; INTRAVENOUS at 12:11

## 2023-11-20 RX ADMIN — SODIUM CHLORIDE 1000 ML: 9 INJECTION, SOLUTION INTRAVENOUS at 12:11

## 2023-11-20 RX ADMIN — MEPERIDINE HYDROCHLORIDE 12.5 MG: 25 INJECTION INTRAMUSCULAR; INTRAVENOUS; SUBCUTANEOUS at 01:11

## 2023-11-20 RX ADMIN — LORAZEPAM 0.5 MG: 2 INJECTION INTRAMUSCULAR; INTRAVENOUS at 04:11

## 2023-11-20 RX ADMIN — DROPERIDOL 1.25 MG: 2.5 INJECTION, SOLUTION INTRAMUSCULAR; INTRAVENOUS at 02:11

## 2023-11-20 NOTE — ED PROVIDER NOTES
SCRIBE #1 NOTE: I,Damián Cerda, am scribing for, and in the presence of, William Robles Jr., MD. I have scribed the HPI, ROS, and PEx.     SCRIBE #2 NOTE: I, Dennise Malone, am scribing for, and in the presence of,  Moses Moya Jr., MD.. I have scribed the remaining portions of the note not scribed by Scribe #1.      History     Chief Complaint   Patient presents with    Numbness     Numbness to head and tongue x 2 days.      Review of patient's allergies indicates:   Allergen Reactions    Chlorhexidine Rash     Per  after surgery patient had large rash across abdomen where chlorhexidine was applied.    Hydrocodone Other (See Comments)     Chest pains. Note: patient tolerated hydromorphone in 2/2019.     Mastisol adhesive [gum ecfubz-giodmm-zphr-alcohol] Rash         History of Present Illness     HPI    11/20/2023, 12:42 PM  History obtained from the patient      History of Present Illness: Sandra Dimas is a 48 y.o. female patient with history of chronic migraines presenting complaining of headache over the past several days. This has advanced to numbness of her tongue as well as to her face and both arms. Her headache has been unremitting however it had resolved prior to arrival here today.  Associated sxs include dizziness and speech difficulty. Pt denies any n/v, weakness, fevers, chills, and all other sxs at this time. Also denies any trauma. Patient does have an underlying history of anxiety and stress.     Arrival mode: Personal vehicle    PCP: Kurtis Gamez MD        Past Medical History:  Past Medical History:   Diagnosis Date    Chronic paroxysmal hemicrania 01/03/2019    Endometriosis     General anesthetics causing adverse effect in therapeutic use     wakes up with severe anxiety attacks    Hemicrania continua     Hepatitis C antibody positive in blood 12/09/2020    RNA NEGATIVE 12/14/2020    Migraines        Past Surgical History:  Past Surgical History:   Procedure  Laterality Date    APPENDECTOMY      COLONOSCOPY N/A 11/12/2020    Procedure: COLONOSCOPY;  Surgeon: Mylene Russ MD;  Location: [facility];  Service: Endoscopy;  Laterality: N/A;    COLONOSCOPY N/A 4/19/2023    Procedure: COLONOSCOPY;  Surgeon: Dian Vail MD;  Location: [facility];  Service: Endoscopy;  Laterality: N/A;    CYST REMOVAL Right 12/30/2019    Procedure: EXCISION, CYST;  Surgeon: NATALI Shah MD;  Location: [facility] OR;  Service: OB/GYN;  Laterality: Right;  Sebaceous cyst    diagnostic laprascopy      ESOPHAGOGASTRODUODENOSCOPY N/A 11/30/2018    Procedure: EGD (ESOPHAGOGASTRODUODENOSCOPY);  Surgeon: Bossman Bustos MD;  Location: [facility];  Service: Endoscopy;  Laterality: N/A;    ESOPHAGOGASTRODUODENOSCOPY N/A 11/12/2020    Procedure: ESOPHAGOGASTRODUODENOSCOPY (EGD) needs rapid covid test;  Surgeon: Mylene Russ MD;  Location: [facility];  Service: Endoscopy;  Laterality: N/A;    ESOPHAGOGASTRODUODENOSCOPY N/A 4/19/2023    Procedure: EGD (ESOPHAGOGASTRODUODENOSCOPY);  Surgeon: Dian Vail MD;  Location: [facility];  Service: Endoscopy;  Laterality: N/A;    FULGURATION OF ENDOMETRIOSIS  12/26/2018    Procedure: FULGURATION, ENDOMETRIOSIS;  Surgeon: Zehra Jensen MD;  Location: [facility];  Service: OB/GYN;;  endometriosis implant resection    HYSTERECTOMY      HYSTEROSCOPY WITH HYDROTHERMAL ABLATION OF ENDOMETRIUM WITH DILATION AND CURETTAGE N/A 12/26/2018    Procedure: HYSTEROSCOPY, WITH DILATION AND CURETTAGE OF UTERUS AND HYDROTHERMAL ENDOMETRIAL ABLATION;  Surgeon: Zehra Jensen MD;  Location: [facility];  Service: OB/GYN;  Laterality: N/A;    INJECTION OF ANESTHETIC AGENT AROUND NERVE Right 03/17/2023    Procedure: BLOCK, NERVE, RIGHT C2,C3 AND THIRD OCCIPITAL NERVE DR MARRERO ONLY 1 OF 2;  Surgeon: Neftaly Marrero MD;  Location: [facility] MGT;  Service: Pain Management;  Laterality: Right;    INJECTION OF ANESTHETIC AGENT AROUND NERVE Right 04/06/2023    Procedure:  BLOCK, NERVE, RIGHT C2,C3 AND THIRD OCCIPITAL MEDIAL BRANCH DR KELLY ONLY 2 OF 2 (URGENT) *VIP*;  Surgeon: Neftaly Kelly MD;  Location: [facility] PAIN MGT;  Service: Pain Management;  Laterality: Right;    LAPAROSCOPIC SALPINGECTOMY Bilateral 12/26/2018    Procedure: SALPINGECTOMY, LAPAROSCOPIC;  Surgeon: Zehra Jensen MD;  Location: [facility] OR;  Service: OB/GYN;  Laterality: Bilateral;    LAPAROTOMY, EXPLORATORY N/A 02/07/2019    Procedure: LAPAROTOMY, EXPLORATORY;  Surgeon: Ramakrishna Boone MD;  Location: [facility] OR;  Service: General;  Laterality: N/A;    OOPHORECTOMY      RADIOFREQUENCY ABLATION Right 5/11/2023    Procedure: RADIOFREQUENCY ABLATION RIGHT C2,C3, TON;  Surgeon: Neftaly Kelly MD;  Location: [facility] PAIN MGT;  Service: Pain Management;  Laterality: Right;    REPAIR OF VAGINAL CUFF N/A 07/09/2020    Procedure: REPAIR, VAGINAL CUFF;  Surgeon: Sailaja Addison MD;  Location: [facility] OR;  Service: OB/GYN;  Laterality: N/A;    ROBOT-ASSISTED LAPAROSCOPIC ABDOMINAL HYSTERECTOMY USING DA MAVIS XI N/A 12/30/2019    Procedure: XI ROBOTIC HYSTERECTOMY;  Surgeon: NATALI Shah MD;  Location: [facility] OR;  Service: OB/GYN;  Laterality: N/A;    ROBOT-ASSISTED LAPAROSCOPIC SURGICAL REMOVAL OF OVARY USING DA MAVIS XI Bilateral 12/30/2019    Procedure: XI ROBOTIC OOPHORECTOMY;  Surgeon: NATALI Shah MD;  Location: [facility] OR;  Service: OB/GYN;  Laterality: Bilateral;    robotic assisted laparoscopy with excision of ovarian endometrioma and chromotubation           Family History:  Family History   Problem Relation Age of Onset    Hypertension Mother     Diabetes type II Mother     Heart attack Father     Strabismus Neg Hx     Retinal detachment Neg Hx     Macular degeneration Neg Hx     Glaucoma Neg Hx     Blindness Neg Hx     Amblyopia Neg Hx     Breast cancer Neg Hx     Colon cancer Neg Hx     Ovarian cancer Neg Hx     Thyroid disease Neg Hx     Migraines Neg Hx     Asthma Neg Hx        Social History:  Social  History     Tobacco Use    Smoking status: Never    Smokeless tobacco: Never   Substance and Sexual Activity    Alcohol use: Not Currently     Comment: rarely No alcohol 72h prior to sx    Drug use: No    Sexual activity: Yes     Partners: Male        Review of Systems     Review of Systems   Constitutional:  Negative for chills and fever.   HENT:  Negative for rhinorrhea, sinus pressure and sore throat.    Respiratory:  Negative for shortness of breath.    Cardiovascular:  Negative for chest pain and palpitations.   Gastrointestinal:  Negative for nausea.   Genitourinary:  Negative for dysuria.   Musculoskeletal:  Negative for back pain.   Skin:  Negative for rash.   Neurological:  Positive for dizziness, speech difficulty, weakness, numbness and headaches.   Hematological:  Does not bruise/bleed easily.   All other systems reviewed and are negative.       Physical Exam     Initial Vitals [11/20/23 1247]   BP Pulse Resp Temp SpO2   101/76 69 18 98.7 °F (37.1 °C) 99 %      MAP       --          Physical Exam  Nursing Notes and Vital Signs Reviewed.  Constitutional: Patient is in no acute distress. Well-developed and well-nourished.  Patient was very anxious  Head: Atraumatic. Normocephalic.  Eyes:  EOM intact.  No scleral icterus.  ENT: Mucous membranes are moist.  Nares clear   Neck:  Full ROM. No JVD.  Cardiovascular: Regular rate. Regular rhythm No murmurs, rubs, or gallops. Distal pulses are 2+ and symmetric  Pulmonary/Chest: No respiratory distress. Clear to auscultation bilaterally. No wheezing or rales.  Equal chest wall rise bilaterally  Abdominal: Soft and non-distended.  There is no tenderness.  No rebound, guarding, or rigidity. Good bowel sounds.  Genitourinary: No CVA tenderness.  No suprapubic tenderness  Musculoskeletal: Moves all extremities. No obvious deformities.  5 x 5 strength in all extremities   Skin: Warm and dry.  Neurological:  Alert, awake, and appropriate.  Normal speech.  No acute focal  "neurological deficits are appreciated.  Two through 12 intact bilaterally.  Psychiatric: Normal affect. Good eye contact. Appropriate in content.      ED Course   Procedures  ED Vital Signs:  Vitals:    11/20/23 1247 11/20/23 1350 11/20/23 1421 11/20/23 1440   BP: 101/76  100/72 98/64   Pulse: 69  70 71   Resp: 18 18 18 18   Temp: 98.7 °F (37.1 °C)  98.7 °F (37.1 °C)    TempSrc: Oral  Oral    SpO2: 99%  100% 100%   Weight: 55.5 kg (122 lb 4.8 oz)      Height: 5' 8" (1.727 m)       11/20/23 1600   BP: 107/72   Pulse: 73   Resp: 20   Temp:    TempSrc:    SpO2: 100%   Weight:    Height:        Abnormal Lab Results:  Labs Reviewed   CBC W/ AUTO DIFFERENTIAL - Abnormal; Notable for the following components:       Result Value    Hemoglobin 11.6 (*)     Hematocrit 36.9 (*)     MCH 26.2 (*)     MCHC 31.4 (*)     Gran % 34.8 (*)     Lymph % 57.0 (*)     All other components within normal limits   COMPREHENSIVE METABOLIC PANEL - Abnormal; Notable for the following components:    Alkaline Phosphatase 54 (*)     All other components within normal limits   PROTIME-INR   APTT        All Lab Results:  Results for orders placed or performed during the hospital encounter of 11/20/23   CBC auto differential   Result Value Ref Range    WBC 5.53 3.90 - 12.70 K/uL    RBC 4.43 4.00 - 5.40 M/uL    Hemoglobin 11.6 (L) 12.0 - 16.0 g/dL    Hematocrit 36.9 (L) 37.0 - 48.5 %    MCV 83 82 - 98 fL    MCH 26.2 (L) 27.0 - 31.0 pg    MCHC 31.4 (L) 32.0 - 36.0 g/dL    RDW 12.2 11.5 - 14.5 %    Platelets 303 150 - 450 K/uL    MPV 9.4 9.2 - 12.9 fL    Immature Granulocytes 0.2 0.0 - 0.5 %    Gran # (ANC) 1.9 1.8 - 7.7 K/uL    Immature Grans (Abs) 0.01 0.00 - 0.04 K/uL    Lymph # 3.2 1.0 - 4.8 K/uL    Mono # 0.3 0.3 - 1.0 K/uL    Eos # 0.1 0.0 - 0.5 K/uL    Baso # 0.05 0.00 - 0.20 K/uL    nRBC 0 0 /100 WBC    Gran % 34.8 (L) 38.0 - 73.0 %    Lymph % 57.0 (H) 18.0 - 48.0 %    Mono % 5.8 4.0 - 15.0 %    Eosinophil % 1.3 0.0 - 8.0 %    Basophil % 0.9 0.0 " - 1.9 %    Differential Method Automated    Comprehensive metabolic panel   Result Value Ref Range    Sodium 145 136 - 145 mmol/L    Potassium 3.9 3.5 - 5.1 mmol/L    Chloride 105 95 - 110 mmol/L    CO2 26 23 - 29 mmol/L    Glucose 88 70 - 110 mg/dL    BUN 20 6 - 20 mg/dL    Creatinine 0.8 0.5 - 1.4 mg/dL    Calcium 10.3 8.7 - 10.5 mg/dL    Total Protein 8.2 6.0 - 8.4 g/dL    Albumin 4.9 3.5 - 5.2 g/dL    Total Bilirubin 0.3 0.1 - 1.0 mg/dL    Alkaline Phosphatase 54 (L) 55 - 135 U/L    AST 23 10 - 40 U/L    ALT 25 10 - 44 U/L    eGFR >60 >60 mL/min/1.73 m^2    Anion Gap 14 8 - 16 mmol/L   Protime-INR   Result Value Ref Range    Prothrombin Time 10.8 9.0 - 12.5 sec    INR 1.0 0.8 - 1.2   APTT   Result Value Ref Range    aPTT 26.2 21.0 - 32.0 sec     *Note: Due to a large number of results and/or encounters for the requested time period, some results have not been displayed. A complete set of results can be found in Results Review.         Imaging Results:  Imaging Results              MRI Brain W WO Contrast (Final result)  Result time 11/20/23 17:06:06   Procedure changed from MRI Brain Without Contrast     Final result by Broderick العلي MD (11/20/23 17:06:06)                   Impression:      No acute intracranial abnormality.      Electronically signed by: Broderick العلي  Date:    11/20/2023  Time:    17:06               Narrative:    EXAMINATION:  MRI BRAIN W WO CONTRAST    CLINICAL HISTORY:  Headache, new or worsening, neuro deficit (Age 19-49y);.    TECHNIQUE:  Multiplanar multisequence MR imaging of the brain was performed before and after the administration of 5 mL Gadavist  intravenous contrast.    COMPARISON:  Head CT same date with priors; MRI brain 01/22/2023 with priors    FINDINGS:  Intracranial compartment:    Ventricles and sulci are normal in size for age without evidence of hydrocephalus.  Somewhat lobular configuration of the choroid plexuses may reflect underlying cysts.  No extra-axial  blood or fluid collections.  The cerebellar tonsils are in expected location.  The visualized portions of the sella appear within normal limits.    No intracranial restricted diffusion.  No focal encephalomalacia.  The brain parenchyma demonstrates no edema, mass or mass effect.  No significant white matter signal abnormalities.  No abnormal gradient susceptibility artifact.  No abnormal enhancement.    Normal vascular flow voids are preserved.    Skull/extracranial contents (limited evaluation): Paranasal sinuses and mastoid air cells are clear.  Globes and orbits appear within normal limits.    Marrow signal is within normal limits.                                       CT Head Without Contrast (Final result)  Result time 11/20/23 15:15:54      Final result by Jose Campuzano MD (11/20/23 15:15:54)                   Impression:      No acute intracranial abnormality. If there is additional clinical concern for acute infarct, MRI with diffusion weighted imaging recommended.   Alberta stroke program early CT score (ASPECTS): 10    COMMUNICATION  This critical result was discovered/received at 15:14.  The critical information above was relayed directly by me secure chat to Dr. Robles on 11/20/23 at 15:15.    All CT scans at this facility use dose modulation, iterative reconstruction, and/or weight based dosing when appropriate to reduce radiation dose to as low as reasonable achievable.      Electronically signed by: Jose Campuzano MD  Date:    11/20/2023  Time:    15:15               Narrative:    EXAMINATION:  CT HEAD WITHOUT CONTRAST    CLINICAL HISTORY:  Neuro deficit, acute, stroke suspected;    TECHNIQUE:  Low dose axial CT images obtained throughout the head without intravenous contrast. Sagittal and coronal reconstructions were performed.    All CT scans at this facility use dose modulation, iterative reconstruction, and/or weight based dosing when appropriate to reduce radiation dose to as low as  reasonable achievable.    COMPARISON:  None.    FINDINGS:  Intracranial compartment:    The brain parenchyma appears normal. No parenchymal mass, hemorrhage, edema or major vascular distribution infarct.  Mild age-related involutional changes within the sulci.  Ventricles are normal in size.  No evidence of hydrocephalus.    No extra-axial blood or fluid collections.    Skull/extracranial contents (limited evaluation): No fracture. Mastoid air cells and paranasal sinuses are essentially clear.                                       The EKG was ordered, reviewed, and independently interpreted by the ED provider.  Interpretation time: 13:59  Rate: 62 BPM  Rhythm: sinus rhythm with premature atrial complexes  Interpretation: Incomplete RBBB. No STEMI.             The Emergency Provider reviewed the vital signs and test results, which are outlined above.     ED Discussion       3:48 PM: Dr. Robles transfers care of patient to Dr. Moya pending disposition results.     4:16 PM: Re-evaluated pt. Pt is resting comfortably and is in no acute distress. D/w pt all pertinent results. D/w pt any concerns expressed at this time. Answered all questions. Pt expresses understanding at this time. I agree with Dr. Robles's assessment of the pt.     5:56 PM: Re-evaluated pt. Pt is resting comfortably and is in no acute distress.  Pt states headache has resolved to a 0 out of 10 pain. She does not need any refill of her medications.  D/w pt all pertinent results. D/w pt any concerns expressed at this time. Follow up with neurology/PCP with 1 week. D/w pt to take driving precautions.  Answered all questions. Pt expresses understanding at this time. No acute finding on MRI on reassessment to discharge. GCS of 15. Paresthesia resolved. No acute focal deficits appreciated    6:08 PM: Reassessed pt at this time. Discussed with pt all pertinent ED information and results. Discussed pt dx and plan of tx. Gave pt all f/u and return to the  ED instructions. All questions and concerns were addressed at this time. Pt expresses understanding of information and instructions, and is comfortable with plan to discharge. Pt is stable for discharge.    I discussed with patient and/or family/caretaker that evaluation in the ED does not suggest any emergent or life threatening medical conditions requiring immediate intervention beyond what was provided in the ED, and I believe patient is safe for discharge.  Regardless, an unremarkable evaluation in the ED does not preclude the development or presence of a serious of life threatening condition. As such, patient was instructed to return immediately for any worsening or change in current symptoms.     Patient's headache is consistent with previous headaches and lacks features concerning for emergent or life threatening condition.  I do not suspect SAH, meningitis, increased IC pressure, infectious, toxic, vascular, CNS, or other EMC.  I have discussed this at length with patient.  Regarding treatment, advised patient to take nonsteroidal antiinflammatory medications, acetaminophen, or any medications prescribed as instructed.  To prevent headaches, patient advised to avoid muscle tension (do not stay in one position for long periods of time), avoid eye strain (make sure there is adequate lighting for reading and routine tasks), eat healthy foods, exercise, and do not smoke or drink excessive alcohol.  Patient also advised to avoid overuse of over-the-counter or prescription medications. Patient was instructed to contact primary healthcare provider if: headaches continue to get worse; occur often enough that they affect daily work or normal activities; frequent medication use is needed to manage headaches; headaches that worsen and cause vomiting; or there are any questions or concerns about the condition or care. Advised patient to return to the emergency department or call 911 if they develop a sudden headache  that presents suddenly and much worse than usual headaches; have difficulty seeing, speaking, or moving; become confused or have seizure activity; or develop a fever and stiff neck with the headache.     Driving or other activities under influence of medications - Patient and/or family/caretaker was given a prescription for, or instructed to use a medicine that may impair ability to drive, operate machinery, or participate in other potentially dangerous activities.  Patient was instructed not to participate in these activities while under the influence of these medications.    ED Course as of 11/23/23 1051   Mon Nov 20, 2023   1316 Patient with continued tongue numbness.  Anxiety has improved [RT]      ED Course User Index  [RT] William Robles Jr., MD     Medical Decision Making  Amount and/or Complexity of Data Reviewed  Labs: ordered. Decision-making details documented in ED Course.  Radiology: ordered. Decision-making details documented in ED Course.     Details: Findings of Dr. Campuzano: No acute intracranial abnormality. If there is additional clinical concern for acute infarct, MRI with diffusion weighted imaging recommended.   Alberta stroke program early CT score (ASPECTS): 10   ECG/medicine tests: ordered and independent interpretation performed. Decision-making details documented in ED Course.    Risk  Prescription drug management.                ED Medication(s):  Medications   LORazepam injection 1 mg (1 mg Intravenous Given 11/20/23 1252)   sodium chloride 0.9% bolus 1,000 mL 1,000 mL (0 mLs Intravenous Stopped 11/20/23 1743)   meperidine (PF) injection 12.5 mg (12.5 mg Intravenous Given 11/20/23 1350)   droPERidol (INAPSINE) 1.25 mg in dextrose 5 % (D5W) 50 mL IVPB (1.25 mg Intravenous Given 11/20/23 1439)   LORazepam injection 0.5 mg (0.5 mg Intravenous Given 11/20/23 1605)   gadobutroL (GADAVIST) injection 10 mL (5 mLs Intravenous Given 11/20/23 1645)       Discharge Medication List as of 11/20/2023   5:58 PM           Follow-up Information       Kurtis Gamez MD. Schedule an appointment as soon as possible for a visit in 1 week.    Specialties: Internal Medicine, Pediatrics  Contact information:  [address]  [place] [address]  [phone]               [facility] Neurology 1st Fl. Schedule an appointment as soon as possible for a visit in 1 week.    Specialty: Neurology  Contact information:  [address] [facility] [address] [phone]  Additional information:  Please park on the [address] side and use the Clinic entrance. Check in on the 1st floor, to the right across from the café.             [facility] - Emergency Dept..    Specialty: Emergency Medicine  Why: As needed, If symptoms worsen  Contact information:  [address]  [facility] [address] [phone]                               Scribe Attestation:   Scribe #1: I performed the above scribed service and the documentation accurately describes the services I performed. I attest to the accuracy of the note.     Attending:   Physician Attestation Statement for Scribe #1: I, William Robles Jr., MD, personally performed the services described in this documentation, as scribed by Damián Cerda, in my presence, and it is both accurate and complete.       Scribe Attestation:   Scribe #2: I performed the above scribed service and the documentation accurately describes the services I performed. I attest to the accuracy of the note.    Attending Attestation:           Physician Attestation for Scribe:    Physician Attestation Statement for Scribe #2: I, Moses Moya Jr., MD., reviewed documentation, as scribed by Dennise Malone in my presence, and it is both accurate and complete. I also acknowledge and confirm the content of the note done by Guidoibe #1.           Clinical Impression       ICD-10-CM ICD-9-CM   1. Nonintractable headache, unspecified chronicity pattern,  unspecified headache type  R51.9 784.0   2. Numbness and tingling  R20.0 782.0    R20.2        Disposition:   Disposition: Discharged  Condition: Stable        Moses Moya Jr., MD  11/23/23 1051

## 2023-11-21 ENCOUNTER — LAB VISIT (OUTPATIENT)
Dept: LAB | Facility: HOSPITAL | Age: 48
End: 2023-11-21
Attending: INTERNAL MEDICINE
Payer: COMMERCIAL

## 2023-11-21 DIAGNOSIS — R20.0 NUMBNESS AND TINGLING: ICD-10-CM

## 2023-11-21 DIAGNOSIS — R20.2 NUMBNESS AND TINGLING: ICD-10-CM

## 2023-11-21 DIAGNOSIS — G43.919 INTRACTABLE MIGRAINE WITHOUT STATUS MIGRAINOSUS, UNSPECIFIED MIGRAINE TYPE: ICD-10-CM

## 2023-11-21 DIAGNOSIS — D50.0 IRON DEFICIENCY ANEMIA DUE TO CHRONIC BLOOD LOSS: ICD-10-CM

## 2023-11-21 DIAGNOSIS — G43.919 INTRACTABLE MIGRAINE WITHOUT STATUS MIGRAINOSUS, UNSPECIFIED MIGRAINE TYPE: Primary | ICD-10-CM

## 2023-11-21 DIAGNOSIS — R20.2 PARESTHESIAS: Primary | ICD-10-CM

## 2023-11-21 LAB
CA-I BLDV-SCNC: 1.32 MMOL/L (ref 1.06–1.42)
FERRITIN SERPL-MCNC: 797 NG/ML (ref 20–300)
FOLATE SERPL-MCNC: 7.6 NG/ML (ref 4–24)
IRON SERPL-MCNC: 54 UG/DL (ref 30–160)
MAGNESIUM SERPL-MCNC: 1.8 MG/DL (ref 1.6–2.6)
SATURATED IRON: 18 % (ref 20–50)
TOTAL IRON BINDING CAPACITY: 293 UG/DL (ref 250–450)
TRANSFERRIN SERPL-MCNC: 198 MG/DL (ref 200–375)
TSH SERPL DL<=0.005 MIU/L-ACNC: 0.56 UIU/ML (ref 0.4–4)
VIT B12 SERPL-MCNC: 914 PG/ML (ref 210–950)

## 2023-11-21 PROCEDURE — 83540 ASSAY OF IRON: CPT | Performed by: INTERNAL MEDICINE

## 2023-11-21 PROCEDURE — 36415 COLL VENOUS BLD VENIPUNCTURE: CPT | Performed by: INTERNAL MEDICINE

## 2023-11-21 PROCEDURE — 84466 ASSAY OF TRANSFERRIN: CPT | Performed by: INTERNAL MEDICINE

## 2023-11-21 PROCEDURE — 82652 VIT D 1 25-DIHYDROXY: CPT | Performed by: INTERNAL MEDICINE

## 2023-11-21 PROCEDURE — 86038 ANTINUCLEAR ANTIBODIES: CPT | Performed by: INTERNAL MEDICINE

## 2023-11-21 PROCEDURE — 82330 ASSAY OF CALCIUM: CPT | Performed by: INTERNAL MEDICINE

## 2023-11-21 PROCEDURE — 82746 ASSAY OF FOLIC ACID SERUM: CPT | Performed by: INTERNAL MEDICINE

## 2023-11-21 PROCEDURE — 84443 ASSAY THYROID STIM HORMONE: CPT | Performed by: INTERNAL MEDICINE

## 2023-11-21 PROCEDURE — 82728 ASSAY OF FERRITIN: CPT | Performed by: INTERNAL MEDICINE

## 2023-11-21 PROCEDURE — 82607 VITAMIN B-12: CPT | Performed by: INTERNAL MEDICINE

## 2023-11-21 PROCEDURE — 83735 ASSAY OF MAGNESIUM: CPT | Performed by: INTERNAL MEDICINE

## 2023-11-22 DIAGNOSIS — G43.919 INTRACTABLE MIGRAINE WITHOUT STATUS MIGRAINOSUS, UNSPECIFIED MIGRAINE TYPE: Primary | ICD-10-CM

## 2023-11-22 RX ORDER — PROMETHAZINE HYDROCHLORIDE 25 MG/1
25 TABLET ORAL EVERY 6 HOURS PRN
Qty: 30 TABLET | Refills: 1 | Status: SHIPPED | OUTPATIENT
Start: 2023-11-22 | End: 2024-03-29 | Stop reason: SDUPTHER

## 2023-11-24 LAB — ANA SER QL IF: NORMAL

## 2023-11-27 LAB — 1,25(OH)2D3 SERPL-MCNC: 60 PG/ML (ref 20–79)

## 2023-11-28 RX ORDER — NITROFURANTOIN 25; 75 MG/1; MG/1
100 CAPSULE ORAL 2 TIMES DAILY
Qty: 20 CAPSULE | Refills: 0 | Status: SHIPPED | OUTPATIENT
Start: 2023-11-28 | End: 2023-12-09

## 2023-11-28 RX ORDER — FLUCONAZOLE 150 MG/1
150 TABLET ORAL DAILY
Qty: 6 TABLET | Refills: 0 | Status: SHIPPED | OUTPATIENT
Start: 2023-11-28 | End: 2023-12-05

## 2023-11-30 ENCOUNTER — TELEPHONE (OUTPATIENT)
Dept: NEUROLOGY | Facility: CLINIC | Age: 48
End: 2023-11-30

## 2023-11-30 ENCOUNTER — PROCEDURE VISIT (OUTPATIENT)
Dept: NEUROLOGY | Facility: CLINIC | Age: 48
End: 2023-11-30
Payer: COMMERCIAL

## 2023-11-30 DIAGNOSIS — R20.2 PARESTHESIAS: ICD-10-CM

## 2023-11-30 PROCEDURE — 95913 NRV CNDJ TEST 13/> STUDIES: CPT | Mod: S$GLB,,, | Performed by: PSYCHIATRY & NEUROLOGY

## 2023-11-30 PROCEDURE — 95913 PR NERVE CONDUCTION STUDY; 13 OR MORE STUDIES: ICD-10-PCS | Mod: S$GLB,,, | Performed by: PSYCHIATRY & NEUROLOGY

## 2023-11-30 NOTE — TELEPHONE ENCOUNTER
----- Message from Thomas Graff MD sent at 11/30/2023  1:19 PM CST -----  11- NCS/EMG BUE BLE Face NL

## 2023-11-30 NOTE — PROCEDURES
[facility]   Regan Hinton  Department of Neurology  [address] [place] [address] BRYANT Cordero  [id number]  Phone [phone]     Fax  [phone]        Full Name: Sandra Dimas Gender: Female  Patient ID: [id number] YOB: 1975      Visit Date: 11/30/2023 10:53 AM  Age: 48 Years  Examining Physician: Thomas Graff M.D.  Referring Physician: Thomas Graff MD  History: NCS/Face/4 extremities.  C/O: Numbness to head, face, tongue, and both arms.  Extensive PMHX including, but not limited to: Migraine, MDD, anxiety, GERD, OA of spine, anemia, occipital neuralgia.    SUMMARY     Nerve conduction studies were performed in the bilateral upper and lower extremities and face.     The facial motor study recording orbicularis oris, orbicularis oculi and nasalis showed symmetric and normal, responses.     The median motor study recording the abductor pollicis brevis showed a normal amplitude, normal distal latency and normal conduction velocity. The ulnar motor study recording the abductor digiti minimi showed a normal amplitude, normal distal latency and normal conduction velocity. No conduction block or focal slowing was present across the elbow.     The median sensory response recording digit two showed a normal amplitude, latency and conduction velocity. The ulnar sensory response recording digit five showed a normal amplitude, latency and conduction velocity. The radial sensory response recording over the extensor snuff box showed a normal amplitude, latency and conduction velocity.     The peroneal motor study recording the extensor digitorum brevis showed a normal amplitude, normal distal latency and normal conduction velocity. No conduction block or focal slowing was present across the fibular neck. The tibial motor study recording the abductor hallucis brevis showed a normal amplitude, normal distal latency and normal conduction velocity.     The sural sensory response showed a normal amplitude and  conduction velocity. The superficial peroneal sensory response showed a normal amplitude and conduction velocity.         IMPRESSION     This is a normal study.     There is no electrophysiologic evidence of peripheral neuropathy.             ---------------------------------             Thomas Graff M.D., F.A.A.N.      Diplomate, American Board of Psychiatry and Neurology  Diplomate, American Board of Clinical Neurophysiology  Fellow, American Academy of Neurology               SENSORY NCS      Nerve / Sites Rec. Site Peak NP Amp PP Amp Dist Jamin d Lat.2     ms µV µV cm m/s ms   L Superficial peroneal      Lat Leg Ankle 3.19 9.0 13.3 10 40.7 3.19      Ref.  4.50  5.0  40.0    L Sural - Lat Mall      Calf Lat Mall 4.38 7.3 6.4 14 39.3 4.38      Ref.  4.50  5.0  40.0    L Median - Dig II      Wrist II 3.15 28.5 46.8 13 52.0 3.15      Ref.  3.40  20.0  48.0    L Median - Ulnar - Palmar      Median Wrist 2.12 47.2 52.2 8 51.2 2.12      Ulnar Wrist 2.19 25.2 37.8 8 3840.0 0.06   L Ulnar - Dig V      Wrist Dig V 3.00 23.7 30.5 11 49.3 3.00      Ref.  3.10  12.0  48.0    L Radial - Snuff box      Forearm Snuff box 2.52 41.2 67.1 10 55.8 2.52      Ref.  2.70 18.0       R Superficial peroneal      Lat Leg Ankle 3.69 14.1 13.1 10 46.6 3.69      Ref.  4.50  5.0  40.0    R Sural - Lat Mall      Calf Lat Mall 3.48 4.7 6.2 14 55.5 3.48      Ref.  4.50  5.0  40.0    R Median - Dig II      Wrist II 3.31 20.0 20.9 13 50.7 3.31      Ref.  3.40  20.0  48.0    R Median - Ulnar - Palmar      Median Wrist 2.17 129.7 184.7 8 51.2 2.17      Ulnar Wrist 2.10 20.0 25.9 8 240.0 -0.06   R Ulnar - Dig V      Wrist Dig V 3.06 15.1 26.6 11 48.9 3.06      Ref.  3.10  12.0  48.0    R Radial - Snuff box      Forearm Snuff box 2.62 42.3 77.4 10 49.5 2.62      Ref.  2.70 18.0           MOTOR NCS      Nerve / Sites Rec. Site Lat Amp Dist Jamin     ms mV cm m/s   L Peroneal - EDB      Ankle EDB 3.31 7.4 8       Ref.  5.50 3.0        FibHead EDB 10.90 7.3 38  50.1      Ref.     40.0      Knee EDB 12.65 7.0 10 57.1   L Tibial - AH      Ankle AH 3.40 13.3 8       Ref.  6.00 8.0        Knee AH 11.96 11.2 43 50.2      Ref.     40.0   L Median - APB      Wrist APB 3.25 13.2 8       Ref.  3.90 6.0        Elbow APB 7.08 13.2 24 62.6      Ref.     49.0   L Ulnar - ADM      Wrist ADM 2.88 10.2 8       Ref.  3.10 7.0        B.Elbow ADM 6.42 10.1 22 62.1      Ref.     50.0      A.Elbow ADM 7.56 10.1 10 87.3   R Peroneal - EDB      Ankle EDB 4.27 8.1 8       Ref.  5.50 3.0        FibHead EDB 11.94 7.1 38 49.6      Ref.     40.0      Knee EDB 13.94 6.8 10 50.0   R Tibial - AH      Ankle AH 4.10 10.4 8       Ref.  6.00 8.0        Knee AH 12.83 9.0 41 47.0      Ref.     40.0   R Median - APB      Wrist APB 3.77 14.9 8       Ref.  3.90 6.0        Elbow APB 7.77 14.6 23 57.5      Ref.     49.0   R Ulnar - ADM      Wrist ADM 3.00 12.1 8       Ref.  3.10 7.0        B.Elbow ADM 6.42 11.8 22 64.4      Ref.     50.0      A.Elbow ADM 8.46 11.5 11 53.9   R Facial - OrbOculi      Mastoid OrbOculi 3.40 1.3 10 29.4   R Facial - Nasalis      Mastoid Nasalis 3.12 1.4 12.5 40.0   R Facial - OrbOris      Mastoid OrbOris 2.04 2.3     L Facial - OrbOculi      Mastoid OrbOculi 3.33 1.6 10 30.0   L Facial - Nasalis      Mastoid Nasalis 3.29 1.5 12 36.5   L Facial - OrbOris      Mastoid OrbOris 2.15 3.5 8 37.3                         ---------------------------------             Thomas Graff M.D., F.A.A.N.      Diplomate, American Board of Psychiatry and Neurology  Diplomate, American Board of Clinical Neurophysiology  Fellow, American Academy of Neurology

## 2023-12-18 ENCOUNTER — OFFICE VISIT (OUTPATIENT)
Dept: OPHTHALMOLOGY | Facility: CLINIC | Age: 48
End: 2023-12-18
Payer: COMMERCIAL

## 2023-12-18 ENCOUNTER — PATIENT MESSAGE (OUTPATIENT)
Dept: OPHTHALMOLOGY | Facility: CLINIC | Age: 48
End: 2023-12-18

## 2023-12-18 DIAGNOSIS — H52.203 MYOPIA WITH ASTIGMATISM AND PRESBYOPIA, BILATERAL: Primary | ICD-10-CM

## 2023-12-18 DIAGNOSIS — H52.4 MYOPIA WITH ASTIGMATISM AND PRESBYOPIA, BILATERAL: Primary | ICD-10-CM

## 2023-12-18 DIAGNOSIS — H52.13 MYOPIA WITH ASTIGMATISM AND PRESBYOPIA, BILATERAL: Primary | ICD-10-CM

## 2023-12-18 PROCEDURE — 99999 PR PBB SHADOW E&M-EST. PATIENT-LVL IV: CPT | Mod: PBBFAC,,, | Performed by: OPTOMETRIST

## 2023-12-18 PROCEDURE — 92014 COMPRE OPH EXAM EST PT 1/>: CPT | Mod: S$GLB,,, | Performed by: OPTOMETRIST

## 2023-12-18 PROCEDURE — 92310 PR CONTACT LENS FITTING (NO CHANGE): ICD-10-PCS | Mod: CSM,S$GLB,, | Performed by: OPTOMETRIST

## 2023-12-18 PROCEDURE — 92310 CONTACT LENS FITTING OU: CPT | Mod: CSM,S$GLB,, | Performed by: OPTOMETRIST

## 2023-12-18 PROCEDURE — 92015 DETERMINE REFRACTIVE STATE: CPT | Mod: S$GLB,,, | Performed by: OPTOMETRIST

## 2023-12-18 PROCEDURE — 99999 PR PBB SHADOW E&M-EST. PATIENT-LVL IV: ICD-10-PCS | Mod: PBBFAC,,, | Performed by: OPTOMETRIST

## 2023-12-18 PROCEDURE — 92015 PR REFRACTION: ICD-10-PCS | Mod: S$GLB,,, | Performed by: OPTOMETRIST

## 2023-12-18 PROCEDURE — 92014 PR EYE EXAM, EST PATIENT,COMPREHESV: ICD-10-PCS | Mod: S$GLB,,, | Performed by: OPTOMETRIST

## 2023-12-18 NOTE — PROGRESS NOTES
HPI     Annual Exam            Comments:   Vision changes since last eye exam?: yes, pt feels she is not seeing well   Any eye pain today: no  Other ocular symptoms: no  Interested in contact lens fitting today? yes         Last edited by Krystyna Dukes MA on 12/18/2023  7:52 AM.            Assessment /Plan     For exam results, see Encounter Report.    Myopia with astigmatism and presbyopia, bilateral      Eyeglass Final Rx       Eyeglass Final Rx         Sphere Cylinder Axis Add    Right -5.25 +0.50 060 +1.75    Left -6.75 +0.75 095 +1.75      Expiration Date: 12/18/2024                  Contact Lens Prescription (12/18/2023)          Brand Base Curve Diameter Sphere Cylinder Axis    Right Acuvue Oasys 1 Day for Astigmatism 8.5 14.3 -4.50 -0.75 150    Left Acuvue Oasys 1 Day for Astigmatism 8.5 14.3 -5.50 -0.75 180      Expiration Date: 12/18/2024    Replacement: Daily    Wearing Schedule: Daily Wear          Ordered trial contact lenses today. Patient is to wear lenses for 1 week.   Ok to order supply if no problems. RTC PRN if any problems arise.    Otherwise, RTC 1 yr for dilated eye exam and gOCT.  Discussed above and answered questions.

## 2023-12-27 ENCOUNTER — PATIENT MESSAGE (OUTPATIENT)
Dept: OPTOMETRY | Facility: CLINIC | Age: 48
End: 2023-12-27
Payer: COMMERCIAL

## 2024-03-01 NOTE — PROGRESS NOTES
Clinic Consult:  [facility] Consultation Note    Reason for Consult:  The primary encounter diagnosis was Irritable bowel syndrome with diarrhea. Diagnoses of NSAID long-term use, Constipation, unspecified constipation type, and Gastroesophageal reflux disease, unspecified whether esophagitis present were also pertinent to this visit.    PCP: OSIRIS Gamez Jr       HPI:  This is a 47 y.o. female here for evaluation of IBS -C/D    #IBS-C/D  - Constipation --can go 3-5 days without BM. Says she has the taste of stool in her mouth and have nausea. Was started on linzess in 11/2022.  Since then having diarrhea, fatigue, Is now having incontinence overnight.  Taking linzess every other day.    - Diarrhea -- will have 1 BM daily but loose and have urgency.    Denies abdominal pain.    *takes NSAIDs almost daily -- takes 800 mg every 4 hours     Was on ozempic, stopped in Nov.  Did not cause problems with gut.  Stopped it because she lost weight and cholesterol improved and then started with migraine so stopped it.  Not eating much now because of migraine.     #migraine/headache  Gets nauseated with headaches.      #GERD  Controlled on ppi     3 pregnancies, all vaginal deliveries.      ROS:  CONSTITUTIONAL: Denies weight change,  fatigue, fevers, chills, night sweats.  EYES: No changes in vision.   ENT: No oral lesions or sore throat.  HEMATOLOGICAL/Lymph: Denies bleeding tendency, bruising tendency. No swellings or enlarged lymph nodes.  CARDIOVASCULAR: Denies chest pain, shortness of breath, orthopnea and edema.  RESPIRATORY: Denies cough, hemoptysis, dyspnea, and wheezing.  GI: See HPI.  : Denies dysuria and hematuria  MUSCULOSKELETAL: Denies joint pain or swelling, back pain and muscle pain.  SKIN: Denies rashes.  NEUROLOGIC: Denies headaches, seizures and numbness.  PSYCHIATRIC: Denies depression or anxiety.  ENDOCRINE: Denies heat or cold intolerance and excessive thirst or urination.    Medical  Continue Levothyroxine 88 mcg daily.  Check TSH.     History:   Past Medical History:   Diagnosis Date    Chronic paroxysmal hemicrania 01/03/2019    Endometriosis     General anesthetics causing adverse effect in therapeutic use     wakes up with severe anxiety attacks    Hemicrania continua     Hepatitis C antibody positive in blood 12/09/2020    RNA NEGATIVE 12/14/2020    Migraines        Surgical History:  Past Surgical History:   Procedure Laterality Date    APPENDECTOMY      COLONOSCOPY N/A 11/12/2020    Procedure: COLONOSCOPY;  Surgeon: Mylene Russ MD;  Location: [facility];  Service: Endoscopy;  Laterality: N/A;    CYST REMOVAL Right 12/30/2019    Procedure: EXCISION, CYST;  Surgeon: NATALI Shah MD;  Location[facility];  Service: OB/GYN;  Laterality: Right;  Sebaceous cyst    diagnostic laprascopy      ESOPHAGOGASTRODUODENOSCOPY N/A 11/30/2018    Procedure: EGD (ESOPHAGOGASTRODUODENOSCOPY);  Surgeon: Bossman Bustos MD;  Location: [facility];  Service: Endoscopy;  Laterality: N/A;    ESOPHAGOGASTRODUODENOSCOPY N/A 11/12/2020    Procedure: ESOPHAGOGASTRODUODENOSCOPY (EGD) needs rapid covid test;  Surgeon: Mylene Russ MD;  Location: [facility];  Service: Endoscopy;  Laterality: N/A;    FULGURATION OF ENDOMETRIOSIS  12/26/2018    Procedure: FULGURATION, ENDOMETRIOSIS;  Surgeon: Zehra Jensen MD;  Location: [facility];  Service: OB/GYN;;  endometriosis implant resection    HYSTEROSCOPY WITH HYDROTHERMAL ABLATION OF ENDOMETRIUM WITH DILATION AND CURETTAGE N/A 12/26/2018    Procedure: HYSTEROSCOPY, WITH DILATION AND CURETTAGE OF UTERUS AND HYDROTHERMAL ENDOMETRIAL ABLATION;  Surgeon: Zehra Jensen MD;  Location: [facility];  Service: OB/GYN;  Laterality: N/A;    LAPAROSCOPIC SALPINGECTOMY Bilateral 12/26/2018    Procedure: SALPINGECTOMY, LAPAROSCOPIC;  Surgeon: Zehra Jensen MD;  Location: [facility];  Service: OB/GYN;  Laterality: Bilateral;    LAPAROTOMY, EXPLORATORY N/A 2/7/2019    Procedure: LAPAROTOMY, EXPLORATORY;  Surgeon: Ramakrishna Boone MD;   Location: [facility] OR;  Service: General;  Laterality: N/A;    REPAIR OF VAGINAL CUFF N/A 7/9/2020    Procedure: REPAIR, VAGINAL CUFF;  Surgeon: Sailaja Addison MD;  Location: [facility] OR;  Service: OB/GYN;  Laterality: N/A;    ROBOT-ASSISTED LAPAROSCOPIC ABDOMINAL HYSTERECTOMY USING DA MAVIS XI N/A 12/30/2019    Procedure: XI ROBOTIC HYSTERECTOMY;  Surgeon: NATALI Shah MD;  Location: [facility] OR;  Service: OB/GYN;  Laterality: N/A;    ROBOT-ASSISTED LAPAROSCOPIC SURGICAL REMOVAL OF OVARY USING DA MAVIS XI Bilateral 12/30/2019    Procedure: XI ROBOTIC OOPHORECTOMY;  Surgeon: NATALI Shah MD;  Location: [facility] OR;  Service: OB/GYN;  Laterality: Bilateral;    robotic assisted laparoscopy with excision of ovarian endometrioma and chromotubation         Family History:   Family History   Problem Relation Age of Onset    Hypertension Mother     Diabetes type II Mother     Heart attack Father     Strabismus Neg Hx     Retinal detachment Neg Hx     Macular degeneration Neg Hx     Glaucoma Neg Hx     Blindness Neg Hx     Amblyopia Neg Hx     Breast cancer Neg Hx     Colon cancer Neg Hx     Ovarian cancer Neg Hx     Thyroid disease Neg Hx     Migraines Neg Hx     Asthma Neg Hx        Social History:   Social History     Tobacco Use    Smoking status: Never    Smokeless tobacco: Never   Substance Use Topics    Alcohol use: Not Currently     Comment: rarely No alcohol 72h prior to sx    Drug use: No       Allergies: Reviewed    Home Medications:   Medication List with Changes/Refills   New Medications    RIFAXIMIN (XIFAXAN) 550 MG TAB    Take 1 tablet (550 mg total) by mouth 3 (three) times daily. for 14 days   Current Medications    ATENOLOL (TENORMIN) 25 MG TABLET    Take 1 tablet (25 mg total) by mouth once daily.    B2/MAGNESIUM CIT,OXID/FEVERFEW (MIGRELIEF ORAL)    Take by mouth once daily.     BACLOFEN (LIORESAL) 10 MG TABLET    Take 1 tablet (10 mg total) by mouth 3 (three) times daily as needed (neck muscle spasm).     "BUTORPHANOL (STADOL) 10 MG/ML NASAL SPRAY    as needed.    DULOXETINE (CYMBALTA) 30 MG CAPSULE    Take 1 capsule (30 mg total) by mouth once daily.    FLUOCINONIDE (LIDEX) 0.05 % OINTMENT    Apply topically to the affected area twice daily as directed    GABAPENTIN (NEURONTIN) 300 MG CAPSULE    Take 1 capsule (300 mg total) by mouth 3 (three) times daily.    HYDROCORTISONE 2.5 % CREAM    Apply topically to the affected area twice daily as directed    LINACLOTIDE (LINZESS) 72 MCG CAP CAPSULE    Take 1 capsule (72 mcg total) by mouth before breakfast.    ONDANSETRON (ZOFRAN-ODT) 8 MG TBDL    Take 1 tablet (8 mg total) by mouth every 8 (eight) hours as needed (nausea/vomiting).    PANTOPRAZOLE (PROTONIX) 40 MG TABLET    Take 1 tablet (40 mg total) by mouth once daily.    PROMETHAZINE (PHENERGAN) 25 MG TABLET    Take 0.5 tablets (12.5 mg total) by mouth every 6 (six) hours as needed for Nausea.    SEMAGLUTIDE (OZEMPIC) 1 MG/DOSE (4 MG/3 ML)    Inject 1 mg into the skin every 7 days.    TOPIRAMATE (TROKENDI XR) 200 MG CP24    Take 1 capsule by mouth once daily.    VALACYCLOVIR (VALTREX) 1000 MG TABLET    Take 1 tablet by mouth once a day    VITAMIN D (VITAMIN D3) 1000 UNITS TAB    Take 1,000 Units by mouth once daily.         Physical Exam:  Vital Signs:  /64 (BP Location: Left arm, Patient Position: Sitting, BP Method: Medium (Manual))   Ht 5' 8" (1.727 m)   Wt 58.8 kg (129 lb 10.1 oz)   LMP  (LMP Unknown)   BMI 19.71 kg/m²   Body mass index is 19.71 kg/m².      GENERAL: No acute distress, A&Ox3  EYES: Anicteric, no pallor noted.  ENT: OP clear  NECK: Supple, no masses, no thyromegally.  CHEST: Equal breath sounds bilaterally, no wheezing.  CARDIOVASCULAR: Regular rate and rhythm. Murmurs, rubs and gallops absent.  ABDOMEN: soft, non-tender, non-distended, normal bowel sounds, no hepatosplenomegaly   EXTREMITIES: No clubbing, cyanosis or edema.  SKIN: Without lesion or erythema.  LYMPH: No cervical, axillary " lymphadenopathy palpable.   NEUROLOGICAL: Grossly normal, no asterixis present.    Labs: Pertinent labs reviewed.    Assessment and Plan:  Irritable bowel syndrome with diarrhea  Diarrhea with urgency and nocturnal incontinence has been her issue in the recent months.  Had colonoscopy in 2020 that was normal.    - trial of rifaximin for 2 weeks   - stop linzess   - start psyllium husk daily   - if no improvement, then will consider repeat colonoscopy vs flex sig to rule out microscopic colitis   -     rifAXIMin (XIFAXAN) 550 mg Tab; Take 1 tablet (550 mg total) by mouth 3 (three) times daily. for 14 days  Dispense: 42 tablet; Refill: 0    NSAID long-term use  Counseled on negative gut effects from high dose nsaid use.  She is actively working with neurology to manage her headaches which hopefully will decrease the need for nsaids.  Recommend ppi while on nsaids.      Constipation, unspecified constipation type    Gastroesophageal reflux disease, unspecified whether esophagitis present  Ppi once daily         Follow up in about 3 months (around 6/20/2023).        Thank you so much for allowing me to participate in the care of Sandra Russ MD

## 2024-03-04 RX ORDER — IBUPROFEN 800 MG/1
800 TABLET ORAL 3 TIMES DAILY
Qty: 90 TABLET | Refills: 1 | Status: SHIPPED | OUTPATIENT
Start: 2024-03-04

## 2024-03-21 ENCOUNTER — HOSPITAL ENCOUNTER (OUTPATIENT)
Dept: RADIOLOGY | Facility: HOSPITAL | Age: 49
Discharge: HOME | End: 2024-03-21
Attending: PEDIATRICS
Payer: COMMERCIAL

## 2024-03-21 ENCOUNTER — OFFICE VISIT (OUTPATIENT)
Dept: INTERNAL MEDICINE | Facility: CLINIC | Age: 49
End: 2024-03-21
Payer: COMMERCIAL

## 2024-03-21 ENCOUNTER — TELEPHONE (OUTPATIENT)
Dept: INTERNAL MEDICINE | Facility: CLINIC | Age: 49
End: 2024-03-21
Payer: COMMERCIAL

## 2024-03-21 VITALS
SYSTOLIC BLOOD PRESSURE: 106 MMHG | TEMPERATURE: 98 F | HEART RATE: 78 BPM | OXYGEN SATURATION: 99 % | HEIGHT: 68 IN | DIASTOLIC BLOOD PRESSURE: 86 MMHG | BODY MASS INDEX: 21.18 KG/M2 | WEIGHT: 139.75 LBS

## 2024-03-21 DIAGNOSIS — K59.00 CONSTIPATION, UNSPECIFIED CONSTIPATION TYPE: Primary | ICD-10-CM

## 2024-03-21 DIAGNOSIS — D50.9 IRON DEFICIENCY ANEMIA, UNSPECIFIED IRON DEFICIENCY ANEMIA TYPE: ICD-10-CM

## 2024-03-21 DIAGNOSIS — K59.00 CONSTIPATION, UNSPECIFIED CONSTIPATION TYPE: ICD-10-CM

## 2024-03-21 PROCEDURE — 99214 OFFICE O/P EST MOD 30 MIN: CPT | Mod: S$GLB,,, | Performed by: PEDIATRICS

## 2024-03-21 PROCEDURE — 1159F MED LIST DOCD IN RCRD: CPT | Mod: CPTII,S$GLB,, | Performed by: PEDIATRICS

## 2024-03-21 PROCEDURE — 74019 RADEX ABDOMEN 2 VIEWS: CPT | Mod: TC

## 2024-03-21 PROCEDURE — 99999 PR PBB SHADOW E&M-EST. PATIENT-LVL V: CPT | Mod: PBBFAC,,, | Performed by: PEDIATRICS

## 2024-03-21 PROCEDURE — 3079F DIAST BP 80-89 MM HG: CPT | Mod: CPTII,S$GLB,, | Performed by: PEDIATRICS

## 2024-03-21 PROCEDURE — 3074F SYST BP LT 130 MM HG: CPT | Mod: CPTII,S$GLB,, | Performed by: PEDIATRICS

## 2024-03-21 PROCEDURE — 74019 RADEX ABDOMEN 2 VIEWS: CPT | Mod: 26,,, | Performed by: RADIOLOGY

## 2024-03-21 PROCEDURE — 1160F RVW MEDS BY RX/DR IN RCRD: CPT | Mod: CPTII,S$GLB,, | Performed by: PEDIATRICS

## 2024-03-21 PROCEDURE — 3008F BODY MASS INDEX DOCD: CPT | Mod: CPTII,S$GLB,, | Performed by: PEDIATRICS

## 2024-03-21 NOTE — PROGRESS NOTES
Subjective     Patient ID: Sandra Dimas is a 48 y.o. female.    Chief Complaint: Constipation and Abdominal Pain    Sandra Dimas is a 48 year old female here for constipation. Pt also had nausea and vomiting yesterday. Pt reports having a normal bowel movement just prior to her appointment. Prior tx includes milk of magnesia and linzess. Pt has been on linzess low dose chronically for constipation and pt is also on ozempic for weight loss weekly when she remembers to take. Pt also reports ongoing fatigue and difficulty sleeping, which is unchanged.      Review of Systems   Constitutional:  Positive for fatigue. Negative for chills and fever.   HENT:  Negative for nasal congestion and rhinorrhea.    Respiratory:  Negative for cough and shortness of breath.    Cardiovascular:  Negative for chest pain.   Gastrointestinal:  Positive for constipation, nausea and vomiting. Negative for abdominal pain, blood in stool, change in bowel habit and diarrhea.   Endocrine: Negative for cold intolerance, heat intolerance, polydipsia, polyphagia and polyuria.   Genitourinary:  Negative for dysuria.   Neurological:  Negative for weakness.   Psychiatric/Behavioral:  Positive for sleep disturbance.           Objective     Physical Exam  Constitutional:       General: She is not in acute distress.     Appearance: Normal appearance. She is normal weight. She is not ill-appearing or toxic-appearing.   Cardiovascular:      Rate and Rhythm: Normal rate and regular rhythm.      Pulses: Normal pulses.      Heart sounds: Normal heart sounds. No murmur heard.     No gallop.   Pulmonary:      Effort: Pulmonary effort is normal. No respiratory distress.      Breath sounds: Normal breath sounds. No stridor. No wheezing, rhonchi or rales.   Chest:      Chest wall: No tenderness.   Abdominal:      General: There is no distension.      Palpations: There is no mass.      Tenderness: There is abdominal tenderness (mild generalized).       Hernia: No hernia is present.   Neurological:      General: No focal deficit present.      Mental Status: She is alert and oriented to person, place, and time. Mental status is at baseline.      Cranial Nerves: No cranial nerve deficit.      Motor: No weakness.      Gait: Gait normal.   Psychiatric:         Mood and Affect: Mood normal.         Behavior: Behavior normal.         Thought Content: Thought content normal.         Judgment: Judgment normal.            Assessment and Plan     1. Constipation, unspecified constipation type  -     Comprehensive Metabolic Panel; Future; Expected date: 03/21/2024    2. Iron deficiency anemia, unspecified iron deficiency anemia type  -     CBC Auto Differential; Future; Expected date: 03/21/2024  -     Iron and TIBC; Future; Expected date: 03/21/2024  -     Ferritin; Future; Expected date: 03/21/2024        KUB did not reveal obstruction but she has a good bit of retained stool and gas. Subsequent to KUB, she had a large BM and felt much better. Daily miralax recommended. Adequate hydration discussed. Continue linzess (consider increasing to 145 if need be) and try to space out ozempic to once every 2-4 weeks (Pt wishes to continue to maintain her weight).         No follow-ups on file.

## 2024-03-29 DIAGNOSIS — G43.919 INTRACTABLE MIGRAINE WITHOUT STATUS MIGRAINOSUS, UNSPECIFIED MIGRAINE TYPE: ICD-10-CM

## 2024-03-29 RX ORDER — PROMETHAZINE HYDROCHLORIDE 25 MG/1
25 TABLET ORAL EVERY 6 HOURS PRN
Qty: 30 TABLET | Refills: 1 | Status: SHIPPED | OUTPATIENT
Start: 2024-03-29

## 2024-04-08 DIAGNOSIS — G43.919 INTRACTABLE MIGRAINE WITHOUT STATUS MIGRAINOSUS, UNSPECIFIED MIGRAINE TYPE: ICD-10-CM

## 2024-04-08 RX ORDER — TOPIRAMATE 50 MG/1
50 CAPSULE, EXTENDED RELEASE ORAL NIGHTLY
Qty: 90 CAPSULE | Refills: 3 | Status: SHIPPED | OUTPATIENT
Start: 2024-04-08

## 2024-04-22 ENCOUNTER — HOSPITAL ENCOUNTER (OUTPATIENT)
Dept: CARDIOLOGY | Facility: HOSPITAL | Age: 49
Discharge: HOME | End: 2024-04-22
Attending: INTERNAL MEDICINE
Payer: COMMERCIAL

## 2024-04-22 DIAGNOSIS — R07.89 CHEST TIGHTNESS: ICD-10-CM

## 2024-04-22 PROCEDURE — 93010 ELECTROCARDIOGRAM REPORT: CPT | Mod: ,,, | Performed by: INTERNAL MEDICINE

## 2024-04-22 PROCEDURE — 93005 ELECTROCARDIOGRAM TRACING: CPT

## 2024-04-24 DIAGNOSIS — R07.89 CHEST TIGHTNESS: Primary | ICD-10-CM

## 2024-04-24 LAB
OHS QRS DURATION: 116 MS
OHS QTC CALCULATION: 465 MS

## 2024-05-01 DIAGNOSIS — Z12.31 OTHER SCREENING MAMMOGRAM: ICD-10-CM

## 2024-05-08 ENCOUNTER — PATIENT MESSAGE (OUTPATIENT)
Dept: ADMINISTRATIVE | Facility: HOSPITAL | Age: 49
End: 2024-05-08
Payer: COMMERCIAL

## 2024-05-14 ENCOUNTER — OFFICE VISIT (OUTPATIENT)
Dept: HEMATOLOGY/ONCOLOGY | Facility: CLINIC | Age: 49
End: 2024-05-14
Payer: COMMERCIAL

## 2024-05-14 ENCOUNTER — LAB VISIT (OUTPATIENT)
Dept: LAB | Facility: HOSPITAL | Age: 49
End: 2024-05-14
Attending: INTERNAL MEDICINE
Payer: COMMERCIAL

## 2024-05-14 ENCOUNTER — PATIENT MESSAGE (OUTPATIENT)
Dept: HEPATOLOGY | Facility: CLINIC | Age: 49
End: 2024-05-14
Payer: COMMERCIAL

## 2024-05-14 VITALS
OXYGEN SATURATION: 100 % | DIASTOLIC BLOOD PRESSURE: 62 MMHG | HEIGHT: 68 IN | HEART RATE: 76 BPM | SYSTOLIC BLOOD PRESSURE: 88 MMHG | WEIGHT: 139.31 LBS | TEMPERATURE: 98 F | BODY MASS INDEX: 21.11 KG/M2

## 2024-05-14 DIAGNOSIS — R79.89 ELEVATED FERRITIN: ICD-10-CM

## 2024-05-14 DIAGNOSIS — D50.0 IRON DEFICIENCY ANEMIA DUE TO CHRONIC BLOOD LOSS: ICD-10-CM

## 2024-05-14 DIAGNOSIS — R16.0 HEPATOMEGALY: ICD-10-CM

## 2024-05-14 DIAGNOSIS — R79.89 ELEVATED FERRITIN: Primary | ICD-10-CM

## 2024-05-14 PROBLEM — D50.9 IDA (IRON DEFICIENCY ANEMIA): Status: RESOLVED | Noted: 2020-11-12 | Resolved: 2024-05-14

## 2024-05-14 LAB
AFP SERPL-MCNC: <2 NG/ML (ref 0–8.4)
HAPTOGLOB SERPL-MCNC: 155 MG/DL (ref 30–250)
HAV IGM SERPL QL IA: ABNORMAL
HBV CORE IGM SERPL QL IA: ABNORMAL
HBV SURFACE AG SERPL QL IA: ABNORMAL
HCV AB SERPL QL IA: REACTIVE
HIV 1+2 AB+HIV1 P24 AG SERPL QL IA: NORMAL
LDH SERPL L TO P-CCNC: 124 U/L (ref 110–260)

## 2024-05-14 PROCEDURE — 86038 ANTINUCLEAR ANTIBODIES: CPT | Performed by: INTERNAL MEDICINE

## 2024-05-14 PROCEDURE — 3078F DIAST BP <80 MM HG: CPT | Mod: CPTII,S$GLB,, | Performed by: INTERNAL MEDICINE

## 2024-05-14 PROCEDURE — 3008F BODY MASS INDEX DOCD: CPT | Mod: CPTII,S$GLB,, | Performed by: INTERNAL MEDICINE

## 2024-05-14 PROCEDURE — 87389 HIV-1 AG W/HIV-1&-2 AB AG IA: CPT | Performed by: INTERNAL MEDICINE

## 2024-05-14 PROCEDURE — 1160F RVW MEDS BY RX/DR IN RCRD: CPT | Mod: CPTII,S$GLB,, | Performed by: INTERNAL MEDICINE

## 2024-05-14 PROCEDURE — 86431 RHEUMATOID FACTOR QUANT: CPT | Performed by: INTERNAL MEDICINE

## 2024-05-14 PROCEDURE — 82105 ALPHA-FETOPROTEIN SERUM: CPT | Performed by: INTERNAL MEDICINE

## 2024-05-14 PROCEDURE — 80074 ACUTE HEPATITIS PANEL: CPT | Performed by: INTERNAL MEDICINE

## 2024-05-14 PROCEDURE — 86235 NUCLEAR ANTIGEN ANTIBODY: CPT | Mod: 59 | Performed by: INTERNAL MEDICINE

## 2024-05-14 PROCEDURE — 83021 HEMOGLOBIN CHROMOTOGRAPHY: CPT | Performed by: INTERNAL MEDICINE

## 2024-05-14 PROCEDURE — 99204 OFFICE O/P NEW MOD 45 MIN: CPT | Mod: S$GLB,,, | Performed by: INTERNAL MEDICINE

## 2024-05-14 PROCEDURE — 36415 COLL VENOUS BLD VENIPUNCTURE: CPT | Performed by: INTERNAL MEDICINE

## 2024-05-14 PROCEDURE — 99999 PR PBB SHADOW E&M-EST. PATIENT-LVL V: CPT | Mod: PBBFAC,,, | Performed by: INTERNAL MEDICINE

## 2024-05-14 PROCEDURE — 83615 LACTATE (LD) (LDH) ENZYME: CPT | Performed by: INTERNAL MEDICINE

## 2024-05-14 PROCEDURE — 83010 ASSAY OF HAPTOGLOBIN QUANT: CPT | Performed by: INTERNAL MEDICINE

## 2024-05-14 PROCEDURE — 86039 ANTINUCLEAR ANTIBODIES (ANA): CPT | Performed by: INTERNAL MEDICINE

## 2024-05-14 PROCEDURE — 3074F SYST BP LT 130 MM HG: CPT | Mod: CPTII,S$GLB,, | Performed by: INTERNAL MEDICINE

## 2024-05-14 PROCEDURE — 1159F MED LIST DOCD IN RCRD: CPT | Mod: CPTII,S$GLB,, | Performed by: INTERNAL MEDICINE

## 2024-05-14 NOTE — PROGRESS NOTES
Subjective:       Patient ID: Sandra Dimas is a 48 y.o. female.    Chief Complaint: Results    HPI:  48-year-old female history of previous iron deficiency anemia.  Treated in the past with intravenous iron.  The patient has had iron repletion and has had hysterectomy.  Patient now has persistently elevated ferritin level most recent CT abdomen pelvis in 2023 demonstrated hepatomegaly enlarged liver of 19.2 cm patient reports fatigue weakness.  Concerned as she plans to visit [place]    Past Medical History:   Diagnosis Date    Chronic paroxysmal hemicrania 01/03/2019    Endometriosis     General anesthetics causing adverse effect in therapeutic use     wakes up with severe anxiety attacks    Hemicrania continua     Hepatitis C antibody positive in blood 12/09/2020    RNA NEGATIVE 12/14/2020    Migraines      Family History   Problem Relation Name Age of Onset    Hypertension Mother      Diabetes type II Mother      Heart attack Father      Strabismus Neg Hx      Retinal detachment Neg Hx      Macular degeneration Neg Hx      Glaucoma Neg Hx      Blindness Neg Hx      Amblyopia Neg Hx      Breast cancer Neg Hx      Colon cancer Neg Hx      Ovarian cancer Neg Hx      Thyroid disease Neg Hx      Migraines Neg Hx      Asthma Neg Hx       Social History     Socioeconomic History    Marital status:    Tobacco Use    Smoking status: Never    Smokeless tobacco: Never   Substance and Sexual Activity    Alcohol use: Not Currently     Comment: rarely No alcohol 72h prior to sx    Drug use: No    Sexual activity: Yes     Partners: Male   Social History Narrative    No pets or smokers in household.     Past Surgical History:   Procedure Laterality Date    APPENDECTOMY      COLONOSCOPY N/A 11/12/2020    Procedure: COLONOSCOPY;  Surgeon: Mylene Russ MD;  Location: [facility];  Service: Endoscopy;  Laterality: N/A;    COLONOSCOPY N/A 4/19/2023    Procedure: COLONOSCOPY;  Surgeon: Dian Vail MD;  Location:  [facility] ENDO;  Service: Endoscopy;  Laterality: N/A;    CYST REMOVAL Right 12/30/2019    Procedure: EXCISION, CYST;  Surgeon: NATALI Shah MD;  Location: [facility];  Service: OB/GYN;  Laterality: Right;  Sebaceous cyst    diagnostic laprascopy      ESOPHAGOGASTRODUODENOSCOPY N/A 11/30/2018    Procedure: EGD (ESOPHAGOGASTRODUODENOSCOPY);  Surgeon: Bossman Bustos MD;  Location: [facility];  Service: Endoscopy;  Laterality: N/A;    ESOPHAGOGASTRODUODENOSCOPY N/A 11/12/2020    Procedure: ESOPHAGOGASTRODUODENOSCOPY (EGD) needs rapid covid test;  Surgeon: Mylene Russ MD;  Location: [facility];  Service: Endoscopy;  Laterality: N/A;    ESOPHAGOGASTRODUODENOSCOPY N/A 4/19/2023    Procedure: EGD (ESOPHAGOGASTRODUODENOSCOPY);  Surgeon: Dian Vail MD;  Location: [facility];  Service: Endoscopy;  Laterality: N/A;    FULGURATION OF ENDOMETRIOSIS  12/26/2018    Procedure: FULGURATION, ENDOMETRIOSIS;  Surgeon: Zehra Jensen MD;  Location: [facility];  Service: OB/GYN;;  endometriosis implant resection    HYSTERECTOMY      HYSTEROSCOPY WITH HYDROTHERMAL ABLATION OF ENDOMETRIUM WITH DILATION AND CURETTAGE N/A 12/26/2018    Procedure: HYSTEROSCOPY, WITH DILATION AND CURETTAGE OF UTERUS AND HYDROTHERMAL ENDOMETRIAL ABLATION;  Surgeon: Zehra Jensen MD;  Location: [facility];  Service: OB/GYN;  Laterality: N/A;    INJECTION OF ANESTHETIC AGENT AROUND NERVE Right 03/17/2023    Procedure: BLOCK, NERVE, RIGHT C2,C3 AND THIRD OCCIPITAL NERVE DR MARRERO ONLY 1 OF 2;  Surgeon: Neftaly Marrero MD;  Location: [facility] PAIN MGT;  Service: Pain Management;  Laterality: Right;    INJECTION OF ANESTHETIC AGENT AROUND NERVE Right 04/06/2023    Procedure: BLOCK, NERVE, RIGHT C2,C3 AND THIRD OCCIPITAL MEDIAL BRANCH DR MARRERO ONLY 2 OF 2 (URGENT) *VIP*;  Surgeon: Neftaly Marrero MD;  Location: [facility]GT;  Service: Pain Management;  Laterality: Right;    LAPAROSCOPIC SALPINGECTOMY Bilateral 12/26/2018    Procedure: SALPINGECTOMY,  LAPAROSCOPIC;  Surgeon: Zehra Jensen MD;  Location: [facility] OR;  Service: OB/GYN;  Laterality: Bilateral;    LAPAROTOMY, EXPLORATORY N/A 02/07/2019    Procedure: LAPAROTOMY, EXPLORATORY;  Surgeon: Ramakrishna Boone MD;  Location: [facility] OR;  Service: General;  Laterality: N/A;    OOPHORECTOMY      RADIOFREQUENCY ABLATION Right 5/11/2023    Procedure: RADIOFREQUENCY ABLATION RIGHT C2,C3, TON;  Surgeon: Neftaly Marrero MD;  Location: [facility] PAIN [facility]GT;  Service: Pain Management;  Laterality: Right;    REPAIR OF VAGINAL CUFF N/A 07/09/2020    Procedure: REPAIR, VAGINAL CUFF;  Surgeon: Sailaja Addison MD;  Location: [facility] OR;  Service: OB/GYN;  Laterality: N/A;    ROBOT-ASSISTED LAPAROSCOPIC ABDOMINAL HYSTERECTOMY USING DA MAVIS XI N/A 12/30/2019    Procedure: XI ROBOTIC HYSTERECTOMY;  Surgeon: NATALI Shah MD;  Location: [facility] OR;  Service: OB/GYN;  Laterality: N/A;    ROBOT-ASSISTED LAPAROSCOPIC SURGICAL REMOVAL OF OVARY USING DA MAVIS XI Bilateral 12/30/2019    Procedure: XI ROBOTIC OOPHORECTOMY;  Surgeon: NATALI Shah MD;  Location: [facility] OR;  Service: OB/GYN;  Laterality: Bilateral;    robotic assisted laparoscopy with excision of ovarian endometrioma and chromotubation         Labs:  Lab Results   Component Value Date    WBC 5.02 03/21/2024    HGB 11.8 (L) 03/21/2024    HCT 37.9 03/21/2024    MCV 82 03/21/2024     03/21/2024     BMP  Lab Results   Component Value Date     03/21/2024    K 4.5 03/21/2024     03/21/2024    CO2 25 03/21/2024    BUN 25 (H) 03/21/2024    CREATININE 0.8 03/21/2024    CALCIUM 10.3 03/21/2024    ANIONGAP 7 (L) 03/21/2024    ESTGFRAFRICA >60.0 05/18/2022    EGFRNONAA >60.0 05/18/2022     Lab Results   Component Value Date    ALT 10 03/21/2024    AST 21 03/21/2024    ALKPHOS 55 03/21/2024    BILITOT 0.5 03/21/2024       Lab Results   Component Value Date    IRON 74 03/21/2024    TIBC 320 03/21/2024    FERRITIN 992 (H) 03/21/2024     Lab Results   Component Value Date     JROEZUWE03 914 11/21/2023     Lab Results   Component Value Date    FOLATE 7.6 11/21/2023     Lab Results   Component Value Date    TSH 0.555 11/21/2023         Review of Systems   Constitutional:  Positive for fatigue. Negative for activity change, appetite change, chills, diaphoresis, fever and unexpected weight change.   HENT:  Negative for congestion, dental problem, drooling, ear discharge, ear pain, facial swelling, hearing loss, mouth sores, nosebleeds, postnasal drip, rhinorrhea, sinus pressure, sneezing, sore throat, tinnitus, trouble swallowing and voice change.    Eyes:  Negative for photophobia, pain, discharge, redness, itching and visual disturbance.   Respiratory:  Negative for cough, choking, chest tightness, shortness of breath, wheezing and stridor.    Cardiovascular:  Negative for chest pain, palpitations and leg swelling.   Gastrointestinal:  Negative for abdominal distention, abdominal pain, anal bleeding, blood in stool, constipation, diarrhea, nausea, rectal pain and vomiting.   Endocrine: Negative for cold intolerance, heat intolerance, polydipsia, polyphagia and polyuria.   Genitourinary:  Negative for decreased urine volume, difficulty urinating, dyspareunia, dysuria, enuresis, flank pain, frequency, genital sores, hematuria, menstrual problem, pelvic pain, urgency, vaginal bleeding, vaginal discharge and vaginal pain.   Musculoskeletal:  Negative for arthralgias, back pain, gait problem, joint swelling, myalgias, neck pain and neck stiffness.   Skin:  Negative for color change, pallor and rash.   Allergic/Immunologic: Negative for environmental allergies, food allergies and immunocompromised state.   Neurological:  Positive for weakness. Negative for dizziness, tremors, seizures, syncope, facial asymmetry, speech difficulty, light-headedness, numbness and headaches.   Hematological:  Negative for adenopathy. Does not bruise/bleed easily.   Psychiatric/Behavioral:  Negative for agitation,  behavioral problems, confusion, decreased concentration, dysphoric mood, hallucinations, self-injury, sleep disturbance and suicidal ideas. The patient is not nervous/anxious and is not hyperactive.        Objective:      Physical Exam  Vitals reviewed.   Constitutional:       General: She is not in acute distress.     Appearance: Normal appearance. She is well-developed. She is not diaphoretic.   HENT:      Head: Normocephalic and atraumatic.      Right Ear: External ear normal.      Left Ear: External ear normal.      Nose: Nose normal.      Right Sinus: No maxillary sinus tenderness or frontal sinus tenderness.      Left Sinus: No maxillary sinus tenderness or frontal sinus tenderness.      Mouth/Throat:      Pharynx: No oropharyngeal exudate.   Eyes:      General: Lids are normal. No scleral icterus.        Right eye: No discharge.         Left eye: No discharge.      Conjunctiva/sclera: Conjunctivae normal.      Right eye: Right conjunctiva is not injected. No hemorrhage.     Left eye: Left conjunctiva is not injected. No hemorrhage.     Pupils: Pupils are equal, round, and reactive to light.   Neck:      Thyroid: No thyromegaly.      Vascular: No JVD.      Trachea: No tracheal deviation.   Cardiovascular:      Rate and Rhythm: Normal rate.   Pulmonary:      Effort: Pulmonary effort is normal. No respiratory distress.      Breath sounds: No stridor.   Chest:      Chest wall: No tenderness.   Abdominal:      General: Bowel sounds are normal. There is no distension.      Palpations: Abdomen is soft. There is no hepatomegaly, splenomegaly or mass.      Tenderness: There is no abdominal tenderness. There is no rebound.   Musculoskeletal:         General: No tenderness. Normal range of motion.      Cervical back: Normal range of motion and neck supple.   Lymphadenopathy:      Cervical: No cervical adenopathy.      Upper Body:      Right upper body: No supraclavicular adenopathy.      Left upper body: No  supraclavicular adenopathy.   Skin:     General: Skin is dry.      Findings: No erythema or rash.   Neurological:      Mental Status: She is alert and oriented to person, place, and time.      Cranial Nerves: No cranial nerve deficit.      Coordination: Coordination normal.   Psychiatric:         Behavior: Behavior normal.         Thought Content: Thought content normal.         Judgment: Judgment normal.             Assessment:      1. Elevated ferritin    2. Hepatomegaly           Med Onc Chart Routing      Follow up with physician . Return to clinic in see me for review in 3-4 weeks after she returns from [place]   Follow up with ISRRAEL    Infusion scheduling note    Injection scheduling note    Labs   Scheduling:  Preferred lab:  Lab interval:  Laboratory studies ordered today   Imaging    Pharmacy appointment    Other referrals         Referrals hepatology for consideration of liver biopsy              Plan:      elevated ferritin level without concurrent disease process.  At this time only finding that I can detect enlarged liver 19.2 cm on CT from 2023.  At this point I will ask hepatology to see whether not liver biopsy is warranted repeat liver studies.  Including repeat hepatitis-C.  Will communicate results through portal and discussed with her once [place].  Discussed implications with her and will discuss with her  as well and last        Judd Hendrickson Jr, MD FACP

## 2024-05-15 LAB
ANA PATTERN 1: NORMAL
ANA SER QL IF: POSITIVE
ANA TITR SER IF: NORMAL {TITER}
RHEUMATOID FACT SERPL-ACNC: <13 IU/ML (ref 0–15)

## 2024-05-16 ENCOUNTER — OFFICE VISIT (OUTPATIENT)
Dept: HEPATOLOGY | Facility: CLINIC | Age: 49
End: 2024-05-16
Payer: COMMERCIAL

## 2024-05-16 VITALS — HEIGHT: 68 IN | WEIGHT: 134 LBS | BODY MASS INDEX: 20.31 KG/M2

## 2024-05-16 DIAGNOSIS — R79.89 ELEVATED FERRITIN: ICD-10-CM

## 2024-05-16 DIAGNOSIS — R16.0 HEPATIC ENLARGEMENT: ICD-10-CM

## 2024-05-16 DIAGNOSIS — R53.82 CHRONIC FATIGUE: Primary | ICD-10-CM

## 2024-05-16 PROCEDURE — 1160F RVW MEDS BY RX/DR IN RCRD: CPT | Mod: CPTII,95,, | Performed by: INTERNAL MEDICINE

## 2024-05-16 PROCEDURE — 99214 OFFICE O/P EST MOD 30 MIN: CPT | Mod: 95,,, | Performed by: INTERNAL MEDICINE

## 2024-05-16 PROCEDURE — 3008F BODY MASS INDEX DOCD: CPT | Mod: CPTII,95,, | Performed by: INTERNAL MEDICINE

## 2024-05-16 PROCEDURE — 1159F MED LIST DOCD IN RCRD: CPT | Mod: CPTII,95,, | Performed by: INTERNAL MEDICINE

## 2024-05-17 LAB
ANTI SM ANTIBODY: 0.09 RATIO (ref 0–0.99)
ANTI SM/RNP ANTIBODY: 0.1 RATIO (ref 0–0.99)
ANTI-SM INTERPRETATION: NEGATIVE
ANTI-SM/RNP INTERPRETATION: NEGATIVE
ANTI-SSA ANTIBODY: 0.09 RATIO (ref 0–0.99)
ANTI-SSA INTERPRETATION: NEGATIVE
ANTI-SSB ANTIBODY: 0.07 RATIO (ref 0–0.99)
ANTI-SSB INTERPRETATION: NEGATIVE
DSDNA AB SER-ACNC: NORMAL [IU]/ML
HGB A2 MFR BLD HPLC: 2.4 % (ref 2.2–3.2)
HGB FRACT BLD ELPH-IMP: NORMAL
HGB FRACT BLD ELPH-IMP: NORMAL

## 2024-05-18 NOTE — PROGRESS NOTES
Subjective:     Sandra Dimas is here for evaluation of Hepatomegaly      HPI  Sandra Dimas has chronic issues with fatigue that has been getting worse. She also has chronically elevated ferritin w/o clear etiology. She is seeing heme as well w/o a clear dx. She was found to have enlarged liver on imaging but liver tests have been normal. She rarely drinks alcohol.      Review of Systems    Objective:     Physical Exam    MELD 3.0: 7 at 11/20/2023 12:51 PM  MELD-Na: 6 at 11/20/2023 12:51 PM  Calculated from:  Serum Creatinine: 0.8 mg/dL (Using min of 1 mg/dL) at 11/20/2023 12:51 PM  Serum Sodium: 145 mmol/L (Using max of 137 mmol/L) at 11/20/2023 12:51 PM  Total Bilirubin: 0.3 mg/dL (Using min of 1 mg/dL) at 11/20/2023 12:51 PM  Serum Albumin: 4.9 g/dL (Using max of 3.5 g/dL) at 11/20/2023 12:51 PM  INR(ratio): 1.0 at 11/20/2023 12:51 PM  Age at listing (hypothetical): 48 years  Sex: Female at 11/20/2023 12:51 PM      WBC   Date Value Ref Range Status   03/21/2024 5.02 3.90 - 12.70 K/uL Final     Hemoglobin   Date Value Ref Range Status   03/21/2024 11.8 (L) 12.0 - 16.0 g/dL Final     Hematocrit   Date Value Ref Range Status   03/21/2024 37.9 37.0 - 48.5 % Final     Platelets   Date Value Ref Range Status   03/21/2024 285 150 - 450 K/uL Final     BUN   Date Value Ref Range Status   03/21/2024 25 (H) 6 - 20 mg/dL Final     Creatinine   Date Value Ref Range Status   03/21/2024 0.8 0.5 - 1.4 mg/dL Final     Glucose   Date Value Ref Range Status   03/21/2024 76 70 - 110 mg/dL Final     Calcium   Date Value Ref Range Status   03/21/2024 10.3 8.7 - 10.5 mg/dL Final     Sodium   Date Value Ref Range Status   03/21/2024 140 136 - 145 mmol/L Final     Potassium   Date Value Ref Range Status   03/21/2024 4.5 3.5 - 5.1 mmol/L Final     Chloride   Date Value Ref Range Status   03/21/2024 108 95 - 110 mmol/L Final     Magnesium   Date Value Ref Range Status   11/21/2023 1.8 1.6 - 2.6 mg/dL Final     AST   Date Value  Ref Range Status   03/21/2024 21 10 - 40 U/L Final     ALT   Date Value Ref Range Status   03/21/2024 10 10 - 44 U/L Final     Alkaline Phosphatase   Date Value Ref Range Status   03/21/2024 55 55 - 135 U/L Final     Total Bilirubin   Date Value Ref Range Status   03/21/2024 0.5 0.1 - 1.0 mg/dL Final     Comment:     For infants and newborns, interpretation of results should be based  on gestational age, weight and in agreement with clinical  observations.    Premature Infant recommended reference ranges:  Up to 24 hours.............<8.0 mg/dL  Up to 48 hours............<12.0 mg/dL  3-5 days..................<15.0 mg/dL  6-29 days.................<15.0 mg/dL       Albumin   Date Value Ref Range Status   03/21/2024 4.6 3.5 - 5.2 g/dL Final     INR   Date Value Ref Range Status   11/20/2023 1.0 0.8 - 1.2 Final     Comment:     Coumadin Therapy:  2.0 - 3.0 for INR for all indicators except mechanical heart valves  and antiphospholipid syndromes which should use 2.5 - 3.5.           Assessment/Plan:     1. Chronic fatigue    2. Elevated ferritin    3. Hepatic enlargement      Sandra Dimas is a 48 y.o. female withHepatomegaly    Chronic fatigue-nonspecific, uncertain is associated with chronic inflammation from unclar source. Will see if other markers of inflammation are elevated  -     C-Reactive Protein; Future; Expected date: 05/16/2024  -     Sedimentation rate; Future; Expected date: 05/16/2024    Elevated ferritin-suspect is a reflection of possible inflammation with lower concern for iron overload but will evaluate further  -     Ambulatory referral/consult to Hepatology  -     C-Reactive Protein; Future; Expected date: 05/16/2024  -     Sedimentation rate; Future; Expected date: 05/16/2024  -     Hemochromatosis DNA Analysis (PCR); Future; Expected date: 05/16/2024    Hepatic enlargement-lower concern for fatty liver as patient w/o risk factors but liver is enlarged will eval further for fibrosis and  steatosis  -     FibroScan (Vibration Controlled Transient Elastography); Future    RTC based on eval    The patient location is: [place]  The chief complaint leading to consultation is: See above in note    Visit type: audiovisual    Face to Face time with patient: 15 minutes  25 minutes of total time spent on the encounter, which includes face to face time and non-face to face time preparing to see the patient (eg, review of tests), Obtaining and/or reviewing separately obtained history, Documenting clinical information in the electronic or other health record, Independently interpreting results (not separately reported) and communicating results to the patient/family/caregiver, or Care coordination (not separately reported).         Each patient to whom he or she provides medical services by telemedicine is:  (1) informed of the relationship between the physician and patient and the respective role of any other health care provider with respect to management of the patient; and (2) notified that he or she may decline to receive medical services by telemedicine and may withdraw from such care at any time.          Libertad Blevins MD

## 2024-05-20 ENCOUNTER — PROCEDURE VISIT (OUTPATIENT)
Dept: HEPATOLOGY | Facility: CLINIC | Age: 49
End: 2024-05-20
Payer: COMMERCIAL

## 2024-05-20 ENCOUNTER — LAB VISIT (OUTPATIENT)
Dept: LAB | Facility: HOSPITAL | Age: 49
End: 2024-05-20
Attending: INTERNAL MEDICINE
Payer: COMMERCIAL

## 2024-05-20 VITALS — HEIGHT: 68 IN | WEIGHT: 134.06 LBS | BODY MASS INDEX: 20.32 KG/M2

## 2024-05-20 DIAGNOSIS — R53.82 CHRONIC FATIGUE: ICD-10-CM

## 2024-05-20 DIAGNOSIS — R79.89 ELEVATED FERRITIN: ICD-10-CM

## 2024-05-20 DIAGNOSIS — R16.0 HEPATIC ENLARGEMENT: ICD-10-CM

## 2024-05-20 LAB
CRP SERPL-MCNC: <0.3 MG/L (ref 0–8.2)
ERYTHROCYTE [SEDIMENTATION RATE] IN BLOOD BY PHOTOMETRIC METHOD: <2 MM/HR (ref 0–36)

## 2024-05-20 PROCEDURE — 81256 HFE GENE: CPT | Performed by: INTERNAL MEDICINE

## 2024-05-20 PROCEDURE — 91200 LIVER ELASTOGRAPHY: CPT | Mod: S$GLB,,, | Performed by: INTERNAL MEDICINE

## 2024-05-20 PROCEDURE — 86140 C-REACTIVE PROTEIN: CPT | Performed by: INTERNAL MEDICINE

## 2024-05-20 PROCEDURE — 36415 COLL VENOUS BLD VENIPUNCTURE: CPT | Performed by: INTERNAL MEDICINE

## 2024-05-20 PROCEDURE — 85652 RBC SED RATE AUTOMATED: CPT | Performed by: INTERNAL MEDICINE

## 2024-05-20 NOTE — PROCEDURES
FibroScan (Vibration Controlled Transient Elastography)    Date/Time: 5/20/2024 9:00 AM    Performed by: Malini Walters RN  Authorized by: Libertad Blevins MD    Diagnosis:  Other    Probe:  M    Universal Protocol: Patient's identity, procedure and site were verified, confirmatory pause was performed.  Discussed procedure including risks and potential complications.  Questions answered.  Patient verbalizes understanding and wishes to proceed with VCTE.     Procedure: After providing explanations of the procedure, patient was placed in the supine position with right arm in maximum abduction to allow optimal exposure of right lateral abdomen.  Patient was briefly assessed, Testing was performed in the mid-axillary location, 50Hz Shear Wave pulses were applied and the resulting Shear Wave and Propagation Speed detected with a 3.5 MHz ultrasonic signal, using the FibroScan probe, Skin to liver capsule distance and liver parenchyma were accessed during the entire examination with the FibroScan probe, Patient was instructed to breathe normally and to abstain from sudden movements during the procedure, allowing for random measurements of liver stiffness. At least 10 Shear Waves were produced, Individual measurements of each Shear Wave were calculated.  Patient tolerated the procedure well with no complications.  Meets discharge criteria as was dismissed.  Rates pain 0 out of 10.  Patient will follow up with ordering provider to review results.    Findings  Median liver stiffness score:  3.8  CAP Reading: dB/m:  175    IQR/med %:  10  Interpretation  Fibrosis interpretation is based on medial liver stiffness - Kilopascal (kPa).    Fibrosis Stage:  F 0-1  Steatosis interpretation is based on controlled attenuation parameter - (dB/m).    Steatosis Grade:  <S1  Comments/Plan:  No steatosis or fibrosis

## 2024-05-22 LAB
GENETICIST REVIEW: NORMAL
HFE GENE MUT ANL BLD/T: NORMAL
HFE RELEASED BY: NORMAL
HFE RESULT SUMMARY: NEGATIVE
REF LAB TEST METHOD: NORMAL
SPECIMEN SOURCE: NORMAL
SPECIMEN,  HEMOCHROMATOSIS: NORMAL

## 2024-06-14 ENCOUNTER — HOSPITAL ENCOUNTER (OUTPATIENT)
Dept: RADIOLOGY | Facility: HOSPITAL | Age: 49
Discharge: HOME | End: 2024-06-14
Attending: STUDENT IN AN ORGANIZED HEALTH CARE EDUCATION/TRAINING PROGRAM
Payer: COMMERCIAL

## 2024-06-14 ENCOUNTER — OFFICE VISIT (OUTPATIENT)
Dept: SURGERY | Facility: CLINIC | Age: 49
End: 2024-06-14
Payer: COMMERCIAL

## 2024-06-14 ENCOUNTER — PATIENT MESSAGE (OUTPATIENT)
Dept: SURGERY | Facility: CLINIC | Age: 49
End: 2024-06-14

## 2024-06-14 VITALS
BODY MASS INDEX: 20.45 KG/M2 | OXYGEN SATURATION: 99 % | SYSTOLIC BLOOD PRESSURE: 95 MMHG | TEMPERATURE: 98 F | HEART RATE: 70 BPM | WEIGHT: 134.94 LBS | DIASTOLIC BLOOD PRESSURE: 64 MMHG | HEIGHT: 68 IN

## 2024-06-14 DIAGNOSIS — K52.9 COLITIS: Primary | ICD-10-CM

## 2024-06-14 DIAGNOSIS — K57.92 DIVERTICULITIS: Primary | ICD-10-CM

## 2024-06-14 DIAGNOSIS — K57.92 DIVERTICULITIS: ICD-10-CM

## 2024-06-14 PROCEDURE — 74177 CT ABD & PELVIS W/CONTRAST: CPT | Mod: TC,PN

## 2024-06-14 PROCEDURE — 99203 OFFICE O/P NEW LOW 30 MIN: CPT | Mod: S$GLB,,, | Performed by: STUDENT IN AN ORGANIZED HEALTH CARE EDUCATION/TRAINING PROGRAM

## 2024-06-14 PROCEDURE — 3074F SYST BP LT 130 MM HG: CPT | Mod: CPTII,S$GLB,, | Performed by: STUDENT IN AN ORGANIZED HEALTH CARE EDUCATION/TRAINING PROGRAM

## 2024-06-14 PROCEDURE — 25500020 PHARM REV CODE 255: Mod: PN | Performed by: STUDENT IN AN ORGANIZED HEALTH CARE EDUCATION/TRAINING PROGRAM

## 2024-06-14 PROCEDURE — 3008F BODY MASS INDEX DOCD: CPT | Mod: CPTII,S$GLB,, | Performed by: STUDENT IN AN ORGANIZED HEALTH CARE EDUCATION/TRAINING PROGRAM

## 2024-06-14 PROCEDURE — 1159F MED LIST DOCD IN RCRD: CPT | Mod: CPTII,S$GLB,, | Performed by: STUDENT IN AN ORGANIZED HEALTH CARE EDUCATION/TRAINING PROGRAM

## 2024-06-14 PROCEDURE — 74177 CT ABD & PELVIS W/CONTRAST: CPT | Mod: 26,,, | Performed by: RADIOLOGY

## 2024-06-14 PROCEDURE — 3078F DIAST BP <80 MM HG: CPT | Mod: CPTII,S$GLB,, | Performed by: STUDENT IN AN ORGANIZED HEALTH CARE EDUCATION/TRAINING PROGRAM

## 2024-06-14 PROCEDURE — 99999 PR PBB SHADOW E&M-EST. PATIENT-LVL V: CPT | Mod: PBBFAC,,, | Performed by: STUDENT IN AN ORGANIZED HEALTH CARE EDUCATION/TRAINING PROGRAM

## 2024-06-14 RX ADMIN — IOHEXOL 75 ML: 350 INJECTION, SOLUTION INTRAVENOUS at 11:06

## 2024-06-14 NOTE — PROGRESS NOTES
CRS Office Visit    SUBJECTIVE:     Chief Complaint:  Abdominal pain    History of Present Illness:  Patient is a 48 y.o. female presents with complaints of persistent left lower quadrant abdominal pain.  She was in [place] one week ago and had left lower quadrant abdominal pain was sudden in onset.  Reports she had a CT scan done there that showed diverticulitis.  She was prescribed antibiotics and her pain slightly improved however it has still been persistent.  Pain is dull in nature.  Associated with nausea without vomiting.  She has baseline as she was with constipation and takes Linzess daily.  She took a colonoscopy prep to have bowel movements while she was in [place].   She reports that since then her stools have been abnormal in their consistency.  She reports they are more mucousy and she felt as if she saw some blood tinged stool.     Colonoscopy: The perianal and digital rectal examinations were normal. The colon (entire examined portion) appeared normal. Biopsies for  histology were taken with a cold forceps from the right colon and left colon for evaluation of microscopic colitis.     Pathology:  Benign colonic mucosa    Review of patient's allergies indicates:   Allergen Reactions    Chlorhexidine Rash     Per  after surgery patient had large rash across abdomen where chlorhexidine was applied.    Hydrocodone Other (See Comments)     Chest pains. Note: patient tolerated hydromorphone in 2/2019.     Mastisol adhesive [gum bbhsjn-jfdkbj-qjiw-alcohol] Rash       Past Medical History:   Diagnosis Date    Chronic paroxysmal hemicrania 01/03/2019    Endometriosis     General anesthetics causing adverse effect in therapeutic use     wakes up with severe anxiety attacks    Hemicrania continua     Hepatitis C antibody positive in blood 12/09/2020    RNA NEGATIVE 12/14/2020    Migraines      Past Surgical History:   Procedure Laterality Date    APPENDECTOMY      COLONOSCOPY N/A 11/12/2020    Procedure:  COLONOSCOPY;  Surgeon: Mylene Russ MD;  Location: [facility];  Service: Endoscopy;  Laterality: N/A;    COLONOSCOPY N/A 4/19/2023    Procedure: COLONOSCOPY;  Surgeon: Dian Vail MD;  Location: [facility];  Service: Endoscopy;  Laterality: N/A;    CYST REMOVAL Right 12/30/2019    Procedure: EXCISION, CYST;  Surgeon: NATALI Shah MD;  Location: [facility] OR;  Service: OB/GYN;  Laterality: Right;  Sebaceous cyst    diagnostic laprascopy      ESOPHAGOGASTRODUODENOSCOPY N/A 11/30/2018    Procedure: EGD (ESOPHAGOGASTRODUODENOSCOPY);  Surgeon: Bossman Bustos MD;  Location: [facility];  Service: Endoscopy;  Laterality: N/A;    ESOPHAGOGASTRODUODENOSCOPY N/A 11/12/2020    Procedure: ESOPHAGOGASTRODUODENOSCOPY (EGD) needs rapid covid test;  Surgeon: Mylene Russ MD;  Location: [facility];  Service: Endoscopy;  Laterality: N/A;    ESOPHAGOGASTRODUODENOSCOPY N/A 4/19/2023    Procedure: EGD (ESOPHAGOGASTRODUODENOSCOPY);  Surgeon: Dian Vail MD;  Location: [facility];  Service: Endoscopy;  Laterality: N/A;    FULGURATION OF ENDOMETRIOSIS  12/26/2018    Procedure: FULGURATION, ENDOMETRIOSIS;  Surgeon: Zehra Jensen MD;  Location: [facility];  Service: OB/GYN;;  endometriosis implant resection    HYSTERECTOMY      HYSTEROSCOPY WITH HYDROTHERMAL ABLATION OF ENDOMETRIUM WITH DILATION AND CURETTAGE N/A 12/26/2018    Procedure: HYSTEROSCOPY, WITH DILATION AND CURETTAGE OF UTERUS AND HYDROTHERMAL ENDOMETRIAL ABLATION;  Surgeon: eZhra Jensen MD;  Location: [facility] OR;  Service: OB/GYN;  Laterality: N/A;    INJECTION OF ANESTHETIC AGENT AROUND NERVE Right 03/17/2023    Procedure: BLOCK, NERVE, RIGHT C2,C3 AND THIRD OCCIPITAL NERVE DR MARRERO ONLY 1 OF 2;  Surgeon: Neftaly Marrero MD;  Location: [facility] PAIN MGT;  Service: Pain Management;  Laterality: Right;    INJECTION OF ANESTHETIC AGENT AROUND NERVE Right 04/06/2023    Procedure: BLOCK, NERVE, RIGHT C2,C3 AND THIRD OCCIPITAL MEDIAL BRANCH DR MARRERO ONLY 2  OF 2 (URGENT) *VIP*;  Surgeon: Neftaly Marrero MD;  Location: [facility] [place] [facility];  Service: Pain Management;  Laterality: Right;    LAPAROSCOPIC SALPINGECTOMY Bilateral 12/26/2018    Procedure: SALPINGECTOMY, LAPAROSCOPIC;  Surgeon: Zehra Jensen MD;  Location: [facility] OR;  Service: OB/GYN;  Laterality: Bilateral;    LAPAROTOMY, EXPLORATORY N/A 02/07/2019    Procedure: LAPAROTOMY, EXPLORATORY;  Surgeon: Ramakrishna Boone MD;  Location: [facility] OR;  Service: General;  Laterality: N/A;    OOPHORECTOMY      RADIOFREQUENCY ABLATION Right 5/11/2023    Procedure: RADIOFREQUENCY ABLATION RIGHT C2,C3, TON;  Surgeon: Neftaly Marrero MD;  Location: [facility] [place] [facility]GT;  Service: Pain Management;  Laterality: Right;    REPAIR OF VAGINAL CUFF N/A 07/09/2020    Procedure: REPAIR, VAGINAL CUFF;  Surgeon: Sailaja Addison MD;  Location: [facility] OR;  Service: OB/GYN;  Laterality: N/A;    ROBOT-ASSISTED LAPAROSCOPIC ABDOMINAL HYSTERECTOMY USING DA MAVIS XI N/A 12/30/2019    Procedure: XI ROBOTIC HYSTERECTOMY;  Surgeon: NATALI Shah MD;  Location: [facility] OR;  Service: OB/GYN;  Laterality: N/A;    ROBOT-ASSISTED LAPAROSCOPIC SURGICAL REMOVAL OF OVARY USING DA MAVIS XI Bilateral 12/30/2019    Procedure: XI ROBOTIC OOPHORECTOMY;  Surgeon: NATALI Shah MD;  Location: [facility] OR;  Service: OB/GYN;  Laterality: Bilateral;    robotic assisted laparoscopy with excision of ovarian endometrioma and chromotubation       Family History   Problem Relation Name Age of Onset    Hypertension Mother      Diabetes type II Mother      Heart attack Father      Strabismus Neg Hx      Retinal detachment Neg Hx      Macular degeneration Neg Hx      Glaucoma Neg Hx      Blindness Neg Hx      Amblyopia Neg Hx      Breast cancer Neg Hx      Colon cancer Neg Hx      Ovarian cancer Neg Hx      Thyroid disease Neg Hx      Migraines Neg Hx      Asthma Neg Hx       Social History     Tobacco Use    Smoking status: Never    Smokeless tobacco: Never   Substance Use Topics     Alcohol use: Not Currently     Comment: rarely No alcohol 72h prior to sx    Drug use: No          OBJECTIVE:     Vital Signs (Most Recent)  Temp: 97.9 °F (36.6 °C) (06/14/24 1300)  Pulse: 70 (06/14/24 1300)  BP: 95/64 (06/14/24 1300)  SpO2: 99 % (06/14/24 1300)    Physical Exam:  Physical Exam  Constitutional:       Appearance: She is well-developed.   HENT:      Head: Normocephalic and atraumatic.   Eyes:      Conjunctiva/sclera: Conjunctivae normal.      Pupils: Pupils are equal, round, and reactive to light.   Neck:      Thyroid: No thyromegaly.   Cardiovascular:      Rate and Rhythm: Normal rate.   Pulmonary:      Effort: Pulmonary effort is normal. No respiratory distress.   Abdominal:      General: There is no distension.      Palpations: Abdomen is soft. There is no mass.      Tenderness: There is abdominal tenderness.   Musculoskeletal:         General: No tenderness. Normal range of motion.      Cervical back: Normal range of motion.   Skin:     General: Skin is warm and dry.      Capillary Refill: Capillary refill takes less than 2 seconds.   Neurological:      General: No focal deficit present.      Mental Status: She is alert and oriented to person, place, and time.       CT abd/pel  No parenchymal abnormalities are appreciated in the liver or spleen although the liver is enlarged measuring 20.8 cm in its craniocaudal dimension.  The pancreas and adrenals appear normal.  There is a small collection of free fluid visible in the pericholecystic space although no gallstones or gallbladder wall thickening are appreciated.  Correlation with patient history and further imaging evaluation by right upper quadrant abdominal ultrasound may be helpful.  The kidneys opacify symmetrically with intravenous contrast demonstrating no evidence of urinary obstruction or focal parenchymal abnormality.  No free intraperitoneal fluid or lymph node enlargement is identified.  A site of prior small bowel surgery is again seen  in the lower abdomen involving the distal ileum.     Images obtained through the pelvis demonstrate no gross abnormality in an incompletely distended urinary bladder.  Uterus and ovaries are surgically absent.  No free pelvic fluid is visible.  There is stable appearance of an incidental bone island in the left iliac bone.       ASSESSMENT/PLAN:     Diagnoses and all orders for this visit:    Colitis  -     CULTURE, STOOL; Future      - Discussed with the patient after review imaging with Dr. Campuzano the no diverticulum were visualized on CT scan or on her colonoscopy.  I suspect she had an episode of colitis, which could be infectious in nature, that is still resolving.  - will follow up on labs were collected earlier today  - will obtain stool cultures and treat as indicated  - patient does have a moderate volume of stool throughout her colon.  Recommend aggressive bowel regimen with her Linzess, MiraLax, Colace to relieve constipation  - will update patient as testing results  - all questions answered

## 2024-06-17 ENCOUNTER — LAB VISIT (OUTPATIENT)
Dept: LAB | Facility: HOSPITAL | Age: 49
End: 2024-06-17
Attending: STUDENT IN AN ORGANIZED HEALTH CARE EDUCATION/TRAINING PROGRAM
Payer: COMMERCIAL

## 2024-06-17 DIAGNOSIS — K52.9 COLITIS: ICD-10-CM

## 2024-06-17 PROCEDURE — 87427 SHIGA-LIKE TOXIN AG IA: CPT | Performed by: STUDENT IN AN ORGANIZED HEALTH CARE EDUCATION/TRAINING PROGRAM

## 2024-06-17 PROCEDURE — 87046 STOOL CULTR AEROBIC BACT EA: CPT | Performed by: STUDENT IN AN ORGANIZED HEALTH CARE EDUCATION/TRAINING PROGRAM

## 2024-06-17 PROCEDURE — 87045 FECES CULTURE AEROBIC BACT: CPT | Performed by: STUDENT IN AN ORGANIZED HEALTH CARE EDUCATION/TRAINING PROGRAM

## 2024-06-17 PROCEDURE — 87449 NOS EACH ORGANISM AG IA: CPT | Performed by: STUDENT IN AN ORGANIZED HEALTH CARE EDUCATION/TRAINING PROGRAM

## 2024-06-19 ENCOUNTER — PATIENT MESSAGE (OUTPATIENT)
Dept: CARDIOLOGY | Facility: HOSPITAL | Age: 49
End: 2024-06-19
Payer: COMMERCIAL

## 2024-06-19 LAB
BACTERIA STL CULT: NORMAL
BACTERIA STL CULT: NORMAL

## 2024-07-01 DIAGNOSIS — R00.2 PALPITATION: ICD-10-CM

## 2024-07-01 DIAGNOSIS — R07.89 CHEST TIGHTNESS: Primary | ICD-10-CM

## 2024-07-01 DIAGNOSIS — D50.0 IRON DEFICIENCY ANEMIA DUE TO CHRONIC BLOOD LOSS: ICD-10-CM

## 2024-07-02 ENCOUNTER — LAB VISIT (OUTPATIENT)
Dept: LAB | Facility: HOSPITAL | Age: 49
End: 2024-07-02
Attending: INTERNAL MEDICINE
Payer: COMMERCIAL

## 2024-07-02 DIAGNOSIS — D50.0 IRON DEFICIENCY ANEMIA DUE TO CHRONIC BLOOD LOSS: ICD-10-CM

## 2024-07-02 DIAGNOSIS — R07.89 CHEST TIGHTNESS: ICD-10-CM

## 2024-07-02 DIAGNOSIS — R00.2 PALPITATION: ICD-10-CM

## 2024-07-02 LAB
ALBUMIN SERPL BCP-MCNC: 4.4 G/DL (ref 3.5–5.2)
ALP SERPL-CCNC: 57 U/L (ref 55–135)
ALT SERPL W/O P-5'-P-CCNC: 15 U/L (ref 10–44)
ANION GAP SERPL CALC-SCNC: 9 MMOL/L (ref 8–16)
AST SERPL-CCNC: 16 U/L (ref 10–40)
BASOPHILS # BLD AUTO: 0.02 K/UL (ref 0–0.2)
BASOPHILS NFR BLD: 0.4 % (ref 0–1.9)
BILIRUB SERPL-MCNC: 0.3 MG/DL (ref 0.1–1)
BUN SERPL-MCNC: 25 MG/DL (ref 6–20)
CALCIUM SERPL-MCNC: 10.1 MG/DL (ref 8.7–10.5)
CHLORIDE SERPL-SCNC: 111 MMOL/L (ref 95–110)
CO2 SERPL-SCNC: 18 MMOL/L (ref 23–29)
CREAT SERPL-MCNC: 0.9 MG/DL (ref 0.5–1.4)
DIFFERENTIAL METHOD BLD: ABNORMAL
EOSINOPHIL # BLD AUTO: 0.1 K/UL (ref 0–0.5)
EOSINOPHIL NFR BLD: 1.8 % (ref 0–8)
ERYTHROCYTE [DISTWIDTH] IN BLOOD BY AUTOMATED COUNT: 13 % (ref 11.5–14.5)
EST. GFR  (NO RACE VARIABLE): >60 ML/MIN/1.73 M^2
GLUCOSE SERPL-MCNC: 82 MG/DL (ref 70–110)
HCT VFR BLD AUTO: 34.6 % (ref 37–48.5)
HGB BLD-MCNC: 10.6 G/DL (ref 12–16)
IMM GRANULOCYTES # BLD AUTO: 0.01 K/UL (ref 0–0.04)
IMM GRANULOCYTES NFR BLD AUTO: 0.2 % (ref 0–0.5)
LYMPHOCYTES # BLD AUTO: 2.6 K/UL (ref 1–4.8)
LYMPHOCYTES NFR BLD: 48.2 % (ref 18–48)
MCH RBC QN AUTO: 26 PG (ref 27–31)
MCHC RBC AUTO-ENTMCNC: 30.6 G/DL (ref 32–36)
MCV RBC AUTO: 85 FL (ref 82–98)
MONOCYTES # BLD AUTO: 0.3 K/UL (ref 0.3–1)
MONOCYTES NFR BLD: 6.3 % (ref 4–15)
NEUTROPHILS # BLD AUTO: 2.3 K/UL (ref 1.8–7.7)
NEUTROPHILS NFR BLD: 43.1 % (ref 38–73)
NRBC BLD-RTO: 0 /100 WBC
PLATELET # BLD AUTO: 294 K/UL (ref 150–450)
PMV BLD AUTO: 10.2 FL (ref 9.2–12.9)
POTASSIUM SERPL-SCNC: 4.6 MMOL/L (ref 3.5–5.1)
PROT SERPL-MCNC: 7.5 G/DL (ref 6–8.4)
RBC # BLD AUTO: 4.08 M/UL (ref 4–5.4)
SODIUM SERPL-SCNC: 138 MMOL/L (ref 136–145)
WBC # BLD AUTO: 5.41 K/UL (ref 3.9–12.7)

## 2024-07-02 PROCEDURE — 36415 COLL VENOUS BLD VENIPUNCTURE: CPT | Performed by: INTERNAL MEDICINE

## 2024-07-02 PROCEDURE — 85025 COMPLETE CBC W/AUTO DIFF WBC: CPT | Performed by: INTERNAL MEDICINE

## 2024-07-02 PROCEDURE — 80053 COMPREHEN METABOLIC PANEL: CPT | Performed by: INTERNAL MEDICINE

## 2024-07-15 DIAGNOSIS — G43.919 INTRACTABLE MIGRAINE WITHOUT STATUS MIGRAINOSUS, UNSPECIFIED MIGRAINE TYPE: ICD-10-CM

## 2024-07-15 RX ORDER — TOPIRAMATE 100 MG/1
100 CAPSULE, EXTENDED RELEASE ORAL DAILY
Qty: 90 CAPSULE | Refills: 3 | Status: SHIPPED | OUTPATIENT
Start: 2024-07-15

## 2024-07-15 RX ORDER — PROMETHAZINE HYDROCHLORIDE 25 MG/1
25 TABLET ORAL EVERY 6 HOURS PRN
Qty: 30 TABLET | Refills: 1 | Status: SHIPPED | OUTPATIENT
Start: 2024-07-15

## 2024-07-18 ENCOUNTER — HOSPITAL ENCOUNTER (OUTPATIENT)
Dept: CARDIOLOGY | Facility: HOSPITAL | Age: 49
Discharge: HOME | End: 2024-07-18
Attending: INTERNAL MEDICINE
Payer: COMMERCIAL

## 2024-07-18 VITALS
SYSTOLIC BLOOD PRESSURE: 88 MMHG | DIASTOLIC BLOOD PRESSURE: 62 MMHG | BODY MASS INDEX: 20.31 KG/M2 | WEIGHT: 134 LBS | HEIGHT: 68 IN

## 2024-07-18 DIAGNOSIS — R79.89 ELEVATED FERRITIN: ICD-10-CM

## 2024-07-18 LAB
AORTIC ROOT ANNULUS: 3.08 CM
AV INDEX (PROSTH): 0.77
AV MEAN GRADIENT: 3 MMHG
AV PEAK GRADIENT: 5 MMHG
AV VALVE AREA BY VELOCITY RATIO: 2.52 CM²
AV VALVE AREA: 2.47 CM²
AV VELOCITY RATIO: 0.79
BSA FOR ECHO PROCEDURE: 1.71 M2
CV ECHO LV RWT: 0.46 CM
DOP CALC AO PEAK VEL: 1.13 M/S
DOP CALC AO VTI: 23.9 CM
DOP CALC LVOT AREA: 3.2 CM2
DOP CALC LVOT DIAMETER: 2.02 CM
DOP CALC LVOT PEAK VEL: 0.89 M/S
DOP CALC LVOT STROKE VOLUME: 58.94 CM3
DOP CALC RVOT PEAK VEL: 0.7 M/S
DOP CALC RVOT VTI: 15.5 CM
DOP CALCLVOT PEAK VEL VTI: 18.4 CM
E WAVE DECELERATION TIME: 165.74 MSEC
E/A RATIO: 1.15
E/E' RATIO: 7.89 M/S
ECHO LV POSTERIOR WALL: 0.99 CM (ref 0.6–1.1)
FRACTIONAL SHORTENING: 33 % (ref 28–44)
INTERVENTRICULAR SEPTUM: 0.93 CM (ref 0.6–1.1)
IVRT: 82.78 MSEC
LA MAJOR: 4.17 CM
LA MINOR: 3.88 CM
LA WIDTH: 2.7 CM
LEFT ATRIUM AREA SYSTOLIC (APICAL 2 CHAMBER): 12.69 CM2
LEFT ATRIUM AREA SYSTOLIC (APICAL 4 CHAMBER): 14.07 CM2
LEFT ATRIUM SIZE: 3 CM
LEFT ATRIUM VOLUME INDEX MOD: 18.9 ML/M2
LEFT ATRIUM VOLUME INDEX: 16.1 ML/M2
LEFT ATRIUM VOLUME MOD: 32.57 CM3
LEFT ATRIUM VOLUME: 27.68 CM3
LEFT INTERNAL DIMENSION IN SYSTOLE: 2.89 CM (ref 2.1–4)
LEFT VENTRICLE DIASTOLIC VOLUME INDEX: 49.38 ML/M2
LEFT VENTRICLE DIASTOLIC VOLUME: 84.93 ML
LEFT VENTRICLE END SYSTOLIC VOLUME APICAL 2 CHAMBER: 25.43 ML
LEFT VENTRICLE END SYSTOLIC VOLUME APICAL 4 CHAMBER: 36.47 ML
LEFT VENTRICLE MASS INDEX: 79 G/M2
LEFT VENTRICLE SYSTOLIC VOLUME INDEX: 18.5 ML/M2
LEFT VENTRICLE SYSTOLIC VOLUME: 31.87 ML
LEFT VENTRICULAR INTERNAL DIMENSION IN DIASTOLE: 4.34 CM (ref 3.5–6)
LEFT VENTRICULAR MASS: 136.69 G
LV LATERAL E/E' RATIO: 7.1 M/S
LV SEPTAL E/E' RATIO: 8.88 M/S
LVED V (TEICH): 84.93 ML
LVES V (TEICH): 31.87 ML
LVOT MG: 1.71 MMHG
LVOT MV: 0.61 CM/S
MV PEAK A VEL: 0.62 M/S
MV PEAK E VEL: 0.71 M/S
OHS CV RV/LV RATIO: 0.46 CM
PISA TR MAX VEL: 2.54 M/S
PULM VEIN S/D RATIO: 1.43
PV MEAN GRADIENT: 1 MMHG
PV MV: 0.66 M/S
PV PEAK D VEL: 0.44 M/S
PV PEAK GRADIENT: 3 MMHG
PV PEAK S VEL: 0.63 M/S
PV PEAK VELOCITY: 0.9 M/S
RA MAJOR: 4.52 CM
RA PRESSURE ESTIMATED: 3 MMHG
RA WIDTH: 2.52 CM
RIGHT VENTRICULAR END-DIASTOLIC DIMENSION: 2 CM
RV TB RVSP: 6 MMHG
SINUS: 2.7 CM
STJ: 2.38 CM
TDI LATERAL: 0.1 M/S
TDI SEPTAL: 0.08 M/S
TDI: 0.09 M/S
TR MAX PG: 26 MMHG
TRICUSPID ANNULAR PLANE SYSTOLIC EXCURSION: 2.51 CM
TV REST PULMONARY ARTERY PRESSURE: 29 MMHG
Z-SCORE OF LEFT VENTRICULAR DIMENSION IN END DIASTOLE: -0.95
Z-SCORE OF LEFT VENTRICULAR DIMENSION IN END SYSTOLE: -0.17

## 2024-07-18 PROCEDURE — 93306 TTE W/DOPPLER COMPLETE: CPT | Mod: 26,,, | Performed by: INTERNAL MEDICINE

## 2024-07-18 PROCEDURE — 93306 TTE W/DOPPLER COMPLETE: CPT

## 2024-07-24 ENCOUNTER — PATIENT MESSAGE (OUTPATIENT)
Dept: HEPATOLOGY | Facility: CLINIC | Age: 49
End: 2024-07-24
Payer: COMMERCIAL

## 2024-07-29 ENCOUNTER — OFFICE VISIT (OUTPATIENT)
Dept: SPORTS MEDICINE | Facility: CLINIC | Age: 49
End: 2024-07-29
Payer: COMMERCIAL

## 2024-07-29 ENCOUNTER — HOSPITAL ENCOUNTER (OUTPATIENT)
Dept: RADIOLOGY | Facility: HOSPITAL | Age: 49
Discharge: HOME | End: 2024-07-29
Attending: STUDENT IN AN ORGANIZED HEALTH CARE EDUCATION/TRAINING PROGRAM
Payer: COMMERCIAL

## 2024-07-29 DIAGNOSIS — M25.511 RIGHT SHOULDER PAIN, UNSPECIFIED CHRONICITY: ICD-10-CM

## 2024-07-29 DIAGNOSIS — M75.41 IMPINGEMENT SYNDROME OF RIGHT SHOULDER: Primary | ICD-10-CM

## 2024-07-29 PROCEDURE — 73030 X-RAY EXAM OF SHOULDER: CPT | Mod: TC,RT

## 2024-07-29 PROCEDURE — 99999 PR PBB SHADOW E&M-EST. PATIENT-LVL II: CPT | Mod: PBBFAC,,, | Performed by: STUDENT IN AN ORGANIZED HEALTH CARE EDUCATION/TRAINING PROGRAM

## 2024-07-29 PROCEDURE — 73030 X-RAY EXAM OF SHOULDER: CPT | Mod: 26,RT,, | Performed by: RADIOLOGY

## 2024-07-29 PROCEDURE — 99203 OFFICE O/P NEW LOW 30 MIN: CPT | Mod: S$GLB,,, | Performed by: STUDENT IN AN ORGANIZED HEALTH CARE EDUCATION/TRAINING PROGRAM

## 2024-07-29 NOTE — PROGRESS NOTES
Patient ID: Sandra Dimas  YOB: 1975  MRN: [id number]    Chief Complaint: No chief complaint on file.    Referred By: Self for right shoulder    History of Present Illness: Sandra Dimas is a right-hand dominant 49 y.o. female who presents today with right anterior shoulder pain.  Symptoms started about 2 months ago reaching behind and has since had some anterior shoulder pain that has radiated down to her hand.  Happens almost daily specifically with reaching overhead reaching behind.  Has pain lying on the right side as well.  Currently on ibuprofen every 4 hours 800 mg for chronic headaches.  Has not done any therapy advanced imaging injections.  Has not had significant pain like this in the past.  Does have a history of C-spine impingement in the past as well.    Past Medical History:   Past Medical History:   Diagnosis Date    Chronic paroxysmal hemicrania 01/03/2019    Endometriosis     General anesthetics causing adverse effect in therapeutic use     wakes up with severe anxiety attacks    Hemicrania continua     Hepatitis C antibody positive in blood 12/09/2020    RNA NEGATIVE 12/14/2020    Migraines      Past Surgical History:   Procedure Laterality Date    APPENDECTOMY      COLONOSCOPY N/A 11/12/2020    Procedure: COLONOSCOPY;  Surgeon: Mylene Russ MD;  Location: [facility];  Service: Endoscopy;  Laterality: N/A;    COLONOSCOPY N/A 4/19/2023    Procedure: COLONOSCOPY;  Surgeon: Dian Vail MD;  Location: [facility];  Service: Endoscopy;  Laterality: N/A;    CYST REMOVAL Right 12/30/2019    Procedure: EXCISION, CYST;  Surgeon: NATALI Shah MD;  Location: [facility] OR;  Service: OB/GYN;  Laterality: Right;  Sebaceous cyst    diagnostic laprascopy      ESOPHAGOGASTRODUODENOSCOPY N/A 11/30/2018    Procedure: EGD (ESOPHAGOGASTRODUODENOSCOPY);  Surgeon: Bossman Bustos MD;  Location: [facility];  Service: Endoscopy;  Laterality: N/A;    ESOPHAGOGASTRODUODENOSCOPY N/A  11/12/2020    Procedure: ESOPHAGOGASTRODUODENOSCOPY (EGD) needs rapid covid test;  Surgeon: Mylene Russ MD;  Location: [facility];  Service: Endoscopy;  Laterality: N/A;    ESOPHAGOGASTRODUODENOSCOPY N/A 4/19/2023    Procedure: EGD (ESOPHAGOGASTRODUODENOSCOPY);  Surgeon: Dian Vail MD;  Location: [facility] ENDO;  Service: Endoscopy;  Laterality: N/A;    FULGURATION OF ENDOMETRIOSIS  12/26/2018    Procedure: FULGURATION, ENDOMETRIOSIS;  Surgeon: Zehra Jensen MD;  Location: [facility] OR;  Service: OB/GYN;;  endometriosis implant resection    HYSTERECTOMY      HYSTEROSCOPY WITH HYDROTHERMAL ABLATION OF ENDOMETRIUM WITH DILATION AND CURETTAGE N/A 12/26/2018    Procedure: HYSTEROSCOPY, WITH DILATION AND CURETTAGE OF UTERUS AND HYDROTHERMAL ENDOMETRIAL ABLATION;  Surgeon: Zehra Jensen MD;  Location: [facility] OR;  Service: OB/GYN;  Laterality: N/A;    INJECTION OF ANESTHETIC AGENT AROUND NERVE Right 03/17/2023    Procedure: BLOCK, NERVE, RIGHT C2,C3 AND THIRD OCCIPITAL NERVE DR KELLY ONLY 1 OF 2;  Surgeon: Neftaly Kelly MD;  Location: [facility]GT;  Service: Pain Management;  Laterality: Right;    INJECTION OF ANESTHETIC AGENT AROUND NERVE Right 04/06/2023    Procedure: BLOCK, NERVE, RIGHT C2,C3 AND THIRD OCCIPITAL MEDIAL BRANCH DR KELLY ONLY 2 OF 2 (URGENT) *VIP*;  Surgeon: Neftaly Kelly MD;  Location: [facility]GT;  Service: Pain Management;  Laterality: Right;    LAPAROSCOPIC SALPINGECTOMY Bilateral 12/26/2018    Procedure: SALPINGECTOMY, LAPAROSCOPIC;  Surgeon: Zehra Jensen MD;  Location: [facility] OR;  Service: OB/GYN;  Laterality: Bilateral;    LAPAROTOMY, EXPLORATORY N/A 02/07/2019    Procedure: LAPAROTOMY, EXPLORATORY;  Surgeon: Ramakrishna Boone MD;  Location: [facility] OR;  Service: General;  Laterality: N/A;    OOPHORECTOMY      RADIOFREQUENCY ABLATION Right 5/11/2023    Procedure: RADIOFREQUENCY ABLATION RIGHT C2,C3, TON;  Surgeon: Neftaly Kelly MD;  Location: [facility];  Service: Pain  Management;  Laterality: Right;    REPAIR OF VAGINAL CUFF N/A 07/09/2020    Procedure: REPAIR, VAGINAL CUFF;  Surgeon: Sailaja Addison MD;  Location: [facility] OR;  Service: OB/GYN;  Laterality: N/A;    ROBOT-ASSISTED LAPAROSCOPIC ABDOMINAL HYSTERECTOMY USING DA MAVIS XI N/A 12/30/2019    Procedure: XI ROBOTIC HYSTERECTOMY;  Surgeon: NATALI Shah MD;  Location: [facility] OR;  Service: OB/GYN;  Laterality: N/A;    ROBOT-ASSISTED LAPAROSCOPIC SURGICAL REMOVAL OF OVARY USING DA MAVIS XI Bilateral 12/30/2019    Procedure: XI ROBOTIC OOPHORECTOMY;  Surgeon: NATALI Shah MD;  Location: [facility] OR;  Service: OB/GYN;  Laterality: Bilateral;    robotic assisted laparoscopy with excision of ovarian endometrioma and chromotubation       Family History   Problem Relation Name Age of Onset    Hypertension Mother      Diabetes type II Mother      Heart attack Father      Strabismus Neg Hx      Retinal detachment Neg Hx      Macular degeneration Neg Hx      Glaucoma Neg Hx      Blindness Neg Hx      Amblyopia Neg Hx      Breast cancer Neg Hx      Colon cancer Neg Hx      Ovarian cancer Neg Hx      Thyroid disease Neg Hx      Migraines Neg Hx      Asthma Neg Hx       Social History     Socioeconomic History    Marital status:    Tobacco Use    Smoking status: Never    Smokeless tobacco: Never   Substance and Sexual Activity    Alcohol use: Not Currently     Comment: rarely No alcohol 72h prior to sx    Drug use: No    Sexual activity: Yes     Partners: Male   Social History Narrative    No pets or smokers in household.     Medication List with Changes/Refills   Current Medications    ALPRAZOLAM (XANAX) 0.5 MG TABLET    Take 1 tablet (0.5 mg total) by mouth nightly as needed for Anxiety.    ATENOLOL (TENORMIN) 25 MG TABLET    Take 1 tablet (25 mg total) by mouth once daily.    B2/MAGNESIUM CIT,OXID/FEVERFEW (MIGRELIEF ORAL)    Take by mouth once daily.     BUTORPHANOL (STADOL) 10 MG/ML NASAL SPRAY    as needed.    DULOXETINE  (CYMBALTA) 30 MG CAPSULE    Take 1 capsule (30 mg total) by mouth once daily.    IBUPROFEN (ADVIL,MOTRIN) 800 MG TABLET    Take 1 tablet (800 mg total) by mouth 3 (three) times daily.    KETOCONAZOLE (NIZORAL) 2 % CREAM    Apply to the affected area 2 times daily    KETOCONAZOLE (NIZORAL) 2 % SHAMPOO    Wash neck/face with medicated shampoo at least 2 to 3 times per week - let sit at least 5 minutes prior to rinsing    LINACLOTIDE (LINZESS) 72 MCG CAP CAPSULE    Take 1 capsule (72 mcg total) by mouth before breakfast.    ONDANSETRON (ZOFRAN-ODT) 8 MG TBDL    Dissolve 1 tablet (8 mg total) by mouth every 8 (eight) hours as needed (nausea/vomiting).    PANTOPRAZOLE (PROTONIX) 40 MG TABLET    Take 1 tablet (40 mg total) by mouth once daily.    PROMETHAZINE (PHENERGAN) 25 MG TABLET    Take 1 tablet (25 mg total) by mouth every 6 (six) hours as needed for Nausea.    SUCRALFATE (CARAFATE) 100 MG/ML SUSPENSION    Take 10 mLs (1 g total) by mouth 4 (four) times daily.    TOPIRAMATE (TROKENDI XR) 100 MG CP24    Take 1 capsule (100 mg total) by mouth once daily.    VALACYCLOVIR (VALTREX) 1000 MG TABLET    Take 1 tablet by mouth once a day    VITAMIN D (VITAMIN D3) 1000 UNITS TAB    Take 1,000 Units by mouth once daily.     Review of patient's allergies indicates:   Allergen Reactions    Chlorhexidine Rash     Per  after surgery patient had large rash across abdomen where chlorhexidine was applied.    Hydrocodone Other (See Comments)     Chest pains. Note: patient tolerated hydromorphone in 2/2019.     Mastisol adhesive [gum flkasm-kqxoyp-pcxx-alcohol] Rash       Physical Exam:   There is no height or weight on file to calculate BMI.    GENERAL: Well appearing, in no acute distress.  HEAD: Normocephalic and atraumatic.  ENT: External ears and nose grossly normal.  EYES: EOMI bilaterally  PULMONARY: Respirations are grossly even and non-labored.  NEURO: Awake, alert, and oriented x 3.  SKIN: No obvious rashes  appreciated.  PSYCH: Mood & affect are appropriate.    Detailed MSK exam:     Right shoulder: Good ROM within normal limits. Positive painful arc, negative drop arm. Good resisted strength. Tenderness to palpation: AC negative, long head biceps tendon negative, anterior capsule negative, posterior capsule negative, biceps negative, rotator cuff positive meagan-lateral shoulder. Neers positive click [place] in scaption positive, [place] in cross body negative, Cross-body adduction negative, resisted extension negative, Obriens equivocal, Speeds negative, Empty can (resisted scaption) positive pain, Full can (resisted scaption) positive pain more than empty can, resisted abduction negative, Belly press negative, lift-off negative.     Imaging:    Per my review shoulder well seated no dislocation no significant arthritic changes possible faint calcium changes at the insertional rotator cuff    Relevant imaging results were reviewed and interpreted by me and per my read as above.  This was discussed with the patient and / or family today.     Assessment:  Sandra Dimas is a 49 y.o. female presents for subacute onset right shoulder pain for 2 months.  Discussed home exercises versus PT provided her with some home exercises today.  Discussed decreasing the amount of ibuprofen she is taking as she taking quite a bit and I would not recommend any extra.  Tylenol topicals as needed potential MRI in the future if indicated.    Impingement syndrome of right shoulder  -     X-ray Shoulder 2 or More Views Right; Future; Expected date: 07/29/2024         A copy of today's visit note has been sent to the referring provider.       Benton Vargas MD    Disclaimer: This note was prepared using a voice recognition system and is likely to have sound alike errors within the text.

## 2024-07-29 NOTE — PATIENT INSTRUCTIONS
Assessment:  Sandra Dimas is a 49 y.o. female   Chief Complaint   Patient presents with    Right Shoulder - Pain       Encounter Diagnosis   Name Primary?    Impingement syndrome of right shoulder Yes        Plan:  Ordered xrays of right shoulder   Over the counter medication-- would not recommend increasing NSAID dosage at this time. Can try to alternate with Tylenol   Topicals. Apply topical diclofenac (Voltaren) up to 4 times a day to the affected area.  It can be bought over the counter at any local pharmacy.     Home exercises vs PT for rotator cuff strengthening    Shoulder Impingement/Rotator Cuff Tendinitis/Bursitis    This information does not include all information related to this issue. For more information please visit the [employer] website using the following link: Shoulder Impingement/Tendinitis/Bursitis    One of the most common physical complaints is shoulder pain. Your shoulder is made up of several joints combined with tendons and muscles that allow a great range of motion in your arm. Because so many different structures make up the shoulder, it is vulnerable to many different problems. The rotator cuff is a frequent source of pain in the shoulder.    Anatomy  Your shoulder is made up of three bones: the upper arm bone (humerus), the shoulder blade (scapula), and the collarbone (clavicle). Your arm is kept in the shoulder socket by the rotator cuff. These muscles and tendons form a covering around the head of the upper arm bone and attach it to the shoulder blade. There is a lubricating sac called a bursa between the rotator cuff and the bone on top of your shoulder (acromion). The bursa allows the rotator cuff tendons to glide freely when you move your arm.          Description  The rotator cuff is a common source of pain in the shoulder. Pain can be the result of:    Tendinitis: The rotator cuff tendons can be irritated or damaged.  Bursitis: The bursa can  become inflamed and swell with more fluid causing pain.  Impingement: When you raise your arm to shoulder height, the space between the acromion and rotator cuff narrows. The acromion can rub against (or impinge on) the tendon and the bursa, causing irritation and pain.    Cause  Rotator cuff pain is common in both young athletes and middle-aged people. Young athletes who use their arms overhead for swimming, baseball, and tennis are particularly vulnerable. Those who do repetitive lifting or overhead activities using the arm, such as paper hanging, construction, or painting are also susceptible. Pain may also develop as the result of a minor injury. Sometimes, it occurs with no apparent cause.    Symptoms  Rotator cuff pain commonly causes local swelling and tenderness in the front of the shoulder. You may have pain and stiffness when you lift your arm. There may also be pain when the arm is lowered from an elevated position.    Beginning symptoms may be mild. Patients frequently do not seek treatment at an early stage. These symptoms may include:  Minor pain that is present both with activity and at rest  Pain radiating from the front of the shoulder to the side of the arm  Sudden pain with lifting and reaching movements  Athletes in overhead sports may have pain when throwing or serving a tennis ball    As the problem progresses, the symptoms increase:  Pain at night  Loss of strength and motion  Difficulty doing activities that place the arm behind the back, such as buttoning or zipping clothing  If the pain comes on suddenly, the shoulder may be severely tender. All movement may be limited and painful.    Imaging Tests    X-rays: Because X-rays do not show the soft tissues of your shoulder like the rotator cuff, plain X-rays of a shoulder with rotator cuff pain are usually normal or may show a small bone spur. A special X-ray view, called an outlet or scapular Y view, sometimes will show a small bone spur on  the front edge of the acromion.    Magnetic resonance imaging (MRI) and ultrasound (US): MRI scans can create better images of soft tissues, like the rotator cuff tendons, than X-rays. They can show fluid or inflammation in the bursa and rotator cuff. In some cases, partial tearing of the rotator cuff will be seen.     Treatment  The goal of treatment is to reduce pain and restore function. In planning your treatment, your doctor will consider your age, activity level, and general health.    Nonsurgical Treatment  In most cases, initial treatment is nonsurgical. Although nonsurgical treatment may take several weeks to months, many patients experience a gradual improvement and return to function.    Rest: Your doctor may suggest rest and activity modification, such as avoiding overhead activities.  Non-steroidal anti-inflammatory drugs (NSAIDs): Drugs like ibuprofen, aspirin, and naproxen reduce pain and swelling.  Physical therapy: A physical therapist will initially focus on restoring normal motion to your shoulder. Stretching exercises to improve range of motion are very helpful. Once your pain is improving, your therapist can start you on a strengthening program for the rotator cuff muscles.  Examples of shoulder exercises can be found at the following link: Rotator Cuff and Shoulder Rehabilitation Exercises  Steroid injection: If rest, medications, and physical therapy do not relieve your pain, an injection of a local anesthetic and a cortisone preparation may be helpful. Cortisone is a very effective anti-inflammatory medicine.  Injecting it into the bursa beneath the acromion can relieve pain; however, it is not effective for all patients. And if it is effective for you, there is no way to know how long the effects will last; it may be weeks, months, years, or possibly the rest of your life. On average, injections can be expected to provide pain relief in about two-thirds of patients for a period of at least 3  months.    If non-surgical treatment does not reduce your pain, your doctor may proceed with ordering an MRI. In many cases your insurance company will not approve having an MRI done if these non-surgical treatment options have not been tried for a period of 6-8 weeks.     Surgical Treatment  When nonsurgical treatment does not relieve pain, your doctor may recommend surgery.  There will likely be other conditions present in the shoulder if your doctor considers surgery and these may also be addressed at the time of surgery. These can include:   Arthritis between the clavicle (collarbone) and the acromion (acromioclavicular arthritis)  Inflammation of the biceps tendon (biceps tendinitis)  A partial rotator cuff tear     Links  Shoulder Impingement/Tendinitis/Bursitis  Rotator Cuff and Shoulder Rehabilitation Exercises     Follow-up: As needed or sooner if there are any problems between now and then.    Thank you for choosing [facility] and Dr. Benton Vargas for your orthopedic & sports medicine care. It is our goal to provide you with exceptional care that will help keep you healthy, active, and get you back in the game.    Please do not hesitate to reach out to us via email, phone, or MyChart with any questions, concerns, or feedback.    If you felt that you received exemplary care today, please consider leaving us feedback on [employer] at:  [email or web address]    If you are experiencing pain/discomfort ,or have questions and would like to be connected to the [facility] on-call team, please call this number and specify which Sports Medicine provider is treating you: [phone]

## 2024-08-12 DIAGNOSIS — M54.81 BILATERAL OCCIPITAL NEURALGIA: ICD-10-CM

## 2024-08-12 RX ORDER — DULOXETIN HYDROCHLORIDE 30 MG/1
30 CAPSULE, DELAYED RELEASE ORAL DAILY
Qty: 90 CAPSULE | Refills: 3 | Status: CANCELLED | OUTPATIENT
Start: 2024-08-12

## 2024-08-20 ENCOUNTER — OFFICE VISIT (OUTPATIENT)
Dept: RHEUMATOLOGY | Facility: CLINIC | Age: 49
End: 2024-08-20
Payer: COMMERCIAL

## 2024-08-20 VITALS
HEIGHT: 68 IN | SYSTOLIC BLOOD PRESSURE: 87 MMHG | BODY MASS INDEX: 20.28 KG/M2 | HEART RATE: 76 BPM | WEIGHT: 133.81 LBS | DIASTOLIC BLOOD PRESSURE: 55 MMHG

## 2024-08-20 DIAGNOSIS — G93.32 CFS (CHRONIC FATIGUE SYNDROME): Primary | ICD-10-CM

## 2024-08-20 DIAGNOSIS — M54.81 BILATERAL OCCIPITAL NEURALGIA: ICD-10-CM

## 2024-08-20 PROCEDURE — 1160F RVW MEDS BY RX/DR IN RCRD: CPT | Mod: CPTII,S$GLB,, | Performed by: INTERNAL MEDICINE

## 2024-08-20 PROCEDURE — 1159F MED LIST DOCD IN RCRD: CPT | Mod: CPTII,S$GLB,, | Performed by: INTERNAL MEDICINE

## 2024-08-20 PROCEDURE — 3078F DIAST BP <80 MM HG: CPT | Mod: CPTII,S$GLB,, | Performed by: INTERNAL MEDICINE

## 2024-08-20 PROCEDURE — 3008F BODY MASS INDEX DOCD: CPT | Mod: CPTII,S$GLB,, | Performed by: INTERNAL MEDICINE

## 2024-08-20 PROCEDURE — 3074F SYST BP LT 130 MM HG: CPT | Mod: CPTII,S$GLB,, | Performed by: INTERNAL MEDICINE

## 2024-08-20 PROCEDURE — 99999 PR PBB SHADOW E&M-EST. PATIENT-LVL IV: CPT | Mod: PBBFAC,,, | Performed by: INTERNAL MEDICINE

## 2024-08-20 PROCEDURE — 99205 OFFICE O/P NEW HI 60 MIN: CPT | Mod: S$GLB,,, | Performed by: INTERNAL MEDICINE

## 2024-08-20 NOTE — PROGRESS NOTES
RHEUMATOLOGY CLINIC INITIAL VISIT    Reason for consult:-  Positive ANIKA , fatigue and headache  Chief complaints, HPI, ROS, EXAM, Assessment & Plans:-  Sandra Flores a 49 y.o. pleasant female comes in with positive ANIKA chronic fatigue and occipital headaches.  On multiple medications as reviewed below.  Longstanding history of severe headaches and extreme fatigue.  Mild joint pain present.  No significant sicca syndrome.  Cold intolerance.  No Raynaud's.  Rheumatological review of system negative otherwise.  Physical examination shows no evidence of synovitis, dactylitis, enthesitis or effusion.  No soft tissue tender points.  No sclerodactyly.  No telangiectasia.  No livedo reticularis.    1. CFS (chronic fatigue syndrome)    2. Bilateral occipital neuralgia      Problem List Items Addressed This Visit       Bilateral occipital neuralgia    Overview     Right JEAN PIERRE and MARY ANNE block 2/3 - only partial relief    Right C2, C3 and TON block 3/17 with Dr. Marrero - complete relief for 1 week, now headaches returning. Planned repeat on Thursday 3/30         CFS (chronic fatigue syndrome) - Primary    Relevant Medications    natrexone tablet 4.5 mg      Latest Reference Range & Units Most Recent   WBC 3.90 - 12.70 K/uL 5.41  7/2/24 09:32   RBC 4.00 - 5.40 M/uL 4.08  7/2/24 09:32   Hemoglobin 12.0 - 16.0 g/dL 10.6 (L)  7/2/24 09:32   Hematocrit 37.0 - 48.5 % 34.6 (L)  7/2/24 09:32   MCV 82 - 98 fL 85  7/2/24 09:32   MCH 27.0 - 31.0 pg 26.0 (L)  7/2/24 09:32   MCHC 32.0 - 36.0 g/dL 30.6 (L)  7/2/24 09:32   RDW 11.5 - 14.5 % 13.0  7/2/24 09:32   Platelet Count 150 - 450 K/uL 294  7/2/24 09:32   MPV 9.2 - 12.9 fL 10.2  7/2/24 09:32   Platelet Estimate Normal  Adequate  5/9/13 13:28   Gran % 38.0 - 73.0 % 43.1  7/2/24 09:32   Lymph % 18.0 - 48.0 % 48.2 (H)  7/2/24 09:32   Lymphs 25 - 40 % 17 (L)  5/9/13 13:28   Mono % 4.0 - 15.0 % 6.3  7/2/24 09:32   Monocytes 0 - 10 % 7  5/9/13 13:28   Eos % 0.0 - 8.0 % 1.8  7/2/24 09:32    Eosinophils 0 - 8 % 1  5/9/13 13:28   Basophil % 0.0 - 1.9 % 0.4  7/2/24 09:32   Immature Granulocytes 0.0 - 0.5 % 0.2  7/2/24 09:32   Gran # (ANC) 1.8 - 7.7 K/uL 2.3  7/2/24 09:32   Lymph # 1.0 - 4.8 K/uL 2.6  7/2/24 09:32   Mono # 0.3 - 1.0 K/uL 0.3  7/2/24 09:32   Eos # 0.0 - 0.5 K/uL 0.1  7/2/24 09:32   Baso # 0.00 - 0.20 K/uL 0.02  7/2/24 09:32   Immature Grans (Abs) 0.00 - 0.04 K/uL 0.01  7/2/24 09:32   SEGS 50 - 70 % 74 (H)  5/9/13 13:28   Bands 0 - 6 % 1  5/9/13 13:28   nRBC 0 /100 WBC 0  7/2/24 09:32   Differential Method  Automated  7/2/24 09:32   Aniso  sl  5/9/13 13:28   Poikilocytosis  sl  5/9/13 13:28   Poly  sl  5/9/13 13:28   Hypo  1+ !  5/9/13 13:28   Iron 30 - 160 ug/dL 74  3/21/24 12:28   TIBC 250 - 450 ug/dL 320  3/21/24 12:28   Saturated Iron 20 - 50 % 23  3/21/24 12:28   Transferrin 200 - 375 mg/dL 216  3/21/24 12:28   Ferritin 20.0 - 300.0 ng/mL 992 (H)  3/21/24 12:28   Folate 4.0 - 24.0 ng/mL 7.6  11/21/23 13:21   Vitamin B12 210 - 950 pg/mL 914  11/21/23 13:21   Hgb A2 Quant 2.2 - 3.2 % 2.4  5/14/24 14:24   Hemoglobin Bands  Hb A and Hb A2  5/14/24 14:24   Haptoglobin 30 - 250 mg/dL 155  5/14/24 14:24   Sed Rate 0 - 36 mm/Hr <2  5/20/24 08:46   PT 9.0 - 12.5 sec 10.8  11/20/23 12:51   INR 0.8 - 1.2  1.0  11/20/23 12:51   PTT 21.0 - 32.0 sec 26.2  11/20/23 12:51   Sodium 136 - 145 mmol/L 138  7/2/24 09:32   Potassium 3.5 - 5.1 mmol/L 4.6  7/2/24 09:32   Chloride 95 - 110 mmol/L 111 (H)  7/2/24 09:32   CO2 23 - 29 mmol/L 18 (L)  7/2/24 09:32   Anion Gap 8 - 16 mmol/L 9  7/2/24 09:32   BUN 6 - 20 mg/dL 25 (H)  7/2/24 09:32   Creatinine 0.5 - 1.4 mg/dL 0.9  7/2/24 09:32   eGFR >60 mL/min/1.73 m^2 >60.0  7/2/24 09:32   eGFR if non African American >60 mL/min/1.73 m^2 >60.0  5/18/22 08:37   eGFR if African American >60 mL/min/1.73 m^2 >60.0  5/18/22 08:37   Glucose 70 - 110 mg/dL 82  7/2/24 09:32   Calcium 8.7 - 10.5 mg/dL 10.1  7/2/24 09:32   Calcium Level Ionized 1.06 - 1.42 mmol/L  1.32  11/21/23 13:21   Magnesium  1.6 - 2.6 mg/dL 1.8  11/21/23 13:21   ALP 55 - 135 U/L 57  7/2/24 09:32   PROTEIN TOTAL 6.0 - 8.4 g/dL 7.5  7/2/24 09:32   Albumin 3.5 - 5.2 g/dL 4.4  7/2/24 09:32   BILIRUBIN TOTAL 0.1 - 1.0 mg/dL 0.3  7/2/24 09:32   AST 10 - 40 U/L 16  7/2/24 09:32   ALT 10 - 44 U/L 15  7/2/24 09:32   Amylase Level 20 - 110 U/L 68  4/14/23 10:31   Lipase 4 - 60 U/L 33  4/14/23 10:31   CRP 0.0 - 8.2 mg/L <0.3  5/20/24 08:46   Cholesterol Total 120 - 199 mg/dL 256 (H)  2/27/23 10:17   HDL 40 - 75 mg/dL 38 (L)  2/27/23 10:17   HDL/Cholesterol Ratio 20.0 - 50.0 % 14.8 (L)  2/27/23 10:17   Non-HDL Cholesterol mg/dL 218  2/27/23 10:17   Total Cholesterol/HDL Ratio 2.0 - 5.0  6.7 (H)  2/27/23 10:17   Triglycerides 30 - 150 mg/dL 191 (H)  2/27/23 10:17   LDL Cholesterol 63.0 - 159.0 mg/dL 179.8 (H)  2/27/23 10:17   CPK 25 - 170 U/L 60  1/6/06 12:27   Lactate Dehydrogenase 110 - 260 U/L 124  5/14/24 14:24   Troponin I 0.000 - 0.026 ng/mL <0.006  10/7/20 20:53   Amyloid Beta Protein 20 - 80 pg/mL 37  5/18/23 12:54   Cortisol, Free, Serum mcg/dL 0.46  9/17/20 07:48   Vit D, 1,25-Dihydroxy 20 - 79 pg/mL 60  11/21/23 13:21   Vitamin D 30 - 96 ng/mL 37  9/17/20 07:48   Gluc Fast 70 - 110 mg/dl 81  6/6/12 09:45   Gluc 1 HR mg/dl 133  6/6/12 09:45   Gluc 2 HR mg/dl 114  6/6/12 09:45   Hemoglobin A1C External 4.0 - 5.6 % 5.2  11/17/22 10:56   Estimated Avg Glucose 68 - 131 mg/dL 103  11/17/22 10:56   TSH 0.400 - 4.000 uIU/mL 0.555  11/21/23 13:21   T3, Total 60 - 180 ng/dL 78  9/17/20 07:48   T3, Free 2.3 - 4.2 pg/mL 3.2  6/28/06 15:00   T4 Total 4.5 - 11.5 ug/dl 7.1  6/28/06 15:00   Free T4 0.71 - 1.51 ng/dL 0.82  9/17/20 07:48   AFP 0.0 - 8.4 ng/mL <2.0  5/14/24 14:24   MS Collection date  01-21-08 1/21/08 10:19   Patient Birthdate  [birth year] 1/21/08 10:19   Calculated age at MICHELLE  32  1/21/08 10:19   Maternal Weight lbs 142  1/21/08 10:19   Insulin Depend. Diabetic  None  1/21/08 10:19   Black Race   non-Black  1/21/08 10:19   MICHELLE By Ultrasound  07-07-08 1/21/08 10:19   GA on collect. by US weeks,days 16,0  1/21/08 10:19   GA used in risk estimate  Scan estimate  1/21/08 10:19   Mat. Screen Interpretation  See Comments  1/21/08 10:19   Mat. Screen Follow-Up  See Comments  1/21/08 10:19   Mat. Screen Test Information  See Comments  1/21/08 10:19   Estriol  0.76 MoM (0.64 ng/mL)  1/21/08 10:19   HCG  1.83 MoM (51.4 IU/mL)  1/21/08 10:19   Inhibin  0.94 MoM (152 pg/mL)  1/21/08 10:19   Down Synd. Risk Estimate  1/800  1/21/08 10:19   Down Synd. MA Risk  1/590  1/21/08 10:19   Trisomy 18 Risk Estimate  < 1/100  1/21/08 10:19   Beta HCG Quant See Text mIU/mL <1.2  12/27/19 14:49   hCG Qualitative, Urine Negative  Negative  12/30/19 10:53   Estradiol See Text pg/mL 42  9/17/20 07:48   FSH See Text mIU/mL 53.68  10/13/21 20:00   Luteinizing Hormone See Text mIU/mL 22.0  10/13/21 20:00   Progesterone 0.28 - 1.22 ng/ml 16.3 (H)  11/27/06 15:19   Prolactin 4.0 - 25.0 ng/ml 13  3/7/11 15:29   Testosterone, Total 5 - 73 ng/dL 15  9/17/20 07:48   ANIKA Neg <1:160  Negative  2/24/05 10:44   ANIKA Screen Negative <1:80  Positive !  5/14/24 14:24   ANIKA Titer 1  1:80  5/14/24 14:24   ANIKA PATTERN 1  Speckled  5/14/24 14:24   ds DNA Ab Negative 1:10  Negative 1:10  5/14/24 14:24   Anti-SSA Antibody 0.00 - 0.99 Ratio 0.09  5/14/24 14:24   Anti-SSA Interpretation Negative  Negative  5/14/24 14:24   Anti-SSB Antibody 0.00 - 0.99 Ratio 0.07  5/14/24 14:24   Anti-SSB Interpretation Negative  Negative  5/14/24 14:24   Anti Sm Antibody 0.00 - 0.99 Ratio 0.09  5/14/24 14:24   Anti-Sm Interpretation Negative  Negative  5/14/24 14:24   Anti Sm/RNP Antibody 0.00 - 0.99 Ratio 0.10  5/14/24 14:24   Anti-Sm/RNP Interpretation Negative  Negative  5/14/24 14:24   Rheumatoid Factor 0.0 - 15.0 IU/mL <13.0  5/14/24 14:24   Group & Rh  O POS  8/14/12 18:20   INDIRECT LISA  NEG  8/14/12 18:20   RAPID STREP A SCREEN Negative  Negative  12/7/17 17:23    Hep A IgM Non-reactive  Non-reactive  5/14/24 14:24   Hep B C IgM Non-reactive  Non-reactive  5/14/24 14:24   Hepatitis B Surface Ag Non-reactive  Non-reactive  5/14/24 14:24   HCV Log <1.08 Log (10) IU/mL <1.08  12/9/20 21:16   HCV RNA Quant PCR LOG <12 IU/mL <12  12/9/20 21:16   HCV, Qualitative Not detected IU/mL Not detected  12/9/20 21:16   Hepatitis C Ab Non-reactive  Reactive !  5/14/24 14:24   Flu A & B Source  Nasopharyngeal Swab  12/7/17 17:23   Infl A & B Source  NASAL  5/9/13 13:29   Influenza A Ag, EIA Negative  Negative  12/7/17 17:23   Influenza B Ag, EIA Negative  Negative  12/7/17 17:23   SARS-CoV-2 RNA, Amplification, Qual Negative  Positive !  1/23/22 12:40   Specimen Type  Blood  5/18/23 12:54   HIV 1/2 Ag/Ab Non-reactive  Non-reactive  5/14/24 14:24   HIV-1/HIV-2 Ab Negative  Negative  1/12/12 10:07   Chlamydia, Amplified DNA Not detected  Not Detected  1/11/12 15:50   GC, Amplified DNA Not detected  Not Detected  11/26/07 14:05   N gonorrhoeae, amplified DNA Not detected  Not Detected  1/11/12 15:50   RPR Non-Reactive  Non-Reactive  1/12/12 10:07   H Pylori IgG Interp Negative  Equivocal  1/15/15 09:28   Rubella IgG Antibodies 0.0 - 4.9 IU/ml 14.2 (H)  1/12/12 10:07   Rubella Immune Status  Positive !  1/12/12 10:07   SARS-CoV2 (COVID-19) Qualitative PCR Not Detected  Not Detected  11/17/20 08:11   Specimen UA  Urine, Clean Catch  1/22/23 03:43   Color, UA Yellow, Straw, Claudia  Yellow  1/22/23 03:43   Appearance, UA Clear  Clear  1/22/23 03:43   Spec Grav UA 1.005 - 1.030  1.005  1/22/23 03:43   pH, UA 5.0 - 8.0  8.0  1/22/23 03:43   Protein, UA Negative  Negative  1/22/23 03:43   Glucose, UA Negative  Negative  1/22/23 03:43   Ketones, UA Negative  1+ !  1/22/23 03:43   Blood, UA Negative  Negative  1/22/23 03:43   NITRITE UA Negative  Negative  1/22/23 03:43   UROBILINOGEN UA <2.0 EU/dL Negative  1/22/23 03:43   Bilirubin (UA) Negative  Negative  1/22/23 03:43   Urobilinogen, urine <4 mg/dL  NORMAL  8/14/12 19:20   Leukocyte Esterase, UA Negative  Negative  1/22/23 03:43   RBC, UA 0 - 4 /hpf 1  2/6/19 21:09   WBC, UA 0 - 5 /hpf 0  2/6/19 21:09   Bacteria, UA None-Occ /hpf Rare  2/6/19 21:09   Squam Epithel, UA /hpf 0  2/6/19 21:09   WBC Clumps, UA None-Rare  None  2/6/19 21:09   Hyaline Casts, UA 0-1/lpf /lpf 1  2/6/19 21:09   Yeast, UA None  None  2/6/19 21:09   Microscopic Comment  SEE COMMENT  2/6/19 21:09   Genetic counseling?  No  5/18/23 12:54   Genso Specimen Type  Blood  5/18/23 12:54   Hereditary Hemochromatosis  SEE BELOW  5/20/24 08:46   HFE Released By  Terrance Wyatt MD  5/20/24 08:46   HFE Result Summary  NEGATIVE  5/20/24 08:46   Interpretation  SEE BELOW  5/20/24 08:46   Method  SEE BELOW  5/20/24 08:46   Miscellaneous Genetic Test Name  Dementia Genetic Panel  5/18/23 12:54   Parental or Sibling Testing?  No  5/18/23 12:54   Source  Test Not Performed  5/20/24 08:46   Specimen  WB Whole Blood  5/20/24 08:46   POCT Glucose 70 - 110 mg/dL 86  12/30/19 11:00   POC Glucose 70 - 110 MG/DL 95  10/13/20 20:08   POCT URINE PREGNANCY  Rpt  12/30/19 10:53    Acceptable  Yes  1/23/22 12:40   POC Sodium MMOL/L 140  10/13/20 20:08   POC Potassium MMOL/L 3.5  10/13/20 20:08   POC Chloride MMOL/L 110  10/13/20 20:08   POC BUN MMOL/L 20  10/13/20 20:08   POC Creatinine 0.6 - 1.3 mg/dL 0.3 !  10/13/20 20:08   POC iCA MMOL/L 1.09  10/13/20 20:08   POC TCO2 MMOL/L 18  10/13/20 20:08   POC Hematocrit 37 - 47 %PCV 35 !  10/13/20 20:08   POC Anion Gap MMOL/L 17  10/13/20 20:08   POC Hemoglobin 12.5 - 16 g/dL 11.9 !  10/13/20 20:08   Hemoglobin Electrophoresis Interp  Normal  5/14/24 14:24   (L): Data is abnormally low  (H): Data is abnormally high  !: Data is abnormal  Rpt: View report in Results Review for more information    Presentation highly suggestive of chronic fatigue syndrome.  Try low-dose naltrexone.  Continue follow-up with neurologist for occipital neuralgia.  Unclear etiology  for persistently elevated ferritin since iron infusion therapy several years ago.  Continue follow-up with hematologist.  No evidence of inflammatory arthritis.  No evidence systemic lupus erythematosus, Sjogren's syndrome, myositis, scleroderma, rheumatoid arthritis or small-vessel vasculitis.  Monitor clinically.  I have explained all of the above in detail and the patient understands all of the current recommendation(s). I have answered all questions to the best of my ability and to their complete satisfaction.         # Follow up in about 3 months (around 11/20/2024).      Past Medical History:   Diagnosis Date    Chronic paroxysmal hemicrania 01/03/2019    Endometriosis     General anesthetics causing adverse effect in therapeutic use     wakes up with severe anxiety attacks    Hemicrania continua     Hepatitis C antibody positive in blood 12/09/2020    RNA NEGATIVE 12/14/2020    Migraines        Past Surgical History:   Procedure Laterality Date    APPENDECTOMY      COLONOSCOPY N/A 11/12/2020    Procedure: COLONOSCOPY;  Surgeon: Mylene Russ MD;  Location: [facility];  Service: Endoscopy;  Laterality: N/A;    COLONOSCOPY N/A 4/19/2023    Procedure: COLONOSCOPY;  Surgeon: Dian Vail MD;  Location: [facility];  Service: Endoscopy;  Laterality: N/A;    CYST REMOVAL Right 12/30/2019    Procedure: EXCISION, CYST;  Surgeon: NATALI Shah MD;  Location: [facility];  Service: OB/GYN;  Laterality: Right;  Sebaceous cyst    diagnostic laprascopy      ESOPHAGOGASTRODUODENOSCOPY N/A 11/30/2018    Procedure: EGD (ESOPHAGOGASTRODUODENOSCOPY);  Surgeon: Bossman Bustos MD;  Location: [facility];  Service: Endoscopy;  Laterality: N/A;    ESOPHAGOGASTRODUODENOSCOPY N/A 11/12/2020    Procedure: ESOPHAGOGASTRODUODENOSCOPY (EGD) needs rapid covid test;  Surgeon: Mylene Russ MD;  Location: [facility];  Service: Endoscopy;  Laterality: N/A;    ESOPHAGOGASTRODUODENOSCOPY N/A 4/19/2023    Procedure: EGD  (ESOPHAGOGASTRODUODENOSCOPY);  Surgeon: Dian Vail MD;  Location[facility];  Service: Endoscopy;  Laterality: N/A;    FULGURATION OF ENDOMETRIOSIS  12/26/2018    Procedure: FULGURATION, ENDOMETRIOSIS;  Surgeon: Zehra Jensen MD;  Location: [facility] OR;  Service: OB/GYN;;  endometriosis implant resection    HYSTERECTOMY      HYSTEROSCOPY WITH HYDROTHERMAL ABLATION OF ENDOMETRIUM WITH DILATION AND CURETTAGE N/A 12/26/2018    Procedure: HYSTEROSCOPY, WITH DILATION AND CURETTAGE OF UTERUS AND HYDROTHERMAL ENDOMETRIAL ABLATION;  Surgeon: Zehra Jensen MD;  Location: [facility] OR;  Service: OB/GYN;  Laterality: N/A;    INJECTION OF ANESTHETIC AGENT AROUND NERVE Right 03/17/2023    Procedure: BLOCK, NERVE, RIGHT C2,C3 AND THIRD OCCIPITAL NERVE DR KELLY ONLY 1 OF 2;  Surgeon: Neftaly Kelly MD;  Location: [facility];  Service: Pain Management;  Laterality: Right;    INJECTION OF ANESTHETIC AGENT AROUND NERVE Right 04/06/2023    Procedure: BLOCK, NERVE, RIGHT C2,C3 AND THIRD OCCIPITAL MEDIAL BRANCH DR KELLY ONLY 2 OF 2 (URGENT) *VIP*;  Surgeon: Neftaly Kelly MD;  Location: [facility];  Service: Pain Management;  Laterality: Right;    LAPAROSCOPIC SALPINGECTOMY Bilateral 12/26/2018    Procedure: SALPINGECTOMY, LAPAROSCOPIC;  Surgeon: Zehra Jensen MD;  Location: [facility];  Service: OB/GYN;  Laterality: Bilateral;    LAPAROTOMY, EXPLORATORY N/A 02/07/2019    Procedure: LAPAROTOMY, EXPLORATORY;  Surgeon: Ramakrishna Boone MD;  Location: [facility] OR;  Service: General;  Laterality: N/A;    OOPHORECTOMY      RADIOFREQUENCY ABLATION Right 5/11/2023    Procedure: RADIOFREQUENCY ABLATION RIGHT C2,C3, TON;  Surgeon: Neftaly Kelly MD;  Location: [facility];  Service: Pain Management;  Laterality: Right;    REPAIR OF VAGINAL CUFF N/A 07/09/2020    Procedure: REPAIR, VAGINAL CUFF;  Surgeon: Sailaja Addison MD;  Location: [facility] OR;  Service: OB/GYN;  Laterality: N/A;    ROBOT-ASSISTED LAPAROSCOPIC ABDOMINAL  HYSTERECTOMY USING DA MAVIS XI N/A 12/30/2019    Procedure: XI ROBOTIC HYSTERECTOMY;  Surgeon: NATALI Shah MD;  Location: [facility] OR;  Service: OB/GYN;  Laterality: N/A;    ROBOT-ASSISTED LAPAROSCOPIC SURGICAL REMOVAL OF OVARY USING DA MAVIS XI Bilateral 12/30/2019    Procedure: XI ROBOTIC OOPHORECTOMY;  Surgeon: NATALI Shah MD;  Location: [facility] OR;  Service: OB/GYN;  Laterality: Bilateral;    robotic assisted laparoscopy with excision of ovarian endometrioma and chromotubation          Social History     Tobacco Use    Smoking status: Never    Smokeless tobacco: Never   Substance Use Topics    Alcohol use: Not Currently     Comment: rarely No alcohol 72h prior to sx    Drug use: No       Family History   Problem Relation Name Age of Onset    Hypertension Mother      Diabetes type II Mother      Heart attack Father      Strabismus Neg Hx      Retinal detachment Neg Hx      Macular degeneration Neg Hx      Glaucoma Neg Hx      Blindness Neg Hx      Amblyopia Neg Hx      Breast cancer Neg Hx      Colon cancer Neg Hx      Ovarian cancer Neg Hx      Thyroid disease Neg Hx      Migraines Neg Hx      Asthma Neg Hx         Review of patient's allergies indicates:   Allergen Reactions    Chlorhexidine Rash     Per  after surgery patient had large rash across abdomen where chlorhexidine was applied.    Hydrocodone Other (See Comments)     Chest pains. Note: patient tolerated hydromorphone in 2/2019.     Mastisol adhesive [gum jxyqds-wkzcla-hqoy-alcohol] Rash       Medication List with Changes/Refills   New Medications    NATREXONE TABLET 4.5 MG    Take 1 tablet (4.5 mg total) by mouth every evening.   Current Medications    ALPRAZOLAM (XANAX) 0.5 MG TABLET    Take 1 tablet (0.5 mg total) by mouth nightly as needed for Anxiety.    ATENOLOL (TENORMIN) 25 MG TABLET    Take 1 tablet (25 mg total) by mouth once daily.    ATENOLOL (TENORMIN) 25 MG TABLET    take 1 tablet my mouth daily    B2/MAGNESIUM  CIT,OXID/FEVERFEW (MIGRELIEF ORAL)    Take by mouth once daily.     DULOXETINE (CYMBALTA) 30 MG CAPSULE    take 1 capsule by mouth daily    IBUPROFEN (ADVIL,MOTRIN) 800 MG TABLET    Take 1 tablet (800 mg total) by mouth 3 (three) times daily.    KETOCONAZOLE (NIZORAL) 2 % CREAM    Apply to the affected area 2 times daily    KETOCONAZOLE (NIZORAL) 2 % SHAMPOO    Wash neck/face with medicated shampoo at least 2 to 3 times per week - let sit at least 5 minutes prior to rinsing    LINACLOTIDE (LINZESS) 72 MCG CAP CAPSULE    Take 1 capsule (72 mcg total) by mouth before breakfast.    ONDANSETRON (ZOFRAN-ODT) 8 MG TBDL    Dissolve 1 tablet (8 mg total) by mouth every 8 (eight) hours as needed (nausea/vomiting).    PANTOPRAZOLE (PROTONIX) 40 MG TABLET    Take 1 tablet (40 mg total) by mouth once daily.    PANTOPRAZOLE (PROTONIX) 40 MG TABLET    take 1 tablet by mouth every day    PROMETHAZINE (PHENERGAN) 25 MG TABLET    Take 1 tablet (25 mg total) by mouth every 6 (six) hours as needed for Nausea.    SUCRALFATE (CARAFATE) 100 MG/ML SUSPENSION    Take 10 mLs (1 g total) by mouth 4 (four) times daily.    TOPIRAMATE (TROKENDI XR) 100 MG CP24    Take 1 capsule (100 mg total) by mouth once daily.    VALACYCLOVIR (VALTREX) 1000 MG TABLET    Take 1 tablet by mouth once a day    VITAMIN D (VITAMIN D3) 1000 UNITS TAB    Take 1,000 Units by mouth once daily.   Discontinued Medications    BUTORPHANOL (STADOL) 10 MG/ML NASAL SPRAY    as needed.    DULOXETINE (CYMBALTA) 30 MG CAPSULE    Take 1 capsule (30 mg total) by mouth once daily.         Thank you for allowing me to participate in the care ofSandra Dimas.    Disclaimer: This note was prepared using voice recognition system and is likely to have sound alike errors and is not proof read.  Please call me with any questions.        Total time spent 60 minutes. This includes face to face time and non-face to face time preparing to see the patient (eg, review of tests), obtaining  and/or reviewing separately obtained history, documenting clinical information in the electronic or other health record, independently interpreting results and communicating results to the patient/family/caregiver, or care coordinator.

## 2024-08-27 DIAGNOSIS — G43.919 INTRACTABLE MIGRAINE WITHOUT STATUS MIGRAINOSUS, UNSPECIFIED MIGRAINE TYPE: ICD-10-CM

## 2024-08-27 RX ORDER — PROMETHAZINE HYDROCHLORIDE 25 MG/1
25 TABLET ORAL EVERY 6 HOURS PRN
Qty: 30 TABLET | Refills: 1 | Status: SHIPPED | OUTPATIENT
Start: 2024-08-27

## 2024-09-20 NOTE — PROGRESS NOTES
"  Bilateral greater and lesser occipital nerve block    Date/Time: 2/3/2023 10:40 AM  Performed by: Arian Vazquez III, MD  Authorized by: Arian Vazquez III, MD   Time out: Immediately prior to procedure a "time out" was called to verify the correct patient, procedure, equipment, support staff and site/side marked as required.  Indications: pain relief  Body area: head  Laterality: Bilateral greater and lesser occipital nerve.    Medications administered: DepoMedrol 40 mg injection  Patient position: prone  Needle size: 27 G  Location technique: ultrasound guidance and anatomical landmarks (Ultrasound images saved on secure external hard drive)  Local Anesthetic: lidocaine 2% without epinephrine and bupivacaine 0.5% without epinephrine  Anesthetic total: 6 mL  Outcome: pain improved  Patient tolerance: Patient tolerated the procedure well with no immediate complications  Comments: Patient will send her pain diary on the portal and depending on results consider further treatment options.  She notes that her headache went from 10/10 intensity to a 6/10 by the time she left my office.  Ultrasound images showed right side greater than left side lesser occipital nerve compression inflammation.      " Yes

## 2024-10-02 ENCOUNTER — HOSPITAL ENCOUNTER (OUTPATIENT)
Dept: RADIOLOGY | Facility: HOSPITAL | Age: 49
Discharge: HOME | End: 2024-10-02
Attending: PEDIATRICS
Payer: COMMERCIAL

## 2024-10-02 DIAGNOSIS — Z12.31 ENCOUNTER FOR SCREENING MAMMOGRAM FOR BREAST CANCER: ICD-10-CM

## 2024-10-02 PROCEDURE — 77063 BREAST TOMOSYNTHESIS BI: CPT | Mod: 26,,, | Performed by: RADIOLOGY

## 2024-10-02 PROCEDURE — 77067 SCR MAMMO BI INCL CAD: CPT | Mod: 26,,, | Performed by: RADIOLOGY

## 2024-10-02 PROCEDURE — 77067 SCR MAMMO BI INCL CAD: CPT | Mod: TC

## 2024-10-09 ENCOUNTER — TELEPHONE (OUTPATIENT)
Dept: SPORTS MEDICINE | Facility: CLINIC | Age: 49
End: 2024-10-09
Payer: COMMERCIAL

## 2024-10-10 ENCOUNTER — PROCEDURE VISIT (OUTPATIENT)
Dept: SPORTS MEDICINE | Facility: CLINIC | Age: 49
End: 2024-10-10
Payer: COMMERCIAL

## 2024-10-10 DIAGNOSIS — G89.29 CHRONIC RIGHT SHOULDER PAIN: ICD-10-CM

## 2024-10-10 DIAGNOSIS — M75.41 IMPINGEMENT SYNDROME OF RIGHT SHOULDER: ICD-10-CM

## 2024-10-10 DIAGNOSIS — M25.511 CHRONIC RIGHT SHOULDER PAIN: ICD-10-CM

## 2024-10-10 DIAGNOSIS — M75.01 ADHESIVE CAPSULITIS OF RIGHT SHOULDER: Primary | ICD-10-CM

## 2024-10-10 RX ORDER — TRIAMCINOLONE ACETONIDE 40 MG/ML
40 INJECTION, SUSPENSION INTRA-ARTICULAR; INTRAMUSCULAR
Status: DISCONTINUED | OUTPATIENT
Start: 2024-10-10 | End: 2024-10-10 | Stop reason: HOSPADM

## 2024-10-10 RX ADMIN — TRIAMCINOLONE ACETONIDE 40 MG: 40 INJECTION, SUSPENSION INTRA-ARTICULAR; INTRAMUSCULAR at 12:10

## 2024-10-10 NOTE — PROGRESS NOTES
Patient ID: Sandra Dimas  YOB: 1975  MRN: [id number]    Chief Complaint: Pain of the Right Shoulder    Referred By: Dr. Vargas      History of Present Illness: Sandra Dimas is a right-hand dominant 49 y.o. female who presents today with Pain of the Right Shoulder    She is an established patient of Dr. Vargas, previously diagnosed with right shoulder impingement.  Her pain began a few months ago when she reached back behind her to lift something up and felt something happen in her shoulder.  She deferred any formal treatment at her last visit in July.  Since then she has developed worsening pain and difficulty with reaching above her head and behind her back.  The pain has become unbearable at times.  She has difficulty reaching her bra strap.  She requested to be seen urgently to consider a cortisone injection.  She reports that she has had some negative reaction to steroids in the past in relation to her chronic migraines.    Past Medical History:   Past Medical History:   Diagnosis Date    Chronic paroxysmal hemicrania 01/03/2019    Endometriosis     General anesthetics causing adverse effect in therapeutic use     wakes up with severe anxiety attacks    Hemicrania continua     Hepatitis C antibody positive in blood 12/09/2020    RNA NEGATIVE 12/14/2020    Migraines      Past Surgical History:   Procedure Laterality Date    APPENDECTOMY      COLONOSCOPY N/A 11/12/2020    Procedure: COLONOSCOPY;  Surgeon: Mylene Russ MD;  Location: [facility] ENDO;  Service: Endoscopy;  Laterality: N/A;    COLONOSCOPY N/A 4/19/2023    Procedure: COLONOSCOPY;  Surgeon: Dian Vail MD;  Location: [facility] ENDO;  Service: Endoscopy;  Laterality: N/A;    CYST REMOVAL Right 12/30/2019    Procedure: EXCISION, CYST;  Surgeon: NATALI Shah MD;  Location: [facility] OR;  Service: OB/GYN;  Laterality: Right;  Sebaceous cyst    diagnostic laprascopy      ESOPHAGOGASTRODUODENOSCOPY N/A 11/30/2018     Procedure: EGD (ESOPHAGOGASTRODUODENOSCOPY);  Surgeon: Bossman Bustos MD;  Location: [facility];  Service: Endoscopy;  Laterality: N/A;    ESOPHAGOGASTRODUODENOSCOPY N/A 11/12/2020    Procedure: ESOPHAGOGASTRODUODENOSCOPY (EGD) needs rapid covid test;  Surgeon: Mylene Russ MD;  Location: [facility];  Service: Endoscopy;  Laterality: N/A;    ESOPHAGOGASTRODUODENOSCOPY N/A 4/19/2023    Procedure: EGD (ESOPHAGOGASTRODUODENOSCOPY);  Surgeon: Dian Vail MD;  Location: [facility];  Service: Endoscopy;  Laterality: N/A;    FULGURATION OF ENDOMETRIOSIS  12/26/2018    Procedure: FULGURATION, ENDOMETRIOSIS;  Surgeon: Zehra Jensen MD;  Location: [facility];  Service: OB/GYN;;  endometriosis implant resection    HYSTERECTOMY      HYSTEROSCOPY WITH HYDROTHERMAL ABLATION OF ENDOMETRIUM WITH DILATION AND CURETTAGE N/A 12/26/2018    Procedure: HYSTEROSCOPY, WITH DILATION AND CURETTAGE OF UTERUS AND HYDROTHERMAL ENDOMETRIAL ABLATION;  Surgeon: Zehra Jensen MD;  Location: [facility];  Service: OB/GYN;  Laterality: N/A;    INJECTION OF ANESTHETIC AGENT AROUND NERVE Right 03/17/2023    Procedure: BLOCK, NERVE, RIGHT C2,C3 AND THIRD OCCIPITAL NERVE DR MARRERO ONLY 1 OF 2;  Surgeon: Neftaly Marrero MD;  Location: [facility] PAIN MGT;  Service: Pain Management;  Laterality: Right;    INJECTION OF ANESTHETIC AGENT AROUND NERVE Right 04/06/2023    Procedure: BLOCK, NERVE, RIGHT C2,C3 AND THIRD OCCIPITAL MEDIAL BRANCH DR MARRERO ONLY 2 OF 2 (URGENT) *VIP*;  Surgeon: Neftaly Marrero MD;  Location: [facility] PAIN MGT;  Service: Pain Management;  Laterality: Right;    LAPAROSCOPIC SALPINGECTOMY Bilateral 12/26/2018    Procedure: SALPINGECTOMY, LAPAROSCOPIC;  Surgeon: Zehra Jensen MD;  Location: [facility];  Service: OB/GYN;  Laterality: Bilateral;    LAPAROTOMY, EXPLORATORY N/A 02/07/2019    Procedure: LAPAROTOMY, EXPLORATORY;  Surgeon: Ramakrishna Boone MD;  Location: [facility];  Service: General;  Laterality: N/A;    OOPHORECTOMY       RADIOFREQUENCY ABLATION Right 5/11/2023    Procedure: RADIOFREQUENCY ABLATION RIGHT C2,C3, TON;  Surgeon: Neftaly Marreor MD;  [facility];  Service: Pain Management;  Laterality: Right;    REPAIR OF VAGINAL CUFF N/A 07/09/2020    Procedure: REPAIR, VAGINAL CUFF;  Surgeon: Sailaja Addison MD;  Location: [facility] OR;  Service: OB/GYN;  Laterality: N/A;    ROBOT-ASSISTED LAPAROSCOPIC ABDOMINAL HYSTERECTOMY USING DA MAVIS XI N/A 12/30/2019    Procedure: XI ROBOTIC HYSTERECTOMY;  Surgeon: NATALI Shah MD;  Location: [facility] OR;  Service: OB/GYN;  Laterality: N/A;    ROBOT-ASSISTED LAPAROSCOPIC SURGICAL REMOVAL OF OVARY USING DA MAVIS XI Bilateral 12/30/2019    Procedure: XI ROBOTIC OOPHORECTOMY;  Surgeon: NATALI Shah MD;  Location: [facility] OR;  Service: OB/GYN;  Laterality: Bilateral;    robotic assisted laparoscopy with excision of ovarian endometrioma and chromotubation       Family History   Problem Relation Name Age of Onset    Hypertension Mother      Diabetes type II Mother      Heart attack Father      Strabismus Neg Hx      Retinal detachment Neg Hx      Macular degeneration Neg Hx      Glaucoma Neg Hx      Blindness Neg Hx      Amblyopia Neg Hx      Breast cancer Neg Hx      Colon cancer Neg Hx      Ovarian cancer Neg Hx      Thyroid disease Neg Hx      Migraines Neg Hx      Asthma Neg Hx       Social History     Socioeconomic History    Marital status:    Tobacco Use    Smoking status: Never    Smokeless tobacco: Never   Substance and Sexual Activity    Alcohol use: Not Currently     Comment: rarely No alcohol 72h prior to sx    Drug use: No    Sexual activity: Yes     Partners: Male   Social History Narrative    No pets or smokers in household.     Medication List with Changes/Refills   Current Medications    ALPRAZOLAM (XANAX) 0.5 MG TABLET    Take 1 tablet (0.5 mg total) by mouth nightly as needed for Anxiety.    ATENOLOL (TENORMIN) 25 MG TABLET    take 1 tablet my mouth daily     B2/MAGNESIUM CIT,OXID/FEVERFEW (MIGRELIEF ORAL)    Take by mouth once daily.     DULOXETINE (CYMBALTA) 30 MG CAPSULE    take 1 capsule by mouth daily    IBUPROFEN (ADVIL,MOTRIN) 800 MG TABLET    Take 1 tablet (800 mg total) by mouth 3 (three) times daily.    KETOCONAZOLE (NIZORAL) 2 % CREAM    Apply to the affected area 2 times daily    KETOCONAZOLE (NIZORAL) 2 % SHAMPOO    Wash neck/face with medicated shampoo at least 2 to 3 times per week - let sit at least 5 minutes prior to rinsing    LINACLOTIDE (LINZESS) 72 MCG CAP CAPSULE    Take 1 capsule (72 mcg total) by mouth before breakfast.    NATREXONE TABLET 4.5 MG    Take 1 tablet (4.5 mg total) by mouth every evening.    ONDANSETRON (ZOFRAN-ODT) 8 MG TBDL    Dissolve 1 tablet (8 mg total) by mouth every 8 (eight) hours as needed (nausea/vomiting).    PANTOPRAZOLE (PROTONIX) 40 MG TABLET    take 1 tablet by mouth every day    PROMETHAZINE (PHENERGAN) 25 MG TABLET    Take 1 tablet (25 mg total) by mouth every 6 (six) hours as needed for Nausea.    SUCRALFATE (CARAFATE) 100 MG/ML SUSPENSION    Take 10 mLs (1 g total) by mouth 4 (four) times daily.    TOPIRAMATE (TROKENDI XR) 100 MG CP24    Take 1 capsule (100 mg total) by mouth once daily.    VALACYCLOVIR (VALTREX) 1000 MG TABLET    Take 1 tablet by mouth once a day    VITAMIN D (VITAMIN D3) 1000 UNITS TAB    Take 1,000 Units by mouth once daily.     Review of patient's allergies indicates:   Allergen Reactions    Chlorhexidine Rash     Per  after surgery patient had large rash across abdomen where chlorhexidine was applied.    Hydrocodone Other (See Comments)     Chest pains. Note: patient tolerated hydromorphone in 2/2019.     Mastisol adhesive [gum omojki-lqguco-mlzz-alcohol] Rash       Physical Exam:   There is no height or weight on file to calculate BMI.    Detailed MSK exam:     Right Shoulder:  Inspection:  No swelling, erythema or ecchymosis.   Palpation: Diffuse tenderness to palpation   Range of  motion: 120 deg Flexion         45 deg External Rotation in Adduction         IR to Lumbar     175/85/midthoracic contralateral  Strength:  5-/5 Abduction    5-/5 External Rotation in Adduction    5-/5 Internal Rotation      Pain limited  N/V Exam:  Radial: Normal motor (EPL/thumbs up)              Normal sensory (dorsal hand)   Median: Normal motor (FPL/A-OK)      Normal sensory (thumb)   Ulnar:  Normal motor (Interossei/scissors-spread)     Normal sensory (5th finger)   LABC: Normal sensory (lateral forearm)   MABC: Normal sensory (medial forearm)   MC: Normal motor (elbow flexion)   Axillary: Normal motor/sensory (deltoid)  Normal radial and ulnar pulses, warm and well perfused with capillary refill < 2 sec       Imaging:    XR Results:  Results for orders placed during the hospital encounter of 07/29/24    X-ray Shoulder 2 or More Views Right    Narrative  EXAM:  XR SHOULDER COMPLETE 2 OR MORE VIEWS RIGHT    CLINICAL HISTORY: Right shoulder pain    TECHNIQUE: 4 view right shoulder series.    FINDINGS: No fracture or dislocation.  No significant arthritic change.    Impression  See above    Finalized on: 7/29/2024 8:30 PM By:  Terrance Wilson MD  [facility]# [id number]      2024-07-29 20:33:[id number]    [facility]      Patient Instructions   Assessment:  Sandra Dimas is a 49 y.o. female with a chief complaint of Pain of the Right Shoulder    Encounter Diagnoses   Name Primary?    Adhesive capsulitis of right shoulder Yes    Impingement syndrome of right shoulder     Chronic right shoulder pain       Plan:  Patient previously with subacromial impingement of the right shoulder, now with significant stiffness about the shoulder, concerning for newly developed adhesive capsulitis.  Glenohumeral joint corticosteroid injection today.    Proper protocols after the injection included: no submerging pools, baths tubs, or hot tubs for 24 hr.  Showering is okay today.  Side effects of the corticosteroid injection can include elevated blood  glucose levels and blood pressures, so if you are taking medications for these, please monitor closely, and contact your PCP if any issues.  Red flag symptoms include fever, chills, nausea, vomiting, red, warm, tender joint at the area of injection.  If you are noticing these symptoms, they may be indicative of an infection, and please seek medical care immediately, either by calling our clinic or going to the emergency room.  Home exercise program given today.    At least 10 minutes were spent developing, teaching, and performing a home exercise program under the direction of King Medina MD.  A written summary was provided and all questions were answered.  Discussed further treatment options, if not improving, such as formal physical therapy, repeat injections, Brisement procedure which combines a nerve block with a high volume glenohumeral joint injection and post-injection therapy manipulation.  In some situations surgery can be considered for adhesive capsulitis.    Follow-up:  4 weeks with Dr. Vargas or sooner if there are any problems between now and then.    Thank you for choosing [facility] and Dr. King Medina for your orthopedic & sports medicine care. It is our goal to provide you with exceptional care that will help keep you healthy, active, and get you back in the game.    Please do not hesitate to reach out to us via email, phone, or MyChart with any questions, concerns, or feedback.    If you are experiencing pain/discomfort ,or have questions after 5pm and would like to be connected to the [facility] on-call team, please call this number and specify which Sports Medicine provider is treating you: [phone]       A copy of today's visit note has been sent to the referring provider.           King Medina MD  Primary Care Sports Medicine    Disclaimer: This note was prepared using a voice recognition system and is likely to  have sound alike errors within the text.

## 2024-10-10 NOTE — PROCEDURES
Large Joint Aspiration/Injection: R glenohumeral    Date/Time: 10/10/2024 12:00 PM    Performed by: King Medina MD  Authorized by: King Medina MD    Consent Done?:  Yes (Verbal)  Indications:  Pain and diagnostic evaluation (Adhesive capsulitis)  Site marked: the procedure site was marked    Timeout: prior to procedure the correct patient, procedure, and site was verified      Local anesthesia used?: Yes    Anesthesia:  Local infiltration  Local anesthetic:  Bupivacaine 0.5% without epinephrine, lidocaine 1% without epinephrine and topical anesthetic  Anesthetic total (ml):  4      Details:  Needle Size:  22 G  Ultrasonic Guidance for needle placement?: Yes    Images are saved and documented.  Approach:  Posterior  Location:  Shoulder  Site:  R glenohumeral  Medications:  40 mg triamcinolone acetonide 40 mg/mL  Patient tolerance:  Patient tolerated the procedure well with no immediate complications     Ultrasound guidance was used for needle localization. Images were saved and stored for documentation. The appropriate structures were visualized. Dynamic visualization of the needle was continuous throughout the procedures and maintained good position.     We discussed the proper protocols after the injection such as no submerging pools, baths tubs, or hot tubs for 24 hr.  Showering is okay today.  We also discussed that blood sugars can be elevated after an injection and asked patient to properly check their sugars over the next few days and contact their PCP if there are any concerns.  We discussed red flags such as fevers, chills, red, warm, tender joint at the area of injection to please seek medical care immediately.

## 2024-10-10 NOTE — PATIENT INSTRUCTIONS
Assessment:  Sandra Dimas is a 49 y.o. female with a chief complaint of Pain of the Right Shoulder    Encounter Diagnoses   Name Primary?    Adhesive capsulitis of right shoulder Yes    Impingement syndrome of right shoulder     Chronic right shoulder pain       Plan:  Patient previously with subacromial impingement of the right shoulder, now with significant stiffness about the shoulder, concerning for newly developed adhesive capsulitis.  Glenohumeral joint corticosteroid injection today.    Proper protocols after the injection included: no submerging pools, baths tubs, or hot tubs for 24 hr.  Showering is okay today.  Side effects of the corticosteroid injection can include elevated blood glucose levels and blood pressures, so if you are taking medications for these, please monitor closely, and contact your PCP if any issues.  Red flag symptoms include fever, chills, nausea, vomiting, red, warm, tender joint at the area of injection.  If you are noticing these symptoms, they may be indicative of an infection, and please seek medical care immediately, either by calling our clinic or going to the emergency room.  Home exercise program given today.    At least 10 minutes were spent developing, teaching, and performing a home exercise program under the direction of King Medina MD.  A written summary was provided and all questions were answered.  Discussed further treatment options, if not improving, such as formal physical therapy, repeat injections, Brisement procedure which combines a nerve block with a high volume glenohumeral joint injection and post-injection therapy manipulation.  In some situations surgery can be considered for adhesive capsulitis.    Follow-up:  4 weeks with Dr. Vargas or sooner if there are any problems between now and then.    Thank you for choosing [facility] and Dr. King Medina for your orthopedic & sports medicine care. It is our goal to provide you  with exceptional care that will help keep you healthy, active, and get you back in the game.    Please do not hesitate to reach out to us via email, phone, or MyChart with any questions, concerns, or feedback.    If you are experiencing pain/discomfort ,or have questions after 5pm and would like to be connected to the [facility] on-call team, please call this number and specify which Sports Medicine provider is treating you: [phone]

## 2024-10-29 DIAGNOSIS — R11.0 NAUSEA: ICD-10-CM

## 2024-10-29 RX ORDER — ONDANSETRON 8 MG/1
8 TABLET, ORALLY DISINTEGRATING ORAL EVERY 8 HOURS PRN
Qty: 30 TABLET | Refills: 2 | Status: SHIPPED | OUTPATIENT
Start: 2024-10-29

## 2024-11-11 NOTE — PROGRESS NOTES
"      Patient ID: Sandra Dimas  YOB: 1975  MRN: [id number]    Chief Complaint: Pain of the Right Shoulder    Referred By: Dr. Vargas      History of Present Illness: Sandra Dimas is a right-hand dominant 49 y.o. female who presents today with Pain of the Right Shoulder    History of Present Illness    CHIEF COMPLAINT:  - Sandra presents for follow-up evaluation of shoulder pain and limited range of motion.    HPI:  Sandra presents for follow-up regarding shoulder pain. While sitting, the pain is at a level 5 out of 10, but certain movements exacerbate it significantly. She reports severe pain upon accidental contact, stating it causes extreme discomfort.    She received an injection in her shoulder during a previous visit, which has improved her range of motion slightly. However, she still experiences limitations in daily activities, such as inability to put on a bra independently. Sandra demonstrates her current range of motion, noting that certain movements cause the shoulder to "lock" and result in severe pain. She describes the pain as radiating down to her abdomen.    She experiences pain in various areas of the shoulder, particularly in the front and upper regions. She expresses concern about possible structural damage, stating she feels as if something is torn or cracked.    The shoulder pain significantly impacts the patient's daily activities, particularly her ability to engage in holiday preparations.    Sandra denies any significant relief from the previous injection.        Previous HPI:  She was initially evaluated in our office on 10/10/24 after being referred by Dr. Vargas for urgent evaluation of suspected right shoulder impingement flareup which had been treated in the past.  Her clinical exam was consistent with right shoulder adhesive capsulitis and she was treated with a glenohumeral hydrodilatation and home stretching program.    Past Medical History:   Past Medical " History:   Diagnosis Date    Chronic paroxysmal hemicrania 01/03/2019    Endometriosis     General anesthetics causing adverse effect in therapeutic use     wakes up with severe anxiety attacks    Hemicrania continua     Hepatitis C antibody positive in blood 12/09/2020    RNA NEGATIVE 12/14/2020    Migraines      Past Surgical History:   Procedure Laterality Date    APPENDECTOMY      COLONOSCOPY N/A 11/12/2020    Procedure: COLONOSCOPY;  Surgeon: Mylene Russ MD;  Location: [facility];  Service: Endoscopy;  Laterality: N/A;    COLONOSCOPY N/A 4/19/2023    Procedure: COLONOSCOPY;  Surgeon: Dian Vail MD;  Location: [facility];  Service: Endoscopy;  Laterality: N/A;    CYST REMOVAL Right 12/30/2019    Procedure: EXCISION, CYST;  Surgeon: NATALI Shah MD;  Location: [facility];  Service: OB/GYN;  Laterality: Right;  Sebaceous cyst    diagnostic laprascopy      ESOPHAGOGASTRODUODENOSCOPY N/A 11/30/2018    Procedure: EGD (ESOPHAGOGASTRODUODENOSCOPY);  Surgeon: Bossman Bustos MD;  Location: [facility];  Service: Endoscopy;  Laterality: N/A;    ESOPHAGOGASTRODUODENOSCOPY N/A 11/12/2020    Procedure: ESOPHAGOGASTRODUODENOSCOPY (EGD) needs rapid covid test;  Surgeon: Mylene Russ MD;  Location: [facility];  Service: Endoscopy;  Laterality: N/A;    ESOPHAGOGASTRODUODENOSCOPY N/A 4/19/2023    Procedure: EGD (ESOPHAGOGASTRODUODENOSCOPY);  Surgeon: Dian Vail MD;  Location: [facility];  Service: Endoscopy;  Laterality: N/A;    FULGURATION OF ENDOMETRIOSIS  12/26/2018    Procedure: FULGURATION, ENDOMETRIOSIS;  Surgeon: Zehra Jensen MD;  Location: [facility];  Service: OB/GYN;;  endometriosis implant resection    HYSTERECTOMY      HYSTEROSCOPY WITH HYDROTHERMAL ABLATION OF ENDOMETRIUM WITH DILATION AND CURETTAGE N/A 12/26/2018    Procedure: HYSTEROSCOPY, WITH DILATION AND CURETTAGE OF UTERUS AND HYDROTHERMAL ENDOMETRIAL ABLATION;  Surgeon: Zehra Jensen MD;  Location: [facility];  Service: OB/GYN;   Laterality: N/A;    INJECTION OF ANESTHETIC AGENT AROUND NERVE Right 03/17/2023    Procedure: BLOCK, NERVE, RIGHT C2,C3 AND THIRD OCCIPITAL NERVE DR KELLY ONLY 1 OF 2;  Surgeon: Neftaly Kelly MD;  Location: [facility];  Service: Pain Management;  Laterality: Right;    INJECTION OF ANESTHETIC AGENT AROUND NERVE Right 04/06/2023    Procedure: BLOCK, NERVE, RIGHT C2,C3 AND THIRD OCCIPITAL MEDIAL BRANCH DR KELLY ONLY 2 OF 2 (URGENT) *VIP*;  Surgeon: Neftaly Kelly MD;  Location: [facility];  Service: Pain Management;  Laterality: Right;    LAPAROSCOPIC SALPINGECTOMY Bilateral 12/26/2018    Procedure: SALPINGECTOMY, LAPAROSCOPIC;  Surgeon: Zehra Jensen MD;  Location: [facility];  Service: OB/GYN;  Laterality: Bilateral;    LAPAROTOMY, EXPLORATORY N/A 02/07/2019    Procedure: LAPAROTOMY, EXPLORATORY;  Surgeon: Ramakrishna Boone MD;  Location: [facility] OR;  Service: General;  Laterality: N/A;    OOPHORECTOMY      RADIOFREQUENCY ABLATION Right 5/11/2023    Procedure: RADIOFREQUENCY ABLATION RIGHT C2,C3, TON;  Surgeon: Neftaly Kelly MD;  Location: [facility];  Service: Pain Management;  Laterality: Right;    REPAIR OF VAGINAL CUFF N/A 07/09/2020    Procedure: REPAIR, VAGINAL CUFF;  Surgeon: Sailaja Addison MD;  Location: [facility] OR;  Service: OB/GYN;  Laterality: N/A;    ROBOT-ASSISTED LAPAROSCOPIC ABDOMINAL HYSTERECTOMY USING DA MAVIS XI N/A 12/30/2019    Procedure: XI ROBOTIC HYSTERECTOMY;  Surgeon: ANTALI Shah MD;  Location: [facility] OR;  Service: OB/GYN;  Laterality: N/A;    ROBOT-ASSISTED LAPAROSCOPIC SURGICAL REMOVAL OF OVARY USING DA MAVIS XI Bilateral 12/30/2019    Procedure: XI ROBOTIC OOPHORECTOMY;  Surgeon: NATALI Shah MD;  Location: [facility] OR;  Service: OB/GYN;  Laterality: Bilateral;    robotic assisted laparoscopy with excision of ovarian endometrioma and chromotubation       Family History   Problem Relation Name Age of Onset    Hypertension Mother      Diabetes type II Mother      Heart  attack Father      Strabismus Neg Hx      Retinal detachment Neg Hx      Macular degeneration Neg Hx      Glaucoma Neg Hx      Blindness Neg Hx      Amblyopia Neg Hx      Breast cancer Neg Hx      Colon cancer Neg Hx      Ovarian cancer Neg Hx      Thyroid disease Neg Hx      Migraines Neg Hx      Asthma Neg Hx       Social History     Socioeconomic History    Marital status:    Tobacco Use    Smoking status: Never    Smokeless tobacco: Never   Substance and Sexual Activity    Alcohol use: Not Currently     Comment: rarely No alcohol 72h prior to sx    Drug use: No    Sexual activity: Yes     Partners: Male   Social History Narrative    No pets or smokers in household.     Medication List with Changes/Refills   Current Medications    ALPRAZOLAM (XANAX) 0.5 MG TABLET    Take 1 tablet (0.5 mg total) by mouth nightly as needed for Anxiety.    ATENOLOL (TENORMIN) 25 MG TABLET    take 1 tablet my mouth daily    B2/MAGNESIUM CIT,OXID/FEVERFEW (MIGRELIEF ORAL)    Take by mouth once daily.     DULOXETINE (CYMBALTA) 30 MG CAPSULE    take 1 capsule by mouth daily    IBUPROFEN (ADVIL,MOTRIN) 800 MG TABLET    Take 1 tablet (800 mg total) by mouth 3 (three) times daily.    KETOCONAZOLE (NIZORAL) 2 % CREAM    Apply to the affected area 2 times daily    KETOCONAZOLE (NIZORAL) 2 % SHAMPOO    Wash neck/face with medicated shampoo at least 2 to 3 times per week - let sit at least 5 minutes prior to rinsing    LINACLOTIDE (LINZESS) 72 MCG CAP CAPSULE    Take 1 capsule (72 mcg total) by mouth before breakfast.    NATREXONE TABLET 4.5 MG    Take 1 tablet (4.5 mg total) by mouth every evening.    ONDANSETRON (ZOFRAN-ODT) 8 MG TBDL    Dissolve 1 tablet (8 mg total) by mouth every 8 (eight) hours as needed (nausea/vomiting).    PANTOPRAZOLE (PROTONIX) 40 MG TABLET    take 1 tablet by mouth every day    PROMETHAZINE (PHENERGAN) 25 MG TABLET    Take 1 tablet (25 mg total) by mouth every 6 (six) hours as needed for Nausea.     SUCRALFATE (CARAFATE) 100 MG/ML SUSPENSION    Take 10 mLs (1 g total) by mouth 4 (four) times daily.    TOPIRAMATE (TROKENDI XR) 100 MG CP24    Take 1 capsule (100 mg total) by mouth once daily.    VALACYCLOVIR (VALTREX) 1000 MG TABLET    Take 1 tablet by mouth once a day    VITAMIN D (VITAMIN D3) 1000 UNITS TAB    Take 1,000 Units by mouth once daily.     Review of patient's allergies indicates:   Allergen Reactions    Chlorhexidine Rash     Per  after surgery patient had large rash across abdomen where chlorhexidine was applied.    Hydrocodone Other (See Comments)     Chest pains. Note: patient tolerated hydromorphone in 2/2019.     Mastisol adhesive [gum blfeza-twjvti-bhec-alcohol] Rash       Physical Exam:   There is no height or weight on file to calculate BMI.    Detailed MSK exam:     Right Shoulder:  Inspection:  No swelling, erythema or ecchymosis.   Palpation: Diffuse tenderness to palpation   Range of motion: 160 deg Flexion         70 deg External Rotation in Adduction         IR to Lumbar     175/85/midthoracic contralateral  Strength:  5-/5 Abduction    5-/5 External Rotation in Adduction    5-/5 Internal Rotation      Pain limited  N/V Exam:  Radial: Normal motor (EPL/thumbs up)              Normal sensory (dorsal hand)   Median: Normal motor (FPL/A-OK)      Normal sensory (thumb)   Ulnar:  Normal motor (Interossei/scissors-spread)     Normal sensory (5th finger)   LABC: Normal sensory (lateral forearm)   MABC: Normal sensory (medial forearm)   MC: Normal motor (elbow flexion)   Axillary: Normal motor/sensory (deltoid)  Normal radial and ulnar pulses, warm and well perfused with capillary refill < 2 sec       Imaging:    No new imaging today.     Patient Instructions   Assessment:  Sandra Dimas is a 49 y.o. female with a chief complaint of Pain of the Right Shoulder    Encounter Diagnoses   Name Primary?    Impingement syndrome of right shoulder Yes    Chronic right shoulder pain        Plan:  Patient with persistent right shoulder pain.  She is now approx 1 month out from glenohumeral joint corticosteroid injection.  Has been compliant with home exercises.  Still with significant pain at end range of motion, as well as some stiffness, however stiffness seems more consistent with guarding and rotator cuff pain, rather than true adhesive capsulitis.  Would recommend diagnostic subacromial bursa corticosteroid injection today.  Ultrasound evaluation is demonstrating intact rotator cuff, as well as significant subacromial effusion and thickening of the bursa.  Proper protocols after the injection included: no submerging pools, baths tubs, or hot tubs for 24 hr.  Showering is okay today.  Side effects of the corticosteroid injection can include elevated blood glucose levels and blood pressures, so if you are taking medications for these, please monitor closely, and contact your PCP if any issues.  Red flag symptoms include fever, chills, nausea, vomiting, red, warm, tender joint at the area of injection.  If you are noticing these symptoms, they may be indicative of an infection, and please seek medical care immediately, either by calling our clinic or going to the emergency room.  Continue home exercise program, rotator cuff strengthening, and range of motion work.  If you are still having significant pain and dysfunction after this injection, would recommend MRI for further evaluation.    Follow-up:  As needed or sooner if there are any problems between now and then.    Thank you for choosing [facility] and Dr. King Medina for your orthopedic & sports medicine care. It is our goal to provide you with exceptional care that will help keep you healthy, active, and get you back in the game.    Please do not hesitate to reach out to us via email, phone, or MyChart with any questions, concerns, or feedback.    If you are experiencing pain/discomfort ,or have questions after 5pm  and would like to be connected to the [facility] [place] on-call team, please call this number and specify which Sports Medicine provider is treating you: [phone]       A copy of today's visit note has been sent to the referring provider.           King Medina MD  Primary Care Sports Medicine    Disclaimer: This note was prepared using a voice recognition system and is likely to have sound alike errors within the text.

## 2024-11-12 ENCOUNTER — OFFICE VISIT (OUTPATIENT)
Dept: SPORTS MEDICINE | Facility: CLINIC | Age: 49
End: 2024-11-12
Payer: COMMERCIAL

## 2024-11-12 DIAGNOSIS — G89.29 CHRONIC RIGHT SHOULDER PAIN: ICD-10-CM

## 2024-11-12 DIAGNOSIS — M25.511 CHRONIC RIGHT SHOULDER PAIN: ICD-10-CM

## 2024-11-12 DIAGNOSIS — M75.41 IMPINGEMENT SYNDROME OF RIGHT SHOULDER: Primary | ICD-10-CM

## 2024-11-12 PROCEDURE — 99999 PR PBB SHADOW E&M-EST. PATIENT-LVL III: CPT | Mod: PBBFAC,,, | Performed by: STUDENT IN AN ORGANIZED HEALTH CARE EDUCATION/TRAINING PROGRAM

## 2024-11-12 PROCEDURE — 1159F MED LIST DOCD IN RCRD: CPT | Mod: CPTII,S$GLB,, | Performed by: STUDENT IN AN ORGANIZED HEALTH CARE EDUCATION/TRAINING PROGRAM

## 2024-11-12 PROCEDURE — 20611 DRAIN/INJ JOINT/BURSA W/US: CPT | Mod: RT,S$GLB,, | Performed by: STUDENT IN AN ORGANIZED HEALTH CARE EDUCATION/TRAINING PROGRAM

## 2024-11-12 PROCEDURE — 1160F RVW MEDS BY RX/DR IN RCRD: CPT | Mod: CPTII,S$GLB,, | Performed by: STUDENT IN AN ORGANIZED HEALTH CARE EDUCATION/TRAINING PROGRAM

## 2024-11-12 PROCEDURE — 99214 OFFICE O/P EST MOD 30 MIN: CPT | Mod: 25,S$GLB,, | Performed by: STUDENT IN AN ORGANIZED HEALTH CARE EDUCATION/TRAINING PROGRAM

## 2024-11-12 RX ORDER — BETAMETHASONE SODIUM PHOSPHATE AND BETAMETHASONE ACETATE 3; 3 MG/ML; MG/ML
6 INJECTION, SUSPENSION INTRA-ARTICULAR; INTRALESIONAL; INTRAMUSCULAR; SOFT TISSUE
Status: DISCONTINUED | OUTPATIENT
Start: 2024-11-12 | End: 2024-11-12 | Stop reason: HOSPADM

## 2024-11-12 RX ADMIN — BETAMETHASONE SODIUM PHOSPHATE AND BETAMETHASONE ACETATE 6 MG: 3; 3 INJECTION, SUSPENSION INTRA-ARTICULAR; INTRALESIONAL; INTRAMUSCULAR; SOFT TISSUE at 11:11

## 2024-11-12 NOTE — PATIENT INSTRUCTIONS
Assessment:  Sandra Dimas is a 49 y.o. female with a chief complaint of Pain of the Right Shoulder    Encounter Diagnoses   Name Primary?    Impingement syndrome of right shoulder Yes    Chronic right shoulder pain       Plan:  Patient with persistent right shoulder pain.  She is now approx 1 month out from glenohumeral joint corticosteroid injection.  Has been compliant with home exercises.  Still with significant pain at end range of motion, as well as some stiffness, however stiffness seems more consistent with guarding and rotator cuff pain, rather than true adhesive capsulitis.  Would recommend diagnostic subacromial bursa corticosteroid injection today.  Ultrasound evaluation is demonstrating intact rotator cuff, as well as significant subacromial effusion and thickening of the bursa.  Proper protocols after the injection included: no submerging pools, baths tubs, or hot tubs for 24 hr.  Showering is okay today.  Side effects of the corticosteroid injection can include elevated blood glucose levels and blood pressures, so if you are taking medications for these, please monitor closely, and contact your PCP if any issues.  Red flag symptoms include fever, chills, nausea, vomiting, red, warm, tender joint at the area of injection.  If you are noticing these symptoms, they may be indicative of an infection, and please seek medical care immediately, either by calling our clinic or going to the emergency room.  Continue home exercise program, rotator cuff strengthening, and range of motion work.  If you are still having significant pain and dysfunction after this injection, would recommend MRI for further evaluation.    Follow-up:  As needed or sooner if there are any problems between now and then.    Thank you for choosing [facility] and Dr. King Medina for your orthopedic & sports medicine care. It is our goal to provide you with exceptional care that will help keep you healthy,  active, and get you back in the game.    Please do not hesitate to reach out to us via email, phone, or MyChart with any questions, concerns, or feedback.    If you are experiencing pain/discomfort ,or have questions after 5pm and would like to be connected to the [facility] [place] on-call team, please call this number and specify which Sports Medicine provider is treating you: [phone]

## 2024-11-12 NOTE — PROCEDURES
Large Joint Aspiration/Injection: R subacromial bursa    Date/Time: 11/12/2024 11:00 AM    Performed by: King Medina MD  Authorized by: King Medina MD    Consent Done?:  Yes (Verbal)  Indications:  Pain and diagnostic evaluation  Site marked: the procedure site was marked    Timeout: prior to procedure the correct patient, procedure, and site was verified      Local anesthesia used?: Yes    Anesthesia:  Local infiltration  Local anesthetic:  Topical anesthetic, bupivacaine 0.5% without epinephrine and lidocaine 1% without epinephrine  Anesthetic total (ml):  4      Details:  Needle Size:  21 G  Ultrasonic Guidance for needle placement?: Yes    Images are saved and documented.  Approach:  Lateral  Location:  Shoulder  Site:  R subacromial bursa  Medications:  6 mg betamethasone acetate-betamethasone sodium phosphate 6 mg/mL  Patient tolerance:  Patient tolerated the procedure well with no immediate complications     Ultrasound guidance was used for needle localization. Images were saved and stored for documentation. The appropriate structures were visualized. Dynamic visualization of the needle was continuous throughout the procedures and maintained good position.     We discussed the proper protocols after the injection such as no submerging pools, baths tubs, or hot tubs for 24 hr.  Showering is okay today.  We also discussed that blood sugars can be elevated after an injection and asked patient to properly check their sugars over the next few days and contact their PCP if there are any concerns.  We discussed red flags such as fevers, chills, red, warm, tender joint at the area of injection to please seek medical care immediately.

## 2024-11-18 DIAGNOSIS — R63.4 WEIGHT LOSS: Primary | ICD-10-CM

## 2024-11-18 DIAGNOSIS — G93.32 CFS (CHRONIC FATIGUE SYNDROME): ICD-10-CM

## 2024-11-18 DIAGNOSIS — R20.2 PARESTHESIAS: ICD-10-CM

## 2024-11-19 ENCOUNTER — LAB VISIT (OUTPATIENT)
Dept: LAB | Facility: HOSPITAL | Age: 49
End: 2024-11-19
Attending: INTERNAL MEDICINE
Payer: COMMERCIAL

## 2024-11-19 DIAGNOSIS — R20.2 PARESTHESIAS: ICD-10-CM

## 2024-11-19 DIAGNOSIS — G93.32 CFS (CHRONIC FATIGUE SYNDROME): ICD-10-CM

## 2024-11-19 DIAGNOSIS — R63.4 WEIGHT LOSS: ICD-10-CM

## 2024-11-19 LAB
ALBUMIN SERPL BCP-MCNC: 4.1 G/DL (ref 3.5–5.2)
ALP SERPL-CCNC: 55 U/L (ref 40–150)
ALT SERPL W/O P-5'-P-CCNC: 15 U/L (ref 10–44)
ANION GAP SERPL CALC-SCNC: 10 MMOL/L (ref 8–16)
AST SERPL-CCNC: 16 U/L (ref 10–40)
BASOPHILS # BLD AUTO: 0.05 K/UL (ref 0–0.2)
BASOPHILS NFR BLD: 0.8 % (ref 0–1.9)
BILIRUB SERPL-MCNC: 0.3 MG/DL (ref 0.1–1)
BUN SERPL-MCNC: 25 MG/DL (ref 6–20)
CALCIUM SERPL-MCNC: 9.6 MG/DL (ref 8.7–10.5)
CHLORIDE SERPL-SCNC: 111 MMOL/L (ref 95–110)
CHOLEST SERPL-MCNC: 236 MG/DL (ref 120–199)
CHOLEST/HDLC SERPL: 5.5 {RATIO} (ref 2–5)
CO2 SERPL-SCNC: 20 MMOL/L (ref 23–29)
CREAT SERPL-MCNC: 0.8 MG/DL (ref 0.5–1.4)
DIFFERENTIAL METHOD BLD: ABNORMAL
EOSINOPHIL # BLD AUTO: 0.1 K/UL (ref 0–0.5)
EOSINOPHIL NFR BLD: 1.1 % (ref 0–8)
ERYTHROCYTE [DISTWIDTH] IN BLOOD BY AUTOMATED COUNT: 13.7 % (ref 11.5–14.5)
EST. GFR  (NO RACE VARIABLE): >60 ML/MIN/1.73 M^2
ESTIMATED AVG GLUCOSE: 100 MG/DL (ref 68–131)
FERRITIN SERPL-MCNC: 721 NG/ML (ref 20–300)
GLUCOSE SERPL-MCNC: 83 MG/DL (ref 70–110)
HBA1C MFR BLD: 5.1 % (ref 4–5.6)
HCT VFR BLD AUTO: 32.7 % (ref 37–48.5)
HDLC SERPL-MCNC: 43 MG/DL (ref 40–75)
HDLC SERPL: 18.2 % (ref 20–50)
HGB BLD-MCNC: 10 G/DL (ref 12–16)
IMM GRANULOCYTES # BLD AUTO: 0.02 K/UL (ref 0–0.04)
IMM GRANULOCYTES NFR BLD AUTO: 0.3 % (ref 0–0.5)
IRON SERPL-MCNC: 71 UG/DL (ref 30–160)
LDLC SERPL CALC-MCNC: 164.4 MG/DL (ref 63–159)
LYMPHOCYTES # BLD AUTO: 3 K/UL (ref 1–4.8)
LYMPHOCYTES NFR BLD: 47 % (ref 18–48)
MCH RBC QN AUTO: 25.6 PG (ref 27–31)
MCHC RBC AUTO-ENTMCNC: 30.6 G/DL (ref 32–36)
MCV RBC AUTO: 84 FL (ref 82–98)
MONOCYTES # BLD AUTO: 0.5 K/UL (ref 0.3–1)
MONOCYTES NFR BLD: 7.1 % (ref 4–15)
NEUTROPHILS # BLD AUTO: 2.8 K/UL (ref 1.8–7.7)
NEUTROPHILS NFR BLD: 43.7 % (ref 38–73)
NONHDLC SERPL-MCNC: 193 MG/DL
NRBC BLD-RTO: 0 /100 WBC
PLATELET # BLD AUTO: 293 K/UL (ref 150–450)
PMV BLD AUTO: 10.1 FL (ref 9.2–12.9)
POTASSIUM SERPL-SCNC: 3.8 MMOL/L (ref 3.5–5.1)
PROT SERPL-MCNC: 6.8 G/DL (ref 6–8.4)
RBC # BLD AUTO: 3.9 M/UL (ref 4–5.4)
SATURATED IRON: 26 % (ref 20–50)
SODIUM SERPL-SCNC: 141 MMOL/L (ref 136–145)
T4 FREE SERPL-MCNC: 0.88 NG/DL (ref 0.71–1.51)
TOTAL IRON BINDING CAPACITY: 277 UG/DL (ref 250–450)
TRANSFERRIN SERPL-MCNC: 187 MG/DL (ref 200–375)
TRIGL SERPL-MCNC: 143 MG/DL (ref 30–150)
TSH SERPL DL<=0.005 MIU/L-ACNC: 1.57 UIU/ML (ref 0.4–4)
WBC # BLD AUTO: 6.32 K/UL (ref 3.9–12.7)

## 2024-11-19 PROCEDURE — 85025 COMPLETE CBC W/AUTO DIFF WBC: CPT | Performed by: INTERNAL MEDICINE

## 2024-11-19 PROCEDURE — 80053 COMPREHEN METABOLIC PANEL: CPT | Performed by: INTERNAL MEDICINE

## 2024-11-19 PROCEDURE — 80061 LIPID PANEL: CPT | Performed by: INTERNAL MEDICINE

## 2024-11-19 PROCEDURE — 82652 VIT D 1 25-DIHYDROXY: CPT | Performed by: INTERNAL MEDICINE

## 2024-11-19 PROCEDURE — 82728 ASSAY OF FERRITIN: CPT | Performed by: INTERNAL MEDICINE

## 2024-11-19 PROCEDURE — 84439 ASSAY OF FREE THYROXINE: CPT | Performed by: INTERNAL MEDICINE

## 2024-11-19 PROCEDURE — 83036 HEMOGLOBIN GLYCOSYLATED A1C: CPT | Performed by: INTERNAL MEDICINE

## 2024-11-19 PROCEDURE — 83540 ASSAY OF IRON: CPT | Performed by: INTERNAL MEDICINE

## 2024-11-19 PROCEDURE — 84443 ASSAY THYROID STIM HORMONE: CPT | Performed by: INTERNAL MEDICINE

## 2024-11-20 ENCOUNTER — TELEPHONE (OUTPATIENT)
Dept: HEMATOLOGY/ONCOLOGY | Facility: CLINIC | Age: 49
End: 2024-11-20
Payer: COMMERCIAL

## 2024-11-20 DIAGNOSIS — R79.89 ELEVATED FERRITIN: Primary | ICD-10-CM

## 2024-11-20 NOTE — TELEPHONE ENCOUNTER
Attempted to reach patient: No  answer. LVM for patient to return call to our office to have labs drawn. New orders as per Dr. Hendrickson

## 2024-11-22 LAB — 1,25(OH)2D3 SERPL-MCNC: 53 PG/ML (ref 20–79)

## 2024-12-03 ENCOUNTER — LAB VISIT (OUTPATIENT)
Dept: LAB | Facility: HOSPITAL | Age: 49
End: 2024-12-03
Attending: INTERNAL MEDICINE
Payer: COMMERCIAL

## 2024-12-03 DIAGNOSIS — R79.89 ELEVATED FERRITIN: ICD-10-CM

## 2024-12-03 LAB
HAPTOGLOB SERPL-MCNC: 133 MG/DL (ref 30–250)
LDH SERPL L TO P-CCNC: 133 U/L (ref 110–260)
RETICS/RBC NFR AUTO: 1.1 % (ref 0.5–2.5)
VIT B12 SERPL-MCNC: 565 PG/ML (ref 210–950)

## 2024-12-03 PROCEDURE — 83010 ASSAY OF HAPTOGLOBIN QUANT: CPT | Performed by: INTERNAL MEDICINE

## 2024-12-03 PROCEDURE — 82607 VITAMIN B-12: CPT | Performed by: INTERNAL MEDICINE

## 2024-12-03 PROCEDURE — 83615 LACTATE (LD) (LDH) ENZYME: CPT | Performed by: INTERNAL MEDICINE

## 2024-12-03 PROCEDURE — 82955 ASSAY OF G6PD ENZYME: CPT | Performed by: INTERNAL MEDICINE

## 2024-12-03 PROCEDURE — 84165 PROTEIN E-PHORESIS SERUM: CPT | Mod: 26,,, | Performed by: PATHOLOGY

## 2024-12-03 PROCEDURE — 85045 AUTOMATED RETICULOCYTE COUNT: CPT | Performed by: INTERNAL MEDICINE

## 2024-12-03 PROCEDURE — 36415 COLL VENOUS BLD VENIPUNCTURE: CPT | Performed by: INTERNAL MEDICINE

## 2024-12-03 PROCEDURE — 84165 PROTEIN E-PHORESIS SERUM: CPT | Performed by: INTERNAL MEDICINE

## 2024-12-04 LAB
ALBUMIN SERPL ELPH-MCNC: 4.39 G/DL (ref 3.35–5.55)
ALPHA1 GLOB SERPL ELPH-MCNC: 0.21 G/DL (ref 0.17–0.41)
ALPHA2 GLOB SERPL ELPH-MCNC: 0.56 G/DL (ref 0.43–0.99)
B-GLOBULIN SERPL ELPH-MCNC: 0.6 G/DL (ref 0.5–1.1)
GAMMA GLOB SERPL ELPH-MCNC: 0.74 G/DL (ref 0.67–1.58)
PROT SERPL-MCNC: 6.5 G/DL (ref 6–8.4)

## 2024-12-06 LAB
G6PD RBC-CCNT: 12.4 U/G HB (ref 8–11.9)
PATHOLOGIST INTERPRETATION SPE: NORMAL

## 2024-12-12 DIAGNOSIS — G43.919 INTRACTABLE MIGRAINE WITHOUT STATUS MIGRAINOSUS, UNSPECIFIED MIGRAINE TYPE: ICD-10-CM

## 2024-12-12 RX ORDER — PROMETHAZINE HYDROCHLORIDE 25 MG/1
25 TABLET ORAL EVERY 6 HOURS PRN
Qty: 30 TABLET | Refills: 1 | Status: SHIPPED | OUTPATIENT
Start: 2024-12-12

## 2025-01-30 ENCOUNTER — OFFICE VISIT (OUTPATIENT)
Dept: URGENT CARE | Facility: CLINIC | Age: 50
End: 2025-01-30
Payer: COMMERCIAL

## 2025-01-30 VITALS
HEIGHT: 68 IN | OXYGEN SATURATION: 99 % | HEART RATE: 101 BPM | DIASTOLIC BLOOD PRESSURE: 63 MMHG | BODY MASS INDEX: 21.55 KG/M2 | RESPIRATION RATE: 18 BRPM | SYSTOLIC BLOOD PRESSURE: 90 MMHG | WEIGHT: 142.19 LBS | TEMPERATURE: 98 F

## 2025-01-30 DIAGNOSIS — U07.1 COVID-19: Primary | ICD-10-CM

## 2025-01-30 DIAGNOSIS — R05.9 COUGH, UNSPECIFIED TYPE: ICD-10-CM

## 2025-01-30 LAB
CTP QC/QA: YES
CTP QC/QA: YES
POC MOLECULAR INFLUENZA A AGN: NEGATIVE
POC MOLECULAR INFLUENZA B AGN: NEGATIVE
SARS-COV-2 AG RESP QL IA.RAPID: POSITIVE

## 2025-01-30 PROCEDURE — 87502 INFLUENZA DNA AMP PROBE: CPT | Mod: QW,S$GLB,, | Performed by: NURSE PRACTITIONER

## 2025-01-30 PROCEDURE — 99213 OFFICE O/P EST LOW 20 MIN: CPT | Mod: S$GLB,,, | Performed by: NURSE PRACTITIONER

## 2025-01-30 PROCEDURE — 87811 SARS-COV-2 COVID19 W/OPTIC: CPT | Mod: QW,S$GLB,, | Performed by: NURSE PRACTITIONER

## 2025-01-30 RX ORDER — PROMETHAZINE HYDROCHLORIDE AND DEXTROMETHORPHAN HYDROBROMIDE 6.25; 15 MG/5ML; MG/5ML
5 SYRUP ORAL NIGHTLY PRN
Qty: 120 ML | Refills: 0 | Status: SHIPPED | OUTPATIENT
Start: 2025-01-30

## 2025-01-30 NOTE — PATIENT INSTRUCTIONS
recommended rest, hydration, supportive care, deep breathing  CDC recommended quarantine discussed  Infection precautions discussed  Vitamins c, d, zinc discussed  mucinex with lots of fluids  Emergency Room if shortness of breath, issues breathing

## 2025-01-30 NOTE — PROGRESS NOTES
"Subjective:      Patient ID: Sandra Dimas is a 49 y.o. female.    Vitals:  height is 5' 8" (1.727 m) and weight is 64.5 kg (142 lb 3.2 oz). Her tympanic temperature is 98.1 °F (36.7 °C). Her blood pressure is 90/63 and her pulse is 101. Her respiration is 18 and oxygen saturation is 99%.     Chief Complaint: Sinus Problem    Patient presents to the clinic with a cough, congestion, ear pressure, and chills that began 2 days ago. Patient states that she has headache often and takes a lot of Advil for those. Does have aches.. She would like to rule out covid and flu.     Sinus Problem  This is a new problem. The problem has been gradually worsening since onset. Maximum temperature: unchecked. Associated symptoms include chills, congestion, coughing, ear pain and headaches. Pertinent negatives include no diaphoresis, hoarse voice, neck pain, shortness of breath, sinus pressure, sneezing, sore throat or swollen glands. Treatments tried: Advil, coricidin. The treatment provided no relief.       Constitution: Positive for activity change, chills and fatigue. Negative for sweating.   HENT:  Positive for ear pain, congestion and postnasal drip. Negative for sinus pressure and sore throat.    Neck: Negative for neck pain.   Respiratory:  Positive for cough. Negative for shortness of breath.    Allergic/Immunologic: Negative for sneezing.   Neurological:  Positive for headaches.      Objective:     Physical Exam   Constitutional: She is oriented to person, place, and time. She appears well-developed. She is cooperative.  Non-toxic appearance. She does not appear ill. No distress.   HENT:   Head: Normocephalic and atraumatic.   Ears:   Right Ear: Hearing, tympanic membrane, external ear and ear canal normal.   Left Ear: Hearing, tympanic membrane, external ear and ear canal normal.   Nose: Mucosal edema and rhinorrhea present. No nasal deformity. No epistaxis. Right sinus exhibits no maxillary sinus tenderness and no " frontal sinus tenderness. Left sinus exhibits no maxillary sinus tenderness and no frontal sinus tenderness.   Mouth/Throat: Uvula is midline, oropharynx is clear and moist and mucous membranes are normal. No trismus in the jaw. Normal dentition. No uvula swelling. Cobblestoning present. No oropharyngeal exudate, posterior oropharyngeal edema or posterior oropharyngeal erythema.   Eyes: Conjunctivae and lids are normal. No scleral icterus.   Neck: Trachea normal and phonation normal. Neck supple. No edema present. No erythema present. No neck rigidity present.   Cardiovascular: Normal rate, regular rhythm, normal heart sounds and normal pulses.   Pulmonary/Chest: Effort normal and breath sounds normal. No respiratory distress. She has no decreased breath sounds. She has no rhonchi.   Deep cough         Comments: Deep cough    Abdominal: Normal appearance.   Musculoskeletal: Normal range of motion.         General: No deformity. Normal range of motion.   Neurological: She is alert and oriented to person, place, and time. She exhibits normal muscle tone. Coordination normal.   Skin: Skin is warm, dry, intact, not diaphoretic and not pale.   Psychiatric: Her speech is normal and behavior is normal. Judgment and thought content normal.   Nursing note and vitals reviewed.      Assessment:     1. COVID-19    2. Cough, unspecified type        Plan:       COVID-19  -     nirmatrelvir-ritonavir 300 mg (150 mg x 2)-100 mg copackaged tablets (EUA); Take 3 tablets by mouth 2 (two) times daily for 5 days. Each dose contains 2 nirmatrelvir (pink tablets) and 1 ritonavir (white tablet). Take all 3 tablets together  Dispense: 30 tablet; Refill: 0  -     promethazine-dextromethorphan (PROMETHAZINE-DM) 6.25-15 mg/5 mL Syrp; Take 5 mLs by mouth nightly as needed (Cough).  Dispense: 120 mL; Refill: 0    Cough, unspecified type  -     SARS Coronavirus 2 Antigen, POCT Manual Read  -     POCT Influenza A/B MOLECULAR      Flu Negative    Covid positive   Patient Instructions   recommended rest, hydration, supportive care, deep breathing  CDC recommended quarantine discussed  Infection precautions discussed  Vitamins c, d, zinc discussed  mucinex with lots of fluids  Emergency Room if shortness of breath, issues breathing

## 2025-02-04 DIAGNOSIS — R11.0 NAUSEA: ICD-10-CM

## 2025-02-04 RX ORDER — ONDANSETRON 8 MG/1
8 TABLET, ORALLY DISINTEGRATING ORAL EVERY 8 HOURS PRN
Qty: 30 TABLET | Refills: 2 | Status: SHIPPED | OUTPATIENT
Start: 2025-02-04

## 2025-02-04 RX ORDER — ATENOLOL 25 MG/1
TABLET ORAL
Qty: 90 TABLET | Refills: 3 | Status: SHIPPED | OUTPATIENT
Start: 2025-02-04

## 2025-02-04 RX ORDER — PANTOPRAZOLE SODIUM 40 MG/1
TABLET, DELAYED RELEASE ORAL
Qty: 90 TABLET | Refills: 3 | Status: SHIPPED | OUTPATIENT
Start: 2025-02-04

## 2025-02-05 ENCOUNTER — HOSPITAL ENCOUNTER (EMERGENCY)
Facility: HOSPITAL | Age: 50
Discharge: HOME | End: 2025-02-05
Attending: EMERGENCY MEDICINE
Payer: COMMERCIAL

## 2025-02-05 VITALS
HEIGHT: 68 IN | BODY MASS INDEX: 20.92 KG/M2 | WEIGHT: 138 LBS | OXYGEN SATURATION: 100 % | SYSTOLIC BLOOD PRESSURE: 106 MMHG | TEMPERATURE: 98 F | HEART RATE: 80 BPM | RESPIRATION RATE: 17 BRPM | DIASTOLIC BLOOD PRESSURE: 71 MMHG

## 2025-02-05 DIAGNOSIS — G43.909 MIGRAINE WITHOUT STATUS MIGRAINOSUS, NOT INTRACTABLE, UNSPECIFIED MIGRAINE TYPE: Primary | ICD-10-CM

## 2025-02-05 LAB
ALBUMIN SERPL BCP-MCNC: 4.4 G/DL (ref 3.5–5.2)
ALP SERPL-CCNC: 61 U/L (ref 40–150)
ALT SERPL W/O P-5'-P-CCNC: 25 U/L (ref 10–44)
ANION GAP SERPL CALC-SCNC: 8 MMOL/L (ref 8–16)
AST SERPL-CCNC: 19 U/L (ref 10–40)
BASOPHILS # BLD AUTO: 0.03 K/UL (ref 0–0.2)
BASOPHILS NFR BLD: 0.5 % (ref 0–1.9)
BILIRUB SERPL-MCNC: 0.1 MG/DL (ref 0.1–1)
BUN SERPL-MCNC: 27 MG/DL (ref 6–20)
CALCIUM SERPL-MCNC: 9.7 MG/DL (ref 8.7–10.5)
CHLORIDE SERPL-SCNC: 108 MMOL/L (ref 95–110)
CO2 SERPL-SCNC: 24 MMOL/L (ref 23–29)
CREAT SERPL-MCNC: 0.9 MG/DL (ref 0.5–1.4)
DIFFERENTIAL METHOD BLD: ABNORMAL
EOSINOPHIL # BLD AUTO: 0.1 K/UL (ref 0–0.5)
EOSINOPHIL NFR BLD: 1.8 % (ref 0–8)
ERYTHROCYTE [DISTWIDTH] IN BLOOD BY AUTOMATED COUNT: 12.7 % (ref 11.5–14.5)
EST. GFR  (NO RACE VARIABLE): >60 ML/MIN/1.73 M^2
GLUCOSE SERPL-MCNC: 91 MG/DL (ref 70–110)
HCT VFR BLD AUTO: 33.3 % (ref 37–48.5)
HGB BLD-MCNC: 10.5 G/DL (ref 12–16)
IMM GRANULOCYTES # BLD AUTO: 0.01 K/UL (ref 0–0.04)
IMM GRANULOCYTES NFR BLD AUTO: 0.2 % (ref 0–0.5)
LYMPHOCYTES # BLD AUTO: 3.1 K/UL (ref 1–4.8)
LYMPHOCYTES NFR BLD: 51.3 % (ref 18–48)
MCH RBC QN AUTO: 26.6 PG (ref 27–31)
MCHC RBC AUTO-ENTMCNC: 31.5 G/DL (ref 32–36)
MCV RBC AUTO: 84 FL (ref 82–98)
MONOCYTES # BLD AUTO: 0.4 K/UL (ref 0.3–1)
MONOCYTES NFR BLD: 6.2 % (ref 4–15)
NEUTROPHILS # BLD AUTO: 2.4 K/UL (ref 1.8–7.7)
NEUTROPHILS NFR BLD: 40 % (ref 38–73)
NRBC BLD-RTO: 0 /100 WBC
PLATELET # BLD AUTO: 291 K/UL (ref 150–450)
PMV BLD AUTO: 9.3 FL (ref 9.2–12.9)
POTASSIUM SERPL-SCNC: 3.6 MMOL/L (ref 3.5–5.1)
PROT SERPL-MCNC: 7.6 G/DL (ref 6–8.4)
RBC # BLD AUTO: 3.95 M/UL (ref 4–5.4)
SODIUM SERPL-SCNC: 140 MMOL/L (ref 136–145)
WBC # BLD AUTO: 5.99 K/UL (ref 3.9–12.7)

## 2025-02-05 PROCEDURE — 96361 HYDRATE IV INFUSION ADD-ON: CPT

## 2025-02-05 PROCEDURE — 96375 TX/PRO/DX INJ NEW DRUG ADDON: CPT

## 2025-02-05 PROCEDURE — 63600175 PHARM REV CODE 636 W HCPCS: Mod: JZ,TB | Performed by: EMERGENCY MEDICINE

## 2025-02-05 PROCEDURE — 80053 COMPREHEN METABOLIC PANEL: CPT | Performed by: EMERGENCY MEDICINE

## 2025-02-05 PROCEDURE — 25000003 PHARM REV CODE 250: Performed by: EMERGENCY MEDICINE

## 2025-02-05 PROCEDURE — 99284 EMERGENCY DEPT VISIT MOD MDM: CPT | Mod: 25

## 2025-02-05 PROCEDURE — 96376 TX/PRO/DX INJ SAME DRUG ADON: CPT

## 2025-02-05 PROCEDURE — 96374 THER/PROPH/DIAG INJ IV PUSH: CPT

## 2025-02-05 PROCEDURE — 85025 COMPLETE CBC W/AUTO DIFF WBC: CPT | Performed by: EMERGENCY MEDICINE

## 2025-02-05 RX ORDER — PREDNISONE 20 MG/1
20 TABLET ORAL
Status: COMPLETED | OUTPATIENT
Start: 2025-02-05 | End: 2025-02-05

## 2025-02-05 RX ORDER — PREDNISONE 20 MG/1
40 TABLET ORAL DAILY
Qty: 8 TABLET | Refills: 0 | Status: SHIPPED | OUTPATIENT
Start: 2025-02-06 | End: 2025-02-10

## 2025-02-05 RX ORDER — KETOROLAC TROMETHAMINE 30 MG/ML
15 INJECTION, SOLUTION INTRAMUSCULAR; INTRAVENOUS
Status: COMPLETED | OUTPATIENT
Start: 2025-02-05 | End: 2025-02-05

## 2025-02-05 RX ADMIN — PREDNISONE 20 MG: 20 TABLET ORAL at 08:02

## 2025-02-05 RX ADMIN — KETOROLAC TROMETHAMINE 15 MG: 30 INJECTION, SOLUTION INTRAMUSCULAR at 06:02

## 2025-02-05 RX ADMIN — SODIUM CHLORIDE 1000 ML: 0.9 INJECTION, SOLUTION INTRAVENOUS at 06:02

## 2025-02-05 RX ADMIN — KETOROLAC TROMETHAMINE 15 MG: 30 INJECTION, SOLUTION INTRAMUSCULAR at 08:02

## 2025-02-05 RX ADMIN — DROPERIDOL 1.25 MG: 2.5 INJECTION, SOLUTION INTRAMUSCULAR; INTRAVENOUS at 07:02

## 2025-02-06 ENCOUNTER — OFFICE VISIT (OUTPATIENT)
Dept: PHYSICAL MEDICINE AND REHAB | Facility: CLINIC | Age: 50
End: 2025-02-06
Payer: COMMERCIAL

## 2025-02-06 DIAGNOSIS — M53.82 MUSCULOSKELETAL DISORDER OF THE SUBOCCIPITAL: ICD-10-CM

## 2025-02-06 DIAGNOSIS — M46.00 SPINAL ENTHESOPATHY: ICD-10-CM

## 2025-02-06 DIAGNOSIS — M79.18 DIFFUSE MYOFASCIAL PAIN SYNDROME: Primary | ICD-10-CM

## 2025-02-06 PROCEDURE — 99999 PR PBB SHADOW E&M-EST. PATIENT-LVL II: CPT | Mod: PBBFAC,,, | Performed by: PHYSICAL MEDICINE & REHABILITATION

## 2025-02-06 PROCEDURE — 1159F MED LIST DOCD IN RCRD: CPT | Mod: CPTII,S$GLB,, | Performed by: PHYSICAL MEDICINE & REHABILITATION

## 2025-02-06 PROCEDURE — 20553 NJX 1/MLT TRIGGER POINTS 3/>: CPT | Mod: S$GLB,,, | Performed by: PHYSICAL MEDICINE & REHABILITATION

## 2025-02-06 PROCEDURE — 1160F RVW MEDS BY RX/DR IN RCRD: CPT | Mod: CPTII,S$GLB,, | Performed by: PHYSICAL MEDICINE & REHABILITATION

## 2025-02-06 PROCEDURE — 99214 OFFICE O/P EST MOD 30 MIN: CPT | Mod: 25,S$GLB,, | Performed by: PHYSICAL MEDICINE & REHABILITATION

## 2025-02-06 RX ORDER — METHYLPREDNISOLONE ACETATE 40 MG/ML
40 INJECTION, SUSPENSION INTRA-ARTICULAR; INTRALESIONAL; INTRAMUSCULAR; SOFT TISSUE
Status: COMPLETED | OUTPATIENT
Start: 2025-02-06 | End: 2025-02-06

## 2025-02-06 RX ADMIN — METHYLPREDNISOLONE ACETATE 40 MG: 40 INJECTION, SUSPENSION INTRA-ARTICULAR; INTRALESIONAL; INTRAMUSCULAR; SOFT TISSUE at 01:02

## 2025-02-06 NOTE — PROGRESS NOTES
PM&R CLINIC NOTE    Chief Complaint   Patient presents with    Shoulder Pain    Neck Pain    Headache       HPI: This is a 49 y.o.  female being seen in clinic today for re-evaluation of neck/shoulder achy pain and tightness with increased headaches.  Her symptoms worsened after recent covid infection.   She had a bad reaction to Botox for migraines in the past.    History obtained from patient    Functional History:  Walking: Not limited  Transfers: Independent  Assistive devices: No  Power mobility: No  Falls: None       Needs help with:  Nothing - all ADLS normal    Past family, medical, social, and surgical history reviewed in chart    Review of Systems:     General- denies lethargy, weight change, fever, chills  Head/neck- denies swallowing difficulties  ENT- denies hearing changes  Cardiovascular-denies chest pain  Pulmonary- denies shortness of breath  GI- denies constipation or bowel incontinence  - denies bladder incontinence  Skin- denies wounds or rashes  Musculoskeletal- denies weakness, +pain  Neurologic- +/-numbness and tingling  Psychiatric- denies depressive or psychotic features, denies anxiety  Lymphatic-denies swelling  Endocrine- denies hypoglycemic symptoms/DM history  All other pertinent systems negative     Physical Examination:  General: Well developed, well nourished female, NAD  HEENT:NCAT EOMI bilaterally   Pulmonary:Normal respirations    Spinal Examination: CERVICAL  Active ROM is within normal limits.  Inspection: No deformity of spinal alignment.  Palpation: NTight and tender at bilateral trapezius, levator scapula, splenius capitus, local twitch at bilateral trapezius  Spurling test: neg    Spinal Examination: LUMBAR or THORACIC  Active ROM is within normal limits.  Inspection: No deformity of spinal alignment.      Musculoskeletal Tests:    Elbow compression (ulnar): neg  Tinels at wrist: neg  Phalen: neg    Bilateral Upper and Lower Extremities:  Pulses are 2+ at  radial,bilaterally.  Shoulder/Elbow/Wrist/Hand ROM wnl   Hip/Knee/Ankle ROM   Bilateral Extremities show normal capillary refill.  No signs of cyanosis, rubor, edema, skin changes, or dysvascular changes of appendages.  Nails appear intact.    Neurological Exam:  Cranial Nerves:  II-XII grossly intact    Manual Muscle Testing: (Motor 5=normal)    RIGHT Upper extremity: Shoulder abduction 5/5, Biceps 5/5, Triceps 5/5, Wrist extension 5/5, Abductor pollicis brevis 5/5, Ulnar hand intrinsics 5/5,  LEFT Upper extremity: Shoulder abduction 5/5, Biceps 5/5, Triceps 5/5, Wrist extension 5/5, Abductor pollicis brevis 5/5, Ulnar hand intrinsics 5/5,  No focal atrophy is noted of either upper  extremity.    Bilateral Reflexes:  Bedoya's response is absent bilaterally.    Sensation: tested to light touch  - intact in arms  Gait: Narrow base and good arm swing.      IMPRESSION/PLAN: This is a 49 y.o.  female with cervical myofascial pain, tension/migraine headaches    1. Trigger pt injections today  2. Cont stretch/exercises.  3. If not improving, consider referral to Dr Talley for Botox eval again  4. Cont topical agents, ice/heat modalities, natural supplements (magnesium, potassium, cont water intake)  5. Fu prn    Loren Caraballo M.D.  Physical Medicine and Rehab    PROCEDURE NOTE    Diagnosis: Myofascial pain  Procedure: trigger point injections to bilateral trapezius, splenius capitus, levator scapula     Risks and benefits of procedure explained to patient including risks of infection, bleeding, pain, or damage to surrounding tissues. All questions answered. Informed consent obtained prior to proceeding. Areas marked and prepped in sterile fashion. Using a 27g 1.25inch needle, 10cc of 1% lidocaine and depomedrol 40mg 1cc was injected evenly into the above mentioned muscles. None to minimal bleeding noted. ER and post injection instructions given.    Loren Caraballo M.D.

## 2025-02-06 NOTE — ED PROVIDER NOTES
Encounter Date: 2/5/2025       History     Chief Complaint   Patient presents with    Migraine     Pt states she was diagnosed with COVID last week and over the past few days she has developed a migraine and cluster headache. Pt has a hx of migraines. +N/V dizziness, ear ache.        Patient has history of migraine presents with headache that typical for migraines.  Recently diagnosed with COVID-19.  Complains of nausea and vomiting.    The history is provided by the patient.     Review of patient's allergies indicates:   Allergen Reactions    Chlorhexidine Rash     Per  after surgery patient had large rash across abdomen where chlorhexidine was applied.    Hydrocodone Other (See Comments)     Chest pains. Note: patient tolerated hydromorphone in 2/2019.     Mastisol adhesive [gum tyhfhn-[id number]-adyu-alcohol] Rash     Past Medical History:   Diagnosis Date    Chronic paroxysmal hemicrania 01/03/2019    Endometriosis     General anesthetics causing adverse effect in therapeutic use     wakes up with severe anxiety attacks    Hemicrania continua     Hepatitis C antibody positive in blood 12/09/2020    RNA NEGATIVE 12/14/2020    Migraines      Past Surgical History:   Procedure Laterality Date    APPENDECTOMY      COLONOSCOPY N/A 11/12/2020    Procedure: COLONOSCOPY;  Surgeon: Mylene Russ MD;  Location: [facility];  Service: Endoscopy;  Laterality: N/A;    COLONOSCOPY N/A 4/19/2023    Procedure: COLONOSCOPY;  Surgeon: Dian Vail MD;  Location: [facility];  Service: Endoscopy;  Laterality: N/A;    CYST REMOVAL Right 12/30/2019    Procedure: EXCISION, CYST;  Surgeon: NATALI Shah MD;  Location: [facility] OR;  Service: OB/GYN;  Laterality: Right;  Sebaceous cyst    diagnostic laprascopy      ESOPHAGOGASTRODUODENOSCOPY N/A 11/30/2018    Procedure: EGD (ESOPHAGOGASTRODUODENOSCOPY);  Surgeon: Bossman Bustos MD;  Location: [facility];  Service: Endoscopy;  Laterality: N/A;    ESOPHAGOGASTRODUODENOSCOPY N/A  11/12/2020    Procedure: ESOPHAGOGASTRODUODENOSCOPY (EGD) needs rapid covid test;  Surgeon: Mylene Russ MD;  Location: [facility];  Service: Endoscopy;  Laterality: N/A;    ESOPHAGOGASTRODUODENOSCOPY N/A 4/19/2023    Procedure: EGD (ESOPHAGOGASTRODUODENOSCOPY);  Surgeon: Dian Vail MD;  Location: [facility] ENDO;  Service: Endoscopy;  Laterality: N/A;    FULGURATION OF ENDOMETRIOSIS  12/26/2018    Procedure: FULGURATION, ENDOMETRIOSIS;  Surgeon: Zehra Jensen MD;  Location: [facility] OR;  Service: OB/GYN;;  endometriosis implant resection    HYSTERECTOMY      HYSTEROSCOPY WITH HYDROTHERMAL ABLATION OF ENDOMETRIUM WITH DILATION AND CURETTAGE N/A 12/26/2018    Procedure: HYSTEROSCOPY, WITH DILATION AND CURETTAGE OF UTERUS AND HYDROTHERMAL ENDOMETRIAL ABLATION;  Surgeon: Zehra Jensen MD;  Location: [facility] OR;  Service: OB/GYN;  Laterality: N/A;    INJECTION OF ANESTHETIC AGENT AROUND NERVE Right 03/17/2023    Procedure: BLOCK, NERVE, RIGHT C2,C3 AND THIRD OCCIPITAL NERVE DR KELLY ONLY 1 OF 2;  Surgeon: Neftaly Kelly MD;  Location: [facility];  Service: Pain Management;  Laterality: Right;    INJECTION OF ANESTHETIC AGENT AROUND NERVE Right 04/06/2023    Procedure: BLOCK, NERVE, RIGHT C2,C3 AND THIRD OCCIPITAL MEDIAL BRANCH DR KELLY ONLY 2 OF 2 (URGENT) *VIP*;  Surgeon: Neftaly Kelly MD;  Location: [facility];  Service: Pain Management;  Laterality: Right;    LAPAROSCOPIC SALPINGECTOMY Bilateral 12/26/2018    Procedure: SALPINGECTOMY, LAPAROSCOPIC;  Surgeon: Zehra Jensen MD;  Location: [facility] OR;  Service: OB/GYN;  Laterality: Bilateral;    LAPAROTOMY, EXPLORATORY N/A 02/07/2019    Procedure: LAPAROTOMY, EXPLORATORY;  Surgeon: Ramakrishna Boone MD;  Location: [facility] OR;  Service: General;  Laterality: N/A;    OOPHORECTOMY      RADIOFREQUENCY ABLATION Right 5/11/2023    Procedure: RADIOFREQUENCY ABLATION RIGHT C2,C3, TON;  Surgeon: Neftaly Kelly MD;  Location: [facility];  Service: Pain  Management;  Laterality: Right;    REPAIR OF VAGINAL CUFF N/A 07/09/2020    Procedure: REPAIR, VAGINAL CUFF;  Surgeon: Sailaja Addison MD;  Location: [facility] OR;  Service: OB/GYN;  Laterality: N/A;    ROBOT-ASSISTED LAPAROSCOPIC ABDOMINAL HYSTERECTOMY USING DA MAVIS XI N/A 12/30/2019    Procedure: XI ROBOTIC HYSTERECTOMY;  Surgeon: NATALI Shah MD;  Location: [facility] OR;  Service: OB/GYN;  Laterality: N/A;    ROBOT-ASSISTED LAPAROSCOPIC SURGICAL REMOVAL OF OVARY USING DA MAVIS XI Bilateral 12/30/2019    Procedure: XI ROBOTIC OOPHORECTOMY;  Surgeon: NATALI Shah MD;  Location: [facility] OR;  Service: OB/GYN;  Laterality: Bilateral;    robotic assisted laparoscopy with excision of ovarian endometrioma and chromotubation       Family History   Problem Relation Name Age of Onset    Hypertension Mother      Diabetes type II Mother      Heart attack Father      Strabismus Neg Hx      Retinal detachment Neg Hx      Macular degeneration Neg Hx      Glaucoma Neg Hx      Blindness Neg Hx      Amblyopia Neg Hx      Breast cancer Neg Hx      Colon cancer Neg Hx      Ovarian cancer Neg Hx      Thyroid disease Neg Hx      Migraines Neg Hx      Asthma Neg Hx       Social History     Tobacco Use    Smoking status: Never    Smokeless tobacco: Never   Substance Use Topics    Alcohol use: Not Currently     Comment: rarely No alcohol 72h prior to sx    Drug use: No     Review of Systems   Neurological:  Positive for headaches.       Physical Exam     Initial Vitals [02/05/25 1814]   BP Pulse Resp Temp SpO2   134/82 90 20 97.8 °F (36.6 °C) 100 %      MAP       --         Physical Exam    Vitals reviewed.  Constitutional: She appears well-developed.   Cardiovascular:  Normal rate and regular rhythm.           No murmur heard.  Pulmonary/Chest: Breath sounds normal. She has no wheezes.   Abdominal: Abdomen is soft. There is no abdominal tenderness.   Musculoskeletal:         General: Normal range of motion.     Neurological: She is  alert and oriented to person, place, and time.   Skin: Skin is warm and dry. No rash noted.         ED Course   Procedures  Labs Reviewed   CBC W/ AUTO DIFFERENTIAL - Abnormal       Result Value    WBC 5.99      RBC 3.95 (*)     Hemoglobin 10.5 (*)     Hematocrit 33.3 (*)     MCV 84      MCH 26.6 (*)     MCHC 31.5 (*)     RDW 12.7      Platelets 291      MPV 9.3      Immature Granulocytes 0.2      Gran # (ANC) 2.4      Immature Grans (Abs) 0.01      Lymph # 3.1      Mono # 0.4      Eos # 0.1      Baso # 0.03      nRBC 0      Gran % 40.0      Lymph % 51.3 (*)     Mono % 6.2      Eosinophil % 1.8      Basophil % 0.5      Differential Method Automated     COMPREHENSIVE METABOLIC PANEL - Abnormal    Sodium 140      Potassium 3.6      Chloride 108      CO2 24      Glucose 91      BUN 27 (*)     Creatinine 0.9      Calcium 9.7      Total Protein 7.6      Albumin 4.4      Total Bilirubin 0.1      Alkaline Phosphatase 61      AST 19      ALT 25      eGFR >60      Anion Gap 8            Imaging Results    None          Medications   ketorolac injection 15 mg (15 mg Intravenous Given 2/5/25 1833)   droPERidol (INAPSINE) 1.25 mg in D5W 50 mL IVPB (1.25 mg Intravenous Given 2/5/25 1913)   sodium chloride 0.9% bolus 1,000 mL 1,000 mL (0 mLs Intravenous Stopped 2/5/25 2006)   ketorolac injection 15 mg (15 mg Intravenous Given 2/5/25 2005)   predniSONE tablet 20 mg (20 mg Oral Given 2/5/25 2005)     Medical Decision Making  At the time of re-evaluation patient's migraine has resolved and she is tolerating fluids.  She is instructed to return immediately for any new worsening symptoms and she verbalized understanding.    Amount and/or Complexity of Data Reviewed  Labs: ordered.     Details: CBC and CMP show nonspecific finding    Risk  Prescription drug management.  Risk Details: Differential diagnosis includes but is not limited to: Migraine headache                                      Clinical Impression:  Final  diagnoses:  [G43.909] Migraine without status migrainosus, not intractable, unspecified migraine type (Primary)          ED Disposition Condition    Discharge Stable          ED Prescriptions       Medication Sig Dispense Start Date End Date Auth. Provider    predniSONE (DELTASONE) 20 MG tablet Take 2 tablets (40 mg total) by mouth once daily. for 4 days 8 tablet 2/6/2025 2/10/2025 Fermin Henry,           Follow-up Information       Follow up With Specialties Details Why Contact Kurtis Brennan MD Internal Medicine, Pediatrics Schedule an appointment as soon as possible for a visit   [address]  [place] [address]  [phone]               Fermin Henry,   02/07/25 0144

## 2025-02-07 ENCOUNTER — OFFICE VISIT (OUTPATIENT)
Dept: URGENT CARE | Facility: CLINIC | Age: 50
End: 2025-02-07
Payer: COMMERCIAL

## 2025-02-07 VITALS
BODY MASS INDEX: 20.92 KG/M2 | HEART RATE: 73 BPM | TEMPERATURE: 97 F | WEIGHT: 138 LBS | HEIGHT: 68 IN | OXYGEN SATURATION: 98 % | DIASTOLIC BLOOD PRESSURE: 69 MMHG | SYSTOLIC BLOOD PRESSURE: 100 MMHG

## 2025-02-07 DIAGNOSIS — G43.909 MIGRAINE WITHOUT STATUS MIGRAINOSUS, NOT INTRACTABLE, UNSPECIFIED MIGRAINE TYPE: Primary | ICD-10-CM

## 2025-02-07 DIAGNOSIS — G43.919 INTRACTABLE MIGRAINE WITHOUT STATUS MIGRAINOSUS, UNSPECIFIED MIGRAINE TYPE: ICD-10-CM

## 2025-02-07 DIAGNOSIS — H65.93 FLUID LEVEL BEHIND TYMPANIC MEMBRANE OF BOTH EARS: ICD-10-CM

## 2025-02-07 PROCEDURE — 96372 THER/PROPH/DIAG INJ SC/IM: CPT | Mod: S$GLB,,, | Performed by: EMERGENCY MEDICINE

## 2025-02-07 PROCEDURE — 99214 OFFICE O/P EST MOD 30 MIN: CPT | Mod: 25,S$GLB,, | Performed by: EMERGENCY MEDICINE

## 2025-02-07 RX ORDER — IPRATROPIUM BROMIDE 21 UG/1
2 SPRAY, METERED NASAL 2 TIMES DAILY
Qty: 30 ML | Refills: 0 | Status: SHIPPED | OUTPATIENT
Start: 2025-02-07

## 2025-02-07 RX ORDER — KETOROLAC TROMETHAMINE 10 MG/1
10 TABLET, FILM COATED ORAL 3 TIMES DAILY PRN
Qty: 10 TABLET | Refills: 0 | Status: SHIPPED | OUTPATIENT
Start: 2025-02-07 | End: 2025-02-12

## 2025-02-07 RX ORDER — PROMETHAZINE HYDROCHLORIDE 25 MG/1
25 TABLET ORAL EVERY 6 HOURS PRN
Qty: 30 TABLET | Refills: 1 | Status: SHIPPED | OUTPATIENT
Start: 2025-02-07

## 2025-02-07 RX ORDER — KETOROLAC TROMETHAMINE 30 MG/ML
30 INJECTION, SOLUTION INTRAMUSCULAR; INTRAVENOUS
Status: COMPLETED | OUTPATIENT
Start: 2025-02-07 | End: 2025-02-07

## 2025-02-07 RX ORDER — PROMETHAZINE HYDROCHLORIDE 25 MG/ML
25 INJECTION, SOLUTION INTRAMUSCULAR; INTRAVENOUS
Status: COMPLETED | OUTPATIENT
Start: 2025-02-07 | End: 2025-02-07

## 2025-02-07 RX ADMIN — PROMETHAZINE HYDROCHLORIDE 25 MG: 25 INJECTION, SOLUTION INTRAMUSCULAR; INTRAVENOUS at 04:02

## 2025-02-07 RX ADMIN — KETOROLAC TROMETHAMINE 30 MG: 30 INJECTION, SOLUTION INTRAMUSCULAR; INTRAVENOUS at 04:02

## 2025-02-07 NOTE — PROGRESS NOTES
"Subjective:      Patient ID: Sandra Dimas is a 49 y.o. female.    Vitals:  height is 5' 8" (1.727 m) and weight is 62.6 kg (138 lb 0.1 oz). Her tympanic temperature is 97 °F (36.1 °C). Her blood pressure is 100/69 and her pulse is 73. Her oxygen saturation is 98%.     Chief Complaint: Headache    Patient presents to the clinic with a headache. She was positive for covid a few days ago.  Seen in the emergency room for a migraine headache 2 days ago.  Has a history of migraines and clusters.  Onset of this when a couple of hours ago.    Headache   This is a new problem. The current episode started in the past 7 days. The problem has been gradually worsening. The quality of the pain is described as aching. The pain is at a severity of 10/10. The pain is severe. Associated symptoms include ear pain. Pertinent negatives include no abdominal pain, abnormal behavior, anorexia, back pain, blurred vision, coughing, dizziness, drainage, eye pain, eye redness, eye watering, facial sweating, fever, hearing loss, insomnia, loss of balance, muscle aches, nausea, neck pain, numbness, phonophobia, photophobia, rhinorrhea, scalp tenderness, seizures, sinus pressure, sore throat, swollen glands, tingling, tinnitus, visual change, vomiting, weakness or weight loss. She has tried NSAIDs (Toradol, Prednisone, Motrin) for the symptoms. The treatment provided no relief. Her past medical history is significant for migraine headaches.       Constitution: Negative for fever.   HENT:  Positive for ear pain. Negative for tinnitus, hearing loss, sinus pressure and sore throat.    Neck: Negative for neck pain.   Eyes:  Negative for eye pain, eye redness, photophobia and blurred vision.   Respiratory:  Negative for cough.    Gastrointestinal:  Negative for abdominal pain, nausea and vomiting.   Musculoskeletal:  Negative for back pain.   Neurological:  Positive for headaches and history of migraines. Negative for dizziness, loss of balance, " numbness and seizures.   Psychiatric/Behavioral:  The patient does not have insomnia.       Objective:     Physical Exam   Constitutional: She is oriented to person, place, and time. She appears ill. She appears distressed (In pain, photophobic).   HENT:   Ears:   Right Ear: Tympanic membrane normal.   Left Ear: Tympanic membrane normal.      Comments: Bilateral clear middle ear effusions.  Eyes: Extraocular movement intact   Pulmonary/Chest: Effort normal. No respiratory distress.   Abdominal: Normal appearance.   Neurological: no focal deficit. She is alert and oriented to person, place, and time.   Skin: Skin is warm and dry.   Psychiatric: Her behavior is normal.   Nursing note and vitals reviewed.      Assessment:     1. Migraine without status migrainosus, not intractable, unspecified migraine type    2. Intractable migraine without status migrainosus, unspecified migraine type    3. Fluid level behind tympanic membrane of both ears        Plan:       Migraine without status migrainosus, not intractable, unspecified migraine type  -     promethazine injection 25 mg  -     ketorolac injection 30 mg  -     ketorolac (TORADOL) 10 mg tablet; Take 1 tablet (10 mg total) by mouth 3 (three) times daily as needed for Pain.  Dispense: 10 tablet; Refill: 0    Intractable migraine without status migrainosus, unspecified migraine type  -     promethazine (PHENERGAN) 25 MG tablet; Take 1 tablet (25 mg total) by mouth every 6 (six) hours as needed for Nausea.  Dispense: 30 tablet; Refill: 1  -     ipratropium (ATROVENT) 21 mcg (0.03 %) nasal spray; 2 sprays by Each Nostril route 2 (two) times daily.  Dispense: 30 mL; Refill: 0    Fluid level behind tympanic membrane of both ears          Medical Decision Making:   Initial Assessment:   Migraine headache  Differential Diagnosis:   Migraine versus cluster  Urgent Care Management:  Patient feeling much better after Toradol and Phenergan for her migraine headache.  She has  bilateral middle ear effusions.  Recently had COVID.  Sent prescriptions for Toradol, Phenergan, Atrovent nasal spray.  Encourage patient to use the Atrovent and Flonase twice a day to relieve the pressure that she has been experiencing in her ears.

## 2025-02-07 NOTE — PATIENT INSTRUCTIONS
Toradol as prescribed twice a day with food as needed for migraine headache.  Phenergan 25 mg tablet every 6 hours as needed for nausea associated with migraine headache.    Continue to use nasal steroid spray twice a day to relieve the pressure in the ears.    Atrovent nasal spray twice a day as prescribed to relieve sinus and ear congestion.

## 2025-03-10 ENCOUNTER — OFFICE VISIT (OUTPATIENT)
Dept: OPHTHALMOLOGY | Facility: CLINIC | Age: 50
End: 2025-03-10
Payer: COMMERCIAL

## 2025-03-10 DIAGNOSIS — H52.203 MYOPIA WITH ASTIGMATISM AND PRESBYOPIA, BILATERAL: Primary | ICD-10-CM

## 2025-03-10 DIAGNOSIS — H52.4 MYOPIA WITH ASTIGMATISM AND PRESBYOPIA, BILATERAL: Primary | ICD-10-CM

## 2025-03-10 DIAGNOSIS — H52.13 MYOPIA WITH ASTIGMATISM AND PRESBYOPIA, BILATERAL: Primary | ICD-10-CM

## 2025-03-10 PROCEDURE — 92015 DETERMINE REFRACTIVE STATE: CPT | Mod: S$GLB,,, | Performed by: OPTOMETRIST

## 2025-03-10 PROCEDURE — 99499 UNLISTED E&M SERVICE: CPT | Mod: S$GLB,,, | Performed by: OPTOMETRIST

## 2025-03-10 RX ORDER — SUCRALFATE 1 G/10ML
1 SUSPENSION ORAL 4 TIMES DAILY
Qty: 414 ML | Refills: 1 | Status: SHIPPED | OUTPATIENT
Start: 2025-03-10

## 2025-03-10 RX ORDER — TIZANIDINE 2 MG/1
4 TABLET ORAL NIGHTLY PRN
Qty: 60 TABLET | Refills: 0 | Status: SHIPPED | OUTPATIENT
Start: 2025-03-10

## 2025-03-10 NOTE — PROGRESS NOTES
HPI     Follow-up            Comments: Pt is happy with current contact brand         Last edited by Robyn Verdugo on 3/10/2025 11:21 AM.            Assessment /Plan     For exam results, see Encounter Report.    Myopia with astigmatism and presbyopia, bilateral  Eyeglass Final Rx       Eyeglass Final Rx         Sphere Cylinder Axis    Right -5.00 +0.75 070    Left -6.50 +0.75 095      Type: SVL    Expiration Date: 3/10/2026                  Contact [facility] Prescription (3/10/2025)          Brand Base Curve Diameter Sphere Cylinder Axis    Right Acuvue 1-Day Moist for Astigmatism  8.5 14.2 -4.50 -0.75 150    Left Acuvue 1-Day Moist for Astigmatism  8.5 14.2 -5.50 -0.75 180      Expiration Date: 3/10/2026    Replacement: Daily    Wearing Schedule: Daily Wear                RTC within the next 6 months for dilated eye exam w/ gOCT or PRN if any problems.   Discussed above and answered questions.

## 2025-03-12 ENCOUNTER — OFFICE VISIT (OUTPATIENT)
Dept: PAIN MEDICINE | Facility: CLINIC | Age: 50
End: 2025-03-12
Payer: COMMERCIAL

## 2025-03-12 VITALS
HEIGHT: 68 IN | SYSTOLIC BLOOD PRESSURE: 99 MMHG | DIASTOLIC BLOOD PRESSURE: 58 MMHG | BODY MASS INDEX: 21.7 KG/M2 | RESPIRATION RATE: 17 BRPM | HEART RATE: 74 BPM | WEIGHT: 143.19 LBS

## 2025-03-12 DIAGNOSIS — M79.12 MYALGIA OF AUXILIARY MUSCLES, HEAD AND NECK: ICD-10-CM

## 2025-03-12 DIAGNOSIS — G43.E19 INTRACTABLE CHRONIC MIGRAINE WITH AURA AND WITHOUT STATUS MIGRAINOSUS: Primary | ICD-10-CM

## 2025-03-12 DIAGNOSIS — G44.51 HEMICRANIA CONTINUA: ICD-10-CM

## 2025-03-12 DIAGNOSIS — G44.40 REBOUND HEADACHE: ICD-10-CM

## 2025-03-12 PROCEDURE — G2211 COMPLEX E/M VISIT ADD ON: HCPCS | Mod: S$GLB,,, | Performed by: PHYSICAL MEDICINE & REHABILITATION

## 2025-03-12 PROCEDURE — 3008F BODY MASS INDEX DOCD: CPT | Mod: CPTII,S$GLB,, | Performed by: PHYSICAL MEDICINE & REHABILITATION

## 2025-03-12 PROCEDURE — 99214 OFFICE O/P EST MOD 30 MIN: CPT | Mod: S$GLB,,, | Performed by: PHYSICAL MEDICINE & REHABILITATION

## 2025-03-12 PROCEDURE — 99999 PR PBB SHADOW E&M-EST. PATIENT-LVL IV: CPT | Mod: PBBFAC,,, | Performed by: PHYSICAL MEDICINE & REHABILITATION

## 2025-03-12 PROCEDURE — 3074F SYST BP LT 130 MM HG: CPT | Mod: CPTII,S$GLB,, | Performed by: PHYSICAL MEDICINE & REHABILITATION

## 2025-03-12 PROCEDURE — 3078F DIAST BP <80 MM HG: CPT | Mod: CPTII,S$GLB,, | Performed by: PHYSICAL MEDICINE & REHABILITATION

## 2025-03-12 PROCEDURE — 1159F MED LIST DOCD IN RCRD: CPT | Mod: CPTII,S$GLB,, | Performed by: PHYSICAL MEDICINE & REHABILITATION

## 2025-03-12 RX ORDER — INDOMETHACIN 50 MG/1
50 CAPSULE ORAL 3 TIMES DAILY
Qty: 15 CAPSULE | Refills: 0 | Status: SHIPPED | OUTPATIENT
Start: 2025-03-12

## 2025-03-12 NOTE — PROGRESS NOTES
"Established Patient Chronic Pain Note (Follow up visit)    Chief Complaint:   Chief Complaint   Patient presents with    Establish Care       SUBJECTIVE:    Sandra Dimas is a 49 y.o. female who presents to the clinic for a follow-up appointment for neck pain and headaches. Since the last visit, Sandra Dimas states the pain has been persistant. Current pain intensity is 9/10.  She reports that her symptoms have always been on the right side originating in the lower to mid cervical region and radiating into her occiput crown and temporal regions along with eye pressure.  She also states that she will have facial flushing on the right side with nasal drainage.  She reports that she has vision changes prior to an attack as well.  She reports that she has several attacks daily.  She has been using ibuprofen continuously to try and abort her headaches.    Patient denies night fever/night sweats, urinary incontinence, bowel incontinence, significant weight loss, significant motor weakness, and loss of sensations.    Pain Disability Index Review:      3/12/2025     7:53 AM 12/5/2017     8:56 AM   Last 3 PDI Scores   Pain Disability Index (PDI) 63 48       Initial HPI 02/28/2023:  Sandra Dimas presents to the clinic for the evaluation of right headache pain. The pain started 30 years ago following no inciting event and symptoms have been worsening.  Since Thanksgiving 2022, the pain has been persisant.  She saw Dr. Vazquez who blocked greater and lesser occipiital nerves.  The pain to her eye improved, however she had pain radiating into her right ear. The pain is located in the also had constant "ball like" pain area and radiates to the top of the head to the right forehead.  The pain is described as aching, shooting, and stabbing (aching is persistent) and is rated as 10/10. The pain is rated with a score of  10/10 on the BEST day and a score of 10/10 on the WORST day.  Symptoms interfere with daily activity " and sleeping. The pain is exacerbated by low frequency noise, lights.  The pain is mitigated by compound creams, lidocaine, ice which helps a little. She reports spending most of the day reclining. The patient reports 2-3 hours of uninterrupted sleep per night.     Non-Pharmacologic Treatments:  Physical Therapy/Home Exercise: yes  Ice/Heat:yes  TENS: no  Acupuncture: yes  Massage: yes  Chiropractic: yes    Other: no    Pain Medications:  NSAIDs  Percocet  Stadol nasal spray  Topamax 200mg (has speech side effects)  Baclofen  Gabapentin 300mg at night  Cymbalta 20mg   Tizanidine  Imitrex  Atenolol  Low-dose naltrexone  Pamelor  Wellbutrin  Tegretol  Emgality  Lamotrigine  Nurtec  Maxalt  Ubrelvy  Effexor     report:  Reviewed and consistent with medication use as prescribed.    Pain Procedures:   Right C2, C3, and 3rd occipital nerve MBB/RFA  Bilateral greater and lesser occipital nerve blocks with Dr. Vazquez  Trigger point injections    Imaging:   X-ray right shoulder 07/29/2024:  No fracture or dislocation. No significant arthritic change.     MRI brain 11/20/2023:  Intracranial compartment:     Ventricles and sulci are normal in size for age without evidence of hydrocephalus.  Somewhat lobular configuration of the choroid plexuses may reflect underlying cysts.  No extra-axial blood or fluid collections.  The cerebellar tonsils are in expected location.  The visualized portions of the sella appear within normal limits.     No intracranial restricted diffusion.  No focal encephalomalacia.  The brain parenchyma demonstrates no edema, mass or mass effect.  No significant white matter signal abnormalities.  No abnormal gradient susceptibility artifact.  No abnormal enhancement.     Normal vascular flow voids are preserved.     Skull/extracranial contents (limited evaluation): Paranasal sinuses and mastoid air cells are clear.  Globes and orbits appear within normal limits.     Marrow signal is within normal  limits.    MRI cervical spine 02/11/2023:  The cervical cord reveals normal signal and morphology.     The cervical vertebra reveal signal and morphology.  Minor scoliosis of the cervical/thoracic spine is noted.     The posterior paraspinal soft tissues appear normal.  Incidentally a right thyroid nodule is present with a maximum vertical dimension 13.5 mm.     C2-C3: Unremarkable.     C3-C4: Unremarkable.     C4-C5: Minor annular disc bulge with minor left uncovertebral joint spurring.     C5-C6: Minor disc degeneration with disc desiccation and disc bulge with mild bilateral uncovertebral joint spurring.     C6-C7: Unremarkable.     C7-T1: Unremarkable.       PMHx,PSHx, Social history, and Family history:  I have reviewed the patient's medical, surgical, social, and family history in detail and updated the computerized patient record.    Review of patient's allergies indicates:   Allergen Reactions    Chlorhexidine Rash     Per  after surgery patient had large rash across abdomen where chlorhexidine was applied.    Hydrocodone Other (See Comments)     Chest pains. Note: patient tolerated hydromorphone in 2/2019.     Mastisol adhesive [gum bfhapd-[id number]-asyj-alcohol] Rash       Current Outpatient Medications   Medication Sig    atenoloL (TENORMIN) 25 MG tablet take 1 tablet my mouth daily    B2/magnesium cit,oxid/feverfew (MIGRELIEF ORAL) Take by mouth once daily.     ibuprofen (ADVIL,MOTRIN) 800 MG tablet Take 1 tablet (800 mg total) by mouth 3 (three) times daily.    ipratropium (ATROVENT) 21 mcg (0.03 %) nasal spray 2 sprays by Each Nostril route 2 (two) times daily.    ketoconazole (NIZORAL) 2 % cream Apply to the affected area 2 times daily    ketoconazole (NIZORAL) 2 % shampoo Wash neck/face with medicated shampoo at least 2 to 3 times per week - let sit at least 5 minutes prior to rinsing    linaCLOtide (LINZESS) 72 mcg Cap capsule Take 1 capsule (72 mcg total) by mouth before breakfast.     ondansetron (ZOFRAN-ODT) 8 MG TbDL Dissolve 1 tablet (8 mg total) by mouth every 8 (eight) hours as needed (nausea/vomiting).    pantoprazole (PROTONIX) 40 MG tablet take 1 tablet by mouth every day    promethazine (PHENERGAN) 25 MG tablet Take 1 tablet (25 mg total) by mouth every 6 (six) hours as needed for Nausea.    sucralfate (CARAFATE) 100 mg/mL suspension Take 10 mLs (1 g total) by mouth 4 (four) times daily.    tiZANidine (ZANAFLEX) 2 MG tablet Take 2 tablets (4 mg total) by mouth nightly as needed (Muscle spasms).    topiramate (TROKENDI XR) 100 mg Cp24 Take 1 capsule (100 mg total) by mouth once daily.    valACYclovir (VALTREX) 1000 MG tablet Take 1 tablet by mouth once a day    verapamiL (VERELAN) 120 MG C24P Take 1 capsule (120 mg total) by mouth once daily.    vilazodone (VIIBRYD) 20 mg Tab Take ½ tablet by mouth daily for 7 days THEN take 1 tablet by mouth daily thereafter.    vitamin D (VITAMIN D3) 1000 units Tab Take 1,000 Units by mouth once daily.    zolpidem (AMBIEN) 5 MG Tab Take ½ to 1 tablet by mouth at bedtime as needed for sleep    indomethacin (INDOCIN) 50 MG capsule Take 1 capsule (50 mg total) by mouth 3 (three) times daily.     No current facility-administered medications for this visit.     Facility-Administered Medications Ordered in Other Visits   Medication    lactated ringers infusion    lactated ringers infusion    lidocaine (PF) 10 mg/ml (1%) injection 10 mg         REVIEW OF SYSTEMS:    GENERAL:  No weight loss, malaise or fevers.  HEENT:   No recent changes in vision or hearing  NECK:  Negative for lumps, no difficulty with swallowing.  RESPIRATORY:  Negative for cough, wheezing or shortness of breath, patient denies any recent URI.  CARDIOVASCULAR:  Negative for chest pain, leg swelling or palpitations.  GI:  Negative for abdominal discomfort, blood in stools or black stools or change in bowel habits.  MUSCULOSKELETAL:  See HPI.  SKIN:  Negative for lesions, rash, and  "itching.  PSYCH:  No mood disorder or recent psychosocial stressors.  Patients sleep is not disturbed secondary to pain.  HEMATOLOGY/LYMPHOLOGY:  Negative for prolonged bleeding, bruising easily or swollen nodes.  Patient is not currently taking any anti-coagulants  NEURO:   No history of headaches, syncope, paralysis, seizures or tremors.  All other reviewed and negative other than HPI.    OBJECTIVE:    BP (!) 99/58   Pulse 74   Resp 17   Ht 5' 8" (1.727 m)   Wt 64.9 kg (143 lb 3 oz)   LMP  (LMP Unknown)   BMI 21.77 kg/m²     PHYSICAL EXAMINATION:    GENERAL: Well appearing, in no acute distress, alert and oriented x3.  PSYCH:  Mood and affect appropriate.  SKIN: Skin color, texture, turgor normal, no rashes or lesions.  HEAD/FACE:  Normocephalic, atraumatic. Cranial nerves grossly intact.  NECK: No pain to palpation over the cervical paraspinous muscles. Spurling Negative. No pain with neck flexion, extension, or lateral flexion.   CV: RRR with palpation of the radial artery.  PULM: No evidence of respiratory difficulty, symmetric chest rise.  GI:  Soft and non-tender.  EXTREMITIES:  No deformities, edema, or skin discoloration. Good capillary refill.  MUSCULOSKELETAL: Shoulder provocative maneuvers are negative.  Bilateral upper and lower extremity strength is normal and symmetric.  No atrophy or tone abnormalities are noted.  NEURO: Bilateral upper and lower extremity coordination and muscle stretch reflexes are physiologic and symmetric.  Plantar response are downgoing. No clonus.  No loss of sensation is noted.  GAIT: normal.      LABS:  Lab Results   Component Value Date    WBC 5.99 02/05/2025    HGB 10.5 (L) 02/05/2025    HCT 33.3 (L) 02/05/2025    MCV 84 02/05/2025     02/05/2025       CMP  Sodium   Date Value Ref Range Status   02/05/2025 140 136 - 145 mmol/L Final     Potassium   Date Value Ref Range Status   02/05/2025 3.6 3.5 - 5.1 mmol/L Final     Chloride   Date Value Ref Range Status "   02/05/2025 108 95 - 110 mmol/L Final     CO2   Date Value Ref Range Status   02/05/2025 24 23 - 29 mmol/L Final     Glucose   Date Value Ref Range Status   02/05/2025 91 70 - 110 mg/dL Final     BUN   Date Value Ref Range Status   02/05/2025 27 (H) 6 - 20 mg/dL Final     Creatinine   Date Value Ref Range Status   02/05/2025 0.9 0.5 - 1.4 mg/dL Final     Calcium   Date Value Ref Range Status   02/05/2025 9.7 8.7 - 10.5 mg/dL Final     Total Protein   Date Value Ref Range Status   02/05/2025 7.6 6.0 - 8.4 g/dL Final     Albumin   Date Value Ref Range Status   02/05/2025 4.4 3.5 - 5.2 g/dL Final     Total Bilirubin   Date Value Ref Range Status   02/05/2025 0.1 0.1 - 1.0 mg/dL Final     Comment:     For infants and newborns, interpretation of results should be based  on gestational age, weight and in agreement with clinical  observations.    Premature Infant recommended reference ranges:  Up to 24 hours.............<8.0 mg/dL  Up to 48 hours............<12.0 mg/dL  3-5 days..................<15.0 mg/dL  6-29 days.................<15.0 mg/dL       Alkaline Phosphatase   Date Value Ref Range Status   02/05/2025 61 40 - 150 U/L Final     AST   Date Value Ref Range Status   02/05/2025 19 10 - 40 U/L Final     ALT   Date Value Ref Range Status   02/05/2025 25 10 - 44 U/L Final     Anion Gap   Date Value Ref Range Status   02/05/2025 8 8 - 16 mmol/L Final     eGFR if    Date Value Ref Range Status   05/18/2022 >60.0 >60 mL/min/1.73 m^2 Final     eGFR if non    Date Value Ref Range Status   05/18/2022 >60.0 >60 mL/min/1.73 m^2 Final     Comment:     Calculation used to obtain the estimated glomerular filtration  rate (eGFR) is the CKD-EPI equation.          Lab Results   Component Value Date    HGBA1C 5.1 11/19/2024             ASSESSMENT: 49 y.o. year old female with headaches and neck pain, consistent with     1. Intractable chronic migraine with aura and without status migrainosus  Prior  AFib RVR authorization Order      2. Hemicrania continua  indomethacin (INDOCIN) 50 MG capsule      3. Myalgia of auxiliary muscles, head and neck        4. Rebound headache              PLAN:   - Interventions:  Plan for Botox injections following the chronic migraine map.  She has tried multiple different medications and interventions and still suffers from chronic daily headaches with severe attacks.    - Anticoagulation:  None    - Medications: I have stressed the importance of physical activity and a home exercise plan to help with pain and improve health. and Patient can continue with medications for now since they are providing benefits, using them appropriately, and without side effects.    We will prescribe indomethacin 50 mg Q 8 p.r.n. for hemicrania continua to abort headache attacks  Advised patient to abstain from continuous use of Advil due to rebound headache concerns and certainly to stop this while taking indomethacin        - Therapy:  Advised patient continue with activities as tolerated  - Psychological:  Discussed coping mechanisms to help address chronic pain issues  - Labs:  Reviewed  - Imaging:  Reviewed imaging available  - Consults/Referrals:  None at this time  - Records:  Reviewed/Obtain old records from outside physicians and imaging  - Follow up visit: return to clinic in 2-3 weeks for Botox  - Counseled patient regarding the importance of activity modification and physical therapy  - This condition does not require this patient to take time off of work, and the primary goal of our Pain Management services is to improve the patient's functional capacity.  - Patient Questions: Answered all of the patient's questions regarding diagnosis, therapy, and treatment    The above plan and management options were discussed at length with patient. Patient is in agreement with the above and verbalized understanding.      Griffin Sahu MD  Interventional Pain Management  [facility]      Visit  today included increased complexity associated with the care of the episodic problem of chronic pain which was addressed and continue to manage the longitudinal care of the patient due to the serious and/or complex managed problem(s) listed above.      Disclaimer:  This note was prepared using voice recognition system and is likely to have sound alike errors that may have been overlooked even after proof reading.  Please call me with any questions

## 2025-03-16 ENCOUNTER — PATIENT MESSAGE (OUTPATIENT)
Dept: OPTOMETRY | Facility: CLINIC | Age: 50
End: 2025-03-16
Payer: COMMERCIAL

## 2025-03-25 ENCOUNTER — TELEPHONE (OUTPATIENT)
Dept: INTERNAL MEDICINE | Facility: CLINIC | Age: 50
End: 2025-03-25
Payer: COMMERCIAL

## 2025-03-25 DIAGNOSIS — R35.0 URINARY FREQUENCY: Primary | ICD-10-CM

## 2025-03-26 ENCOUNTER — RESULTS FOLLOW-UP (OUTPATIENT)
Dept: INTERNAL MEDICINE | Facility: CLINIC | Age: 50
End: 2025-03-26

## 2025-03-26 ENCOUNTER — LAB VISIT (OUTPATIENT)
Dept: LAB | Facility: HOSPITAL | Age: 50
End: 2025-03-26
Attending: PEDIATRICS
Payer: COMMERCIAL

## 2025-03-26 DIAGNOSIS — R35.0 URINARY FREQUENCY: ICD-10-CM

## 2025-03-26 LAB
BILIRUB UR QL STRIP.AUTO: NEGATIVE
CLARITY UR: CLEAR
COLOR UR AUTO: YELLOW
GLUCOSE UR QL STRIP: NEGATIVE
HGB UR QL STRIP: NEGATIVE
KETONES UR QL STRIP: NEGATIVE
LEUKOCYTE ESTERASE UR QL STRIP: NEGATIVE
NITRITE UR QL STRIP: NEGATIVE
PH UR STRIP: 6 [PH]
PROT UR QL STRIP: NEGATIVE
SP GR UR STRIP: 1.02

## 2025-03-26 PROCEDURE — 81003 URINALYSIS AUTO W/O SCOPE: CPT

## 2025-04-01 ENCOUNTER — TELEPHONE (OUTPATIENT)
Dept: PAIN MEDICINE | Facility: CLINIC | Age: 50
End: 2025-04-01
Payer: COMMERCIAL

## 2025-04-01 RX ORDER — TIZANIDINE 2 MG/1
4 TABLET ORAL NIGHTLY PRN
Qty: 180 TABLET | Refills: 1 | Status: SHIPPED | OUTPATIENT
Start: 2025-04-01

## 2025-04-01 NOTE — TELEPHONE ENCOUNTER
----- Message from Med Assistant Ramirez sent at 4/1/2025  1:58 PM CDT -----  Regarding: RE: appt  Done sis !  ----- Message -----  From: Kendra Oviedo MA  Sent: 4/1/2025   1:52 PM CDT  To: James Talamantes MA  Subject: FW: appt                                         Can you help me schedule please :)  ----- Message -----  From: Kendra Oviedo MA  Sent: 4/1/2025   6:00 AM CDT  To: Kendra Oviedo MA  Subject: FW: appt                                           ----- Message -----  From: Griffin Sahu MD  Sent: 3/31/2025  11:46 AM CDT  To: Adelina Moya Staff  Subject: appt                                             Can we overbook this patient with me on 04/09/2025 at any time that is most convenient for her? Preferably either 1st thing in the morning, right before lunch or at the end of the day for me.

## 2025-04-01 NOTE — TELEPHONE ENCOUNTER
I called the patient and confirmed the appointment with her.    Kendra LOCKHART ([facility])

## 2025-04-06 NOTE — PROGRESS NOTES
"PSYCHIATRIC EVALUATION     Name: Sandra Dimas  Age: 49 y.o.  : 1975    60 minutes of total time spent on the encounter, which includes face to face time and non-face to face time.  Face-to-face time: 50 minutes.    Preparing to see the patient (reviewing portions of the available record), Performing a medically appropriate evaluation, Counseling and educating the patient/family/caregiver, Ordering medications, labs, or referrals, and Documenting clinical information in the health record      CHIEF COMPLAINT :  I have trouble sleeping.  I do not sleep, Ambien gave me hallucinations, so I stopped it.  I also feel irritable."      HISTORY OF PRESENT ILLNESS:         Sandra Dimas a 49 y.o.  female presents today in transfer of care from Dr. Blevins.  There is a single visit on 2025, with a diagnosis of insomnia and prescriptions for vilazodone and zolpidem, with no other documentation.    The patient has a history of cognitive complaints with a normal exam.  2023 neuropsychology documentation includes:  Referral question and neuropsychological necessity: Ms. Dimas is a 48 y.o., right-handed, woman born in [place] and raised in [place] with 18 years of formal education who was referred by her neurology team due to cognitive concerns in the context of a family history of early-onset dementia in her maternal grandmother, dementia in her mother, chronic migraine, depression, and anxiety.  Evaluation methods: I had the pleasure of seeing Sandra Dimas on 2023 in person at the [facility], Department of Neurology. Data sources for the below report include review of the available medical record and an interview with the patient. At the outset of the appointment, the undersigned explained the rationale for the evaluation along with the limits of confidentiality; and verbal informed consent for this evaluation was obtained.     Summary and Impressions    Ms. Dimas is " a 48 y.o., right-handed, White, multilingual (Maltese, Urdu, and English) woman with 18 years of formal education. She was referred by her neurology team due to cognitive concerns in the context of a family history of early-onset dementia in her maternal grandmother, dementia in her mother, chronic migraine, depression, and anxiety. Cognitive screening completed by her referring neurology team on 08/29/2023 was within normal limits (MoCA = 29/30). Most-recent neurologic exam completed on 08/29/2023 was documented as normal. Most-recent brain MRI completed on 01/22/2023 was documented as reflecting no acute abnormality. Laboratory studies for genetic risk of early-onset dementia processes were interpreted as normal by her neurologist.      During interview, Ms. Dimas reported the insidious onset and variable or slowly worsening course of cognitive concerns beginning approximately 2-3 years ago. Specifically, she characterized difficulties with attention and mental tracking, follow-through when completing planned activities, and difficulties with word-finding. Functionally, Ms. Dimas reported difficulties with tracking her medications, relying on her  more to assist her with medications haider to instances of forgetting; she discussed that she is otherwise independent and effective in daily living skills.       Emotionally, Ms. Dimas reported symptoms of anxiety, depression, and hopelessness regarding intractable migraine symptoms. She discussed that these symptoms along with migraine are affecting her sleep considerably. She discussed that she achieves fragmented sleep throughout the day and is often awake at night.       Ms. Dimas has a history of insomnia, anxiety, depression, and migraines which together are the most-likely cause of her day-to-day cognitive concerns particularly in light of her genetic test results. Given her reported cognitive difficulty there is value in ruling out a neurocognitive process  and establishing a cognitive baseline for continued monitoring if clinically indicated in the future. She does not have psychological or medical concerns that would preclude gathering neuropsychological test data at this time. As such, formal neuropsychological testing is clinically indicated in order to aid in differential diagnosis and treatment planning.      We discussed the pros and cons of testing with an  vs. Testing in English particularly regarding tests of language. Given low suspicion for a current neurodegenerative process but the value of baseline assessment and monitoring in her case, collecting a valid dataset in English was determined to be the best course of action for her care.      ICD-10-CM Diagnoses      1. Mild episode of recurrent major depressive disorder          2. Anxiety          3. Intractable migraine without status migrainosus, unspecified migraine type          4. Cognitive complaints          5. Family history of dementia             Plan/ Recommendations    Provider Recommendations:   On the basis of the above summary, neuropsychological testing is clinically indicated at this time. Ms. Dimas will be scheduled for comprehensive neuropsychological testing. A detailed report including detailed diagnostic information and recommendations will be completed after testing has been completed.  Patient Recommendations:   The next step in your care is to complete neuropsychological testing. Our office staff will reach out to you to schedule an appointment for the testing portion of your evaluation. Please review your after visit summary for more information about your testing appointment.      indicates recurring refills butorphanol nasal spray and 30 tablets of Ambien 5 mg on January 16.  Other medications per epic include atenolol, ibuprofen, Indocin, Atrovent, linaclotide, pantoprazole, sucralfate, tizanidine, topiramate, valacyclovir, verapamil    In the current session, the  patient reports chronic and excessive worry with associated signs and symptoms of generalized anxiety disorder for as long as she can remember.  Questioning yields no out of the blue panic, agoraphobia, social anxiety, obsessions, compulsions, or PTSD.      She also reports chronic recurring depression with decreased energy, enjoyment (not absent), concentration, self-esteem; guilty feelings about migraine and mental health symptoms sometimes interfering with her time with her children.  Appetite is intact.  Never with thoughts of self-harm, but sometimes with passive thoughts about death.  She reports postpartum depression after the births of each of her 3 children; she says that she was prescribed antidepressants but they were not effective.  She describes irritability as clearly related to anxiety, depression, fatigue, and headache discomfort.  Extensive and specific questioning yields no elevated moods, ongoing irritable moods, or manic signs or symptoms.  She notes many years of notable insomnia and indicates that medications have either not worked or have not been tolerated.  Most recently, Ambien caused hallucinations, so she discontinued the medication.    The patient reports that vilazodone has not caused any side effects, but it has not helped with any of the symptoms of anxiety, depression, or insomnia.    Consideration with regards to medications is use of butorphanol and incomplete right bundle branch block.    PAST BEHAVIORAL HEALTH HISTORY  Outpatient Treatment - psychotherapy with a psychologist for a time, but it was not helpful.  One visit with a psychiatrist, at which time vilazodone and Ambien were prescribed.  A number of years of mental health medications by way of primary care physicians.  Inpatient Treatment - none  Suicide Attempts - none; no history of suicidal thoughts; hopeful despite distress, consistently aware of protective factors, consistently confident in maintaining  safety  Violence - none; no history of thoughts of violence or feelings of aggression  Psychosis - hallucinations with Ambien and medical THC; no other psychosis  Aleksandra/Hypomania - none  Substance Abuse Treatment - none  Trauma:  None    SUBSTANCE USE:  Alcohol:  None  Other:  None    Allergy Review:   Review of patient's allergies indicates:   Allergen Reactions    Chlorhexidine Rash     Per  after surgery patient had large rash across abdomen where chlorhexidine was applied.    Hydrocodone Other (See Comments)     Chest pains. Note: patient tolerated hydromorphone in 2/2019.     Mastisol adhesive [gum [id number]-sncphu-wdle-alcohol] Rash        Medical Problem List:   Problem List[1]    Primary snoring    Recurrent major depressive disorder, in partial remission    Migraines    NSAID long-term use    Anxiety    Nonintractable headache    Cervical myofascial pain syndrome    Bilateral occipital neuralgia    Cervical radiculitis    Abdominal pain    Weight loss    Chronic diarrhea    Cognitive complaints with normal exam    Family history of Alzheimer's disease    Cervicalgia    Family history of presenile dementia    Psychosocial stressors    Numbness and tingling    Paresthesias    Hepatomegaly    CFS (chronic fatigue syndrome)       Past Surgical History:   Procedure Laterality Date    APPENDECTOMY      COLONOSCOPY N/A 11/12/2020    Procedure: COLONOSCOPY;  Surgeon: Mylene Russ MD;  Location: [facility];  Service: Endoscopy;  Laterality: N/A;    COLONOSCOPY N/A 4/19/2023    Procedure: COLONOSCOPY;  Surgeon: Dian Vail MD;  Location: [facility];  Service: Endoscopy;  Laterality: N/A;    CYST REMOVAL Right 12/30/2019    Procedure: EXCISION, CYST;  Surgeon: NATALI Shah MD;  Location: [facility] OR;  Service: OB/GYN;  Laterality: Right;  Sebaceous cyst    diagnostic laprascopy      ESOPHAGOGASTRODUODENOSCOPY N/A 11/30/2018    Procedure: EGD (ESOPHAGOGASTRODUODENOSCOPY);  Surgeon: Bossman Bustos MD;   Location: [facility];  Service: Endoscopy;  Laterality: N/A;    ESOPHAGOGASTRODUODENOSCOPY N/A 11/12/2020    Procedure: ESOPHAGOGASTRODUODENOSCOPY (EGD) needs rapid covid test;  Surgeon: Mylene Russ MD;  Location: [facility];  Service: Endoscopy;  Laterality: N/A;    ESOPHAGOGASTRODUODENOSCOPY N/A 4/19/2023    Procedure: EGD (ESOPHAGOGASTRODUODENOSCOPY);  Surgeon: Dian Vail MD;  Location: [facility];  Service: Endoscopy;  Laterality: N/A;    FULGURATION OF ENDOMETRIOSIS  12/26/2018    Procedure: FULGURATION, ENDOMETRIOSIS;  Surgeon: Zehra Jensen MD;  Location: [facility];  Service: OB/GYN;;  endometriosis implant resection    HYSTERECTOMY      HYSTEROSCOPY WITH HYDROTHERMAL ABLATION OF ENDOMETRIUM WITH DILATION AND CURETTAGE N/A 12/26/2018    Procedure: HYSTEROSCOPY, WITH DILATION AND CURETTAGE OF UTERUS AND HYDROTHERMAL ENDOMETRIAL ABLATION;  Surgeon: Zehra Jensen MD;  Location: [facility];  Service: OB/GYN;  Laterality: N/A;    INJECTION OF ANESTHETIC AGENT AROUND NERVE Right 03/17/2023    Procedure: BLOCK, NERVE, RIGHT C2,C3 AND THIRD OCCIPITAL NERVE DR KELLY ONLY 1 OF 2;  Surgeon: Neftaly Kelly MD;  Location: [facility];  Service: Pain Management;  Laterality: Right;    INJECTION OF ANESTHETIC AGENT AROUND NERVE Right 04/06/2023    Procedure: BLOCK, NERVE, RIGHT C2,C3 AND THIRD OCCIPITAL MEDIAL BRANCH DR KELLY ONLY 2 OF 2 (URGENT) *VIP*;  Surgeon: Neftaly Kelly MD;  Location: [facility];  Service: Pain Management;  Laterality: Right;    LAPAROSCOPIC SALPINGECTOMY Bilateral 12/26/2018    Procedure: SALPINGECTOMY, LAPAROSCOPIC;  Surgeon: Zehra Jensen MD;  Location: [facility];  Service: OB/GYN;  Laterality: Bilateral;    LAPAROTOMY, EXPLORATORY N/A 02/07/2019    Procedure: LAPAROTOMY, EXPLORATORY;  Surgeon: Ramakrishna Boone MD;  Location: [facility];  Service: General;  Laterality: N/A;    OOPHORECTOMY      RADIOFREQUENCY ABLATION Right 5/11/2023    Procedure: RADIOFREQUENCY ABLATION  RIGHT C2,C3, TON;  Surgeon: Neftaly Marrero MD;  Location: [facility];  Service: Pain Management;  Laterality: Right;    REPAIR OF VAGINAL CUFF N/A 2020    Procedure: REPAIR, VAGINAL CUFF;  Surgeon: Sailaja Addison MD;  Location: [facility] OR;  Service: OB/GYN;  Laterality: N/A;    ROBOT-ASSISTED LAPAROSCOPIC ABDOMINAL HYSTERECTOMY USING DA MAVIS XI N/A 2019    Procedure: XI ROBOTIC HYSTERECTOMY;  Surgeon: NATALI Shah MD;  Location: [facility] OR;  Service: OB/GYN;  Laterality: N/A;    ROBOT-ASSISTED LAPAROSCOPIC SURGICAL REMOVAL OF OVARY USING DA MAVIS XI Bilateral 2019    Procedure: XI ROBOTIC OOPHORECTOMY;  Surgeon: NATALI Shah MD;  Location: [facility] OR;  Service: OB/GYN;  Laterality: Bilateral;    robotic assisted laparoscopy with excision of ovarian endometrioma and chromotubation          Family History:  Family History   Problem Relation Name Age of Onset    Hypertension Mother      Diabetes type II Mother      Heart attack Father      Strabismus Neg Hx      Retinal detachment Neg Hx      Macular degeneration Neg Hx      Glaucoma Neg Hx      Blindness Neg Hx      Amblyopia Neg Hx      Breast cancer Neg Hx      Colon cancer Neg Hx      Ovarian cancer Neg Hx      Thyroid disease Neg Hx      Migraines Neg Hx      Asthma Neg Hx        Family Psychiatric History:  Mother with depression and anxiety after finding her   next to her, later developing dementia.  Father with anxiety.  Three children with anxiety problems    PSYCHO-SOCIAL/DEVELOPMENT HISTORY:     Relationships:  The patient lives with her  and her 3 children, apart from when the 20-year-old daughter is away at school.  She describes very close relationships with her family.  Her father's , and her mother with Alzheimer's disease lives in [place] with the patient's sister.  The patient has a close best friend and 2 other good friends.    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    Mental Status Exam:  Appearance:  Appropriately groomed  Orientation:  X4  Attitude:  Cooperative, engaged   Eye Contact:  Appropriate  Behavior:  Calm, appropriate  Speech:     Rate - WNL    Volume - WNL    Quantity - WNL    Tone - appropriately variable  Pressure - no  Thought Processes:  Goal-directed  Mood:  Depressed, anxious  Affect:  Sad, anxious, tearful; can brighten  SI:  No, and no thoughts of self-harm  HI:  No, and no thoughts of harm towards others  Paranoia:  No  Delusions:  No  Hallucinations:  No  Attention:  Intact over the course of the session  Cognition:  No deficits noted over the course of the session  Insight:  Intact   Judgment:  Intact  Impulse Control:  Intact      Assessment/Plan:   Encounter Diagnoses   Name Primary?    Moderate episode of recurrent major depressive disorder Yes    SABI (generalized anxiety disorder)     Insomnia, unspecified type       Follow up in 1 month  Psychiatry Medication:  Discontinue vilazodone.  Remeron 7.5 mg 30 minutes before bedtime.  The patient is concerned about weight gain but is willing to try medication with the idea that the medication discontinued for an increase in appetite or the beginnings of weight gain; today's weight is 140 lb.  Regarding Remeron, the review included decreased alertness, decreased concentration, confusion, unsteadiness and falls, effects on driving and other activities requiring alertness and attention and steadiness, weight gain, suicidal thoughts, anxiety, india, decreased white blood counts, rash (rarely Hoyt-Lalo), tachycardia, and others.    Mindfulness, cognitive, and behavioral strategies for anxiety and insomnia were reviewed with the patient, particularly appointment times for worry, with an appointment 2 hours before bedtime.    Reviewed with patient:  Report side effects, other problems, or questions to the  psychiatrist by way of the [facility] portal, [facility], or by calling [facility] at [phone].  Messages are checked during clinic hours only.  For urgent issues outside of clinic hours, call 911 or go to an emergency department.  Follow up with primary care/MD specialist for continued monitoring of general health and wellness and any medical conditions.  Call [facility] at [phone] or use the [facility] portal if necessary for scheduling or rescheduling.  It is the responsibility of the patient to reschedule an appointment if an appointment has been canceled or missed.  Understanding was expressed; and no further concerns or questions were raised at this time.       There are no Patient Instructions on file for this visit.    Large portions of this note were completed by way of voice recognition dictation software, and transcription errors are possible, such that specific information in the note should be considered in the context of the entire report.           [1]   Patient Active Problem List  Diagnosis    Primary snoring    Recurrent major depressive disorder, in partial remission    Migraines    NSAID long-term use    Anxiety    Nonintractable headache    Cervical myofascial pain syndrome    Bilateral occipital neuralgia    Cervical radiculitis    Abdominal pain    Weight loss    Chronic diarrhea    Cognitive complaints with normal exam    Family history of Alzheimer's disease    Cervicalgia    Family history of presenile dementia    Psychosocial stressors    Numbness and tingling    Paresthesias    Hepatomegaly    CFS (chronic fatigue syndrome)

## 2025-04-07 ENCOUNTER — OFFICE VISIT (OUTPATIENT)
Dept: PSYCHIATRY | Facility: CLINIC | Age: 50
End: 2025-04-07
Payer: COMMERCIAL

## 2025-04-07 VITALS — HEART RATE: 80 BPM | DIASTOLIC BLOOD PRESSURE: 72 MMHG | SYSTOLIC BLOOD PRESSURE: 108 MMHG

## 2025-04-07 DIAGNOSIS — G47.00 INSOMNIA, UNSPECIFIED TYPE: ICD-10-CM

## 2025-04-07 DIAGNOSIS — F41.1 GAD (GENERALIZED ANXIETY DISORDER): ICD-10-CM

## 2025-04-07 DIAGNOSIS — F33.1 MODERATE EPISODE OF RECURRENT MAJOR DEPRESSIVE DISORDER: Primary | ICD-10-CM

## 2025-04-07 PROCEDURE — 3078F DIAST BP <80 MM HG: CPT | Mod: CPTII,S$GLB,, | Performed by: PSYCHIATRY & NEUROLOGY

## 2025-04-07 PROCEDURE — 1160F RVW MEDS BY RX/DR IN RCRD: CPT | Mod: CPTII,S$GLB,, | Performed by: PSYCHIATRY & NEUROLOGY

## 2025-04-07 PROCEDURE — 1159F MED LIST DOCD IN RCRD: CPT | Mod: CPTII,S$GLB,, | Performed by: PSYCHIATRY & NEUROLOGY

## 2025-04-07 PROCEDURE — 3074F SYST BP LT 130 MM HG: CPT | Mod: CPTII,S$GLB,, | Performed by: PSYCHIATRY & NEUROLOGY

## 2025-04-07 PROCEDURE — 99999 PR PBB SHADOW E&M-EST. PATIENT-LVL II: CPT | Mod: PBBFAC,,, | Performed by: PSYCHIATRY & NEUROLOGY

## 2025-04-07 PROCEDURE — 99205 OFFICE O/P NEW HI 60 MIN: CPT | Mod: S$GLB,,, | Performed by: PSYCHIATRY & NEUROLOGY

## 2025-04-07 RX ORDER — MIRTAZAPINE 7.5 MG/1
7.5 TABLET, FILM COATED ORAL NIGHTLY
Qty: 30 TABLET | Refills: 1 | Status: SHIPPED | OUTPATIENT
Start: 2025-04-07

## 2025-04-08 ENCOUNTER — PATIENT MESSAGE (OUTPATIENT)
Dept: PSYCHIATRY | Facility: CLINIC | Age: 50
End: 2025-04-08
Payer: COMMERCIAL

## 2025-04-08 DIAGNOSIS — F33.1 MODERATE EPISODE OF RECURRENT MAJOR DEPRESSIVE DISORDER: Primary | ICD-10-CM

## 2025-04-08 DIAGNOSIS — F41.1 GAD (GENERALIZED ANXIETY DISORDER): ICD-10-CM

## 2025-04-08 NOTE — TELEPHONE ENCOUNTER
I called the patient.  She reports that she slept last night, but fitfully, and feels drowsy today.  She has had a dissociative feeling today but no psychosis.  No other side effects with discussion.  She is alert, oriented, coherent, calm, and fully appropriate in her telephone interaction.  She is agreeable for a trial of, after skipping tonight, taking 1/2 tablet of Remeron 7.5 mg tomorrow night

## 2025-04-09 ENCOUNTER — OFFICE VISIT (OUTPATIENT)
Dept: PAIN MEDICINE | Facility: CLINIC | Age: 50
End: 2025-04-09
Payer: COMMERCIAL

## 2025-04-09 VITALS
BODY MASS INDEX: 20.04 KG/M2 | WEIGHT: 132.25 LBS | RESPIRATION RATE: 17 BRPM | HEIGHT: 68 IN | SYSTOLIC BLOOD PRESSURE: 100 MMHG | HEART RATE: 107 BPM | DIASTOLIC BLOOD PRESSURE: 76 MMHG

## 2025-04-09 DIAGNOSIS — M79.12 MYALGIA OF AUXILIARY MUSCLES, HEAD AND NECK: ICD-10-CM

## 2025-04-09 DIAGNOSIS — G44.51 HEMICRANIA CONTINUA: ICD-10-CM

## 2025-04-09 DIAGNOSIS — G43.E19 INTRACTABLE CHRONIC MIGRAINE WITH AURA AND WITHOUT STATUS MIGRAINOSUS: Primary | ICD-10-CM

## 2025-04-09 DIAGNOSIS — M47.812 CERVICAL SPONDYLOSIS: ICD-10-CM

## 2025-04-09 PROCEDURE — 99999 PR PBB SHADOW E&M-EST. PATIENT-LVL IV: CPT | Mod: PBBFAC,,, | Performed by: PHYSICAL MEDICINE & REHABILITATION

## 2025-04-09 NOTE — PROGRESS NOTES
Informed consent was obtained and the procedure was described to the patient, a timeout was performed prior to starting the procedure.    Surgeon: Griffin Sahu MD      Pre-Op Diagnosis: chronic intractable migraine, atypical cluster headache    Post-Op Diagnosis:  Same    Procedure:  Botox injections for headache    EBL: None    Complications: None    Specimens: None    Description of Procedure:    The patient was placed in the sitting position and the areas over the planned injections were all prepped with alcohol.  botox was reconstituted in preservative free normal saline, 100 units was placed in 2 mLof saline on a 30-gauge needle.  5 Units each was placed into the following muscles:     x2  Procerus x1  Frontalis x4  Temporalis x8  Occipitalis x6  Cervical paraspinals x4  Trapezius x6     45 units of unavoidable waste    Patient tolerated the procedure well and bleeding was nil no Band-Aids were applied the patient was discharged in stable condition.

## 2025-04-11 RX ORDER — VILAZODONE HYDROCHLORIDE 20 MG/1
TABLET ORAL
Qty: 45 TABLET | Refills: 0 | Status: SHIPPED | OUTPATIENT
Start: 2025-04-11

## 2025-04-11 RX ORDER — LORAZEPAM 1 MG/1
1 TABLET ORAL NIGHTLY
Qty: 30 TABLET | Refills: 0 | Status: SHIPPED | OUTPATIENT
Start: 2025-04-11

## 2025-04-11 NOTE — TELEPHONE ENCOUNTER
The patient has continued with side effects of Remeron at 3.75 mg.  Review of the chart indicates that in the past she has taken lorazepam prior to MRI.  She indicates that she has done so without side effects.  Use for anxiety and sleep were reviewed with her, along with side effects including sleepiness, unsteadiness, and effects on activities.  She requests a trial; 1 mg at bedtime.  She will resume vilazodone daily, with an increase over her previous dose of 20 mg to 30 mg, in light of good tolerability but ineffectiveness of 20 mg.

## 2025-04-28 ENCOUNTER — PATIENT MESSAGE (OUTPATIENT)
Dept: SPORTS MEDICINE | Facility: CLINIC | Age: 50
End: 2025-04-28
Payer: COMMERCIAL

## 2025-04-28 DIAGNOSIS — M54.2 NECK PAIN: Primary | ICD-10-CM

## 2025-04-28 DIAGNOSIS — M54.6 THORACIC BACK PAIN, UNSPECIFIED BACK PAIN LATERALITY, UNSPECIFIED CHRONICITY: ICD-10-CM

## 2025-04-28 NOTE — PROGRESS NOTES
Patient ID: Sandra Dimas  YOB: 1975  MRN: [id number]    Chief Complaint: Pain of the Neck and Pain of the Spine    Referred By:  Self    History of Present Illness: Sandra Dimas is a right-hand dominant 49 y.o. female who presents today with Pain of the Neck and Pain of the Spine    History of Present Illness    HPI:  Sandra presents for evaluation of back and neck pain following a fall approximately two weeks ago. She slipped and fell backwards into a closet [place], hitting her back on the [place] and her head on the wall. The exact onset of back pain is unclear. Initially, she was more concerned about potential head injury due to her history of head issues and forgot about the fall.    She describes localized pain in her mid-back, with significant tenderness to touch. She also reports neck pain, which she feels is worsening. She characterizes her back pain as improving but notes increasing discomfort in her neck, possibly due to a whiplash-like injury.    For pain management, she has been using Bengay topically, which provided minimal relief. She is also taking Ibuprofen (Advil) and muscle relaxants, primarily for her head issues, with Advil providing some relief for her back pain.    She denies persistent head pain from the fall. Bengay: Applied topically to the back, minimal benefit  Ibuprofen (Advil): Taken for pain, some benefit  Muscle relaxants: Taken for head pain    MEDICATIONS:  - Ibuprofen (Advil): For pain  - Muscle relaxants: For head pain        Previous HPI:  She has previously been treated for right shoulder adhesive capsulitis and subacromial impingement, last seen 11/12/24.    Past Medical History:   Past Medical History:   Diagnosis Date    Chronic paroxysmal hemicrania 01/03/2019    Endometriosis     General anesthetics causing adverse effect in therapeutic use     wakes up with severe anxiety attacks    Hemicrania continua     Hepatitis C antibody positive in blood  12/09/2020    RNA NEGATIVE 12/14/2020    Migraines      Past Surgical History:   Procedure Laterality Date    APPENDECTOMY      COLONOSCOPY N/A 11/12/2020    Procedure: COLONOSCOPY;  Surgeon: Mylene Russ MD;  Location: [facility];  Service: Endoscopy;  Laterality: N/A;    COLONOSCOPY N/A 4/19/2023    Procedure: COLONOSCOPY;  Surgeon: Dian Vail MD;  Location: [facility];  Service: Endoscopy;  Laterality: N/A;    CYST REMOVAL Right 12/30/2019    Procedure: EXCISION, CYST;  Surgeon: NATALI Shah MD;  Location: [facility];  Service: OB/GYN;  Laterality: Right;  Sebaceous cyst    diagnostic laprascopy      ESOPHAGOGASTRODUODENOSCOPY N/A 11/30/2018    Procedure: EGD (ESOPHAGOGASTRODUODENOSCOPY);  Surgeon: Bossman Bustos MD;  Location: [facility];  Service: Endoscopy;  Laterality: N/A;    ESOPHAGOGASTRODUODENOSCOPY N/A 11/12/2020    Procedure: ESOPHAGOGASTRODUODENOSCOPY (EGD) needs rapid covid test;  Surgeon: Mylene Russ MD;  Location: [facility];  Service: Endoscopy;  Laterality: N/A;    ESOPHAGOGASTRODUODENOSCOPY N/A 4/19/2023    Procedure: EGD (ESOPHAGOGASTRODUODENOSCOPY);  Surgeon: Dian Vail MD;  Location: [facility];  Service: Endoscopy;  Laterality: N/A;    FULGURATION OF ENDOMETRIOSIS  12/26/2018    Procedure: FULGURATION, ENDOMETRIOSIS;  Surgeon: Zehar Jensen MD;  Location: [facility];  Service: OB/GYN;;  endometriosis implant resection    HYSTERECTOMY      HYSTEROSCOPY WITH HYDROTHERMAL ABLATION OF ENDOMETRIUM WITH DILATION AND CURETTAGE N/A 12/26/2018    Procedure: HYSTEROSCOPY, WITH DILATION AND CURETTAGE OF UTERUS AND HYDROTHERMAL ENDOMETRIAL ABLATION;  Surgeon: Zehra Jensen MD;  Location: [facility];  Service: OB/GYN;  Laterality: N/A;    INJECTION OF ANESTHETIC AGENT AROUND NERVE Right 03/17/2023    Procedure: BLOCK, NERVE, RIGHT C2,C3 AND THIRD OCCIPITAL NERVE DR MARRERO ONLY 1 OF 2;  Surgeon: Neftaly Marrero MD;  Location: [facility];  Service: Pain Management;   Laterality: Right;    INJECTION OF ANESTHETIC AGENT AROUND NERVE Right 04/06/2023    Procedure: BLOCK, NERVE, RIGHT C2,C3 AND THIRD OCCIPITAL MEDIAL BRANCH DR KELLY ONLY 2 OF 2 (URGENT) *VIP*;  Surgeon: Neftaly Kelly MD;  Location: [facility];  Service: Pain Management;  Laterality: Right;    LAPAROSCOPIC SALPINGECTOMY Bilateral 12/26/2018    Procedure: SALPINGECTOMY, LAPAROSCOPIC;  Surgeon: Zehra Jensen MD;  Location: [facility];  Service: OB/GYN;  Laterality: Bilateral;    LAPAROTOMY, EXPLORATORY N/A 02/07/2019    Procedure: LAPAROTOMY, EXPLORATORY;  Surgeon: Ramakrishna Boone MD;  Location: [facility];  Service: General;  Laterality: N/A;    OOPHORECTOMY      RADIOFREQUENCY ABLATION Right 5/11/2023    Procedure: RADIOFREQUENCY ABLATION RIGHT C2,C3, TON;  Surgeon: Neftaly Kelly MD;  Location: [facility];  Service: Pain Management;  Laterality: Right;    REPAIR OF VAGINAL CUFF N/A 07/09/2020    Procedure: REPAIR, VAGINAL CUFF;  Surgeon: Sailaja Addison MD;  Location: [facility];  Service: OB/GYN;  Laterality: N/A;    ROBOT-ASSISTED LAPAROSCOPIC ABDOMINAL HYSTERECTOMY USING DA MAVIS XI N/A 12/30/2019    Procedure: XI ROBOTIC HYSTERECTOMY;  Surgeon: NATALI Shah MD;  Location: [facility] OR;  Service: OB/GYN;  Laterality: N/A;    ROBOT-ASSISTED LAPAROSCOPIC SURGICAL REMOVAL OF OVARY USING DA MAVIS XI Bilateral 12/30/2019    Procedure: XI ROBOTIC OOPHORECTOMY;  Surgeon: NATALI Shah MD;  Location: [facility] OR;  Service: OB/GYN;  Laterality: Bilateral;    robotic assisted laparoscopy with excision of ovarian endometrioma and chromotubation       Family History   Problem Relation Name Age of Onset    Hypertension Mother      Diabetes type II Mother      Heart attack Father      Strabismus Neg Hx      Retinal detachment Neg Hx      Macular degeneration Neg Hx      Glaucoma Neg Hx      Blindness Neg Hx      Amblyopia Neg Hx      Breast cancer Neg Hx      Colon cancer Neg Hx      Ovarian cancer Neg Hx       Thyroid disease Neg Hx      Migraines Neg Hx      Asthma Neg Hx       Social History     Socioeconomic History    Marital status:    Tobacco Use    Smoking status: Never    Smokeless tobacco: Never   Substance and Sexual Activity    Alcohol use: Not Currently     Comment: rarely No alcohol 72h prior to sx    Drug use: No    Sexual activity: Yes     Partners: Male   Social History Narrative    No pets or smokers in household.     Medication List with Changes/Refills   Current Medications    ATENOLOL (TENORMIN) 25 MG TABLET    take 1 tablet my mouth daily    B2/MAGNESIUM CIT,OXID/FEVERFEW (MIGRELIEF ORAL)    Take by mouth once daily.     IBUPROFEN (ADVIL,MOTRIN) 800 MG TABLET    Take 1 tablet (800 mg total) by mouth 3 (three) times daily.    INDOMETHACIN (INDOCIN) 50 MG CAPSULE    Take 1 capsule (50 mg total) by mouth 3 (three) times daily    IPRATROPIUM (ATROVENT) 21 MCG (0.03 %) NASAL SPRAY    2 sprays by Each Nostril route 2 (two) times daily.    KETOCONAZOLE (NIZORAL) 2 % CREAM    Apply to the affected area 2 times daily    KETOCONAZOLE (NIZORAL) 2 % SHAMPOO    Wash neck/face with medicated shampoo at least 2 to 3 times per week - let sit at least 5 minutes prior to rinsing    LINACLOTIDE (LINZESS) 72 MCG CAP CAPSULE    Take 1 capsule (72 mcg total) by mouth before breakfast.    LORAZEPAM (ATIVAN) 1 MG TABLET    Take 1 tablet (1 mg total) by mouth nightly.    MIRTAZAPINE (REMERON) 7.5 MG TAB    Take 1 tablet (7.5 mg total) by mouth every evening.    ONDANSETRON (ZOFRAN-ODT) 8 MG TBDL    Dissolve 1 tablet (8 mg total) by mouth every 8 (eight) hours as needed (nausea/vomiting).    PANTOPRAZOLE (PROTONIX) 40 MG TABLET    take 1 tablet by mouth every day    PROMETHAZINE (PHENERGAN) 25 MG TABLET    Take 1 tablet (25 mg total) by mouth every 6 (six) hours as needed for Nausea.    PROMETHAZINE (PHENERGAN) 25 MG TABLET    Take 1 tablet by mouth every 6 hours as needed for nausea and vomiting.    SUCRALFATE  (CARAFATE) 100 MG/ML SUSPENSION    Take 10 mLs (1 g total) by mouth 4 (four) times daily.    TIZANIDINE (ZANAFLEX) 2 MG TABLET    TAKE 2 TABLETS (4 MG TOTAL) BY MOUTH NIGHTLY AS NEEDED (MUSCLE SPASMS).    TOPIRAMATE (TROKENDI XR) 100 MG CP24    Take 1 capsule (100 mg total) by mouth once daily.    VALACYCLOVIR (VALTREX) 1000 MG TABLET    Take 1 tablet by mouth once a day    VERAPAMIL (VERELAN) 120 MG C24P    Take 1 capsule (120 mg total) by mouth once daily.    VILAZODONE (VIIBRYD) 20 MG TAB    Take 1.5 tablets (30 mg) daily.    VITAMIN D (VITAMIN D3) 1000 UNITS TAB    Take 1,000 Units by mouth once daily.     Review of patient's allergies indicates:   Allergen Reactions    Chlorhexidine Rash     Per  after surgery patient had large rash across abdomen where chlorhexidine was applied.    Hydrocodone Other (See Comments)     Chest pains. Note: patient tolerated hydromorphone in 2/2019.     Mastisol adhesive [gum vusdyo-[id number]-itdz-alcohol] Rash       Physical Exam:   There is no height or weight on file to calculate BMI.    Detailed MSK exam:     Thoracic and Cervical Spine:  No swelling, erythema or ecchymosis.   Tender to palpation spinous processes T 3-T6, most focally at spinous process of T5, as well as C3 and right cervical paraspinals  ROM WNL throughout  Cervical flexion and extension strength WNL  No radicular symptoms.  Negative Spurling's.    Imaging:  XR cervical spine - 04/29/2025    XR thoracic spine - 04/29/2025     Relevant imaging results were reviewed and interpreted by me and per my read:  Normal radiographs of cervical spine.  Thoracic spine with mild degenerative disc space changes.  No acute abnormalities or fractures.    This was discussed with the patient and / or family today.      Patient Instructions   Assessment:  Sandra Dimas is a 49 y.o. female with a chief complaint of Pain of the Neck and Pain of the Spine    Encounter Diagnoses   Name Primary?    Contusion of middle back  wall of thorax, initial encounter Yes    Acute midline thoracic back pain     Cervical myofascial pain syndrome     Neck pain       Plan:  X-rays reviewed   Cervical spine - normal, no acute abnormalities or fractures  Thoracic spine - mild degenerative changes, no acute abnormalities or fractures  History and clinical exam is consistent with acute midline thoracic back pain from a contusion after a fall.  She is also has some developed some thoracic and cervical myofascial pain as a result, likely from whiplash type injury, and compensation.    Recommend conservative management.  Continue ibuprofen, topical such as Israel-Rey, ice vs heat to the affected area.  Work on gentle range of motion of your neck, stretching of your neck and your back, and maintaining proper posture.  If further treatment is necessary, can consider for massage therapy, physical therapy, as well as chiropractic care.    Follow-up: As needed or sooner if there are any problems between now and then.    Thank you for choosing [facility] and Dr. King Medina for your orthopedic & sports medicine care. It is our goal to provide you with exceptional care that will help keep you healthy, active, and get you back in the game.    Please do not hesitate to reach out to us via email, phone, or MyChart with any questions, concerns, or feedback.    If you are experiencing pain/discomfort, or have questions after 5pm and would like to be connected to the [facility] on-call team, please call this number and specify which Sports Medicine provider is treating you: [phone]  During business hours, before 5 PM, if you have any questions or concerns, please call this number and as to speak with a member of Dr Medina's team: [phone]      A copy of today's visit note has been sent to the referring provider.           King Medina MD  Primary Care Sports Medicine    Disclaimer: This note was  prepared using a voice recognition system and is likely to have sound alike errors within the text.    This note was generated with the assistance of ambient listening technology. Verbal consent was obtained by the patient and accompanying visitor(s) for the recording of patient appointment to facilitate this note. I attest to having reviewed and edited the generated note for accuracy, though some syntax or spelling errors may persist. Please contact the author of this note for any clarification.

## 2025-04-29 ENCOUNTER — HOSPITAL ENCOUNTER (OUTPATIENT)
Dept: RADIOLOGY | Facility: HOSPITAL | Age: 50
Discharge: HOME | End: 2025-04-29
Attending: STUDENT IN AN ORGANIZED HEALTH CARE EDUCATION/TRAINING PROGRAM
Payer: COMMERCIAL

## 2025-04-29 ENCOUNTER — OFFICE VISIT (OUTPATIENT)
Dept: SPORTS MEDICINE | Facility: CLINIC | Age: 50
End: 2025-04-29
Payer: COMMERCIAL

## 2025-04-29 DIAGNOSIS — M54.2 NECK PAIN: ICD-10-CM

## 2025-04-29 DIAGNOSIS — M79.18 CERVICAL MYOFASCIAL PAIN SYNDROME: ICD-10-CM

## 2025-04-29 DIAGNOSIS — M54.6 THORACIC BACK PAIN, UNSPECIFIED BACK PAIN LATERALITY, UNSPECIFIED CHRONICITY: ICD-10-CM

## 2025-04-29 DIAGNOSIS — S20.224A CONTUSION OF MIDDLE BACK WALL OF THORAX, INITIAL ENCOUNTER: Primary | ICD-10-CM

## 2025-04-29 DIAGNOSIS — M54.6 ACUTE MIDLINE THORACIC BACK PAIN: ICD-10-CM

## 2025-04-29 PROCEDURE — 1160F RVW MEDS BY RX/DR IN RCRD: CPT | Mod: CPTII,S$GLB,, | Performed by: STUDENT IN AN ORGANIZED HEALTH CARE EDUCATION/TRAINING PROGRAM

## 2025-04-29 PROCEDURE — 72070 X-RAY EXAM THORAC SPINE 2VWS: CPT | Mod: TC

## 2025-04-29 PROCEDURE — 99999 PR PBB SHADOW E&M-EST. PATIENT-LVL II: CPT | Mod: PBBFAC,,, | Performed by: STUDENT IN AN ORGANIZED HEALTH CARE EDUCATION/TRAINING PROGRAM

## 2025-04-29 PROCEDURE — 1159F MED LIST DOCD IN RCRD: CPT | Mod: CPTII,S$GLB,, | Performed by: STUDENT IN AN ORGANIZED HEALTH CARE EDUCATION/TRAINING PROGRAM

## 2025-04-29 PROCEDURE — 72070 X-RAY EXAM THORAC SPINE 2VWS: CPT | Mod: 26,,, | Performed by: RADIOLOGY

## 2025-04-29 PROCEDURE — 99213 OFFICE O/P EST LOW 20 MIN: CPT | Mod: S$GLB,,, | Performed by: STUDENT IN AN ORGANIZED HEALTH CARE EDUCATION/TRAINING PROGRAM

## 2025-04-29 PROCEDURE — 72050 X-RAY EXAM NECK SPINE 4/5VWS: CPT | Mod: TC

## 2025-04-29 PROCEDURE — 72050 X-RAY EXAM NECK SPINE 4/5VWS: CPT | Mod: 26,,, | Performed by: RADIOLOGY

## 2025-04-29 NOTE — PATIENT INSTRUCTIONS
Assessment:  Sandra Dimas is a 49 y.o. female with a chief complaint of Pain of the Neck and Pain of the Spine    Encounter Diagnoses   Name Primary?    Contusion of middle back wall of thorax, initial encounter Yes    Acute midline thoracic back pain     Cervical myofascial pain syndrome     Neck pain       Plan:  X-rays reviewed   Cervical spine - normal, no acute abnormalities or fractures  Thoracic spine - mild degenerative changes, no acute abnormalities or fractures  History and clinical exam is consistent with acute midline thoracic back pain from a contusion after a fall.  She is also has some developed some thoracic and cervical myofascial pain as a result, likely from whiplash type injury, and compensation.    Recommend conservative management.  Continue ibuprofen, topical such as Israel-Rey, ice vs heat to the affected area.  Work on gentle range of motion of your neck, stretching of your neck and your back, and maintaining proper posture.  If further treatment is necessary, can consider for massage therapy, physical therapy, as well as chiropractic care.    Follow-up: As needed or sooner if there are any problems between now and then.    Thank you for choosing [facility] and Dr. King eMdina for your orthopedic & sports medicine care. It is our goal to provide you with exceptional care that will help keep you healthy, active, and get you back in the game.    Please do not hesitate to reach out to us via email, phone, or MyChart with any questions, concerns, or feedback.    If you are experiencing pain/discomfort, or have questions after 5pm and would like to be connected to the [facility] on-call team, please call this number and specify which Sports Medicine provider is treating you: [phone]  During business hours, before 5 PM, if you have any questions or concerns, please call this number and as to speak with a member of Dr Medina's  team: [phone]

## 2025-05-03 DIAGNOSIS — F33.1 MODERATE EPISODE OF RECURRENT MAJOR DEPRESSIVE DISORDER: ICD-10-CM

## 2025-05-05 RX ORDER — VILAZODONE HYDROCHLORIDE 20 MG/1
TABLET ORAL
Qty: 135 TABLET | Refills: 0 | Status: SHIPPED | OUTPATIENT
Start: 2025-05-05

## 2025-05-12 ENCOUNTER — TELEPHONE (OUTPATIENT)
Dept: PSYCHIATRY | Facility: CLINIC | Age: 50
End: 2025-05-12
Payer: COMMERCIAL

## 2025-05-13 ENCOUNTER — OFFICE VISIT (OUTPATIENT)
Dept: PSYCHIATRY | Facility: CLINIC | Age: 50
End: 2025-05-13
Payer: COMMERCIAL

## 2025-05-13 DIAGNOSIS — F33.1 MODERATE EPISODE OF RECURRENT MAJOR DEPRESSIVE DISORDER: Primary | ICD-10-CM

## 2025-05-13 DIAGNOSIS — G47.00 INSOMNIA, UNSPECIFIED TYPE: ICD-10-CM

## 2025-05-13 DIAGNOSIS — F41.1 GAD (GENERALIZED ANXIETY DISORDER): ICD-10-CM

## 2025-05-13 PROCEDURE — 1159F MED LIST DOCD IN RCRD: CPT | Mod: CPTII,95,, | Performed by: PSYCHIATRY & NEUROLOGY

## 2025-05-13 PROCEDURE — G2211 COMPLEX E/M VISIT ADD ON: HCPCS | Mod: 95,,, | Performed by: PSYCHIATRY & NEUROLOGY

## 2025-05-13 PROCEDURE — 1160F RVW MEDS BY RX/DR IN RCRD: CPT | Mod: CPTII,95,, | Performed by: PSYCHIATRY & NEUROLOGY

## 2025-05-13 PROCEDURE — 98006 SYNCH AUDIO-VIDEO EST MOD 30: CPT | Mod: 95,,, | Performed by: PSYCHIATRY & NEUROLOGY

## 2025-05-13 RX ORDER — LORAZEPAM 1 MG/1
1 TABLET ORAL NIGHTLY
Qty: 30 TABLET | Refills: 1 | Status: SHIPPED | OUTPATIENT
Start: 2025-05-13

## 2025-05-13 RX ORDER — VILAZODONE HYDROCHLORIDE 40 MG/1
40 TABLET ORAL DAILY
Qty: 90 TABLET | Refills: 0 | Status: SHIPPED | OUTPATIENT
Start: 2025-05-13

## 2025-05-13 NOTE — PROGRESS NOTES
"  The patient location is: Patient's home. Patient reported  that his/her location at the time of this visit was in the [facility].    Visit type: Virtual visit with synchronous audio and video     Each patient to whom he or she provides medical services by telemedicine is: (1) informed of the relationship between the physician and patient and the respective role of any other health care provider with respect to management of the patient; and (2) notified that he or she may decline to receive medical services by telemedicine and may withdraw from such care at any time.    Patient was informed that I am a physician who is licensed in the [facility]:  Inocencio Burns MD:  Employed by [facility]     Patient was instructed that If technology issues arise, he/she may  call our office phone at: [phone].    Pt informed that if he/she is ever in crisis (or has acute concerns), dial 911 or go to nearest Emergency Room (ER).    Pt informed that if questions related to privacy practices arise, contact [facility] Information Department: [phone].    Understanding Expressed. No questions.      Sandra Dimas   1975 05/13/2025        CURRENT PRESENTATION  (psychiatric biopsychosocial evaluation; plan for treatment):   The patient presents for her 1st follow-up visit with me, with diagnoses at the initial visit being recurrent major depression, generalized anxiety disorder, and insomnia.      Documentation from the initial visit on 04/07/2025 includes:   CHIEF COMPLAINT :  I have trouble sleeping.  I do not sleep, Ambien gave me hallucinations, so I stopped it.  I also feel irritable."    HISTORY OF PRESENT ILLNESS:       Sandra Dimas a 49 y.o.  female presents today in transfer of care from Dr. Blevins.  There is a single visit on 01/16/2025, with a diagnosis of insomnia and prescriptions for vilazodone and zolpidem, with no other documentation.      The patient has a history " of cognitive complaints with a normal exam.  08/29/2023 neuropsychology documentation includes:  Referral question and neuropsychological necessity: Ms. Dimas is a 48 y.o., right-handed, woman born in [place] and raised in [place] with 18 years of formal education who was referred by her neurology team due to cognitive concerns in the context of a family history of early-onset dementia in her maternal grandmother, dementia in her mother, chronic migraine, depression, and anxiety.  Evaluation methods: I had the pleasure of seeing Sandra Dimas on 08/29/2023 in person at the [facility], Department of Neurology. Data sources for the below report include review of the available medical record and an interview with the patient. At the outset of the appointment, the undersigned explained the rationale for the evaluation along with the limits of confidentiality; and verbal informed consent for this evaluation was obtained.    Summary and Impressions    Ms. Dimas is a 48 y.o., right-handed, White, multilingual (Japanese, Tajik, and English) woman with 18 years of formal education. She was referred by her neurology team due to cognitive concerns in the context of a family history of early-onset dementia in her maternal grandmother, dementia in her mother, chronic migraine, depression, and anxiety. Cognitive screening completed by her referring neurology team on 08/29/2023 was within normal limits (MoCA = 29/30). Most-recent neurologic exam completed on 08/29/2023 was documented as normal. Most-recent brain MRI completed on 01/22/2023 was documented as reflecting no acute abnormality. Laboratory studies for genetic risk of early-onset dementia processes were interpreted as normal by her neurologist.      During interview, Ms. Dimas reported the insidious onset and variable or slowly worsening course of cognitive concerns beginning approximately 2-3 years ago. Specifically, she characterized difficulties  with attention and mental tracking, follow-through when completing planned activities, and difficulties with word-finding. Functionally, Ms. Dimas reported difficulties with tracking her medications, relying on her  more to assist her with medications haider to instances of forgetting; she discussed that she is otherwise independent and effective in daily living skills.       Emotionally, Ms. Dimas reported symptoms of anxiety, depression, and hopelessness regarding intractable migraine symptoms. She discussed that these symptoms along with migraine are affecting her sleep considerably. She discussed that she achieves fragmented sleep throughout the day and is often awake at night.       Ms. Dimas has a history of insomnia, anxiety, depression, and migraines which together are the most-likely cause of her day-to-day cognitive concerns particularly in light of her genetic test results. Given her reported cognitive difficulty there is value in ruling out a neurocognitive process and establishing a cognitive baseline for continued monitoring if clinically indicated in the future. She does not have psychological or medical concerns that would preclude gathering neuropsychological test data at this time. As such, formal neuropsychological testing is clinically indicated in order to aid in differential diagnosis and treatment planning.      We discussed the pros and cons of testing with an  vs. Testing in English particularly regarding tests of language. Given low suspicion for a current neurodegenerative process but the value of baseline assessment and monitoring in her case, collecting a valid dataset in English was determined to be the best course of action for her care.      ICD-10-CM Diagnoses      1. Mild episode of recurrent major depressive disorder          2. Anxiety          3. Intractable migraine without status migrainosus, unspecified migraine type          4. Cognitive complaints          5.  Family history of dementia             Plan/ Recommendations    Provider Recommendations:   On the basis of the above summary, neuropsychological testing is clinically indicated at this time. Ms. Dimas will be scheduled for comprehensive neuropsychological testing. A detailed report including detailed diagnostic information and recommendations will be completed after testing has been completed.  Patient Recommendations:   The next step in your care is to complete neuropsychological testing. Our office staff will reach out to you to schedule an appointment for the testing portion of your evaluation. Please review your after visit summary for more information about your testing appointment.        indicates recurring refills butorphanol nasal spray and 30 tablets of Ambien 5 mg on January 16.  Other medications per epic include atenolol, ibuprofen, Indocin, Atrovent, linaclotide, pantoprazole, sucralfate, tizanidine, topiramate, valacyclovir, verapamil      In the current session, the patient reports chronic and excessive worry with associated signs and symptoms of generalized anxiety disorder for as long as she can remember.  Questioning yields no out of the blue panic, agoraphobia, social anxiety, obsessions, compulsions, or PTSD.        She also reports chronic recurring depression with decreased energy, enjoyment (not absent), concentration, self-esteem; guilty feelings about migraine and mental health symptoms sometimes interfering with her time with her children.  Appetite is intact.  Never with thoughts of self-harm, but sometimes with passive thoughts about death.  She reports postpartum depression after the births of each of her 3 children; she says that she was prescribed antidepressants but they were not effective.  She describes irritability as clearly related to anxiety, depression, fatigue, and headache discomfort.  Extensive and specific questioning yields no elevated moods, ongoing irritable moods,  or manic signs or symptoms.  She notes many years of notable insomnia and indicates that medications have either not worked or have not been tolerated.  Most recently, Ambien caused hallucinations, so she discontinued the medication.      The patient reports that vilazodone has not caused any side effects, but it has not helped with any of the symptoms of anxiety, depression, or insomnia.      Consideration with regards to medications is use of butorphanol and incomplete right bundle branch block.  PAST BEHAVIORAL HEALTH HISTORY  Outpatient Treatment - psychotherapy with a psychologist for a time, but it was not helpful.  One visit with a psychiatrist, at which time vilazodone and Ambien were prescribed.  A number of years of mental health medications by way of primary care physicians.  Inpatient Treatment - none  Suicide Attempts - none; no history of suicidal thoughts; hopeful despite distress, consistently aware of protective factors, consistently confident in maintaining safety  Violence - none; no history of thoughts of violence or feelings of aggression  Psychosis - hallucinations with Ambien and medical THC; no other psychosis  Aleksandra/Hypomania - none  Substance Abuse Treatment - none  Trauma:  None  SUBSTANCE USE:  Alcohol:  None  Other:  None       Encounter Diagnoses   Name Primary?    Moderate episode of recurrent major depressive disorder Yes    SABI (generalized anxiety disorder)      Insomnia, unspecified type     Follow up in 1 month  Psychiatry Medication:  Discontinue vilazodone.  Remeron 7.5 mg 30 minutes before bedtime.    Subsequent to the appointment, Remeron was not tolerated, and vilazodone was restarted and titrated to 30 mg daily by way of 1.5 tablets of 20 mg.  Lorazepam 1 mg at bedtime was initiated.     indicates the patient continues on butorphanol and that 30 tablets of lorazepam 1 mg were filled on April 11.    In the current session, the patient feels markedly improved.  She is very  "pleased with sleep, which is so much better."  Depression is resolved, but she continues with excessive worry though she feels that Viibryd has been helpful.  No elevated moods, irritable moods, manic signs or symptoms, psychosis, suicidal ideation or thoughts of self-harm, homicidal ideation or thoughts of harm towards others, or feelings of aggression.  Including with extensive and specific questioning, no side effects with lorazepam or Viibryd as she is currently taking them; she says that she tried Viibryd at bedtime initially, and it interfered with her sleep, so she has been taking it in the morning without any side effects.    Interim history:  Living situation/supports:  The patient reports that having all 3 children home for the summer is and will be stressful; she participates in coping strategies.  She is traveling from around  until early July.  Relationships:  The patient lives with her  and her 3 children, apart from when the 20-year-old daughter is away at school.  She describes very close relationships with her family.  Her father's , and her mother with Alzheimer's disease lives in [place] with the patient's sister.  The patient has a close best friend and 2 other good friends.  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  Medical issues:  Encounters with pain medicine and sports medicine  Nonpsychotropic Medication:  Includes, per Epic, atenolol, ibuprofen, Atrovent, Linzess, ondansetron PRN, pantoprazole, promethazine PRN, sucralfate, tizanidine, topiramate, Valtrex, verapamil   Allergies:   Review of patient's allergies indicates:   Allergen Reactions    Chlorhexidine Rash     Per  after surgery patient had large rash across abdomen where chlorhexidine was applied.    Hydrocodone Other (See Comments)     Chest pains. Note: patient tolerated hydromorphone in " 2/2019.     Mastisol adhesive [gum [id number]-mrhgjr-csbe-alcohol] Rash     Alcohol use:  None  Other substance use:  None    Mental Status Exam:   Appearance:  Appropriately groomed  Orientation:  X4  Attitude:  Cooperative, engaged   Eye Contact:  Appropriate  Behavior:  Calm, appropriate  Speech:     Rate - WNL    Volume - WNL    Quantity - WNL    Tone - relaxed, appropriate  Pressure - no  Thought Processes:  Goal-directed  Mood:  Euthymic but anxious   Affect:  Without any notable distress, appropriately variable, anxious at times also able to brighten at appropriate times  SI:  No, and no thoughts of self-harm  HI:  No, and no thoughts of harm towards others  Paranoia:  No  Delusions:  No  Hallucinations:  No  Attention:  Intact over the course of the session  Cognition:  No deficits noted over the course of the session  Insight:  Intact   Judgment:  Intact  Impulse Control:  Intact       ASSESSMENT:   Encounter Diagnoses   Name Primary?    Moderate episode of recurrent major depressive disorder Yes    SABI (generalized anxiety disorder)     Insomnia, unspecified type      PLAN:   Follow up in 2 months.    Psychiatry Medication:  Continue lorazepam 1 mg at bedtime.  With discussion rationale, risks, side effects of increasing Viibryd to 40 mg daily, the patient requests the increase.    Reviewed with patient:  Report side effects, other problems, or questions to the psychiatrist by way of the [facility] portal, [facility], or by calling [facility] at [phone].  Messages are checked during clinic hours only.  For urgent issues outside of clinic hours, call [phone] or go to an emergency department.  Follow up with primary care/MD specialist for continued monitoring of general health and wellness and any medical conditions.  Call [facility] at [phone] or use the [facility] portal if necessary for scheduling or rescheduling.  It is the responsibility of the patient to reschedule an  appointment if an appointment has been canceled or missed.  Understanding was expressed; and no further concerns or questions were raised at this time.     [address]  Prescription drug management and 2 or more stable chronic illnesses (moderate)        Large portions of this note were completed by way of voice recognition dictation software, and transcription errors are possible, such that specific information in the note should be considered in the context of the entire report.

## 2025-06-02 RX ORDER — PROMETHAZINE HYDROCHLORIDE 25 MG/1
TABLET ORAL
Qty: 45 TABLET | Refills: 3 | Status: SHIPPED | OUTPATIENT
Start: 2025-06-02

## 2025-06-11 RX ORDER — VERAPAMIL HYDROCHLORIDE 120 MG/1
120 CAPSULE, EXTENDED RELEASE ORAL DAILY
Qty: 30 CAPSULE | Refills: 3 | Status: SHIPPED | OUTPATIENT
Start: 2025-06-11

## 2025-06-24 ENCOUNTER — PATIENT OUTREACH (OUTPATIENT)
Dept: ADMINISTRATIVE | Facility: HOSPITAL | Age: 50
End: 2025-06-24
Payer: COMMERCIAL

## 2025-07-13 RX ORDER — FUROSEMIDE 20 MG/1
20 TABLET ORAL DAILY
Qty: 30 TABLET | Refills: 1 | Status: SHIPPED | OUTPATIENT
Start: 2025-07-13

## 2025-07-14 ENCOUNTER — HOSPITAL ENCOUNTER (OUTPATIENT)
Dept: CARDIOLOGY | Facility: HOSPITAL | Age: 50
Discharge: HOME | End: 2025-07-14
Attending: INTERNAL MEDICINE
Payer: COMMERCIAL

## 2025-07-14 VITALS — BODY MASS INDEX: 20 KG/M2 | HEIGHT: 68 IN | WEIGHT: 132 LBS

## 2025-07-14 DIAGNOSIS — M79.89 LEG SWELLING: ICD-10-CM

## 2025-07-14 DIAGNOSIS — M79.89 LEG SWELLING: Primary | ICD-10-CM

## 2025-07-14 PROCEDURE — 93970 EXTREMITY STUDY: CPT | Mod: 26,,, | Performed by: INTERNAL MEDICINE

## 2025-07-14 PROCEDURE — 93970 EXTREMITY STUDY: CPT

## 2025-07-14 NOTE — PROGRESS NOTES
Had plane trip overseas. Noted swelling both lower extremity .  Pain in right leg more swollen than left.  Will get duplex venous check cmp bnp.

## 2025-07-15 ENCOUNTER — OFFICE VISIT (OUTPATIENT)
Dept: PSYCHIATRY | Facility: CLINIC | Age: 50
End: 2025-07-15
Payer: COMMERCIAL

## 2025-07-15 VITALS — DIASTOLIC BLOOD PRESSURE: 73 MMHG | SYSTOLIC BLOOD PRESSURE: 114 MMHG | HEART RATE: 71 BPM

## 2025-07-15 DIAGNOSIS — G47.00 INSOMNIA, UNSPECIFIED TYPE: ICD-10-CM

## 2025-07-15 DIAGNOSIS — F41.1 GAD (GENERALIZED ANXIETY DISORDER): Primary | ICD-10-CM

## 2025-07-15 DIAGNOSIS — F33.1 MODERATE EPISODE OF RECURRENT MAJOR DEPRESSIVE DISORDER: ICD-10-CM

## 2025-07-15 PROCEDURE — 99214 OFFICE O/P EST MOD 30 MIN: CPT | Mod: S$GLB,,, | Performed by: PSYCHIATRY & NEUROLOGY

## 2025-07-15 PROCEDURE — 3074F SYST BP LT 130 MM HG: CPT | Mod: CPTII,S$GLB,, | Performed by: PSYCHIATRY & NEUROLOGY

## 2025-07-15 PROCEDURE — 99999 PR PBB SHADOW E&M-EST. PATIENT-LVL II: CPT | Mod: PBBFAC,,, | Performed by: PSYCHIATRY & NEUROLOGY

## 2025-07-15 PROCEDURE — G2211 COMPLEX E/M VISIT ADD ON: HCPCS | Mod: S$GLB,,, | Performed by: PSYCHIATRY & NEUROLOGY

## 2025-07-15 PROCEDURE — 3078F DIAST BP <80 MM HG: CPT | Mod: CPTII,S$GLB,, | Performed by: PSYCHIATRY & NEUROLOGY

## 2025-07-15 RX ORDER — VILAZODONE HYDROCHLORIDE 20 MG/1
TABLET ORAL
Qty: 45 TABLET | Refills: 1 | Status: SHIPPED | OUTPATIENT
Start: 2025-07-15

## 2025-07-15 RX ORDER — LORAZEPAM 1 MG/1
1 TABLET ORAL NIGHTLY
Qty: 30 TABLET | Refills: 1 | Status: SHIPPED | OUTPATIENT
Start: 2025-07-15

## 2025-07-15 NOTE — PROGRESS NOTES
"  Sandra Dimas   1975   07/15/2025        CURRENT PRESENTATION  (psychiatric biopsychosocial evaluation; plan for treatment):   The patient presents for her 1st follow-up visit with me, with diagnoses at the initial visit being recurrent major depression, generalized anxiety disorder, and insomnia.      Documentation from the initial visit on 04/07/2025 includes:   CHIEF COMPLAINT :  I have trouble sleeping.  I do not sleep, Ambien gave me hallucinations, so I stopped it.  I also feel irritable."    HISTORY OF PRESENT ILLNESS:       Sandra Dimas a 49 y.o.  female presents today in transfer of care from Dr. Blevins.  There is a single visit on 01/16/2025, with a diagnosis of insomnia and prescriptions for vilazodone and zolpidem, with no other documentation.      The patient has a history of cognitive complaints with a normal exam.  08/29/2023 neuropsychology documentation includes:  Referral question and neuropsychological necessity: Ms. Dimas is a 48 y.o., right-handed, woman born in [place] and raised in [place] with 18 years of formal education who was referred by her neurology team due to cognitive concerns in the context of a family history of early-onset dementia in her maternal grandmother, dementia in her mother, chronic migraine, depression, and anxiety.  Evaluation methods: I had the pleasure of seeing Sandra Dimas on 08/29/2023 in person at the [facility], Department of Neurology. Data sources for the below report include review of the available medical record and an interview with the patient. At the outset of the appointment, the undersigned explained the rationale for the evaluation along with the limits of confidentiality; and verbal informed consent for this evaluation was obtained.    Summary and Impressions    Ms. Dimas is a 48 y.o., right-handed, White, multilingual (Sudanese, Bengali, and English) woman with 18 years of formal education. She was referred by " her neurology team due to cognitive concerns in the context of a family history of early-onset dementia in her maternal grandmother, dementia in her mother, chronic migraine, depression, and anxiety. Cognitive screening completed by her referring neurology team on 08/29/2023 was within normal limits (MoCA = 29/30). Most-recent neurologic exam completed on 08/29/2023 was documented as normal. Most-recent brain MRI completed on 01/22/2023 was documented as reflecting no acute abnormality. Laboratory studies for genetic risk of early-onset dementia processes were interpreted as normal by her neurologist.      During interview, Ms. Dimas reported the insidious onset and variable or slowly worsening course of cognitive concerns beginning approximately 2-3 years ago. Specifically, she characterized difficulties with attention and mental tracking, follow-through when completing planned activities, and difficulties with word-finding. Functionally, Ms. Dimas reported difficulties with tracking her medications, relying on her  more to assist her with medications haider to instances of forgetting; she discussed that she is otherwise independent and effective in daily living skills.       Emotionally, Ms. Dimas reported symptoms of anxiety, depression, and hopelessness regarding intractable migraine symptoms. She discussed that these symptoms along with migraine are affecting her sleep considerably. She discussed that she achieves fragmented sleep throughout the day and is often awake at night.       Ms. Dimas has a history of insomnia, anxiety, depression, and migraines which together are the most-likely cause of her day-to-day cognitive concerns particularly in light of her genetic test results. Given her reported cognitive difficulty there is value in ruling out a neurocognitive process and establishing a cognitive baseline for continued monitoring if clinically indicated in the future. She does not have psychological or  medical concerns that would preclude gathering neuropsychological test data at this time. As such, formal neuropsychological testing is clinically indicated in order to aid in differential diagnosis and treatment planning.      We discussed the pros and cons of testing with an  vs. Testing in English particularly regarding tests of language. Given low suspicion for a current neurodegenerative process but the value of baseline assessment and monitoring in her case, collecting a valid dataset in English was determined to be the best course of action for her care.      ICD-10-CM Diagnoses      1. Mild episode of recurrent major depressive disorder          2. Anxiety          3. Intractable migraine without status migrainosus, unspecified migraine type          4. Cognitive complaints          5. Family history of dementia             Plan/ Recommendations    Provider Recommendations:   On the basis of the above summary, neuropsychological testing is clinically indicated at this time. Ms. Dimas will be scheduled for comprehensive neuropsychological testing. A detailed report including detailed diagnostic information and recommendations will be completed after testing has been completed.  Patient Recommendations:   The next step in your care is to complete neuropsychological testing. Our office staff will reach out to you to schedule an appointment for the testing portion of your evaluation. Please review your after visit summary for more information about your testing appointment.        indicates recurring refills butorphanol nasal spray and 30 tablets of Ambien 5 mg on January 16.  Other medications per epic include atenolol, ibuprofen, Indocin, Atrovent, linaclotide, pantoprazole, sucralfate, tizanidine, topiramate, valacyclovir, verapamil      In the current session, the patient reports chronic and excessive worry with associated signs and symptoms of generalized anxiety disorder for as long as she  can remember.  Questioning yields no out of the blue panic, agoraphobia, social anxiety, obsessions, compulsions, or PTSD.        She also reports chronic recurring depression with decreased energy, enjoyment (not absent), concentration, self-esteem; guilty feelings about migraine and mental health symptoms sometimes interfering with her time with her children.  Appetite is intact.  Never with thoughts of self-harm, but sometimes with passive thoughts about death.  She reports postpartum depression after the births of each of her 3 children; she says that she was prescribed antidepressants but they were not effective.  She describes irritability as clearly related to anxiety, depression, fatigue, and headache discomfort.  Extensive and specific questioning yields no elevated moods, ongoing irritable moods, or manic signs or symptoms.  She notes many years of notable insomnia and indicates that medications have either not worked or have not been tolerated.  Most recently, Ambien caused hallucinations, so she discontinued the medication.      The patient reports that vilazodone has not caused any side effects, but it has not helped with any of the symptoms of anxiety, depression, or insomnia.      Consideration with regards to medications is use of butorphanol and incomplete right bundle branch block.  PAST BEHAVIORAL HEALTH HISTORY  Outpatient Treatment - psychotherapy with a psychologist for a time, but it was not helpful.  One visit with a psychiatrist, at which time vilazodone and Ambien were prescribed.  A number of years of mental health medications by way of primary care physicians.  Inpatient Treatment - none  Suicide Attempts - none; no history of suicidal thoughts; hopeful despite distress, consistently aware of protective factors, consistently confident in maintaining safety  Violence - none; no history of thoughts of violence or feelings of aggression  Psychosis - hallucinations with Ambien and medical  "THC; no other psychosis  Aleksandra/Hypomania - none  Substance Abuse Treatment - none  Trauma:  None  SUBSTANCE USE:  Alcohol:  None  Other:  None       Encounter Diagnoses   Name Primary?    Moderate episode of recurrent major depressive disorder Yes    SABI (generalized anxiety disorder)      Insomnia, unspecified type     [Remeron was prescribed but not tolerated, and the regimen was changed to Viibryd in lorazepam.]    Last visit 05/13/2025 documentation includes:  ...the patient feels markedly improved.  She is very pleased with sleep, which is so much better."  Depression is resolved, but she continues with excessive worry though she feels that Viibryd has been helpful.  No elevated moods, irritable moods, manic signs or symptoms, psychosis, suicidal ideation or thoughts of self-harm, homicidal ideation or thoughts of harm towards others, or feelings of aggression.  Including with extensive and specific questioning, no side effects with lorazepam or Viibryd as she is currently taking them; she says that she tried Viibryd at bedtime initially, and it interfered with her sleep, so she has been taking it in the morning without any side effects.  Follow up in 2 months.    Psychiatry Medication:  Continue lorazepam 1 mg at bedtime.  With discussion rationale, risks, side effects of increasing Viibryd to 40 mg daily, the patient requests the increase.     indicates the patient continues on butorphanol and that 30 tablets of lorazepam 1 mg were filled on June 11, May 14, April 11.    In the current session, the patient reports feeling improved in largely well with regards to psychiatric symptoms, apart from worry and associated anxiety symptoms as she anticipates her 2 younger children returning to school, as this will require socialization on her part.  Depression is notably improved.  No aleksandra, hypomania, psychosis, thoughts of harm to self or others, or feelings of aggression.  She reports that she sleeps well with " lorazepam, apart from being awakened by headaches.      The patient indicates the possibility that Viibryd 40 mg daily is causing an increase in frequency and intensity of headaches.  She says that this was not present on 30 mg.  I reviewed at length with her the options of decreasing back to 30 mg or trying a new antidepressant.  She elects Viibryd 30 mg daily for now.    Interim history:  Living situation/supports:  [place] with her  and children.  She and the children had a positive trip to [place] to see her mother with dementia, the sister with whom the mother lives, and other family.  She talks appropriately about how sad it is to see someone decline with dementia.  She indicates appreciation for her sister as a sister has a big load with the mother, a job, and a family of her own.  Last visit-  The patient reports that having all 3 children home for the summer is and will be stressful; she participates in coping strategies.  She is traveling from around  until early July.  Relationships:  The patient lives with her  and her 3 children, apart from when the 20-year-old daughter is away at school.  She describes very close relationships with her family.  Her father is , and her mother with Alzheimer's disease lives in [place] with the patient's sister.  The patient has a close best friend and 2 other good friends.  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  Medical issues:  Encounters for leg swelling  Nonpsychotropic Medication:  Includes, per Epic, atenolol, furosemide, Atrovent, Linzess, ondansetron PRN, pantoprazole, promethazine PRN, sucralfate, tizanidine, topiramate, Valtrex, verapamil   Allergies:   Review of patient's allergies indicates:   Allergen Reactions    Chlorhexidine Rash     Per  after surgery patient had large rash across abdomen where  chlorhexidine was applied.    Hydrocodone Other (See Comments)     Chest pains. Note: patient tolerated hydromorphone in 2/2019.     Mastisol adhesive [gum [id number]-wrcg-alcohol] Rash     Alcohol use:  None  Other substance use:  None    Mental Status Exam:   Appearance:  Appropriately groomed  Orientation:  X4  Attitude:  Cooperative, engaged   Eye Contact:  Appropriate  Behavior:  Calm, appropriate  Speech:     Rate - WNL    Volume - WNL    Quantity - WNL    Tone - appropriately variable  Pressure - no  Thought Processes:  Goal-directed  Mood:  Euthymic but anxious   Affect:  Without notable distress, appropriately variable, anxious at times but also able to brighten at appropriate times  SI:  No, and no thoughts of self-harm  HI:  No, and no thoughts of harm towards others  Paranoia:  No  Delusions:  No  Hallucinations:  No  Attention:  Intact over the course of the session  Cognition:  No deficits noted over the course of the session  Insight:  Intact   Judgment:  Intact  Impulse Control:  Intact       ASSESSMENT:   Encounter Diagnoses   Name Primary?    SABI (generalized anxiety disorder) Yes    Moderate episode of recurrent major depressive disorder     Insomnia, unspecified type        PLAN:   Follow up in 6 weeks.  Continue lorazepam 1 mg at bedtime.  Decrease Viibryd to 30 mg daily by way of 1.5 tablets of 20 mg.  If headaches persist at an increase level at 30 mg or if 30 mg is insufficient for control of symptoms, a change in antidepressant will again be discussed.    Reviewed with patient:  Report side effects, other problems, or questions to the psychiatrist by way of the [employer] portal, [facility], or by calling [facility] at [phone].  Messages are checked during clinic hours only.  For urgent issues outside of clinic hours, call 911 or go to an emergency department.  Follow up with primary care/MD specialist for continued monitoring of general health and wellness and any medical  conditions.  Call [facility] at [phone] or use the [facility] portal if necessary for scheduling or rescheduling.  It is the responsibility of the patient to reschedule an appointment if an appointment has been canceled or missed.  Understanding was expressed; and no further concerns or questions were raised at this time.     [id number]  Prescription drug management and 2 or more stable chronic illnesses (moderate)        Large portions of this note were completed by way of voice recognition dictation software, and transcription errors are possible, such that specific information in the note should be considered in the context of the entire report.

## 2025-08-04 RX ORDER — FUROSEMIDE 20 MG/1
20 TABLET ORAL
Qty: 90 TABLET | Refills: 3 | OUTPATIENT
Start: 2025-08-04

## 2025-08-05 DIAGNOSIS — F33.1 MODERATE EPISODE OF RECURRENT MAJOR DEPRESSIVE DISORDER: ICD-10-CM

## 2025-08-05 RX ORDER — VILAZODONE HYDROCHLORIDE 20 MG/1
TABLET ORAL
Qty: 135 TABLET | Refills: 0 | Status: SHIPPED | OUTPATIENT
Start: 2025-08-05

## 2025-08-28 ENCOUNTER — OFFICE VISIT (OUTPATIENT)
Dept: PSYCHIATRY | Facility: CLINIC | Age: 50
End: 2025-08-28
Payer: COMMERCIAL

## 2025-08-28 VITALS — DIASTOLIC BLOOD PRESSURE: 71 MMHG | SYSTOLIC BLOOD PRESSURE: 102 MMHG | HEART RATE: 101 BPM

## 2025-08-28 DIAGNOSIS — F51.01 PRIMARY INSOMNIA: Chronic | ICD-10-CM

## 2025-08-28 DIAGNOSIS — F41.1 GAD (GENERALIZED ANXIETY DISORDER): Primary | ICD-10-CM

## 2025-08-28 DIAGNOSIS — F33.9 RECURRENT MAJOR DEPRESSIVE DISORDER, REMISSION STATUS UNSPECIFIED: Chronic | ICD-10-CM

## 2025-08-28 PROCEDURE — 3078F DIAST BP <80 MM HG: CPT | Mod: CPTII,S$GLB,, | Performed by: PSYCHIATRY & NEUROLOGY

## 2025-08-28 PROCEDURE — 3074F SYST BP LT 130 MM HG: CPT | Mod: CPTII,S$GLB,, | Performed by: PSYCHIATRY & NEUROLOGY

## 2025-08-28 PROCEDURE — 99999 PR PBB SHADOW E&M-EST. PATIENT-LVL II: CPT | Mod: PBBFAC,,, | Performed by: PSYCHIATRY & NEUROLOGY

## 2025-08-28 PROCEDURE — 99214 OFFICE O/P EST MOD 30 MIN: CPT | Mod: S$GLB,,, | Performed by: PSYCHIATRY & NEUROLOGY

## 2025-08-28 PROCEDURE — 1160F RVW MEDS BY RX/DR IN RCRD: CPT | Mod: CPTII,S$GLB,, | Performed by: PSYCHIATRY & NEUROLOGY

## 2025-08-28 PROCEDURE — 1159F MED LIST DOCD IN RCRD: CPT | Mod: CPTII,S$GLB,, | Performed by: PSYCHIATRY & NEUROLOGY

## 2025-08-28 RX ORDER — LORAZEPAM 1 MG/1
1 TABLET ORAL NIGHTLY
Qty: 30 TABLET | Refills: 5 | Status: SHIPPED | OUTPATIENT
Start: 2025-08-28

## (undated) DEVICE — DRAPE LAVH LAPAROSCOPY W/FLUID

## (undated) DEVICE — TUBING HEATED INSUFFLATOR

## (undated) DEVICE — SUT VICRYL PLUS 3-0 SH 18IN

## (undated) DEVICE — SEE MEDLINE ITEM 146345

## (undated) DEVICE — DRESSING TELFA N ADH 3X8

## (undated) DEVICE — APPLICATOR CHLORAPREP ORN 26ML

## (undated) DEVICE — SEE MEDLINE ITEM 152739

## (undated) DEVICE — SEE MEDLINE ITEM 157027

## (undated) DEVICE — GLOVE SURGICAL LATEX SZ 7

## (undated) DEVICE — SOL ELECTROLUBE ANTI-STIC

## (undated) DEVICE — SUT MONOCRYL 4-0 PS-1 UND

## (undated) DEVICE — SYR 50CC LL

## (undated) DEVICE — COVER OVERHEAD SURG LT BLUE

## (undated) DEVICE — SOL NS 1000CC

## (undated) DEVICE — TIP RUMI BLUE DISPOSABLE 5/BX

## (undated) DEVICE — POSITIONER HEAD DONUT 9IN FOAM

## (undated) DEVICE — SUT VICRYL PLUS 0 CT1 36IN

## (undated) DEVICE — ELECTRODE REM PLYHSV RETURN 9

## (undated) DEVICE — SUT VICRYL 4-0 ANTIBACT

## (undated) DEVICE — GLOVE PROTEXIS HYDROGEL SZ6.5

## (undated) DEVICE — CLOSURE SKIN STERI STRIP 1/2X4

## (undated) DEVICE — SYR ONLY LUER LOCK 20CC

## (undated) DEVICE — DECANTER VIAL ASEPTIC TRANSFER

## (undated) DEVICE — GLOVE PROTEXIS HYDROGEL SZ7

## (undated) DEVICE — SYR 30CC LUER LOCK

## (undated) DEVICE — PAD ABD 8X10 STERILE

## (undated) DEVICE — SUT 1 36IN PDS II VIO MONO

## (undated) DEVICE — DRESSING LEUKOPLAST FLEX 1X3IN

## (undated) DEVICE — SUT VICRYL PLUS 0 CT1 18IN

## (undated) DEVICE — OBTURATOR BLADELESS 8MM XI CLR

## (undated) DEVICE — TROCAR ENDOPATH XCEL 8MM 10CM

## (undated) DEVICE — SOL 9P NACL IRR PIC IL

## (undated) DEVICE — MANIPULATOR UTERINE

## (undated) DEVICE — GLOVE SURG BIOGEL LATEX SZ 7.5

## (undated) DEVICE — Device

## (undated) DEVICE — KIT ANTIFOG

## (undated) DEVICE — SEE MEDLINE ITEM 152622

## (undated) DEVICE — DEVICE ABLATION NOVASURE DISP

## (undated) DEVICE — PACK DRAPE PERI/GYN TIBURON

## (undated) DEVICE — NDL PNEUMO INSUFFLATI 120MM

## (undated) DEVICE — ELECTRODE BLD EXT 6.50 ST DISP

## (undated) DEVICE — SUPPORT ULNA NERVE PROTECTOR

## (undated) DEVICE — RELOAD PROXIMATE CUT BLUE 75MM

## (undated) DEVICE — ADHESIVE DERMABOND ADVANCED

## (undated) DEVICE — SUT 0 54IN COATED VICRYL U

## (undated) DEVICE — DRAPE COLUMN DAVINCI XI

## (undated) DEVICE — SEE MEDLINE ITEM 154981

## (undated) DEVICE — CONTAINER SPECIMEN STRL 4OZ

## (undated) DEVICE — COVER TIP CURVED SCISSORS XI

## (undated) DEVICE — SEAL UNIVERSAL 5MM-8MM XI

## (undated) DEVICE — SPONGE LAP 18X18 PREWASHED

## (undated) DEVICE — IRRIGATOR ENDOSCOPY DISP.

## (undated) DEVICE — CATH 16FR URETHRL RED RUB

## (undated) DEVICE — SEE MEDLINE ITEM 157181

## (undated) DEVICE — MANIFOLD 4 PORT

## (undated) DEVICE — SEE MEDLINE ITEM 157117

## (undated) DEVICE — TAPE SURG MEDIPORE 6X72IN

## (undated) DEVICE — TROCAR ENDOPATH XCEL 5X100MM

## (undated) DEVICE — SYR 10CC LUER LOCK

## (undated) DEVICE — CUTTER PROXIMATE BLUE 75MM

## (undated) DEVICE — SEE MEDLINE ITEM 146292

## (undated) DEVICE — SUT SILK 3-0 SH 18IN BLACK

## (undated) DEVICE — SPONGE GAUZE 16PLY 4X4

## (undated) DEVICE — NDL SAFETY 22G X 1.5 ECLIPSE

## (undated) DEVICE — WARMER DRAPE STERILE LF

## (undated) DEVICE — SEE MEDLINE ITEM 157148

## (undated) DEVICE — SUT 1 48IN PDS II VIO MONO

## (undated) DEVICE — SUT PDS II 96 1 VIO

## (undated) DEVICE — CORD LAP 10 DISP

## (undated) DEVICE — DRAPE ARM DAVINCI XI

## (undated) DEVICE — SUT 2/0 30IN SILK BLK BRAI

## (undated) DEVICE — EVACUATOR KIT SMOKE PLUME AWAY

## (undated) DEVICE — SYR 3CC LUER LOC

## (undated) DEVICE — SUT SILK 3-0 STRANDS 30IN

## (undated) DEVICE — SET CYSTO IRRIGATION UNIV SPIK

## (undated) DEVICE — DRAPE STERI LONG

## (undated) DEVICE — SEE L#152161

## (undated) DEVICE — ADHESIVE MASTISOL VIAL 48/BX

## (undated) DEVICE — GLOVE SURGICAL LATEX SZ 6

## (undated) DEVICE — DEVICE ENSEAL X1 LARGE JAW

## (undated) DEVICE — SEE L#153236

## (undated) DEVICE — UNDERGLOVES BIOGEL PI SZ 7 LF

## (undated) DEVICE — UNDERGLOVES BIOGEL PI SZ 6 LF

## (undated) DEVICE — SUT VICRYL 3-0 27 SH

## (undated) DEVICE — SOL DEX 5% PF/100ML 150ML

## (undated) DEVICE — GAUZE SPONGE 4X4 12PLY

## (undated) DEVICE — SUT SILK 2-0 STRANDS 30IN

## (undated) DEVICE — SET DECANTER MEDICHOICE

## (undated) DEVICE — OCCLUDER COLPO-PNEUMO STERILE

## (undated) DEVICE — SUT SILK 0 SUTUPAK SA86H